# Patient Record
Sex: MALE | Race: WHITE | NOT HISPANIC OR LATINO | Employment: OTHER | URBAN - METROPOLITAN AREA
[De-identification: names, ages, dates, MRNs, and addresses within clinical notes are randomized per-mention and may not be internally consistent; named-entity substitution may affect disease eponyms.]

---

## 2017-03-31 ENCOUNTER — HOSPITAL ENCOUNTER (OUTPATIENT)
Facility: MEDICAL CENTER | Age: 67
End: 2017-03-31
Attending: COLON & RECTAL SURGERY | Admitting: COLON & RECTAL SURGERY
Payer: COMMERCIAL

## 2017-03-31 VITALS
WEIGHT: 193.78 LBS | TEMPERATURE: 98.1 F | OXYGEN SATURATION: 98 % | HEART RATE: 49 BPM | RESPIRATION RATE: 16 BRPM | SYSTOLIC BLOOD PRESSURE: 101 MMHG | DIASTOLIC BLOOD PRESSURE: 66 MMHG

## 2017-03-31 PROBLEM — R11.2 NAUSEA WITH VOMITING: Status: ACTIVE | Noted: 2017-03-31

## 2017-03-31 LAB
ANION GAP SERPL CALC-SCNC: 9 MMOL/L (ref 0–11.9)
BUN SERPL-MCNC: 15 MG/DL (ref 8–22)
CALCIUM SERPL-MCNC: 9.2 MG/DL (ref 8.5–10.5)
CHLORIDE SERPL-SCNC: 107 MMOL/L (ref 96–112)
CO2 SERPL-SCNC: 24 MMOL/L (ref 20–33)
CREAT SERPL-MCNC: 0.85 MG/DL (ref 0.5–1.4)
GFR SERPL CREATININE-BSD FRML MDRD: >60 ML/MIN/1.73 M 2
GLUCOSE SERPL-MCNC: 116 MG/DL (ref 65–99)
POTASSIUM SERPL-SCNC: 3.9 MMOL/L (ref 3.6–5.5)
SODIUM SERPL-SCNC: 140 MMOL/L (ref 135–145)

## 2017-03-31 PROCEDURE — 160002 HCHG RECOVERY MINUTES (STAT): Performed by: COLON & RECTAL SURGERY

## 2017-03-31 PROCEDURE — 160048 HCHG OR STATISTICAL LEVEL 1-5: Performed by: COLON & RECTAL SURGERY

## 2017-03-31 PROCEDURE — 500066 HCHG BITE BLOCK, ECT: Performed by: COLON & RECTAL SURGERY

## 2017-03-31 PROCEDURE — 99153 MOD SED SAME PHYS/QHP EA: CPT | Performed by: COLON & RECTAL SURGERY

## 2017-03-31 PROCEDURE — 80048 BASIC METABOLIC PNL TOTAL CA: CPT

## 2017-03-31 PROCEDURE — 88305 TISSUE EXAM BY PATHOLOGIST: CPT | Mod: 59

## 2017-03-31 PROCEDURE — 700111 HCHG RX REV CODE 636 W/ 250 OVERRIDE (IP)

## 2017-03-31 PROCEDURE — 160208 HCHG ENDO MINUTES - EA ADDL 1 MIN LEVEL 4: Performed by: COLON & RECTAL SURGERY

## 2017-03-31 PROCEDURE — 700111 HCHG RX REV CODE 636 W/ 250 OVERRIDE (IP): Performed by: COLON & RECTAL SURGERY

## 2017-03-31 PROCEDURE — 99152 MOD SED SAME PHYS/QHP 5/>YRS: CPT | Performed by: COLON & RECTAL SURGERY

## 2017-03-31 PROCEDURE — 502240 HCHG MISC OR SUPPLY RC 0272: Performed by: COLON & RECTAL SURGERY

## 2017-03-31 PROCEDURE — 160203 HCHG ENDO MINUTES - 1ST 30 MINS LEVEL 4: Performed by: COLON & RECTAL SURGERY

## 2017-03-31 RX ORDER — MIDAZOLAM HYDROCHLORIDE 1 MG/ML
.5-2 INJECTION INTRAMUSCULAR; INTRAVENOUS PRN
Status: DISCONTINUED | OUTPATIENT
Start: 2017-03-31 | End: 2017-03-31 | Stop reason: HOSPADM

## 2017-03-31 RX ORDER — SODIUM CHLORIDE 9 MG/ML
500 INJECTION, SOLUTION INTRAVENOUS PRN
Status: DISCONTINUED | OUTPATIENT
Start: 2017-03-31 | End: 2017-03-31 | Stop reason: HOSPADM

## 2017-03-31 RX ORDER — ATORVASTATIN CALCIUM 20 MG/1
20 TABLET, FILM COATED ORAL NIGHTLY
COMMUNITY
End: 2023-07-27

## 2017-03-31 RX ORDER — MIDAZOLAM HYDROCHLORIDE 1 MG/ML
INJECTION INTRAMUSCULAR; INTRAVENOUS
Status: DISCONTINUED | OUTPATIENT
Start: 2017-03-31 | End: 2017-03-31 | Stop reason: HOSPADM

## 2017-03-31 RX ORDER — GABAPENTIN 300 MG/1
600 CAPSULE ORAL 2 TIMES DAILY
COMMUNITY

## 2017-03-31 RX ORDER — SODIUM CHLORIDE, SODIUM LACTATE, POTASSIUM CHLORIDE, CALCIUM CHLORIDE 600; 310; 30; 20 MG/100ML; MG/100ML; MG/100ML; MG/100ML
1000 INJECTION, SOLUTION INTRAVENOUS
Status: COMPLETED | OUTPATIENT
Start: 2017-03-31 | End: 2017-03-31

## 2017-03-31 RX ADMIN — SODIUM CHLORIDE, POTASSIUM CHLORIDE, SODIUM LACTATE AND CALCIUM CHLORIDE 1000 ML: 600; 310; 30; 20 INJECTION, SOLUTION INTRAVENOUS at 09:30

## 2017-03-31 ASSESSMENT — PAIN SCALES - GENERAL
PAINLEVEL_OUTOF10: 0

## 2017-03-31 NOTE — IP AVS SNAPSHOT
Home Care Instructions                                                                                                                Name:Bart [facility]  Medical Record Number:[id number]  CSN: [id number]    YOB: 1950   Age: 67 y.o.  Sex: male  HT:  WT: 87.9 kg (193 lb 12.6 oz)          Admit Date: 3/31/2017     Discharge Date:   Today's Date: 3/31/2017  Attending Doctor:  Emerson Eaton M.D.                  Allergies:  Review of patient's allergies indicates no known allergies.                Discharge Instructions           ACTIVITY: Rest and take it easy for the first 24 hours.  A responsible adult is recommended to remain with you during that time.  It is normal to feel sleepy.  We encourage you to not do anything that requires balance, judgment or coordination.    MILD FLU-LIKE SYMPTOMS ARE NORMAL. YOU MAY EXPERIENCE GENERALIZED MUSCLE ACHES, THROAT IRRITATION, HEADACHE AND/OR SOME NAUSEA.    FOR 24 HOURS DO NOT:  Drive, operate machinery or run household appliances.  Drink beer or alcoholic beverages.   Make important decisions or sign legal documents.    SPECIAL INSTRUCTIONS: *EGD and Colonoscopy D/C instructions:     1. DIET: Upon discharge from the hospital you may resume your normal preoperative diet. Depending on how you are feeling and whether you have nausea or not, you may wish to stay with a bland diet for the first few days. However, you can advance this as quickly as you feel ready.     2. ACTIVITIES: Upon discharge from the hospital, the day of surgery it is requested that you do no significant physical activity and limit mental activities, as you have had sedation. The day after discharge, you may resume full routine activities.     3. DRIVING: You may drive whenever you are off pain medications.     4. BOWEL FUNCTION: Constipation is common after receiving anesthesia. The combination of pain medication and decreased activity level can cause constipation in otherwise normal  patients. If you feel this is occurring, take a laxative (Miralax, Milk of Magnesia, Ex-Lax, Senokot, etc.) until the problem has resolved.     5.CALL IF YOU HAVE: (1) Fevers to more than 101.0 F, (2) Unusual chest or leg pain, (3) Excessive bleeding or persistent nausea/vomiting, Please do not hesitate to call with any other questions.     6. APPOINTMENT: If biopsies were done, you can call the office in 1-2 weeks for results. Contact our office at [phone] with any questions or concerns. Our office hours are Monday-Friday, 8am-5pm.  Please try to call during these hours when we are better able to assist you.     If you have any additional questions, please do not hesitate to call the office and speak to either myself or the physician on call.     Office address:   [facility].   [address] [place] [id number]**    DIET: To avoid nausea, slowly advance diet as tolerated, avoiding spicy or greasy foods for the first day.  Add more substantial food to your diet according to your physician's instructions.  Babies can be fed formula or breast milk as soon as they are hungry.  INCREASE FLUIDS AND FIBER TO AVOID CONSTIPATION.    SURGICAL DRESSING/BATHING: *MAY SHOWER TOMORROW**    FOLLOW-UP APPOINTMENT:  A follow-up appointment should be arranged with your doctor in **FOLLOW UP WITH DR. DE LA PAZ *; call to schedule.    You should CALL YOUR PHYSICIAN if you develop:  Fever greater than 101 degrees F.  Pain not relieved by medication, or persistent nausea or vomiting.  Excessive bleeding (blood soaking through dressing) or unexpected drainage from the wound.  Extreme redness or swelling around the incision site, drainage of pus or foul smelling drainage.  Inability to urinate or empty your bladder within 8 hours.  Problems with breathing or chest pain.    You should call 911 if you develop problems with breathing or chest pain.  If you are unable to contact your doctor or surgical center,  you should go to the nearest emergency room or urgent care center.  Physician's telephone #: *DR. DE LA [phone] **    If any questions arise, call your doctor.  If your doctor is not available, please feel free to call the Surgical Center at [phone].  The Center is open Monday through Friday from 7AM to 7PM.  You can also call the HEALTH HOTLINE open 24 hours/day, 7 days/week and speak to a nurse at [phone], or toll free at [phone].    A registered nurse may call you a few days after your surgery to see how you are doing after your procedure.    MEDICATIONS: Resume taking daily medication.  Take prescribed pain medication with food.  If no medication is prescribed, you may take non-aspirin pain medication if needed.  PAIN MEDICATION CAN BE VERY CONSTIPATING.  Take a stool softener or laxative such as senokot, pericolace, or milk of magnesia if needed.    Prescription given for *NONE**.  Last pain medication given at *NONE    If your physician has prescribed pain medication that includes Acetaminophen (Tylenol), do not take additional Acetaminophen (Tylenol) while taking the prescribed medication.    Depression / Suicide Risk    As you are discharged from this [facility] facility, it is important to learn how to keep safe from harming yourself.    Recognize the warning signs:  · Abrupt changes in personality, positive or negative- including increase in energy   · Giving away possessions  · Change in eating patterns- significant weight changes-  positive or negative  · Change in sleeping patterns- unable to sleep or sleeping all the time   · Unwillingness or inability to communicate  · Depression  · Unusual sadness, discouragement and loneliness  · Talk of wanting to die  · Neglect of personal appearance   · Rebelliousness- reckless behavior  · Withdrawal from people/activities they love  · Confusion- inability to concentrate     If you or a loved one observes any of these behaviors or has  concerns about self-harm, here's what you can do:  · Talk about it- your feelings and reasons for harming yourself  · Remove any means that you might use to hurt yourself (examples: pills, rope, extension cords, firearm)  · Get professional help from the community (Mental Health, Substance Abuse, psychological counseling)  · Do not be alone:Call your Safe Contact- someone whom you trust who will be there for you.  · Call your local CRISIS HOTLINE 547-5629 or 202-097-5863  · Call your local Children's Mobile Crisis Response Team [place] (175) 235-5095 or www.[employer]  · Call the toll free National Suicide Prevention Hotlines   · National Suicide Prevention Lifeline 244-543-YASS (8022)  · [employer] [facility] Network 800-SUICIDE (284-0207)       Medication List      CONTINUE taking these medications        Instructions    aspirin EC 81 MG Tbec   Commonly known as:  ECOTRIN    Take 81 mg by mouth every day.   Dose:  81 mg       atorvastatin 20 MG Tabs   Commonly known as:  LIPITOR    Take 20 mg by mouth every evening.   Dose:  20 mg       gabapentin 300 MG Caps   Commonly known as:  NEURONTIN    Take 300 mg by mouth 3 times a day.   Dose:  300 mg               Medication Information     Above and/or attached are the medications your physician expects you to take upon discharge. Review all of your home medications and newly ordered medications with your doctor and/or pharmacist. Follow medication instructions as directed by your doctor and/or pharmacist. Please keep your medication list with you and share with your physician. Update the information when medications are discontinued, doses are changed, or new medications (including over-the-counter products) are added; and carry medication information at all times in the event of emergency situations.        Resources     Quit Smoking / Tobacco Use:    I understand the use of any tobacco products increases my chance of suffering from future heart disease or  stroke and could cause other illnesses which may shorten my life. Quitting the use of tobacco products is the single most important thing I can do to improve my health. For further information on smoking / tobacco cessation call a Toll Free Quit Line at 1-849.963.5592 (*National Cancer [place]) or 1-933.478.1175 (American Lung Association) or you can access the web based program at www.lungusa.org.    [place] Tobacco Users Help Line:  (779) 930-9752       Toll Free: 1-151.322.1839  Quit Tobacco Program [facility] (590)429-1277    Crisis Hotline:    [place] Crisis Hotline:  0-477-HIJRMUK or 1-769.333.6839    [place] Crisis Hotline:    1-716.691.8735 or 198-822-7224    Discharge Survey:   Thank you for choosing [facility]. We hope we did everything we could to make your hospital stay a pleasant one. You may be receiving a survey and we would appreciate your time and participation in answering the questions. Your input is very valuable to us in our efforts to improve our service to our patients and their families.            Signatures     My signature on this form indicates that:    1. I acknowledge receipt and understanding of these Home Care Instruction.  2. My questions regarding this information have been answered to my satisfaction.  3. I have formulated a plan with my discharge nurse to obtain my prescribed medications for home.    __________________________________      __________________________________                   Patient Signature                                 Guardian/Responsible Adult Signature      __________________________________                 __________       ________                       Nurse Signature                                               Date                 Time

## 2017-03-31 NOTE — OP REPORT
NAME:  Bart Ramsey  MRN:  [id number]  :  1950      DATE OF OPERATION: 3/31/2017    PREOPERATIVE DIAGNOSIS: Vomiting, diarrhea    POSTOPERATIVE DIAGNOSIS: Small hiatal hernia, sigmoid diverticulosis, otherwise normal EGD and colonoscopy to ccum    OPERATION PERFORMED: Esophagogastroduodenoscopy and colonoscopy to cecum with random biopsies    SURGEON: Emerson Eaton MD    SPECIMEN: Colon random biopsies    COMPLICATIONS: None    ESTIMATED BLOOD LOSS: <5 cc    INDICATIONS: The patient is a 67 y.o. male with a history of Vomiting, diarrhea. He is taken to the operating room today for Esophagogastroduodenoscopy.     DETAILS OF PROCEDURE: After an extensive informed consent discussion and   process, the patient was brought to the operating room and was placed in a   supine position on the endoscopy table. The patient was then given conscious sedation in the form 100 mcg of fentanyl and 5 mg of Versed IV. During the course of the   procedure, the patient was monitored continuously with pulse oximetry, telemetry, and   intermittent blood pressure readings.     The patient was turned and placed in the left lateral decubitus position with   a bite block and comfortable padding. After induction of conscious sedation,   the gastroscope was slowly introduced and advanced into the esophagus. It was   slowly advanced down to the gastroesophageal junction region. The GE   junction appeared normal with a small hiatal hernia.  The gastroscope passed without difficulty into the body of the stomach, which appeared normal. The gastroscope was then advanced into the duodenum. The first, second, and third portions of the duodenum appeared normal with no evidence of duodenitis or ulcers.     Slowly, the gastroscope was withdrawn, and the   antrum appeared normal. A retroflexion view was performed. This demonstrated   no evidence of ulcers.  The gastroscope was slowly withdrawn as air was aspirated and the area of the  proximal stomach and gastroesophageal junction region were again carefully inspected, which all appeared normal with a small hiatal hernia a very subtle venous prominence or varix and were photographed for documentation purposes. The gastroscope was then withdrawn.     A digital rectal exam was performed which demonstrated normal tone generally normal tone and no lesions, no significant external hemorrhoids.    Colonoscopy was introduced. It was slowly advanced. The rectum appeared normal. The sigmoid colon exhibited mild diverticulosis. Gradually, the colonoscope was advanced beyond both flexures to the cecum. The ileocecal valve and appendiceal orifice were clearly identified and photographed for documentation purposes. The area was irrigated as modest amount of fecal material obscured the view in the cecum and throughout the colon, but mostly we had a good view with multiple flushes and irrigations.     The colonoscope was slowly withdrawn. The vascular pattern appeared normal. No neoplasia or lesions were identified. Random biopsies were obtained from the ascending, transverse, descending, sigmoid and colon.  In the sigmoid colon, diverticulosis was present. Examination of the  internal hemorrhoids demonstrated mild-to-moderate internal hemorrhoids. Air was aspirated and the colonoscope was withdrawn.       IMPRESSION/PLAN: Small hiatal hernia. Sigmoid diverticulosis, otherwise normal EGD and colonoscopy.    Await colon biopsies. Recommend abdominal ultrasound to further evaluate vomiting.     The patient tolerated the procedure well and there were no apparent complications.  He was awakened and transferred to the recovery area in satisfactory condition.       ____________________________________   Emerson Eaton MD  DD: 3/31/2017  12:34 PM    CC:  [facility];

## 2017-03-31 NOTE — IP AVS SNAPSHOT
3/31/2017          Bart [facility]  [address] [place] [address]    Dear Bart:    [facility] wants to ensure your discharge home is safe and you or your loved ones have had all your questions answered regarding your care after you leave the hospital.    You may receive a telephone call within two days of your discharge.  This call is to make certain you understand your discharge instructions as well as ensure we provided you with the best care possible during your stay with us.     The call will only last approximately 3-5 minutes and will be done by a nurse.    Once again, we want to ensure your discharge home is safe and that you have a clear understanding of any next steps in your care.  If you have any questions or concerns, please do not hesitate to contact us, we are here for you.  Thank you for choosing [facility] for your healthcare needs.    Sincerely,    Manny Thompson    [facility]

## 2017-03-31 NOTE — DISCHARGE INSTRUCTIONS
ACTIVITY: Rest and take it easy for the first 24 hours.  A responsible adult is recommended to remain with you during that time.  It is normal to feel sleepy.  We encourage you to not do anything that requires balance, judgment or coordination.    MILD FLU-LIKE SYMPTOMS ARE NORMAL. YOU MAY EXPERIENCE GENERALIZED MUSCLE ACHES, THROAT IRRITATION, HEADACHE AND/OR SOME NAUSEA.    FOR 24 HOURS DO NOT:  Drive, operate machinery or run household appliances.  Drink beer or alcoholic beverages.   Make important decisions or sign legal documents.    SPECIAL INSTRUCTIONS: *EGD and Colonoscopy D/C instructions:     1. DIET: Upon discharge from the hospital you may resume your normal preoperative diet. Depending on how you are feeling and whether you have nausea or not, you may wish to stay with a bland diet for the first few days. However, you can advance this as quickly as you feel ready.     2. ACTIVITIES: Upon discharge from the hospital, the day of surgery it is requested that you do no significant physical activity and limit mental activities, as you have had sedation. The day after discharge, you may resume full routine activities.     3. DRIVING: You may drive whenever you are off pain medications.     4. BOWEL FUNCTION: Constipation is common after receiving anesthesia. The combination of pain medication and decreased activity level can cause constipation in otherwise normal patients. If you feel this is occurring, take a laxative (Miralax, Milk of Magnesia, Ex-Lax, Senokot, etc.) until the problem has resolved.     5.CALL IF YOU HAVE: (1) Fevers to more than 101.0 F, (2) Unusual chest or leg pain, (3) Excessive bleeding or persistent nausea/vomiting, Please do not hesitate to call with any other questions.     6. APPOINTMENT: If biopsies were done, you can call the office in 1-2 weeks for results. Contact our office at [phone] with any questions or concerns. Our office hours are Monday-Friday, 8am-5pm.  Please  try to call during these hours when we are better able to assist you.     If you have any additional questions, please do not hesitate to call the office and speak to either myself or the physician on call.     Office address:   [facility].   [address] [place] [address]   [address]   [place] [address]**    DIET: To avoid nausea, slowly advance diet as tolerated, avoiding spicy or greasy foods for the first day.  Add more substantial food to your diet according to your physician's instructions.  Babies can be fed formula or breast milk as soon as they are hungry.  INCREASE FLUIDS AND FIBER TO AVOID CONSTIPATION.    SURGICAL DRESSING/BATHING: *MAY SHOWER TOMORROW**    FOLLOW-UP APPOINTMENT:  A follow-up appointment should be arranged with your doctor in **FOLLOW UP WITH DR. DE LA PAZ *; call to schedule.    You should CALL YOUR PHYSICIAN if you develop:  Fever greater than 101 degrees F.  Pain not relieved by medication, or persistent nausea or vomiting.  Excessive bleeding (blood soaking through dressing) or unexpected drainage from the wound.  Extreme redness or swelling around the incision site, drainage of pus or foul smelling drainage.  Inability to urinate or empty your bladder within 8 hours.  Problems with breathing or chest pain.    You should call 911 if you develop problems with breathing or chest pain.  If you are unable to contact your doctor or surgical center, you should go to the nearest emergency room or urgent care center.  Physician's telephone #: *DR. DE LA -[phone] **    If any questions arise, call your doctor.  If your doctor is not available, please feel free to call the Surgical Center at [phone].  The Center is open Monday through Friday from 7AM to 7PM.  You can also call the "[employer] HOTLINE open 24 hours/day, 7 days/week and speak to a nurse at [phone], or toll free at [phone].    A registered nurse may call you a few days after your surgery to see how you are doing after  your procedure.    MEDICATIONS: Resume taking daily medication.  Take prescribed pain medication with food.  If no medication is prescribed, you may take non-aspirin pain medication if needed.  PAIN MEDICATION CAN BE VERY CONSTIPATING.  Take a stool softener or laxative such as senokot, pericolace, or milk of magnesia if needed.    Prescription given for *NONE**.  Last pain medication given at *NONE    If your physician has prescribed pain medication that includes Acetaminophen (Tylenol), do not take additional Acetaminophen (Tylenol) while taking the prescribed medication.    Depression / Suicide Risk    As you are discharged from this [facility] facility, it is important to learn how to keep safe from harming yourself.    Recognize the warning signs:  · Abrupt changes in personality, positive or negative- including increase in energy   · Giving away possessions  · Change in eating patterns- significant weight changes-  positive or negative  · Change in sleeping patterns- unable to sleep or sleeping all the time   · Unwillingness or inability to communicate  · Depression  · Unusual sadness, discouragement and loneliness  · Talk of wanting to die  · Neglect of personal appearance   · Rebelliousness- reckless behavior  · Withdrawal from people/activities they love  · Confusion- inability to concentrate     If you or a loved one observes any of these behaviors or has concerns about self-harm, here's what you can do:  · Talk about it- your feelings and reasons for harming yourself  · Remove any means that you might use to hurt yourself (examples: pills, rope, extension cords, firearm)  · Get professional help from the community (Mental Health, Substance Abuse, psychological counseling)  · Do not be alone:Call your Safe Contact- someone whom you trust who will be there for you.  · Call your local CRISIS HOTLINE 305-3866 or 653-839-1652  · Call your local Children's Mobile Crisis Response Team [facility] (702)  175-8800 or www.[employer]pan  · Call the toll free National Suicide Prevention Hotlines   · National Suicide Prevention Lifeline 445-076-TVID (6267)  · National [employer] Line Network 800-SUICIDE (085-2934)

## 2017-03-31 NOTE — OR NURSING
1210  RECEIVED PATIENT FROM ENDO.  REPORT FROM ROSI GARCIA.  PATIENT DENIES PAIN.  RESPIRATIONS ARE EVEN AND UNLABORED. TAKING PO WITHOUT DIFFICULTY.     1300  UP GETTING DRESSED.  WIFE CYNTHIA TO BEDSIDE.    1307  DISCHARGED.  DISCHARGE INSTRUCTIONS GIVEN TO PATIENT AND HIS WIFE.  A VERBAL UNDERSTANDING OF ALL INSTRUCTIONS WAS STATED.  PATIENT TAKING PO AND AMBULATING WITHOUT DIFFICULTY.  PATIENT STATES HE IS READY TO GO HOME.

## 2021-01-15 DIAGNOSIS — Z23 NEED FOR VACCINATION: ICD-10-CM

## 2023-02-10 ENCOUNTER — APPOINTMENT (OUTPATIENT)
Dept: RADIOLOGY | Facility: MEDICAL CENTER | Age: 73
End: 2023-02-10
Attending: EMERGENCY MEDICINE
Payer: COMMERCIAL

## 2023-02-10 ENCOUNTER — APPOINTMENT (OUTPATIENT)
Dept: RADIOLOGY | Facility: MEDICAL CENTER | Age: 73
End: 2023-02-10
Attending: INTERNAL MEDICINE
Payer: COMMERCIAL

## 2023-02-10 ENCOUNTER — APPOINTMENT (OUTPATIENT)
Dept: CARDIOLOGY | Facility: MEDICAL CENTER | Age: 73
End: 2023-02-10
Attending: INTERNAL MEDICINE
Payer: COMMERCIAL

## 2023-02-10 ENCOUNTER — HOSPITAL ENCOUNTER (OUTPATIENT)
Facility: MEDICAL CENTER | Age: 73
End: 2023-02-11
Attending: EMERGENCY MEDICINE | Admitting: INTERNAL MEDICINE
Payer: COMMERCIAL

## 2023-02-10 DIAGNOSIS — I25.9 CHEST PAIN DUE TO MYOCARDIAL ISCHEMIA, UNSPECIFIED ISCHEMIC CHEST PAIN TYPE: ICD-10-CM

## 2023-02-10 DIAGNOSIS — I16.1 HYPERTENSIVE EMERGENCY: ICD-10-CM

## 2023-02-10 DIAGNOSIS — R07.9 ACUTE CHEST PAIN: ICD-10-CM

## 2023-02-10 PROBLEM — I10 HYPERTENSION: Status: ACTIVE | Noted: 2023-02-10

## 2023-02-10 PROBLEM — I25.10 CAD (CORONARY ARTERY DISEASE): Status: ACTIVE | Noted: 2023-02-10

## 2023-02-10 PROBLEM — I71.9 AORTIC ANEURYSM (HCC): Status: ACTIVE | Noted: 2023-02-10

## 2023-02-10 LAB
ALBUMIN SERPL BCP-MCNC: 4.3 G/DL (ref 3.2–4.9)
ALBUMIN/GLOB SERPL: 1.2 G/DL
ALP SERPL-CCNC: 100 U/L (ref 30–99)
ALT SERPL-CCNC: 30 U/L (ref 2–50)
ANION GAP SERPL CALC-SCNC: 13 MMOL/L (ref 7–16)
AST SERPL-CCNC: 37 U/L (ref 12–45)
BASOPHILS # BLD AUTO: 1 % (ref 0–1.8)
BASOPHILS # BLD: 0.08 K/UL (ref 0–0.12)
BILIRUB SERPL-MCNC: 0.7 MG/DL (ref 0.1–1.5)
BUN SERPL-MCNC: 12 MG/DL (ref 8–22)
CALCIUM ALBUM COR SERPL-MCNC: 9.3 MG/DL (ref 8.5–10.5)
CALCIUM SERPL-MCNC: 9.5 MG/DL (ref 8.5–10.5)
CHLORIDE SERPL-SCNC: 105 MMOL/L (ref 96–112)
CO2 SERPL-SCNC: 22 MMOL/L (ref 20–33)
CREAT SERPL-MCNC: 0.82 MG/DL (ref 0.5–1.4)
D DIMER PPP IA.FEU-MCNC: 0.55 UG/ML (FEU) (ref 0–0.5)
EKG IMPRESSION: NORMAL
EOSINOPHIL # BLD AUTO: 0.29 K/UL (ref 0–0.51)
EOSINOPHIL NFR BLD: 3.7 % (ref 0–6.9)
ERYTHROCYTE [DISTWIDTH] IN BLOOD BY AUTOMATED COUNT: 47.1 FL (ref 35.9–50)
GFR SERPLBLD CREATININE-BSD FMLA CKD-EPI: 93 ML/MIN/1.73 M 2
GLOBULIN SER CALC-MCNC: 3.5 G/DL (ref 1.9–3.5)
GLUCOSE BLD STRIP.AUTO-MCNC: 140 MG/DL (ref 65–99)
GLUCOSE BLD STRIP.AUTO-MCNC: 146 MG/DL (ref 65–99)
GLUCOSE SERPL-MCNC: 154 MG/DL (ref 65–99)
HCT VFR BLD AUTO: 47.7 % (ref 42–52)
HGB BLD-MCNC: 16.4 G/DL (ref 14–18)
IMM GRANULOCYTES # BLD AUTO: 0.02 K/UL (ref 0–0.11)
IMM GRANULOCYTES NFR BLD AUTO: 0.3 % (ref 0–0.9)
LV EJECT FRACT  99904: 65
LYMPHOCYTES # BLD AUTO: 1.65 K/UL (ref 1–4.8)
LYMPHOCYTES NFR BLD: 21.3 % (ref 22–41)
MCH RBC QN AUTO: 31.6 PG (ref 27–33)
MCHC RBC AUTO-ENTMCNC: 34.4 G/DL (ref 33.7–35.3)
MCV RBC AUTO: 91.9 FL (ref 81.4–97.8)
MONOCYTES # BLD AUTO: 0.6 K/UL (ref 0–0.85)
MONOCYTES NFR BLD AUTO: 7.7 % (ref 0–13.4)
NEUTROPHILS # BLD AUTO: 5.11 K/UL (ref 1.82–7.42)
NEUTROPHILS NFR BLD: 66 % (ref 44–72)
NRBC # BLD AUTO: 0 K/UL
NRBC BLD-RTO: 0 /100 WBC
PLATELET # BLD AUTO: 97 K/UL (ref 164–446)
PMV BLD AUTO: 10.3 FL (ref 9–12.9)
POTASSIUM SERPL-SCNC: 4 MMOL/L (ref 3.6–5.5)
PROT SERPL-MCNC: 7.8 G/DL (ref 6–8.2)
RBC # BLD AUTO: 5.19 M/UL (ref 4.7–6.1)
SODIUM SERPL-SCNC: 140 MMOL/L (ref 135–145)
TROPONIN T SERPL-MCNC: 10 NG/L (ref 6–19)
TROPONIN T SERPL-MCNC: 14 NG/L (ref 6–19)
TROPONIN T SERPL-MCNC: 44 NG/L (ref 6–19)
WBC # BLD AUTO: 7.8 K/UL (ref 4.8–10.8)

## 2023-02-10 PROCEDURE — G0378 HOSPITAL OBSERVATION PER HR: HCPCS

## 2023-02-10 PROCEDURE — 85379 FIBRIN DEGRADATION QUANT: CPT

## 2023-02-10 PROCEDURE — A9270 NON-COVERED ITEM OR SERVICE: HCPCS | Performed by: INTERNAL MEDICINE

## 2023-02-10 PROCEDURE — 99285 EMERGENCY DEPT VISIT HI MDM: CPT

## 2023-02-10 PROCEDURE — 85025 COMPLETE CBC W/AUTO DIFF WBC: CPT

## 2023-02-10 PROCEDURE — 71045 X-RAY EXAM CHEST 1 VIEW: CPT

## 2023-02-10 PROCEDURE — 93005 ELECTROCARDIOGRAM TRACING: CPT | Performed by: INTERNAL MEDICINE

## 2023-02-10 PROCEDURE — 93005 ELECTROCARDIOGRAM TRACING: CPT

## 2023-02-10 PROCEDURE — 700111 HCHG RX REV CODE 636 W/ 250 OVERRIDE (IP): Performed by: INTERNAL MEDICINE

## 2023-02-10 PROCEDURE — 74175 CTA ABDOMEN W/CONTRAST: CPT

## 2023-02-10 PROCEDURE — 80053 COMPREHEN METABOLIC PANEL: CPT

## 2023-02-10 PROCEDURE — 96376 TX/PRO/DX INJ SAME DRUG ADON: CPT | Mod: XU

## 2023-02-10 PROCEDURE — 700102 HCHG RX REV CODE 250 W/ 637 OVERRIDE(OP): Performed by: INTERNAL MEDICINE

## 2023-02-10 PROCEDURE — 99204 OFFICE O/P NEW MOD 45 MIN: CPT | Performed by: INTERNAL MEDICINE

## 2023-02-10 PROCEDURE — 93005 ELECTROCARDIOGRAM TRACING: CPT | Mod: XE | Performed by: EMERGENCY MEDICINE

## 2023-02-10 PROCEDURE — 36415 COLL VENOUS BLD VENIPUNCTURE: CPT

## 2023-02-10 PROCEDURE — 96374 THER/PROPH/DIAG INJ IV PUSH: CPT

## 2023-02-10 PROCEDURE — 700102 HCHG RX REV CODE 250 W/ 637 OVERRIDE(OP): Performed by: EMERGENCY MEDICINE

## 2023-02-10 PROCEDURE — 700117 HCHG RX CONTRAST REV CODE 255: Performed by: EMERGENCY MEDICINE

## 2023-02-10 PROCEDURE — 99223 1ST HOSP IP/OBS HIGH 75: CPT | Performed by: INTERNAL MEDICINE

## 2023-02-10 PROCEDURE — 82962 GLUCOSE BLOOD TEST: CPT | Mod: 91

## 2023-02-10 PROCEDURE — 700111 HCHG RX REV CODE 636 W/ 250 OVERRIDE (IP): Performed by: EMERGENCY MEDICINE

## 2023-02-10 PROCEDURE — 96375 TX/PRO/DX INJ NEW DRUG ADDON: CPT

## 2023-02-10 PROCEDURE — A9270 NON-COVERED ITEM OR SERVICE: HCPCS | Performed by: EMERGENCY MEDICINE

## 2023-02-10 PROCEDURE — 700105 HCHG RX REV CODE 258: Performed by: INTERNAL MEDICINE

## 2023-02-10 PROCEDURE — 93306 TTE W/DOPPLER COMPLETE: CPT | Mod: 26 | Performed by: INTERNAL MEDICINE

## 2023-02-10 PROCEDURE — 93306 TTE W/DOPPLER COMPLETE: CPT

## 2023-02-10 PROCEDURE — 84484 ASSAY OF TROPONIN QUANT: CPT | Mod: 91

## 2023-02-10 RX ORDER — LANOLIN ALCOHOL/MO/W.PET/CERES
1000 CREAM (GRAM) TOPICAL DAILY
COMMUNITY

## 2023-02-10 RX ORDER — VITAMIN B COMPLEX
1000 TABLET ORAL DAILY
COMMUNITY

## 2023-02-10 RX ORDER — OXYCODONE HYDROCHLORIDE 5 MG/1
2.5 TABLET ORAL
Status: DISCONTINUED | OUTPATIENT
Start: 2023-02-10 | End: 2023-02-11 | Stop reason: HOSPADM

## 2023-02-10 RX ORDER — AMOXICILLIN 250 MG
2 CAPSULE ORAL 2 TIMES DAILY
Status: DISCONTINUED | OUTPATIENT
Start: 2023-02-10 | End: 2023-02-11 | Stop reason: HOSPADM

## 2023-02-10 RX ORDER — FLUTICASONE PROPIONATE 50 MCG
2 SPRAY, SUSPENSION (ML) NASAL DAILY
COMMUNITY
End: 2023-11-13

## 2023-02-10 RX ORDER — ATORVASTATIN CALCIUM 20 MG/1
20 TABLET, FILM COATED ORAL NIGHTLY
Status: DISCONTINUED | OUTPATIENT
Start: 2023-02-10 | End: 2023-02-11 | Stop reason: HOSPADM

## 2023-02-10 RX ORDER — BUDESONIDE AND FORMOTEROL FUMARATE DIHYDRATE 160; 4.5 UG/1; UG/1
2 AEROSOL RESPIRATORY (INHALATION)
Status: DISCONTINUED | OUTPATIENT
Start: 2023-02-10 | End: 2023-02-11 | Stop reason: HOSPADM

## 2023-02-10 RX ORDER — FLUTICASONE PROPIONATE AND SALMETEROL 250; 50 UG/1; UG/1
1 POWDER RESPIRATORY (INHALATION) 2 TIMES DAILY
COMMUNITY

## 2023-02-10 RX ORDER — ENALAPRILAT 1.25 MG/ML
1.25 INJECTION INTRAVENOUS EVERY 6 HOURS PRN
Status: DISCONTINUED | OUTPATIENT
Start: 2023-02-10 | End: 2023-02-11 | Stop reason: HOSPADM

## 2023-02-10 RX ORDER — BACLOFEN 10 MG/1
10 TABLET ORAL NIGHTLY PRN
Status: DISCONTINUED | OUTPATIENT
Start: 2023-02-10 | End: 2023-02-11 | Stop reason: HOSPADM

## 2023-02-10 RX ORDER — ONDANSETRON 4 MG/1
4 TABLET, ORALLY DISINTEGRATING ORAL EVERY 4 HOURS PRN
Status: DISCONTINUED | OUTPATIENT
Start: 2023-02-10 | End: 2023-02-11 | Stop reason: HOSPADM

## 2023-02-10 RX ORDER — HYDRALAZINE HYDROCHLORIDE 20 MG/ML
10 INJECTION INTRAMUSCULAR; INTRAVENOUS EVERY 4 HOURS PRN
Status: DISCONTINUED | OUTPATIENT
Start: 2023-02-10 | End: 2023-02-11 | Stop reason: HOSPADM

## 2023-02-10 RX ORDER — GABAPENTIN 300 MG/1
300 CAPSULE ORAL 3 TIMES DAILY
Status: DISCONTINUED | OUTPATIENT
Start: 2023-02-10 | End: 2023-02-11 | Stop reason: HOSPADM

## 2023-02-10 RX ORDER — ONDANSETRON 2 MG/ML
4 INJECTION INTRAMUSCULAR; INTRAVENOUS ONCE
Status: COMPLETED | OUTPATIENT
Start: 2023-02-10 | End: 2023-02-10

## 2023-02-10 RX ORDER — BACLOFEN 10 MG/1
10 TABLET ORAL NIGHTLY PRN
COMMUNITY

## 2023-02-10 RX ORDER — POLYETHYLENE GLYCOL 3350 17 G/17G
1 POWDER, FOR SOLUTION ORAL
Status: DISCONTINUED | OUTPATIENT
Start: 2023-02-10 | End: 2023-02-11 | Stop reason: HOSPADM

## 2023-02-10 RX ORDER — FLUTICASONE PROPIONATE 50 MCG
2 SPRAY, SUSPENSION (ML) NASAL DAILY
Status: DISCONTINUED | OUTPATIENT
Start: 2023-02-11 | End: 2023-02-11 | Stop reason: HOSPADM

## 2023-02-10 RX ORDER — METOPROLOL TARTRATE 50 MG/1
50 TABLET, FILM COATED ORAL TWICE DAILY
Status: DISCONTINUED | OUTPATIENT
Start: 2023-02-10 | End: 2023-02-11 | Stop reason: HOSPADM

## 2023-02-10 RX ORDER — ENOXAPARIN SODIUM 100 MG/ML
40 INJECTION SUBCUTANEOUS DAILY
Status: DISCONTINUED | OUTPATIENT
Start: 2023-02-10 | End: 2023-02-11 | Stop reason: HOSPADM

## 2023-02-10 RX ORDER — MORPHINE SULFATE 4 MG/ML
2 INJECTION INTRAVENOUS
Status: DISCONTINUED | OUTPATIENT
Start: 2023-02-10 | End: 2023-02-11 | Stop reason: HOSPADM

## 2023-02-10 RX ORDER — OMEPRAZOLE 20 MG/1
40 CAPSULE, DELAYED RELEASE ORAL DAILY
Status: DISCONTINUED | OUTPATIENT
Start: 2023-02-10 | End: 2023-02-11 | Stop reason: HOSPADM

## 2023-02-10 RX ORDER — LOSARTAN POTASSIUM 50 MG/1
50 TABLET ORAL
Status: DISCONTINUED | OUTPATIENT
Start: 2023-02-11 | End: 2023-02-11 | Stop reason: HOSPADM

## 2023-02-10 RX ORDER — MORPHINE SULFATE 4 MG/ML
4 INJECTION INTRAVENOUS ONCE
Status: COMPLETED | OUTPATIENT
Start: 2023-02-10 | End: 2023-02-10

## 2023-02-10 RX ORDER — SODIUM CHLORIDE 9 MG/ML
INJECTION, SOLUTION INTRAVENOUS CONTINUOUS
Status: DISCONTINUED | OUTPATIENT
Start: 2023-02-10 | End: 2023-02-11

## 2023-02-10 RX ORDER — BISACODYL 10 MG
10 SUPPOSITORY, RECTAL RECTAL
Status: DISCONTINUED | OUTPATIENT
Start: 2023-02-10 | End: 2023-02-11 | Stop reason: HOSPADM

## 2023-02-10 RX ORDER — CHLORAL HYDRATE 500 MG
1000 CAPSULE ORAL DAILY
COMMUNITY

## 2023-02-10 RX ORDER — NITROGLYCERIN 0.4 MG/1
0.4 TABLET SUBLINGUAL
Status: DISCONTINUED | OUTPATIENT
Start: 2023-02-10 | End: 2023-02-11 | Stop reason: HOSPADM

## 2023-02-10 RX ORDER — OXYCODONE HYDROCHLORIDE 5 MG/1
5 TABLET ORAL
Status: DISCONTINUED | OUTPATIENT
Start: 2023-02-10 | End: 2023-02-11 | Stop reason: HOSPADM

## 2023-02-10 RX ORDER — OMEPRAZOLE 40 MG/1
40 CAPSULE, DELAYED RELEASE ORAL DAILY
COMMUNITY
End: 2023-11-13

## 2023-02-10 RX ORDER — GUAIFENESIN/DEXTROMETHORPHAN 100-10MG/5
10 SYRUP ORAL EVERY 6 HOURS PRN
Status: DISCONTINUED | OUTPATIENT
Start: 2023-02-10 | End: 2023-02-11 | Stop reason: HOSPADM

## 2023-02-10 RX ORDER — ACETAMINOPHEN 325 MG/1
650 TABLET ORAL EVERY 6 HOURS PRN
Status: DISCONTINUED | OUTPATIENT
Start: 2023-02-10 | End: 2023-02-11 | Stop reason: HOSPADM

## 2023-02-10 RX ORDER — REGADENOSON 0.08 MG/ML
0.4 INJECTION, SOLUTION INTRAVENOUS ONCE
Status: COMPLETED | OUTPATIENT
Start: 2023-02-10 | End: 2023-02-11

## 2023-02-10 RX ORDER — LOSARTAN POTASSIUM 25 MG/1
25 TABLET ORAL
Status: DISCONTINUED | OUTPATIENT
Start: 2023-02-10 | End: 2023-02-10

## 2023-02-10 RX ORDER — METOPROLOL TARTRATE 1 MG/ML
5 INJECTION, SOLUTION INTRAVENOUS
Status: DISCONTINUED | OUTPATIENT
Start: 2023-02-10 | End: 2023-02-10

## 2023-02-10 RX ORDER — ONDANSETRON 2 MG/ML
4 INJECTION INTRAMUSCULAR; INTRAVENOUS EVERY 4 HOURS PRN
Status: DISCONTINUED | OUTPATIENT
Start: 2023-02-10 | End: 2023-02-11 | Stop reason: HOSPADM

## 2023-02-10 RX ORDER — AMINOPHYLLINE 25 MG/ML
100 INJECTION, SOLUTION INTRAVENOUS
Status: COMPLETED | OUTPATIENT
Start: 2023-02-10 | End: 2023-02-11

## 2023-02-10 RX ADMIN — ASPIRIN 81 MG: 81 TABLET, COATED ORAL at 10:20

## 2023-02-10 RX ADMIN — METOPROLOL TARTRATE 50 MG: 50 TABLET, FILM COATED ORAL at 19:36

## 2023-02-10 RX ADMIN — ONDANSETRON 4 MG: 2 INJECTION INTRAMUSCULAR; INTRAVENOUS at 14:30

## 2023-02-10 RX ADMIN — ONDANSETRON 4 MG: 2 INJECTION INTRAMUSCULAR; INTRAVENOUS at 06:17

## 2023-02-10 RX ADMIN — MORPHINE SULFATE 4 MG: 4 INJECTION INTRAVENOUS at 06:17

## 2023-02-10 RX ADMIN — BUDESONIDE AND FORMOTEROL FUMARATE DIHYDRATE 2 PUFF: 160; 4.5 AEROSOL RESPIRATORY (INHALATION) at 21:53

## 2023-02-10 RX ADMIN — ENALAPRILAT 1.25 MG: 1.25 INJECTION INTRAVENOUS at 14:44

## 2023-02-10 RX ADMIN — OXYCODONE HYDROCHLORIDE 5 MG: 5 TABLET ORAL at 10:29

## 2023-02-10 RX ADMIN — IOHEXOL 100 ML: 350 INJECTION, SOLUTION INTRAVENOUS at 06:41

## 2023-02-10 RX ADMIN — HYDRALAZINE HYDROCHLORIDE 10 MG: 20 INJECTION INTRAMUSCULAR; INTRAVENOUS at 10:20

## 2023-02-10 RX ADMIN — NITROGLYCERIN 0.5 INCH: 20 OINTMENT TOPICAL at 08:11

## 2023-02-10 RX ADMIN — OMEPRAZOLE 40 MG: 20 CAPSULE, DELAYED RELEASE ORAL at 10:20

## 2023-02-10 RX ADMIN — SODIUM CHLORIDE: 9 INJECTION, SOLUTION INTRAVENOUS at 14:52

## 2023-02-10 RX ADMIN — GABAPENTIN 300 MG: 300 CAPSULE ORAL at 19:36

## 2023-02-10 ASSESSMENT — LIFESTYLE VARIABLES
ALCOHOL_USE: NO
TOTAL SCORE: 0
CONSUMPTION TOTAL: NEGATIVE
TOTAL SCORE: 0
HAVE YOU EVER FELT YOU SHOULD CUT DOWN ON YOUR DRINKING: NO
ON A TYPICAL DAY WHEN YOU DRINK ALCOHOL HOW MANY DRINKS DO YOU HAVE: 0
DOES PATIENT WANT TO STOP DRINKING: NO
HAVE PEOPLE ANNOYED YOU BY CRITICIZING YOUR DRINKING: NO
AVERAGE NUMBER OF DAYS PER WEEK YOU HAVE A DRINK CONTAINING ALCOHOL: 0
TOTAL SCORE: 0
HOW MANY TIMES IN THE PAST YEAR HAVE YOU HAD 5 OR MORE DRINKS IN A DAY: 0
EVER FELT BAD OR GUILTY ABOUT YOUR DRINKING: NO
EVER HAD A DRINK FIRST THING IN THE MORNING TO STEADY YOUR NERVES TO GET RID OF A HANGOVER: NO

## 2023-02-10 ASSESSMENT — PATIENT HEALTH QUESTIONNAIRE - PHQ9
1. LITTLE INTEREST OR PLEASURE IN DOING THINGS: NOT AT ALL
7. TROUBLE CONCENTRATING ON THINGS, SUCH AS READING THE NEWSPAPER OR WATCHING TELEVISION: MORE THAN HALF THE DAYS
5. POOR APPETITE OR OVEREATING: NOT AT ALL
SUM OF ALL RESPONSES TO PHQ9 QUESTIONS 1 AND 2: 1
3. TROUBLE FALLING OR STAYING ASLEEP OR SLEEPING TOO MUCH: SEVERAL DAYS
4. FEELING TIRED OR HAVING LITTLE ENERGY: NOT AT ALL
SUM OF ALL RESPONSES TO PHQ QUESTIONS 1-9: 5
8. MOVING OR SPEAKING SO SLOWLY THAT OTHER PEOPLE COULD HAVE NOTICED. OR THE OPPOSITE, BEING SO FIGETY OR RESTLESS THAT YOU HAVE BEEN MOVING AROUND A LOT MORE THAN USUAL: SEVERAL DAYS
2. FEELING DOWN, DEPRESSED, IRRITABLE, OR HOPELESS: SEVERAL DAYS
6. FEELING BAD ABOUT YOURSELF - OR THAT YOU ARE A FAILURE OR HAVE LET YOURSELF OR YOUR FAMILY DOWN: NOT AL ALL
9. THOUGHTS THAT YOU WOULD BE BETTER OFF DEAD, OR OF HURTING YOURSELF: NOT AT ALL

## 2023-02-10 ASSESSMENT — ENCOUNTER SYMPTOMS
WEAKNESS: 1
ABDOMINAL PAIN: 0
HEADACHES: 0
FEVER: 0
SHORTNESS OF BREATH: 1
SPEECH CHANGE: 0
DIZZINESS: 0
NAUSEA: 1
VOMITING: 0
CHILLS: 0
FOCAL WEAKNESS: 0
MYALGIAS: 0
COUGH: 1
DEPRESSION: 0
BACK PAIN: 0
MEMORY LOSS: 0
NERVOUS/ANXIOUS: 0
HEARTBURN: 0
PALPITATIONS: 0
LOSS OF CONSCIOUSNESS: 0
SENSORY CHANGE: 0
FLANK PAIN: 0

## 2023-02-10 ASSESSMENT — PAIN DESCRIPTION - PAIN TYPE
TYPE: ACUTE PAIN

## 2023-02-10 ASSESSMENT — FIBROSIS 4 INDEX: FIB4 SCORE: 5.01

## 2023-02-10 NOTE — ED PROVIDER NOTES
ER Provider Note    Scribed for Ronal Mustafa M.d. by Ruben Adan. 2/10/2023  5:58 AM    Primary Care Provider: Pcp Pt States None    CHIEF COMPLAINT  Chief Complaint   Patient presents with    Chest Pain     Pt reports being woken up this morning with CP that radiates from R chest through into back. Pt has associated SOB as well. Hx of MI x 3 (last one in 2001). Pt has a total of 3 stents placed.     EXTERNAL RECORDS REVIEWED  Other Patient has history of 3 stent placements. He has not had a stress test or an Echocardiogram.    HPI/ROS  LIMITATION TO HISTORY   Select: : None  OUTSIDE HISTORIAN(S):  None    Bart Ramsey is a 72 y.o. male who presents to the ED who was upgraded to code Aorta complaining of acute tearing chest pain which woke him up this morning. He describes this chest pain as radiating from his right chest into his back. He complains of associated shortness of breath, and has a history of 3 MI's, last in 2001. He says he took 4 baby aspirin this morning at 2:00 AM due to the chest pain. He woke up at 3:00 AM and decided to present due to his symptoms.    PAST MEDICAL HISTORY  Past Medical History:   Diagnosis Date    Arthritis     knee    Bowel habit changes     diarrhea    Bronchitis     Cold     Feb 2017    Diabetes (HCC)     diet controlled, no medications.    Emphysema of lung (HCC)     COPD- no medications    Myocardial infarct (HCC)     1994,1996,2000    Pneumonia      SURGICAL HISTORY  Past Surgical History:   Procedure Laterality Date    GASTROSCOPY-ENDO  3/31/2017    Procedure: GASTROSCOPY-ENDO;  Surgeon: Emerson Eaton M.D.;  Location: ENDOSCOPY [facility];  Service:     COLONOSCOPY - ENDO  3/31/2017    Procedure: COLONOSCOPY - ENDO;  Surgeon: Emerson Eaton M.D.;  Location: ENDOSCOPY [facility];  Service:     OTHER      Tonsillectomy    OTHER      Hernia     OTHER CARDIAC SURGERY      3 stents     FAMILY HISTORY  No family history pertinent to today's visit.    SOCIAL  "HISTORY   reports that he has quit smoking. He does not have any smokeless tobacco history on file. He reports that he does not drink alcohol and does not use drugs.    CURRENT MEDICATIONS  Previous Medications    ASPIRIN EC (ECOTRIN) 81 MG TABLET DELAYED RESPONSE    Take 81 mg by mouth every day.    ATORVASTATIN (LIPITOR) 20 MG TAB    Take 20 mg by mouth every evening.    BACLOFEN (LIORESAL) 10 MG TAB    Take 10 mg by mouth at bedtime as needed. Indications: Muscle Spasm    CYANOCOBALAMIN (VITAMIN B-12) 1000 MCG TAB    Take 1,000 mcg by mouth every day.    FLUTICASONE (FLONASE) 50 MCG/ACT NASAL SPRAY    Administer 2 Sprays into affected nostril(S) every day.    FLUTICASONE-SALMETEROL (ADVAIR) 250-50 MCG/ACT AEROSOL POWDER, BREATH ACTIVATED    Inhale 1 Puff every 12 hours.    GABAPENTIN (NEURONTIN) 300 MG CAP    Take 300 mg by mouth 3 times a day.    METFORMIN (GLUCOPHAGE) 500 MG TAB    Take 750 mg by mouth 2 times a day with meals.    OMEGA-3 FATTY ACIDS (FISH OIL) 1000 MG CAP CAPSULE    Take 1,000 mg by mouth every day.    OMEPRAZOLE (PRILOSEC) 40 MG DELAYED-RELEASE CAPSULE    Take 40 mg by mouth every day.    VITAMIN D3 (CHOLECALCIFEROL) 1000 UNIT (25 MCG) TAB    Take 1,000 Units by mouth every day.     ALLERGIES  Patient has no known allergies.    PHYSICAL EXAM  BP (!) 179/91   Pulse 60   Temp 36.4 °C (97.5 °F) (Temporal)   Resp 16   Ht 1.778 m (5' 10\")   Wt 87.5 kg (193 lb)   SpO2 97%   BMI 27.69 kg/m²   Constitutional: Well developed, Well nourished, No acute distress, Non-toxic appearance, appears uncomfortable.  HENT: Normocephalic, Atraumatic, Bilateral external ears normal, Oropharynx is clear mucous membranes are moist. No oral exudates or nasal discharge.   Eyes: Pupils are equal round and reactive, EOMI, Conjunctiva normal, No discharge.   Neck: Normal range of motion, No tenderness, Supple, No stridor. No meningismus.  Lymphatic: No lymphadenopathy noted.   Cardiovascular: Bounding thermal " pulses bilaterally, 20 point difference of BP arm to arm.  Thorax & Lungs: Clear breath sounds bilaterally without wheezes, rhonchi or rales. There is no chest wall tenderness.   Abdomen: Soft non-tender non-distended. There is no rebound or guarding. No organomegaly is appreciated. Bowel sounds are normal.  Skin: Normal without rash.   Back: No CVA or spinal tenderness.   Extremities: Intact distal pulses, No edema, No tenderness, No cyanosis, No clubbing. Capillary refill is less than 2 seconds.  Musculoskeletal: Good range of motion in all major joints. No tenderness to palpation or major deformities noted.   Neurologic: Alert & oriented x 3, Normal motor function, Normal sensory function, No focal deficits noted. Reflexes are normal.  Psychiatric: Affect normal, Judgment normal, Mood normal. There is no suicidal ideation or patient reported hallucinations.     DIAGNOSTIC STUDIES    Labs:   Labs Reviewed   CBC WITH DIFFERENTIAL - Abnormal; Notable for the following components:       Result Value    Platelet Count 97 (*)     Lymphocytes 21.30 (*)     All other components within normal limits    Narrative:     Biotin intake of greater than 5 mg per day may interfere with  troponin levels, causing false low values.   COMP METABOLIC PANEL - Abnormal; Notable for the following components:    Glucose 154 (*)     Alkaline Phosphatase 100 (*)     All other components within normal limits    Narrative:     Biotin intake of greater than 5 mg per day may interfere with  troponin levels, causing false low values.   TROPONIN    Narrative:     Biotin intake of greater than 5 mg per day may interfere with  troponin levels, causing false low values.   TROPONIN    Narrative:     Biotin intake of greater than 5 mg per day may interfere with  troponin levels, causing false low values.   CORRECTED CALCIUM    Narrative:     Biotin intake of greater than 5 mg per day may interfere with  troponin levels, causing false low values.    ESTIMATED GFR    Narrative:     Biotin intake of greater than 5 mg per day may interfere with  troponin levels, causing false low values.     EKG:   I have independently interpreted this EKG  Results for orders placed or performed during the hospital encounter of 02/10/23   EKG   Result Value Ref Range    Report       [facility] Emergency Dept.    Test Date:  2023-02-10  Pt Name:    EMMIE GUTIERREZ           Department: ER  MRN:        [id number]                      Room:        09  Gender:     Male                         Technician: [id number]  :        1950                   Requested By:ER TRIAGE PROTOCOL  Order #:    [id number]                    Reading MD: EPI BATES MD    Measurements  Intervals                                Axis  Rate:       56                           P:          16  KS:         166                          QRS:        -7  QRSD:       96                           T:          66  QT:         397  QTc:        384    Interpretive Statements  Sinus bradycardia  Low voltage, precordial leads  No previous ECG available for comparison  Electronically Signed On 2- 7:54:14 PST by EPI BATES MD       Radiology:   The attending emergency physician has independently interpreted the diagnostic imaging associated with this visit and am waiting the final reading from the radiologist.   Preliminary interpretation is a follows: Calcifications observed on ascending arch of aorta, in scrolling down through bifurcation I do not see any false lumen.  Radiologist interpretation:   CT-CTA COMPLETE THORACOABDOMINAL AORTA   Final Result         1.  2.9 cm fusiform ectasia/borderline aneurysm of the distal abdominal aorta.   2.  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   3.  1.5 cm splenic artery aneurysm at its proximal portion. Interventional radiology or surgical consultation for further management.   4.  1.6 cm aneurysmal dilatation of  the bilateral common iliac arteries.   5.  Scattered paraseptal emphysematous changes.   6.  Hepatomegaly and irregular hepatic contour favoring changes of cirrhosis   7.  Mild mediastinal adenopathy, workup and evaluation for causes of adenopathy recommended as clinically appropriate.   8.  Small segment 2 duodenal diverticula   9.  Diverticulosis   10.  Cholelithiasis   11.  Small fat-containing umbilical hernia   12.  Atherosclerosis and atherosclerotic coronary artery   13.  Pulmonary nodules measuring up to 7 mm. See nodule follow-up right medications below.      Fleischner Society pulmonary nodule recommendations:      Low Risk: CT at 3-6 months, then consider CT at 18-24 months      High Risk: CT at 3-6 months, then at 18-24 months      Comments: Use most suspicious nodule as guide to management. Follow-up intervals may vary according to size and risk.      Low Risk - Minimal or absent history of smoking and of other known risk factors.      High Risk - History of smoking or of other known risk factors.      Note: These recommendations do not apply to lung cancer screening, patients with immunosuppression, or patients with known primary cancer.      Fleischner Society 2017 Guidelines for Management of Incidentally Detected Pulmonary Nodules in Adults      DX-CHEST-PORTABLE (1 VIEW)   Final Result         1.  Interstitial edema and/or infiltrates, greatest in the right upper lobe.   2.  Atherosclerosis        COURSE & MEDICAL DECISION MAKING     ED Observation Status? Yes; I am placing the patient in to an observation status due to a diagnostic uncertainty as well as therapeutic intensity. Patient placed in observation status at 6:15 AM, 2/10/2023.     Observation plan is as follows: Check cardiac enzymes, correct any hypoxia, determine whether an aortic emergency exists, score heart score    Upon Reevaluation, the patient's condition has: not improved; and will be escalated to hospitalization.    Patient  discharged from ED Observation status at 8:52 AM 02/10/23 In preparation for admission.     INITIAL ASSESSMENT, COURSE AND PLAN  Care Narrative: Patient arrives and had concerning symptoms with blood pressure inequity side to side prompting a code aorta to be called.    6:11 AM - Patient seen and examined at bedside. Discussed plan of care, including medications, labs and imaging. Patient agrees to the plan of care. The patient will be medicated with morphine 4 mg injection, and Zofran 4 mg injection. Ordered for EKG, DX-Chest, Troponins now and every 2 hours, CMP, CBC w/ Diff, CT-CTA Complete Thoracoabdominal Aorta, and DX-Chest to evaluate his symptoms. We discussed the concern for an aortic dissection, and the need for testing.    CT of the aorta does not reveal any definitive evidence of dissection or significant aneurysm/leakage    6:54 AM - Patient was reevaluated at bedside due to nursing report of desaturation in the 70's after morphine administration, he is now diaphoretic, but not persistently hypoxic now.    Laboratory evaluation reveals no leukocytosis, shift, anemia, electrolyte derangements or acidosis.  Both initial troponin and subsequent troponin are unremarkable.    7:04 AM - RN reports the patient continues to desaturate. I'm ordering for oxygen for this patient.    7:53 AM - Patient was reevaluated at bedside. He is sleeping comfortably with stable vital signs.    8:08 AM - I am ordering for another dose of Nitro-bid ointment.  Blood pressure has been difficult to manage.  Symptoms have been improved however and the patient is doing well with oxy mask with no further desaturation events which are likely secondary to obstructive sleep apnea    8:19 AM - Paged Hospitalist.    8:41 AM - Re-paged hospitalist.    8:52 AM - I discussed the patient's case and the above findings with Dr. Orozco (Hospitalist) who agreed to evaluate the patient for admission    ADDITIONAL PROBLEM LIST  Obstructive sleep  apnea: Oxy mask as needed and consider evaluation as an outpatient for sleep study    DISPOSITION AND DISCUSSIONS  I have discussed management of the patient with the following physicians and JAE's:  Dr. Orozco    Discussion of management with other [facility] [place] or appropriate source(s): None       Decision tools and prescription drugs considered including, but not limited to: HEART Score 6 .    Please note a critical care time of 30 minutes is spent in the care of this patient outside of procedure time.  The system is cardiovascular    FINAL DIAGNOSIS  1. Acute chest pain    2.  Critical care time, 30 minutes     Ruben FREY (Dipak), am scribing for, and in the presence of, Ronal Mustafa M.D..    Electronically signed by: Ruben Adan (Dipak), 2/10/2023    Ronal FREY M.D. personally performed the services described in this documentation, as scribed by Ruben Adan in my presence, and it is both accurate and complete.    The note accurately reflects work and decisions made by me.  Ronal Mustafa M.D.  2/10/2023  10:23 AM

## 2023-02-10 NOTE — ED NOTES
Pt desatting into 60s oxygenation during sleep- placed on 3L oxymask. Pt easy to arouse and oxygenation rises when pt awakens

## 2023-02-10 NOTE — H&P
Hospital Medicine History & Physical Note    Date of Service  2/10/2023    Primary Care Physician  Pcp Pt States None    Consultants  cardiology    Specialist Names: Dr. Ortiz    Code Status  Full Code    Chief Complaint  Chief Complaint   Patient presents with    Chest Pain     Pt reports being woken up this morning with CP that radiates from R chest through into back. Pt has associated SOB as well. Hx of MI x 3 (last one in 2001). Pt has a total of 3 stents placed.       History of Presenting Illness  Bart Ramsey is a 72 y.o. male who presented 2/10/2023 with known history of CAD, hypertension with 3 stents placed, history of MI x3 now presents with chest pain that woke patient up from sleep.    Patient reports the pain as substernal, on the right side radiating to his back.  Patient is noted to be hypertensive with  blood pressure of 193/100.  He was given nitro and morphine with relief of symptoms.  Blood pressure improved.    EKG with no significant changes.  Troponin is negative.  Due to patient's history of coronary artery disease he will be admitted to the CDU for observation and continued cardiac monitoring.    On examination, patient does appear lethargic.  He was given morphine for chest pain earlier.  He is hypertensive.  He reports ongoing substernal pressure.  CT of the aorta with no aneurysm as per ER physician.  Patient is kept n.p.o. for stress test and echocardiogram.    Patient reports slight improvement in chest pain.  Reports nausea, no vomiting.    I discussed the plan of care with patient and bedside RN.    Review of Systems  Review of Systems   Constitutional:  Positive for malaise/fatigue. Negative for chills and fever.   HENT:  Negative for congestion and hearing loss.    Respiratory:  Positive for cough and shortness of breath.    Cardiovascular:  Positive for chest pain. Negative for palpitations and leg swelling.   Gastrointestinal:  Positive for nausea. Negative for abdominal  pain, heartburn and vomiting.   Genitourinary:  Negative for dysuria and flank pain.   Musculoskeletal:  Negative for back pain, joint pain and myalgias.   Neurological:  Positive for weakness. Negative for dizziness, sensory change, speech change, focal weakness, loss of consciousness and headaches.   Psychiatric/Behavioral:  Negative for depression and memory loss. The patient is not nervous/anxious.      Past Medical History   has a past medical history of Arthritis, Bowel habit changes, Bronchitis, Cold, Diabetes ([facility]), Emphysema of lung (HCC), Myocardial infarct (HCC), and Pneumonia.    Surgical History   has a past surgical history that includes other cardiac surgery; other; other; gastroscopy-endo (3/31/2017); and colonoscopy - endo (3/31/2017).     Family History  family history is not on file.   Family history reviewed with patient. There is no family history that is pertinent to the chief complaint.     Social History   reports that he has quit smoking. He does not have any smokeless tobacco history on file. He reports current alcohol use of about 8.4 oz per week. He reports that he does not use drugs.    Allergies  No Known Allergies    Medications  Prior to Admission Medications   Prescriptions Last Dose Informant Patient Reported? Taking?   Cyanocobalamin (VITAMIN B-12) 1000 MCG Tab 2/9/2023 at [facility] Patient, Patient's Home Pharmacy Yes Yes   Sig: Take 1,000 mcg by mouth every day.   Omega-3 Fatty Acids (FISH OIL) 1000 MG Cap capsule 2/9/2023 at [facility] Patient, Patient's Home Pharmacy Yes Yes   Sig: Take 1,000 mg by mouth every day.   aspirin EC (ECOTRIN) 81 MG Tablet Delayed Response 2/9/2023 at AM Patient, Patient's Home Pharmacy Yes No   Sig: Take 81 mg by mouth every day.   atorvastatin (LIPITOR) 20 MG Tab 2/9/2023 at PM Patient, Patient's Home Pharmacy Yes No   Sig: Take 20 mg by mouth every evening.   baclofen (LIORESAL) 10 MG Tab PRN at PRN Patient, Patient's Home Pharmacy Yes Yes   Sig: Take 10 mg by  mouth at bedtime as needed. Indications: Muscle Spasm   fluticasone (FLONASE) 50 MCG/ACT nasal spray 2/9/2023 at [facility] Patient, Patient's Home Pharmacy Yes Yes   Sig: Administer 2 Sprays into affected nostril(S) every day.   fluticasone-salmeterol (ADVAIR) 250-50 MCG/ACT AEROSOL POWDER, BREATH ACTIVATED 2/9/2023 at PM Patient, Patient's Home Pharmacy Yes Yes   Sig: Inhale 1 Puff every 12 hours.   gabapentin (NEURONTIN) 300 MG Cap 2/9/2023 at PM Patient, Patient's Home Pharmacy Yes No   Sig: Take 300 mg by mouth 3 times a day.   metFORMIN (GLUCOPHAGE) 500 MG Tab 2/9/2023 at PM Patient, Patient's Home Pharmacy Yes Yes   Sig: Take 750 mg by mouth 2 times a day with meals.   omeprazole (PRILOSEC) 40 MG delayed-release capsule 2/9/2023 at [facility] Patient, Patient's Home Pharmacy Yes Yes   Sig: Take 40 mg by mouth every day.   vitamin D3 (CHOLECALCIFEROL) 1000 Unit (25 mcg) Tab 2/9/2023 at [facility] Patient, Patient's Home Pharmacy Yes Yes   Sig: Take 1,000 Units by mouth every day.      Facility-Administered Medications: None       Physical Exam  Temp:  [36.8 °C (98.2 °F)-37.1 °C (98.8 °F)] 37.1 °C (98.8 °F)  Pulse:  [54-91] 64  Resp:  [13-19] 18  BP: (136-197)/() 136/81  SpO2:  [88 %-98 %] 92 %  Blood Pressure : (!) 184/91   Temperature: 36.8 °C (98.2 °F)   Pulse: 61   Respiration: 15   Pulse Oximetry: 95 %       Physical Exam  Vitals and nursing note reviewed.   Constitutional:       General: He is in acute distress.      Appearance: He is ill-appearing. He is not toxic-appearing or diaphoretic.   HENT:      Head: Normocephalic and atraumatic.      Nose: Nose normal.      Mouth/Throat:      Mouth: Mucous membranes are moist.   Eyes:      General:         Right eye: No discharge.         Left eye: No discharge.      Pupils: Pupils are equal, round, and reactive to light.   Neck:      Thyroid: No thyromegaly.      Vascular: No JVD.   Cardiovascular:      Rate and Rhythm: Normal rate.      Heart sounds: No murmur  heard.  Pulmonary:      Effort: Pulmonary effort is normal. No respiratory distress.      Breath sounds: No stridor. No wheezing.      Comments: Decreased breath sounds bilaterally  Abdominal:      General: Bowel sounds are normal. There is no distension.      Palpations: Abdomen is soft. There is no mass.      Tenderness: There is no abdominal tenderness.      Hernia: No hernia is present.   Musculoskeletal:         General: Swelling present. No tenderness.      Cervical back: Neck supple.   Skin:     General: Skin is warm and dry.      Coloration: Skin is pale.      Findings: No erythema or rash.   Neurological:      Mental Status: He is alert and oriented to person, place, and time.      Cranial Nerves: No cranial nerve deficit.      Sensory: No sensory deficit.      Motor: Weakness present.      Coordination: Coordination normal.   Psychiatric:         Behavior: Behavior normal.         Thought Content: Thought content normal.       Laboratory:  Recent Labs     02/10/23  0553   WBC 7.8   RBC 5.19   HEMOGLOBIN 16.4   HEMATOCRIT 47.7   MCV 91.9   MCH 31.6   MCHC 34.4   RDW 47.1   PLATELETCT 97*   MPV 10.3     Recent Labs     02/10/23  0553   SODIUM 140   POTASSIUM 4.0   CHLORIDE 105   CO2 22   GLUCOSE 154*   BUN 12   CREATININE 0.82   CALCIUM 9.5     Recent Labs     02/10/23  0553   ALTSGPT 30   ASTSGOT 37   ALKPHOSPHAT 100*   TBILIRUBIN 0.7   GLUCOSE 154*         No results for input(s): NTPROBNP in the last 72 hours.      Recent Labs     02/10/23  0553 02/10/23  0724 02/10/23  1335   TROPONINT 10 14 44*       Imaging:  EC-ECHOCARDIOGRAM COMPLETE W/O CONT   Final Result      CT-CTA COMPLETE THORACOABDOMINAL AORTA   Final Result         1.  2.9 cm fusiform ectasia/borderline aneurysm of the distal abdominal aorta.   2.  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   3.  1.5 cm splenic artery aneurysm at its proximal portion. Interventional radiology or surgical consultation for  further management.   4.  1.6 cm aneurysmal dilatation of the bilateral common iliac arteries.   5.  Scattered paraseptal emphysematous changes.   6.  Hepatomegaly and irregular hepatic contour favoring changes of cirrhosis   7.  Mild mediastinal adenopathy, workup and evaluation for causes of adenopathy recommended as clinically appropriate.   8.  Small segment 2 duodenal diverticula   9.  Diverticulosis   10.  Cholelithiasis   11.  Small fat-containing umbilical hernia   12.  Atherosclerosis and atherosclerotic coronary artery   13.  Pulmonary nodules measuring up to 7 mm. See nodule follow-up right medications below.      Fleischner Society pulmonary nodule recommendations:      Low Risk: CT at 3-6 months, then consider CT at 18-24 months      High Risk: CT at 3-6 months, then at 18-24 months      Comments: Use most suspicious nodule as guide to management. Follow-up intervals may vary according to size and risk.      Low Risk - Minimal or absent history of smoking and of other known risk factors.      High Risk - History of smoking or of other known risk factors.      Note: These recommendations do not apply to lung cancer screening, patients with immunosuppression, or patients with known primary cancer.      Fleischner Society 2017 Guidelines for Management of Incidentally Detected Pulmonary Nodules in Adults      DX-CHEST-PORTABLE (1 VIEW)   Final Result         1.  Interstitial edema and/or infiltrates, greatest in the right upper lobe.   2.  Atherosclerosis      NM-CARDIAC STRESS TEST    (Results Pending)       X-Ray:  I have personally reviewed the images and compared with prior images.  EKG:  I have personally reviewed the images and compared with prior images.    Assessment/Plan:  Justification for Admission Status  I anticipate this patient is appropriate for observation status at this time because chest pain    Patient will need a  bed on EMERGENCY service .  The need is secondary to chest pain with  elevated troponin.    * Chest pain  Assessment & Plan  The ASCVD Risk score (Kristine DK, et al., 2019) failed to calculate for the following reasons:    Cannot find a previous HDL lab    Cannot find a previous total cholesterol lab     Patient will be admitted to the CDU, observation  History of CAD with stents follows up with outpatient cardiology, Dr. Blanchard  EKG and troponin negative  Now chest pain-free  Given history of CAD, will admit patient to the CDU for cardiac monitoring and follow-up troponin  Pain control as needed  Sublingual nitro  Continue home meds  No beta-blocker secondary to bradycardia  F/u MPI, and echo, cont npo      Aortic aneurysm (HCC)  Assessment & Plan  Known history of  CT aorta with aneurysm noted  Now chest pain-free  If recurrent, would get surgical evaluation  Continue to monitor    CAD (coronary artery disease)  Assessment & Plan  History of    Hypertension  Assessment & Plan  Continue home meds  Add hydralazine as needed        VTE prophylaxis: SCDs/TEDs

## 2023-02-10 NOTE — ASSESSMENT & PLAN NOTE
-Stable.    -CT showed 2.9 cm fusiform ectasia/borderline aneurysm of the distal abdominal aorta.  -Continue losartan and metoprolol.  -Recommended close outpatient follow-up.

## 2023-02-10 NOTE — ASSESSMENT & PLAN NOTE
The ASCVD Risk score (Kristine SOSA, et al., 2019) failed to calculate for the following reasons:    Cannot find a previous HDL lab    Cannot find a previous total cholesterol lab     -History of CAD with stents followed by cardiologist, Dr. Blanchard.  -Cardiology consulted and subsequently signed off.    -Chest pain resolved with initiation of Losartan and Metoprolol.    -Troponin 10-->15-->44-->32.    -EKG with no significant changes.    -SR on telemetry.  -ECHO showed left ventricular ejection fraction visually estimated to be 65% and noted valvular disease.  -Pending stress test.  -No indication for further invasive cardiac work-up per Cardiology.    -Recommended patient re-establish care with a cardiologist.

## 2023-02-10 NOTE — ASSESSMENT & PLAN NOTE
-Continue pertinent outpatient medications.  -Recommended patient re-establish care with a cardiologist.

## 2023-02-10 NOTE — ED TRIAGE NOTES
"Chief Complaint   Patient presents with    Chest Pain     Pt reports being woken up this morning with CP that radiates from R chest through into back. Pt has associated SOB as well. Hx of MI x 3 (last one in 2001). Pt has a total of 3 stents placed.       BIB EMS to [facility], pt on monitor and in gown, labs drawn and sent. Pt reports being woken up to CP on R side radiating into back. Pt has hx of MI x3. States this pain is different than previous MI.  Pt reports associated SOB.   R BP- 193/100  L BP-179/91    Medications given en route:324 ASA    BP (!) 193/100   Pulse 61   Ht 1.778 m (5' 10\")   Wt 87.5 kg (193 lb)   SpO2 98%   BMI 27.69 kg/m²    "

## 2023-02-10 NOTE — ASSESSMENT & PLAN NOTE
-Resolved with initiation of losartan 50 mg and Metroprolol tartrate 50 mg BID.  -Directed patient to take blood pressure twice daily and take data to PCP.

## 2023-02-11 ENCOUNTER — PHARMACY VISIT (OUTPATIENT)
Dept: PHARMACY | Facility: MEDICAL CENTER | Age: 73
End: 2023-02-11
Payer: COMMERCIAL

## 2023-02-11 ENCOUNTER — APPOINTMENT (OUTPATIENT)
Dept: RADIOLOGY | Facility: MEDICAL CENTER | Age: 73
End: 2023-02-11
Attending: EMERGENCY MEDICINE
Payer: COMMERCIAL

## 2023-02-11 VITALS
BODY MASS INDEX: 29.73 KG/M2 | OXYGEN SATURATION: 96 % | RESPIRATION RATE: 16 BRPM | TEMPERATURE: 97.5 F | WEIGHT: 207.67 LBS | DIASTOLIC BLOOD PRESSURE: 55 MMHG | HEIGHT: 70 IN | HEART RATE: 77 BPM | SYSTOLIC BLOOD PRESSURE: 114 MMHG

## 2023-02-11 PROBLEM — I71.9 AORTIC ANEURYSM (HCC): Status: RESOLVED | Noted: 2023-02-10 | Resolved: 2023-02-11

## 2023-02-11 PROBLEM — I16.0 HYPERTENSIVE URGENCY: Status: ACTIVE | Noted: 2023-02-10

## 2023-02-11 PROBLEM — I16.0 HYPERTENSIVE URGENCY: Status: RESOLVED | Noted: 2023-02-10 | Resolved: 2023-02-11

## 2023-02-11 PROBLEM — I16.0 HYPERTENSIVE URGENCY: Status: RESOLVED | Noted: 2023-02-11 | Resolved: 2023-02-11

## 2023-02-11 PROBLEM — K55.1 MESENTERIC ARTERY STENOSIS (HCC): Status: ACTIVE | Noted: 2023-02-11

## 2023-02-11 PROBLEM — I16.0 HYPERTENSIVE URGENCY: Status: ACTIVE | Noted: 2023-02-11

## 2023-02-11 PROBLEM — R07.9 CHEST PAIN: Status: RESOLVED | Noted: 2023-02-10 | Resolved: 2023-02-11

## 2023-02-11 PROBLEM — Z86.79 HISTORY OF AORTIC ANEURYSM: Status: ACTIVE | Noted: 2023-02-10

## 2023-02-11 LAB
ANION GAP SERPL CALC-SCNC: 10 MMOL/L (ref 7–16)
APPEARANCE UR: CLEAR
BASOPHILS # BLD AUTO: 0.4 % (ref 0–1.8)
BASOPHILS # BLD: 0.06 K/UL (ref 0–0.12)
BILIRUB UR QL STRIP.AUTO: NEGATIVE
BUN SERPL-MCNC: 14 MG/DL (ref 8–22)
CALCIUM SERPL-MCNC: 8.6 MG/DL (ref 8.5–10.5)
CHLORIDE SERPL-SCNC: 105 MMOL/L (ref 96–112)
CHOLEST SERPL-MCNC: 127 MG/DL (ref 100–199)
CO2 SERPL-SCNC: 23 MMOL/L (ref 20–33)
COLOR UR: YELLOW
CREAT SERPL-MCNC: 0.77 MG/DL (ref 0.5–1.4)
EOSINOPHIL # BLD AUTO: 0 K/UL (ref 0–0.51)
EOSINOPHIL NFR BLD: 0 % (ref 0–6.9)
ERYTHROCYTE [DISTWIDTH] IN BLOOD BY AUTOMATED COUNT: 48.2 FL (ref 35.9–50)
GFR SERPLBLD CREATININE-BSD FMLA CKD-EPI: 95 ML/MIN/1.73 M 2
GLUCOSE BLD STRIP.AUTO-MCNC: 135 MG/DL (ref 65–99)
GLUCOSE BLD STRIP.AUTO-MCNC: 153 MG/DL (ref 65–99)
GLUCOSE BLD STRIP.AUTO-MCNC: 165 MG/DL (ref 65–99)
GLUCOSE SERPL-MCNC: 173 MG/DL (ref 65–99)
GLUCOSE UR STRIP.AUTO-MCNC: NEGATIVE MG/DL
HCT VFR BLD AUTO: 44.7 % (ref 42–52)
HDLC SERPL-MCNC: 50 MG/DL
HGB BLD-MCNC: 15.3 G/DL (ref 14–18)
IMM GRANULOCYTES # BLD AUTO: 0.09 K/UL (ref 0–0.11)
IMM GRANULOCYTES NFR BLD AUTO: 0.6 % (ref 0–0.9)
KETONES UR STRIP.AUTO-MCNC: NEGATIVE MG/DL
LDLC SERPL CALC-MCNC: 58 MG/DL
LEUKOCYTE ESTERASE UR QL STRIP.AUTO: NEGATIVE
LYMPHOCYTES # BLD AUTO: 0.84 K/UL (ref 1–4.8)
LYMPHOCYTES NFR BLD: 5.8 % (ref 22–41)
MCH RBC QN AUTO: 31.6 PG (ref 27–33)
MCHC RBC AUTO-ENTMCNC: 34.2 G/DL (ref 33.7–35.3)
MCV RBC AUTO: 92.4 FL (ref 81.4–97.8)
MICRO URNS: NORMAL
MONOCYTES # BLD AUTO: 1.05 K/UL (ref 0–0.85)
MONOCYTES NFR BLD AUTO: 7.2 % (ref 0–13.4)
NEUTROPHILS # BLD AUTO: 12.56 K/UL (ref 1.82–7.42)
NEUTROPHILS NFR BLD: 86 % (ref 44–72)
NITRITE UR QL STRIP.AUTO: NEGATIVE
NRBC # BLD AUTO: 0 K/UL
NRBC BLD-RTO: 0 /100 WBC
PH UR STRIP.AUTO: 5.5 [PH] (ref 5–8)
PLATELET # BLD AUTO: 93 K/UL (ref 164–446)
PMV BLD AUTO: 10.1 FL (ref 9–12.9)
POTASSIUM SERPL-SCNC: 3.9 MMOL/L (ref 3.6–5.5)
PROCALCITONIN SERPL-MCNC: 0.19 NG/ML
PROT UR QL STRIP: NEGATIVE MG/DL
RBC # BLD AUTO: 4.84 M/UL (ref 4.7–6.1)
RBC UR QL AUTO: NEGATIVE
SODIUM SERPL-SCNC: 138 MMOL/L (ref 135–145)
SP GR UR STRIP.AUTO: 1.03
TRIGL SERPL-MCNC: 93 MG/DL (ref 0–149)
TROPONIN T SERPL-MCNC: 32 NG/L (ref 6–19)
UROBILINOGEN UR STRIP.AUTO-MCNC: 0.2 MG/DL
WBC # BLD AUTO: 14.6 K/UL (ref 4.8–10.8)

## 2023-02-11 PROCEDURE — 84145 PROCALCITONIN (PCT): CPT

## 2023-02-11 PROCEDURE — 81003 URINALYSIS AUTO W/O SCOPE: CPT

## 2023-02-11 PROCEDURE — 80061 LIPID PANEL: CPT

## 2023-02-11 PROCEDURE — 96376 TX/PRO/DX INJ SAME DRUG ADON: CPT

## 2023-02-11 PROCEDURE — 84484 ASSAY OF TROPONIN QUANT: CPT

## 2023-02-11 PROCEDURE — 85025 COMPLETE CBC W/AUTO DIFF WBC: CPT

## 2023-02-11 PROCEDURE — 700105 HCHG RX REV CODE 258: Performed by: INTERNAL MEDICINE

## 2023-02-11 PROCEDURE — 36415 COLL VENOUS BLD VENIPUNCTURE: CPT

## 2023-02-11 PROCEDURE — 700102 HCHG RX REV CODE 250 W/ 637 OVERRIDE(OP): Performed by: INTERNAL MEDICINE

## 2023-02-11 PROCEDURE — 82962 GLUCOSE BLOOD TEST: CPT | Mod: 91

## 2023-02-11 PROCEDURE — 99239 HOSP IP/OBS DSCHRG MGMT >30: CPT | Performed by: NURSE PRACTITIONER

## 2023-02-11 PROCEDURE — 80048 BASIC METABOLIC PNL TOTAL CA: CPT

## 2023-02-11 PROCEDURE — 700111 HCHG RX REV CODE 636 W/ 250 OVERRIDE (IP)

## 2023-02-11 PROCEDURE — A9270 NON-COVERED ITEM OR SERVICE: HCPCS | Performed by: INTERNAL MEDICINE

## 2023-02-11 PROCEDURE — G0378 HOSPITAL OBSERVATION PER HR: HCPCS

## 2023-02-11 PROCEDURE — 78452 HT MUSCLE IMAGE SPECT MULT: CPT

## 2023-02-11 PROCEDURE — RXMED WILLOW AMBULATORY MEDICATION CHARGE: Performed by: NURSE PRACTITIONER

## 2023-02-11 RX ORDER — LOSARTAN POTASSIUM 50 MG/1
50 TABLET ORAL DAILY
Qty: 30 TABLET | Refills: 0 | Status: CANCELLED | OUTPATIENT
Start: 2023-02-12 | End: 2023-03-14

## 2023-02-11 RX ORDER — REGADENOSON 0.08 MG/ML
INJECTION, SOLUTION INTRAVENOUS
Status: COMPLETED
Start: 2023-02-11 | End: 2023-02-11

## 2023-02-11 RX ORDER — METOPROLOL TARTRATE 50 MG/1
50 TABLET, FILM COATED ORAL 2 TIMES DAILY
Qty: 60 TABLET | Refills: 0 | Status: SHIPPED | OUTPATIENT
Start: 2023-02-11 | End: 2023-03-13

## 2023-02-11 RX ORDER — LOSARTAN POTASSIUM 50 MG/1
50 TABLET ORAL DAILY
Qty: 30 TABLET | Refills: 0 | Status: SHIPPED | OUTPATIENT
Start: 2023-02-12 | End: 2023-03-14

## 2023-02-11 RX ORDER — METOPROLOL TARTRATE 50 MG/1
50 TABLET, FILM COATED ORAL 2 TIMES DAILY
Qty: 60 TABLET | Refills: 0 | Status: CANCELLED | OUTPATIENT
Start: 2023-02-11 | End: 2023-03-13

## 2023-02-11 RX ORDER — AMINOPHYLLINE 25 MG/ML
INJECTION, SOLUTION INTRAVENOUS
Status: COMPLETED
Start: 2023-02-11 | End: 2023-02-11

## 2023-02-11 RX ADMIN — ASPIRIN 81 MG: 81 TABLET, COATED ORAL at 06:07

## 2023-02-11 RX ADMIN — AMINOPHYLLINE 100 MG: 25 INJECTION, SOLUTION INTRAVENOUS at 12:42

## 2023-02-11 RX ADMIN — OMEPRAZOLE 40 MG: 20 CAPSULE, DELAYED RELEASE ORAL at 06:07

## 2023-02-11 RX ADMIN — REGADENOSON 0.4 MG: 0.08 INJECTION, SOLUTION INTRAVENOUS at 12:32

## 2023-02-11 RX ADMIN — GABAPENTIN 300 MG: 300 CAPSULE ORAL at 06:05

## 2023-02-11 RX ADMIN — LOSARTAN POTASSIUM 50 MG: 50 TABLET, FILM COATED ORAL at 06:04

## 2023-02-11 RX ADMIN — FLUTICASONE PROPIONATE 100 MCG: 50 SPRAY, METERED NASAL at 06:07

## 2023-02-11 RX ADMIN — GABAPENTIN 300 MG: 300 CAPSULE ORAL at 13:50

## 2023-02-11 RX ADMIN — SODIUM CHLORIDE: 9 INJECTION, SOLUTION INTRAVENOUS at 03:52

## 2023-02-11 ASSESSMENT — COPD QUESTIONNAIRES
HAVE YOU SMOKED AT LEAST 100 CIGARETTES IN YOUR ENTIRE LIFE: YES
COPD SCREENING SCORE: 5
DO YOU EVER COUGH UP ANY MUCUS OR PHLEGM?: NO/ONLY WITH OCCASIONAL COLDS OR INFECTIONS
DURING THE PAST 4 WEEKS HOW MUCH DID YOU FEEL SHORT OF BREATH: SOME OF THE TIME

## 2023-02-11 NOTE — CARE PLAN
The patient is Stable - Low risk of patient condition declining or worsening    Shift Goals  Clinical Goals: Stress test  Patient Goals: rest  Family Goals: not at bedside    Progress made toward(s) clinical / shift goals:    Problem: Pain - Standard  Goal: Alleviation of pain or a reduction in pain to the patient’s comfort goal  Outcome: Progressing  Expected end date: 2/12/2023     Problem: Knowledge Deficit - Standard  Goal: Patient and family/care givers will demonstrate understanding of plan of care, disease process/condition, diagnostic tests and medications  Outcome: Progressing  Expected end date: 2/12/2023     Problem: Fall Risk  Goal: Patient will remain free from falls  Outcome: Progressing   Expected end date: 2/12/2023

## 2023-02-11 NOTE — CARE PLAN
The patient is Stable - Low risk of patient condition declining or worsening    Shift Goals: Pending stress test; medication management and monitoring blood pressures   Clinical Goals: Stress test  Patient Goals: rest  Family Goals: not at bedside    Progress made toward(s) clinical / shift goals:  see below    Problem: Pain - Standard  Goal: Alleviation of pain or a reduction in pain to the patient’s comfort goal  Outcome: Progressing     Problem: Knowledge Deficit - Standard  Goal: Patient and family/care givers will demonstrate understanding of plan of care, disease process/condition, diagnostic tests and medications  Outcome: Progressing     Problem: Fall Risk  Goal: Patient will remain free from falls  Outcome: Progressing       Patient is not progressing towards the following goals:

## 2023-02-11 NOTE — HOSPITAL COURSE
Bart Ramsey is a 72 y.o. male with PMH of CAD, hypertension, s/p cardiac stents x 3, and MI x 3 who was admitted for hypertensive urgency and chest pain/ACS rule out.    Cardiology consulted and subsequently signed off.  Chest pain and hypertensive urgency resolved with initiation of Losartan 50 mg and Metoprolol 50 mg BID.  Troponin 10-->15-->44-->32.  EKG with no significant changes.  SR on telemetry.  ECHO showed left ventricular ejection fraction visually estimated to be 65% and noted valvular disease.  Stress test negative for significant jeopardized viable myocardium or prior myocardial infarct.  No indication for further invasive cardiac work-up indicated per Cardiology.  Recommended patient re-establish care with a cardiologist.    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  Tolerating regular diet and denied abdominal pain.  Recommended close follow-up with general/vascular surgery outpatient.      CT showed 2.9 cm fusiform ectasia/borderline aneurysm of the distal abdominal aorta.  Optimize blood pressure with PCP.  Recommended close outpatient follow-up.    Other chronic conditions remained stable.  Patient updated on findings and hospital course.  Educated on discharge plan, including medications and follow-up recommendations.  All questions answered.  Patient verbalized understanding.

## 2023-02-11 NOTE — PROGRESS NOTES
0740: Went in to see patient this morning and the patient was alert and orientated x4. Patient denied having chest pain and they stated that they were a little short of breath. Patient was wanting to keep on oxygen and was sating at 92% on 2 liters.    0805: During rounds, the medical test said that stress test was pending and he was scheduled for 1100.     1140: Patient was transported to [facility] for his stress test.    1330: Patient came back from stress test with no complaints of chest pain or issues. Vitals were stable at this time.    1515: Spoke with MD about the patient's stress test results being read.     1530: MD spoke with patient about discharging and ordered [id number]. The patient was dressed and the patient's IV was taken out.    1600: The patient was picked up by discharge lounge CNA and they said that they picked up the patient's [id number] for the patient.

## 2023-02-11 NOTE — DISCHARGE SUMMARY
Discharge Summary    CHIEF COMPLAINT ON ADMISSION  Chief Complaint   Patient presents with    Chest Pain     Pt reports being woken up this morning with CP that radiates from R chest through into back. Pt has associated SOB as well. Hx of MI x 3 (last one in 2001). Pt has a total of 3 stents placed.     Reason for Admission  Chest pain    Admission Date  2/10/2023    CODE STATUS  Full Code    HPI & HOSPITAL COURSE  Bart Ramsey is a 72 y.o. male with PMH of CAD, hypertension, s/p cardiac stents x 3, and MI x 3 who was admitted for hypertensive urgency and chest pain/ACS rule out.    Cardiology consulted and subsequently signed off.  Chest pain and hypertensive urgency resolved with initiation of Losartan 50 mg and Metoprolol 50 mg BID.  Troponin 10-->15-->44-->32.  EKG with no significant changes.  SR on telemetry.  ECHO showed left ventricular ejection fraction visually estimated to be 65% and noted valvular disease.  Stress test negative for significant jeopardized viable myocardium or prior myocardial infarct.  No indication for further invasive cardiac work-up indicated per Cardiology.  Recommended patient re-establish care with a cardiologist.    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  Tolerating regular diet and denied abdominal pain.  Recommended close follow-up with general/vascular surgery outpatient.      CT showed 2.9 cm fusiform ectasia/borderline aneurysm of the distal abdominal aorta.  Optimize blood pressure with PCP.  Recommended close outpatient follow-up.    Other chronic conditions remained stable.  Patient updated on findings and hospital course.  Educated on discharge plan, including medications and follow-up recommendations.  All questions answered.  Patient verbalized understanding.      Therefore, he is discharged in fair and stable condition to home with close outpatient follow-up.    The patient recovered much more quickly than  anticipated on admission.    I have performed a physical exam and reviewed and updated ROS and Plan today (2/10/2023). In review of yesterday's note (2/11/2023), there are no changes except as documented above.    Discharge Date  2/11/2023    FOLLOW UP ITEMS POST DISCHARGE  -Follow-up with general surgery  -Re-establish care with a cardiology    DISCHARGE DIAGNOSES  Principal Problem:    Chest pain POA: Unknown  Active Problems:    Hypertensive urgency POA: Unknown    CAD (coronary artery disease) POA: Unknown    History of aortic aneurysm POA: Unknown    Mesenteric artery stenosis (HCC) POA: Unknown  Resolved Problems:    Hypertensive urgency POA: Unknown    FOLLOW UP  No future appointments.  PCP    Schedule an appointment as soon as possible for a visit  in the next 1-2 weeks.    Cardiology    Schedule an appointment as soon as possible for a visit  to re-establish care.    General surgery    Schedule an appointment as soon as possible for a visit  for further evaluation of mesenteric artery stenosis.    MEDICATIONS ON DISCHARGE     Medication List        START taking these medications        Instructions   losartan 50 MG Tabs  Start taking on: February 12, 2023  Commonly known as: COZAAR   Take 1 Tablet by mouth every day for 30 days.  Dose: 50 mg     metoprolol tartrate 50 MG Tabs  Commonly known as: LOPRESSOR   Take 1 Tablet by mouth 2 times a day for 30 days.  Dose: 50 mg            CONTINUE taking these medications        Instructions   aspirin EC 81 MG Tbec  Commonly known as: ECOTRIN   Take 81 mg by mouth every day.  Dose: 81 mg     atorvastatin 20 MG Tabs  Commonly known as: LIPITOR   Take 20 mg by mouth every evening.  Dose: 20 mg     baclofen 10 MG Tabs  Commonly known as: LIORESAL   Take 10 mg by mouth at bedtime as needed. Indications: Muscle Spasm  Dose: 10 mg     fish oil 1000 MG Caps capsule   Take 1,000 mg by mouth every day.  Dose: 1,000 mg     fluticasone 50 MCG/ACT nasal spray  Commonly known  as: FLONASE   Administer 2 Sprays into affected nostril(S) every day.  Dose: 2 Spray     fluticasone-salmeterol 250-50 MCG/ACT Aepb  Commonly known as: Advair   Inhale 1 Puff every 12 hours.  Dose: 1 Puff     gabapentin 300 MG Caps  Commonly known as: NEURONTIN   Take 300 mg by mouth 3 times a day.  Dose: 300 mg     metFORMIN 500 MG Tabs  Commonly known as: GLUCOPHAGE   Take 750 mg by mouth 2 times a day with meals.  Dose: 750 mg     omeprazole 40 MG delayed-release capsule  Commonly known as: PRILOSEC   Take 40 mg by mouth every day.  Dose: 40 mg     vitamin B-12 1000 MCG Tabs   Take 1,000 mcg by mouth every day.  Dose: 1,000 mcg     vitamin D3 1000 Unit (25 mcg) Tabs  Commonly known as: cholecalciferol   Take 1,000 Units by mouth every day.  Dose: 1,000 Units            Allergies  No Known Allergies    DIET  Orders Placed This Encounter   Procedures    Diet Order Diet: Regular     Standing Status:   Standing     Number of Occurrences:   1     Order Specific Question:   Diet:     Answer:   Regular [1]     ACTIVITY  As tolerated.  Weight bearing as tolerated    CONSULTATIONS  Cardiology    PROCEDURES  Stress test    LABORATORY  Lab Results   Component Value Date    SODIUM 138 02/11/2023    POTASSIUM 3.9 02/11/2023    CHLORIDE 105 02/11/2023    CO2 23 02/11/2023    GLUCOSE 173 (H) 02/11/2023    BUN 14 02/11/2023    CREATININE 0.77 02/11/2023      Lab Results   Component Value Date    WBC 14.6 (H) 02/11/2023    HEMOGLOBIN 15.3 02/11/2023    HEMATOCRIT 44.7 02/11/2023    PLATELETCT 93 (L) 02/11/2023      Total time of the discharge process took 35 minutes.

## 2023-02-11 NOTE — ASSESSMENT & PLAN NOTE
-Denies abdominal pain.    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    -Tolerating regular diet.    -Recommended close follow-up with general surgery outpatient.

## 2023-02-11 NOTE — DISCHARGE INSTRUCTIONS
Angina    Angina is very bad discomfort or pain in the chest, neck, arm, jaw, or back. The discomfort is caused by a lack of blood in the middle layer of the heart wall (myocardium).  What are the causes?  This condition is caused by a buildup of fat and cholesterol (plaque) in your arteries (atherosclerosis). This buildup narrows the arteries and makes it hard for blood to flow.  What increases the risk?  You are more likely to develop this condition if:  You have high levels of cholesterol in your blood.  You have high blood pressure (hypertension).  You have diabetes.  You have a family history of heart disease.  You are not active, or you do not exercise enough.  You feel sad (depressed).  You have been treated with high energy rays (radiation) on the left side of your chest.  Other risk factors are:  Using tobacco.  Being very overweight (obese).  Eating a diet high in unhealthy fats (saturated fats).  Having stress, or being exposed to things that cause stress.  Using drugs, such as cocaine.  Women have a greater risk for angina if:  They are older than 55.  They have stopped having their period (are in postmenopause).  What are the signs or symptoms?  Common symptoms of this condition in both men and women may include:  Chest pain, which may:  Feel like a crushing or squeezing in the chest.  Feel like a tightness, pressure, fullness, or heaviness in the chest.  Last for more than a few minutes at a time.  Stop and come back (recur) after a few minutes.  Pain in the neck, arm, jaw, or back.  Heartburn or upset stomach (indigestion) for no reason.  Being short of breath.  Feeling sick to your stomach (nauseous).  Sudden cold sweats.  Women and people with diabetes may have other symptoms that are not usual, such as feeling:  Tired (fatigue).  Worried or nervous (anxious) for no reason.  Weak for no reason.  Dizzy or passing out (fainting).  How is this treated?  This condition may be treated with:  Medicines.  These are given to:  Prevent blood clots.  Prevent heart attack.  Relax blood vessels and improve blood flow to the heart (nitrates).  Reduce blood pressure.  Improve the pumping action of the heart.  Reduce fat and cholesterol in the blood.  A procedure to widen a narrowed or blocked artery in the heart (angioplasty).  Surgery to allow blood to go around a blocked artery (coronary artery bypass surgery).  Follow these instructions at home:  Medicines  Take over-the-counter and prescription medicines only as told by your doctor.  Do not take these medicines unless your doctor says that you can:  NSAIDs. These include:  Ibuprofen.  Naproxen.  Vitamin supplements that have vitamin A, vitamin E, or both.  Hormone therapy that contains estrogen with or without progestin.  Eating and drinking    Eat a heart-healthy diet that includes:  Lots of fresh fruits and vegetables.  Whole grains.  Low-fat (lean) protein.  Low-fat dairy products.  Follow instructions from your doctor about what you cannot eat or drink.  Activity  Follow an exercise program that your doctor tells you.  Talk with your doctor about joining a program to help improve the health of your heart (cardiac rehab).  When you feel tired, take a break. Plan breaks if you know you are going to feel tired.  Lifestyle    Do not use any products that contain nicotine or tobacco. This includes cigarettes, e-cigarettes, and chewing tobacco. If you need help quitting, ask your doctor.  If your doctor says you can drink alcohol:  Limit how much you use to:  0-1 drink a day for women who are not pregnant.  0-2 drinks a day for men.  Be aware of how much alcohol is in your drink. In the [place] one drink equals:  One 12 oz bottle of beer (355 mL).  One 5 oz glass of wine (148 mL).  One 1½ oz glass of hard liquor (44 mL).  General instructions  Stay at a healthy weight. If your doctor tells you to do so, work with him or her to lose weight.  Learn to deal with stress. If you  need help, ask your doctor.  Keep your vaccines up to date. Get a flu shot every year.  Talk with your doctor if you feel sad. Take a screening test to see if you are at risk for depression.  Work with your doctor to manage any other health problems that you have. These may include diabetes or high blood pressure.  Keep all follow-up visits as told by your doctor. This is important.  Get help right away if:  You have pain in your chest, neck, arm, jaw, or back, and the pain:  Lasts more than a few minutes.  Comes back.  Does not get better after you take medicine under your tongue (sublingual nitroglycerin).  Keeps getting worse.  Comes more often.  You have any of these problems for no reason:  Sweating a lot.  Heartburn or upset stomach.  Shortness of breath.  Trouble breathing.  Feeling sick to your stomach.  Throwing up (vomiting).  Feeling more tired than normal.  Feeling nervous or worrying more than normal.  Weakness.  You are suddenly dizzy or light-headed.  You pass out.  These symptoms may be an emergency. Do not wait to see if the symptoms will go away. Get medical help right away. Call your local emergency services (911 in the [place] Do not drive yourself to the hospital.  Summary  Angina is very bad discomfort or pain in the chest, neck, arm, neck, or back.  Symptoms include chest pain, heartburn or upset stomach for no reason, and shortness of breath.  Women or people with diabetes may have symptoms that are not usual, such as feeling nervous or worried for no reason, weak for no reason, or tired.  Take all medicines only as told by your doctor.  You should eat a heart-healthy diet and follow an exercise program.  This information is not intended to replace advice given to you by your health care provider. Make sure you discuss any questions you have with your health care provider.  Document Released: 06/05/2009 Document Revised: 08/05/2019 Document Reviewed: 08/05/2019  [employer] Patient Education ©  "2020 [employer]    Cardiopulmonary Exercise Stress Test  Cardiopulmonary exercise testing (CPET) is a test that checks how the heart and lungs react to exercise. This is called \"exercise capacity.\" During this test, you will walk or run on a treadmill or pedal on a stationary bike. As you walk or run, tests will be done on your heart and lungs. You may have this test to:  Find out why you are short of breath.  Check for exercise intolerance.  See how your lungs work.  See how your heart works.  Check whether your heart or lungs are responding to treatments.  Check if you have a heart or lung problem.  Check if you are healthy enough to have surgery.  Tell your doctor about:  Any allergies you have.  All medicines you are taking.  Any problems you or family members have had with anesthetic medicines.  Any blood disorders you have.  Any surgeries you have had.  Any medical conditions you have.  Whether you are pregnant or may be pregnant.  What are the risks?  Generally, this is a safe test. However, problems may occur, including:  Chest pain.  Shortness of breath.  Leg pain.  Irregular heartbeat.  What happens before the procedure?  Follow instructions from your doctor about what you cannot eat or drink.  Ask your doctor about changing or stopping your normal medicines.  Wear loose, comfortable clothing and shoes.  If you use an inhaler, bring it with you to the test.  What happens during the procedure?    A blood pressure cuff will be placed on your arm.  Stick-on patches (electrodes) will be placed on your chest. They will be attached to an EKG machine.  A clip-on monitor that shows the amount of oxygen in your blood will be placed on your finger (pulse oximeter).  A clip will be placed on your nose and a mouthpiece will be placed in your mouth. This may be held in place with a headpiece. You will breathe through the mouthpiece during the test.  You will be asked to start exercising. You will be closely watched " "while you exercise.  The amount of effort for your exercise will be slowly increased.  During exercise, the test will measure:  Your heart rate.  Your heart rhythm.  Your oxygen blood level.  The amount of oxygen and carbon dioxide that you breathe out.  The test will end when:  You have finished the test.  You have reached your maximum ability to exercise.  You have chest or leg pain, dizziness, or shortness of breath.  The test may vary among doctors and hospitals.  What can I expect after the test?  Your blood pressure, heart rate, breathing rate, and blood oxygen level will be monitored until you leave the hospital or clinic.  Summary  Cardiopulmonary exercise testing (CPET) is a test that checks how your heart and lungs react to exercise.  Follow your doctor's instructions about food and drink, and what medicines to change or stop.  During this test, you will walk or run on a treadmill or pedal on a stationary bike. Tests will be done as you run or walk.  This information is not intended to replace advice given to you by your health care provider. Make sure you discuss any questions you have with your health care provider.  Document Released: 12/06/2010 Document Revised: 03/05/2020 Document Reviewed: 08/22/2019  [employer] Patient Education © 2020 [employer]  Hypertension, Adult  High blood pressure (hypertension) is when the force of blood pumping through the arteries is too strong. The arteries are the blood vessels that carry blood from the heart throughout the body. Hypertension forces the heart to work harder to pump blood and may cause arteries to become narrow or stiff. Untreated or uncontrolled hypertension can cause a heart attack, heart failure, a stroke, kidney disease, and other problems.  A blood pressure reading consists of a higher number over a lower number. Ideally, your blood pressure should be below 120/80. The first (\"top\") number is called the systolic pressure. It is a measure of the " "pressure in your arteries as your heart beats. The second (\"bottom\") number is called the diastolic pressure. It is a measure of the pressure in your arteries as the heart relaxes.  What are the causes?  The exact cause of this condition is not known. There are some conditions that result in or are related to high blood pressure.  What increases the risk?  Some risk factors for high blood pressure are under your control. The following factors may make you more likely to develop this condition:  Smoking.  Having type 2 diabetes mellitus, high cholesterol, or both.  Not getting enough exercise or physical activity.  Being overweight.  Having too much fat, sugar, calories, or salt (sodium) in your diet.  Drinking too much alcohol.  Some risk factors for high blood pressure may be difficult or impossible to change. Some of these factors include:  Having chronic kidney disease.  Having a family history of high blood pressure.  Age. Risk increases with age.  Race. You may be at higher risk if you are .  Gender. Men are at higher risk than women before age 45. After age 65, women are at higher risk than men.  Having obstructive sleep apnea.  Stress.  What are the signs or symptoms?  High blood pressure may not cause symptoms. Very high blood pressure (hypertensive crisis) may cause:  Headache.  Anxiety.  Shortness of breath.  Nosebleed.  Nausea and vomiting.  Vision changes.  Severe chest pain.  Seizures.  How is this diagnosed?  This condition is diagnosed by measuring your blood pressure while you are seated, with your arm resting on a flat surface, your legs uncrossed, and your feet flat on the floor. The cuff of the blood pressure monitor will be placed directly against the skin of your upper arm at the level of your heart. It should be measured at least twice using the same arm. Certain conditions can cause a difference in blood pressure between your right and left arms.  Certain factors can cause " blood pressure readings to be lower or higher than normal for a short period of time:  When your blood pressure is higher when you are in a health care provider's office than when you are at home, this is called white coat hypertension. Most people with this condition do not need medicines.  When your blood pressure is higher at home than when you are in a health care provider's office, this is called masked hypertension. Most people with this condition may need medicines to control blood pressure.  If you have a high blood pressure reading during one visit or you have normal blood pressure with other risk factors, you may be asked to:  Return on a different day to have your blood pressure checked again.  Monitor your blood pressure at home for 1 week or longer.  If you are diagnosed with hypertension, you may have other blood or imaging tests to help your health care provider understand your overall risk for other conditions.  How is this treated?  This condition is treated by making healthy lifestyle changes, such as eating healthy foods, exercising more, and reducing your alcohol intake. Your health care provider may prescribe medicine if lifestyle changes are not enough to get your blood pressure under control, and if:  Your systolic blood pressure is above 130.  Your diastolic blood pressure is above 80.  Your personal target blood pressure may vary depending on your medical conditions, your age, and other factors.  Follow these instructions at home:  Eating and drinking    Eat a diet that is high in fiber and potassium, and low in sodium, added sugar, and fat. An example eating plan is called the DASH (Dietary Approaches to Stop Hypertension) diet. To eat this way:  Eat plenty of fresh fruits and vegetables. Try to fill one half of your plate at each meal with fruits and vegetables.  Eat whole grains, such as whole-wheat pasta, brown rice, or whole-grain bread. Fill about one fourth of your plate with whole  grains.  Eat or drink low-fat dairy products, such as skim milk or low-fat yogurt.  Avoid fatty cuts of meat, processed or cured meats, and poultry with skin. Fill about one fourth of your plate with lean proteins, such as fish, chicken without skin, beans, eggs, or tofu.  Avoid pre-made and processed foods. These tend to be higher in sodium, added sugar, and fat.  Reduce your daily sodium intake. Most people with hypertension should eat less than 1,500 mg of sodium a day.  Do not drink alcohol if:  Your health care provider tells you not to drink.  You are pregnant, may be pregnant, or are planning to become pregnant.  If you drink alcohol:  Limit how much you use to:  0-1 drink a day for women.  0-2 drinks a day for men.  Be aware of how much alcohol is in your drink. In the [place] one drink equals one 12 oz bottle of beer (355 mL), one 5 oz glass of wine (148 mL), or one 1½ oz glass of hard liquor (44 mL).  Lifestyle    Work with your health care provider to maintain a healthy body weight or to lose weight. Ask what an ideal weight is for you.  Get at least 30 minutes of exercise most days of the week. Activities may include walking, swimming, or biking.  Include exercise to strengthen your muscles (resistance exercise), such as Pilates or lifting weights, as part of your weekly exercise routine. Try to do these types of exercises for 30 minutes at least 3 days a week.  Do not use any products that contain nicotine or tobacco, such as cigarettes, e-cigarettes, and chewing tobacco. If you need help quitting, ask your health care provider.  Monitor your blood pressure at home as told by your health care provider.  Keep all follow-up visits as told by your health care provider. This is important.  Medicines  Take over-the-counter and prescription medicines only as told by your health care provider. Follow directions carefully. Blood pressure medicines must be taken as prescribed.  Do not skip doses of blood pressure  medicine. Doing this puts you at risk for problems and can make the medicine less effective.  Ask your health care provider about side effects or reactions to medicines that you should watch for.  Contact a health care provider if you:  Think you are having a reaction to a medicine you are taking.  Have headaches that keep coming back (recurring).  Feel dizzy.  Have swelling in your ankles.  Have trouble with your vision.  Get help right away if you:  Develop a severe headache or confusion.  Have unusual weakness or numbness.  Feel faint.  Have severe pain in your chest or abdomen.  Vomit repeatedly.  Have trouble breathing.  Summary  Hypertension is when the force of blood pumping through your arteries is too strong. If this condition is not controlled, it may put you at risk for serious complications.  Your personal target blood pressure may vary depending on your medical conditions, your age, and other factors. For most people, a normal blood pressure is less than 120/80.  Hypertension is treated with lifestyle changes, medicines, or a combination of both. Lifestyle changes include losing weight, eating a healthy, low-sodium diet, exercising more, and limiting alcohol.  This information is not intended to replace advice given to you by your health care provider. Make sure you discuss any questions you have with your health care provider.  Document Released: 12/18/2006 Document Revised: 08/28/2019 Document Reviewed: 08/28/2019  [employer] Patient Education © 2020 [employer]      Losartan tablets  What is this medicine?  LOSARTAN (kris KATELYNN tan) is used to treat high blood pressure and to reduce the risk of stroke in certain patients. This drug also slows the progression of kidney disease in patients with diabetes.  This medicine may be used for other purposes; ask your health care provider or pharmacist if you have questions.  COMMON BRAND NAME(S): Cozaar  What should I tell my health care provider before I take  this medicine?  They need to know if you have any of these conditions:  heart failure  kidney or liver disease  an unusual or allergic reaction to losartan, other medicines, foods, dyes, or preservatives  pregnant or trying to get pregnant  breast-feeding  How should I use this medicine?  Take this medicine by mouth with a glass of water. Follow the directions on the prescription label. This medicine can be taken with or without food. Take your doses at regular intervals. Do not take your medicine more often than directed.  Talk to your pediatrician regarding the use of this medicine in children. Special care may be needed.  Overdosage: If you think you have taken too much of this medicine contact a poison control center or emergency room at once.  NOTE: This medicine is only for you. Do not share this medicine with others.  What if I miss a dose?  If you miss a dose, take it as soon as you can. If it is almost time for your next dose, take only that dose. Do not take double or extra doses.  What may interact with this medicine?  blood pressure medicines  diuretics, especially triamterene, spironolactone, or amiloride  fluconazole  NSAIDs, medicines for pain and inflammation, like ibuprofen or naproxen  potassium salts or potassium supplements  rifampin  This list may not describe all possible interactions. Give your health care provider a list of all the medicines, herbs, non-prescription drugs, or dietary supplements you use. Also tell them if you smoke, drink alcohol, or use illegal drugs. Some items may interact with your medicine.  What should I watch for while using this medicine?  Visit your doctor or health care professional for regular checks on your progress. Check your blood pressure as directed. Ask your doctor or health care professional what your blood pressure should be and when you should contact him or her. Call your doctor or health care professional if you notice an irregular or fast heart  beat.  Women should inform their doctor if they wish to become pregnant or think they might be pregnant. There is a potential for serious side effects to an unborn child, particularly in the second or third trimester. Talk to your health care professional or pharmacist for more information.  You may get drowsy or dizzy. Do not drive, use machinery, or do anything that needs mental alertness until you know how this drug affects you. Do not stand or sit up quickly, especially if you are an older patient. This reduces the risk of dizzy or fainting spells. Alcohol can make you more drowsy and dizzy. Avoid alcoholic drinks.  Avoid salt substitutes unless you are told otherwise by your doctor or health care professional.  Do not treat yourself for coughs, colds, or pain while you are taking this medicine without asking your doctor or health care professional for advice. Some ingredients may increase your blood pressure.  What side effects may I notice from receiving this medicine?  Side effects that you should report to your doctor or health care professional as soon as possible:  confusion, dizziness, light headedness or fainting spells  decreased amount of urine passed  difficulty breathing or swallowing, hoarseness, or tightening of the throat  fast or irregular heart beat, palpitations, or chest pain  skin rash, itching  swelling of your face, lips, tongue, hands, or feet  Side effects that usually do not require medical attention (report to your doctor or health care professional if they continue or are bothersome):  cough  decreased sexual function or desire  headache  nasal congestion or stuffiness  nausea or stomach pain  sore or cramping muscles  This list may not describe all possible side effects. Call your doctor for medical advice about side effects. You may report side effects to FDA at 4-038-FDA-4343.  Where should I keep my medicine?  Keep out of the reach of children.  Store at room temperature between 15  and 30 degrees C (59 and 86 degrees F). Protect from light. Keep container tightly closed. Throw away any unused medicine after the expiration date.  NOTE: This sheet is a summary. It may not cover all possible information. If you have questions about this medicine, talk to your doctor, pharmacist, or health care provider.  © 2020 [employer] (2009-02-27 16:42:18)  Metoprolol tablets  What is this medicine?  METOPROLOL (me TOE proe lole) is a beta-blocker. Beta-blockers reduce the workload on the heart and help it to beat more regularly. This medicine is used to treat high blood pressure and to prevent chest pain. It is also used to after a heart attack and to prevent an additional heart attack from occurring.  This medicine may be used for other purposes; ask your health care provider or pharmacist if you have questions.  COMMON BRAND NAME(S): Lopressor  What should I tell my health care provider before I take this medicine?  They need to know if you have any of these conditions:  diabetes  heart or vessel disease like slow heart rate, worsening heart failure, heart block, sick sinus syndrome or Raynaud's disease  kidney disease  liver disease  lung or breathing disease, like asthma or emphysema  pheochromocytoma  thyroid disease  an unusual or allergic reaction to metoprolol, other beta-blockers, medicines, foods, dyes, or preservatives  pregnant or trying to get pregnant  breast-feeding  How should I use this medicine?  Take this medicine by mouth with a drink of water. Follow the directions on the prescription label. Take this medicine immediately after meals. Take your doses at regular intervals. Do not take more medicine than directed. Do not stop taking this medicine suddenly. This could lead to serious heart-related effects.  Talk to your pediatrician regarding the use of this medicine in children. Special care may be needed.  Overdosage: If you think you have taken too much of this medicine  contact a poison control center or emergency room at once.  NOTE: This medicine is only for you. Do not share this medicine with others.  What if I miss a dose?  If you miss a dose, take it as soon as you can. If it is almost time for your next dose, take only that dose. Do not take double or extra doses.  What may interact with this medicine?  This medicine may interact with the following medications:  certain medicines for blood pressure, heart disease, irregular heart beat  certain medicines for depression like monoamine oxidase (MAO) inhibitors, fluoxetine, or paroxetine  clonidine  dobutamine  epinephrine  isoproterenol  reserpine  This list may not describe all possible interactions. Give your health care provider a list of all the medicines, herbs, non-prescription drugs, or dietary supplements you use. Also tell them if you smoke, drink alcohol, or use illegal drugs. Some items may interact with your medicine.  What should I watch for while using this medicine?  Visit your doctor or health care professional for regular check ups. Contact your doctor right away if your symptoms worsen. Check your blood pressure and pulse rate regularly. Ask your health care professional what your blood pressure and pulse rate should be, and when you should contact them.  You may get drowsy or dizzy. Do not drive, use machinery, or do anything that needs mental alertness until you know how this medicine affects you. Do not sit or stand up quickly, especially if you are an older patient. This reduces the risk of dizzy or fainting spells. Contact your doctor if these symptoms continue. Alcohol may interfere with the effect of this medicine. Avoid alcoholic drinks.  This medicine may increase blood sugar. Ask your healthcare provider if changes in diet or medicines are needed if you have diabetes.  What side effects may I notice from receiving this medicine?  Side effects that you should report to your doctor or health care  professional as soon as possible:  allergic reactions like skin rash, itching or hives  cold or numb hands or feet  depression  difficulty breathing  faint  fever with sore throat  irregular heartbeat, chest pain  rapid weight gain    signs and symptoms of high blood sugar such as being more thirsty or hungry or having to urinate more than normal. You may also feel very tired or have blurry vision.  swollen legs or ankles  Side effects that usually do not require medical attention (report to your doctor or health care professional if they continue or are bothersome):  anxiety or nervousness  change in sex drive or performance  dry skin  headache  nightmares or trouble sleeping  short term memory loss  stomach upset or diarrhea  This list may not describe all possible side effects. Call your doctor for medical advice about side effects. You may report side effects to FDA at 3-137-FDA-1929.  Where should I keep my medicine?  Keep out of the reach of children.  Store at room temperature between 15 and 30 degrees C (59 and 86 degrees F). Throw away any unused medicine after the expiration date.  NOTE: This sheet is a summary. It may not cover all possible information. If you have questions about this medicine, talk to your doctor, pharmacist, or health care provider.  © 2020 [employer]vier/Gold Standard (2019-10-08 11:15:23)

## 2023-02-11 NOTE — CONSULTS
Cardiology Consult Note:    Apolinar Ortiz M.D.  Date & Time note created:    2/10/2023   4:45 PM     Referring MD:  Dr. Orozco    Patient ID:   Name:             Bart Ramsey   YOB: 1950  Age:                 72 y.o.  male   MRN:               [id number]                                                             Chief Complaint / Reason for consult:  Chest pain    History of Present Illness:    This is a 72 years old man with uncontrolled hypertension, presented to the hospital with acute tearing chest pain which woke him up this morning.  Chest pain was described to be tearing rating from his right chest into his back.  He does have prior history of 3 MIs in the past remotely.  Patient did undergo CT angiogram which did not show evidence of aortic dissection.    I personally interpreted the images of his transthoracic echocardiogram which showed normal LV function, no significant valvular disease.    I personally interpreted the EKG tracing which showed diffuse ST depression.    His blood pressure is extremely high.    I personally interpreted blood test result which showed initial troponin CT negative x2 and third value jumped to 44.  Normal GFR, potassium of 4.0.    Review of Systems:      Constitutional: Denies fevers, Denies weight changes  Eyes: Denies changes in vision, no eye pain  Ears/Nose/Throat/Mouth: Denies nasal congestion or sore throat   Cardiovascular: no chest pain, no palpitations   Respiratory: no shortness of breath , Denies cough  Gastrointestinal/Hepatic: Denies abdominal pain, nausea, vomiting, diarrhea, constipation or GI bleeding   Genitourinary: Denies dysuria or frequency  Musculoskeletal/Rheum: Denies  joint pain and swelling   Skin: Denies rash  Neurological: Denies headache, confusion, memory loss or focal weakness/parasthesias  Psychiatric: denies mood disorder   Endocrine: Tori thyroid problems  Heme/Oncology/Lymph Nodes: Denies enlarged lymph  nodes, denies brusing or known bleeding disorder  All other systems were reviewed and are negative (AMA/CMS criteria)                Past Medical History:   Past Medical History:   Diagnosis Date    Arthritis     knee    Bowel habit changes     diarrhea    Bronchitis     Cold     Feb 2017    Diabetes (HCC)     diet controlled, no medications.    Emphysema of lung (HCC)     COPD- no medications    Myocardial infarct (HCC)     1994,1996,2000    Pneumonia      Active Hospital Problems    Diagnosis     Chest pain [R07.9]     Hypertension [I10]     CAD (coronary artery disease) [I25.10]     Aortic aneurysm (HCC) [I71.9]        Past Surgical History:  Past Surgical History:   Procedure Laterality Date    GASTROSCOPY-ENDO  3/31/2017    Procedure: GASTROSCOPY-ENDO;  Surgeon: Emerson Eaton M.D.;  Location: ENDOSCOPY [facility];  Service:     COLONOSCOPY - ENDO  3/31/2017    Procedure: COLONOSCOPY - ENDO;  Surgeon: Emerson Eaton M.D.;  Location: ENDOSCOPY [facility];  Service:     OTHER      Tonsillectomy    OTHER      Hernia     OTHER CARDIAC SURGERY      3 [facility]       Hospital Medications:    Current Facility-Administered Medications:     aspirin EC (ECOTRIN) tablet 81 mg, 81 mg, Oral, DAILY, Pepper Orozco D.O., 81 mg at 02/10/23 1020    atorvastatin (LIPITOR) tablet 20 mg, 20 mg, Oral, Nightly, KARRIE Crook.O.    baclofen (LIORESAL) tablet 10 mg, 10 mg, Oral, HS PRN, FRANCIA CrookO.    [START ON 2/11/2023] fluticasone (FLONASE) nasal spray 100 mcg, 2 Spray, Nasal, DAILY, Pepper Orozco D.O.    budesonide-formoterol (SYMBICORT) 160-4.5 MCG/ACT inhaler 2 Puff, 2 Puff, Inhalation, BID (RT), Pepper Orozco D.O.    gabapentin (NEURONTIN) capsule 300 mg, 300 mg, Oral, TID, FRANCIA CrookO.    omeprazole (PRILOSEC) capsule 40 mg, 40 mg, Oral, DAILY, KARRIE Crook.O., 40 mg at 02/10/23 1020    senna-docusate (PERICOLACE or SENOKOT S) 8.6-50 MG per tablet 2 Tablet, 2 Tablet, Oral, BID **AND** polyethylene  glycol/lytes (MIRALAX) PACKET 1 Packet, 1 Packet, Oral, QDAY PRN **AND** magnesium hydroxide (MILK OF MAGNESIA) suspension 30 mL, 30 mL, Oral, QDAY PRN **AND** bisacodyl (DULCOLAX) suppository 10 mg, 10 mg, Rectal, QDAY PRN, Pepper Orozco D.O.    Respiratory Therapy Consult, , Nebulization, Continuous RT, Pepper Orozco D.O.    NS infusion, , Intravenous, Continuous, Pepper Orozco D.O., Last Rate: 75 mL/hr at 02/10/23 1452, New Bag at 02/10/23 1452    enoxaparin (Lovenox) inj 40 mg, 40 mg, Subcutaneous, DAILY AT 1800, Pepper Orozco D.O.    acetaminophen (Tylenol) tablet 650 mg, 650 mg, Oral, Q6HRS PRN, Pepper Orozco D.O.    Notify provider if pain remains uncontrolled, , , CONTINUOUS **AND** Use the Numeric Rating Scale (NRS), Vidal-Baker Faces (WBF), or FLACC on regular floors and Critical-Care Pain Observation Tool (CPOT) on ICUs/Trauma to assess pain, , , CONTINUOUS **AND** Pulse Ox, , , CONTINUOUS **AND** Pharmacy Consult Request ...Pain Management Review 1 Each, 1 Each, Other, PHARMACY TO DOSE **AND** If patient difficult to arouse and/or has respiratory depression (respiratory rate of 10 or less), stop any opiates that are currently infusing and call a Rapid Response., , , CONTINUOUS, Pepper Orozco D.O.    oxyCODONE immediate-release (ROXICODONE) tablet 2.5 mg, 2.5 mg, Oral, Q3HRS PRN **OR** oxyCODONE immediate-release (ROXICODONE) tablet 5 mg, 5 mg, Oral, Q3HRS PRN, 5 mg at 02/10/23 1029 **OR** morphine 4 MG/ML injection 2 mg, 2 mg, Intravenous, Q3HRS PRN, Pepper Orozco D.O.    hydrALAZINE (APRESOLINE) injection 10 mg, 10 mg, Intravenous, Q4HRS PRN, Pepper Orozco D.O., 10 mg at 02/10/23 1020    ondansetron (ZOFRAN) syringe/vial injection 4 mg, 4 mg, Intravenous, Q4HRS PRN, FRANCIA CrookOShad, 4 mg at 02/10/23 1430    ondansetron (ZOFRAN ODT) dispertab 4 mg, 4 mg, Oral, Q4HRS PRN, Pepper Orozco D.O.    guaiFENesin dextromethorphan (ROBITUSSIN DM) 100-10 MG/5ML syrup 10 mL, 10 mL, Oral, Q6HRS PRN, Pepper Orozco,  D.O.    insulin regular (HumuLIN R,NovoLIN R) injection, 1-6 Units, Subcutaneous, Q6HRS **AND** POC blood glucose manual result, , , Q6H **AND** NOTIFY MD and PharmD, , , Once **AND** Administer 20 grams of glucose (approximately 8 ounces of fruit juice) every 15 minutes PRN FSBG less than 70 mg/dL, , , PRN **AND** dextrose 10 % BOLUS 25 g, 25 g, Intravenous, Q15 MIN PRN, Pepper Orozco D.O.    enalaprilat (Vasotec) injection 1.25 mg 1 mL, 1.25 mg, Intravenous, Q6HRS PRN, Pepper Orozco D.O., 1.25 mg at 02/10/23 1444    nitroglycerin (NITROSTAT) tablet 0.4 mg, 0.4 mg, Sublingual, Q5 MIN PRN, Pepper Orozco D.O.    losartan (COZAAR) tablet 25 mg, 25 mg, Oral, Q DAY, Pepper Orozco D.O.    regadenoson (LEXISCAN) injection SOLN 0.4 mg, 0.4 mg, Intravenous, Once, Pepper Orozco D.O.    aminophylline injection 100 mg, 100 mg, Intravenous, Once PRN, Pepper Orozco D.O.    Current Outpatient Medications:     metFORMIN (GLUCOPHAGE) 500 MG Tab, Take 750 mg by mouth 2 times a day with meals., Disp: , Rfl:     omeprazole (PRILOSEC) 40 MG delayed-release capsule, Take 40 mg by mouth every day., Disp: , Rfl:     baclofen (LIORESAL) 10 MG Tab, Take 10 mg by mouth at bedtime as needed. Indications: Muscle Spasm, Disp: , Rfl:     Cyanocobalamin (VITAMIN B-12) 1000 MCG Tab, Take 1,000 mcg by mouth every day., Disp: , Rfl:     fluticasone-salmeterol (ADVAIR) 250-50 MCG/ACT AEROSOL POWDER, BREATH ACTIVATED, Inhale 1 Puff every 12 hours., Disp: , Rfl:     fluticasone (FLONASE) 50 MCG/ACT nasal spray, Administer 2 Sprays into affected nostril(S) every day., Disp: , Rfl:     Omega-3 Fatty Acids (FISH OIL) 1000 MG Cap capsule, Take 1,000 mg by mouth every day., Disp: , Rfl:     vitamin D3 (CHOLECALCIFEROL) 1000 Unit (25 mcg) Tab, Take 1,000 Units by mouth every day., Disp: , Rfl:     atorvastatin (LIPITOR) 20 MG Tab, Take 20 mg by mouth every evening., Disp: , Rfl:     gabapentin (NEURONTIN) 300 MG Cap, Take 300 mg by mouth 3 times a day.,  "Disp: , Rfl:     aspirin EC (ECOTRIN) 81 MG Tablet Delayed Response, Take 81 mg by mouth every day., Disp: , Rfl:     Current Outpatient Medications:  (Not in a hospital admission)      Medication Allergy:  No Known Allergies    Family History:  No family history on file.    Social History:  Social History     Socioeconomic History    Marital status:      Spouse name: Not on file    Number of children: Not on file    Years of education: Not on file    Highest education level: Not on file   Occupational History    Not on file   Tobacco Use    Smoking status: Former    Smokeless tobacco: Not on file   Substance and Sexual Activity    Alcohol use: No    Drug use: No    Sexual activity: Not on file   Other Topics Concern    Not on file   Social History Narrative    Not on file     Social Determinants of Health     Financial Resource Strain: Not on file   Food Insecurity: Not on file   Transportation Needs: Not on file   Physical Activity: Not on file   Stress: Not on file   Social Connections: Not on file   Intimate Partner Violence: Not on file   Housing Stability: Not on file         Physical Exam:  Vitals/ General Appearance:   Weight/BMI: Body mass index is 27.69 kg/m².  BP (!) 157/88   Pulse 68   Temp 36.8 °C (98.2 °F) (Temporal)   Resp 14   Ht 1.778 m (5' 10\")   Wt 87.5 kg (193 lb)   SpO2 96%   Vitals:    02/10/23 1200 02/10/23 1430 02/10/23 1445 02/10/23 1504   BP: (!) 191/95 (!) 187/86 (!) 171/85 (!) 157/88   Pulse: 91 64 71 68   Resp: 19 16 13 14   Temp:       TempSrc:       SpO2: 95% 96% 93% 96%   Weight:       Height:         Oxygen Therapy:  Pulse Oximetry: 96 %, O2 (LPM): 3, O2 Delivery Device: None - Room Air    Constitutional:   No acute distress  HENMT:  Normocephalic, Atraumatic.  Eyes:  EOMI, No discharge.  Neck:  no JVD.  Cardiovascular:  Normal heart rate, Normal rhythm.  Extremitites with intact distal pulses, no cyanosis, or edema.  Lungs:  No respiratory distress.  Abdomen: Soft, No " tenderness, No guarding, No rebound.  Skin: No significant rash.  Neurologic: Alert & oriented x 3, No focal deficits noted, cranial nerves II through X are intact.  Psychiatric: Affect normal, Judgment normal, Mood normal.      MDM (Data Review):     Records reviewed and summarized in current documentation    Lab Data Review:  Recent Results (from the past 24 hour(s))   EKG    Collection Time: 02/10/23  5:45 AM   Result Value Ref Range    Report       [facility] Emergency Dept.    Test Date:  2023-02-10  Pt Name:    EMMIE GUTIERREZ           Department: ER  MRN:        [id number]                      Room:       RD 09  Gender:     Male                         Technician: [id number]  :        1950                   Requested By:ER TRIAGE PROTOCOL  Order #:    [id number]                    Reading MD: EPI BATES MD    Measurements  Intervals                                Axis  Rate:       56                           P:          16  TX:         166                          QRS:        -7  QRSD:       96                           T:          66  QT:         397  QTc:        384    Interpretive Statements  Sinus bradycardia  Low voltage, precordial leads  No previous ECG available for comparison  Electronically Signed On 2- 7:54:14 PST by EPI BATES MD     CBC with Differential    Collection Time: 02/10/23  5:53 AM   Result Value Ref Range    WBC 7.8 4.8 - 10.8 K/uL    RBC 5.19 4.70 - 6.10 M/uL    Hemoglobin 16.4 14.0 - 18.0 g/dL    Hematocrit 47.7 42.0 - 52.0 %    MCV 91.9 81.4 - 97.8 fL    MCH 31.6 27.0 - 33.0 pg    MCHC 34.4 33.7 - 35.3 g/dL    RDW 47.1 35.9 - 50.0 fL    Platelet Count 97 (L) 164 - 446 K/uL    MPV 10.3 9.0 - 12.9 fL    Neutrophils-Polys 66.00 44.00 - 72.00 %    Lymphocytes 21.30 (L) 22.00 - 41.00 %    Monocytes 7.70 0.00 - 13.40 %    Eosinophils 3.70 0.00 - 6.90 %    Basophils 1.00 0.00 - 1.80 %    Immature Granulocytes 0.30 0.00 - 0.90 %    Nucleated RBC 0.00 /100  WBC    Neutrophils (Absolute) 5.11 1.82 - 7.42 K/uL    Lymphs (Absolute) 1.65 1.00 - 4.80 K/uL    Monos (Absolute) 0.60 0.00 - 0.85 K/uL    Eos (Absolute) 0.29 0.00 - 0.51 K/uL    Baso (Absolute) 0.08 0.00 - 0.12 K/uL    Immature Granulocytes (abs) 0.02 0.00 - 0.11 K/uL    NRBC (Absolute) 0.00 K/uL   Complete Metabolic Panel (CMP)    Collection Time: 02/10/23  5:53 AM   Result Value Ref Range    Sodium 140 135 - 145 mmol/L    Potassium 4.0 3.6 - 5.5 mmol/L    Chloride 105 96 - 112 mmol/L    Co2 22 20 - 33 mmol/L    Anion Gap 13.0 7.0 - 16.0    Glucose 154 (H) 65 - 99 mg/dL    Bun 12 8 - 22 mg/dL    Creatinine 0.82 0.50 - 1.40 mg/dL    Calcium 9.5 8.5 - 10.5 mg/dL    AST(SGOT) 37 12 - 45 U/L    ALT(SGPT) 30 2 - 50 U/L    Alkaline Phosphatase 100 (H) 30 - 99 U/L    Total Bilirubin 0.7 0.1 - 1.5 mg/dL    Albumin 4.3 3.2 - 4.9 g/dL    Total Protein 7.8 6.0 - 8.2 g/dL    Globulin 3.5 1.9 - 3.5 g/dL    A-G Ratio 1.2 g/dL   Troponins NOW    Collection Time: 02/10/23  5:53 AM   Result Value Ref Range    Troponin T 10 6 - 19 ng/L   CORRECTED CALCIUM    Collection Time: 02/10/23  5:53 AM   Result Value Ref Range    Correct Calcium 9.3 8.5 - 10.5 mg/dL   ESTIMATED GFR    Collection Time: 02/10/23  5:53 AM   Result Value Ref Range    GFR (CKD-EPI) 93 >60 mL/min/1.73 m 2   Troponins in two (2) hours    Collection Time: 02/10/23  7:24 AM   Result Value Ref Range    Troponin T 14 6 - 19 ng/L   EKG in four (4) hours    Collection Time: 02/10/23  8:58 AM   Result Value Ref Range    Report       [facility] Emergency Dept.    Test Date:  2023-02-10  Pt Name:    EMMIE GUTIERREZ           Department: ER  MRN:        [id number]                      Room:        09  Gender:     Male                         Technician: [id number]  :        1950                   Requested By:ERYN COBOS  Order #:    [id number]                    Reading MD:    Measurements  Intervals                                Axis  Rate:        0                            P:          0  ID:                                      QRS:        0  QRSD:                                    T:  QT:  QTc:        0    Interpretive Statements  All 12 leads are missing  Compared to ECG 02/10/2023 05:45:04  Sinus bradycardia no longer present     D-DIMER    Collection Time: 02/10/23  9:45 AM   Result Value Ref Range    D-Dimer 0.55 (H) 0.00 - 0.50 ug/mL (FEU)   POCT glucose device results    Collection Time: 02/10/23 11:51 AM   Result Value Ref Range    POC Glucose, Blood 140 (H) 65 - 99 mg/dL   Troponin - STAT Once    Collection Time: 02/10/23  1:35 PM   Result Value Ref Range    Troponin T 44 (H) 6 - 19 ng/L   EKG (NOW)    Collection Time: 02/10/23  3:28 PM   Result Value Ref Range    Report       [facility] Emergency Dept.    Test Date:  2023-02-10  Pt Name:    EMMIE GUTIERREZ           Department: ER  MRN:        [id number]                      Room:       [facility]  Gender:     Male                         Technician: TCM  :        1950                   Requested By:ER TRIAGE PROTOCOL  Order #:    [id number]                    Reading MD:    Measurements  Intervals                                Axis  Rate:       60                           P:          17  ID:         175                          QRS:        -26  QRSD:       83                           T:          31  QT:         392  QTc:        392    Interpretive Statements  Sinus rhythm  Borderline left axis deviation  Low voltage, precordial leads  Probable anteroseptal infarct, old  Compared to ECG 02/10/2023 08:58:43  Low QRS voltage now present  Myocardial infarct finding now present         Imaging/Procedures Review:    Chest Xray:  Reviewed    EKG:   As in HPI.     MDM (Assessment and Plan):     Active Hospital Problems    Diagnosis     Chest pain [R07.9]     Hypertension [I10]     CAD (coronary artery disease) [I25.10]     Aortic aneurysm (HCC) [I71.9]          Overall at  this time, there is no suspicion for acute coronary syndrome.  No evidence of heart failure exacerbation either.  Overall, clinical presentation is suspicious for hypertensive emergency.  No further invasive cardiac work-up is indicated.  Optimize medical therapy for blood pressure control.  With normal LV function, trial of metoprolol 50 mg p.o. twice a day.  I will increase losartan to 50 mg p.o. once a day.  Titrate up medication to maximal dosage frequently via protocol for better blood pressure control.    Will sign off at this time, please call us with further questions.  Patient will be followed in the outpatient cardiology clinic for further cardiac care.    Thank you for referring this patient to our cardiology service.    Apolinar Ortiz MD.   [facility].

## 2023-05-30 ENCOUNTER — HOSPITAL ENCOUNTER (OUTPATIENT)
Dept: RADIOLOGY | Facility: MEDICAL CENTER | Age: 73
End: 2023-05-30
Attending: PHYSICIAN ASSISTANT
Payer: COMMERCIAL

## 2023-06-06 ENCOUNTER — HOSPITAL ENCOUNTER (OUTPATIENT)
Dept: RADIOLOGY | Facility: MEDICAL CENTER | Age: 73
End: 2023-06-06
Attending: PHYSICIAN ASSISTANT
Payer: COMMERCIAL

## 2023-06-06 DIAGNOSIS — R91.1 COIN LESION: ICD-10-CM

## 2023-06-06 PROCEDURE — A9552 F18 FDG: HCPCS

## 2023-06-19 ENCOUNTER — APPOINTMENT (RX ONLY)
Dept: URBAN - METROPOLITAN AREA CLINIC 4 | Facility: CLINIC | Age: 73
Setting detail: DERMATOLOGY
End: 2023-06-19

## 2023-06-19 DIAGNOSIS — L56.5 DISSEMINATED SUPERFICIAL ACTINIC POROKERATOSIS (DSAP): ICD-10-CM

## 2023-06-19 DIAGNOSIS — L57.0 ACTINIC KERATOSIS: ICD-10-CM

## 2023-06-19 DIAGNOSIS — Z71.89 OTHER SPECIFIED COUNSELING: ICD-10-CM

## 2023-06-19 DIAGNOSIS — L82.1 OTHER SEBORRHEIC KERATOSIS: ICD-10-CM

## 2023-06-19 DIAGNOSIS — L81.4 OTHER MELANIN HYPERPIGMENTATION: ICD-10-CM

## 2023-06-19 PROBLEM — D48.5 NEOPLASM OF UNCERTAIN BEHAVIOR OF SKIN: Status: ACTIVE | Noted: 2023-06-19

## 2023-06-19 PROCEDURE — ? DEFER

## 2023-06-19 PROCEDURE — ? SUNSCREEN RECOMMENDATIONS

## 2023-06-19 PROCEDURE — 99203 OFFICE O/P NEW LOW 30 MIN: CPT

## 2023-06-19 PROCEDURE — ? ADDITIONAL NOTES

## 2023-06-19 PROCEDURE — ? PHOTO-DOCUMENTATION

## 2023-06-19 PROCEDURE — ? COUNSELING

## 2023-06-19 ASSESSMENT — LOCATION ZONE DERM
LOCATION ZONE: ARM
LOCATION ZONE: HAND
LOCATION ZONE: FACE

## 2023-06-19 ASSESSMENT — LOCATION SIMPLE DESCRIPTION DERM
LOCATION SIMPLE: RIGHT HAND
LOCATION SIMPLE: LEFT UPPER ARM
LOCATION SIMPLE: RIGHT UPPER ARM
LOCATION SIMPLE: LEFT HAND
LOCATION SIMPLE: SUPERIOR FOREHEAD
LOCATION SIMPLE: RIGHT FOREHEAD

## 2023-06-19 ASSESSMENT — LOCATION DETAILED DESCRIPTION DERM
LOCATION DETAILED: RIGHT INFERIOR FOREHEAD
LOCATION DETAILED: SUPERIOR MID FOREHEAD
LOCATION DETAILED: LEFT DISTAL POSTERIOR UPPER ARM
LOCATION DETAILED: RIGHT ANTERIOR PROXIMAL UPPER ARM
LOCATION DETAILED: LEFT ANTERIOR PROXIMAL UPPER ARM
LOCATION DETAILED: LEFT ULNAR DORSAL HAND
LOCATION DETAILED: RIGHT ULNAR DORSAL HAND
LOCATION DETAILED: RIGHT PROXIMAL POSTERIOR UPPER ARM

## 2023-06-19 NOTE — PROCEDURE: ADDITIONAL NOTES
Render Risk Assessment In Note?: no
Detail Level: Simple
Additional Notes: Rechecking at next visit in three months and is scheduled. If still present then bx and patient was understanding of this.
Additional Notes: Pt reports that PCP did LN2 on this lesion one month ago. Rechecking at next visit and is scheduled.

## 2023-06-19 NOTE — PROCEDURE: PHOTO-DOCUMENTATION
Photo Preface (Leave Blank If You Do Not Want): Photographs were obtained today
Detail Level: Zone
03-Nov-2017

## 2023-07-19 ENCOUNTER — APPOINTMENT (OUTPATIENT)
Dept: SLEEP MEDICINE | Facility: MEDICAL CENTER | Age: 73
End: 2023-07-19
Attending: FAMILY MEDICINE
Payer: COMMERCIAL

## 2023-07-24 ASSESSMENT — ENCOUNTER SYMPTOMS
RESPIRATORY SYMPTOMS COMMENTS: YES
SHORTNESS OF BREATH: 1
CHEST TIGHTNESS: 0
DYSPNEA AT REST: 0
WHEEZING: 0
HEMOPTYSIS: 0

## 2023-07-27 ENCOUNTER — HOSPITAL ENCOUNTER (OUTPATIENT)
Dept: RADIOLOGY | Facility: MEDICAL CENTER | Age: 73
End: 2023-07-27

## 2023-07-27 ENCOUNTER — HOSPITAL ENCOUNTER (OUTPATIENT)
Dept: RADIOLOGY | Facility: MEDICAL CENTER | Age: 73
End: 2023-07-27
Attending: SURGERY

## 2023-07-27 ENCOUNTER — HOSPITAL ENCOUNTER (OUTPATIENT)
Dept: RADIOLOGY | Facility: MEDICAL CENTER | Age: 73
End: 2023-07-27
Attending: INTERNAL MEDICINE

## 2023-07-27 ENCOUNTER — OFFICE VISIT (OUTPATIENT)
Dept: SLEEP MEDICINE | Facility: MEDICAL CENTER | Age: 73
End: 2023-07-27
Attending: INTERNAL MEDICINE
Payer: COMMERCIAL

## 2023-07-27 VITALS
HEIGHT: 69 IN | DIASTOLIC BLOOD PRESSURE: 56 MMHG | SYSTOLIC BLOOD PRESSURE: 118 MMHG | WEIGHT: 180 LBS | BODY MASS INDEX: 26.66 KG/M2 | OXYGEN SATURATION: 96 % | HEART RATE: 64 BPM

## 2023-07-27 DIAGNOSIS — R91.1 LUNG NODULE: ICD-10-CM

## 2023-07-27 DIAGNOSIS — Z87.891 FORMER SMOKER: ICD-10-CM

## 2023-07-27 PROCEDURE — 3074F SYST BP LT 130 MM HG: CPT | Performed by: INTERNAL MEDICINE

## 2023-07-27 PROCEDURE — 99213 OFFICE O/P EST LOW 20 MIN: CPT | Performed by: INTERNAL MEDICINE

## 2023-07-27 PROCEDURE — 3078F DIAST BP <80 MM HG: CPT | Performed by: INTERNAL MEDICINE

## 2023-07-27 PROCEDURE — 99204 OFFICE O/P NEW MOD 45 MIN: CPT | Performed by: INTERNAL MEDICINE

## 2023-07-27 ASSESSMENT — ENCOUNTER SYMPTOMS
SINUS PAIN: 0
WEIGHT LOSS: 0
ORTHOPNEA: 0
HEARTBURN: 0
WHEEZING: 0
SPUTUM PRODUCTION: 0
FOCAL WEAKNESS: 0
SPEECH CHANGE: 0
EYE DISCHARGE: 0
FEVER: 0
CONSTIPATION: 0
PND: 0
SHORTNESS OF BREATH: 1
BLURRED VISION: 0
PALPITATIONS: 0
SORE THROAT: 0
HEADACHES: 0
CLAUDICATION: 0
DIZZINESS: 0
COUGH: 0
NECK PAIN: 0
VOMITING: 0
EYE PAIN: 0
WEAKNESS: 0
DIARRHEA: 0
DIAPHORESIS: 0
TREMORS: 0
BACK PAIN: 0
FALLS: 0
STRIDOR: 0
DEPRESSION: 0
EYE REDNESS: 0
CHILLS: 0
PHOTOPHOBIA: 0
NAUSEA: 0
HEMOPTYSIS: 0
ABDOMINAL PAIN: 0
MYALGIAS: 0
DOUBLE VISION: 0

## 2023-07-27 ASSESSMENT — FIBROSIS 4 INDEX: FIB4 SCORE: 5.3

## 2023-07-27 NOTE — PROGRESS NOTES
Chief Complaint   Patient presents with    New Patient     [facility]    Results     CT CHEST 7/10/23, 5/2/23       HPI: This patient is a 73 y.o. male presenting for evaluation of left lower lobe nodule that has enlarged significantly since February with associated left perihilar lymphadenopathy.  The patient's past medical history significant for obstructive sleep apnea on CPAP [facility], coronary artery disease status post PCI with no events for the past 20 years.  He is a former tobacco smoker with 20-25-pack-year history and quit in 1994.  He is retired from work in manufacturing specifically grinding sheet metal.  No family history of atopic or autoimmune disease.  He was hospitalized in February with acute cholecystitis and course was somewhat complicated but CT at the time noted incidental finding of peripheral left lower lobe nodule with associated cystic structure.  There was attempted CT-guided needle biopsy done in May which was nondiagnostic.  A PET scan done here in June showed max SUV of the left lower lobe at 6.36 in addition to increased FDG uptake in the left superior hilum with an SUV of 3.76.  No distal activity.  A repeat CT chest from 7/10 shows marked increase in left lower lobe nodule with some satellite nodules in addition to increase in left perihilar adenopathy.  The patient is essentially asymptomatic.  No cough.  No chest pain.  No unintended weight loss.  No fevers or chills.    Past Medical History:   Diagnosis Date    Arthritis     knee    Bowel habit changes     diarrhea    Bronchitis     Cold     Feb 2017    Diabetes (HCC)     diet controlled, no medications.    Emphysema of lung (HCC)     COPD- no medications    Myocardial infarct (HCC)     1994,1996,2000    Pneumonia        Social History     Socioeconomic History    Marital status:      Spouse name: Not on file    Number of children: Not on file    Years of education: Not on file     Highest education level: Not on file   Occupational History    Not on file   Tobacco Use    Smoking status: Former    Smokeless tobacco: Not on file   Vaping Use    Vaping Use: Never used   Substance and Sexual Activity    Alcohol use: Yes     Alcohol/week: 8.4 oz     Types: 14 Shots of liquor per week     Comment: 2 mixed drinks a night    Drug use: No    Sexual activity: Not on file   Other Topics Concern    Not on file   Social History Narrative    Not on file     Social Determinants of Health     Financial Resource Strain: Not on file   Food Insecurity: Not on file   Transportation Needs: Not on file   Physical Activity: Not on file   Stress: Not on file   Social Connections: Not on file   Intimate Partner Violence: Not on file   Housing Stability: Not on file       No family history on file.    Current Outpatient Medications on File Prior to Visit   Medication Sig Dispense Refill    metFORMIN (GLUCOPHAGE) 500 MG Tab Take 750 mg by mouth 2 times a day with meals.      omeprazole (PRILOSEC) 40 MG delayed-release capsule Take 40 mg by mouth every day.      baclofen (LIORESAL) 10 MG Tab Take 10 mg by mouth at bedtime as needed. Indications: Muscle Spasm      Cyanocobalamin (VITAMIN B-12) 1000 MCG Tab Take 1,000 mcg by mouth every day.      fluticasone-salmeterol (ADVAIR) 250-50 MCG/ACT AEROSOL POWDER, BREATH ACTIVATED Inhale 1 Puff every 12 hours.      fluticasone (FLONASE) 50 MCG/ACT nasal spray Administer 2 Sprays into affected nostril(S) every day.      Omega-3 Fatty Acids (FISH OIL) 1000 MG Cap capsule Take 1,000 mg by mouth every day.      vitamin D3 (CHOLECALCIFEROL) 1000 Unit (25 mcg) Tab Take 1,000 Units by mouth every day.      gabapentin (NEURONTIN) 300 MG Cap Take 300 mg by mouth 3 times a day.      aspirin EC (ECOTRIN) 81 MG Tablet Delayed Response Take 81 mg by mouth every day.      atorvastatin (LIPITOR) 20 MG Tab Take 20 mg by mouth every evening.       No current facility-administered medications on  "file prior to visit.       Allergies: Patient has no known allergies.    ROS:   Review of Systems   Constitutional:  Negative for chills, diaphoresis, fever, malaise/fatigue and weight loss.   HENT:  Negative for congestion, ear discharge, ear pain, hearing loss, nosebleeds, sinus pain, sore throat and tinnitus.    Eyes:  Negative for blurred vision, double vision, photophobia, pain, discharge and redness.   Respiratory:  Positive for shortness of breath. Negative for cough, hemoptysis, sputum production, wheezing and stridor.    Cardiovascular:  Negative for chest pain, palpitations, orthopnea, claudication, leg swelling and PND.   Gastrointestinal:  Negative for abdominal pain, constipation, diarrhea, heartburn, nausea and vomiting.   Genitourinary:  Negative for dysuria and urgency.   Musculoskeletal:  Negative for back pain, falls, joint pain, myalgias and neck pain.   Skin:  Negative for itching and rash.   Neurological:  Negative for dizziness, tremors, speech change, focal weakness, weakness and headaches.   Endo/Heme/Allergies:  Negative for environmental allergies.   Psychiatric/Behavioral:  Negative for depression.        /56 (BP Location: Right arm, Patient Position: Sitting, BP Cuff Size: Adult)   Pulse 64   Ht 1.753 m (5' 9\")   Wt 81.6 kg (180 lb)   SpO2 96%     Physical Exam:  Physical Exam  Vitals reviewed.   Constitutional:       General: He is not in acute distress.     Appearance: Normal appearance.   HENT:      Head: Normocephalic and atraumatic.      Right Ear: External ear normal.      Left Ear: External ear normal.      Nose: Nose normal. No congestion.      Mouth/Throat:      Mouth: Mucous membranes are moist.      Pharynx: Oropharynx is clear. No oropharyngeal exudate.   Eyes:      General: No scleral icterus.     Extraocular Movements: Extraocular movements intact.      Conjunctiva/sclera: Conjunctivae normal.      Pupils: Pupils are equal, round, and reactive to light. "   Cardiovascular:      Rate and Rhythm: Normal rate and regular rhythm.      Heart sounds: Normal heart sounds. No murmur heard.     No gallop.   Pulmonary:      Effort: Pulmonary effort is normal. No respiratory distress.      Breath sounds: Normal breath sounds. No wheezing or rales.   Abdominal:      General: There is no distension.      Palpations: Abdomen is soft.   Musculoskeletal:         General: Normal range of motion.      Cervical back: Normal range of motion and neck supple.      Right lower leg: No edema.      Left lower leg: No edema.   Skin:     General: Skin is warm and dry.      Findings: No rash.   Neurological:      Mental Status: He is alert and oriented to person, place, and time.      Cranial Nerves: No cranial nerve deficit.   Psychiatric:         Mood and Affect: Mood normal.         Behavior: Behavior normal.       PFTs as reviewed by me personally: I have no pulmonary function testing for review    Imaging as reviewed by me personally: As per above    Assessment:  1. Lung nodule  Bronchoscopy    CT-CHEST (THORAX) W/O      2. Former smoker            Plan:  Highly concerning left lower lobe nodule that has increased significantly over the past 5 months with associated lymphadenopathy suggesting a fast-growing malignancy such as small cell.  Nondiagnostic CT-guided biopsy therefore we will proceed with bronchoscopy with transbronchial biopsy of left lower lobe nodule in addition to EBUS for lymph node biopsy.  Tobacco free for greater than 30 years.  Return in about 4 weeks (around 8/24/2023) for bronch results any MD.

## 2023-08-08 ENCOUNTER — APPOINTMENT (OUTPATIENT)
Dept: ADMISSIONS | Facility: MEDICAL CENTER | Age: 73
End: 2023-08-08
Attending: INTERNAL MEDICINE
Payer: COMMERCIAL

## 2023-08-15 ENCOUNTER — PRE-ADMISSION TESTING (OUTPATIENT)
Dept: ADMISSIONS | Facility: MEDICAL CENTER | Age: 73
End: 2023-08-15
Attending: INTERNAL MEDICINE
Payer: COMMERCIAL

## 2023-08-15 VITALS — HEIGHT: 70 IN | BODY MASS INDEX: 25.83 KG/M2

## 2023-08-15 RX ORDER — IPRATROPIUM BROMIDE 21 UG/1
2 SPRAY, METERED NASAL EVERY 12 HOURS
COMMUNITY
End: 2023-11-13

## 2023-08-15 NOTE — PREPROCEDURE INSTRUCTIONS
Pre admit apt: Pt. Instructed to continue regularly prescribed medications through day before surgery.  Instructed to take the following medications, the day of surgery, with a sip of water per anesthesia protocol: baclofen, flonase, advair, gabpentin, ipratropium omeprazole  METS greater than 4  Instructed pt to notify Dr. Cortez if he develops any new sxs of covid/illness, prior to procedure.

## 2023-08-16 ENCOUNTER — PRE-ADMISSION TESTING (OUTPATIENT)
Dept: ADMISSIONS | Facility: MEDICAL CENTER | Age: 73
End: 2023-08-16
Attending: INTERNAL MEDICINE
Payer: COMMERCIAL

## 2023-08-16 DIAGNOSIS — Z01.810 PRE-OPERATIVE CARDIOVASCULAR EXAMINATION: ICD-10-CM

## 2023-08-16 DIAGNOSIS — Z01.812 PRE-OPERATIVE LABORATORY EXAMINATION: ICD-10-CM

## 2023-08-16 LAB
ANION GAP SERPL CALC-SCNC: 10 MMOL/L (ref 7–16)
BUN SERPL-MCNC: 17 MG/DL (ref 8–22)
CALCIUM SERPL-MCNC: 9.3 MG/DL (ref 8.4–10.2)
CHLORIDE SERPL-SCNC: 99 MMOL/L (ref 96–112)
CO2 SERPL-SCNC: 26 MMOL/L (ref 20–33)
CREAT SERPL-MCNC: 0.86 MG/DL (ref 0.5–1.4)
EST. AVERAGE GLUCOSE BLD GHB EST-MCNC: 134 MG/DL
GFR SERPLBLD CREATININE-BSD FMLA CKD-EPI: 91 ML/MIN/1.73 M 2
GLUCOSE SERPL-MCNC: 108 MG/DL (ref 65–99)
HBA1C MFR BLD: 6.3 % (ref 4–5.6)
POTASSIUM SERPL-SCNC: 4.5 MMOL/L (ref 3.6–5.5)
SODIUM SERPL-SCNC: 135 MMOL/L (ref 135–145)

## 2023-08-16 PROCEDURE — 80048 BASIC METABOLIC PNL TOTAL CA: CPT

## 2023-08-16 PROCEDURE — 36415 COLL VENOUS BLD VENIPUNCTURE: CPT

## 2023-08-16 PROCEDURE — 93005 ELECTROCARDIOGRAM TRACING: CPT

## 2023-08-16 PROCEDURE — 83036 HEMOGLOBIN GLYCOSYLATED A1C: CPT

## 2023-08-17 LAB — EKG IMPRESSION: NORMAL

## 2023-08-17 PROCEDURE — 93010 ELECTROCARDIOGRAM REPORT: CPT | Performed by: INTERNAL MEDICINE

## 2023-08-29 ENCOUNTER — APPOINTMENT (OUTPATIENT)
Dept: RADIOLOGY | Facility: MEDICAL CENTER | Age: 73
End: 2023-08-29
Attending: INTERNAL MEDICINE
Payer: COMMERCIAL

## 2023-08-29 ENCOUNTER — ANESTHESIA (OUTPATIENT)
Dept: SURGERY | Facility: MEDICAL CENTER | Age: 73
End: 2023-08-29
Payer: COMMERCIAL

## 2023-08-29 ENCOUNTER — ANESTHESIA EVENT (OUTPATIENT)
Dept: SURGERY | Facility: MEDICAL CENTER | Age: 73
End: 2023-08-29
Payer: COMMERCIAL

## 2023-08-29 ENCOUNTER — HOSPITAL ENCOUNTER (OUTPATIENT)
Facility: MEDICAL CENTER | Age: 73
End: 2023-08-29
Attending: INTERNAL MEDICINE | Admitting: INTERNAL MEDICINE
Payer: COMMERCIAL

## 2023-08-29 VITALS
BODY MASS INDEX: 26.94 KG/M2 | WEIGHT: 188.16 LBS | RESPIRATION RATE: 16 BRPM | HEIGHT: 70 IN | TEMPERATURE: 97.2 F | HEART RATE: 66 BPM | SYSTOLIC BLOOD PRESSURE: 127 MMHG | OXYGEN SATURATION: 91 % | DIASTOLIC BLOOD PRESSURE: 79 MMHG

## 2023-08-29 DIAGNOSIS — R91.1 LUNG NODULE: ICD-10-CM

## 2023-08-29 LAB
GLUCOSE BLD STRIP.AUTO-MCNC: 95 MG/DL (ref 65–99)
PATHOLOGY CONSULT NOTE: NORMAL

## 2023-08-29 PROCEDURE — 31627 NAVIGATIONAL BRONCHOSCOPY: CPT | Performed by: INTERNAL MEDICINE

## 2023-08-29 PROCEDURE — 88342 IMHCHEM/IMCYTCHM 1ST ANTB: CPT | Mod: XU

## 2023-08-29 PROCEDURE — 160002 HCHG RECOVERY MINUTES (STAT): Performed by: INTERNAL MEDICINE

## 2023-08-29 PROCEDURE — 160048 HCHG OR STATISTICAL LEVEL 1-5: Performed by: INTERNAL MEDICINE

## 2023-08-29 PROCEDURE — 700105 HCHG RX REV CODE 258: Performed by: INTERNAL MEDICINE

## 2023-08-29 PROCEDURE — 71250 CT THORAX DX C-: CPT

## 2023-08-29 PROCEDURE — C1887 CATHETER, GUIDING: HCPCS | Performed by: INTERNAL MEDICINE

## 2023-08-29 PROCEDURE — 71045 X-RAY EXAM CHEST 1 VIEW: CPT

## 2023-08-29 PROCEDURE — 82962 GLUCOSE BLOOD TEST: CPT

## 2023-08-29 PROCEDURE — 88360 TUMOR IMMUNOHISTOCHEM/MANUAL: CPT

## 2023-08-29 PROCEDURE — 160035 HCHG PACU - 1ST 60 MINS PHASE I: Performed by: INTERNAL MEDICINE

## 2023-08-29 PROCEDURE — 160046 HCHG PACU - 1ST 60 MINS PHASE II: Performed by: INTERNAL MEDICINE

## 2023-08-29 PROCEDURE — 160042 HCHG SURGERY MINUTES - EA ADDL 1 MIN LEVEL 5: Performed by: INTERNAL MEDICINE

## 2023-08-29 PROCEDURE — 88333 PATH CONSLTJ SURG CYTO XM 1: CPT | Mod: XU

## 2023-08-29 PROCEDURE — 88173 CYTOPATH EVAL FNA REPORT: CPT | Mod: XU

## 2023-08-29 PROCEDURE — 31652 BRONCH EBUS SAMPLNG 1/2 NODE: CPT | Performed by: INTERNAL MEDICINE

## 2023-08-29 PROCEDURE — 31628 BRONCHOSCOPY/LUNG BX EACH: CPT | Performed by: INTERNAL MEDICINE

## 2023-08-29 PROCEDURE — 88172 CYTP DX EVAL FNA 1ST EA SITE: CPT | Mod: XU

## 2023-08-29 PROCEDURE — 502714 HCHG ROBOTIC SURGERY SERVICES: Performed by: INTERNAL MEDICINE

## 2023-08-29 PROCEDURE — 31629 BRONCHOSCOPY/NEEDLE BX EACH: CPT | Performed by: INTERNAL MEDICINE

## 2023-08-29 PROCEDURE — 700111 HCHG RX REV CODE 636 W/ 250 OVERRIDE (IP): Performed by: ANESTHESIOLOGY

## 2023-08-29 PROCEDURE — 160025 RECOVERY II MINUTES (STATS): Performed by: INTERNAL MEDICINE

## 2023-08-29 PROCEDURE — 160009 HCHG ANES TIME/MIN: Performed by: INTERNAL MEDICINE

## 2023-08-29 PROCEDURE — 31654 BRONCH EBUS IVNTJ PERPH LES: CPT | Performed by: INTERNAL MEDICINE

## 2023-08-29 PROCEDURE — 160031 HCHG SURGERY MINUTES - 1ST 30 MINS LEVEL 5: Performed by: INTERNAL MEDICINE

## 2023-08-29 PROCEDURE — 700101 HCHG RX REV CODE 250: Performed by: ANESTHESIOLOGY

## 2023-08-29 PROCEDURE — 88341 IMHCHEM/IMCYTCHM EA ADD ANTB: CPT | Mod: XU

## 2023-08-29 PROCEDURE — 88305 TISSUE EXAM BY PATHOLOGIST: CPT

## 2023-08-29 RX ORDER — DEXAMETHASONE SODIUM PHOSPHATE 4 MG/ML
INJECTION, SOLUTION INTRA-ARTICULAR; INTRALESIONAL; INTRAMUSCULAR; INTRAVENOUS; SOFT TISSUE PRN
Status: DISCONTINUED | OUTPATIENT
Start: 2023-08-29 | End: 2023-08-29 | Stop reason: SURG

## 2023-08-29 RX ORDER — ONDANSETRON 2 MG/ML
4 INJECTION INTRAMUSCULAR; INTRAVENOUS
Status: DISCONTINUED | OUTPATIENT
Start: 2023-08-29 | End: 2023-08-29 | Stop reason: HOSPADM

## 2023-08-29 RX ORDER — SODIUM CHLORIDE, SODIUM LACTATE, POTASSIUM CHLORIDE, CALCIUM CHLORIDE 600; 310; 30; 20 MG/100ML; MG/100ML; MG/100ML; MG/100ML
INJECTION, SOLUTION INTRAVENOUS CONTINUOUS
Status: DISCONTINUED | OUTPATIENT
Start: 2023-08-29 | End: 2023-08-29 | Stop reason: HOSPADM

## 2023-08-29 RX ORDER — OXYCODONE HCL 5 MG/5 ML
5 SOLUTION, ORAL ORAL
Status: DISCONTINUED | OUTPATIENT
Start: 2023-08-29 | End: 2023-08-29 | Stop reason: HOSPADM

## 2023-08-29 RX ORDER — DIPHENHYDRAMINE HYDROCHLORIDE 50 MG/ML
12.5 INJECTION INTRAMUSCULAR; INTRAVENOUS
Status: DISCONTINUED | OUTPATIENT
Start: 2023-08-29 | End: 2023-08-29 | Stop reason: HOSPADM

## 2023-08-29 RX ORDER — OXYCODONE HCL 5 MG/5 ML
10 SOLUTION, ORAL ORAL
Status: DISCONTINUED | OUTPATIENT
Start: 2023-08-29 | End: 2023-08-29 | Stop reason: HOSPADM

## 2023-08-29 RX ORDER — HALOPERIDOL 5 MG/ML
1 INJECTION INTRAMUSCULAR
Status: DISCONTINUED | OUTPATIENT
Start: 2023-08-29 | End: 2023-08-29 | Stop reason: HOSPADM

## 2023-08-29 RX ORDER — ONDANSETRON 2 MG/ML
INJECTION INTRAMUSCULAR; INTRAVENOUS PRN
Status: DISCONTINUED | OUTPATIENT
Start: 2023-08-29 | End: 2023-08-29 | Stop reason: SURG

## 2023-08-29 RX ORDER — LIDOCAINE HYDROCHLORIDE 20 MG/ML
INJECTION, SOLUTION EPIDURAL; INFILTRATION; INTRACAUDAL; PERINEURAL PRN
Status: DISCONTINUED | OUTPATIENT
Start: 2023-08-29 | End: 2023-08-29 | Stop reason: SURG

## 2023-08-29 RX ADMIN — DEXAMETHASONE SODIUM PHOSPHATE 4 MG: 4 INJECTION INTRA-ARTICULAR; INTRALESIONAL; INTRAMUSCULAR; INTRAVENOUS; SOFT TISSUE at 14:50

## 2023-08-29 RX ADMIN — PROPOFOL 200 MG: 10 INJECTION, EMULSION INTRAVENOUS at 13:20

## 2023-08-29 RX ADMIN — Medication 100 MG: at 13:20

## 2023-08-29 RX ADMIN — ONDANSETRON 4 MG: 2 INJECTION INTRAMUSCULAR; INTRAVENOUS at 14:50

## 2023-08-29 RX ADMIN — SODIUM CHLORIDE, POTASSIUM CHLORIDE, SODIUM LACTATE AND CALCIUM CHLORIDE: 600; 310; 30; 20 INJECTION, SOLUTION INTRAVENOUS at 13:10

## 2023-08-29 RX ADMIN — ROCURONIUM BROMIDE 50 MG: 10 INJECTION, SOLUTION INTRAVENOUS at 13:20

## 2023-08-29 RX ADMIN — SUGAMMADEX 200 MG: 100 INJECTION, SOLUTION INTRAVENOUS at 14:50

## 2023-08-29 RX ADMIN — LIDOCAINE HYDROCHLORIDE 100 MG: 20 INJECTION, SOLUTION EPIDURAL; INFILTRATION; INTRACAUDAL at 13:20

## 2023-08-29 ASSESSMENT — PAIN DESCRIPTION - PAIN TYPE: TYPE: SURGICAL PAIN

## 2023-08-29 ASSESSMENT — FIBROSIS 4 INDEX: FIB4 SCORE: 5.3

## 2023-08-29 NOTE — OR NURSING
1436:  To PACU from OR via gurney,  sleeping, respirations spontaneous and non-labored.  VSS    1440:  Report given to Thi GARCIA

## 2023-08-29 NOTE — DISCHARGE INSTRUCTIONS
What to Expect Post Anesthesia    Rest and take it easy for the first 24 hours.  A responsible adult is recommended to remain with you during that time.  It is normal to feel sleepy.  We encourage you to not do anything that requires balance, judgment or coordination.    FOR 24 HOURS DO NOT:  Drive, operate machinery or run household appliances.  Drink beer or alcoholic beverages.  Make important decisions or sign legal documents.    To avoid nausea, slowly advance diet as tolerated, avoiding spicy or greasy foods for the first day.  Add more substantial food to your diet according to your provider's instructions.    INCREASE FLUIDS AND FIBER TO AVOID CONSTIPATION.    MILD FLU-LIKE SYMPTOMS ARE NORMAL.  YOU MAY EXPERIENCE GENERALIZED MUSCLE ACHES, THROAT IRRITATION, HEADACHE AND/OR SOME NAUSEA.    If any questions arise, call your provider.  If your provider is not available, please feel free to call the Surgical Center at [phone].    MEDICATIONS: Resume taking daily medication.  Take prescribed pain medication with food.  If no medication is prescribed, you may take non-aspirin pain medication if needed.  PAIN MEDICATION CAN BE VERY CONSTIPATING.  Take a stool softener or laxative such as senokot, pericolace, or milk of magnesia if needed.    Diet    Resume your normal diet as tolerated.  A diet low in cholesterol, fat, and sodium is recommended for good health.     Bronchoscopy Discharge Instructions  Home Care Instructions    Bronchoscopy is a procedure to look inside your windpipe and bronchial tubes.  An anesthetic solution is sprayed in your throat to make it numb.    You may experience a mild sore throat, hoarseness, fever up to 101?F, and /or coughing up small amounts of blood immediately following your bronchoscopy, especially if a biopsy was performed.  The discomfort should subside in 24-48 hours.    Do not smoke for 6-8 hours after the procedure to decrease your risk of coughing and /or  bleeding.    Take ice chips or slowly sip cool fluids to make sure your gag reflex has returned.  Avoid hot fluids from the microwave for several hours.    After 2 hours or when you get home you may take throat lozenges or gargle with salt water if your throat is sore.  Drink liquids to help dryness in your mouth and throat.    Resume your normal activities the following day.    MEDICATIONS: Resume taking daily medication as directed by your doctor.      Your physician will call with results of the bronchoscopy and any tests done during the procedure on Friday.     You should CALL YOUR PHYSICIAN if you develop:  Fever greater than 101?F  You cough up more than a teaspoon of blood other than blood-tinged mucus  You have increasing amounts of bleeding from coughing after the bronchoscopy  You are wheezing  You develop any unusual signs or symptoms or have any questions                You should call 911 if you develop problems with breathing or chest pain.    If you are unable to contact your doctor or surgical center, you should go to the nearest emergency room or urgent care center.  Physician's telephone #: [phone]      If any questions arise, call your doctor.  If your doctor is not available, please feel free to call the Surgical Center at [phone].  The Center is open Monday through Friday from 7AM to 7PM.  You can also call the [facility] HOTLINE open 24 hours/day, 7 days/week and speak to a nurse at [phone], or toll free at [phone].    You may receive a survey in the mail within the next two weeks and we ask that you take a few moments to complete the survey and return it to us.  Our goal is to provide you with very good care and we value your comments.

## 2023-08-29 NOTE — PROCEDURES
Bronchoscopy Procedure Note     Findings:   Malignant cells, small cell carcinoma vs. Lymphoma at station 10L and left lower lobe     Specimen:   A: FNA slides- left lower lobe  B: FNA core- left lower lobe  C: Transbronchial biopsies/touch prep- left lower lobe  D: FNA- left lower lobe in RPMI  E: FNA slides- 10L  F: FNA core- 10L    Location: [facility] Endoscopy Suite     Date of Operation: 8/29/2023     Attending Physician Performing Procedure: Bart Cortez MD     Pre-op Diagnosis: Solitary lung nodule, Hilar adenopathy     Post-op Diagnosis: Solitary lung nodule, Hilar adenopathy     Anesthesia: General, administered by Dr Coffman     Operation:   Diagnostic flexible fiberoptic bronchoscopy, with navigational bronchoscopy with fine needle aspiration and transbronchial biopsies, linear and radial endobronchial ultrasound and fluoroscopy      Estimated Blood Loss: 10cc     Complications: None     Indications and History:     The patient is a 73 y.o. male. The risks, benefits, complications, treatment options and expected outcomes were discussed with the patient. The possibilities of reaction to medication, pulmonary aspiration, perforation of a viscus, bleeding, failure to diagnose a condition and creating a complication requiring transfusion or operation were discussed with the patient who freely signed the consent.     Description of Procedure:     The patient was seen in the Pre-op area and interval H&P was verified. The patient was taken to the endoscopy suite, identified as Bart Ramsey and a Time Out was held and the above information confirmed. Following induction of general anesthesia and intubation, careful inspection of the tracheal lumen was accomplished with the bronchoscope. Jacquelyn was noted to be splayed. There were scant secretions bilaterally. The right upper lobe, bronchus intermedius, middle lobe, and lower lobe showed normal segmental and subsegmental anatomy. No  endobronchial lesions seen. Left upper lobe proper, lingual, and lower lobe areas on the left side were similarly normal in their segmental and subsegmental anatomy with no endobronchial lesions seen.     Robotic navigation bronchoscopy was then performed with Ion platform.  Partial registration was used.  Ion robotic catheter was used to engage the lower segment of left lower lobe lung.  Target lesion is about 2 cm in diameter.   Under navigational guidance the ion robotic catheter was advanced to 1.0 cm away from the planned target. Fluoroscopy was used to confirm catheter positioning.    Radial EBUS was performed to confirm that the location of the nodule is concentric.    Transbronchial needle aspiration was performed with 21 gauge needle through the extended working channel catheter.  Total 3 samples were collected.  Samples sent for pathology.    Transbronchial biopsy was performed with cupped forceps through the extended working channel catheter.  Total 12 samples were collected.  Samples sent for pathology    ROBERT findings: Malignant cells, small cell carcinoma vs. Lymphoma at station 10L and left lower lobe      Using the endobronchial ultrasound, a systematic survey of the accessible mediastinal and hilar lymph nodes was then performed, notable for lymphadenopathy at stations 10L and 12L. Then, under direct ultrasound visualization, transbronchial needle aspirations were performed at the following lymph node stations:     1. SIZE OF NEEDLE   22g [place] [facility]     2. STATIONS SAMPLED  Station 10L, 3 passes:        3. ROBERT CONFIRMATION  Malignant cells, small cell carcinoma vs. Lymphoma at station 10L and left lower lobe     The airway was subsequently cleared and hemostasis was ensured. A post-procedural CXR confirmed no pneumothorax. The patient was subsequently taken to the PACU in satisfactory condition.     Kavita (wife) was called and left message notified of the results of the procedure who will be driving  patient home after recovery in PACU.    __________  Bart Cortez MD  Pulmonary and Critical Care Medicine  [facility]

## 2023-08-29 NOTE — ANESTHESIA PREPROCEDURE EVALUATION
Case: [id number] Date/Time: 08/29/23 1245    Procedures:       FIBER OPTIC BRONCHOSCOPY WITH  WASH, BRUSH, BRONCHOALVEOLAR LAVAGE, BIOPSY AND FINE NEEDLE ASPIRATION, ENDOBRONCHIAL ULTRASOUND & NAVIGATION,  ROBOTICS      ENDOBRONCHIAL ULTRASOUND (EBUS)    Anesthesia type: General    Pre-op diagnosis: LUNG NODULE    Location:  PROCEDURE ROOM / SURGERY [facility]    Surgeons: Bart Cortez M.D.          Relevant Problems   CARDIAC   (positive) CAD (coronary artery disease)   (positive) Mesenteric artery stenosis (HCC)       Physical Exam    Airway   Mallampati: II  TM distance: >3 FB  Neck ROM: full       Cardiovascular - normal exam  Rhythm: regular  Rate: normal  (-) murmur     Dental - normal exam           Pulmonary - normal exam  Breath sounds clear to auscultation     Abdominal    Neurological - normal exam                 Anesthesia Plan    ASA 3   ASA physical status 3 criteria: CAD/stents (> 3 months)    Plan - general       Airway plan will be ETT          Induction: intravenous    Postoperative Plan: Postoperative administration of opioids is intended.    Pertinent diagnostic labs and testing reviewed    Informed Consent:    Anesthetic plan and risks discussed with patient.    Use of blood products discussed with: patient whom consented to blood products.

## 2023-08-29 NOTE — ANESTHESIA PROCEDURE NOTES
Airway    Date/Time: 8/29/2023 1:20 PM    Performed by: Cachorro Coffman M.D.  Authorized by: Cachorro Coffman M.D.    Location:  OR  Urgency:  Elective  Indications for Airway Management:  Anesthesia      Spontaneous Ventilation: absent    Sedation Level:  Deep  Preoxygenated: Yes    Patient Position:  Sniffing  Final Airway Type:  Endotracheal airway  Final Endotracheal Airway:  ETT  Cuffed: Yes    Technique Used for Successful ETT Placement:  Direct laryngoscopy    Insertion Site:  Oral  Blade Type:  Erin  Laryngoscope Blade/Videolaryngoscope Blade Size:  4  ETT Size (mm):  8.5  Measured from:  Teeth  ETT to Teeth (cm):  20  Placement Verified by: auscultation and capnometry    Cormack-Lehane Classification:  Grade I - full view of glottis  Number of Attempts at Approach:  1

## 2023-08-29 NOTE — ANESTHESIA TIME REPORT
Anesthesia Start and Stop Event Times     Date Time Event    8/29/2023 1309 Ready for Procedure     1310 Anesthesia Start     1450 Anesthesia Stop        Responsible Staff  08/29/23    Name Role Begin End    Cachorro Coffman M.D. Anesth 1310 1450        Overtime Reason:  no overtime (within assigned shift)    Comments:

## 2023-08-29 NOTE — OR NURSING
1446- Care assumed. Received report from Sue GARCIA. Respirations are equal and unlabored. Plan to keep pt in PACU for full hour per STOPBANG protocol.     1450-Radiology at the bedside.    1500- Sleeping - not roused at this time.    1515- Dr. Cortez at the bedside to speak with the patient. Given IS and instructed on use with goal of 2650, pt currently inhaling volumes of approx 2200.      1530-Pt using IS. Resting comfortably.     1536- 1 hr STOPBANG hold in PACU complete.  Patient met criteria for transfer to stage II via [facility] with CNA. assist.  Patient states good pain control. Denies N/V, tolerating PO well. Report called to Nirmal GARCIA.

## 2023-08-29 NOTE — OR NURSING
1536: Patient arrived to phase 2. Patient changed out of surgical gown into clothes. Patient is A&Ox4. Patient reports no pain and no nausea. Vital signs taken and patient assessed. Asked the patient if they had to use the bathroom.    1608: IV removed and discharge instructions read. All questions answered.     1613: Discharged to care of responsible adult via wheelchair by CNA.

## 2023-08-29 NOTE — ANESTHESIA POSTPROCEDURE EVALUATION
Patient: Bart Ramsey    Procedure Summary     Date: 08/29/23 Room / Location:  PROCEDURE ROOM / SURGERY [facility]    Anesthesia Start: 1310 Anesthesia Stop: 1450    Procedures:       FIBER OPTIC BRONCHOSCOPY WITH  WASH, BRUSH, BRONCHOALVEOLAR LAVAGE, BIOPSY AND FINE NEEDLE ASPIRATION, ENDOBRONCHIAL ULTRASOUND & NAVIGATION,  ROBOTICS      ENDOBRONCHIAL ULTRASOUND (EBUS) Diagnosis:       Lung nodule      (LUNG NODULE)    Surgeons: Bart Cortez M.D. Responsible Provider: Cachorro Coffman M.D.    Anesthesia Type: general ASA Status: 3          Final Anesthesia Type: general  Last vitals  BP   Blood Pressure : 132/75    Temp   36.2 °C (97.2 °F)    Pulse   (!) 54   Resp   16    SpO2   97 %      Anesthesia Post Evaluation    Patient location during evaluation: PACU  Patient participation: complete - patient participated  Level of consciousness: awake and alert    Airway patency: patent  Anesthetic complications: no  Cardiovascular status: hemodynamically stable  Respiratory status: acceptable  Hydration status: euvolemic    PONV: none          No notable events documented.     Nurse Pain Score: 0 (NPRS)

## 2023-09-07 DIAGNOSIS — C34.90 SMALL CELL LUNG CANCER (HCC): ICD-10-CM

## 2023-09-08 ENCOUNTER — HOSPITAL ENCOUNTER (OUTPATIENT)
Dept: HEMATOLOGY ONCOLOGY | Facility: MEDICAL CENTER | Age: 73
End: 2023-09-08
Attending: STUDENT IN AN ORGANIZED HEALTH CARE EDUCATION/TRAINING PROGRAM
Payer: COMMERCIAL

## 2023-09-08 ENCOUNTER — RESEARCH ENCOUNTER (OUTPATIENT)
Dept: HEMATOLOGY ONCOLOGY | Facility: MEDICAL CENTER | Age: 73
End: 2023-09-08

## 2023-09-08 VITALS
SYSTOLIC BLOOD PRESSURE: 106 MMHG | HEIGHT: 70 IN | HEART RATE: 92 BPM | BODY MASS INDEX: 27.03 KG/M2 | WEIGHT: 188.82 LBS | OXYGEN SATURATION: 94 % | TEMPERATURE: 98.6 F | DIASTOLIC BLOOD PRESSURE: 60 MMHG

## 2023-09-08 DIAGNOSIS — C34.90 SCLC (SMALL CELL LUNG CARCINOMA) (HCC): ICD-10-CM

## 2023-09-08 PROCEDURE — 99212 OFFICE O/P EST SF 10 MIN: CPT | Performed by: STUDENT IN AN ORGANIZED HEALTH CARE EDUCATION/TRAINING PROGRAM

## 2023-09-08 PROCEDURE — 99205 OFFICE O/P NEW HI 60 MIN: CPT | Performed by: STUDENT IN AN ORGANIZED HEALTH CARE EDUCATION/TRAINING PROGRAM

## 2023-09-08 RX ORDER — CETIRIZINE HYDROCHLORIDE 10 MG/1
10 TABLET ORAL DAILY
COMMUNITY
End: 2023-12-15

## 2023-09-08 ASSESSMENT — ENCOUNTER SYMPTOMS
WEIGHT LOSS: 0
ABDOMINAL PAIN: 0
FOCAL WEAKNESS: 0
CHILLS: 0
FEVER: 0
MEMORY LOSS: 0
DIZZINESS: 0
NAUSEA: 0
SENSORY CHANGE: 0
HEARTBURN: 0
BRUISES/BLEEDS EASILY: 0
DEPRESSION: 0
COUGH: 0
VOMITING: 0
HEADACHES: 0
TINGLING: 1
WHEEZING: 0
SPUTUM PRODUCTION: 0
SORE THROAT: 0
NECK PAIN: 0
ORTHOPNEA: 0
TREMORS: 0
SHORTNESS OF BREATH: 0
BLURRED VISION: 0
PALPITATIONS: 0

## 2023-09-08 ASSESSMENT — FIBROSIS 4 INDEX: FIB4 SCORE: 5.3

## 2023-09-08 NOTE — PROGRESS NOTES
"    Consult Note: Hematology/Oncology     Primary Care:  Pcp Pt States None    Chief Complaint   Patient presents with    New Patient     SCLC       Current Treatment: None    Prior Treatment: None    Subjective:   History of Presenting Illness:  Bart Ramsey is a 73 y.o. male who presents with a new diagnosis of SCLC    Patient reports that in February he felt that he was being strangled.  He called the ambulance and he was taken to the ER.  He was placed on BP meds.  He still felt terrible after several days.  [facility]bl[facility] which was removed.  Imaging at that time showed some nodules on his lung.      In March or April 2023 he had a biopsy attempt of his lung nodules.  Per patient biopsy result was inconclusive      PET scan was done on 6/6/2023 which showed a hypermetabolic nodule in the superior segment LEFT lower lobe of lung measuring 2.3 cm consistent with malignancy. Involvement of the LEFT superior hilar lymph node.  No evidence for metastatic disease in the abdomen or pelvis.    He had a bronchoscopy Pathology revealed SCLC.     He had agent orange exposure from [place] ([id number]). Son committed suicide 1 year ago today.  He is raising his granddaughter ([place]).     He quit in 1994 (started in 1966, 28 pack year history).  Used to smoke marijuana.  He states he drinks too much (drinks a \"couple good drinks\" per night).  He has a healthy diet.  He eats a lot of vegetables.     Brain MRI is scheduled for 9/10 @3pm  PET scan on 9/12     Past Medical History:   Diagnosis Date    Arthritis     knee    Bowel habit changes     diarrhea    Bronchitis     COPD (chronic obstructive pulmonary disease) (HCC)     Diabetes (HCC)     diet controlled, no medications.    Emphysema of lung (HCC)     COPD- no medications    High cholesterol 08/2023    Was on a statin, MD monitoring, not currently on meds    Myocardial infarct (HCC)     1994,1996,2000    " Pneumonia     Psychiatric problem 2023    depression, son passed away recently, no meds    Sleep apnea     cpap        Past Surgical History:   Procedure Laterality Date    WA BRONCHOSCOPY,DIAGNOSTIC N/A 2023    Procedure: FIBER OPTIC BRONCHOSCOPY WITH  WASH, BRUSH, BRONCHOALVEOLAR LAVAGE, BIOPSY AND FINE NEEDLE ASPIRATION, ENDOBRONCHIAL ULTRASOUND & NAVIGATION,  ROBOTICS;  Surgeon: Bart Cortez M.D.;  Location: SURGERY [facility];  Service: Pulmonary Robotic    ENDOBRONCHIAL US ADD-ON N/A 2023    Procedure: ENDOBRONCHIAL ULTRASOUND (EBUS);  Surgeon: Bart Cortez M.D.;  Location: SURGERY [facility];  Service: Pulmonary Robotic    GASTROSCOPY-ENDO  3/31/2017    Procedure: GASTROSCOPY-ENDO;  Surgeon: Emerson Eaton M.D.;  Location: ENDOSCOPY [facility], [place];  Service:     COLONOSCOPY - ENDO  3/31/2017    Procedure: COLONOSCOPY - ENDO;  Surgeon: Emerson Eaton M.D.;  Location: ENDOSCOPY [facility], [place];  Service:     OTHER      Tonsillectomy    OTHER      Hernia     OTHER CARDIAC SURGERY      3 stents       Social History     Tobacco Use    Smoking status: Former     Current packs/day: 0.00     Average packs/day: 0.5 packs/day for 20.0 years (10.0 ttl pk-yrs)     Types: Cigarettes     Start date:      Quit date:      Years since quittin.7    Tobacco comments:     Pt quit smoking cigarettes, .  1/2 pack per day, smoked 20 years    Vaping Use    Vaping Use: Never used   Substance Use Topics    Alcohol use: Yes     Alcohol/week: 8.4 oz     Types: 14 Shots of liquor per week     Comment: 4 per week    Drug use: No        No family history on file.    No Known Allergies    Current Outpatient Medications   Medication Sig Dispense Refill    Cetirizine HCl (ZYRTEC ALLERGY PO) Take  by mouth.      ipratropium (ATROVENT) 0.03 % Solution Administer 2 Sprays into affected nostril(S) every 12 hours.      metFORMIN (GLUCOPHAGE) 500 MG Tab Take 750 mg by mouth 2 times a day with meals.       omeprazole (PRILOSEC) 40 MG delayed-release capsule Take 40 mg by mouth every day.      baclofen (LIORESAL) 10 MG Tab Take 10 mg by mouth at bedtime as needed. Indications: Muscle Spasm      Cyanocobalamin (VITAMIN B-12) 1000 MCG Tab Take 1,000 mcg by mouth every day.      fluticasone-salmeterol (ADVAIR) 250-50 MCG/ACT AEROSOL POWDER, BREATH ACTIVATED Inhale 1 Puff every 12 hours.      fluticasone (FLONASE) 50 MCG/ACT nasal spray Administer 2 Sprays into affected nostril(S) every day.      Omega-3 Fatty Acids (FISH OIL) 1000 MG Cap capsule Take 1,000 mg by mouth every day.      vitamin D3 (CHOLECALCIFEROL) 1000 Unit (25 mcg) Tab Take 1,000 Units by mouth every day.      gabapentin (NEURONTIN) 300 MG Cap Take 300 mg by mouth 3 times a day.      aspirin EC (ECOTRIN) 81 MG Tablet Delayed Response Take 81 mg by mouth every day.       No current facility-administered medications for this encounter.       Review of Systems   Constitutional:  Negative for chills, fever, malaise/fatigue and weight loss.   HENT:  Positive for tinnitus. Negative for congestion, ear pain, nosebleeds and sore throat.    Eyes:  Negative for blurred vision.   Respiratory:  Negative for cough, sputum production, shortness of breath and wheezing.    Cardiovascular:  Negative for chest pain, palpitations, orthopnea and leg swelling.   Gastrointestinal:  Negative for abdominal pain, heartburn, nausea and vomiting.   Genitourinary:  Negative for dysuria, frequency and urgency.   Musculoskeletal:  Negative for neck pain.   Neurological:  Positive for tingling. Negative for dizziness, tremors, sensory change, focal weakness and headaches.   Endo/Heme/Allergies:  Does not bruise/bleed easily.   Psychiatric/Behavioral:  Negative for depression, memory loss and suicidal ideas.    All other systems reviewed and are negative.      Problem list, medications, and allergies reviewed by myself today in Epic.     Objective:     Vitals:    09/08/23 1429   BP:  "106/60   BP Location: Left arm   Patient Position: Sitting   BP Cuff Size: Adult   Pulse: 92   Temp: 37 °C (98.6 °F)   TempSrc: Temporal   SpO2: 94%   Weight: 85.6 kg (188 lb 13.2 oz)   Height: 1.778 m (5' 10\")       DESC; KARNOFSKY SCALE WITH ECOG EQUIVALENT: 90, Able to carry on normal activity; minor signs or symptoms of disease (ECOG equivalent 0)    DISTRESS LEVEL: no acute distress    Physical Exam  Constitutional:       General: He is not in acute distress.     Appearance: Normal appearance.   HENT:      Head: Normocephalic and atraumatic.      Nose: Nose normal. No congestion.      Mouth/Throat:      Mouth: Mucous membranes are moist.      Pharynx: Oropharynx is clear.   Eyes:      General: No scleral icterus.     Conjunctiva/sclera: Conjunctivae normal.      Pupils: Pupils are equal, round, and reactive to light.   Cardiovascular:      Rate and Rhythm: Normal rate and regular rhythm.      Pulses: Normal pulses.      Heart sounds: No murmur heard.     No friction rub.   Pulmonary:      Effort: Pulmonary effort is normal. No respiratory distress.      Breath sounds: Normal breath sounds. No stridor. No wheezing or rales.   Chest:      Chest wall: No tenderness.   Abdominal:      General: Abdomen is flat. Bowel sounds are normal. There is no distension.      Palpations: Abdomen is soft. There is no mass.      Tenderness: There is no abdominal tenderness. There is no guarding or rebound.   Musculoskeletal:         General: No swelling, tenderness or deformity. Normal range of motion.      Cervical back: Normal range of motion and neck supple. No rigidity or tenderness.      Right lower leg: No edema.      Left lower leg: No edema.   Skin:     General: Skin is warm.      Coloration: Skin is not jaundiced or pale.      Findings: No bruising or rash.   Neurological:      General: No focal deficit present.      Mental Status: He is alert and oriented to person, place, and time. Mental status is at baseline.      " Motor: No weakness.   Psychiatric:         Mood and Affect: Mood normal.         Behavior: Behavior normal.         Thought Content: Thought content normal.         Judgment: Judgment normal.         Labs:   Most recent labs reviewed.  Please see the lab tab of chart review    Imaging:   Most recent images below have been independently reviewed by me.  Please see the imaging tab of chart review    6/6/2023 PET scan  1.  Multilobular hypermetabolic nodule in the superior segment LEFT lower lobe of lung measuring 2.3 cm consistent with malignancy.  2.  Involvement of the LEFT superior hilar lymph node.  3.  No evidence for metastatic disease in the abdomen or pelvis.    Pathology    A. Lung, left lower lobe, fine needle aspiration slides:          Blood only; no epithelial or malignant cells identified.   B. Lung, left lower lobe, core biopsies:          Positive for small cell carcinoma.   C. Lung, left lower lobe, forceps biopsy with touch prep slides:          Positive for small cell carcinoma.   D. Lung, left lower lobe, fine needle aspiration:          Originally submitted for possible flow cytometric analysis,           which was deemed unnecessary.  Tissue will be submitted to           cytology for preparation of a cell block; an addendum will be           generated if there is an unexpected finding.   E. Lymph node, 10L, fine needle aspiration slides:          Malignant cells present, consistent with metastatic small cell   carcinoma.   F.  Lymph node, 10L, core biopsies:            Hypocellular specimen demonstrating few scattered lymphocytes           and few atypical cells suspicious for small cell carcinoma       Assessment/Plan:      Cancer Staging   SCLC (small cell lung carcinoma) (HCC)  Staging form: Lung, AJCC 8th Edition  - Clinical: Stage IIB (cT1c, cN1, cM0) - Signed by Roseline Fuller M.D. on 9/8/2023       Mr. Ramsey who presents today with a new diagnosis of small cell lung cancer.      Today I had a long discussion with the patient about his type of cancer.  We discussed the natural history of SCLC as well as the risk factors associated with this disease.     We discussed various stages of disease. Based on the information available, it appears he may have limited stage small cell lung cancer but more testing is needed, including a PET scan and brain MRI     Next we moved onto discuss the standard of care treatment for SCLC as well as a clinical trial.  We discussed that standard of care for limited stage consists of cisplatin and etoposide + radiation.  Clinical trial would consist of SOC drugs (Cis and etoposide) as well as immunotherapy.      We briefly touched on the s/e associated with this clinical trial and how given how aggressive SCLC is, recurrence is common. We we spoke about immunotherapy and how immunotherapy has shown very promising results in sense of stage small cell lung cancer as well as many other cancers.  Unfortunately it is not approved for the use in small cell lung cancer in the limited stage.     However, we have a clinical trial that is available here at [facility] that may provide the opportunity to have immunotherapy in her setting. We discussed the HL X trial which consists of standard of care plus or minus immunotherapy.     Plan  -Consent for HLX trial, if candidate RT to start with C2  -PET scan  -Brain MRI  -CT CAP  -Echo ordered   -Will need chemo ED  -participated in [facility]reen[facility] study  -tentative start date is 9/18, with labs on 8/15  -Signatera draw to be done now    Any questions and concerns raised by the patient were addressed and answered. Patient denies any further questions.  Patient encouraged to call the office with any concerns or issues.      Roseline Fuller M.D.  Hematology/Oncology     62 minutes was spent on this visit    No follow-ups on file.     Any questions and concerns raised by the patient were addressed and answered. Patient denies any further  questions.  Patient encouraged to call the office with any concerns or issues.     Roseline Fuller M.D.  Hematology/Oncology      65 minutes was spent on this visit

## 2023-09-08 NOTE — RESEARCH NOTE
Participation in the [id number] clinical trial was discussed with the patient today. All aspects of the study purpose and procedures were explained.  They were given ample time to review the consent and all questions were answered to thier satisfaction. Patient aware that the clinical trial is voluntary and they may withdraw consent at any time without affecting the level of care they receive.  Subject signed consent without coercion and undue influence and was given a copy of the signed consent. No study-related procedures took place prior to consenting and all assessments were conducted per protocol.

## 2023-09-08 NOTE — ADDENDUM NOTE
Encounter addended by: Santo Quintero on: 9/8/2023 4:41 PM   Actions taken: Charge Capture section accepted

## 2023-09-11 DIAGNOSIS — C34.90 SCLC (SMALL CELL LUNG CARCINOMA) (HCC): ICD-10-CM

## 2023-09-11 ASSESSMENT — LIFESTYLE VARIABLES
SMOKING_YEARS: 28
TOBACCO_USE: NO
SMOKING_STATUS: NO

## 2023-09-12 ENCOUNTER — HOSPITAL ENCOUNTER (OUTPATIENT)
Dept: CARDIOLOGY | Facility: MEDICAL CENTER | Age: 73
End: 2023-09-12
Attending: STUDENT IN AN ORGANIZED HEALTH CARE EDUCATION/TRAINING PROGRAM
Payer: COMMERCIAL

## 2023-09-12 ENCOUNTER — APPOINTMENT (OUTPATIENT)
Dept: HEMATOLOGY ONCOLOGY | Facility: MEDICAL CENTER | Age: 73
End: 2023-09-12
Payer: COMMERCIAL

## 2023-09-12 ENCOUNTER — HOSPITAL ENCOUNTER (OUTPATIENT)
Dept: RADIOLOGY | Facility: MEDICAL CENTER | Age: 73
End: 2023-09-12
Attending: RADIOLOGY
Payer: COMMERCIAL

## 2023-09-12 DIAGNOSIS — C34.90 SMALL CELL LUNG CANCER (HCC): ICD-10-CM

## 2023-09-12 DIAGNOSIS — C34.90 SCLC (SMALL CELL LUNG CARCINOMA) (HCC): ICD-10-CM

## 2023-09-12 LAB
LV EJECT FRACT  99904: 65
LV EJECT FRACT MOD 2C 99903: 58.79
LV EJECT FRACT MOD 4C 99902: 64.45
LV EJECT FRACT MOD BP 99901: 60.44

## 2023-09-12 PROCEDURE — A9552 F18 FDG: HCPCS

## 2023-09-12 PROCEDURE — 93306 TTE W/DOPPLER COMPLETE: CPT

## 2023-09-12 PROCEDURE — 93306 TTE W/DOPPLER COMPLETE: CPT | Mod: 26 | Performed by: INTERNAL MEDICINE

## 2023-09-13 ENCOUNTER — HOSPITAL ENCOUNTER (OUTPATIENT)
Dept: RADIOLOGY | Facility: MEDICAL CENTER | Age: 73
End: 2023-09-13
Attending: STUDENT IN AN ORGANIZED HEALTH CARE EDUCATION/TRAINING PROGRAM
Payer: COMMERCIAL

## 2023-09-13 DIAGNOSIS — C34.90 SCLC (SMALL CELL LUNG CARCINOMA) (HCC): ICD-10-CM

## 2023-09-13 PROCEDURE — 700117 HCHG RX CONTRAST REV CODE 255: Performed by: STUDENT IN AN ORGANIZED HEALTH CARE EDUCATION/TRAINING PROGRAM

## 2023-09-13 PROCEDURE — 71260 CT THORAX DX C+: CPT

## 2023-09-13 RX ADMIN — IOHEXOL 100 ML: 350 INJECTION, SOLUTION INTRAVENOUS at 18:00

## 2023-09-14 ENCOUNTER — HOSPITAL ENCOUNTER (OUTPATIENT)
Dept: RADIATION ONCOLOGY | Facility: MEDICAL CENTER | Age: 73
End: 2023-09-30
Attending: RADIOLOGY
Payer: COMMERCIAL

## 2023-09-14 VITALS
DIASTOLIC BLOOD PRESSURE: 74 MMHG | BODY MASS INDEX: 28.08 KG/M2 | RESPIRATION RATE: 18 BRPM | HEART RATE: 59 BPM | TEMPERATURE: 97.6 F | SYSTOLIC BLOOD PRESSURE: 126 MMHG | OXYGEN SATURATION: 97 % | WEIGHT: 189.6 LBS | HEIGHT: 69 IN

## 2023-09-14 DIAGNOSIS — C34.90 SCLC (SMALL CELL LUNG CARCINOMA) (HCC): ICD-10-CM

## 2023-09-14 PROCEDURE — 99205 OFFICE O/P NEW HI 60 MIN: CPT | Performed by: RADIOLOGY

## 2023-09-14 PROCEDURE — 99212 OFFICE O/P EST SF 10 MIN: CPT | Performed by: RADIOLOGY

## 2023-09-14 ASSESSMENT — PAIN SCALES - GENERAL: PAINLEVEL: NO PAIN

## 2023-09-14 ASSESSMENT — FIBROSIS 4 INDEX: FIB4 SCORE: 5.3

## 2023-09-14 NOTE — PROGRESS NOTES
"Patient was seen today in clinic with  for consult.  Vitals signs and weight were obtained and pain assessment was completed.  Allergies and medications were reviewed with the patient.       Vitals/Pain:  Vitals:    09/14/23 0907   BP: 126/74   Pulse: (!) 59   Resp: 18   Temp: 36.4 °C (97.6 °F)   SpO2: 97%   Weight: 86 kg (189 lb 9.5 oz)   Height: 1.753 m (5' 9\")   Pain Score: No pain (pain RLE from prior fall)        Allergies:   Patient has no known allergies.    Current Medications:  Current Outpatient Medications   Medication Sig Dispense Refill    Cetirizine HCl (ZYRTEC ALLERGY PO) Take  by mouth.      ipratropium (ATROVENT) 0.03 % Solution Administer 2 Sprays into affected nostril(S) every 12 hours.      metFORMIN (GLUCOPHAGE) 500 MG Tab Take 750 mg by mouth 2 times a day with meals.      omeprazole (PRILOSEC) 40 MG delayed-release capsule Take 40 mg by mouth every day.      baclofen (LIORESAL) 10 MG Tab Take 10 mg by mouth at bedtime as needed. Indications: Muscle Spasm      Cyanocobalamin (VITAMIN B-12) 1000 MCG Tab Take 1,000 mcg by mouth every day.      fluticasone-salmeterol (ADVAIR) 250-50 MCG/ACT AEROSOL POWDER, BREATH ACTIVATED Inhale 1 Puff every 12 hours.      fluticasone (FLONASE) 50 MCG/ACT nasal spray Administer 2 Sprays into affected nostril(S) every day.      Omega-3 Fatty Acids (FISH OIL) 1000 MG Cap capsule Take 1,000 mg by mouth every day.      vitamin D3 (CHOLECALCIFEROL) 1000 Unit (25 mcg) Tab Take 1,000 Units by mouth every day.      gabapentin (NEURONTIN) 300 MG Cap Take 300 mg by mouth 3 times a day.      aspirin EC (ECOTRIN) 81 MG Tablet Delayed Response Take 81 mg by mouth every day.       No current facility-administered medications for this encounter.         PCP:  Pcp Pt States None        Di Cotto R.N.   "

## 2023-09-14 NOTE — CONSULTS
"RADIATION ONCOLOGY CONSULT    Patient name:  Bart Ramsey    Primary Physician:  Pcp Pt States None MRN: [id number]  [facility]: [id number]   Referring physician:  Gianfranco Echevarria, *  : 1950, 73 y.o.     DATE OF SERVICE: 2023    IDENTIFICATION: A 73 y.o. male with   SCLC (small cell lung carcinoma) (HCC)  Staging form: Lung, AJCC 8th Edition  - Clinical stage from 2023: Stage IIIA (cT3, cN1, cM0) - Signed by Leonel Patel M.D. on 2023  Stage prefix: Initial diagnosis        He is here at the kind request of Gianfranco Dumont, *        HISTORY OF PRESENT ILLNESS:  Subjective     Bart Ramsey is a 73 y.o. male who presents with a new diagnosis of SCLC     His recent history dates back to February of this year when he had an admission due to cholecystitis and underwent a cholecystectomy.  Imaging at that time showed some nodules on his lung.       In March or 2023 he had a biopsy attempt of his lung nodules.  He reports that this was in conclusive.     PET scan was done on 2023 which showed a hypermetabolic nodule in the superior segment LEFT lower lobe of lung measuring 2.3 cm consistent with malignancy. Involvement of the LEFT superior hilar lymph node.  No evidence for metastatic disease in the abdomen or pelvis.     He had a bronchoscopy on  and pathology confirmed small cell lung cancer from station 10 L.  He had agent orange exposure from [place] ([id number]). Son committed suicide 1 year ago today.  He is raising his granddaughter ([place]).      He quit in  (started in , 28 pack year history).  Used to smoke marijuana.  He states he drinks too much (drinks a \"couple good drinks\" per night).  He has a healthy diet.  He eats a lot of vegetables.  He met with Dr. Fuller, and discussed possible enrollment on the HL X trial for limited stage small cell lung cancer.  In the meantime, I had discussed the case with her as well and we opted " for repeat imaging.  A repeat PET scan was completed earlier this week that unfortunately shows some progression of disease in the left thorax as well as a punctate area of uptake at the right lung base without any obvious CT correlate.  He is brain MRI was also updated a few days ago, and revealed no metastatic disease.        PROBLEM LIST:  Patient Active Problem List   Diagnosis    Nausea with vomiting    CAD (coronary artery disease)    History of aortic aneurysm    Mesenteric artery stenosis (HCC)    SCLC (small cell lung carcinoma) (HCC)        PAST SURGICAL HISTORY:  Past Surgical History:   Procedure Laterality Date    MN BRONCHOSCOPY,DIAGNOSTIC N/A 08/29/2023    Procedure: FIBER OPTIC BRONCHOSCOPY WITH  WASH, BRUSH, BRONCHOALVEOLAR LAVAGE, BIOPSY AND FINE NEEDLE ASPIRATION, ENDOBRONCHIAL ULTRASOUND & NAVIGATION,  ROBOTICS;  Surgeon: Bart Cortez M.D.;  Location: SURGERY [facility];  Service: Pulmonary Robotic    ENDOBRONCHIAL US ADD-ON N/A 08/29/2023    Procedure: ENDOBRONCHIAL ULTRASOUND (EBUS);  Surgeon: Bart Cortez M.D.;  Location: SURGERY [facility];  Service: Pulmonary Robotic    GASTROSCOPY-ENDO  03/31/2017    Procedure: GASTROSCOPY-ENDO;  Surgeon: Emerson Eaton M.D.;  Location: ENDOSCOPY [facility];  Service:     COLONOSCOPY - ENDO  03/31/2017    Procedure: COLONOSCOPY - ENDO;  Surgeon: Emerson Eaton M.D.;  Location: ENDOSCOPY [facility];  Service:     CHOLECYSTECTOMY      INGUINAL HERNIA REPAIR Right     OTHER      Tonsillectomy    OTHER      Hernia     OTHER CARDIAC SURGERY      3 stents    TONSILLECTOMY         CURRENT MEDICATIONS:  Current Outpatient Medications   Medication Sig Dispense Refill    Cetirizine HCl (ZYRTEC ALLERGY PO) Take  by mouth.      ipratropium (ATROVENT) 0.03 % Solution Administer 2 Sprays into affected nostril(S) every 12 hours.      metFORMIN (GLUCOPHAGE) 500 MG Tab Take 750 mg by mouth 2 times a day with meals.      omeprazole (PRILOSEC) 40 MG  delayed-release capsule Take 40 mg by mouth every day.      baclofen (LIORESAL) 10 MG Tab Take 10 mg by mouth at bedtime as needed. Indications: Muscle Spasm      Cyanocobalamin (VITAMIN B-12) 1000 MCG Tab Take 1,000 mcg by mouth every day.      fluticasone-salmeterol (ADVAIR) 250-50 MCG/ACT AEROSOL POWDER, BREATH ACTIVATED Inhale 1 Puff every 12 hours.      fluticasone (FLONASE) 50 MCG/ACT nasal spray Administer 2 Sprays into affected nostril(S) every day.      Omega-3 Fatty Acids (FISH OIL) 1000 MG Cap capsule Take 1,000 mg by mouth every day.      vitamin D3 (CHOLECALCIFEROL) 1000 Unit (25 mcg) Tab Take 1,000 Units by mouth every day.      gabapentin (NEURONTIN) 300 MG Cap Take 300 mg by mouth 3 times a day.      aspirin EC (ECOTRIN) 81 MG Tablet Delayed Response Take 81 mg by mouth every day.       No current facility-administered medications for this encounter.       ALLERGIES:    Patient has no known allergies.    FAMILY HISTORY:    family history includes Cancer in his mother.    SOCIAL HISTORY:     reports that he quit smoking about 23 years ago. His smoking use included cigarettes. He started smoking about 43 years ago. He has a 10.0 pack-year smoking history. He does not have any smokeless tobacco history on file. He reports current alcohol use of about 8.4 oz of alcohol per week. He reports that he does not currently use drugs after having used the following drugs: Marijuana.  Patient currently resides in [place] with his spouse Marcia and his grand-daughter.  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  He is retired from the [employer] business.     REVIEW OF SYSTEMS:    A complete review of systems taken. Pertinent items in HPI. All others negative.    PHYSICAL EXAM:    PERFORMANCE STATUS:      9/14/2023     9:22 AM   ECOG Performance Review   ECOG Performance Status Fully active, able to carry on all pre-disease performance without restriction         9/14/2023     9:23  "AM   Karnofsky Score   Karnofsky Score 90     /74   Pulse (!) 59   Temp 36.4 °C (97.6 °F)   Resp 18   Ht 1.753 m (5' 9\")   Wt 86 kg (189 lb 9.5 oz)   SpO2 97%   BMI 28.00 kg/m²   Physical Exam  Constitutional:       Appearance: Normal appearance.   HENT:      Head: Normocephalic and atraumatic.   Eyes:      Extraocular Movements: Extraocular movements intact.      Conjunctiva/sclera: Conjunctivae normal.   Cardiovascular:      Rate and Rhythm: Normal rate and regular rhythm.   Pulmonary:      Effort: Pulmonary effort is normal.      Breath sounds: Normal breath sounds.   Abdominal:      General: Abdomen is flat.      Palpations: Abdomen is soft.   Musculoskeletal:         General: Normal range of motion.   Neurological:      General: No focal deficit present.      Mental Status: He is alert and oriented to person, place, and time.          LABORATORY DATA:   Lab Results   Component Value Date/Time    WBC 14.6 (H) 02/11/2023 01:18 AM    RBC 4.84 02/11/2023 01:18 AM    HEMOGLOBIN 15.3 02/11/2023 01:18 AM    HEMATOCRIT 44.7 02/11/2023 01:18 AM    MCV 92.4 02/11/2023 01:18 AM    MCH 31.6 02/11/2023 01:18 AM    MCHC 34.2 02/11/2023 01:18 AM    RDW 48.2 02/11/2023 01:18 AM    PLATELETCT 93 (L) 02/11/2023 01:18 AM    MPV 10.1 02/11/2023 01:18 AM    NEUTSPOLYS 86.00 (H) 02/11/2023 01:18 AM    LYMPHOCYTES 5.80 (L) 02/11/2023 01:18 AM    MONOCYTES 7.20 02/11/2023 01:18 AM    EOSINOPHILS 0.00 02/11/2023 01:18 AM    BASOPHILS 0.40 02/11/2023 01:18 AM      Lab Results   Component Value Date/Time    SODIUM 135 08/16/2023 11:39 AM    POTASSIUM 4.5 08/16/2023 11:39 AM    CHLORIDE 99 08/16/2023 11:39 AM    CO2 26 08/16/2023 11:39 AM    GLUCOSE 108 (H) 08/16/2023 11:39 AM    BUN 17 08/16/2023 11:39 AM    CREATININE 0.86 08/16/2023 11:39 AM           RADIOLOGY DATA:  CT-CHEST (THORAX) W/O    Result Date: 8/29/2023  1.  There has been interval progression of disease with increased size of the dominant left lower lobe " lobulated peripheral lung mass consistent with malignancy. 2.  There is also increase in size of the more medial left lower lobe lobulated mass in the left infrahilar region as well as numerous other left lung nodule suspicious for intrapulmonary metastasis. 3.  There is left hilar adenopathy consistent with metastatic disease based on prior PET/CT uptake. 4.  Interval increase in subpleural nodularity left and to a lesser extent right posterior medial lung bases suspicious for pleural-based metastasis.     CT-CHEST,ABDOMEN,PELVIS WITH    Result Date: 9/13/2023  1.  Left lower lobe pulmonary masses, very slightly more prominent than on 8/29/2023. 2.  Multiple left pulmonary and pleural metastases. 3.  Left hilar emely metastases. 4.  No evidence of metastatic disease in the abdomen or pelvis.    DX-CHEST-LIMITED (1 VIEW)    Result Date: 8/30/2023  Digitized intraoperative radiograph is submitted for review. This examination is not for diagnostic purpose but for guidance during a surgical procedure. Please see the patient's chart for full procedural details. INTERPRETING LOCATION: [address], [place], [address]    DX-CHEST-PORTABLE (1 VIEW)    Result Date: 8/29/2023  1.  No evidence of pneumothorax. 2.  Mass within the left lung base.    DX-PORTABLE FLUOROSCOPY < 1 HOUR    Result Date: 8/30/2023  Portable fluoroscopy utilized for 1 minute 5 seconds. INTERPRETING LOCATION: [address], [place], [address]    [id number] BASE TO MID-THIGH    Result Date: 9/13/2023  1.  Interval increased size and intensity of LEFT lower lobe lung mass with new intrapulmonary and pleural metastases indicating progression of disease. 2.  Probable pleural metastasis at the RIGHT lung base posteriorly. 3.  Worsening LEFT hilar adenopathy, metastatic. 4.  No evidence of metastatic disease in the abdomen or pelvis.      IMPRESSION:    A 73 y.o. with  SCLC (small cell lung carcinoma) (HCC)  Staging form: Lung, AJCC 8th Edition  - Clinical stage  from 9/14/2023: Stage IIIA (cT3, cN1, cM0) - Signed by Leonel Patel M.D. on 9/14/2023  Stage prefix: Initial diagnosis        RECOMMENDATIONS:   I had a long discussion with him today regarding his diagnosis of small cell lung cancer, we reviewed the natural history and risk factors for small cell lung cancer, and the staging system both with TNM staging as well as more conventional limited versus extensive stage disease.  Based on his most recent PET scan, unfortunately he does have some progression on the left side along with the punctate focus of uptake on the right side.  However, without the obvious CT correlate on the right side of the right lung base, I think all of this is still limited to the left thorax and therefore I would still consider to this to be limited stage disease.  Therefore, we next reviewed treatment options for limited stage disease with concurrent chemoradiation with radiation's topically starting with cycle 1 or cycle 2 of chemotherapy.  We reviewed the aggressive nature of small cell lung cancer, though generally does tend to respond quite well to treatment but does have a significant risk of recurrence.  He has reviewed all of this with Dr. Fuller already, and wants to proceed with treatment as soon as possible.  I think he would be a reasonable candidate for the HL X study as well, and therefore we will plan to start with radiation, assuming he is able to enroll on the study with cycle 2 of chemotherapy.  It appears cycle 1 is scheduled for this coming Monday, so anticipate starting radiation with cycle 2 of chemotherapy.  CT simulation will be scheduled about a week prior to the start of radiation.  We reviewed the expected acute and long-term toxicities of the entirety of this approach in quite some detail today as well.  He is in agreement with the plan as outlined and I will plan to see him at his simulation appointment.    Thank you for the opportunity to participate in his  care.  If any questions or comments, please do not hesitate in calling.  Approximately 65 minutes were spent on this visit, including extensive face-to-face visit, imaging and records review, and documentation and coordination.    Orders Placed This Encounter    Referral to Oncology Psychosocial Screening for Distress

## 2023-09-14 NOTE — CT SIMULATION
PATIENT NAME Bart Israel Braulio   PRIMARY PHYSICIAN Pcp Pt States None [phone]   REFERRING PHYSICIAN Gianfranco Echevarria, * 1950     SCLC (small cell lung carcinoma) (HCC)  Staging form: Lung, AJCC 8th Edition  - Clinical stage from 9/14/2023: Stage IIIA (cT3, cN1, cM0) - Signed by Leonel Patel M.D. on 9/14/2023  Stage prefix: Initial diagnosis         Treatment Planning CT Simulation      Order Questions     Question Answer    Is this for a new course of treatment? Yes    Is this an Addendum? No    Implanted Device/Pacemaker No    Simulation Status Initial    Planned Start Date 9/14/2023    Treatment Site Lung    Laterality Left    Treatment Technique IMRT    Treatment Pattern/Frequency Daily    Number of fractions: 33    Concurrent Chemotherapy Yes    Ordering Provider ANUM MORSE    CT Technique 4D    Slice Thickness 2mm    Scan Extent Chest    Contrast IV    IV Contrast Volume Other    Volume (cc) 100    Bowel Preparation No    Treatment Device(s) Vac Junie    Patient Attire Gown    Patient Position Supine    Patient Orientation Head First    Arm Position Up    Treatment Machine No preference    Treatment Image Guidance CBCT    Frequency (CBCT) Daily    Image Guidance Match PTV - Soft Tissue     Airway    Treatment Planning Image Fusion CT/PET    Other Orders Weekly Physics Check     Special Tx Procedure    Release to patient Immediate

## 2023-09-15 ENCOUNTER — HOSPITAL ENCOUNTER (OUTPATIENT)
Dept: HEMATOLOGY ONCOLOGY | Facility: MEDICAL CENTER | Age: 73
End: 2023-09-15
Attending: STUDENT IN AN ORGANIZED HEALTH CARE EDUCATION/TRAINING PROGRAM
Payer: COMMERCIAL

## 2023-09-15 ENCOUNTER — RESEARCH ENCOUNTER (OUTPATIENT)
Dept: HEMATOLOGY ONCOLOGY | Facility: MEDICAL CENTER | Age: 73
End: 2023-09-15

## 2023-09-15 ENCOUNTER — HOSPITAL ENCOUNTER (OUTPATIENT)
Dept: LAB | Facility: MEDICAL CENTER | Age: 73
End: 2023-09-15
Attending: STUDENT IN AN ORGANIZED HEALTH CARE EDUCATION/TRAINING PROGRAM

## 2023-09-15 VITALS
DIASTOLIC BLOOD PRESSURE: 58 MMHG | WEIGHT: 191.8 LBS | SYSTOLIC BLOOD PRESSURE: 112 MMHG | BODY MASS INDEX: 28.41 KG/M2 | TEMPERATURE: 97.7 F | HEIGHT: 69 IN | HEART RATE: 76 BPM | OXYGEN SATURATION: 99 %

## 2023-09-15 DIAGNOSIS — C34.90 SCLC (SMALL CELL LUNG CARCINOMA) (HCC): ICD-10-CM

## 2023-09-15 LAB
ALBUMIN SERPL BCP-MCNC: 4.5 G/DL (ref 3.2–4.9)
ALBUMIN/GLOB SERPL: 1.5 G/DL
ALP SERPL-CCNC: 94 U/L (ref 30–99)
ALT SERPL-CCNC: 22 U/L (ref 2–50)
ANION GAP SERPL CALC-SCNC: 13 MMOL/L (ref 7–16)
APPEARANCE UR: CLEAR
APTT PPP: 27.4 SEC (ref 24.7–36)
AST SERPL-CCNC: 31 U/L (ref 12–45)
BASOPHILS # BLD AUTO: 1.5 % (ref 0–1.8)
BASOPHILS # BLD: 0.08 K/UL (ref 0–0.12)
BILIRUB CONJ SERPL-MCNC: 0.2 MG/DL (ref 0.1–0.5)
BILIRUB INDIRECT SERPL-MCNC: 0.6 MG/DL (ref 0–1)
BILIRUB SERPL-MCNC: 0.8 MG/DL (ref 0.1–1.5)
BILIRUB UR QL STRIP.AUTO: NEGATIVE
BUN SERPL-MCNC: 16 MG/DL (ref 8–22)
CALCIUM ALBUM COR SERPL-MCNC: 9 MG/DL (ref 8.5–10.5)
CALCIUM SERPL-MCNC: 9.4 MG/DL (ref 8.5–10.5)
CHLORIDE SERPL-SCNC: 102 MMOL/L (ref 96–112)
CHOLEST SERPL-MCNC: 215 MG/DL (ref 100–199)
CK SERPL-CCNC: 379 U/L (ref 0–154)
CO2 SERPL-SCNC: 24 MMOL/L (ref 20–33)
COLOR UR: YELLOW
CREAT SERPL-MCNC: 0.9 MG/DL (ref 0.5–1.4)
EOSINOPHIL # BLD AUTO: 0.22 K/UL (ref 0–0.51)
EOSINOPHIL NFR BLD: 4.1 % (ref 0–6.9)
ERYTHROCYTE [DISTWIDTH] IN BLOOD BY AUTOMATED COUNT: 52.8 FL (ref 35.9–50)
GFR SERPLBLD CREATININE-BSD FMLA CKD-EPI: 90 ML/MIN/1.73 M 2
GLOBULIN SER CALC-MCNC: 3.1 G/DL (ref 1.9–3.5)
GLUCOSE SERPL-MCNC: 113 MG/DL (ref 65–99)
GLUCOSE UR STRIP.AUTO-MCNC: NEGATIVE MG/DL
HBV SURFACE AB SERPL IA-ACNC: <3.5 MIU/ML (ref 0–10)
HBV SURFACE AG SER QL: NORMAL
HCT VFR BLD AUTO: 44.8 % (ref 42–52)
HGB BLD-MCNC: 15 G/DL (ref 14–18)
HIV 1+2 AB+HIV1 P24 AG SERPL QL IA: NORMAL
IMM GRANULOCYTES # BLD AUTO: 0.01 K/UL (ref 0–0.11)
IMM GRANULOCYTES NFR BLD AUTO: 0.2 % (ref 0–0.9)
INR PPP: 0.98 (ref 0.87–1.13)
KETONES UR STRIP.AUTO-MCNC: NEGATIVE MG/DL
LDH SERPL L TO P-CCNC: 136 U/L (ref 107–266)
LEUKOCYTE ESTERASE UR QL STRIP.AUTO: NEGATIVE
LYMPHOCYTES # BLD AUTO: 1.69 K/UL (ref 1–4.8)
LYMPHOCYTES NFR BLD: 31.4 % (ref 22–41)
MAGNESIUM SERPL-MCNC: 2.4 MG/DL (ref 1.5–2.5)
MCH RBC QN AUTO: 31.3 PG (ref 27–33)
MCHC RBC AUTO-ENTMCNC: 33.5 G/DL (ref 32.3–36.5)
MCV RBC AUTO: 93.3 FL (ref 81.4–97.8)
MICRO URNS: NORMAL
MONOCYTES # BLD AUTO: 0.5 K/UL (ref 0–0.85)
MONOCYTES NFR BLD AUTO: 9.3 % (ref 0–13.4)
NEUTROPHILS # BLD AUTO: 2.89 K/UL (ref 1.82–7.42)
NEUTROPHILS NFR BLD: 53.5 % (ref 44–72)
NITRITE UR QL STRIP.AUTO: NEGATIVE
NRBC # BLD AUTO: 0 K/UL
NRBC BLD-RTO: 0 /100 WBC (ref 0–0.2)
NT-PROBNP SERPL IA-MCNC: 88 PG/ML (ref 0–125)
PH UR STRIP.AUTO: 6 [PH] (ref 5–8)
PHOSPHATE SERPL-MCNC: 2.9 MG/DL (ref 2.5–4.5)
PLATELET # BLD AUTO: 153 K/UL (ref 164–446)
PMV BLD AUTO: 9.8 FL (ref 9–12.9)
POTASSIUM SERPL-SCNC: 4.1 MMOL/L (ref 3.6–5.5)
PROT SERPL-MCNC: 7.6 G/DL (ref 6–8.2)
PROT UR QL STRIP: NEGATIVE MG/DL
PROTHROMBIN TIME: 13.1 SEC (ref 12–14.6)
RBC # BLD AUTO: 4.8 M/UL (ref 4.7–6.1)
RBC UR QL AUTO: NEGATIVE
SODIUM SERPL-SCNC: 139 MMOL/L (ref 135–145)
SP GR UR STRIP.AUTO: 1.01
T3 SERPL-MCNC: 114 NG/DL (ref 60–181)
T4 FREE SERPL-MCNC: 1.22 NG/DL (ref 0.93–1.7)
TROPONIN T SERPL-MCNC: 12 NG/L (ref 6–19)
TSH SERPL DL<=0.005 MIU/L-ACNC: 1.26 UIU/ML (ref 0.38–5.33)
UROBILINOGEN UR STRIP.AUTO-MCNC: 0.2 MG/DL
WBC # BLD AUTO: 5.4 K/UL (ref 4.8–10.8)

## 2023-09-15 PROCEDURE — 80053 COMPREHEN METABOLIC PANEL: CPT

## 2023-09-15 PROCEDURE — 84100 ASSAY OF PHOSPHORUS: CPT

## 2023-09-15 PROCEDURE — 81003 URINALYSIS AUTO W/O SCOPE: CPT

## 2023-09-15 PROCEDURE — 87340 HEPATITIS B SURFACE AG IA: CPT

## 2023-09-15 PROCEDURE — 82550 ASSAY OF CK (CPK): CPT

## 2023-09-15 PROCEDURE — 84439 ASSAY OF FREE THYROXINE: CPT

## 2023-09-15 PROCEDURE — 83615 LACTATE (LD) (LDH) ENZYME: CPT

## 2023-09-15 PROCEDURE — 83880 ASSAY OF NATRIURETIC PEPTIDE: CPT

## 2023-09-15 PROCEDURE — 85730 THROMBOPLASTIN TIME PARTIAL: CPT

## 2023-09-15 PROCEDURE — 99214 OFFICE O/P EST MOD 30 MIN: CPT | Performed by: STUDENT IN AN ORGANIZED HEALTH CARE EDUCATION/TRAINING PROGRAM

## 2023-09-15 PROCEDURE — 99212 OFFICE O/P EST SF 10 MIN: CPT | Performed by: STUDENT IN AN ORGANIZED HEALTH CARE EDUCATION/TRAINING PROGRAM

## 2023-09-15 PROCEDURE — 83735 ASSAY OF MAGNESIUM: CPT

## 2023-09-15 PROCEDURE — 82248 BILIRUBIN DIRECT: CPT

## 2023-09-15 PROCEDURE — 87389 HIV-1 AG W/HIV-1&-2 AB AG IA: CPT

## 2023-09-15 PROCEDURE — 86706 HEP B SURFACE ANTIBODY: CPT

## 2023-09-15 PROCEDURE — 85025 COMPLETE CBC W/AUTO DIFF WBC: CPT

## 2023-09-15 PROCEDURE — 87522 HEPATITIS C REVRS TRNSCRPJ: CPT

## 2023-09-15 PROCEDURE — 36415 COLL VENOUS BLD VENIPUNCTURE: CPT

## 2023-09-15 PROCEDURE — 82465 ASSAY BLD/SERUM CHOLESTEROL: CPT

## 2023-09-15 PROCEDURE — 84480 ASSAY TRIIODOTHYRONINE (T3): CPT

## 2023-09-15 PROCEDURE — 84443 ASSAY THYROID STIM HORMONE: CPT

## 2023-09-15 PROCEDURE — 85610 PROTHROMBIN TIME: CPT

## 2023-09-15 PROCEDURE — 84484 ASSAY OF TROPONIN QUANT: CPT

## 2023-09-15 PROCEDURE — 82553 CREATINE MB FRACTION: CPT

## 2023-09-15 RX ORDER — ONDANSETRON 4 MG/1
4 TABLET, FILM COATED ORAL EVERY 4 HOURS PRN
Qty: 30 TABLET | Refills: 6 | Status: SHIPPED | OUTPATIENT
Start: 2023-09-15 | End: 2023-09-19 | Stop reason: SDUPTHER

## 2023-09-15 RX ORDER — PROCHLORPERAZINE MALEATE 10 MG
10 TABLET ORAL EVERY 6 HOURS PRN
Qty: 30 TABLET | Refills: 6 | Status: SHIPPED | OUTPATIENT
Start: 2023-09-15 | End: 2023-09-19 | Stop reason: SDUPTHER

## 2023-09-15 ASSESSMENT — ENCOUNTER SYMPTOMS
TINGLING: 1
DIZZINESS: 0
MEMORY LOSS: 0
VOMITING: 0
DEPRESSION: 0
SORE THROAT: 0
ORTHOPNEA: 0
NAUSEA: 0
PALPITATIONS: 0
TREMORS: 0
HEARTBURN: 0
SENSORY CHANGE: 0
SHORTNESS OF BREATH: 0
HEADACHES: 0
COUGH: 0
SPUTUM PRODUCTION: 0
FEVER: 0
NECK PAIN: 0
WEIGHT LOSS: 0
ABDOMINAL PAIN: 0
CHILLS: 0
BLURRED VISION: 0
WHEEZING: 0
FOCAL WEAKNESS: 0
BRUISES/BLEEDS EASILY: 0

## 2023-09-15 ASSESSMENT — FIBROSIS 4 INDEX: FIB4 SCORE: 5.3

## 2023-09-15 NOTE — PROGRESS NOTES
The following appointments, labs, and medications have been provided to the patient:  Line: NA  ECHO: Completed 9/12/23  WBC Support (g-csf): NA  Labs: CMP, CBC, Mag, Bilirubin direct, Phosphorus, TSH, Free T4, Triidothyronine, LDH, Cholesterol total, INR, APTT, CPK, CKMB, BNP, UA  Follow up appts: Scheduled  Chemo/immunotherapy class: Completed 9/15/23  Chemotherapy Regimen: Cisplatin, etoposide, and Immunotherapy or Placebo [id number]  Nausea medication: zofran/compazine prescribed   Other treatment specific meds: NA  Pain medication: Per provider discretion  Nurse angie: Referred on 9/11/23   Dietician:  NICK  SW: NICK  FRA: Referred on 9/11/23    Additional info/teaching for immunotherapy patients:  If hospitalized, inform staff of immunotherapy treatment and have them contact oncologist - immunotherapy alert card provided.    Additional info/teaching for chemotherapy patients:  Neutropenia reminder card provided to patient      Additional teaching:  NCCN handout, small cell lung cancer, distress management during cancer care handout.      Patient was provided with drug specific handouts and [facility] side effects sheet. Patient verbalized that questions and concerns regarding treatment have been addressed. Consent to proceed with treatment was reviewed and signed during this visit.    Spent 60 minutes of continuous, non-interrupted, face-to-face patient contact in which greater than 50% of the visit was spent counseling and coordinating of care.

## 2023-09-15 NOTE — ADDENDUM NOTE
Encounter addended by: Santo Quintero on: 9/15/2023 10:15 AM   Actions taken: Charge Capture section accepted

## 2023-09-15 NOTE — PROGRESS NOTES
"    Consult Note: Hematology/Oncology     Primary Care:  Pcp Pt States None    Chief Complaint   Patient presents with    Lung Cancer     Pre treatment follow up        Current Treatment: None    Prior Treatment: None    Subjective:   History of Presenting Illness:  Bart Ramsey is a 73 y.o. male who presents with a new diagnosis of SCLC    Patient reports that in February he felt that he was being strangled.  He called the ambulance and he was taken to the ER.  He was placed on BP meds.  He still felt terrible after several days.  [facility]bl[facility] which was removed.  Imaging at that time showed some nodules on his lung.      In March or April 2023 he had a biopsy attempt of his lung nodules.  Per patient biopsy result was inconclusive      PET scan was done on 6/6/2023 which showed a hypermetabolic nodule in the superior segment LEFT lower lobe of lung measuring 2.3 cm consistent with malignancy. Involvement of the LEFT superior hilar lymph node.  No evidence for metastatic disease in the abdomen or pelvis.    He had a bronchoscopy Pathology revealed SCLC.     He had agent orange exposure from [place] ([id number]). Son committed suicide 1 year ago today.  He is raising his granddaughter ([place]).     He quit in 1994 (started in 1966, 28 pack year history).  Used to smoke marijuana.  He states he drinks too much (drinks a \"couple good drinks\" per night).  He has a healthy diet.  He eats a lot of vegetables.     Interval History    Patient reports that he is doing well.  We reviewed his PET scan together.     EKG reviewed today.      Past Medical History:   Diagnosis Date    Arthritis     knee    Bowel habit changes     diarrhea    Bronchitis     COPD (chronic obstructive pulmonary disease) (HCC)     Diabetes (HCC)     diet controlled, no medications.    Emphysema of lung (HCC)     COPD- no medications    High cholesterol 08/2023    Was on a statin, MD " monitoring, not currently on meds    Myocardial infarct (HCC)     ,,    Pneumonia     Psychiatric problem 2023    depression, son passed away recently, no meds    Sleep apnea     cpap        Past Surgical History:   Procedure Laterality Date    IA BRONCHOSCOPY,DIAGNOSTIC N/A 2023    Procedure: FIBER OPTIC BRONCHOSCOPY WITH  WASH, BRUSH, BRONCHOALVEOLAR LAVAGE, BIOPSY AND FINE NEEDLE ASPIRATION, ENDOBRONCHIAL ULTRASOUND & NAVIGATION,  ROBOTICS;  Surgeon: Bart Cortez M.D.;  Location: SURGERY [facility];  Service: Pulmonary Robotic    ENDOBRONCHIAL US ADD-ON N/A 2023    Procedure: ENDOBRONCHIAL ULTRASOUND (EBUS);  Surgeon: Bart Cortez M.D.;  Location: SURGERY [facility];  Service: Pulmonary Robotic    GASTROSCOPY-ENDO  2017    Procedure: GASTROSCOPY-ENDO;  Surgeon: Emerson Eaton M.D.;  Location: ENDOSCOPY [facility];  Service:     COLONOSCOPY - ENDO  2017    Procedure: COLONOSCOPY - ENDO;  Surgeon: Emerson Eaton M.D.;  Location: ENDOSCOPY [facility];  Service:     CHOLECYSTECTOMY      INGUINAL HERNIA REPAIR Right     OTHER      Tonsillectomy    OTHER      Hernia     OTHER CARDIAC SURGERY      3 stents    TONSILLECTOMY         Social History     Tobacco Use    Smoking status: Former     Current packs/day: 0.00     Average packs/day: 0.5 packs/day for 20.0 years (10.0 ttl pk-yrs)     Types: Cigarettes     Start date:      Quit date: 2000     Years since quittin.7    Tobacco comments:     Pt quit smoking cigarettes, 2000.  1/2 pack per day, smoked 20 years    Vaping Use    Vaping Use: Never used   Substance Use Topics    Alcohol use: Yes     Alcohol/week: 8.4 oz     Types: 14 Shots of liquor per week     Comment: daily    Drug use: Not Currently     Types: Marijuana     Comment: quit         Family History   Problem Relation Age of Onset    Cancer Mother         lung?       No Known Allergies    Current Outpatient Medications   Medication  Sig Dispense Refill    Cetirizine HCl (ZYRTEC ALLERGY PO) Take  by mouth.      ipratropium (ATROVENT) 0.03 % Solution Administer 2 Sprays into affected nostril(S) every 12 hours.      metFORMIN (GLUCOPHAGE) 500 MG Tab Take 750 mg by mouth 2 times a day with meals.      omeprazole (PRILOSEC) 40 MG delayed-release capsule Take 40 mg by mouth every day.      baclofen (LIORESAL) 10 MG Tab Take 10 mg by mouth at bedtime as needed. Indications: Muscle Spasm      Cyanocobalamin (VITAMIN B-12) 1000 MCG Tab Take 1,000 mcg by mouth every day.      fluticasone-salmeterol (ADVAIR) 250-50 MCG/ACT AEROSOL POWDER, BREATH ACTIVATED Inhale 1 Puff every 12 hours.      fluticasone (FLONASE) 50 MCG/ACT nasal spray Administer 2 Sprays into affected nostril(S) every day.      Omega-3 Fatty Acids (FISH OIL) 1000 MG Cap capsule Take 1,000 mg by mouth every day.      vitamin D3 (CHOLECALCIFEROL) 1000 Unit (25 mcg) Tab Take 1,000 Units by mouth every day.      gabapentin (NEURONTIN) 300 MG Cap Take 300 mg by mouth 3 times a day.      aspirin EC (ECOTRIN) 81 MG Tablet Delayed Response Take 81 mg by mouth every day.       No current facility-administered medications for this encounter.       Review of Systems   Constitutional:  Negative for chills, fever, malaise/fatigue and weight loss.   HENT:  Positive for tinnitus. Negative for congestion, ear pain, nosebleeds and sore throat.    Eyes:  Negative for blurred vision.   Respiratory:  Negative for cough, sputum production, shortness of breath and wheezing.    Cardiovascular:  Negative for chest pain, palpitations, orthopnea and leg swelling.   Gastrointestinal:  Negative for abdominal pain, heartburn, nausea and vomiting.   Genitourinary:  Negative for dysuria, frequency and urgency.   Musculoskeletal:  Negative for neck pain.   Neurological:  Positive for tingling. Negative for dizziness, tremors, sensory change, focal weakness and headaches.   Endo/Heme/Allergies:  Does not bruise/bleed  "easily.   Psychiatric/Behavioral:  Negative for depression, memory loss and suicidal ideas.    All other systems reviewed and are negative.      Problem list, medications, and allergies reviewed by myself today in Epic.     Objective:     Vitals:    09/15/23 0915   BP: 112/58   BP Location: Left arm   Patient Position: Sitting   BP Cuff Size: Adult   Pulse: 76   Temp: 36.5 °C (97.7 °F)   TempSrc: Temporal   SpO2: 99%   Weight: 87 kg (191 lb 12.8 oz)   Height: 1.753 m (5' 9.02\")       DESC; KARNOFSKY SCALE WITH ECOG EQUIVALENT: 90, Able to carry on normal activity; minor signs or symptoms of disease (ECOG equivalent 0)    DISTRESS LEVEL: no acute distress    Physical Exam  Constitutional:       General: He is not in acute distress.     Appearance: Normal appearance.   HENT:      Head: Normocephalic and atraumatic.      Nose: Nose normal. No congestion.      Mouth/Throat:      Mouth: Mucous membranes are moist.      Pharynx: Oropharynx is clear.   Eyes:      General: No scleral icterus.     Conjunctiva/sclera: Conjunctivae normal.      Pupils: Pupils are equal, round, and reactive to light.   Cardiovascular:      Rate and Rhythm: Normal rate and regular rhythm.      Pulses: Normal pulses.      Heart sounds: No murmur heard.     No friction rub.   Pulmonary:      Effort: Pulmonary effort is normal. No respiratory distress.      Breath sounds: Normal breath sounds. No stridor. No wheezing or rales.   Chest:      Chest wall: No tenderness.   Abdominal:      General: Abdomen is flat. Bowel sounds are normal. There is no distension.      Palpations: Abdomen is soft. There is no mass.      Tenderness: There is no abdominal tenderness. There is no guarding or rebound.   Musculoskeletal:         General: No swelling, tenderness or deformity. Normal range of motion.      Cervical back: Normal range of motion and neck supple. No rigidity or tenderness.      Right lower leg: No edema.      Left lower leg: No edema.   Skin:     " General: Skin is warm.      Coloration: Skin is not jaundiced or pale.      Findings: No bruising or rash.   Neurological:      General: No focal deficit present.      Mental Status: He is alert and oriented to person, place, and time. Mental status is at baseline.      Motor: No weakness.   Psychiatric:         Mood and Affect: Mood normal.         Behavior: Behavior normal.         Thought Content: Thought content normal.         Judgment: Judgment normal.         Labs:   Most recent labs reviewed.  Please see the lab tab of chart review    Imaging:   Most recent images below have been independently reviewed by me.  Please see the imaging tab of chart review    9/13/23 CT CAP  1.  Left lower lobe pulmonary masses, very slightly more prominent than on 8/29/2023.  2.  Multiple left pulmonary and pleural metastases.  3.  Left hilar emely metastases.  4.  No evidence of metastatic disease in the abdomen or pelvis.    9/12/2023 PET scan  1.  Interval increased size and intensity of LEFT lower lobe lung mass with new intrapulmonary and pleural metastases indicating progression of disease.  2.  Probable pleural metastasis at the RIGHT lung base posteriorly.  3.  Worsening LEFT hilar adenopathy, metastatic.  4.  No evidence of metastatic disease in the abdomen or pelvis.    6/6/2023 PET scan  1.  Multilobular hypermetabolic nodule in the superior segment LEFT lower lobe of lung measuring 2.3 cm consistent with malignancy.  2.  Involvement of the LEFT superior hilar lymph node.  3.  No evidence for metastatic disease in the abdomen or pelvis.    Pathology    A. Lung, left lower lobe, fine needle aspiration slides:          Blood only; no epithelial or malignant cells identified.   B. Lung, left lower lobe, core biopsies:          Positive for small cell carcinoma.   C. Lung, left lower lobe, forceps biopsy with touch prep slides:          Positive for small cell carcinoma.   D. Lung, left lower lobe, fine needle  aspiration:          Originally submitted for possible flow cytometric analysis,           which was deemed unnecessary.  Tissue will be submitted to           cytology for preparation of a cell block; an addendum will be           generated if there is an unexpected finding.   E. Lymph node, 10L, fine needle aspiration slides:          Malignant cells present, consistent with metastatic small cell   carcinoma.   F.  Lymph node, 10L, core biopsies:            Hypocellular specimen demonstrating few scattered lymphocytes           and few atypical cells suspicious for small cell carcinoma       Assessment/Plan:      Cancer Staging   SCLC (small cell lung carcinoma) (HCC)  Staging form: Lung, AJCC 8th Edition  - Clinical stage from 9/14/2023: Stage IIIA (cT3, cN1, cM0) - Signed by Leonel Patel M.D. on 9/14/2023       Mr. Ramsey who presents today with a new diagnosis of small cell lung cancer, limited stage.     He is being considered for HLX10 trial.      Plan  -Consent for HLX trial, if candidate RT to start with C2  -tentative start date is 9/18, with labs on 8/15    Any questions and concerns raised by the patient were addressed and answered. Patient denies any further questions.  Patient encouraged to call the office with any concerns or issues.      Roseline Fuller M.D.  Hematology/Oncology     30 minutes was spent on this visit

## 2023-09-15 NOTE — RESEARCH NOTE
I/E criteria/Screening/Consent note:       Participation in the [employer] Study clinical trial was discussed with the patient today. All aspects of the study purpose and procedures were explained.  They were given ample time to review the consent and all questions were answered to thier satisfaction. Patient aware that the clinical trial is voluntary and they may withdraw consent at any time without affecting the level of care they receive.  Subject signed consent without coercion and undue influence and was given a copy of the signed consent. No study-related procedures took place prior to consenting and all assessments were conducted per protocol.  Did patient accept stipend?: Yes  Did the subject consent to the blood samples being used for future research? Yes  Did the subject consent to the medical information being used for future research? Yes  Did the subject consent to being re-contacted in the future regarding other studies or to provide feedback on this study? Yes  Master Informed Consent Version: 3  Protocol Version: 2    1) Inclusion criteria:  Age ?30 years within 30 days of enrollment: Yes  Able and willing to provide blood samples per protocol: Yes  Able to comprehend and willing to sign and date the informed consent and HIPAA Authorization documents: Yes  Able and willing to allow their retrospective and prospective data to be utilized for study purposes: Yes  Site/Sponsor has access to subject’s health information including past diagnoses, medications, and procedures and a minimum of 1 encounter in the site’s EHR system in the past 12 months: Yes    Lung Group:  Subject must be diagnosed with pathologically-confirmed lung cancer (i.e., adenocarcinoma, squamous cell carcinoma, large cell carcinoma or small cell carcinoma) or have a presumptive diagnosis of or high clinical suspicion for the same by imaging (e.g., CT, MRI or PET) and have not yet received any cancer treatment (including but  not limited to surgery, chemotherapy, and/or radiation). -Yes        Exclusion criteria:  Any history of solid organ or bone marrow transplantation: No  Any physical trauma or surgery requiring inpatient overnight hospitalization in the 30 days preceding enrollment: No  Received a blood transfusion in the 30 days preceding enrollment: No  A medical condition that, in the opinion of the Investigator, should preclude enrollment in the study: No   Known to be pregnant: No  Any therapy for cancer, including but not limited to surgery, chemotherapy, biologic therapy, immunotherapy, and/or radiation therapy in the 5 years preceding enrollment: No   Participated or currently participating in a clinical research study in which an experimental medication has been administered during the 30 days preceding enrollment: No   Participated or currently participating in another [employer]-sponsored clinical study: No    Lung Group: Any previous cancer diagnosis (with the exception of basal cell skin cancer or squamous cell skin cancer) in the 5 years preceding enrollment, apart from the current lung cancer diagnosis  OR recurrence of the same primary cancer within any timeframe;  OR concurrent diagnosis of multiple primary cancers within any timeframe. -No     Patient meets all eligibility criteria    Group: Lung  Cancer addendum: 1  Does the subject have a confirmed cancer diagnosis or have a presumptive cancer diagnosis / high clinical suspicion? Confirmed    Study specimen was drawn prior to standard care cancer treatment:    Accession Number: [id number]   Date of collection: 15Sep2023   Start time: 0947   End time: 0952   Phlebotomist initials: [facility]   Subject did not experience any adverse events or serious adverse events during today's study visit or blood draw    Was the medical history questionnaire completed by the patient? Yes

## 2023-09-17 NOTE — PROGRESS NOTES
"Pharmacy Chemotherapy Verification:   Patient Name: Bart Ramsey  Dx: small cell lung cancer  Cycle 1      Previous treatment: n/a  Study Protocol: [id number]  Participant # [id number]    HLX10 or placebo 300 mg IV over 30-60 min on day 1  Cisplatin 75 mg/m2 IV over 60 min on day 1  Etoposide 100 mg/m2 IV over 1-2 hours on days 1-3  Every 21 days with concurrent radiation    followed by  HLX10 or placebo 300 mg IV over 30-60 min on day 1  Every 21 days  A Randomized, Double-Blind, International Multicenter, Phase III Study to Evaluate the Anti-Tumor Efficacy and Safety of HLX10 (Recombinant Humanized Anti-PD-1 Monoclonal Antibody Injection) or Placebo in Combination with Chemotherapy (Carboplatin/Cisplatin-Etoposide) and Concurrent Radiotherapy in Patients with Limited-Stage Small Cell Lung Cancer (LS-SCLC). [id number]  Allergies: Patient has no known allergies.       /66   Pulse 66   Temp 36 °C (96.8 °F) (Temporal)   Resp 18   Ht 1.72 m (5' 7.72\")   Wt 88.2 kg (194 lb 7.1 oz)   SpO2 96%   BMI 29.81 kg/m²      Body surface area is 2.05 meters squared.    Labs 9/15/23:  ANC~ 2890 Plt = 153k   Hgb = 15.0     SCr = 0.9 mg/dL CrCl ~ 90 mL/min   AST/ALT/AP = 31/22/94 TBili = 0.8  TSH = 1.26 Free T4 = 1.22  HLX10 or placebo 300 mg fixed dose = 300 mg IV   Fixed dose, no calculation required = 300 mg IV    Cisplatin 75 mg/m² x 2.05 m² = 153.75 mg   <10% difference, ok to treat with final dose = 155 mg IV    Etoposide 100 mg/m² x 2.05 m² = 205 mg   <10% difference, ok to treat with final dose = 206 mg IV    Mainor Yap, PharmD      "

## 2023-09-17 NOTE — PROGRESS NOTES
"Pharmacy Chemotherapy Verification:     Patient Name: Bart Ramsey  Dx: small cell lung cancer  Study Protocol: [id number]  Participant # [id number]    HLX10 or placebo 300 mg IV over 30-60 min on day 1  Cisplatin 75 mg/m2 IV over 60 min on day 1  Etoposide 100 mg/m2 IV over 1-2 hours on days 1-3  Every 21 days with concurrent radiation    followed by  HLX10 or placebo 300 mg IV over 30-60 min on day 1  Every 21 days  A Randomized, Double-Blind, International Multicenter, Phase III Study to Evaluate the Anti-Tumor Efficacy and Safety of HLX10 (Recombinant Humanized Anti-PD-1 Monoclonal Antibody Injection) or Placebo in Combination with Chemotherapy (Carboplatin/Cisplatin-Etoposide) and Concurrent Radiotherapy in Patients with Limited-Stage Small Cell Lung Cancer (LS-SCLC). [id number]  Allergies: Patient has no known allergies.       /66   Pulse 66   Temp 36 °C (96.8 °F) (Temporal)   Resp 18   Ht 1.72 m (5' 7.72\")   Wt 88.2 kg (194 lb 7.1 oz)   SpO2 96%   BMI 29.81 kg/m²      Body surface area is 2.05 meters squared.    Labs 9/15/23:  ANC~ 2890 Plt = 153k   Hgb = 15     SCr = 0.9 mg/dL CrCl ~ 90 mL/min   AST/ALT/AP = 31/22/94 TBili = 0.8    TSH = 1.26   Free T4 = 1.22           Drug Order   (Drug name, dose, route, IV Fluid & volume, frequency, number of doses) Cycle: 1 Day 1 of 3      Previous treatment: n/a     Medication = HLX10 or placebo  Base Dose = 300 mg  Fixed dose, no calculation required  Final Dose = 300 mg  Route = IV  Fluid & Volume =  mL  Admin Duration = Over 60 min    Study-supplied medication      First infusion 60 min. If tolerated, subsequent infusions 30 min.   Medication = Cisplatin  Base Dose= 75 mg/m2  Calc Dose: Base Dose x 2.05 m2 = 153.75 mg  Final Dose = 155 mg  Route = IV  Fluid & Volume =  mL  Admin Duration = Over 60 min   Study-supplied medication       <10% difference, rounded to vial size (with 10%) per dose rounding protocol, OK to treat " with final dose     Medication = Etoposide   Base Dose= 100 mg/m2   Calc Dose: Base Dose x 2.05 m2 = 205 mg  Final Dose = 206 mg  Route = IV  Fluid & Volume =  mL  Admin Duration = Over 120 min   Days 1-3       <10% difference, rounded to vial size (with 10%) per dose rounding protocol, OK to treat with final dose       By my signature below, I confirm this process was performed independently with the BSA and all final chemotherapy dosing calculations congruent. I have reviewed the above chemotherapy order and that my calculation of the final dose and BSA (when applicable) corroborate those calculations of the  pharmacist. Discrepancies of 5% or greater in the written dose have been addressed and documented within the EPIC Progress notes.    Signature: Oscar PonceD

## 2023-09-18 ENCOUNTER — OUTPATIENT INFUSION SERVICES (OUTPATIENT)
Dept: ONCOLOGY | Facility: MEDICAL CENTER | Age: 73
End: 2023-09-18
Attending: STUDENT IN AN ORGANIZED HEALTH CARE EDUCATION/TRAINING PROGRAM
Payer: COMMERCIAL

## 2023-09-18 VITALS
SYSTOLIC BLOOD PRESSURE: 135 MMHG | HEIGHT: 68 IN | WEIGHT: 194.45 LBS | TEMPERATURE: 96.8 F | HEART RATE: 66 BPM | BODY MASS INDEX: 29.47 KG/M2 | OXYGEN SATURATION: 96 % | RESPIRATION RATE: 18 BRPM | DIASTOLIC BLOOD PRESSURE: 66 MMHG

## 2023-09-18 DIAGNOSIS — C34.90 SCLC (SMALL CELL LUNG CARCINOMA) (HCC): ICD-10-CM

## 2023-09-18 LAB
CK MB SERPL-MCNC: 11.2 NG/ML (ref 0–7.7)
HCV RNA SERPL NAA+PROBE-ACNC: NOT DETECTED IU/ML
HCV RNA SERPL NAA+PROBE-LOG IU: NOT DETECTED LOG IU/ML
HCV RNA SERPL QL NAA+PROBE: NOT DETECTED

## 2023-09-18 PROCEDURE — 304540 HCHG NITRO SET VENT 2ND TUB

## 2023-09-18 PROCEDURE — 96367 TX/PROPH/DG ADDL SEQ IV INF: CPT

## 2023-09-18 PROCEDURE — 700101 HCHG RX REV CODE 250: Performed by: STUDENT IN AN ORGANIZED HEALTH CARE EDUCATION/TRAINING PROGRAM

## 2023-09-18 PROCEDURE — 96413 CHEMO IV INFUSION 1 HR: CPT

## 2023-09-18 PROCEDURE — 96361 HYDRATE IV INFUSION ADD-ON: CPT

## 2023-09-18 PROCEDURE — A9270 NON-COVERED ITEM OR SERVICE: HCPCS | Performed by: STUDENT IN AN ORGANIZED HEALTH CARE EDUCATION/TRAINING PROGRAM

## 2023-09-18 PROCEDURE — 96375 TX/PRO/DX INJ NEW DRUG ADDON: CPT

## 2023-09-18 PROCEDURE — 96417 CHEMO IV INFUS EACH ADDL SEQ: CPT

## 2023-09-18 PROCEDURE — 96366 THER/PROPH/DIAG IV INF ADDON: CPT

## 2023-09-18 PROCEDURE — 700111 HCHG RX REV CODE 636 W/ 250 OVERRIDE (IP): Mod: JZ | Performed by: STUDENT IN AN ORGANIZED HEALTH CARE EDUCATION/TRAINING PROGRAM

## 2023-09-18 PROCEDURE — 84484 ASSAY OF TROPONIN QUANT: CPT

## 2023-09-18 PROCEDURE — 700105 HCHG RX REV CODE 258: Performed by: STUDENT IN AN ORGANIZED HEALTH CARE EDUCATION/TRAINING PROGRAM

## 2023-09-18 RX ORDER — 0.9 % SODIUM CHLORIDE 0.9 %
3 VIAL (ML) INJECTION PRN
Status: CANCELLED | OUTPATIENT
Start: 2023-09-19

## 2023-09-18 RX ORDER — ONDANSETRON 8 MG/1
8 TABLET, ORALLY DISINTEGRATING ORAL PRN
Status: CANCELLED | OUTPATIENT
Start: 2023-09-19

## 2023-09-18 RX ORDER — 0.9 % SODIUM CHLORIDE 0.9 %
VIAL (ML) INJECTION PRN
Status: CANCELLED | OUTPATIENT
Start: 2023-09-19

## 2023-09-18 RX ORDER — ONDANSETRON 2 MG/ML
4 INJECTION INTRAMUSCULAR; INTRAVENOUS PRN
Status: CANCELLED | OUTPATIENT
Start: 2023-09-19

## 2023-09-18 RX ORDER — SODIUM CHLORIDE 9 MG/ML
INJECTION, SOLUTION INTRAVENOUS CONTINUOUS
Status: CANCELLED | OUTPATIENT
Start: 2023-09-20

## 2023-09-18 RX ORDER — METHYLPREDNISOLONE SODIUM SUCCINATE 125 MG/2ML
125 INJECTION, POWDER, LYOPHILIZED, FOR SOLUTION INTRAMUSCULAR; INTRAVENOUS PRN
Status: CANCELLED | OUTPATIENT
Start: 2023-09-20

## 2023-09-18 RX ORDER — ONDANSETRON 2 MG/ML
4 INJECTION INTRAMUSCULAR; INTRAVENOUS PRN
Status: CANCELLED | OUTPATIENT
Start: 2023-09-18

## 2023-09-18 RX ORDER — 0.9 % SODIUM CHLORIDE 0.9 %
10 VIAL (ML) INJECTION PRN
Status: CANCELLED | OUTPATIENT
Start: 2023-09-20

## 2023-09-18 RX ORDER — 0.9 % SODIUM CHLORIDE 0.9 %
10 VIAL (ML) INJECTION PRN
Status: CANCELLED | OUTPATIENT
Start: 2023-09-18

## 2023-09-18 RX ORDER — METHYLPREDNISOLONE SODIUM SUCCINATE 125 MG/2ML
125 INJECTION, POWDER, LYOPHILIZED, FOR SOLUTION INTRAMUSCULAR; INTRAVENOUS PRN
Status: CANCELLED | OUTPATIENT
Start: 2023-09-19

## 2023-09-18 RX ORDER — DIPHENHYDRAMINE HYDROCHLORIDE 50 MG/ML
50 INJECTION INTRAMUSCULAR; INTRAVENOUS PRN
Status: CANCELLED | OUTPATIENT
Start: 2023-09-20

## 2023-09-18 RX ORDER — PROCHLORPERAZINE MALEATE 10 MG
10 TABLET ORAL EVERY 6 HOURS PRN
Status: CANCELLED | OUTPATIENT
Start: 2023-09-18

## 2023-09-18 RX ORDER — PROCHLORPERAZINE MALEATE 10 MG
10 TABLET ORAL EVERY 6 HOURS PRN
Status: CANCELLED | OUTPATIENT
Start: 2023-09-19

## 2023-09-18 RX ORDER — DIPHENHYDRAMINE HYDROCHLORIDE 50 MG/ML
50 INJECTION INTRAMUSCULAR; INTRAVENOUS PRN
Status: DISCONTINUED | OUTPATIENT
Start: 2023-09-18 | End: 2023-10-16 | Stop reason: HOSPADM

## 2023-09-18 RX ORDER — DEXAMETHASONE SODIUM PHOSPHATE 4 MG/ML
12 INJECTION, SOLUTION INTRA-ARTICULAR; INTRALESIONAL; INTRAMUSCULAR; INTRAVENOUS; SOFT TISSUE ONCE
Status: DISCONTINUED | OUTPATIENT
Start: 2023-09-18 | End: 2023-09-18

## 2023-09-18 RX ORDER — 0.9 % SODIUM CHLORIDE 0.9 %
10 VIAL (ML) INJECTION PRN
Status: CANCELLED | OUTPATIENT
Start: 2023-09-19

## 2023-09-18 RX ORDER — METHYLPREDNISOLONE SODIUM SUCCINATE 125 MG/2ML
125 INJECTION, POWDER, LYOPHILIZED, FOR SOLUTION INTRAMUSCULAR; INTRAVENOUS PRN
Status: DISCONTINUED | OUTPATIENT
Start: 2023-09-18 | End: 2023-10-16 | Stop reason: HOSPADM

## 2023-09-18 RX ORDER — 0.9 % SODIUM CHLORIDE 0.9 %
3 VIAL (ML) INJECTION PRN
Status: CANCELLED | OUTPATIENT
Start: 2023-09-18

## 2023-09-18 RX ORDER — SODIUM CHLORIDE 9 MG/ML
1000 INJECTION, SOLUTION INTRAVENOUS ONCE
Status: COMPLETED | OUTPATIENT
Start: 2023-09-18 | End: 2023-09-18

## 2023-09-18 RX ORDER — 0.9 % SODIUM CHLORIDE 0.9 %
VIAL (ML) INJECTION PRN
Status: CANCELLED | OUTPATIENT
Start: 2023-09-18

## 2023-09-18 RX ORDER — DIPHENHYDRAMINE HYDROCHLORIDE 50 MG/ML
50 INJECTION INTRAMUSCULAR; INTRAVENOUS PRN
Status: CANCELLED | OUTPATIENT
Start: 2023-09-18

## 2023-09-18 RX ORDER — EPINEPHRINE 1 MG/ML(1)
0.5 AMPUL (ML) INJECTION PRN
Status: CANCELLED | OUTPATIENT
Start: 2023-09-20

## 2023-09-18 RX ORDER — SODIUM CHLORIDE 9 MG/ML
INJECTION, SOLUTION INTRAVENOUS CONTINUOUS
Status: CANCELLED | OUTPATIENT
Start: 2023-09-18

## 2023-09-18 RX ORDER — DEXAMETHASONE SODIUM PHOSPHATE 4 MG/ML
12 INJECTION, SOLUTION INTRA-ARTICULAR; INTRALESIONAL; INTRAMUSCULAR; INTRAVENOUS; SOFT TISSUE ONCE
Status: CANCELLED | OUTPATIENT
Start: 2023-09-18 | End: 2023-09-18

## 2023-09-18 RX ORDER — EPINEPHRINE 1 MG/ML(1)
0.5 AMPUL (ML) INJECTION PRN
Status: CANCELLED | OUTPATIENT
Start: 2023-09-18

## 2023-09-18 RX ORDER — 0.9 % SODIUM CHLORIDE 0.9 %
VIAL (ML) INJECTION PRN
Status: CANCELLED | OUTPATIENT
Start: 2023-09-20

## 2023-09-18 RX ORDER — 0.9 % SODIUM CHLORIDE 0.9 %
3 VIAL (ML) INJECTION PRN
Status: CANCELLED | OUTPATIENT
Start: 2023-09-20

## 2023-09-18 RX ORDER — ONDANSETRON 8 MG/1
8 TABLET, ORALLY DISINTEGRATING ORAL PRN
Status: CANCELLED | OUTPATIENT
Start: 2023-09-20

## 2023-09-18 RX ORDER — SODIUM CHLORIDE 9 MG/ML
1000 INJECTION, SOLUTION INTRAVENOUS ONCE
Status: CANCELLED | OUTPATIENT
Start: 2023-09-18

## 2023-09-18 RX ORDER — ONDANSETRON 2 MG/ML
4 INJECTION INTRAMUSCULAR; INTRAVENOUS PRN
Status: CANCELLED | OUTPATIENT
Start: 2023-09-20

## 2023-09-18 RX ORDER — METHYLPREDNISOLONE SODIUM SUCCINATE 125 MG/2ML
125 INJECTION, POWDER, LYOPHILIZED, FOR SOLUTION INTRAMUSCULAR; INTRAVENOUS PRN
Status: CANCELLED | OUTPATIENT
Start: 2023-09-18

## 2023-09-18 RX ORDER — EPINEPHRINE 1 MG/ML(1)
0.5 AMPUL (ML) INJECTION PRN
Status: DISCONTINUED | OUTPATIENT
Start: 2023-09-18 | End: 2023-10-16 | Stop reason: HOSPADM

## 2023-09-18 RX ORDER — DIPHENHYDRAMINE HYDROCHLORIDE 50 MG/ML
50 INJECTION INTRAMUSCULAR; INTRAVENOUS PRN
Status: CANCELLED | OUTPATIENT
Start: 2023-09-19

## 2023-09-18 RX ORDER — EPINEPHRINE 1 MG/ML(1)
0.5 AMPUL (ML) INJECTION PRN
Status: CANCELLED | OUTPATIENT
Start: 2023-09-19

## 2023-09-18 RX ORDER — ONDANSETRON 2 MG/ML
8 INJECTION INTRAMUSCULAR; INTRAVENOUS ONCE
Status: CANCELLED | OUTPATIENT
Start: 2023-09-19 | End: 2023-09-19

## 2023-09-18 RX ORDER — PROCHLORPERAZINE MALEATE 10 MG
10 TABLET ORAL EVERY 6 HOURS PRN
Status: CANCELLED | OUTPATIENT
Start: 2023-09-20

## 2023-09-18 RX ORDER — ONDANSETRON 2 MG/ML
8 INJECTION INTRAMUSCULAR; INTRAVENOUS ONCE
Status: CANCELLED | OUTPATIENT
Start: 2023-09-20 | End: 2023-09-20

## 2023-09-18 RX ORDER — ONDANSETRON 8 MG/1
8 TABLET, ORALLY DISINTEGRATING ORAL PRN
Status: CANCELLED | OUTPATIENT
Start: 2023-09-18

## 2023-09-18 RX ORDER — SODIUM CHLORIDE 9 MG/ML
INJECTION, SOLUTION INTRAVENOUS CONTINUOUS
Status: CANCELLED | OUTPATIENT
Start: 2023-09-19

## 2023-09-18 RX ADMIN — Medication 300 MG: at 12:03

## 2023-09-18 RX ADMIN — FOSAPREPITANT 150 MG: 150 INJECTION, POWDER, LYOPHILIZED, FOR SOLUTION INTRAVENOUS at 11:14

## 2023-09-18 RX ADMIN — SODIUM CHLORIDE 1000 ML: 9 INJECTION, SOLUTION INTRAVENOUS at 09:30

## 2023-09-18 RX ADMIN — ONDANSETRON 16 MG: 2 INJECTION INTRAMUSCULAR; INTRAVENOUS at 10:55

## 2023-09-18 RX ADMIN — DEXAMETHASONE SODIUM PHOSPHATE 12 MG: 4 INJECTION, SOLUTION INTRA-ARTICULAR; INTRALESIONAL; INTRAMUSCULAR; INTRAVENOUS; SOFT TISSUE at 10:42

## 2023-09-18 RX ADMIN — MAGNESIUM SULFATE HEPTAHYDRATE: 500 INJECTION, SOLUTION INTRAMUSCULAR; INTRAVENOUS at 16:23

## 2023-09-18 ASSESSMENT — FIBROSIS 4 INDEX: FIB4 SCORE: 3.15

## 2023-09-18 NOTE — PROGRESS NOTES
Chemotherapy Verification - SECONDARY RN       Height = 172 cm  Weight = 88.2 kg  BSA = 2.05 m^2       Medication: HLX 10 or Placebo  Dose: set dose  Calculated Dose: 300 mg                             (In mg/m2, AUC, mg/kg)     Medication: Cisplatin  Dose: 75 mg/m^2  Calculated Dose: 153.75 mg                             (In mg/m2, AUC, mg/kg)    Medication: Etoposide  Dose: 100 mg/m^2  Calculated Dose: 205 mg                             (In mg/m2, AUC, mg/kg)

## 2023-09-18 NOTE — PROGRESS NOTES
Chemotherapy Verification - PRIMARY RN      Height = 1.72m  Weight = 88.2kg  BSA = 2.05m^2       Medication: HLX10 or Placebo  Dose: 300mg - set dose  Calculated Dose: 300mg - set dose                             (In mg/m2, AUC, mg/kg)     Medication: Cisplatin (Study drug)  Dose: 75mg/m^2  Calculated Dose: 153.75mg                             (In mg/m2, AUC, mg/kg)    Medication: Etoposide (Study drug)  Dose: 100mg/m^2  Calculated Dose: 205mg                             (In mg/m2, AUC, mg/kg)        I confirm this process was performed independently

## 2023-09-19 ENCOUNTER — OUTPATIENT INFUSION SERVICES (OUTPATIENT)
Dept: ONCOLOGY | Facility: MEDICAL CENTER | Age: 73
End: 2023-09-19
Attending: STUDENT IN AN ORGANIZED HEALTH CARE EDUCATION/TRAINING PROGRAM
Payer: COMMERCIAL

## 2023-09-19 ENCOUNTER — TELEPHONE (OUTPATIENT)
Dept: HEMATOLOGY ONCOLOGY | Facility: MEDICAL CENTER | Age: 73
End: 2023-09-19

## 2023-09-19 ENCOUNTER — APPOINTMENT (RX ONLY)
Dept: URBAN - METROPOLITAN AREA CLINIC 4 | Facility: CLINIC | Age: 73
Setting detail: DERMATOLOGY
End: 2023-09-19

## 2023-09-19 VITALS
HEART RATE: 82 BPM | TEMPERATURE: 97 F | BODY MASS INDEX: 30.07 KG/M2 | SYSTOLIC BLOOD PRESSURE: 117 MMHG | RESPIRATION RATE: 18 BRPM | DIASTOLIC BLOOD PRESSURE: 65 MMHG | HEIGHT: 68 IN | WEIGHT: 198.41 LBS | OXYGEN SATURATION: 93 %

## 2023-09-19 DIAGNOSIS — C34.90 SCLC (SMALL CELL LUNG CARCINOMA) (HCC): ICD-10-CM

## 2023-09-19 DIAGNOSIS — L57.0 ACTINIC KERATOSIS: ICD-10-CM

## 2023-09-19 PROBLEM — D48.5 NEOPLASM OF UNCERTAIN BEHAVIOR OF SKIN: Status: ACTIVE | Noted: 2023-09-19

## 2023-09-19 LAB — MISCELLANEOUS LAB RESULT MISCLAB: NORMAL

## 2023-09-19 PROCEDURE — ? ADDITIONAL NOTES

## 2023-09-19 PROCEDURE — 304540 HCHG NITRO SET VENT 2ND TUB

## 2023-09-19 PROCEDURE — 96375 TX/PRO/DX INJ NEW DRUG ADDON: CPT

## 2023-09-19 PROCEDURE — 96413 CHEMO IV INFUSION 1 HR: CPT

## 2023-09-19 PROCEDURE — ? COUNSELING

## 2023-09-19 PROCEDURE — 99212 OFFICE O/P EST SF 10 MIN: CPT

## 2023-09-19 PROCEDURE — 700111 HCHG RX REV CODE 636 W/ 250 OVERRIDE (IP): Mod: JZ | Performed by: STUDENT IN AN ORGANIZED HEALTH CARE EDUCATION/TRAINING PROGRAM

## 2023-09-19 PROCEDURE — ? DEFER

## 2023-09-19 PROCEDURE — ? PHOTO-DOCUMENTATION

## 2023-09-19 RX ORDER — ONDANSETRON 2 MG/ML
8 INJECTION INTRAMUSCULAR; INTRAVENOUS ONCE
Status: COMPLETED | OUTPATIENT
Start: 2023-09-19 | End: 2023-09-19

## 2023-09-19 RX ORDER — ONDANSETRON 4 MG/1
4 TABLET, FILM COATED ORAL EVERY 4 HOURS PRN
Qty: 30 TABLET | Refills: 6 | Status: SHIPPED | OUTPATIENT
Start: 2023-09-19 | End: 2024-03-07

## 2023-09-19 RX ORDER — PROCHLORPERAZINE MALEATE 10 MG
10 TABLET ORAL EVERY 6 HOURS PRN
Qty: 30 TABLET | Refills: 6 | Status: SHIPPED | OUTPATIENT
Start: 2023-09-19 | End: 2024-03-07

## 2023-09-19 RX ADMIN — ONDANSETRON 8 MG: 2 INJECTION INTRAMUSCULAR; INTRAVENOUS at 15:03

## 2023-09-19 ASSESSMENT — LOCATION DETAILED DESCRIPTION DERM: LOCATION DETAILED: RIGHT INFERIOR FOREHEAD

## 2023-09-19 ASSESSMENT — LOCATION SIMPLE DESCRIPTION DERM: LOCATION SIMPLE: RIGHT FOREHEAD

## 2023-09-19 ASSESSMENT — FIBROSIS 4 INDEX: FIB4 SCORE: 3.15

## 2023-09-19 ASSESSMENT — PAIN DESCRIPTION - PAIN TYPE: TYPE: ACUTE PAIN

## 2023-09-19 ASSESSMENT — LOCATION ZONE DERM: LOCATION ZONE: FACE

## 2023-09-19 NOTE — PROGRESS NOTES
Chemotherapy Verification - SECONDARY RN   C1 D2    Height = 172 cm  Weight = 90 kg  BSA = 2.07 m^2       Medication: etoposide (STUDY DRUG)  Dose: 100 mg/m2  Calculated Dose: 207 mg                             (In mg/m2, AUC, mg/kg)       I confirm that this process was performed independently.

## 2023-09-19 NOTE — PROGRESS NOTES
Chemotherapy Verification - PRIMARY RN      Height = 172 cm  Weight = 90 kg  BSA = 2.07 m^2       Medication: etoposide  Dose: 100 mg/m^2  Calculated Dose: 207.4 mg                             (In mg/m2, AUC, mg/kg)     I confirm this process was performed independently with the BSA and all final chemotherapy dosing calculations congruent.  Any discrepancies of 10% or greater have been addressed with the chemotherapy pharmacist. The resolution of the discrepancy has been documented in the EPIC progress notes.

## 2023-09-19 NOTE — PROGRESS NOTES
Assumed care of pt from Anuj FIGUEREDO RN for remainder of hydration. Hydration infused and pt tolerated well. No s/s of complications noted. PIV flushed, removed with tip intact and site covered with sterile gauze/coban. Pt confirmed tomorrow's appt and discharged ambulatory in [facility].

## 2023-09-19 NOTE — TELEPHONE ENCOUNTER
[facility] called stating Rx for zofran was never received, please fax to [phone] or send electronically.

## 2023-09-19 NOTE — PROGRESS NOTES
"Pharmacy Chemotherapy Verification:     Patient Name: Bart Ramsey  Dx: small cell lung cancer  Study Protocol: [id number]  Participant # [id number]    HLX10 or placebo 300 mg IV over 30-60 min on day 1  Cisplatin 75 mg/m2 IV over 60 min on day 1  Etoposide 100 mg/m2 IV over 1-2 hours on days 1-3  Every 21 days with concurrent radiation    followed by  HLX10 or placebo 300 mg IV over 30-60 min on day 1  Every 21 days  A Randomized, Double-Blind, International Multicenter, Phase III Study to Evaluate the Anti-Tumor Efficacy and Safety of HLX10 (Recombinant Humanized Anti-PD-1 Monoclonal Antibody Injection) or Placebo in Combination with Chemotherapy (Carboplatin/Cisplatin-Etoposide) and Concurrent Radiotherapy in Patients with Limited-Stage Small Cell Lung Cancer (LS-SCLC). [id number]  Allergies: Patient has no known allergies.       /65   Pulse 82   Temp 36.1 °C (97 °F) (Temporal)   Resp 18   Ht 1.72 m (5' 7.72\") Comment: Shoes off.  Wt 90 kg (198 lb 6.6 oz)   SpO2 93%   BMI 30.42 kg/m²      Body surface area is 2.07 meters squared.    Labs 9/15/23:  ANC~ 2890 Plt = 153k   Hgb = 15     SCr = 0.9 mg/dL CrCl ~ 90 mL/min   AST/ALT/AP = 31/22/94 TBili = 0.8    TSH = 1.26   Free T4 = 1.22           Drug Order   (Drug name, dose, route, IV Fluid & volume, frequency, number of doses) Cycle 1 Day 2 of 3      Previous treatment: n/a     Medication = HLX10 or placebo  Base Dose = 300 mg  Fixed dose, no calculation required  Final Dose = -- mg  Route = IV  Fluid & Volume =  mL  Admin Duration = Over 60 min    Study-supplied medication      First infusion 60 min. If tolerated, subsequent infusions 30 min.   Medication = Cisplatin  Base Dose= 75 mg/m2  Calc Dose: Base Dose x -- m2 = -- mg  Final Dose = -- mg  Route = IV  Fluid & Volume =  mL  Admin Duration = Over 60 min   Study-supplied medication       <10% difference, rounded to vial size (with 10%) per dose rounding protocol, OK to " treat with final dose     Medication = Etoposide   Base Dose= 100 mg/m2   Calc Dose: Base Dose x 2.07 m2 = 207 mg  Final Dose = 206 mg  Route = IV  Fluid & Volume =  mL  Admin Duration = Over 1-2 hours   Days 1-3       <10% difference, rounded to vial size (with 10%) per dose rounding protocol, OK to treat with final dose       By my signature below, I confirm this process was performed independently with the BSA and all final chemotherapy dosing calculations congruent. I have reviewed the above chemotherapy order and that my calculation of the final dose and BSA (when applicable) corroborate those calculations of the  pharmacist. Discrepancies of 5% or greater in the written dose have been addressed and documented within the EPIC Progress notes.    Mainor Yap, PharmD

## 2023-09-19 NOTE — PROGRESS NOTES
[facility]art presented [facility] for Day 2/ Cycle 1 of etoposide for small cell lung carcinoma. Pt denied having any new or acute complaints today, reports tolerating yesterday's treatment well. PIV started to left AC, had positive blood return and flushed briskly.  Labs were drawn yesterday and reviewed. Pt meets parameters for treatment today. Premed given as ordered. Pt given etoposide as prescribed, tolerated well, denied having any complaints during or after infusion. PIV discontinued, bleeding controlled with gauze and Coban. Pt has future appointments. Pt discharged to home in good condition.

## 2023-09-19 NOTE — PROCEDURE: ADDITIONAL NOTES
Render Risk Assessment In Note?: no
Detail Level: Simple
Additional Notes: Patient recently was diagnosed with small cell carcinoma of the lung. Patient will contact his oncologist in regard to performance of biopsies
Additional Notes: Pt reports that PCP did LN2 on this lesion one month ago. Rechecking at next visit and is scheduled.

## 2023-09-19 NOTE — PROGRESS NOTES
Bart came into [facility] for Day 1/ Cycle 1 of Cisplatin, etopside, study drug for lung cancer. Pt denied having any new or acute complaints today, reports tolerating past treatments well. PIV started, had + blood return flushed briskly. Labs drawn prior to appointment. Pt given pre-hydration, pre-meds, chemo, and post hydration as prescribed, tolerated well, denied having any complaints during or after infusion. PIV +blood return post infusion, flushed well, removed, covered with gauze and coban. Pt has future appointments. Discharged to self care.

## 2023-09-20 ENCOUNTER — APPOINTMENT (OUTPATIENT)
Dept: ADMISSIONS | Facility: MEDICAL CENTER | Age: 73
End: 2023-09-20
Attending: STUDENT IN AN ORGANIZED HEALTH CARE EDUCATION/TRAINING PROGRAM
Payer: COMMERCIAL

## 2023-09-20 ENCOUNTER — OUTPATIENT INFUSION SERVICES (OUTPATIENT)
Dept: ONCOLOGY | Facility: MEDICAL CENTER | Age: 73
End: 2023-09-20
Attending: STUDENT IN AN ORGANIZED HEALTH CARE EDUCATION/TRAINING PROGRAM
Payer: COMMERCIAL

## 2023-09-20 VITALS
WEIGHT: 201.28 LBS | OXYGEN SATURATION: 97 % | DIASTOLIC BLOOD PRESSURE: 68 MMHG | RESPIRATION RATE: 18 BRPM | TEMPERATURE: 97.4 F | HEIGHT: 68 IN | HEART RATE: 65 BPM | SYSTOLIC BLOOD PRESSURE: 122 MMHG | BODY MASS INDEX: 30.51 KG/M2

## 2023-09-20 DIAGNOSIS — C34.90 SCLC (SMALL CELL LUNG CARCINOMA) (HCC): ICD-10-CM

## 2023-09-20 PROCEDURE — 304540 HCHG NITRO SET VENT 2ND TUB

## 2023-09-20 PROCEDURE — 700111 HCHG RX REV CODE 636 W/ 250 OVERRIDE (IP): Mod: JZ | Performed by: STUDENT IN AN ORGANIZED HEALTH CARE EDUCATION/TRAINING PROGRAM

## 2023-09-20 PROCEDURE — 96415 CHEMO IV INFUSION ADDL HR: CPT

## 2023-09-20 PROCEDURE — 96375 TX/PRO/DX INJ NEW DRUG ADDON: CPT

## 2023-09-20 PROCEDURE — 96413 CHEMO IV INFUSION 1 HR: CPT

## 2023-09-20 RX ORDER — ONDANSETRON 2 MG/ML
8 INJECTION INTRAMUSCULAR; INTRAVENOUS ONCE
Status: COMPLETED | OUTPATIENT
Start: 2023-09-20 | End: 2023-09-20

## 2023-09-20 RX ADMIN — ONDANSETRON 8 MG: 2 INJECTION INTRAMUSCULAR; INTRAVENOUS at 15:48

## 2023-09-20 ASSESSMENT — FIBROSIS 4 INDEX: FIB4 SCORE: 3.15

## 2023-09-20 NOTE — PROGRESS NOTES
Chemotherapy Verification - PRIMARY RN      Height = 172 cm  Weight = 91.3 kg  BSA = 2.09 m^2       Medication: Etoposide  Dose: 100 mg/m^2  Calculated Dose: 209 mg                             (In mg/m2, AUC, mg/kg)         I confirm this process was performed independently with the BSA and all final chemotherapy dosing calculations congruent.  Any discrepancies of 10% or greater have been addressed with the chemotherapy pharmacist. The resolution of the discrepancy has been documented in the EPIC progress notes.

## 2023-09-20 NOTE — PROGRESS NOTES
"Pharmacy Chemotherapy Verification:    Patient Name: Bart Ramsey  Dx: small cell lung cancer  Study Protocol: [id number]  Participant # [id number]    HLX10 or placebo 300 mg IV over 30-60 min on day 1  Cisplatin 75 mg/m2 IV over 60 min on day 1  Etoposide 100 mg/m2 IV over 1-2 hours on days 1-3  Every 21 days with concurrent radiation    followed by  HLX10 or placebo 300 mg IV over 30-60 min on day 1  Every 21 days  A Randomized, Double-Blind, International Multicenter, Phase III Study to Evaluate the Anti-Tumor Efficacy and Safety of HLX10 (Recombinant Humanized Anti-PD-1 Monoclonal Antibody Injection) or Placebo in Combination with Chemotherapy (Carboplatin/Cisplatin-Etoposide) and Concurrent Radiotherapy in Patients with Limited-Stage Small Cell Lung Cancer (LS-SCLC). [id number]  Allergies: Patient has no known allergies.       /68   Pulse 65   Temp 36.3 °C (97.4 °F) (Temporal)   Resp 18   Ht 1.72 m (5' 7.72\")   Wt 91.3 kg (201 lb 4.5 oz)   SpO2 97%   BMI 30.86 kg/m²      Body surface area is 2.09 meters squared.    Labs 9/15/23:  ANC~ 2890 Plt = 153k   Hgb = 15     SCr = 0.9 mg/dL CrCl ~ 90 mL/min   AST/ALT/AP = 31/22/94 TBili = 0.8    TSH = 1.26   Free T4 = 1.22           Drug Order   (Drug name, dose, route, IV Fluid & volume, frequency, number of doses) Cycle 1, Day 3 of 3      Previous treatment: n/a     Medication = HLX10 or placebo  Base Dose = 300 mg  Fixed dose, no calculation required  Final Dose = -- mg  Route = IV  Fluid & Volume =  mL  Admin Duration = Over 60 min    Study-supplied medication      First infusion 60 min. If tolerated, subsequent infusions 30 min.   Medication = Cisplatin  Base Dose = 75 mg/m2  Calc Dose: Base Dose x -- m2 = -- mg  Final Dose = -- mg  Route = IV  Fluid & Volume =  mL  Admin Duration = Over 60 min   Study-supplied medication       <10% difference, okay to treat with final dose   Medication = Etoposide   Base Dose = 100 mg/m2 "   Calc Dose: Base Dose x 2.09 m2 = 209 mg  Final Dose = 206 mg  Route = IV  Fluid & Volume =  mL  Admin Duration = Over 1-2 hours   Days 1-3       <10% difference, okay to treat with final dose     By my signature below, I confirm this process was performed independently with the BSA and all final chemotherapy dosing calculations congruent. I have reviewed the above chemotherapy order and that my calculation of the final dose and BSA (when applicable) corroborate those calculations of the  pharmacist. Discrepancies of 10% or greater in the written dose have been addressed and documented within the EPIC Progress notes.    Anyi Thompson, PharmD

## 2023-09-20 NOTE — PROGRESS NOTES
Chemotherapy Verification - SECONDARY RN       Height = 1.72m  Weight = 91.3kg  BSA = 2.09m2       Medication: etoposide (STUDY DRUG)  Dose: 100mg/m2  Calculated Dose: 209mg                                  I confirm that this process was performed independently.

## 2023-09-21 ENCOUNTER — PATIENT OUTREACH (OUTPATIENT)
Dept: ONCOLOGY | Facility: MEDICAL CENTER | Age: 73
End: 2023-09-21
Payer: COMMERCIAL

## 2023-09-21 NOTE — PROGRESS NOTES
Introduction letter and myChart message with support calendar sent via [employer].  Will follow up with call for assessment.

## 2023-09-21 NOTE — LETTER
[facility]    [address]  [place] [address]  Phone: [phone] - Fax: [phone]              Edenilson Ramsey  [address] [place] [address]     Date: 09/21/23    Dear Edenilson,    I am a Cancer Nurse Navigator, a certified oncology nurse. My role is to assess any needs you may have with education, guidance and support. I am available to you and your family through your treatment at [facility].       I am available to address your needs during your journey with the following services:     Care Coordination  I can assist you in facilitating communication between your cancer care treatment team to ensure timely treatment and follow-up.  I can also assist with transition of care back to your primary care provider, or other specialist, as needed.  My goal is to bridge gaps for you throughout the course of your active treatment.       Education Services  Understanding the recommended treatment course by your physician is key. I can provide educational resources personalized to your cancer diagnosis to help you understand your diagnosis and treatment. Please let me know if you would like to receive information about your diagnosis and treatment plan.  I am here to help.     Support Services/Resource Information  [facility] we offer a full scope of support services.  I can assist you with referral information to:  Cancer Clinical Trials & Research  Nutrition counseling  Support groups  Complementary Therapies such as Mind-Body Techniques Meditation  Patient Financial Advocates    [facility], an [employer] affiliate office, our volunteers can assist you with accessing our lending library, support services information, head coverings and comfort items  Community and national resources, included eligibility based christina assistance and pharmaceutical access programs, if you are in need of additional information.     [facility]  offers services that include:  Behavioral Health  Genetic counseling & testing  Acupuncture  Lymphedema prevention/treatment program  Palliative care services.        I hope you have an excellent patient experience.  Please feel free to share with me your comments regarding the care you have received- we value your feedback.    Sincerely,     Jacqueline Rg R.N.  Cancer Nurse Navigator    Office: [phone]  Main:  [phone]  Email:  [email or web address]

## 2023-09-21 NOTE — PROGRESS NOTES
Pt presented to infusion for C1D3 Etoposide (study). Labs drawn previously, reviewed. Denied any new s/s of infection or open wounds. PIV started, brisk blood return observed. Pre-med given. Etoposide infused over 90 mins without issue. PIV flushed and removed, gauze drsg placed. Has next cycle, left on foot to self care.

## 2023-09-22 ENCOUNTER — APPOINTMENT (OUTPATIENT)
Dept: RADIOLOGY | Facility: MEDICAL CENTER | Age: 73
End: 2023-09-22
Attending: EMERGENCY MEDICINE
Payer: MEDICARE

## 2023-09-22 ENCOUNTER — HOSPITAL ENCOUNTER (EMERGENCY)
Facility: MEDICAL CENTER | Age: 73
End: 2023-09-23
Attending: EMERGENCY MEDICINE
Payer: MEDICARE

## 2023-09-22 VITALS
DIASTOLIC BLOOD PRESSURE: 70 MMHG | WEIGHT: 201.28 LBS | RESPIRATION RATE: 14 BRPM | SYSTOLIC BLOOD PRESSURE: 109 MMHG | TEMPERATURE: 97.1 F | HEART RATE: 62 BPM | BODY MASS INDEX: 31.59 KG/M2 | OXYGEN SATURATION: 95 % | HEIGHT: 67 IN

## 2023-09-22 DIAGNOSIS — R55 NEAR SYNCOPE: ICD-10-CM

## 2023-09-22 DIAGNOSIS — R53.1 WEAKNESS: ICD-10-CM

## 2023-09-22 LAB
ALBUMIN SERPL BCP-MCNC: 3.7 G/DL (ref 3.2–4.9)
ALBUMIN/GLOB SERPL: 1.2 G/DL
ALP SERPL-CCNC: 73 U/L (ref 30–99)
ALT SERPL-CCNC: 40 U/L (ref 2–50)
ANION GAP SERPL CALC-SCNC: 15 MMOL/L (ref 7–16)
AST SERPL-CCNC: 27 U/L (ref 12–45)
BASOPHILS # BLD AUTO: 0.6 % (ref 0–1.8)
BASOPHILS # BLD: 0.03 K/UL (ref 0–0.12)
BILIRUB SERPL-MCNC: 1 MG/DL (ref 0.1–1.5)
BLOOD CULTURE HOLD CXBCH: NORMAL
BUN SERPL-MCNC: 26 MG/DL (ref 8–22)
CALCIUM ALBUM COR SERPL-MCNC: 9.1 MG/DL (ref 8.5–10.5)
CALCIUM SERPL-MCNC: 8.9 MG/DL (ref 8.4–10.2)
CHLORIDE SERPL-SCNC: 98 MMOL/L (ref 96–112)
CO2 SERPL-SCNC: 21 MMOL/L (ref 20–33)
CREAT SERPL-MCNC: 1.06 MG/DL (ref 0.5–1.4)
EKG IMPRESSION: NORMAL
EOSINOPHIL # BLD AUTO: 0.1 K/UL (ref 0–0.51)
EOSINOPHIL NFR BLD: 2.1 % (ref 0–6.9)
ERYTHROCYTE [DISTWIDTH] IN BLOOD BY AUTOMATED COUNT: 50.8 FL (ref 35.9–50)
GFR SERPLBLD CREATININE-BSD FMLA CKD-EPI: 74 ML/MIN/1.73 M 2
GLOBULIN SER CALC-MCNC: 3 G/DL (ref 1.9–3.5)
GLUCOSE SERPL-MCNC: 194 MG/DL (ref 65–99)
HCT VFR BLD AUTO: 41.3 % (ref 42–52)
HGB BLD-MCNC: 14 G/DL (ref 14–18)
IMM GRANULOCYTES # BLD AUTO: 0.01 K/UL (ref 0–0.11)
IMM GRANULOCYTES NFR BLD AUTO: 0.2 % (ref 0–0.9)
LACTATE SERPL-SCNC: 2 MMOL/L (ref 0.5–2)
LACTATE SERPL-SCNC: 2.4 MMOL/L (ref 0.5–2)
LYMPHOCYTES # BLD AUTO: 1.35 K/UL (ref 1–4.8)
LYMPHOCYTES NFR BLD: 27.8 % (ref 22–41)
MCH RBC QN AUTO: 31.6 PG (ref 27–33)
MCHC RBC AUTO-ENTMCNC: 33.9 G/DL (ref 32.3–36.5)
MCV RBC AUTO: 93.2 FL (ref 81.4–97.8)
MONOCYTES # BLD AUTO: 0.04 K/UL (ref 0–0.85)
MONOCYTES NFR BLD AUTO: 0.8 % (ref 0–13.4)
NEUTROPHILS # BLD AUTO: 3.33 K/UL (ref 1.82–7.42)
NEUTROPHILS NFR BLD: 68.5 % (ref 44–72)
NRBC # BLD AUTO: 0 K/UL
NRBC BLD-RTO: 0 /100 WBC (ref 0–0.2)
PLATELET # BLD AUTO: 133 K/UL (ref 164–446)
PMV BLD AUTO: 10.2 FL (ref 9–12.9)
POTASSIUM SERPL-SCNC: 4.3 MMOL/L (ref 3.6–5.5)
PROT SERPL-MCNC: 6.7 G/DL (ref 6–8.2)
RBC # BLD AUTO: 4.43 M/UL (ref 4.7–6.1)
SODIUM SERPL-SCNC: 134 MMOL/L (ref 135–145)
TROPONIN T SERPL-MCNC: 11 NG/L (ref 6–19)
WBC # BLD AUTO: 4.9 K/UL (ref 4.8–10.8)

## 2023-09-22 PROCEDURE — 36415 COLL VENOUS BLD VENIPUNCTURE: CPT

## 2023-09-22 PROCEDURE — 83605 ASSAY OF LACTIC ACID: CPT

## 2023-09-22 PROCEDURE — 72125 CT NECK SPINE W/O DYE: CPT

## 2023-09-22 PROCEDURE — 700105 HCHG RX REV CODE 258: Performed by: EMERGENCY MEDICINE

## 2023-09-22 PROCEDURE — 71045 X-RAY EXAM CHEST 1 VIEW: CPT

## 2023-09-22 PROCEDURE — 99285 EMERGENCY DEPT VISIT HI MDM: CPT

## 2023-09-22 PROCEDURE — 84484 ASSAY OF TROPONIN QUANT: CPT

## 2023-09-22 PROCEDURE — 80053 COMPREHEN METABOLIC PANEL: CPT

## 2023-09-22 PROCEDURE — 93005 ELECTROCARDIOGRAM TRACING: CPT

## 2023-09-22 PROCEDURE — 85025 COMPLETE CBC W/AUTO DIFF WBC: CPT

## 2023-09-22 PROCEDURE — 96374 THER/PROPH/DIAG INJ IV PUSH: CPT

## 2023-09-22 PROCEDURE — 70450 CT HEAD/BRAIN W/O DYE: CPT

## 2023-09-22 RX ORDER — SODIUM CHLORIDE 9 MG/ML
1000 INJECTION, SOLUTION INTRAVENOUS ONCE
Status: COMPLETED | OUTPATIENT
Start: 2023-09-22 | End: 2023-09-22

## 2023-09-22 RX ADMIN — SODIUM CHLORIDE 1000 ML: 9 INJECTION, SOLUTION INTRAVENOUS at 20:30

## 2023-09-22 RX ADMIN — SODIUM CHLORIDE 1000 ML: 9 INJECTION, SOLUTION INTRAVENOUS at 23:15

## 2023-09-22 ASSESSMENT — FIBROSIS 4 INDEX: FIB4 SCORE: 3.15

## 2023-09-23 NOTE — ED NOTES
IV removed. Patient provided discharge instructions and prescription education. Patient denies questions at this time. Patient assisted out via wheelchair to spouse. No acute changes or concerns at this time.

## 2023-09-23 NOTE — DISCHARGE INSTRUCTIONS
You were seen in the ER after a near fainting.  Thankfully, your labs and imaging were reassuring.  We discussed potential admission which you have declined.  Please remember to continue taking care of yourself by drinking at least 8 ounces of clear liquids every hour.  Try to eat as many good calories as you can to keep your strength up for your chemotherapy.  If you develop any new or worsening symptoms please return immediately to the ER.  I hope you feel better soon!

## 2023-09-23 NOTE — ED PROVIDER NOTES
ED Provider Note    CHIEF COMPLAINT  Chief Complaint   Patient presents with    Near Syncopal     Patient reports syncopal episode this evening, unknown head strike denies blood thinners aside from daily aspirin. Patient reports chemo started Monday, has been feeling weak and lose of appetite since then.        EXTERNAL RECORDS REVIEWED  Outpatient Notes patient started chemotherapy 9/20/2023.  He is on either HL X10 or placebo on day 1, cisplatin on day 1, and etopside on days 1 through 3 and every 21 days with concurrent radiation followed by HL X10 or placebo for limited stage small cell lung cancer.    HPI/ROS  LIMITATION TO HISTORY   Select: : None  OUTSIDE HISTORIAN(S):  None    Bart Ramsey is a 73 y.o. male who presents after syncopal episode.  Patient has a history of lung cancer, is followed by Dr. Fuller, and initiated chemotherapy on Monday.  He did well on Monday but on Tuesday and Wednesday he began to feel significant fatigue, loss his appetite, and was feeling generally weak.  He stayed in bed the majority of the day today but when his wife left took a shower and went to the kitchen to make some dinner.  While in the kitchen he felt lightheaded, hung onto the counter and put his head down because he thought he was going to pass out, and ultimately thinks he may have lost consciousness briefly, falling to the floor.  He landed on his right side.  He takes an aspirin daily but denies any anticoagulation.  He was too weak to get up off the floor so EMS was contacted. He notes that he is feeling back to baseline here while lying on the gurney.  He has a minimal headache.  He has no neck pain, focal weakness or numbness of his extremities.  He denies any difficulty seeing, speaking, or swallowing.  He has no chest pain, shortness of breath, nausea, vomiting, diarrhea, constipation.    PAST MEDICAL HISTORY   has a past medical history of Arthritis, Bowel habit changes, Bronchitis, COPD (chronic  obstructive pulmonary disease) (HCC), Diabetes (HCC), Emphysema of lung (HCC), High cholesterol (2023), Myocardial infarct (HCC), Pneumonia, Psychiatric problem (2023), and Sleep apnea.    SURGICAL HISTORY   has a past surgical history that includes other cardiac surgery; other; other; gastroscopy-endo (2017); colonoscopy - endo (2017); bronchoscopy,diagnostic (N/A, 2023); endobronchial us add-on (N/A, 2023); cholecystectomy; tonsillectomy; and inguinal hernia repair (Right).    FAMILY HISTORY  Family History   Problem Relation Age of Onset    Cancer Mother         lung?       SOCIAL HISTORY  Social History     Tobacco Use    Smoking status: Former     Current packs/day: 0.00     Average packs/day: 0.5 packs/day for 20.0 years (10.0 ttl pk-yrs)     Types: Cigarettes     Start date:      Quit date:      Years since quittin.7    Smokeless tobacco: Not on file    Tobacco comments:     Pt quit smoking cigarettes, .  1/2 pack per day, smoked 20 years    Vaping Use    Vaping Use: Never used   Substance and Sexual Activity    Alcohol use: Yes     Alcohol/week: 8.4 oz     Types: 14 Shots of liquor per week     Comment: daily    Drug use: Not Currently     Types: Marijuana     Comment: quit     Sexual activity: Not on file       CURRENT MEDICATIONS  Home Medications       Reviewed by Kiki Cooper R.N. (Registered Nurse) on 23 at 1950  Med List Status: Partial     Medication Last Dose Status   aspirin EC (ECOTRIN) 81 MG Tablet Delayed Response  Active   baclofen (LIORESAL) 10 MG Tab  Active   Cetirizine HCl (ZYRTEC ALLERGY PO)  Active   Cyanocobalamin (VITAMIN B-12) 1000 MCG Tab  Active   fluticasone (FLONASE) 50 MCG/ACT nasal spray  Active   fluticasone-salmeterol (ADVAIR) 250-50 MCG/ACT AEROSOL POWDER, BREATH ACTIVATED  Active   gabapentin (NEURONTIN) 300 MG Cap  Active   ipratropium (ATROVENT) 0.03 % Solution  Active   metFORMIN (GLUCOPHAGE) 500 MG Tab  Active  "  Omega-3 Fatty Acids (FISH OIL) 1000 MG Cap capsule  Active   omeprazole (PRILOSEC) 40 MG delayed-release capsule  Active   ondansetron (ZOFRAN) 4 MG Tab tablet  Active   prochlorperazine (COMPAZINE) 10 MG Tab  Active   vitamin D3 (CHOLECALCIFEROL) 1000 Unit (25 mcg) Tab  Active                    ALLERGIES  No Known Allergies    PHYSICAL EXAM  VITAL SIGNS: /58   Pulse 69   Temp 36.2 °C (97.1 °F) (Temporal)   Resp 16   Ht 1.702 m (5' 7\")   Wt 91.3 kg (201 lb 4.5 oz)   SpO2 90%   BMI 31.52 kg/m²    Physical Exam  Vitals and nursing note reviewed.   Constitutional:       Appearance: Normal appearance.   HENT:      Head: Normocephalic.      Comments: Dried blood on mouth with tiny laceration above left lip.     Nose: Nose normal.      Mouth/Throat:      Mouth: Mucous membranes are dry.      Comments: Scant amount of dried blood in the mouth  Eyes:      Extraocular Movements: Extraocular movements intact.      Conjunctiva/sclera: Conjunctivae normal.      Pupils: Pupils are equal, round, and reactive to light.   Cardiovascular:      Rate and Rhythm: Normal rate and regular rhythm.   Pulmonary:      Effort: Pulmonary effort is normal.      Breath sounds: Normal breath sounds.   Abdominal:      Palpations: Abdomen is soft.      Tenderness: There is no abdominal tenderness.   Musculoskeletal:         General: Normal range of motion.      Cervical back: Normal range of motion and neck supple. No tenderness.   Skin:     General: Skin is warm and dry.   Neurological:      General: No focal deficit present.      Mental Status: He is alert and oriented to person, place, and time.   Psychiatric:         Mood and Affect: Mood normal.         Behavior: Behavior normal.       DIAGNOSTIC STUDIES / PROCEDURES  LABS  Results for orders placed or performed during the hospital encounter of 09/22/23   CBC With Differential   Result Value Ref Range    WBC 4.9 4.8 - 10.8 K/uL    RBC 4.43 (L) 4.70 - 6.10 M/uL    Hemoglobin " 14.0 14.0 - 18.0 g/dL    Hematocrit 41.3 (L) 42.0 - 52.0 %    MCV 93.2 81.4 - 97.8 fL    MCH 31.6 27.0 - 33.0 pg    MCHC 33.9 32.3 - 36.5 g/dL    RDW 50.8 (H) 35.9 - 50.0 fL    Platelet Count 133 (L) 164 - 446 K/uL    MPV 10.2 9.0 - 12.9 fL    Neutrophils-Polys 68.50 44.00 - 72.00 %    Lymphocytes 27.80 22.00 - 41.00 %    Monocytes 0.80 0.00 - 13.40 %    Eosinophils 2.10 0.00 - 6.90 %    Basophils 0.60 0.00 - 1.80 %    Immature Granulocytes 0.20 0.00 - 0.90 %    Nucleated RBC 0.00 0.00 - 0.20 /100 WBC    Neutrophils (Absolute) 3.33 1.82 - 7.42 K/uL    Lymphs (Absolute) 1.35 1.00 - 4.80 K/uL    Monos (Absolute) 0.04 0.00 - 0.85 K/uL    Eos (Absolute) 0.10 0.00 - 0.51 K/uL    Baso (Absolute) 0.03 0.00 - 0.12 K/uL    Immature Granulocytes (abs) 0.01 0.00 - 0.11 K/uL    NRBC (Absolute) 0.00 K/uL   Comp Metabolic Panel   Result Value Ref Range    Sodium 134 (L) 135 - 145 mmol/L    Potassium 4.3 3.6 - 5.5 mmol/L    Chloride 98 96 - 112 mmol/L    Co2 21 20 - 33 mmol/L    Anion Gap 15.0 7.0 - 16.0    Glucose 194 (H) 65 - 99 mg/dL    Bun 26 (H) 8 - 22 mg/dL    Creatinine 1.06 0.50 - 1.40 mg/dL    Calcium 8.9 8.4 - 10.2 mg/dL    Correct Calcium 9.1 8.5 - 10.5 mg/dL    AST(SGOT) 27 12 - 45 U/L    ALT(SGPT) 40 2 - 50 U/L    Alkaline Phosphatase 73 30 - 99 U/L    Total Bilirubin 1.0 0.1 - 1.5 mg/dL    Albumin 3.7 3.2 - 4.9 g/dL    Total Protein 6.7 6.0 - 8.2 g/dL    Globulin 3.0 1.9 - 3.5 g/dL    A-G Ratio 1.2 g/dL   Lactic Acid   Result Value Ref Range    Lactic Acid 2.4 (H) 0.5 - 2.0 mmol/L   Lactic Acid   Result Value Ref Range    Lactic Acid 2.0 0.5 - 2.0 mmol/L   Troponin   Result Value Ref Range    Troponin T 11 6 - 19 ng/L   ESTIMATED GFR   Result Value Ref Range    GFR (CKD-EPI) 74 >60 mL/min/1.73 m 2   Blood Culture,Hold   Result Value Ref Range    Blood Culture Hold Collected    EKG   Result Value Ref Range    Report       [facility]    Test Date:  2023-09-22  Pt Name:     [facility]           [facility]  MRN:        [id number]                      [facility]  Gender:     Male                         Technician: [id number]  :        1950                   Requested By:ER TRIAGE PROTOCOL  Order #:    [id number]                    Reading MD: Haile Driscoll MD    Measurements  Intervals                                Axis  Rate:       69                           P:          17  OR:         178                          QRS:        -30  QRSD:       84                           T:          25  QT:         364  QTc:        390    Interpretive Statements  Ventricular premature complex(es) now present    Sinus rhythm  Ventricular premature complex  Inferior infarct, old  Baseline wander in lead(s) V3  Compared to ECG 2023 11:39:27  Sinus bradycardia no longer present  Electronically Signed On 2023 22:49:00 PDT by Haile Driscoll MD       RADIOLOGY  I have independently interpreted the diagnostic imaging associated with this visit and am waiting the final reading from the radiologist.   My preliminary interpretation is as follows: No intracranial hemorrhage    Radiologist interpretation:   CT-CSPINE WITHOUT PLUS RECONS   Final Result      No acute fracture or dislocation seen in the CT scan of the cervical spine.      CT-HEAD W/O   Final Result         NO ACUTE ABNORMALITIES ARE NOTED ON CT SCAN OF THE HEAD.               DX-CHEST-PORTABLE (1 VIEW)   Final Result         No acute cardiac or pulmonary abnormality is identified.      Left lower lung mass again noted.        COURSE & MEDICAL DECISION MAKING    ED Observation Status? Yes; I am placing the patient in to an observation status due to a diagnostic uncertainty as well as therapeutic intensity. Patient placed in observation status at 7:53 PM, 2023.     Observation plan is as follows: Labs, imaging, IV fluids    Upon Reevaluation, the patient's condition has: Improved; and will be  discharged.    Patient discharged from ED Observation status at 11:00 PM (Time) 9/22/2023 (Date).     INITIAL ASSESSMENT, COURSE AND PLAN  Care Narrative: This is a 73 year old male, unfortunately with a recent lung CA diagnosis with recent initiation of chemotherapy this week, who is here after either a brief syncopal event or near syncope leading to a fall. Patient is unsure if he actually lost consciousness.    DDx includes, but is not limited to, infection, dehydration, hypo/hyperglycemia, ICH, fracture, anemia, arrhythmia.    Arrives AF with normal VS but upon my initial evaluation his BP was in the high 70s. Despite the hypotension he is awake and alert, has no focal neurologic deficits, is warm and well perfused. I asked nursing staff to come to bedside to start IV fluids given dry mucous membranes and obvious evidence of dehydration. BP cuff was exchanged to one appropriate for his arm size and repeat value had improved to the mid 90s without any additional intervention. IV fluids were started. Patient has a tiny laceration over his left upper lip that does not require sutures. There is no obvious dental injury and he has no malocclusion or midface instability. Beyond the tiny laceration there is no other obvious head trauma. Remainder of his exam is reassuring and non-focal. CN II-XII grossly intact. He has good strength and sensation in all four extremities.     CBC is reassuring without leukocytosis or left shift. He has no appreciable anemia, denies any active bleeding. Metabolic panel with some elevated blood glucose to 194 and BUN/Cr of 26/1.06 consistent with clinical picture of dehydration. Initial lactate is elevated to 2.4 and improved to normal after IV fluids. Troponin is negative. EKG without signs of acute ischemia. Patient had no chest pain, shortness of breath, diaphoresis, or nausea during the incident and I doubt ACS.     CTH and CT C-Spine are without acute abnormality.    I reevaluated  the patient at bedside. He has eaten without any nausea or vomiting. I observed him get up off the gurney and ambulate with his cane. He was stable and steady. We discussed his labs/imaging results along with my suspicion that he fell or passed out due to multiple factors including dehydration, deconditioning from lying in bed for two days after chemotherapy, decreased caloric intake, and initiation of chemotherapy. Utilizing shared decision making we discussed possibility of admission for continued IV fluids, overnight telemetry monitoring, PT/OT, and additional workup if needed vs discharge home with close outpatient follow up. He is very eager for discharge which I feel is acceptable. His BP is in the mid 100s to 110s. He is asymptomatic. He is steady on his feet. I recommended increasing his PO hydration and making every effort to force himself to take in calories especially while on chemotherapy. We discussed very strict return precautions. He will follow up with his oncologist next week. Discharged in good and stable condition.    HYDRATION: Based on the patient's presentation of Dehydration the patient was given IV fluids. IV Hydration was used because oral hydration was not adequate alone. Upon recheck following hydration, the patient was improved.    ADDITIONAL PROBLEM LIST  None  DISPOSITION AND DISCUSSIONS  I have discussed management of the patient with the following physicians and JAE's:  None    Discussion of management with other QHP or appropriate source(s): None     Escalation of care considered, and ultimately not performed:after discussion with the patient / family, they have elected to decline an escalation in care    Barriers to care at this time, including but not limited to:  None .     Decision tools and prescription drugs considered including, but not limited to:  None .    FINAL DIAGNOSIS  1. Weakness    2. Near syncope      Electronically signed by: Haile Driscoll M.D., 9/22/2023 7:53  PM

## 2023-09-23 NOTE — ED TRIAGE NOTES
"Chief Complaint   Patient presents with    Near Syncopal     Patient reports syncopal episode this evening, unknown head strike denies blood thinners aside from daily aspirin. Patient reports chemo started Monday, has been feeling weak and lose of appetite since then.      /58   Pulse 69   Temp 36.2 °C (97.1 °F) (Temporal)   Resp 16   Ht 1.702 m (5' 7\")   Wt 91.3 kg (201 lb 4.5 oz)   SpO2 90%   BMI 31.52 kg/m²     "

## 2023-09-25 ENCOUNTER — TELEPHONE (OUTPATIENT)
Dept: HEMATOLOGY ONCOLOGY | Facility: MEDICAL CENTER | Age: 73
End: 2023-09-25
Payer: COMMERCIAL

## 2023-09-25 NOTE — TELEPHONE ENCOUNTER
"Per Dr. Fuller called patient to schedule IV hydration in our office.  Patient refused appointment and stated that he is \"feeling a lot better and has been drinking a lot of water.\"  Pt states he has drank approximately 20 ounces so far this morning.  Provided education on drinking at least 64 ounces per day. He verbalized understanding and agreed. Patient states he will check in with nurse tomorrow.  "

## 2023-09-26 ENCOUNTER — HOSPITAL ENCOUNTER (OUTPATIENT)
Dept: HEMATOLOGY ONCOLOGY | Facility: MEDICAL CENTER | Age: 73
End: 2023-09-26
Attending: STUDENT IN AN ORGANIZED HEALTH CARE EDUCATION/TRAINING PROGRAM
Payer: COMMERCIAL

## 2023-09-26 ENCOUNTER — APPOINTMENT (OUTPATIENT)
Dept: ADMISSIONS | Facility: MEDICAL CENTER | Age: 73
End: 2023-09-26
Attending: STUDENT IN AN ORGANIZED HEALTH CARE EDUCATION/TRAINING PROGRAM
Payer: COMMERCIAL

## 2023-09-26 VITALS
TEMPERATURE: 98.9 F | BODY MASS INDEX: 28.69 KG/M2 | HEART RATE: 71 BPM | OXYGEN SATURATION: 97 % | DIASTOLIC BLOOD PRESSURE: 59 MMHG | RESPIRATION RATE: 18 BRPM | SYSTOLIC BLOOD PRESSURE: 106 MMHG | WEIGHT: 183.2 LBS

## 2023-09-26 DIAGNOSIS — C34.90 SCLC (SMALL CELL LUNG CARCINOMA) (HCC): ICD-10-CM

## 2023-09-26 PROCEDURE — 96360 HYDRATION IV INFUSION INIT: CPT

## 2023-09-26 PROCEDURE — 700105 HCHG RX REV CODE 258: Performed by: STUDENT IN AN ORGANIZED HEALTH CARE EDUCATION/TRAINING PROGRAM

## 2023-09-26 RX ORDER — SODIUM CHLORIDE 9 MG/ML
1000 INJECTION, SOLUTION INTRAVENOUS ONCE
Status: COMPLETED | OUTPATIENT
Start: 2023-09-26 | End: 2023-09-26

## 2023-09-26 RX ADMIN — SODIUM CHLORIDE 1000 ML: 9 INJECTION, SOLUTION INTRAVENOUS at 10:30

## 2023-09-26 ASSESSMENT — PAIN SCALES - GENERAL
PAINLEVEL: NO PAIN
PAINLEVEL: NO PAIN

## 2023-09-26 ASSESSMENT — FIBROSIS 4 INDEX: FIB4 SCORE: 2.34

## 2023-09-26 NOTE — ADDENDUM NOTE
Encounter addended by: Renzo Morales R.N. on: 9/26/2023 12:12 PM   Actions taken: Charge Capture section accepted

## 2023-09-26 NOTE — PROGRESS NOTES
Patient presents to Medical Oncology for hydration per orders from Dr. Fuller.  Pt in agreement with POC.  VSS.  Established 22g in left AC with brisk blood return noted. IV flushed without resistance.  Administered 1 LNS over 1 hour (refer to MAR- start time 1030 and end time 1130)  VSS at end of infusion.  PIV discontinued no s/s of infection. Pt tolerated well.  Patient released from clinic in no apparent distress.

## 2023-09-29 ENCOUNTER — PATIENT OUTREACH (OUTPATIENT)
Dept: ONCOLOGY | Facility: MEDICAL CENTER | Age: 73
End: 2023-09-29

## 2023-09-29 ENCOUNTER — HOSPITAL ENCOUNTER (OUTPATIENT)
Dept: RADIATION ONCOLOGY | Facility: MEDICAL CENTER | Age: 73
End: 2023-09-29

## 2023-09-29 DIAGNOSIS — Z65.8 PSYCHOSOCIAL DISTRESS: ICD-10-CM

## 2023-09-29 PROCEDURE — 77290 THER RAD SIMULAJ FIELD CPLX: CPT | Performed by: RADIOLOGY

## 2023-09-29 PROCEDURE — 77334 RADIATION TREATMENT AID(S): CPT | Mod: 26 | Performed by: RADIOLOGY

## 2023-09-29 PROCEDURE — 77470 SPECIAL RADIATION TREATMENT: CPT | Performed by: RADIOLOGY

## 2023-09-29 PROCEDURE — 77334 RADIATION TREATMENT AID(S): CPT | Performed by: RADIOLOGY

## 2023-09-29 PROCEDURE — 77470 SPECIAL RADIATION TREATMENT: CPT | Mod: 26 | Performed by: RADIOLOGY

## 2023-09-29 PROCEDURE — 77263 THER RADIOLOGY TX PLNG CPLX: CPT | Performed by: RADIOLOGY

## 2023-09-29 NOTE — RADIATION PLANNING NOTES
DATE OF SERVICE: 9/29/2023    DIAGNOSIS:  SCLC (small cell lung carcinoma) (HCC)  Staging form: Lung, AJCC 8th Edition  - Clinical stage from 9/14/2023: Stage IIIA (cT3, cN1, cM0) - Signed by Leonel Patel M.D. on 9/14/2023  Stage prefix: Initial diagnosis       DATE OF SERVICE: 9/29/2023    TYPE OF SIMULATION: Thorax    GOAL OF TREATMENT:   [x] Curative  [] Palliative  [] Oligometastatic    CONTRAST:    [x] IV Contrast*  [] Oral Contrast                POSITION:    [x]  Supine  [] Prone     COMPLEX:  [x] Complex Blocking   []Arcs  [] Custom Blocks  [] >3 Sites    PROCEDURE: Patient positioned on CT table in Vac-Junie immobilization device. 4D CT acquired thorough the entire volume of interest.  Images reviewed and exported to treatment planning system.    I have personally reviewed the relevant data, performed the target localization, and determined all relevant factors for this patient’s simulation.    *Omnipaque 80 -100cc IVP in conjunction with 500cc NS

## 2023-09-29 NOTE — RADIATION PLANNING NOTES
Clinical Treatment Planning Note    DATE OF SERVICE: 9/29/2023    DIAGNOSIS:  SCLC (small cell lung carcinoma) (HCC)  Staging form: Lung, AJCC 8th Edition  - Clinical stage from 9/14/2023: Stage IIIA (cT3, cN1, cM0) - Signed by Leonel Patel M.D. on 9/14/2023  Stage prefix: Initial diagnosis         IMAGING REVIEWED:  [x] CT     [] MRI     [] PET/CT     [] BONE SCAN     [] MAMMO     [] OTHER      TREATMENT INTENT:   [x] CURATIVE     [] MAINTENANCE     []  PALLIATIVE      []  SUPPORTIVE     []  PROPHYLACTIC     [] BENIGN     []  CONSOLIDATIVE      [x] DEFINITIVE   []  OLOGIMETASTATIC      LINE OF TREATMENT:  [] ADJUVANT   [x] DEFINITIVE   [] NEOADJUVANT   [] RE-TREATMENT      TECHNIQUE PLANNED:  [x] IMRT   [] 3D   [] SBRT   [] SRS/SRT   [] HDR   [] ELECTRON       IMRT JUSTIFICATION:  []   An immediately adjacent area has been previously irradiated and abutting portals must be established with high precision.    []  Dose escalation is planned to deliver radiation doses in excess of those commonly utilized for similar tumors with conventional treatment.    [x]  The target volume is concave or convex, and the critical normal tissues are within or around that convexity or concavity.    [x]  The target volume is in close proximity to critical structures that must be protected.    [x]  The volume of interest must be covered with narrow margins to adequately protect  immediately adjacent structures.      FIELDS & BLOCKING:  [x] COMPLEX BLOCKS     []  = 3 TX AREAS     []  ARCS     []  CUSTOM SHEILD        []  SIMPLE BLOCK      CHEMOTHERAPY:  [x]  CONCURRENT     []  INDUCTION     [] SEQUENTIAL     []  <30 DAYS FROM XRT      NOTES:  OAR CONSTRAINTS: (GUIDELINES ONLY NOT ABSOLUTE)  Target Prescribed Coverage   % of PTV covered by 95% (cGy) of RX Dose       JAY Goal   Plan Hot Spot Max Dose < 120%   Cord Max Dose < 45Gy   Cord + 0.5cm Max Dose < 50Gy   Total Lung ** V20Gy < 31%   Total Lung V10Gy < 50%   Total Lung V5Gy  < 60%   Total Lung  Mean Dose < 20Gy   Brachial Plexus Max Dose < 66Gy   Esophagus V35Gy < 50%   Esophagus V50Gy < 40%   Esophagus V70Gy < 20%   Esophagus Mean Dose < 34Gy   Heart Mean Dose < 26Gy   Heart V30Gy < 46%   *RTOG 0617, QUANTEC

## 2023-09-29 NOTE — PROGRESS NOTES
"[facility] Intake    Diagnosis Type:\" Small Cell Carcinoma\"  Preferred Language: English  Insurance: [facility]   Dental Insurance:[employer]; Last Appointment Date:\" January 2023\"   Primary Care Provider Reviewed: yes  [facility] App[facility]ment Date: \" [facility]\"  Introduced Edenilson Ramsey to Oncology Support Services and provided contact information on 9/29/2023 .  Current Barriers Identified Include: None at this time  MyChart Status: Activated  Communication Preferences:Phone calls; Best Contact Number: [phone]  Patient Contact's Reviewed: yes     Met with patient and his spouse, Marcia, in RTD Consult Room #1 post SIM appointment. Patient states he has family and friends as his support system. Educated pt. On Cancer Support Groups, Reviewed the Resource Center, and provided him with RADHA Osborne's and LUKE Phillips's card for contact information. Administered Distress Screening, patient scored a two stating \" Not worried about anything\". Encouraged pt. To reach out to our team if things change.     Outcome:  No further needs at this time.          "

## 2023-09-29 NOTE — RADIATION PLANNING NOTES
[facility]   PRIMARY PHYSICIAN Pcp Not In Computer [id number]   REFERRING PHYSICIAN No ref. provider found 1950       DATE OF SERVICE: 9/29/2023    DIAGNOSIS:  SCLC (small cell lung carcinoma) (HCC)  Staging form: Lung, AJCC 8th Edition  - Clinical stage from 9/14/2023: Stage IIIA (cT3, cN1, cM0) - Signed by Leonel Patel M.D. on 9/14/2023  Stage prefix: Initial diagnosis       SPECIAL TREATMENT PROCEDURE NOTE:  Considerable additional effort required in the management of this case because of administration of concurrent platinum/etoposide chemotherapy which may result in increased normal tissue toxicity. This includes longer and possibly more frequent on treatment visits.

## 2023-09-29 NOTE — PROGRESS NOTES
Cancer Nurse Navigation Assessment:      Assessment/Discussion: Call placed to patient, introduced self and explained role of navigator.  Pt reporting being fatigued from treatment, but otherwise doing ok.  Pt denies questions or barriers to care. He states that his wife will drive him to appointments.    TRANSPORTATION: pt reports wife will be driving him  FINANCIAL:   denies this being a barrier  SUPPORT SYSTEM:   Has support of family and friends  PSYCHOLOGICAL:   Denies concerns    DST:  2 previously administered by [facility]    Resources Provided/Intervention: Introduction letter, cancer support service calendar

## 2023-10-02 RX ORDER — 0.9 % SODIUM CHLORIDE 0.9 %
3 VIAL (ML) INJECTION PRN
Status: CANCELLED | OUTPATIENT
Start: 2023-10-10

## 2023-10-02 RX ORDER — 0.9 % SODIUM CHLORIDE 0.9 %
10 VIAL (ML) INJECTION PRN
Status: CANCELLED | OUTPATIENT
Start: 2023-10-10

## 2023-10-02 RX ORDER — PROCHLORPERAZINE MALEATE 10 MG
10 TABLET ORAL EVERY 6 HOURS PRN
Status: CANCELLED | OUTPATIENT
Start: 2023-10-10

## 2023-10-02 RX ORDER — DIPHENHYDRAMINE HYDROCHLORIDE 50 MG/ML
50 INJECTION INTRAMUSCULAR; INTRAVENOUS PRN
Status: CANCELLED | OUTPATIENT
Start: 2023-10-11

## 2023-10-02 RX ORDER — ONDANSETRON 2 MG/ML
4 INJECTION INTRAMUSCULAR; INTRAVENOUS PRN
Status: CANCELLED | OUTPATIENT
Start: 2023-10-09

## 2023-10-02 RX ORDER — 0.9 % SODIUM CHLORIDE 0.9 %
VIAL (ML) INJECTION PRN
Status: CANCELLED | OUTPATIENT
Start: 2023-10-09

## 2023-10-02 RX ORDER — 0.9 % SODIUM CHLORIDE 0.9 %
VIAL (ML) INJECTION PRN
Status: CANCELLED | OUTPATIENT
Start: 2023-10-11

## 2023-10-02 RX ORDER — 0.9 % SODIUM CHLORIDE 0.9 %
10 VIAL (ML) INJECTION PRN
Status: CANCELLED | OUTPATIENT
Start: 2023-10-08

## 2023-10-02 RX ORDER — 0.9 % SODIUM CHLORIDE 0.9 %
10 VIAL (ML) INJECTION PRN
Status: CANCELLED | OUTPATIENT
Start: 2023-10-11

## 2023-10-02 RX ORDER — SODIUM CHLORIDE 9 MG/ML
INJECTION, SOLUTION INTRAVENOUS CONTINUOUS
Status: CANCELLED | OUTPATIENT
Start: 2023-10-10

## 2023-10-02 RX ORDER — 0.9 % SODIUM CHLORIDE 0.9 %
10 VIAL (ML) INJECTION PRN
Status: CANCELLED | OUTPATIENT
Start: 2023-10-09

## 2023-10-02 RX ORDER — EPINEPHRINE 1 MG/ML(1)
0.5 AMPUL (ML) INJECTION PRN
Status: CANCELLED | OUTPATIENT
Start: 2023-10-11

## 2023-10-02 RX ORDER — SODIUM CHLORIDE 9 MG/ML
1000 INJECTION, SOLUTION INTRAVENOUS ONCE
Status: CANCELLED | OUTPATIENT
Start: 2023-10-09

## 2023-10-02 RX ORDER — 0.9 % SODIUM CHLORIDE 0.9 %
3 VIAL (ML) INJECTION PRN
Status: CANCELLED | OUTPATIENT
Start: 2023-10-11

## 2023-10-02 RX ORDER — METHYLPREDNISOLONE SODIUM SUCCINATE 125 MG/2ML
125 INJECTION, POWDER, LYOPHILIZED, FOR SOLUTION INTRAMUSCULAR; INTRAVENOUS PRN
Status: CANCELLED | OUTPATIENT
Start: 2023-10-09

## 2023-10-02 RX ORDER — METHYLPREDNISOLONE SODIUM SUCCINATE 125 MG/2ML
125 INJECTION, POWDER, LYOPHILIZED, FOR SOLUTION INTRAMUSCULAR; INTRAVENOUS PRN
Status: CANCELLED | OUTPATIENT
Start: 2023-10-10

## 2023-10-02 RX ORDER — SODIUM CHLORIDE 9 MG/ML
INJECTION, SOLUTION INTRAVENOUS CONTINUOUS
Status: CANCELLED | OUTPATIENT
Start: 2023-10-09

## 2023-10-02 RX ORDER — ONDANSETRON 2 MG/ML
8 INJECTION INTRAMUSCULAR; INTRAVENOUS ONCE
Status: CANCELLED | OUTPATIENT
Start: 2023-10-11 | End: 2023-10-11

## 2023-10-02 RX ORDER — ONDANSETRON 2 MG/ML
4 INJECTION INTRAMUSCULAR; INTRAVENOUS PRN
Status: CANCELLED | OUTPATIENT
Start: 2023-10-11

## 2023-10-02 RX ORDER — ONDANSETRON 8 MG/1
8 TABLET, ORALLY DISINTEGRATING ORAL PRN
Status: CANCELLED | OUTPATIENT
Start: 2023-10-10

## 2023-10-02 RX ORDER — 0.9 % SODIUM CHLORIDE 0.9 %
3 VIAL (ML) INJECTION PRN
Status: CANCELLED | OUTPATIENT
Start: 2023-10-08

## 2023-10-02 RX ORDER — ONDANSETRON 8 MG/1
8 TABLET, ORALLY DISINTEGRATING ORAL PRN
Status: CANCELLED | OUTPATIENT
Start: 2023-10-11

## 2023-10-02 RX ORDER — DIPHENHYDRAMINE HYDROCHLORIDE 50 MG/ML
50 INJECTION INTRAMUSCULAR; INTRAVENOUS PRN
Status: CANCELLED | OUTPATIENT
Start: 2023-10-10

## 2023-10-02 RX ORDER — DIPHENHYDRAMINE HYDROCHLORIDE 50 MG/ML
50 INJECTION INTRAMUSCULAR; INTRAVENOUS PRN
Status: CANCELLED | OUTPATIENT
Start: 2023-10-09

## 2023-10-02 RX ORDER — ONDANSETRON 2 MG/ML
8 INJECTION INTRAMUSCULAR; INTRAVENOUS ONCE
Status: CANCELLED | OUTPATIENT
Start: 2023-10-10 | End: 2023-10-10

## 2023-10-02 RX ORDER — 0.9 % SODIUM CHLORIDE 0.9 %
3 VIAL (ML) INJECTION PRN
Status: CANCELLED | OUTPATIENT
Start: 2023-10-09

## 2023-10-02 RX ORDER — EPINEPHRINE 1 MG/ML(1)
0.5 AMPUL (ML) INJECTION PRN
Status: CANCELLED | OUTPATIENT
Start: 2023-10-10

## 2023-10-02 RX ORDER — 0.9 % SODIUM CHLORIDE 0.9 %
VIAL (ML) INJECTION PRN
Status: CANCELLED | OUTPATIENT
Start: 2023-10-08

## 2023-10-02 RX ORDER — METHYLPREDNISOLONE SODIUM SUCCINATE 125 MG/2ML
125 INJECTION, POWDER, LYOPHILIZED, FOR SOLUTION INTRAMUSCULAR; INTRAVENOUS PRN
Status: CANCELLED | OUTPATIENT
Start: 2023-10-11

## 2023-10-02 RX ORDER — ONDANSETRON 2 MG/ML
4 INJECTION INTRAMUSCULAR; INTRAVENOUS PRN
Status: CANCELLED | OUTPATIENT
Start: 2023-10-10

## 2023-10-02 RX ORDER — PROCHLORPERAZINE MALEATE 10 MG
10 TABLET ORAL EVERY 6 HOURS PRN
Status: CANCELLED | OUTPATIENT
Start: 2023-10-09

## 2023-10-02 RX ORDER — 0.9 % SODIUM CHLORIDE 0.9 %
VIAL (ML) INJECTION PRN
Status: CANCELLED | OUTPATIENT
Start: 2023-10-10

## 2023-10-02 RX ORDER — ONDANSETRON 8 MG/1
8 TABLET, ORALLY DISINTEGRATING ORAL PRN
Status: CANCELLED | OUTPATIENT
Start: 2023-10-09

## 2023-10-02 RX ORDER — SODIUM CHLORIDE 9 MG/ML
INJECTION, SOLUTION INTRAVENOUS CONTINUOUS
Status: CANCELLED | OUTPATIENT
Start: 2023-10-11

## 2023-10-02 RX ORDER — PROCHLORPERAZINE MALEATE 10 MG
10 TABLET ORAL EVERY 6 HOURS PRN
Status: CANCELLED | OUTPATIENT
Start: 2023-10-11

## 2023-10-02 RX ORDER — EPINEPHRINE 1 MG/ML(1)
0.5 AMPUL (ML) INJECTION PRN
Status: CANCELLED | OUTPATIENT
Start: 2023-10-09

## 2023-10-03 ENCOUNTER — HOSPITAL ENCOUNTER (OUTPATIENT)
Facility: MEDICAL CENTER | Age: 73
End: 2023-10-03
Attending: STUDENT IN AN ORGANIZED HEALTH CARE EDUCATION/TRAINING PROGRAM | Admitting: RADIOLOGY
Payer: COMMERCIAL

## 2023-10-03 ENCOUNTER — APPOINTMENT (OUTPATIENT)
Dept: RADIOLOGY | Facility: MEDICAL CENTER | Age: 73
End: 2023-10-03
Attending: STUDENT IN AN ORGANIZED HEALTH CARE EDUCATION/TRAINING PROGRAM
Payer: COMMERCIAL

## 2023-10-03 VITALS
HEART RATE: 63 BPM | WEIGHT: 183 LBS | BODY MASS INDEX: 27.11 KG/M2 | HEIGHT: 69 IN | OXYGEN SATURATION: 96 % | DIASTOLIC BLOOD PRESSURE: 67 MMHG | RESPIRATION RATE: 12 BRPM | SYSTOLIC BLOOD PRESSURE: 105 MMHG

## 2023-10-03 DIAGNOSIS — C34.90 SCLC (SMALL CELL LUNG CARCINOMA) (HCC): ICD-10-CM

## 2023-10-03 PROCEDURE — 700111 HCHG RX REV CODE 636 W/ 250 OVERRIDE (IP): Performed by: RADIOLOGY

## 2023-10-03 PROCEDURE — 700105 HCHG RX REV CODE 258: Performed by: RADIOLOGY

## 2023-10-03 PROCEDURE — 99153 MOD SED SAME PHYS/QHP EA: CPT

## 2023-10-03 PROCEDURE — 700111 HCHG RX REV CODE 636 W/ 250 OVERRIDE (IP): Mod: JZ

## 2023-10-03 RX ORDER — SODIUM CHLORIDE 9 MG/ML
500 INJECTION, SOLUTION INTRAVENOUS
Status: DISCONTINUED | OUTPATIENT
Start: 2023-10-03 | End: 2023-10-03 | Stop reason: HOSPADM

## 2023-10-03 RX ORDER — CEFAZOLIN SODIUM 1 G/3ML
INJECTION, POWDER, FOR SOLUTION INTRAMUSCULAR; INTRAVENOUS
Status: COMPLETED
Start: 2023-10-03 | End: 2023-10-03

## 2023-10-03 RX ORDER — LIDOCAINE HCL/EPINEPHRINE/PF 2%-1:200K
VIAL (ML) INJECTION
Status: DISCONTINUED
Start: 2023-10-03 | End: 2023-10-03 | Stop reason: HOSPADM

## 2023-10-03 RX ORDER — MIDAZOLAM HYDROCHLORIDE 1 MG/ML
INJECTION INTRAMUSCULAR; INTRAVENOUS
Status: COMPLETED
Start: 2023-10-03 | End: 2023-10-03

## 2023-10-03 RX ORDER — ONDANSETRON 2 MG/ML
4 INJECTION INTRAMUSCULAR; INTRAVENOUS PRN
Status: DISCONTINUED | OUTPATIENT
Start: 2023-10-03 | End: 2023-10-03 | Stop reason: HOSPADM

## 2023-10-03 RX ORDER — MIDAZOLAM HYDROCHLORIDE 1 MG/ML
.5-2 INJECTION INTRAMUSCULAR; INTRAVENOUS PRN
Status: DISCONTINUED | OUTPATIENT
Start: 2023-10-03 | End: 2023-10-03 | Stop reason: HOSPADM

## 2023-10-03 RX ADMIN — FENTANYL CITRATE 50 MCG: 50 INJECTION, SOLUTION INTRAMUSCULAR; INTRAVENOUS at 10:33

## 2023-10-03 RX ADMIN — MIDAZOLAM 1 MG: 1 INJECTION, SOLUTION INTRAMUSCULAR; INTRAVENOUS at 10:33

## 2023-10-03 RX ADMIN — FENTANYL CITRATE 50 MCG: 50 INJECTION, SOLUTION INTRAMUSCULAR; INTRAVENOUS at 10:26

## 2023-10-03 RX ADMIN — MIDAZOLAM 1 MG: 1 INJECTION, SOLUTION INTRAMUSCULAR; INTRAVENOUS at 10:26

## 2023-10-03 RX ADMIN — CEFAZOLIN 2 G: 2 INJECTION, POWDER, FOR SOLUTION INTRAMUSCULAR; INTRAVENOUS at 10:29

## 2023-10-03 RX ADMIN — HEPARIN 2.5 UNITS: 100 SYRINGE at 10:41

## 2023-10-03 ASSESSMENT — FIBROSIS 4 INDEX: FIB4 SCORE: 2.34

## 2023-10-03 NOTE — PROGRESS NOTES
IR RN note    Patient underwent a Power port placement by Dr. Cao.  Procedure site was verified by MD using imaging guidance. Consent was signed.  IR kim Draper and Lilian assisted. Patient was placed in a supine position.  Vitals were taken every 5 minutes and remained stable during procedure (see doc flow sheet for results).  CO2 waveform capnography was monitored throughout procedure, see chart.  Right chest access site.   A derma bond, internal sutures, gauze and tegaderm dressing was placed over surgical site.  .   Reviewed sedation orders with MD JOHNSON procedure ended at 1052      BARD Power Port Clear Roxi Implantable port  8F X 21 cm     REF # [id number]  LOT # [id number]  Exp. 01-

## 2023-10-03 NOTE — H&P
History and Physical    Date: 10/3/2023    PCP: Pcp Pt States None      CC: Recently diagnosed with NSCLC, here for a port placement.    HPI: This is a 73 y.o. male who is presenting for port placement    Past Medical History:   Diagnosis Date    Arthritis     knee    Bowel habit changes     diarrhea    Bronchitis     Cancer (HCC) 09/29/2023    small cell lung cancer    COPD (chronic obstructive pulmonary disease) (HCC)     Diabetes (HCC)     diet controlled, no medications.    Emphysema of lung (HCC)     COPD- no medications    High cholesterol 08/2023    Was on a statin, MD monitoring, not currently on meds    Myocardial infarct (HCC)     1994,1996,2000    Pneumonia     Psychiatric problem 08/2023    depression, son passed away recently, no meds    Sleep apnea     cpap       Past Surgical History:   Procedure Laterality Date    NE BRONCHOSCOPY,DIAGNOSTIC N/A 08/29/2023    Procedure: FIBER OPTIC BRONCHOSCOPY WITH  WASH, BRUSH, BRONCHOALVEOLAR LAVAGE, BIOPSY AND FINE NEEDLE ASPIRATION, ENDOBRONCHIAL ULTRASOUND & NAVIGATION,  ROBOTICS;  Surgeon: Bart Cortez M.D.;  Location: SURGERY [facility];  Service: Pulmonary Robotic    ENDOBRONCHIAL US ADD-ON N/A 08/29/2023    Procedure: ENDOBRONCHIAL ULTRASOUND (EBUS);  Surgeon: Bart Cortez M.D.;  Location: SURGERY [facility];  Service: Pulmonary Robotic    GASTROSCOPY-ENDO  03/31/2017    Procedure: GASTROSCOPY-ENDO;  Surgeon: Emerson Eaton M.D.;  Location: ENDOSCOPY [facility];  Service:     COLONOSCOPY - ENDO  03/31/2017    Procedure: COLONOSCOPY - ENDO;  Surgeon: Emerson Eaton M.D.;  Location: ENDOSCOPY [facility];  Service:     CHOLECYSTECTOMY      INGUINAL HERNIA REPAIR Right     OTHER      Tonsillectomy    OTHER      Hernia     OTHER CARDIAC SURGERY      3 stents    TONSILLECTOMY         Current Facility-Administered Medications   Medication Dose Route Frequency Provider Last Rate Last Admin    LIDOCAINE-EPINEPHRINE 2 %-1:200000 INJ SOLN              HEPARIN SOD (PORK) LOCK FLUSH 100 UNIT/ML IV SOLN (WRAPPER)              Facility-Administered Medications Ordered in Other Encounters   Medication Dose Route Frequency Provider Last Rate Last Admin    EPINEPHrine (ANAPHYLAXIS DOSE) IM 0.5 mg  0.5 mg Intramuscular PRN Roseline Fuller M.D.        diphenhydrAMINE (Benadryl) injection 50 mg  50 mg Intravenous PRN Roseline Fuller M.D.        methylPREDNISolone sod succ (SOLU-MEDROL) 125 MG injection 125 mg  125 mg Intravenous PRN Roseline Fuller M.D.            Social History     Socioeconomic History    Marital status:      Spouse name: Not on file    Number of children: Not on file    Years of education: Not on file    Highest education level: Not on file   Occupational History    Not on file   Tobacco Use    Smoking status: Former     Current packs/day: 0.00     Average packs/day: 0.5 packs/day for 14.0 years (7.0 ttl pk-yrs)     Types: Cigarettes     Start date: 1980     Quit date:      Years since quittin.7    Smokeless tobacco: Not on file    Tobacco comments:     Pt quit smoking cigarettes, 2000.  1/2 pack per day, smoked 20 years    Vaping Use    Vaping Use: Never used   Substance and Sexual Activity    Alcohol use: Not Currently     Alcohol/week: 8.4 oz     Types: 14 Shots of liquor per week     Comment: daily    Drug use: Not Currently     Types: Marijuana     Comment: quit     Sexual activity: Not on file   Other Topics Concern    Not on file   Social History Narrative    Not on file     Social Determinants of Health     Financial Resource Strain: Not on file   Food Insecurity: Not on file   Transportation Needs: Not on file   Physical Activity: Not on file   Stress: Not on file   Social Connections: Not on file   Intimate Partner Violence: Not on file   Housing Stability: Not on file       Family History   Problem Relation Age of Onset    Cancer Mother         lung?       Allergies:  Patient has no known  allergies.    Review of Systems:  Negative except for mild tiredness attributed to chemotherapy.    Physical Exam    Vital Signs  Blood Pressure : 106/67       Pulse: 63   Respiration: 16   Pulse Oximetry: 94 %        Labs:                    Radiology:  IR-CVC PORT PLACEMENT > AGE 5    (Results Pending)             Assessment and Plan: This is a 73 y.o. with NSCLC presenting today for a port placement. Consent obtained and risks, benefits and alternatives discussed. Plan to proceed with procedure under moderate sedation.

## 2023-10-03 NOTE — OR SURGEON
Immediate Post- Operative Note        Findings: Right IJV port placed. Ready for use.      Procedure(s): Port placement      Estimated Blood Loss: Less than 5 ml        Complications: None            10/3/2023     10:58 AM     Jeff Cao M.D.      
no

## 2023-10-03 NOTE — PROGRESS NOTES
Reviewed DC orders with and wife. DC PIV.  Pt AOX4, had some juice. Vitals WNL. No pain. DC home

## 2023-10-04 ENCOUNTER — TELEPHONE (OUTPATIENT)
Dept: HEMATOLOGY ONCOLOGY | Facility: MEDICAL CENTER | Age: 73
End: 2023-10-04
Payer: COMMERCIAL

## 2023-10-04 ENCOUNTER — HOSPITAL ENCOUNTER (EMERGENCY)
Facility: MEDICAL CENTER | Age: 73
End: 2023-10-04
Attending: EMERGENCY MEDICINE
Payer: COMMERCIAL

## 2023-10-04 ENCOUNTER — APPOINTMENT (OUTPATIENT)
Dept: RADIOLOGY | Facility: MEDICAL CENTER | Age: 73
End: 2023-10-04
Attending: EMERGENCY MEDICINE
Payer: COMMERCIAL

## 2023-10-04 ENCOUNTER — HOSPITAL ENCOUNTER (OUTPATIENT)
Dept: RADIATION ONCOLOGY | Facility: MEDICAL CENTER | Age: 73
End: 2023-10-31
Attending: RADIOLOGY
Payer: COMMERCIAL

## 2023-10-04 VITALS
RESPIRATION RATE: 18 BRPM | TEMPERATURE: 97 F | WEIGHT: 188.05 LBS | DIASTOLIC BLOOD PRESSURE: 66 MMHG | BODY MASS INDEX: 27.85 KG/M2 | HEART RATE: 53 BPM | OXYGEN SATURATION: 96 % | SYSTOLIC BLOOD PRESSURE: 120 MMHG | HEIGHT: 69 IN

## 2023-10-04 DIAGNOSIS — L03.313 CELLULITIS OF CHEST WALL: ICD-10-CM

## 2023-10-04 PROCEDURE — 700102 HCHG RX REV CODE 250 W/ 637 OVERRIDE(OP): Performed by: EMERGENCY MEDICINE

## 2023-10-04 PROCEDURE — A9270 NON-COVERED ITEM OR SERVICE: HCPCS | Performed by: EMERGENCY MEDICINE

## 2023-10-04 PROCEDURE — 71045 X-RAY EXAM CHEST 1 VIEW: CPT

## 2023-10-04 PROCEDURE — 99284 EMERGENCY DEPT VISIT MOD MDM: CPT

## 2023-10-04 RX ORDER — CEPHALEXIN 250 MG/1
500 CAPSULE ORAL ONCE
Status: COMPLETED | OUTPATIENT
Start: 2023-10-04 | End: 2023-10-04

## 2023-10-04 RX ORDER — CEPHALEXIN 500 MG/1
500 CAPSULE ORAL 3 TIMES DAILY
Qty: 15 CAPSULE | Refills: 0 | Status: ACTIVE | OUTPATIENT
Start: 2023-10-04 | End: 2023-10-09

## 2023-10-04 RX ADMIN — CEPHALEXIN 500 MG: 250 CAPSULE ORAL at 11:57

## 2023-10-04 ASSESSMENT — FIBROSIS 4 INDEX: FIB4 SCORE: 2.34

## 2023-10-04 NOTE — ED PROVIDER NOTES
"ED Provider Note    CHIEF COMPLAINT  Chief Complaint   Patient presents with    Wound Check     Reports R chest port implanted yesterday. States today this is sore and \"white\". Denies fevers/chills.       EXTERNAL RECORDS REVIEWED  Other notes from yesterday's procedure are not available.  Notes from the patient's oncologist office reveal that he was instructed to come to the ER for evaluation.    HPI/ROS  LIMITATION TO HISTORY   Select: : None  OUTSIDE HISTORIAN(S):  None    Bart Ramsey is a 73 y.o. male who presents with a chief complaint of pain and redness over a port that was placed in his right chest recently for chemotherapy.  Patient was unfortunately recently diagnosed with lung cancer and has additional chemotherapy and radiation treatments in front of him.  He had the port placed yesterday but is uncertain who actually placed it.  He noted today that the area was sore and red.  He has not had any fevers and denies any purulence from the site.  He contacted his oncologist and was instructed to come to the ER for evaluation.    PAST MEDICAL HISTORY   has a past medical history of Arthritis, Bowel habit changes, Bronchitis, Cancer (HCC) (09/29/2023), COPD (chronic obstructive pulmonary disease) (HCC), Diabetes (HCC), Emphysema of lung (HCC), High cholesterol (08/2023), Myocardial infarct (HCC), Pneumonia, Psychiatric problem (08/2023), and Sleep apnea.    SURGICAL HISTORY   has a past surgical history that includes other cardiac surgery; other; other; gastroscopy-endo (03/31/2017); colonoscopy - endo (03/31/2017); bronchoscopy,diagnostic (N/A, 08/29/2023); endobronchial us add-on (N/A, 08/29/2023); cholecystectomy; tonsillectomy; and inguinal hernia repair (Right).    FAMILY HISTORY  Family History   Problem Relation Age of Onset    Cancer Mother         lung?       SOCIAL HISTORY  Social History     Tobacco Use    Smoking status: Former     Current packs/day: 0.00     Average packs/day: 0.5 " "packs/day for 14.0 years (7.0 ttl pk-yrs)     Types: Cigarettes     Start date: 1980     Quit date:      Years since quittin.7    Smokeless tobacco: Not on file    Tobacco comments:     Pt quit smoking cigarettes, 2000.  1/2 pack per day, smoked 20 years    Vaping Use    Vaping Use: Never used   Substance and Sexual Activity    Alcohol use: Not Currently     Alcohol/week: 8.4 oz     Types: 14 Shots of liquor per week     Comment: daily    Drug use: Not Currently     Types: Marijuana     Comment: quit     Sexual activity: Not on file       CURRENT MEDICATIONS  Home Medications       Reviewed by Yamilex Sweet R.N. (Registered Nurse) on 10/04/23 at 1122  Med List Status: Not Addressed     Medication Last Dose Status   aspirin EC (ECOTRIN) 81 MG Tablet Delayed Response  Active   baclofen (LIORESAL) 10 MG Tab  Active   Cetirizine HCl (ZYRTEC ALLERGY PO)  Active   Cyanocobalamin (VITAMIN B-12) 1000 MCG Tab  Active   fluticasone (FLONASE) 50 MCG/ACT nasal spray  Active   fluticasone-salmeterol (ADVAIR) 250-50 MCG/ACT AEROSOL POWDER, BREATH ACTIVATED  Active   gabapentin (NEURONTIN) 300 MG Cap  Active   ipratropium (ATROVENT) 0.03 % Solution  Active   metFORMIN (GLUCOPHAGE) 500 MG Tab  Active   Omega-3 Fatty Acids (FISH OIL) 1000 MG Cap capsule  Active   omeprazole (PRILOSEC) 40 MG delayed-release capsule  Active   ondansetron (ZOFRAN) 4 MG Tab tablet  Active   prochlorperazine (COMPAZINE) 10 MG Tab  Active   vitamin D3 (CHOLECALCIFEROL) 1000 Unit (25 mcg) Tab  Active                    ALLERGIES  No Known Allergies    PHYSICAL EXAM  VITAL SIGNS: BP (!) 142/90   Pulse 83   Temp 35.8 °C (96.5 °F) (Temporal)   Resp 20   Ht 1.753 m (5' 9\")   Wt 85.3 kg (188 lb 0.8 oz)   SpO2 95%   BMI 27.77 kg/m²    Physical Exam  Vitals and nursing note reviewed.   Constitutional:       Appearance: Normal appearance.   HENT:      Head: Normocephalic and atraumatic.      Right Ear: External ear normal.      Left " Ear: External ear normal.      Mouth/Throat:      Mouth: Mucous membranes are moist.      Pharynx: Oropharynx is clear.   Eyes:      Extraocular Movements: Extraocular movements intact.      Conjunctiva/sclera: Conjunctivae normal.      Pupils: Pupils are equal, round, and reactive to light.   Cardiovascular:      Rate and Rhythm: Normal rate.   Pulmonary:      Effort: Pulmonary effort is normal.   Musculoskeletal:         General: Normal range of motion.      Cervical back: Normal range of motion and neck supple.   Skin:     General: Skin is warm and dry.      Comments: Port in place over right chest with surrounding erythema and resolving ecchymosis.  There is associated tenderness but no fluctuance.  Surgical incision is clean, dry, and intact without any evidence for dehiscence.  There is no crepitus or pain out of proportion to exam.   Neurological:      General: No focal deficit present.      Mental Status: He is alert and oriented to person, place, and time.   Psychiatric:         Mood and Affect: Mood normal.         Behavior: Behavior normal.       DIAGNOSTIC STUDIES / PROCEDURES  RADIOLOGY  I have independently interpreted the diagnostic imaging associated with this visit and am waiting the final reading from the radiologist.   My preliminary interpretation is as follows: Port-A-Cath appears to be appropriately positioned    Radiologist interpretation:   DX-CHEST-PORTABLE (1 VIEW)   Final Result      1.  Right IJ Port-A-Cath present with the tip overlying the superior vena cava.      2.  Again seen mass within the left lung base.        COURSE & MEDICAL DECISION MAKING    ED Observation Status? No; Patient does not meet criteria for ED Observation.     INITIAL ASSESSMENT, COURSE AND PLAN  Care Narrative: This is a 73-year-old male with a recent diagnosis of lung cancer s/p port placement in the right chest yesterday who is here with concerns about the wound.  He has no systemic symptoms and otherwise feels  well beyond the soreness at the port.  There is significant erythema surrounding the port but no fluctuance, crepitus, tenderness out of proportion to exam.  Low suspicion for abscess, NSTI.  The incision itself is clean, dry, and intact.  There is no dehiscence or drainage.  Will obtain an x-ray to confirm appropriate placement and provide him with a dose of Keflex for potential developing cellulitis.    X-ray revealed appropriate placement.  Patient is safe for discharge and I will start him on Keflex.  I have asked him to follow-up with his oncologist for recheck.  Discharged in good and stable condition with very strict return precautions.    HTN/IDDM FOLLOW UP:  The patient is referred to a primary physician for blood pressure management, diabetic screening, and for all other preventive health concerns    ADDITIONAL PROBLEM LIST  None  DISPOSITION AND DISCUSSIONS  I have discussed management of the patient with the following physicians and JAE's: None    Discussion of management with other QHP or appropriate source(s): None     Escalation of care considered, and ultimately not performed:blood analysis and acute inpatient care management, however at this time, the patient is most appropriate for outpatient management    Barriers to care at this time, including but not limited to:  None .     Decision tools and prescription drugs considered including, but not limited to: Antibiotics -Keflex .    FINAL DIAGNOSIS  1. Cellulitis of chest wall      Electronically signed by: Haile Driscoll M.D., 10/4/2023 11:38 AM

## 2023-10-04 NOTE — DISCHARGE INSTRUCTIONS
You were seen in the ER for redness and soreness over your recently placed port.  I am starting you on antibiotics.  Take these as directed.  Follow-up with Dr. Fuller for recheck and return with new or worsening symptoms.  I hope you feel better soon!

## 2023-10-04 NOTE — TELEPHONE ENCOUNTER
Patient called this morning concerned that his port which was placed yesterday looks red and swollen today. He does not have fever or chills he does not have pus or yellowish fluid coming from the site. I spoke with Kalani Butterfield RN for Dr. Fuller and she instructed me to tell the patient to go to ER if it is very swollen and red and painful.  The patient was instructed to go to ER and have it looked at.  Kalani GARCIA will follow up with patient later today or tomorrow to see how he is doing.

## 2023-10-05 ENCOUNTER — TELEPHONE (OUTPATIENT)
Dept: HEMATOLOGY ONCOLOGY | Facility: MEDICAL CENTER | Age: 73
End: 2023-10-05
Payer: COMMERCIAL

## 2023-10-05 DIAGNOSIS — Z79.899 ENCOUNTER FOR LONG-TERM (CURRENT) USE OF HIGH-RISK MEDICATION: ICD-10-CM

## 2023-10-05 DIAGNOSIS — C34.90 SCLC (SMALL CELL LUNG CARCINOMA) (HCC): ICD-10-CM

## 2023-10-05 PROCEDURE — RXMED WILLOW AMBULATORY MEDICATION CHARGE: Performed by: STUDENT IN AN ORGANIZED HEALTH CARE EDUCATION/TRAINING PROGRAM

## 2023-10-05 RX ORDER — LIDOCAINE AND PRILOCAINE 25; 25 MG/G; MG/G
CREAM TOPICAL
Qty: 30 G | Refills: 3 | Status: SHIPPED | OUTPATIENT
Start: 2023-10-05 | End: 2023-11-13

## 2023-10-05 NOTE — TELEPHONE ENCOUNTER
Contacted patient to follow up on port site redness, swelling, pain. Pt stated that the site is looking much better, the ER visit cleaned it up and place a new dressing on it. Advised patient to call office with any issues, or return to ER for increased pain, redness, swelling. Pt did request EMLA cream for port site prior to chemotherapy infusions. Order sent to [facility].

## 2023-10-06 ENCOUNTER — PHARMACY VISIT (OUTPATIENT)
Dept: PHARMACY | Facility: MEDICAL CENTER | Age: 73
End: 2023-10-06
Payer: COMMERCIAL

## 2023-10-06 ENCOUNTER — PATIENT OUTREACH (OUTPATIENT)
Dept: ONCOLOGY | Facility: MEDICAL CENTER | Age: 73
End: 2023-10-06

## 2023-10-06 ENCOUNTER — HOSPITAL ENCOUNTER (OUTPATIENT)
Dept: HEMATOLOGY ONCOLOGY | Facility: MEDICAL CENTER | Age: 73
End: 2023-10-06
Attending: STUDENT IN AN ORGANIZED HEALTH CARE EDUCATION/TRAINING PROGRAM
Payer: COMMERCIAL

## 2023-10-06 ENCOUNTER — HOSPITAL ENCOUNTER (OUTPATIENT)
Dept: LAB | Facility: MEDICAL CENTER | Age: 73
End: 2023-10-06
Attending: STUDENT IN AN ORGANIZED HEALTH CARE EDUCATION/TRAINING PROGRAM
Payer: COMMERCIAL

## 2023-10-06 VITALS
SYSTOLIC BLOOD PRESSURE: 106 MMHG | BODY MASS INDEX: 27.54 KG/M2 | OXYGEN SATURATION: 97 % | HEART RATE: 56 BPM | WEIGHT: 185.96 LBS | TEMPERATURE: 97.7 F | DIASTOLIC BLOOD PRESSURE: 62 MMHG | HEIGHT: 69 IN

## 2023-10-06 DIAGNOSIS — C34.90 SCLC (SMALL CELL LUNG CARCINOMA) (HCC): ICD-10-CM

## 2023-10-06 DIAGNOSIS — Z79.899 ENCOUNTER FOR LONG-TERM (CURRENT) USE OF HIGH-RISK MEDICATION: ICD-10-CM

## 2023-10-06 LAB
ALBUMIN SERPL BCP-MCNC: 3.9 G/DL (ref 3.2–4.9)
ALBUMIN/GLOB SERPL: 1.1 G/DL
ALP SERPL-CCNC: 113 U/L (ref 30–99)
ALT SERPL-CCNC: 18 U/L (ref 2–50)
ANION GAP SERPL CALC-SCNC: 12 MMOL/L (ref 7–16)
APPEARANCE UR: CLEAR
APTT PPP: 25.5 SEC (ref 24.7–36)
AST SERPL-CCNC: 20 U/L (ref 12–45)
BASOPHILS # BLD AUTO: 0.5 % (ref 0–1.8)
BASOPHILS # BLD: 0.02 K/UL (ref 0–0.12)
BILIRUB CONJ SERPL-MCNC: <0.2 MG/DL (ref 0.1–0.5)
BILIRUB INDIRECT SERPL-MCNC: NORMAL MG/DL (ref 0–1)
BILIRUB SERPL-MCNC: 0.4 MG/DL (ref 0.1–1.5)
BILIRUB UR QL STRIP.AUTO: NEGATIVE
BUN SERPL-MCNC: 17 MG/DL (ref 8–22)
CALCIUM ALBUM COR SERPL-MCNC: 9.3 MG/DL (ref 8.5–10.5)
CALCIUM SERPL-MCNC: 9.2 MG/DL (ref 8.4–10.2)
CHLORIDE SERPL-SCNC: 101 MMOL/L (ref 96–112)
CHOLEST SERPL-MCNC: 194 MG/DL (ref 100–199)
CK SERPL-CCNC: 65 U/L (ref 0–154)
CO2 SERPL-SCNC: 25 MMOL/L (ref 20–33)
COLOR UR: YELLOW
CREAT SERPL-MCNC: 1 MG/DL (ref 0.5–1.4)
EOSINOPHIL # BLD AUTO: 0.08 K/UL (ref 0–0.51)
EOSINOPHIL NFR BLD: 2 % (ref 0–6.9)
ERYTHROCYTE [DISTWIDTH] IN BLOOD BY AUTOMATED COUNT: 46.5 FL (ref 35.9–50)
FASTING STATUS PATIENT QL REPORTED: NORMAL
GFR SERPLBLD CREATININE-BSD FMLA CKD-EPI: 79 ML/MIN/1.73 M 2
GLOBULIN SER CALC-MCNC: 3.6 G/DL (ref 1.9–3.5)
GLUCOSE SERPL-MCNC: 119 MG/DL (ref 65–99)
GLUCOSE UR STRIP.AUTO-MCNC: NEGATIVE MG/DL
HCT VFR BLD AUTO: 40.2 % (ref 42–52)
HGB BLD-MCNC: 13.4 G/DL (ref 14–18)
IMM GRANULOCYTES # BLD AUTO: 0.06 K/UL (ref 0–0.11)
IMM GRANULOCYTES NFR BLD AUTO: 1.5 % (ref 0–0.9)
INR PPP: 0.95 (ref 0.87–1.13)
KETONES UR STRIP.AUTO-MCNC: NEGATIVE MG/DL
LDH SERPL L TO P-CCNC: 114 U/L (ref 107–266)
LEUKOCYTE ESTERASE UR QL STRIP.AUTO: NEGATIVE
LYMPHOCYTES # BLD AUTO: 1.77 K/UL (ref 1–4.8)
LYMPHOCYTES NFR BLD: 43.7 % (ref 22–41)
MAGNESIUM SERPL-MCNC: 2 MG/DL (ref 1.5–2.5)
MCH RBC QN AUTO: 31 PG (ref 27–33)
MCHC RBC AUTO-ENTMCNC: 33.3 G/DL (ref 32.3–36.5)
MCV RBC AUTO: 93.1 FL (ref 81.4–97.8)
MICRO URNS: NORMAL
MONOCYTES # BLD AUTO: 0.52 K/UL (ref 0–0.85)
MONOCYTES NFR BLD AUTO: 12.8 % (ref 0–13.4)
NEUTROPHILS # BLD AUTO: 1.6 K/UL (ref 1.82–7.42)
NEUTROPHILS NFR BLD: 39.5 % (ref 44–72)
NITRITE UR QL STRIP.AUTO: NEGATIVE
NRBC # BLD AUTO: 0 K/UL
NRBC BLD-RTO: 0 /100 WBC (ref 0–0.2)
NT-PROBNP SERPL IA-MCNC: 133 PG/ML (ref 0–125)
PH UR STRIP.AUTO: 5.5 [PH] (ref 5–8)
PHOSPHATE SERPL-MCNC: 3.5 MG/DL (ref 2.5–4.5)
PLATELET # BLD AUTO: 242 K/UL (ref 164–446)
PMV BLD AUTO: 9.1 FL (ref 9–12.9)
POTASSIUM SERPL-SCNC: 4 MMOL/L (ref 3.6–5.5)
PROT SERPL-MCNC: 7.5 G/DL (ref 6–8.2)
PROT UR QL STRIP: NEGATIVE MG/DL
PROTHROMBIN TIME: 13.2 SEC (ref 12–14.6)
RBC # BLD AUTO: 4.32 M/UL (ref 4.7–6.1)
RBC UR QL AUTO: NEGATIVE
SODIUM SERPL-SCNC: 138 MMOL/L (ref 135–145)
SP GR UR STRIP.AUTO: 1.02
TROPONIN T SERPL-MCNC: 12 NG/L (ref 6–19)
WBC # BLD AUTO: 4.1 K/UL (ref 4.8–10.8)

## 2023-10-06 PROCEDURE — 77338 DESIGN MLC DEVICE FOR IMRT: CPT | Mod: 26 | Performed by: RADIOLOGY

## 2023-10-06 PROCEDURE — 85610 PROTHROMBIN TIME: CPT

## 2023-10-06 PROCEDURE — 36415 COLL VENOUS BLD VENIPUNCTURE: CPT

## 2023-10-06 PROCEDURE — 82465 ASSAY BLD/SERUM CHOLESTEROL: CPT

## 2023-10-06 PROCEDURE — 77293 RESPIRATOR MOTION MGMT SIMUL: CPT | Performed by: RADIOLOGY

## 2023-10-06 PROCEDURE — 99212 OFFICE O/P EST SF 10 MIN: CPT | Performed by: STUDENT IN AN ORGANIZED HEALTH CARE EDUCATION/TRAINING PROGRAM

## 2023-10-06 PROCEDURE — 99214 OFFICE O/P EST MOD 30 MIN: CPT | Performed by: STUDENT IN AN ORGANIZED HEALTH CARE EDUCATION/TRAINING PROGRAM

## 2023-10-06 PROCEDURE — 77338 DESIGN MLC DEVICE FOR IMRT: CPT | Performed by: RADIOLOGY

## 2023-10-06 PROCEDURE — 77300 RADIATION THERAPY DOSE PLAN: CPT | Performed by: RADIOLOGY

## 2023-10-06 PROCEDURE — 77301 RADIOTHERAPY DOSE PLAN IMRT: CPT | Mod: 26 | Performed by: RADIOLOGY

## 2023-10-06 PROCEDURE — 84100 ASSAY OF PHOSPHORUS: CPT

## 2023-10-06 PROCEDURE — 82553 CREATINE MB FRACTION: CPT

## 2023-10-06 PROCEDURE — 82248 BILIRUBIN DIRECT: CPT

## 2023-10-06 PROCEDURE — 82550 ASSAY OF CK (CPK): CPT

## 2023-10-06 PROCEDURE — 80053 COMPREHEN METABOLIC PANEL: CPT

## 2023-10-06 PROCEDURE — 83735 ASSAY OF MAGNESIUM: CPT

## 2023-10-06 PROCEDURE — 83880 ASSAY OF NATRIURETIC PEPTIDE: CPT

## 2023-10-06 PROCEDURE — 81003 URINALYSIS AUTO W/O SCOPE: CPT

## 2023-10-06 PROCEDURE — 83615 LACTATE (LD) (LDH) ENZYME: CPT

## 2023-10-06 PROCEDURE — 85025 COMPLETE CBC W/AUTO DIFF WBC: CPT

## 2023-10-06 PROCEDURE — 77300 RADIATION THERAPY DOSE PLAN: CPT | Mod: 26 | Performed by: RADIOLOGY

## 2023-10-06 PROCEDURE — 84484 ASSAY OF TROPONIN QUANT: CPT

## 2023-10-06 PROCEDURE — 77301 RADIOTHERAPY DOSE PLAN IMRT: CPT | Performed by: RADIOLOGY

## 2023-10-06 PROCEDURE — 77293 RESPIRATOR MOTION MGMT SIMUL: CPT | Mod: 26 | Performed by: RADIOLOGY

## 2023-10-06 PROCEDURE — 85730 THROMBOPLASTIN TIME PARTIAL: CPT

## 2023-10-06 ASSESSMENT — ENCOUNTER SYMPTOMS
WEIGHT LOSS: 0
SENSORY CHANGE: 0
SORE THROAT: 0
WHEEZING: 0
NAUSEA: 0
TINGLING: 1
HEARTBURN: 0
TREMORS: 0
COUGH: 0
MEMORY LOSS: 0
CHILLS: 0
ORTHOPNEA: 0
SHORTNESS OF BREATH: 0
FOCAL WEAKNESS: 0
BRUISES/BLEEDS EASILY: 0
VOMITING: 0
DIZZINESS: 0
PALPITATIONS: 0
HEADACHES: 0
ABDOMINAL PAIN: 0
NECK PAIN: 0
FEVER: 0
BLURRED VISION: 0
SPUTUM PRODUCTION: 0
DEPRESSION: 0

## 2023-10-06 ASSESSMENT — FIBROSIS 4 INDEX: FIB4 SCORE: 1.42

## 2023-10-06 NOTE — PROGRESS NOTES
"    Consult Note: Hematology/Oncology     Primary Care:  Pcp Pt States None    Chief Complaint   Patient presents with    Cancer     Prechemo        Current Treatment:     C1: Cis/Etop/HLX10 vs Placebo  C2: Cis/Etop/HLX vs Placebo + RT    Prior Treatment: None    Subjective:   History of Presenting Illness:  Bart Ramsey is a 73 y.o. male who presents with a new diagnosis of SCLC    Patient reports that in February he felt that he was being strangled.  He called the ambulance and he was taken to the ER.  He was placed on BP meds.  He still felt terrible after several days.  [facility]bl[facility] which was removed.  Imaging at that time showed some nodules on his lung.      In March or April 2023 he had a biopsy attempt of his lung nodules.  Per patient biopsy result was inconclusive      PET scan was done on 6/6/2023 which showed a hypermetabolic nodule in the superior segment LEFT lower lobe of lung measuring 2.3 cm consistent with malignancy. Involvement of the LEFT superior hilar lymph node.  No evidence for metastatic disease in the abdomen or pelvis.    He had a bronchoscopy Pathology revealed SCLC.     He had agent orange exposure from [place] ([id number]). Son committed suicide 1 year ago today.  He is raising his granddaughter ([place]).     He quit in 1994 (started in 1966, 28 pack year history).  Used to smoke marijuana.  He states he drinks too much (drinks a \"couple good drinks\" per night).  He has a healthy diet.  He eats a lot of vegetables.     Interval History    Patient is here prior to C2.     He is doing well, reports these past 2 days have been better than hes been feeling.    Radiation plan to start Monday.      Past Medical History:   Diagnosis Date    Arthritis     knee    Bowel habit changes     diarrhea    Bronchitis     Cancer (HCC) 09/29/2023    small cell lung cancer    COPD (chronic obstructive pulmonary disease) (HCC)     Diabetes " (HCC)     diet controlled, no medications.    Emphysema of lung (HCC)     COPD- no medications    High cholesterol 2023    Was on a statin, MD monitoring, not currently on meds    Myocardial infarct (HCC)     ,,    Pneumonia     Psychiatric problem 2023    depression, son passed away recently, no meds    Sleep apnea     cpap        Past Surgical History:   Procedure Laterality Date    [place] BRONCHOSCOPY,DIAGNOSTIC N/A 2023    Procedure: FIBER OPTIC BRONCHOSCOPY WITH  WASH, BRUSH, BRONCHOALVEOLAR LAVAGE, BIOPSY AND FINE NEEDLE ASPIRATION, ENDOBRONCHIAL ULTRASOUND & NAVIGATION,  ROBOTICS;  Surgeon: Bart Cortez M.D.;  Location: SURGERY [facility];  Service: Pulmonary Robotic    ENDOBRONCHIAL US ADD-ON N/A 2023    Procedure: ENDOBRONCHIAL ULTRASOUND (EBUS);  Surgeon: Bart Cortez M.D.;  Location: SURGERY [facility];  Service: Pulmonary Robotic    GASTROSCOPY-ENDO  2017    Procedure: GASTROSCOPY-ENDO;  Surgeon: Emerson Eaton M.D.;  Location: ENDOSCOPY [facility];  Service:     COLONOSCOPY - ENDO  2017    Procedure: COLONOSCOPY - ENDO;  Surgeon: Emerson Eaton M.D.;  Location: ENDOSCOPY [facility];  Service:     CHOLECYSTECTOMY      INGUINAL HERNIA REPAIR Right     OTHER      Tonsillectomy    OTHER      Hernia     OTHER CARDIAC SURGERY      3 stents    TONSILLECTOMY         Social History     Tobacco Use    Smoking status: Former     Current packs/day: 0.00     Average packs/day: 0.5 packs/day for 14.0 years (7.0 ttl pk-yrs)     Types: Cigarettes     Start date: 1980     Quit date:      Years since quittin.7    Tobacco comments:     Pt quit smoking cigarettes, 2000.  1/2 pack per day, smoked 20 years    Vaping Use    Vaping Use: Never used   Substance Use Topics    Alcohol use: Not Currently     Alcohol/week: 8.4 oz     Types: 14 Shots of liquor per week     Comment: daily    Drug use: Not Currently     Types: Marijuana     Comment: quit          Family History   Problem Relation Age of Onset    Cancer Mother         lung?       No Known Allergies    Current Outpatient Medications   Medication Sig Dispense Refill    lidocaine-prilocaine (EMLA) 2.5-2.5 % Cream Apply to port site approximately 1 hour prior to port access and cover with plastic wrap to numb skin. 30 g 3    cephALEXin (KEFLEX) 500 MG Cap Take 1 Capsule by mouth 3 times a day for 5 days. 15 Capsule 0    ondansetron (ZOFRAN) 4 MG Tab tablet Take 1 Tablet by mouth every four hours as needed for Nausea/Vomiting (for nausea, vomiting). 30 Tablet 6    prochlorperazine (COMPAZINE) 10 MG Tab Take 1 Tablet by mouth every 6 hours as needed (for nausea, vomiting). (Patient not taking: Reported on 9/29/2023) 30 Tablet 6    Cetirizine HCl (ZYRTEC ALLERGY PO) Take 1 Tablet by mouth every day.      ipratropium (ATROVENT) 0.03 % Solution Administer 2 Sprays into affected nostril(S) every 12 hours.      metFORMIN (GLUCOPHAGE) 500 MG Tab Take 750 mg by mouth 2 times a day with meals.      omeprazole (PRILOSEC) 40 MG delayed-release capsule Take 40 mg by mouth every day.      baclofen (LIORESAL) 10 MG Tab Take 10 mg by mouth at bedtime as needed. Indications: Muscle Spasm      Cyanocobalamin (VITAMIN B-12) 1000 MCG Tab Take 1,000 mcg by mouth every day.      fluticasone-salmeterol (ADVAIR) 250-50 MCG/ACT AEROSOL POWDER, BREATH ACTIVATED Inhale 1 Puff every 12 hours.      fluticasone (FLONASE) 50 MCG/ACT nasal spray Administer 2 Sprays into affected nostril(S) every day.      Omega-3 Fatty Acids (FISH OIL) 1000 MG Cap capsule Take 1,000 mg by mouth every day.      vitamin D3 (CHOLECALCIFEROL) 1000 Unit (25 mcg) Tab Take 1,000 Units by mouth every day.      gabapentin (NEURONTIN) 300 MG Cap Take 300 mg by mouth 3 times a day.      aspirin EC (ECOTRIN) 81 MG Tablet Delayed Response Take 81 mg by mouth every day.       No current facility-administered medications for this encounter.     Facility-Administered  "Medications Ordered in Other Encounters   Medication Dose Route Frequency Provider Last Rate Last Admin    EPINEPHrine (ANAPHYLAXIS DOSE) IM 0.5 mg  0.5 mg Intramuscular PRN Roseline Fuller M.D.        diphenhydrAMINE (Benadryl) injection 50 mg  50 mg Intravenous PRN Roseline Fuller M.D.        methylPREDNISolone sod succ (SOLU-MEDROL) 125 MG injection 125 mg  125 mg Intravenous PRN Roseline Fuller M.D.           Review of Systems   Constitutional:  Negative for chills, fever, malaise/fatigue and weight loss.   HENT:  Positive for tinnitus. Negative for congestion, ear pain, nosebleeds and sore throat.    Eyes:  Negative for blurred vision.   Respiratory:  Negative for cough, sputum production, shortness of breath and wheezing.    Cardiovascular:  Negative for chest pain, palpitations, orthopnea and leg swelling.   Gastrointestinal:  Negative for abdominal pain, heartburn, nausea and vomiting.   Genitourinary:  Negative for dysuria, frequency and urgency.   Musculoskeletal:  Negative for neck pain.   Neurological:  Positive for tingling. Negative for dizziness, tremors, sensory change, focal weakness and headaches.   Endo/Heme/Allergies:  Does not bruise/bleed easily.   Psychiatric/Behavioral:  Negative for depression, memory loss and suicidal ideas.    All other systems reviewed and are negative.      Problem list, medications, and allergies reviewed by myself today in Epic.     Objective:     Vitals:    10/06/23 1019   BP: 106/62   BP Location: Right arm   Patient Position: Sitting   BP Cuff Size: Adult   Pulse: (!) 56   Temp: 36.5 °C (97.7 °F)   TempSrc: Temporal   SpO2: 97%   Weight: 84.4 kg (185 lb 15.3 oz)   Height: 1.753 m (5' 9.02\")       DESC; KARNOFSKY SCALE WITH ECOG EQUIVALENT: 90, Able to carry on normal activity; minor signs or symptoms of disease (ECOG equivalent 0)    DISTRESS LEVEL: no acute distress    Physical Exam  Constitutional:       General: He is not in acute distress.     " Appearance: Normal appearance.      Comments: Port in place, bruising   HENT:      Head: Normocephalic and atraumatic.      Nose: Nose normal. No congestion.      Mouth/Throat:      Mouth: Mucous membranes are moist.      Pharynx: Oropharynx is clear.   Eyes:      General: No scleral icterus.     Conjunctiva/sclera: Conjunctivae normal.      Pupils: Pupils are equal, round, and reactive to light.   Cardiovascular:      Rate and Rhythm: Normal rate and regular rhythm.      Pulses: Normal pulses.      Heart sounds: No murmur heard.     No friction rub.   Pulmonary:      Effort: Pulmonary effort is normal. No respiratory distress.      Breath sounds: Normal breath sounds. No stridor. No wheezing or rales.   Chest:      Chest wall: No tenderness.   Abdominal:      General: Abdomen is flat. Bowel sounds are normal. There is no distension.      Palpations: Abdomen is soft. There is no mass.      Tenderness: There is no abdominal tenderness. There is no guarding or rebound.   Musculoskeletal:         General: No swelling, tenderness or deformity. Normal range of motion.      Cervical back: Normal range of motion and neck supple. No rigidity or tenderness.      Right lower leg: No edema.      Left lower leg: No edema.   Skin:     General: Skin is warm.      Coloration: Skin is not jaundiced or pale.      Findings: No bruising or rash.   Neurological:      General: No focal deficit present.      Mental Status: He is alert and oriented to person, place, and time. Mental status is at baseline.      Motor: No weakness.   Psychiatric:         Mood and Affect: Mood normal.         Behavior: Behavior normal.         Thought Content: Thought content normal.         Judgment: Judgment normal.         Labs:   Most recent labs reviewed.  Please see the lab tab of chart review    Imaging:   Most recent images below have been independently reviewed by me.  Please see the imaging tab of chart review    9/13/23 CT CAP  1.  Left lower lobe  pulmonary masses, very slightly more prominent than on 8/29/2023.  2.  Multiple left pulmonary and pleural metastases.  3.  Left hilar emely metastases.  4.  No evidence of metastatic disease in the abdomen or pelvis.    9/12/2023 PET scan  1.  Interval increased size and intensity of LEFT lower lobe lung mass with new intrapulmonary and pleural metastases indicating progression of disease.  2.  Probable pleural metastasis at the RIGHT lung base posteriorly.  3.  Worsening LEFT hilar adenopathy, metastatic.  4.  No evidence of metastatic disease in the abdomen or pelvis.    6/6/2023 PET scan  1.  Multilobular hypermetabolic nodule in the superior segment LEFT lower lobe of lung measuring 2.3 cm consistent with malignancy.  2.  Involvement of the LEFT superior hilar lymph node.  3.  No evidence for metastatic disease in the abdomen or pelvis.    Pathology    A. Lung, left lower lobe, fine needle aspiration slides:          Blood only; no epithelial or malignant cells identified.   B. Lung, left lower lobe, core biopsies:          Positive for small cell carcinoma.   C. Lung, left lower lobe, forceps biopsy with touch prep slides:          Positive for small cell carcinoma.   D. Lung, left lower lobe, fine needle aspiration:          Originally submitted for possible flow cytometric analysis,           which was deemed unnecessary.  Tissue will be submitted to           cytology for preparation of a cell block; an addendum will be           generated if there is an unexpected finding.   E. Lymph node, 10L, fine needle aspiration slides:          Malignant cells present, consistent with metastatic small cell   carcinoma.   F.  Lymph node, 10L, core biopsies:            Hypocellular specimen demonstrating few scattered lymphocytes           and few atypical cells suspicious for small cell carcinoma       Assessment/Plan:      Cancer Staging   SCLC (small cell lung carcinoma) (HCC)  Staging form: Lung, AJCC 8th  Edition  - Clinical stage from 9/14/2023: Stage IIIA (cT3, cN1, cM0) - Signed by Leonel Patel M.D. on 9/14/2023       Mr. Ramsey who presents today with a new diagnosis of small cell lung cancer, limited stage.     He is being considered for HLX10 trial.      Plan  -Enrolled in the HLX trial   -cleared for C2  -radiation to start with C2  -will need to see him prior to C3     Any questions and concerns raised by the patient were addressed and answered. Patient denies any further questions.  Patient encouraged to call the office with any concerns or issues.      Roseline Fuller M.D.  Hematology/Oncology

## 2023-10-06 NOTE — PROGRESS NOTES
"On October 6, 2023, Oncology Social Worker Alexus Almeida contacted pt. via telephone to follow up on psychosocial distress screening.  OSW Juan Pablo introduced herself, role and reason for call to pt.  Pt. shared he was \"doing okay I need to be healthy.  Chemo sucks.\"  Pt. shared he will have his second cycle of chemotherapy on Monday and starts radiation.  Pt. shared he will have 30 days of radiation treatment.  Pt. shared his wife will be taking him to appointments.  OSLIDIA Almeida encouraged [facility] if [facility] an issue.  SHELLIE Almeida also encouraged pt. to contact her if he has any questions and/or need support.     "

## 2023-10-06 NOTE — ADDENDUM NOTE
Encounter addended by: Santo Quintero on: 10/6/2023 11:06 AM   Actions taken: Charge Capture section accepted

## 2023-10-06 NOTE — PROGRESS NOTES
"Pharmacy Chemotherapy Verification:    Patient Name: Bart Ramsey  Dx: small cell lung cancer  Study Protocol: [id number]  Participant # [id number]    HLX10 or placebo 300 mg IV over 30-60 min on day 1  Cisplatin 75 mg/m2 IV over 60 min on day 1  Etoposide 100 mg/m2 IV over 1-2 hours on days 1-3  Every 21 days with concurrent radiation    followed by  HLX10 or placebo 300 mg IV over 30-60 min on day 1  Every 21 days  A Randomized, Double-Blind, International Multicenter, Phase III Study to Evaluate the Anti-Tumor Efficacy and Safety of HLX10 (Recombinant Humanized Anti-PD-1 Monoclonal Antibody Injection) or Placebo in Combination with Chemotherapy (Carboplatin/Cisplatin-Etoposide) and Concurrent Radiotherapy in Patients with Limited-Stage Small Cell Lung Cancer (LS-SCLC). [id number]  Allergies: Patient has no known allergies.       /69   Pulse 76   Temp 36.1 °C (96.9 °F) (Temporal)   Resp 18   Ht 1.72 m (5' 7.72\")   Wt 84.1 kg (185 lb 6.5 oz)   SpO2 96%   BMI 28.43 kg/m²      Body surface area is 2 meters squared.    Labs 10/6/23:  ANC~ 1600 Plt = 242k   Hgb = 13.4     SCr = 1 mg/dL   CrCl ~ 78 mL/min   AST/ALT/AP = 20/18/113 TBili = 0.4  Mag = 2  K+ = 4  Urine protein = negative      Labs 9/15/23:   TSH = 1.26   Free T4 = 1.22        Drug Order   (Drug name, dose, route, IV Fluid & volume, frequency, number of doses) Cycle 2, Day 3 of 3      Previous treatment: C1 Days 1-3 on 9/18-9/20/23     Medication = HLX10 or placebo  Base Dose = 300 mg  Fixed dose, no calculation required  Final Dose = 300 mg  Route = IV  Fluid & Volume =  mL  Admin Duration = Over 60 min    Study-supplied medication      First infusion 60 min. If tolerated, subsequent infusions 30 min.   Medication = Cisplatin  Base Dose = 75 mg/m2  Calc Dose: Base Dose x 2 m2 = 150 mg  Final Dose = 155 mg  Route = IV  Fluid & Volume =  mL  Admin Duration = Over 60 min   Study-supplied medication       <10% " difference, okay to treat with final dose   Medication = Etoposide   Base Dose = 100 mg/m2   Calc Dose: Base Dose x 2 m2 = 200 mg  Final Dose = 206 mg  Route = IV  Fluid & Volume =  mL  Admin Duration = Over 1-2 hours   Days 1-3       <10% difference, okay to treat with final dose     By my signature below, I confirm this process was performed independently with the BSA and all final chemotherapy dosing calculations congruent. I have reviewed the above chemotherapy order and that my calculation of the final dose and BSA (when applicable) corroborate those calculations of the  pharmacist. Discrepancies of 10% or greater in the written dose have been addressed and documented within the EPIC Progress notes.    Benita Pena, PharmD,BCOP

## 2023-10-08 LAB — CK MB SERPL-MCNC: 1.7 NG/ML (ref 0–7.7)

## 2023-10-08 NOTE — PROGRESS NOTES
"Pharmacy Chemotherapy Verification:   Patient Name: Bart Ramsey  Dx: small cell lung cancer  Cycle 2  Previous treatment: C1 9/18-9/20  Study Protocol: [id number]  Participant # [id number]    HLX10 or placebo 300 mg IV over 30-60 min on day 1  Cisplatin 75 mg/m2 IV over 60 min on day 1  Etoposide 100 mg/m2 IV over 1-2 hours on days 1-3  Every 21 days with concurrent radiation    followed by  HLX10 or placebo 300 mg IV over 30-60 min on day 1  Every 21 days  A Randomized, Double-Blind, International Multicenter, Phase III Study to Evaluate the Anti-Tumor Efficacy and Safety of HLX10 (Recombinant Humanized Anti-PD-1 Monoclonal Antibody Injection) or Placebo in Combination with Chemotherapy (Carboplatin/Cisplatin-Etoposide) and Concurrent Radiotherapy in Patients with Limited-Stage Small Cell Lung Cancer (LS-SCLC). [id number]  Allergies: Patient has no known allergies.       /69   Pulse 76   Temp 36.1 °C (96.9 °F) (Temporal)   Resp 18   Ht 1.72 m (5' 7.72\")   Wt 84.1 kg (185 lb 6.5 oz)   SpO2 96%   BMI 28.43 kg/m²      Body surface area is 2 meters squared.    Labs 10/8/23:  ANC~ 1600 Plt = 242k   Hgb = 13.4     SCr = 1.0 mg/dL CrCl ~ 78 mL/min   AST/ALT/AP = 20/18/113 TBili = 0.4   HLX10 or placebo 300 mg fixed dose = 300 mg IV   Fixed dose, no calculation required = 300 mg IV    Cisplatin 75 mg/m² x 2 m² = 150 mg   <10% difference, ok to treat with final dose = 155 mg IV    Etoposide 100 mg/m² x 2 m² = 200 mg   <10% difference, ok to treat with final dose = 206 mg IV    Mainor Yap, PharmD    "

## 2023-10-09 ENCOUNTER — DOCUMENTATION (OUTPATIENT)
Dept: ONCOLOGY | Facility: MEDICAL CENTER | Age: 73
End: 2023-10-09

## 2023-10-09 ENCOUNTER — OUTPATIENT INFUSION SERVICES (OUTPATIENT)
Dept: ONCOLOGY | Facility: MEDICAL CENTER | Age: 73
End: 2023-10-09
Attending: STUDENT IN AN ORGANIZED HEALTH CARE EDUCATION/TRAINING PROGRAM
Payer: COMMERCIAL

## 2023-10-09 VITALS
HEIGHT: 68 IN | DIASTOLIC BLOOD PRESSURE: 69 MMHG | WEIGHT: 185.41 LBS | BODY MASS INDEX: 28.1 KG/M2 | TEMPERATURE: 96.9 F | RESPIRATION RATE: 18 BRPM | HEART RATE: 76 BPM | SYSTOLIC BLOOD PRESSURE: 125 MMHG | OXYGEN SATURATION: 96 %

## 2023-10-09 DIAGNOSIS — C34.90 SCLC (SMALL CELL LUNG CARCINOMA) (HCC): ICD-10-CM

## 2023-10-09 PROCEDURE — 77280 THER RAD SIMULAJ FIELD SMPL: CPT | Performed by: RADIOLOGY

## 2023-10-09 PROCEDURE — 96367 TX/PROPH/DG ADDL SEQ IV INF: CPT

## 2023-10-09 PROCEDURE — 700105 HCHG RX REV CODE 258: Performed by: NURSE PRACTITIONER

## 2023-10-09 PROCEDURE — A4212 NON CORING NEEDLE OR STYLET: HCPCS

## 2023-10-09 PROCEDURE — 77386 HCHG IMRT DELIVERY COMPLEX: CPT | Performed by: RADIOLOGY

## 2023-10-09 PROCEDURE — 96368 THER/DIAG CONCURRENT INF: CPT

## 2023-10-09 PROCEDURE — 700101 HCHG RX REV CODE 250: Performed by: NURSE PRACTITIONER

## 2023-10-09 PROCEDURE — 96415 CHEMO IV INFUSION ADDL HR: CPT

## 2023-10-09 PROCEDURE — 96375 TX/PRO/DX INJ NEW DRUG ADDON: CPT

## 2023-10-09 PROCEDURE — 96417 CHEMO IV INFUS EACH ADDL SEQ: CPT

## 2023-10-09 PROCEDURE — A9270 NON-COVERED ITEM OR SERVICE: HCPCS | Performed by: NURSE PRACTITIONER

## 2023-10-09 PROCEDURE — 96413 CHEMO IV INFUSION 1 HR: CPT

## 2023-10-09 PROCEDURE — 304540 HCHG NITRO SET VENT 2ND TUB

## 2023-10-09 PROCEDURE — 700111 HCHG RX REV CODE 636 W/ 250 OVERRIDE (IP): Performed by: NURSE PRACTITIONER

## 2023-10-09 PROCEDURE — 96361 HYDRATE IV INFUSION ADD-ON: CPT

## 2023-10-09 RX ORDER — SODIUM CHLORIDE 9 MG/ML
1000 INJECTION, SOLUTION INTRAVENOUS ONCE
Status: COMPLETED | OUTPATIENT
Start: 2023-10-09 | End: 2023-10-09

## 2023-10-09 RX ADMIN — POTASSIUM CHLORIDE: 2 INJECTION, SOLUTION, CONCENTRATE INTRAVENOUS at 13:33

## 2023-10-09 RX ADMIN — DEXAMETHASONE SODIUM PHOSPHATE 12 MG: 4 INJECTION, SOLUTION INTRA-ARTICULAR; INTRALESIONAL; INTRAMUSCULAR; INTRAVENOUS; SOFT TISSUE at 09:34

## 2023-10-09 RX ADMIN — ONDANSETRON 16 MG: 2 INJECTION INTRAMUSCULAR; INTRAVENOUS at 09:47

## 2023-10-09 RX ADMIN — SODIUM CHLORIDE 1000 ML: 9 INJECTION, SOLUTION INTRAVENOUS at 09:34

## 2023-10-09 RX ADMIN — FOSAPREPITANT 150 MG: 150 INJECTION, POWDER, LYOPHILIZED, FOR SOLUTION INTRAVENOUS at 10:50

## 2023-10-09 RX ADMIN — LIDOCAINE HYDROCHLORIDE 0.5 ML: 10 INJECTION, SOLUTION EPIDURAL; INFILTRATION; INTRACAUDAL at 09:25

## 2023-10-09 RX ADMIN — HEPARIN 500 UNITS: 100 SYRINGE at 15:49

## 2023-10-09 RX ADMIN — Medication 300 MG: at 11:31

## 2023-10-09 ASSESSMENT — FIBROSIS 4 INDEX: FIB4 SCORE: 1.42

## 2023-10-09 NOTE — PROGRESS NOTES
Chemotherapy Verification - SECONDARY RN       Height = 172 cm  Weight = 84.1 kg  BSA = 2 m2       Medication: HLX10 or Placebo  Dose: 300 mg set dose  Calculated Dose: 300 mg                             (In mg/m2, AUC, mg/kg)     Medication: Cisplatin  Dose: 75 mg/m2  Calculated Dose: 150 mg                             (In mg/m2, AUC, mg/kg)    Medication: etoposide  Dose: 100 mg/m2  Calculated Dose: 200 mg                             (In mg/m2, AUC, mg/kg)    I confirm that this process was performed independently.

## 2023-10-09 NOTE — ADDENDUM NOTE
Encounter addended by: Leonel Patel M.D. on: 10/9/2023 2:22 PM   Actions taken: Clinical Note Signed

## 2023-10-09 NOTE — PROGRESS NOTES
Chemotherapy Verification - PRIMARY RN      Height = 172 cm  Weight = 84.1 kg  BSA = 2.0 m2       Medication: [id number] placebo  Dose: 300 mg (set dose)  Calculated Dose: 300 mg                                Medication: Cisplatin (Study Drug) Dose: 75 mg/m2  Calculated Dose: 200 mg                               Medication: etoposide  Dose: 100 mg/m2  Calculated Dose:   200 mg                                    I confirm this process was performed independently with the BSA and all final chemotherapy dosing calculations congruent.  Any discrepancies of 10% or greater have been addressed with the chemotherapy pharmacist. The resolution of the discrepancy has been documented in the EPIC progress notes.

## 2023-10-09 NOTE — PROGRESS NOTES
Bill arrives to [facility] for Day 1 Cycle 2 HLX10 Study Drug/placebo/ cisplatin study drug/ etoposide for SCLC.  Bill denies any new or acute changes.  Right upper chest port accessed in sterile fashion, brisk blood return observed.  Labs collected 10/06/2023 reviewed, parameters met for treatment today.  Pre-hydration, pre-medications, chemotherapy and post hydration administered as ordered, Edenilson tolerated well.  Port flushed with NS, + blood return observed, heparin instilled.  Port de-accessed, gauze and tape to site.  Discharged in NAD, next appointment confirmed.

## 2023-10-09 NOTE — PROGRESS NOTES
Nutrition Services: RD Consultation/ New Chemoradiation Start  Bart Ramsey is a 73 y.o. male with diagnosis of small cell lung carcinoma: Stage IIIA: cT3, cN1, cM0    RD met with pt to assess current intake, appetite, and nutritional status. RD attempted visit on 10/9, though pt sleeping soundly during attempt. RD able to visit pt today in [facility].  Pt presents to appointment unaccompanied. Pt states is managing decently at this time. Denies any significant n/v, though has alternating diarrhea and constipation. Pt states is taking imodium or milk of magnesia depending on how stools are presenting.  [facility] after having his gallbladder removed with complications. Pt did not express any further nutrition-related questions or concerns at this time.     Dietary intake pattern:  No food allergies listed, drinks smoothie daily of berries, ice cream, milk, protein powder, and Greek yogurt. States does force himself to eat frequently to preserve strength. Also eating chicken sandwich with soup, tunafish and soup.    Past Medical History:   Diagnosis Date    Arthritis     knee    Bowel habit changes     diarrhea    Bronchitis     Cancer (HCC) 09/29/2023    small cell lung cancer    COPD (chronic obstructive pulmonary disease) (HCC)     Diabetes (HCC)     diet controlled, no medications.    Emphysema of lung (HCC)     COPD- no medications    High cholesterol 08/2023    Was on a statin, MD monitoring, not currently on meds    Myocardial infarct (HCC)     1994,1996,2000    Pneumonia     Psychiatric problem 08/2023    depression, son passed away recently, no meds    Sleep apnea     cpap       Past Surgical History:   Procedure Laterality Date    NC BRONCHOSCOPY,DIAGNOSTIC N/A 08/29/2023    Procedure: FIBER OPTIC BRONCHOSCOPY WITH  WASH, BRUSH, BRONCHOALVEOLAR LAVAGE, BIOPSY AND FINE NEEDLE ASPIRATION, ENDOBRONCHIAL ULTRASOUND & NAVIGATION,   "ROBOTICS;  Surgeon: Bart Cortez M.D.;  Location: SURGERY [facility];  Service: Pulmonary Robotic    ENDOBRONCHIAL US ADD-ON N/A 08/29/2023    Procedure: ENDOBRONCHIAL ULTRASOUND (EBUS);  Surgeon: Bart Cortez M.D.;  Location: SURGERY [facility];  Service: Pulmonary Robotic    GASTROSCOPY-ENDO  03/31/2017    Procedure: GASTROSCOPY-ENDO;  Surgeon: Emerson Eaton M.D.;  Location: ENDOSCOPY [facility];  Service:     COLONOSCOPY - ENDO  03/31/2017    Procedure: COLONOSCOPY - ENDO;  Surgeon: Emerson Eaton M.D.;  Location: ENDOSCOPY [facility];  Service:     CHOLECYSTECTOMY      INGUINAL HERNIA REPAIR Right     OTHER      Tonsillectomy    OTHER      Hernia     OTHER CARDIAC SURGERY      3 stents    TONSILLECTOMY       Biochemical/ Anthropometric Assessment:  Treatment Regimen: Research [id number] SC Lung CISplatin + Etoposide + RT + HLX10 or placebo  Pertinent Labs: Glucose 119, A1c 6.3%  Pertinent Meds: zofran, compazine, neurontin, metformin, prilosec  Wt Readings from Last 1 Encounters:   10/09/23 84.1 kg (185 lb 6.5 oz)      Ht Readings from Last 1 Encounters:   10/09/23 1.72 m (5' 7.72\")      BMI Readings from Last 1 Encounters:   10/09/23 28.43 kg/m²   BMI Classification: Overweight-suitable range for age group  Nutrition-Focused Physical Exam: Appears nourished     Weight History:  Wt Readings from Last 6 Encounters:   10/09/23 84.1 kg (185 lb 6.5 oz)   10/06/23 84.4 kg (185 lb 15.3 oz)   10/04/23 85.3 kg (188 lb 0.8 oz)   10/03/23 83 kg (183 lb)   09/26/23 83.1 kg (183 lb 3.2 oz)   09/14/23 86 kg (189 lb 9.5 oz)   2/10/23: 207 lbs     Weight Change/Malnutrition Risk: potential weight loss of 22 lbs from earlier this year, or a 10.6% wt loss within past 8 months. While this does not meet criteria for significance, it is worth noting as was unintentional and related to complication ss/p gallbladder removal. Pt confirms weight today of 193 lbs is likely an error as weighed 185 lbs on 10/9. "     Interventions:  Introduced self and discussed role of dietitian throughout treatment process   Encouraged balanced diet. Verbalized importance of nutrition and maintaining LBM throughout treatment.   Increase meal and snack frequency to 4-6 x/day.   Supported continuation of high protein/kcal shake to help meet nutrition demands.     Pt reports understanding and was receptive to information provided.   RD provided contact information. Encouraged pt to reach out as questions/concerns arise.   RD available PRN  [phone]

## 2023-10-10 ENCOUNTER — OUTPATIENT INFUSION SERVICES (OUTPATIENT)
Dept: ONCOLOGY | Facility: MEDICAL CENTER | Age: 73
End: 2023-10-10
Attending: STUDENT IN AN ORGANIZED HEALTH CARE EDUCATION/TRAINING PROGRAM
Payer: COMMERCIAL

## 2023-10-10 VITALS
DIASTOLIC BLOOD PRESSURE: 63 MMHG | HEIGHT: 68 IN | BODY MASS INDEX: 29.34 KG/M2 | RESPIRATION RATE: 18 BRPM | OXYGEN SATURATION: 97 % | HEART RATE: 55 BPM | SYSTOLIC BLOOD PRESSURE: 131 MMHG | WEIGHT: 193.56 LBS | TEMPERATURE: 98 F

## 2023-10-10 DIAGNOSIS — C34.90 SCLC (SMALL CELL LUNG CARCINOMA) (HCC): ICD-10-CM

## 2023-10-10 PROCEDURE — 96413 CHEMO IV INFUSION 1 HR: CPT

## 2023-10-10 PROCEDURE — 700111 HCHG RX REV CODE 636 W/ 250 OVERRIDE (IP)

## 2023-10-10 PROCEDURE — 96415 CHEMO IV INFUSION ADDL HR: CPT

## 2023-10-10 PROCEDURE — 96375 TX/PRO/DX INJ NEW DRUG ADDON: CPT

## 2023-10-10 PROCEDURE — 700111 HCHG RX REV CODE 636 W/ 250 OVERRIDE (IP): Mod: JZ | Performed by: NURSE PRACTITIONER

## 2023-10-10 PROCEDURE — 304540 HCHG NITRO SET VENT 2ND TUB

## 2023-10-10 PROCEDURE — 77386 HCHG IMRT DELIVERY COMPLEX: CPT | Performed by: RADIOLOGY

## 2023-10-10 PROCEDURE — A4212 NON CORING NEEDLE OR STYLET: HCPCS

## 2023-10-10 PROCEDURE — 77014 PR CT GUIDANCE PLACEMENT RAD THERAPY FIELDS: CPT | Mod: 26 | Performed by: RADIOLOGY

## 2023-10-10 RX ORDER — ONDANSETRON 2 MG/ML
8 INJECTION INTRAMUSCULAR; INTRAVENOUS ONCE
Status: COMPLETED | OUTPATIENT
Start: 2023-10-10 | End: 2023-10-10

## 2023-10-10 RX ADMIN — HEPARIN 500 UNITS: 100 SYRINGE at 18:23

## 2023-10-10 RX ADMIN — ONDANSETRON 8 MG: 2 INJECTION INTRAMUSCULAR; INTRAVENOUS at 15:34

## 2023-10-10 ASSESSMENT — FIBROSIS 4 INDEX: FIB4 SCORE: 1.42

## 2023-10-10 NOTE — PROGRESS NOTES
Chemotherapy Verification - SECONDARY RN   C2 D2     Height = 172 cm  Weight = 87.8 kg  BSA = 2.05 m^2       Medication: etoposide (TOPOSAR)  Dose: 100 mg/m2  Calculated Dose: 205 mg                             (In mg/m2, AUC, mg/kg)       I confirm that this process was performed independently.

## 2023-10-10 NOTE — PROGRESS NOTES
"Pharmacy Chemotherapy Verification:    Patient Name: Bart Ramsey  Dx: small cell lung cancer  Study Protocol: [id number]  Participant # [id number]    HLX10 or placebo 300 mg IV over 30-60 min on day 1  Cisplatin 75 mg/m2 IV over 60 min on day 1  Etoposide 100 mg/m2 IV over 1-2 hours on days 1-3  Every 21 days with concurrent radiation    followed by  HLX10 or placebo 300 mg IV over 30-60 min on day 1  Every 21 days  A Randomized, Double-Blind, International Multicenter, Phase III Study to Evaluate the Anti-Tumor Efficacy and Safety of HLX10 (Recombinant Humanized Anti-PD-1 Monoclonal Antibody Injection) or Placebo in Combination with Chemotherapy (Carboplatin/Cisplatin-Etoposide) and Concurrent Radiotherapy in Patients with Limited-Stage Small Cell Lung Cancer (LS-SCLC). [id number]  Allergies: Patient has no known allergies.       /63   Pulse (!) 55   Temp 36.7 °C (98 °F) (Temporal)   Resp 18   Ht 1.72 m (5' 7.72\") Comment: Shoes off.  Wt 87.8 kg (193 lb 9 oz)   SpO2 97%   BMI 29.68 kg/m²      Body surface area is 2.05 meters squared.    Labs 10/6/23:  ANC~ 1600 Plt = 242k   Hgb = 13.4     SCr = 1 mg/dL   CrCl ~ 78 mL/min   AST/ALT/AP = 20/18/113 TBili = 0.4  Mag = 2  K+ = 4  Urine protein = negative      Labs 9/15/23:   TSH = 1.26   Free T4 = 1.22        Drug Order   (Drug name, dose, route, IV Fluid & volume, frequency, number of doses) Cycle 2, Day 2 of 3      Previous treatment: 1 Days 1-3 on 9/18-9/20/23   Medication = HLX10 or placebo  Base Dose = 300 mg  Fixed dose, no calculation required  Final Dose = 300 mg  Route = IV  Fluid & Volume =  mL  Admin Duration = Over 60 min    Study-supplied medication      First infusion 60 min. If tolerated, subsequent infusions 30 min.   Medication = Cisplatin  Base Dose = 75 mg/m2  Calc Dose: Base Dose x 2 m2 = 150 mg  Final Dose = 155 mg  Route = IV  Fluid & Volume =  mL  Admin Duration = Over 60 min   Study-supplied medication "       <10% difference, okay to treat with final dose   Medication = Etoposide   Base Dose = 100 mg/m2   Calc Dose: Base Dose x 2.05m2 = 205mg  Final Dose = 206 mg  Route = IV  Fluid & Volume =  mL  Admin Duration = Over 1-2 hours   Days 1-3   Study-supplied medication       <10% difference, okay to treat with final dose     By my signature below, I confirm this process was performed independently with the BSA and all final chemotherapy dosing calculations congruent. I have reviewed the above chemotherapy order and that my calculation of the final dose and BSA (when applicable) corroborate those calculations of the  pharmacist. Discrepancies of 10% or greater in the written dose have been addressed and documented within the EPIC Progress notes.    Oscar BainsD

## 2023-10-10 NOTE — PROGRESS NOTES
Chemotherapy Verification - PRIMARY RN      Height = 172 cm     Weight = 87.8 kg    BSA =  2.05 m^2      Medication:  Etoposide   Dose: 100 mg/m^2     Calculated Dose:  205 mg                            (In mg/m2, AUC, mg/kg)           I confirm this process was performed independently with the BSA and all final chemotherapy dosing calculations congruent.  Any discrepancies of 10% or greater have been addressed with the chemotherapy pharmacist. The resolution of the discrepancy has been documented in the EPIC progress notes.

## 2023-10-10 NOTE — PROGRESS NOTES
Edenilson arrived to the [facility]fusion [facility] for Day 2/ Cycle 2 of Etoposide  for SCLC. Pt denied having any new or acute complaints today, reports tolerating past treatments well. POC and lab results reviewed. Port accessed in a sterile manner, brisk blood return noted, IVF TKO. Premed administered per MD order, Pt tolerated well. Pt given Etoposide as prescribed, non-DEHP tubing intact, Bill tolerated well, denied having any complaints during or after infusion. Port had + blood return after, flushed per [facility] policy, de-accessed, needle intact, insertion site covered with sterile gauze and paper tape. Confirmed next appointment and Edenilson was discharged home in no acute distress.

## 2023-10-10 NOTE — PROGRESS NOTES
Pulmonary Clinic Note    Date of Visit: 10/11/2023     Chief Complaint:  Chief Complaint   Patient presents with    Follow-Up     Last seen 7/27/23 Dr. Blanchard     HPI:   Bart Ramsey is a very pleasant 73 y.o. year old male former smoker (25 pack-years, quit in 1994), with a PMHx of pulmonary nodule, ARAM on CPAP, CAD s/p PCI who presented to the Pulmonary Clinic for a regular follow up. Last seen in the office on 7/27/2023 with Dr. Blanchard.     Patient is followed by pulmonary office for pulmonary nodules.  CT chest shows left lower lobe nodule that has enlarged significantly since February 2023 with associated left perihilar lymphadenopathy.  Patient is a retired from sheet-metal manufacturing.  Patient did have an attempted CT-guided needle biopsy in May 2023, which is nondiagnostic.  A PET scan done here in June showed max SUV of the left lower lobe at 6.36 in addition to increased FDG uptake in the left superior hilum with an SUV of 3.76.  No distal activity.  A repeat CT chest from 7/10 shows marked increase in left lower lobe nodule with some satellite nodules in addition to increase in left perihilar adenopathy.  Patient underwent EBUS which showed small cell carcinoma.  Patient is currently being treated with radiation and chemo.  Patient is currently on Wixela and albuterol as needed, which she has not needed.  Symptomatically, he denies any significant shortness of breath, cough, sputum production, or wheezing.    Exacerbations this year:  0    Current medication regimen: Wixela 250 and albuterol    Oxygen use: None      Past Medical History:   Diagnosis Date    Arthritis     knee    Bowel habit changes     diarrhea    Bronchitis     Cancer (HCC) 09/29/2023    small cell lung cancer    COPD (chronic obstructive pulmonary disease) (HCC)     Diabetes (HCC)     diet controlled, no medications.    Emphysema of lung (HCC)     COPD- no medications    High cholesterol 08/2023    Was on a statin, MD  monitoring, not currently on meds    Myocardial infarct (HCC)     ,,    Pneumonia     Psychiatric problem 2023    depression, son passed away recently, no meds    Sleep apnea     cpap     Past Surgical History:   Procedure Laterality Date    GA BRONCHOSCOPY,DIAGNOSTIC N/A 2023    Procedure: FIBER OPTIC BRONCHOSCOPY WITH  WASH, BRUSH, BRONCHOALVEOLAR LAVAGE, BIOPSY AND FINE NEEDLE ASPIRATION, ENDOBRONCHIAL ULTRASOUND & NAVIGATION,  ROBOTICS;  Surgeon: Bart Cortez M.D.;  Location: SURGERY [facility];  Service: Pulmonary Robotic    ENDOBRONCHIAL US ADD-ON N/A 2023    Procedure: ENDOBRONCHIAL ULTRASOUND (EBUS);  Surgeon: Bart Cortez M.D.;  Location: SURGERY [facility];  Service: Pulmonary Robotic    GASTROSCOPY-ENDO  2017    Procedure: GASTROSCOPY-ENDO;  Surgeon: Emerson Eaton M.D.;  Location: ENDOSCOPY [facility];  Service:     COLONOSCOPY - ENDO  2017    Procedure: COLONOSCOPY - ENDO;  Surgeon: Emerson Eaton M.D.;  Location: ENDOSCOPY [facility];  Service:     CHOLECYSTECTOMY      INGUINAL HERNIA REPAIR Right     OTHER      Tonsillectomy    OTHER      Hernia     OTHER CARDIAC SURGERY      3 stents    TONSILLECTOMY       Social History     Socioeconomic History    Marital status:      Spouse name: Not on file    Number of children: Not on file    Years of education: Not on file    Highest education level: Not on file   Occupational History    Not on file   Tobacco Use    Smoking status: Former     Current packs/day: 0.00     Average packs/day: 0.5 packs/day for 14.0 years (7.0 ttl pk-yrs)     Types: Cigarettes     Start date: 1980     Quit date:      Years since quittin.7    Smokeless tobacco: Not on file    Tobacco comments:     Pt quit smoking cigarettes, .  1/2 pack per day, smoked 20 years    Vaping Use    Vaping Use: Never used   Substance and Sexual Activity    Alcohol use: Not Currently     Alcohol/week: 8.4 oz      Types: 14 Shots of liquor per week     Comment: daily    Drug use: Not Currently     Types: Marijuana     Comment: quit 2023    Sexual activity: Not on file   Other Topics Concern    Not on file   Social History Narrative    Not on file     Social Determinants of Health     Financial Resource Strain: Not on file   Food Insecurity: Not on file   Transportation Needs: Not on file   Physical Activity: Not on file   Stress: Not on file   Social Connections: Not on file   Intimate Partner Violence: Not on file   Housing Stability: Not on file        Family History   Problem Relation Age of Onset    Cancer Mother         lung?     Current Outpatient Medications on File Prior to Visit   Medication Sig Dispense Refill    lidocaine-prilocaine (EMLA) 2.5-2.5 % Cream Apply to port site approximately 1 hour prior to port access and cover with plastic wrap to numb skin. 30 g 3    ondansetron (ZOFRAN) 4 MG Tab tablet Take 1 Tablet by mouth every four hours as needed for Nausea/Vomiting (for nausea, vomiting). 30 Tablet 6    prochlorperazine (COMPAZINE) 10 MG Tab Take 1 Tablet by mouth every 6 hours as needed (for nausea, vomiting). 30 Tablet 6    Cetirizine HCl (ZYRTEC ALLERGY PO) Take 1 Tablet by mouth every day.      ipratropium (ATROVENT) 0.03 % Solution Administer 2 Sprays into affected nostril(S) every 12 hours.      metFORMIN (GLUCOPHAGE) 500 MG Tab Take 750 mg by mouth 2 times a day with meals.      omeprazole (PRILOSEC) 40 MG delayed-release capsule Take 40 mg by mouth every day.      baclofen (LIORESAL) 10 MG Tab Take 10 mg by mouth at bedtime as needed. Indications: Muscle Spasm      Cyanocobalamin (VITAMIN B-12) 1000 MCG Tab Take 1,000 mcg by mouth every day.      fluticasone-salmeterol (ADVAIR) 250-50 MCG/ACT AEROSOL POWDER, BREATH ACTIVATED Inhale 1 Puff every 12 hours.      fluticasone (FLONASE) 50 MCG/ACT nasal spray Administer 2 Sprays into affected nostril(S) every day.      Omega-3 Fatty Acids (FISH OIL) 1000 MG  "Cap capsule Take 1,000 mg by mouth every day.      vitamin D3 (CHOLECALCIFEROL) 1000 Unit (25 mcg) Tab Take 1,000 Units by mouth every day.      gabapentin (NEURONTIN) 300 MG Cap Take 300 mg by mouth 3 times a day.      aspirin EC (ECOTRIN) 81 MG Tablet Delayed Response Take 81 mg by mouth every day.       Current Facility-Administered Medications on File Prior to Visit   Medication Dose Route Frequency Provider Last Rate Last Admin    EPINEPHrine (ANAPHYLAXIS DOSE) IM 0.5 mg  0.5 mg Intramuscular PRN Roseline Fuller M.D.        diphenhydrAMINE (Benadryl) injection 50 mg  50 mg Intravenous PRN Roseline Fuller M.D.        methylPREDNISolone sod succ (SOLU-MEDROL) 125 MG injection 125 mg  125 mg Intravenous PRN Roseline Fuller M.D.         Allergies: Patient has no known allergies.    ROS:   Review of Systems   Constitutional:  Positive for malaise/fatigue. Negative for chills, diaphoresis and fever.   HENT:  Negative for congestion and sinus pain.    Respiratory:  Negative for cough, hemoptysis, sputum production, shortness of breath and wheezing.    Cardiovascular:  Negative for chest pain, palpitations and leg swelling.   Gastrointestinal:  Negative for diarrhea, heartburn, nausea and vomiting.   Musculoskeletal:  Negative for falls and myalgias.   Neurological:  Negative for dizziness, weakness and headaches.     Vitals:  /76 (BP Location: Right arm, Patient Position: Sitting, BP Cuff Size: Adult)   Pulse 71   Resp 16   Ht 1.753 m (5' 9\")   Wt 87.5 kg (193 lb)   SpO2 94%     Physical Exam  Constitutional:       General: He is not in acute distress.     Appearance: Normal appearance. He is not ill-appearing, toxic-appearing or diaphoretic.   Cardiovascular:      Rate and Rhythm: Normal rate and regular rhythm.      Heart sounds: No murmur heard.     No friction rub. No gallop.   Pulmonary:      Effort: No respiratory distress.      Breath sounds: Normal breath sounds. No stridor. No wheezing, " rhonchi or rales.   Musculoskeletal:         General: No swelling.      Right lower leg: No edema.      Left lower leg: No edema.   Skin:     General: Skin is warm.   Neurological:      General: No focal deficit present.      Mental Status: He is alert and oriented to person, place, and time.   Psychiatric:         Mood and Affect: Mood normal.         Behavior: Behavior normal.         Thought Content: Thought content normal.         Judgment: Judgment normal.         Laboratory Data:  Pathology 8/29/2023-          PET: (Date: 9/12/2023)-  Impression:  1.  Interval increased size and intensity of LEFT lower lobe lung mass with new intrapulmonary and pleural metastases indicating progression of disease.  2.  Probable pleural metastasis at the RIGHT lung base posteriorly.  3.  Worsening LEFT hilar adenopathy, metastatic.  4.  No evidence of metastatic disease in the abdomen or pelvis.      Assessment and Plan:    Problem List Items Addressed This Visit       SCLC (small cell lung carcinoma) (HCC)     Patient currently undergoing chemo and radiation.  He is followed by radiation and oncology.  He seems to be tolerating treatment fairly well.  Patient is currently on Wixela 250 and albuterol as needed, which she has not needed.  These medications were started before he was establish care with pulmonary office.  I do not have any PFTs on file.  Patient states that his breathing has remained stable on the inhalers.  We will continue inhaler therapy and once treatment is done we will consider PFTs.          Diagnostic studies have been reviewed with the patient.    Return in about 6 months (around 4/11/2024), or if symptoms worsen or fail to improve, for lung cancer, with Arya.     This note was generated using voice recognition software which has a chance of producing errors of grammar and possibly content.  I have made every reasonable attempt to find and correct any obvious errors, but it should be expected that some  may not be found prior to finalization of this note.    Time spent in record review prior to patient arrival, reviewing results, and in face-to-face encounter totaled 20 min.  __________  NAHOMI Bell  Pulmonary Medicine  [facility]

## 2023-10-11 ENCOUNTER — OFFICE VISIT (OUTPATIENT)
Dept: SLEEP MEDICINE | Facility: MEDICAL CENTER | Age: 73
End: 2023-10-11
Payer: COMMERCIAL

## 2023-10-11 ENCOUNTER — OUTPATIENT INFUSION SERVICES (OUTPATIENT)
Dept: ONCOLOGY | Facility: MEDICAL CENTER | Age: 73
End: 2023-10-11
Attending: STUDENT IN AN ORGANIZED HEALTH CARE EDUCATION/TRAINING PROGRAM
Payer: COMMERCIAL

## 2023-10-11 VITALS
DIASTOLIC BLOOD PRESSURE: 78 MMHG | HEART RATE: 57 BPM | BODY MASS INDEX: 29.58 KG/M2 | SYSTOLIC BLOOD PRESSURE: 127 MMHG | WEIGHT: 192.9 LBS | OXYGEN SATURATION: 96 %

## 2023-10-11 VITALS
WEIGHT: 193 LBS | HEIGHT: 69 IN | DIASTOLIC BLOOD PRESSURE: 76 MMHG | HEART RATE: 71 BPM | SYSTOLIC BLOOD PRESSURE: 124 MMHG | RESPIRATION RATE: 16 BRPM | BODY MASS INDEX: 28.58 KG/M2 | OXYGEN SATURATION: 94 %

## 2023-10-11 VITALS
TEMPERATURE: 97.8 F | BODY MASS INDEX: 29.74 KG/M2 | WEIGHT: 196.21 LBS | HEART RATE: 64 BPM | OXYGEN SATURATION: 95 % | SYSTOLIC BLOOD PRESSURE: 110 MMHG | DIASTOLIC BLOOD PRESSURE: 60 MMHG | RESPIRATION RATE: 18 BRPM | HEIGHT: 68 IN

## 2023-10-11 DIAGNOSIS — C34.90 SCLC (SMALL CELL LUNG CARCINOMA) (HCC): ICD-10-CM

## 2023-10-11 LAB
CHEMOTHERAPY INFUSION START DATE: NORMAL
CHEMOTHERAPY RECORDS: 2
CHEMOTHERAPY RECORDS: 6600
CHEMOTHERAPY RECORDS: NORMAL
CHEMOTHERAPY RX CANCER: NORMAL
DATE 1ST CHEMO CANCER: NORMAL
RAD ONC ARIA COURSE LAST TREATMENT DATE: NORMAL
RAD ONC ARIA COURSE TREATMENT ELAPSED DAYS: NORMAL
RAD ONC ARIA REFERENCE POINT DOSAGE GIVEN TO DATE: 6
RAD ONC ARIA REFERENCE POINT ID: NORMAL
RAD ONC ARIA REFERENCE POINT SESSION DOSAGE GIVEN: 2

## 2023-10-11 PROCEDURE — 96413 CHEMO IV INFUSION 1 HR: CPT

## 2023-10-11 PROCEDURE — 99213 OFFICE O/P EST LOW 20 MIN: CPT

## 2023-10-11 PROCEDURE — 77014 PR CT GUIDANCE PLACEMENT RAD THERAPY FIELDS: CPT | Mod: 26 | Performed by: RADIOLOGY

## 2023-10-11 PROCEDURE — 3078F DIAST BP <80 MM HG: CPT

## 2023-10-11 PROCEDURE — 77336 RADIATION PHYSICS CONSULT: CPT | Performed by: RADIOLOGY

## 2023-10-11 PROCEDURE — 1125F AMNT PAIN NOTED PAIN PRSNT: CPT

## 2023-10-11 PROCEDURE — 700111 HCHG RX REV CODE 636 W/ 250 OVERRIDE (IP): Mod: JZ | Performed by: NURSE PRACTITIONER

## 2023-10-11 PROCEDURE — 77386 HCHG IMRT DELIVERY COMPLEX: CPT | Performed by: RADIOLOGY

## 2023-10-11 PROCEDURE — 3074F SYST BP LT 130 MM HG: CPT

## 2023-10-11 PROCEDURE — A4212 NON CORING NEEDLE OR STYLET: HCPCS

## 2023-10-11 PROCEDURE — 304540 HCHG NITRO SET VENT 2ND TUB

## 2023-10-11 PROCEDURE — 96375 TX/PRO/DX INJ NEW DRUG ADDON: CPT

## 2023-10-11 RX ORDER — ONDANSETRON 2 MG/ML
8 INJECTION INTRAMUSCULAR; INTRAVENOUS ONCE
Status: COMPLETED | OUTPATIENT
Start: 2023-10-11 | End: 2023-10-11

## 2023-10-11 RX ADMIN — ONDANSETRON 8 MG: 2 INJECTION INTRAMUSCULAR; INTRAVENOUS at 15:03

## 2023-10-11 RX ADMIN — HEPARIN 500 UNITS: 100 SYRINGE at 16:48

## 2023-10-11 ASSESSMENT — FIBROSIS 4 INDEX
FIB4 SCORE: 1.42

## 2023-10-11 ASSESSMENT — ENCOUNTER SYMPTOMS
HEMOPTYSIS: 0
COUGH: 0
NAUSEA: 0
DIARRHEA: 0
HEADACHES: 0
SHORTNESS OF BREATH: 0
VOMITING: 0
MYALGIAS: 0
DIAPHORESIS: 0
HEARTBURN: 0
DIZZINESS: 0
SPUTUM PRODUCTION: 0
FALLS: 0
CHILLS: 0
WHEEZING: 0
WEAKNESS: 0
PALPITATIONS: 0
SINUS PAIN: 0
FEVER: 0

## 2023-10-11 ASSESSMENT — PAIN SCALES - GENERAL: PAINLEVEL: 2=MINIMAL-SLIGHT

## 2023-10-11 NOTE — PROGRESS NOTES
"Pharmacy Chemotherapy Verification:    Patient Name: Bart Ramsey  Dx: small cell lung cancer  Study Protocol: [id number]  Participant # [id number]    HLX10 or placebo 300 mg IV over 30-60 min on day 1  Cisplatin 75 mg/m2 IV over 60 min on day 1  Etoposide 100 mg/m2 IV over 1-2 hours on days 1-3  Every 21 days with concurrent radiation    followed by  HLX10 or placebo 300 mg IV over 30-60 min on day 1  Every 21 days  A Randomized, Double-Blind, International Multicenter, Phase III Study to Evaluate the Anti-Tumor Efficacy and Safety of HLX10 (Recombinant Humanized Anti-PD-1 Monoclonal Antibody Injection) or Placebo in Combination with Chemotherapy (Carboplatin/Cisplatin-Etoposide) and Concurrent Radiotherapy in Patients with Limited-Stage Small Cell Lung Cancer (LS-SCLC). [id number]  Allergies: Patient has no known allergies.       /60   Pulse 64   Temp 36.6 °C (97.8 °F) (Temporal)   Resp 18   Ht 1.72 m (5' 7.72\")   Wt 89 kg (196 lb 3.4 oz)   SpO2 95%   BMI 30.08 kg/m²      Body surface area is 2.06 meters squared.    Labs 10/6/23:  ANC~ 1600 Plt = 242k   Hgb = 13.4     SCr = 1 mg/dL   CrCl ~ 78 mL/min   AST/ALT/AP = 20/18/113 TBili = 0.4  Mag = 2  K+ = 4  Urine protein = negative      Labs 9/15/23:   TSH = 1.26   Free T4 = 1.22        Drug Order   (Drug name, dose, route, IV Fluid & volume, frequency, number of doses) Cycle 2, Day 3 of 3      Previous treatment: 1 Days 1-3 on 9/18-9/20/23   Medication = Etoposide   Base Dose = 100 mg/m2   Calc Dose: Base Dose x 2.06 m2 = 206 mg  Final Dose = 206 mg  Route = IV  Fluid & Volume =  mL  Admin Duration = Over 1-2 hours   Days 1-3   Study-supplied medication       <10% difference, okay to treat with final dose   By my signature below, I confirm this process was performed independently with the BSA and all final chemotherapy dosing calculations congruent. I have reviewed the above chemotherapy order and that my calculation of the final " dose and BSA (when applicable) corroborate those calculations of the  pharmacist. Discrepancies of 10% or greater in the written dose have been addressed and documented within the EPIC Progress notes.    Nba Fisher, PharmD

## 2023-10-11 NOTE — PROGRESS NOTES
Pt arrived ambulatory for day 3 Etoposide, c/o fatigue but otherwise doing well.  Pt states he is eating/drinking well, denies N/V/D.  Port accessed with good blood return, premeds and Etoposide infused per orders.  Pt tolerated well, no reaction noted.  Pt Dc'd home without incident and will return as scheduled.

## 2023-10-11 NOTE — ON TREATMENT VISIT
"  ON TREATMENT  NOTE  RADIATION ONCOLOGY DEPARTMENT    Patient name:  Bart Ramsey    Primary Physician:  Pcp Pt States None MRN: [id number]  CSN: [id number]   Referring physician:  Gianfranco Echevarria, *   : 1950, 73 y.o.     ENCOUNTER DATE:  10/11/2023      DIAGNOSIS:  SCLC (small cell lung carcinoma) (HCC)  Staging form: Lung, AJCC 8th Edition  - Clinical stage from 2023: Stage IIIA (cT3, cN1, cM0) - Signed by Leonel Patel M.D. on 2023  Stage prefix: Initial diagnosis      TREATMENT SUMMARY:  Course First Treatment Date 10/09/2023  Course Last Treatment Date 10/11/2023  Aria Treatment Information          10/11/2023   Aria Course Treatment Dates   Course First Treatment Date 10/09/2023    Course Last Treatment Date 10/11/2023    Aria Treatment Summary   L_Lung  Plan from Course C1_L_LUNG   Fraction 3 of 33   Elapsed Course Days 2 @ [id number]   Prescribed Fraction Dose 200 cGy   Prescribed Total Dose 6,600 cGy   L_Lung  Reference Point from Course C1_L_LUNG   Elapsed Course Days 2 @ [id number]   Session Dose 200 cGy   Total Dose 600 cGy          SUBJECTIVE:  Doing well overall but very fatigued from chemotherapy, no other complaints.    VITAL SIGNS:      10/11/2023    11:25 AM 10/10/2023     2:53 PM 10/9/2023     9:03 AM 10/6/2023    10:19 AM 10/4/2023    12:54 PM 10/4/2023    11:19 AM 10/3/2023    11:20 AM   Vitals   SYSTOLIC 127 131 125 106 120 142 105   DIASTOLIC 78 63 69 62 66 90 67   Pulse 57 55 76 56 53 83 63   Temperature  36.7 °C (98 °F) 36.1 °C (96.9 °F) 36.5 °C (97.7 °F) 36.1 °C (97 °F) 35.8 °C (96.5 °F)    Respiration  18 18  18 20 12   Weight 192.9 193.56 185.41 185.96  188.05    Height  1.72 m (5' 7.72\") 1.72 m (5' 7.72\") 1.753 m (5' 9.02\")  1.753 m (5' 9\")    BMI 29.58 kg/m2 29.68 kg/m2 28.43 kg/m2 27.45 kg/m2  27.77 kg/m2    Pulse Oximetry 96 % 97 % 96 % 97 % 96 % 95 % 96 %     KPS: 90, Able to carry on normal activity; minor signs or symptoms of disease (ECOG " equivalent 0)  Encounter Vitals  Blood Pressure : 127/78  Pulse: (!) 57  Pulse Oximetry: 96 %  Weight: 87.5 kg (192 lb 14.4 oz)  Pain Score: 2=Minimal-Slight      10/11/2023    11:25 AM 9/26/2023    11:30 AM 9/26/2023    10:25 AM 9/14/2023     9:07 AM   Pain Assessment   Pain Score 2=MINIMAL PA NO PAIN NO PAIN NO PAIN   Pain Loc HEAD             PHYSICAL EXAM:  Physical Exam  Constitutional:       Appearance: Normal appearance.   Cardiovascular:      Rate and Rhythm: Normal rate and regular rhythm.   Pulmonary:      Effort: Pulmonary effort is normal.      Breath sounds: Normal breath sounds.   Neurological:      Mental Status: He is alert.              10/11/2023    11:27 AM   Toxicity Assessment   Toxicity Assessment Chest   Fatigue (lethargy, malaise, asthenia) Increased fatigue over baseline, but not altering normal activities   Radiation Dermatitis None   Anorexia Loss of appetite   Dyspepsia/heartburn None   Dysphagia, Esophagitis, odynophagoa (painful swallowing) None   RT Dysphagia-Esophageal None   Cough Absent   Dyspnea Normal       CURRENT MEDICATIONS:    Current Outpatient Medications:     lidocaine-prilocaine (EMLA) 2.5-2.5 % Cream, Apply to port site approximately 1 hour prior to port access and cover with plastic wrap to numb skin., Disp: 30 g, Rfl: 3    ondansetron (ZOFRAN) 4 MG Tab tablet, Take 1 Tablet by mouth every four hours as needed for Nausea/Vomiting (for nausea, vomiting)., Disp: 30 Tablet, Rfl: 6    prochlorperazine (COMPAZINE) 10 MG Tab, Take 1 Tablet by mouth every 6 hours as needed (for nausea, vomiting)., Disp: 30 Tablet, Rfl: 6    Cetirizine HCl (ZYRTEC ALLERGY PO), Take 1 Tablet by mouth every day., Disp: , Rfl:     ipratropium (ATROVENT) 0.03 % Solution, Administer 2 Sprays into affected nostril(S) every 12 hours., Disp: , Rfl:     metFORMIN (GLUCOPHAGE) 500 MG Tab, Take 750 mg by mouth 2 times a day with meals., Disp: , Rfl:     omeprazole (PRILOSEC) 40 MG delayed-release capsule,  Take 40 mg by mouth every day., Disp: , Rfl:     baclofen (LIORESAL) 10 MG Tab, Take 10 mg by mouth at bedtime as needed. Indications: Muscle Spasm, Disp: , Rfl:     Cyanocobalamin (VITAMIN B-12) 1000 MCG Tab, Take 1,000 mcg by mouth every day., Disp: , Rfl:     fluticasone-salmeterol (ADVAIR) 250-50 MCG/ACT AEROSOL POWDER, BREATH ACTIVATED, Inhale 1 Puff every 12 hours., Disp: , Rfl:     fluticasone (FLONASE) 50 MCG/ACT nasal spray, Administer 2 Sprays into affected nostril(S) every day., Disp: , Rfl:     Omega-3 Fatty Acids (FISH OIL) 1000 MG Cap capsule, Take 1,000 mg by mouth every day., Disp: , Rfl:     vitamin D3 (CHOLECALCIFEROL) 1000 Unit (25 mcg) Tab, Take 1,000 Units by mouth every day., Disp: , Rfl:     gabapentin (NEURONTIN) 300 MG Cap, Take 300 mg by mouth 3 times a day., Disp: , Rfl:     aspirin EC (ECOTRIN) 81 MG Tablet Delayed Response, Take 81 mg by mouth every day., Disp: , Rfl:   No current facility-administered medications for this encounter.    Facility-Administered Medications Ordered in Other Encounters:     EPINEPHrine (ANAPHYLAXIS DOSE) IM 0.5 mg, 0.5 mg, Intramuscular, PRN, Roseline Fuller M.D.    diphenhydrAMINE (Benadryl) injection 50 mg, 50 mg, Intravenous, PRN, Roseline Fuller M.D.    methylPREDNISolone sod succ (SOLU-MEDROL) 125 MG injection 125 mg, 125 mg, Intravenous, PRN, Roseline Fuller M.D.    LABORATORY DATA:   Lab Results   Component Value Date/Time    SODIUM 138 10/06/2023 08:05 AM    POTASSIUM 4.0 10/06/2023 08:05 AM    CHLORIDE 101 10/06/2023 08:05 AM    CO2 25 10/06/2023 08:05 AM    GLUCOSE 119 (H) 10/06/2023 08:05 AM    BUN 17 10/06/2023 08:05 AM    CREATININE 1.00 10/06/2023 08:05 AM       Lab Results   Component Value Date/Time    WBC 4.1 (L) 10/06/2023 08:05 AM    RBC 4.32 (L) 10/06/2023 08:05 AM    HEMOGLOBIN 13.4 (L) 10/06/2023 08:05 AM    HEMATOCRIT 40.2 (L) 10/06/2023 08:05 AM    MCV 93.1 10/06/2023 08:05 AM    MCH 31.0 10/06/2023 08:05 AM    MCHC 33.3  10/06/2023 08:05 AM    PLATELETCT 242 10/06/2023 08:05 AM         RADIOLOGY DATA:  CT-CSPINE WITHOUT PLUS RECONS    Result Date: 9/22/2023  No acute fracture or dislocation seen in the CT scan of the cervical spine.    CT-CHEST (THORAX) W/O    Result Date: 8/29/2023  1.  There has been interval progression of disease with increased size of the dominant left lower lobe lobulated peripheral lung mass consistent with malignancy. 2.  There is also increase in size of the more medial left lower lobe lobulated mass in the left infrahilar region as well as numerous other left lung nodule suspicious for intrapulmonary metastasis. 3.  There is left hilar adenopathy consistent with metastatic disease based on prior PET/CT uptake. 4.  Interval increase in subpleural nodularity left and to a lesser extent right posterior medial lung bases suspicious for pleural-based metastasis.     CT-CHEST,ABDOMEN,PELVIS WITH    Result Date: 9/13/2023  1.  Left lower lobe pulmonary masses, very slightly more prominent than on 8/29/2023. 2.  Multiple left pulmonary and pleural metastases. 3.  Left hilar emely metastases. 4.  No evidence of metastatic disease in the abdomen or pelvis.    CT-HEAD W/O    Result Date: 9/22/2023  NO ACUTE ABNORMALITIES ARE NOTED ON CT SCAN OF THE HEAD.     DX-CHEST-LIMITED (1 VIEW)    Result Date: 8/30/2023  Digitized intraoperative radiograph is submitted for review. This examination is not for diagnostic purpose but for guidance during a surgical procedure. Please see the patient's chart for full procedural details. INTERPRETING LOCATION: [address] [place] [address], [id number]    DX-CHEST-PORTABLE (1 VIEW)    Result Date: 10/4/2023  1.  Right IJ Port-A-Cath present with the tip overlying the superior vena cava. 2.  Again seen mass within the left lung base.    DX-CHEST-PORTABLE (1 VIEW)    Result Date: 9/22/2023  No acute cardiac or pulmonary abnormality is identified. Left lower lung mass again noted.    DX-CHEST-PORTABLE  (1 VIEW)    Result Date: 8/29/2023  1.  No evidence of pneumothorax. 2.  Mass within the left lung base.    IR-CVC PORT PLACEMENT > AGE 5    Result Date: 10/3/2023  1. ULTRASOUND AND FLUOROSCOPIC GUIDED PLACEMENT OF A Right INTERNAL JUGULAR SINGLE LUMEN BARD POWER-PORT VENOUS ACCESS DEVICE. 2. THE PORT MAY BE USED IMMEDIATELY AS CLINICALLY INDICATED. FLUSHES PER PROTOCOL.     DX-PORTABLE FLUOROSCOPY < 1 HOUR    Result Date: 8/30/2023  Portable fluoroscopy utilized for 1 minute 5 seconds. INTERPRETING LOCATION: [address] [place] [address], [facility], [id number]    [id number] BASE TO MID-THIGH    Result Date: 9/13/2023  1.  Interval increased size and intensity of LEFT lower lobe lung mass with new intrapulmonary and pleural metastases indicating progression of disease. 2.  Probable pleural metastasis at the RIGHT lung base posteriorly. 3.  Worsening LEFT hilar adenopathy, metastatic. 4.  No evidence of metastatic disease in the abdomen or pelvis.      IMPRESSION:  Cancer Staging   SCLC (small cell lung carcinoma) (HCC)  Staging form: Lung, AJCC 8th Edition  - Clinical stage from 9/14/2023: Stage IIIA (cT3, cN1, cM0) - Signed by Leonel Patel M.D. on 9/14/2023      PLAN:  No change in treatment plan    Disposition:  Treatment plan and imaging reviewed. Questions answered. Continue therapy outlined.     Leonel Patel M.D.    Orders Placed This Encounter    Rad Onc Aria Session Summary

## 2023-10-11 NOTE — PROGRESS NOTES
Chemotherapy Verification - PRIMARY RN      Height = 172cm  Weight = 89kg  BSA = 2.06       Medication: Etoposide  Dose: 100mg/m2  Calculated Dose: 206mg                             (In mg/m2, AUC, mg/kg)         I confirm this process was performed independently with the BSA and all final chemotherapy dosing calculations congruent.  Any discrepancies of 10% or greater have been addressed with the chemotherapy pharmacist. The resolution of the discrepancy has been documented in the EPIC progress notes.

## 2023-10-11 NOTE — ASSESSMENT & PLAN NOTE
Patient currently undergoing chemo and radiation.  He is followed by radiation and oncology.  He seems to be tolerating treatment fairly well.  Patient is currently on Wixela 250 and albuterol as needed, which she has not needed.  These medications were started before he was establish care with pulmonary office.  I do not have any PFTs on file.  Patient states that his breathing has remained stable on the inhalers.  We will continue inhaler therapy and once treatment is done we will consider PFTs.

## 2023-10-11 NOTE — PROGRESS NOTES
Chemotherapy Verification - SECONDARY RN       Height = 172 cm  Weight = 89 kg  BSA = 2.06 m2       Medication: Etoposide  Dose: 100 mg/m2  Calculated Dose: 206 mg                             (In mg/m2, AUC, mg/kg)       I confirm that this process was performed independently.

## 2023-10-12 PROCEDURE — 77014 PR CT GUIDANCE PLACEMENT RAD THERAPY FIELDS: CPT | Mod: 26 | Performed by: RADIOLOGY

## 2023-10-12 PROCEDURE — 77386 HCHG IMRT DELIVERY COMPLEX: CPT | Performed by: RADIOLOGY

## 2023-10-13 ENCOUNTER — APPOINTMENT (OUTPATIENT)
Dept: RADIATION ONCOLOGY | Facility: MEDICAL CENTER | Age: 73
End: 2023-10-13
Payer: COMMERCIAL

## 2023-10-13 PROCEDURE — 77386 HCHG IMRT DELIVERY COMPLEX: CPT | Performed by: RADIOLOGY

## 2023-10-13 PROCEDURE — 77014 PR CT GUIDANCE PLACEMENT RAD THERAPY FIELDS: CPT | Mod: 26 | Performed by: RADIOLOGY

## 2023-10-13 PROCEDURE — 77427 RADIATION TX MANAGEMENT X5: CPT | Performed by: RADIOLOGY

## 2023-10-16 ENCOUNTER — APPOINTMENT (OUTPATIENT)
Dept: RADIATION ONCOLOGY | Facility: MEDICAL CENTER | Age: 73
End: 2023-10-16
Payer: COMMERCIAL

## 2023-10-16 PROCEDURE — 77386 HCHG IMRT DELIVERY COMPLEX: CPT | Performed by: RADIOLOGY

## 2023-10-16 PROCEDURE — 77014 PR CT GUIDANCE PLACEMENT RAD THERAPY FIELDS: CPT | Mod: 26 | Performed by: RADIOLOGY

## 2023-10-17 ENCOUNTER — APPOINTMENT (OUTPATIENT)
Dept: RADIATION ONCOLOGY | Facility: MEDICAL CENTER | Age: 73
End: 2023-10-17
Payer: COMMERCIAL

## 2023-10-17 PROCEDURE — 77014 PR CT GUIDANCE PLACEMENT RAD THERAPY FIELDS: CPT | Mod: 26 | Performed by: RADIOLOGY

## 2023-10-17 PROCEDURE — 77386 HCHG IMRT DELIVERY COMPLEX: CPT | Performed by: RADIOLOGY

## 2023-10-17 RX ORDER — 0.9 % SODIUM CHLORIDE 0.9 %
10 VIAL (ML) INJECTION PRN
Status: CANCELLED | OUTPATIENT
Start: 2023-10-29

## 2023-10-17 RX ORDER — 0.9 % SODIUM CHLORIDE 0.9 %
3 VIAL (ML) INJECTION PRN
Status: CANCELLED | OUTPATIENT
Start: 2023-11-07

## 2023-10-17 RX ORDER — ONDANSETRON 2 MG/ML
4 INJECTION INTRAMUSCULAR; INTRAVENOUS PRN
Status: CANCELLED | OUTPATIENT
Start: 2023-11-08

## 2023-10-17 RX ORDER — ONDANSETRON 2 MG/ML
8 INJECTION INTRAMUSCULAR; INTRAVENOUS ONCE
Status: CANCELLED | OUTPATIENT
Start: 2023-11-08 | End: 2023-11-01

## 2023-10-17 RX ORDER — DIPHENHYDRAMINE HYDROCHLORIDE 50 MG/ML
50 INJECTION INTRAMUSCULAR; INTRAVENOUS PRN
Status: CANCELLED | OUTPATIENT
Start: 2023-11-08

## 2023-10-17 RX ORDER — 0.9 % SODIUM CHLORIDE 0.9 %
10 VIAL (ML) INJECTION PRN
Status: CANCELLED | OUTPATIENT
Start: 2023-11-08

## 2023-10-17 RX ORDER — ONDANSETRON 2 MG/ML
4 INJECTION INTRAMUSCULAR; INTRAVENOUS PRN
Status: CANCELLED | OUTPATIENT
Start: 2023-11-07

## 2023-10-17 RX ORDER — PROCHLORPERAZINE MALEATE 10 MG
10 TABLET ORAL EVERY 6 HOURS PRN
Status: CANCELLED | OUTPATIENT
Start: 2023-10-30

## 2023-10-17 RX ORDER — METHYLPREDNISOLONE SODIUM SUCCINATE 125 MG/2ML
125 INJECTION, POWDER, LYOPHILIZED, FOR SOLUTION INTRAMUSCULAR; INTRAVENOUS PRN
Status: CANCELLED | OUTPATIENT
Start: 2023-10-30

## 2023-10-17 RX ORDER — EPINEPHRINE 1 MG/ML(1)
0.5 AMPUL (ML) INJECTION PRN
Status: CANCELLED | OUTPATIENT
Start: 2023-11-08

## 2023-10-17 RX ORDER — 0.9 % SODIUM CHLORIDE 0.9 %
3 VIAL (ML) INJECTION PRN
Status: CANCELLED | OUTPATIENT
Start: 2023-11-08

## 2023-10-17 RX ORDER — 0.9 % SODIUM CHLORIDE 0.9 %
3 VIAL (ML) INJECTION PRN
Status: CANCELLED | OUTPATIENT
Start: 2023-10-29

## 2023-10-17 RX ORDER — 0.9 % SODIUM CHLORIDE 0.9 %
10 VIAL (ML) INJECTION PRN
Status: CANCELLED | OUTPATIENT
Start: 2023-11-07

## 2023-10-17 RX ORDER — DIPHENHYDRAMINE HYDROCHLORIDE 50 MG/ML
50 INJECTION INTRAMUSCULAR; INTRAVENOUS PRN
Status: CANCELLED | OUTPATIENT
Start: 2023-11-07

## 2023-10-17 RX ORDER — 0.9 % SODIUM CHLORIDE 0.9 %
3 VIAL (ML) INJECTION PRN
Status: CANCELLED | OUTPATIENT
Start: 2023-10-30

## 2023-10-17 RX ORDER — 0.9 % SODIUM CHLORIDE 0.9 %
VIAL (ML) INJECTION PRN
Status: CANCELLED | OUTPATIENT
Start: 2023-11-08

## 2023-10-17 RX ORDER — 0.9 % SODIUM CHLORIDE 0.9 %
VIAL (ML) INJECTION PRN
Status: CANCELLED | OUTPATIENT
Start: 2023-10-29

## 2023-10-17 RX ORDER — 0.9 % SODIUM CHLORIDE 0.9 %
10 VIAL (ML) INJECTION PRN
Status: CANCELLED | OUTPATIENT
Start: 2023-10-30

## 2023-10-17 RX ORDER — PROCHLORPERAZINE MALEATE 10 MG
10 TABLET ORAL EVERY 6 HOURS PRN
Status: CANCELLED | OUTPATIENT
Start: 2023-11-07

## 2023-10-17 RX ORDER — SODIUM CHLORIDE 9 MG/ML
1000 INJECTION, SOLUTION INTRAVENOUS ONCE
Status: CANCELLED | OUTPATIENT
Start: 2023-10-30

## 2023-10-17 RX ORDER — ONDANSETRON 8 MG/1
8 TABLET, ORALLY DISINTEGRATING ORAL PRN
Status: CANCELLED | OUTPATIENT
Start: 2023-10-30

## 2023-10-17 RX ORDER — SODIUM CHLORIDE 9 MG/ML
INJECTION, SOLUTION INTRAVENOUS CONTINUOUS
Status: CANCELLED | OUTPATIENT
Start: 2023-11-08

## 2023-10-17 RX ORDER — 0.9 % SODIUM CHLORIDE 0.9 %
VIAL (ML) INJECTION PRN
Status: CANCELLED | OUTPATIENT
Start: 2023-10-30

## 2023-10-17 RX ORDER — EPINEPHRINE 1 MG/ML(1)
0.5 AMPUL (ML) INJECTION PRN
Status: CANCELLED | OUTPATIENT
Start: 2023-11-07

## 2023-10-17 RX ORDER — PROCHLORPERAZINE MALEATE 10 MG
10 TABLET ORAL EVERY 6 HOURS PRN
Status: CANCELLED | OUTPATIENT
Start: 2023-11-08

## 2023-10-17 RX ORDER — METHYLPREDNISOLONE SODIUM SUCCINATE 125 MG/2ML
125 INJECTION, POWDER, LYOPHILIZED, FOR SOLUTION INTRAMUSCULAR; INTRAVENOUS PRN
Status: CANCELLED | OUTPATIENT
Start: 2023-11-07

## 2023-10-17 RX ORDER — ONDANSETRON 8 MG/1
8 TABLET, ORALLY DISINTEGRATING ORAL PRN
Status: CANCELLED | OUTPATIENT
Start: 2023-11-08

## 2023-10-17 RX ORDER — SODIUM CHLORIDE 9 MG/ML
INJECTION, SOLUTION INTRAVENOUS CONTINUOUS
Status: CANCELLED | OUTPATIENT
Start: 2023-10-30

## 2023-10-17 RX ORDER — EPINEPHRINE 1 MG/ML(1)
0.5 AMPUL (ML) INJECTION PRN
Status: CANCELLED | OUTPATIENT
Start: 2023-10-30

## 2023-10-17 RX ORDER — 0.9 % SODIUM CHLORIDE 0.9 %
VIAL (ML) INJECTION PRN
Status: CANCELLED | OUTPATIENT
Start: 2023-11-07

## 2023-10-17 RX ORDER — ONDANSETRON 2 MG/ML
4 INJECTION INTRAMUSCULAR; INTRAVENOUS PRN
Status: CANCELLED | OUTPATIENT
Start: 2023-10-30

## 2023-10-17 RX ORDER — ONDANSETRON 8 MG/1
8 TABLET, ORALLY DISINTEGRATING ORAL PRN
Status: CANCELLED | OUTPATIENT
Start: 2023-11-07

## 2023-10-17 RX ORDER — ONDANSETRON 2 MG/ML
8 INJECTION INTRAMUSCULAR; INTRAVENOUS ONCE
Status: CANCELLED | OUTPATIENT
Start: 2023-11-07 | End: 2023-10-31

## 2023-10-17 RX ORDER — DIPHENHYDRAMINE HYDROCHLORIDE 50 MG/ML
50 INJECTION INTRAMUSCULAR; INTRAVENOUS PRN
Status: CANCELLED | OUTPATIENT
Start: 2023-10-30

## 2023-10-17 RX ORDER — METHYLPREDNISOLONE SODIUM SUCCINATE 125 MG/2ML
125 INJECTION, POWDER, LYOPHILIZED, FOR SOLUTION INTRAMUSCULAR; INTRAVENOUS PRN
Status: CANCELLED | OUTPATIENT
Start: 2023-11-08

## 2023-10-17 RX ORDER — SODIUM CHLORIDE 9 MG/ML
INJECTION, SOLUTION INTRAVENOUS CONTINUOUS
Status: CANCELLED | OUTPATIENT
Start: 2023-11-07

## 2023-10-18 ENCOUNTER — APPOINTMENT (OUTPATIENT)
Dept: RADIATION ONCOLOGY | Facility: MEDICAL CENTER | Age: 73
End: 2023-10-18
Payer: COMMERCIAL

## 2023-10-18 VITALS
BODY MASS INDEX: 27.85 KG/M2 | HEART RATE: 79 BPM | OXYGEN SATURATION: 97 % | SYSTOLIC BLOOD PRESSURE: 121 MMHG | WEIGHT: 181.66 LBS | RESPIRATION RATE: 17 BRPM | DIASTOLIC BLOOD PRESSURE: 74 MMHG

## 2023-10-18 LAB
CHEMOTHERAPY INFUSION START DATE: NORMAL
CHEMOTHERAPY RECORDS: 2
CHEMOTHERAPY RECORDS: 6600
CHEMOTHERAPY RECORDS: NORMAL
CHEMOTHERAPY RX CANCER: NORMAL
DATE 1ST CHEMO CANCER: NORMAL
RAD ONC ARIA COURSE LAST TREATMENT DATE: NORMAL
RAD ONC ARIA COURSE TREATMENT ELAPSED DAYS: NORMAL
RAD ONC ARIA REFERENCE POINT DOSAGE GIVEN TO DATE: 16
RAD ONC ARIA REFERENCE POINT ID: NORMAL
RAD ONC ARIA REFERENCE POINT SESSION DOSAGE GIVEN: 2

## 2023-10-18 PROCEDURE — 77386 HCHG IMRT DELIVERY COMPLEX: CPT | Performed by: RADIOLOGY

## 2023-10-18 PROCEDURE — 77014 PR CT GUIDANCE PLACEMENT RAD THERAPY FIELDS: CPT | Mod: 26 | Performed by: RADIOLOGY

## 2023-10-18 PROCEDURE — 77336 RADIATION PHYSICS CONSULT: CPT | Performed by: RADIOLOGY

## 2023-10-18 ASSESSMENT — FIBROSIS 4 INDEX: FIB4 SCORE: 1.42

## 2023-10-18 ASSESSMENT — PAIN SCALES - GENERAL: PAINLEVEL: 2=MINIMAL-SLIGHT

## 2023-10-18 NOTE — ON TREATMENT VISIT
ON TREATMENT NOTE  RADIATION ONCOLOGY DEPARTMENT    Patient name:  Bart Ramsey    Primary Physician:  Pcp Pt States None MRN: [id number]  CSN: [id number]   Referring physician:  Gianfranco Echevarria, * : 1950, 73 y.o.     ENCOUNTER DATE:  10/18/23    DIAGNOSIS:    SCLC (small cell lung carcinoma) (HCC)  Staging form: Lung, AJCC 8th Edition  - Clinical stage from 2023: Stage IIIA (cT3, cN1, cM0) - Signed by Leonel Patel M.D. on 2023  Stage prefix: Initial diagnosis      TREATMENT SUMMARY:  Aria Treatment Information          10/18/2023   Aria Course Treatment Dates   Course First Treatment Date 10/09/2023    Course Last Treatment Date 10/18/2023    Aria Treatment Summary   L_Lung  Plan from Course C1_L_LUNG   Fraction 8 of 33   Elapsed Course Days 9 @    Prescribed Fraction Dose 200 cGy   Prescribed Total Dose 6,600 cGy   L_Lung  Reference Point from Course C1_L_LUNG   Elapsed Course Days 9 @    Session Dose 200 cGy   Total Dose 1,600 cGy      Radiation Treatments       Active   Plans   L_Lung   Most recent treatment: Dose planned: 200 cGy (fraction 8 of 33 on 10/18/2023)   Total: Dose planned: 6,600 cGy   Elapsed Days: 9 @            Historical   No historical radiation treatments to show.               SUBJECTIVE:   Patient is doing well.  He does not have any changes he would attribute to his radiation.    VITAL SIGNS:    Encounter Vitals  Blood Pressure : 121/74  Pulse: 79  Respiration: 17  Pulse Oximetry: 97 %  Weight: 82.4 kg (181 lb 10.5 oz)  Pain Score: 2=Minimal-Slight      10/18/2023    11:11 AM 10/11/2023    11:25 AM 2023    11:30 AM 2023    10:25 AM 2023     9:07 AM   Pain Assessment   Pain Score 2=MINIMAL PA 2=MINIMAL PA NO PAIN NO PAIN NO PAIN   Pain Loc HEAD HEAD             PHYSICAL EXAM:  Physical Exam  Pulmonary:      Effort: Pulmonary effort is normal.      Breath sounds: Normal breath sounds.          TOXICITY       10/18/2023    11:12 AM 10/11/2023    11:27 AM   Toxicity Assessment   Toxicity Assessment Chest Chest   Fatigue (lethargy, malaise, asthenia) Increased fatigue over baseline, but not altering normal activities Increased fatigue over baseline, but not altering normal activities   Radiation Dermatitis None None   Anorexia Loss of appetite Loss of appetite   Dyspepsia/heartburn None None   Dysphagia, Esophagitis, odynophagoa (painful swallowing) None None   RT Dysphagia-Esophageal None None   Cough Absent Absent   Dyspnea Dyspnea on exertion Normal         IMPRESSION:  Cancer Staging   SCLC (small cell lung carcinoma) (HCC)  Staging form: Lung, AJCC 8th Edition  - Clinical stage from 9/14/2023: Stage IIIA (cT3, cN1, cM0) - Signed by Leonel Patel M.D. on 9/14/2023      PLAN:  No change in treatment plan    Disposition:  Treatment plan reviewed. Questions answered. Continue therapy outlined.     Juan Manuel Swartz M.D.    Orders Placed This Encounter    Rad Onc Aria Session Summary

## 2023-10-20 ENCOUNTER — APPOINTMENT (OUTPATIENT)
Dept: RADIATION ONCOLOGY | Facility: MEDICAL CENTER | Age: 73
End: 2023-10-20
Payer: COMMERCIAL

## 2023-10-20 PROCEDURE — 77014 PR CT GUIDANCE PLACEMENT RAD THERAPY FIELDS: CPT | Mod: 26 | Performed by: RADIOLOGY

## 2023-10-20 PROCEDURE — 77386 HCHG IMRT DELIVERY COMPLEX: CPT | Performed by: RADIOLOGY

## 2023-10-23 ENCOUNTER — APPOINTMENT (OUTPATIENT)
Dept: RADIATION ONCOLOGY | Facility: MEDICAL CENTER | Age: 73
End: 2023-10-23
Payer: COMMERCIAL

## 2023-10-23 PROCEDURE — 77427 RADIATION TX MANAGEMENT X5: CPT | Performed by: RADIOLOGY

## 2023-10-23 PROCEDURE — 77014 PR CT GUIDANCE PLACEMENT RAD THERAPY FIELDS: CPT | Mod: 26 | Performed by: RADIOLOGY

## 2023-10-23 PROCEDURE — 77386 HCHG IMRT DELIVERY COMPLEX: CPT | Performed by: RADIOLOGY

## 2023-10-24 ENCOUNTER — APPOINTMENT (OUTPATIENT)
Dept: RADIATION ONCOLOGY | Facility: MEDICAL CENTER | Age: 73
End: 2023-10-24
Payer: COMMERCIAL

## 2023-10-24 PROCEDURE — 77014 PR CT GUIDANCE PLACEMENT RAD THERAPY FIELDS: CPT | Mod: 26 | Performed by: RADIOLOGY

## 2023-10-24 PROCEDURE — 77386 HCHG IMRT DELIVERY COMPLEX: CPT | Performed by: RADIOLOGY

## 2023-10-25 ENCOUNTER — APPOINTMENT (OUTPATIENT)
Dept: RADIATION ONCOLOGY | Facility: MEDICAL CENTER | Age: 73
End: 2023-10-25
Payer: COMMERCIAL

## 2023-10-25 VITALS
DIASTOLIC BLOOD PRESSURE: 66 MMHG | TEMPERATURE: 97.7 F | OXYGEN SATURATION: 94 % | BODY MASS INDEX: 28.73 KG/M2 | SYSTOLIC BLOOD PRESSURE: 120 MMHG | WEIGHT: 187.39 LBS | HEART RATE: 71 BPM | RESPIRATION RATE: 18 BRPM

## 2023-10-25 LAB
CHEMOTHERAPY INFUSION START DATE: NORMAL
CHEMOTHERAPY RECORDS: 2
CHEMOTHERAPY RECORDS: 6600
CHEMOTHERAPY RECORDS: NORMAL
CHEMOTHERAPY RX CANCER: NORMAL
DATE 1ST CHEMO CANCER: NORMAL
RAD ONC ARIA COURSE LAST TREATMENT DATE: NORMAL
RAD ONC ARIA COURSE TREATMENT ELAPSED DAYS: NORMAL
RAD ONC ARIA REFERENCE POINT DOSAGE GIVEN TO DATE: 24
RAD ONC ARIA REFERENCE POINT ID: NORMAL
RAD ONC ARIA REFERENCE POINT SESSION DOSAGE GIVEN: 2

## 2023-10-25 PROCEDURE — 77014 PR CT GUIDANCE PLACEMENT RAD THERAPY FIELDS: CPT | Mod: 26 | Performed by: RADIOLOGY

## 2023-10-25 PROCEDURE — 77386 HCHG IMRT DELIVERY COMPLEX: CPT | Performed by: RADIOLOGY

## 2023-10-25 ASSESSMENT — FIBROSIS 4 INDEX: FIB4 SCORE: 1.42

## 2023-10-25 ASSESSMENT — PAIN SCALES - GENERAL: PAINLEVEL: NO PAIN

## 2023-10-25 NOTE — ON TREATMENT VISIT
"  ON TREATMENT  NOTE  RADIATION ONCOLOGY DEPARTMENT    Patient name:  Bart Ramsey    Primary Physician:  Pcp Pt States None MRN: [id number]  CSN: [id number]   Referring physician:  Gianfranco Echevarria, *   : 1950, 73 y.o.     ENCOUNTER DATE:  10/25/2023      DIAGNOSIS:  SCLC (small cell lung carcinoma) (HCC)  Staging form: Lung, AJCC 8th Edition  - Clinical stage from 2023: Stage IIIA (cT3, cN1, cM0) - Signed by Leonel Patel M.D. on 2023  Stage prefix: Initial diagnosis      TREATMENT SUMMARY:  Course First Treatment Date 10/09/2023  Course Last Treatment Date 10/25/2023  Aria Treatment Information          10/25/2023   Aria Course Treatment Dates   Course First Treatment Date 10/09/2023    Course Last Treatment Date 10/25/2023    Aria Treatment Summary   L_Lung  Plan from Course C1_L_LUNG   Fraction    Elapsed Course Days 16 @ [id number]   Prescribed Fraction Dose 200 cGy   Prescribed Total Dose 6,600 cGy   L_Lung  Reference Point from Course C1_L_LUNG   Elapsed Course Days 16 @ [id number]   Session Dose 200 cGy   Total Dose 2,400 cGy          SUBJECTIVE:  Remarkably well, no major complaints today at all    VITAL SIGNS:      10/25/2023    11:32 AM 10/18/2023    11:11 AM 10/11/2023     2:45 PM 10/11/2023     1:02 PM 10/11/2023    11:25 AM 10/10/2023     2:53 PM 10/9/2023     9:03 AM   Vitals   SYSTOLIC 120 121 110 124 127 131 125   DIASTOLIC 66 74 60 76 78 63 69   Pulse 71 79 64 71 57 55 76   Temperature 36.5 °C (97.7 °F)  36.6 °C (97.8 °F)   36.7 °C (98 °F) 36.1 °C (96.9 °F)   Respiration 18 17 18 16  18 18   Weight 187.39 181.66 196.21 193 192.9 193.56 185.41   Height   1.72 m (5' 7.72\") 1.753 m (5' 9\")  1.72 m (5' 7.72\") 1.72 m (5' 7.72\")   BMI 28.73 kg/m2 27.85 kg/m2 30.08 kg/m2 28.5 kg/m2 29.58 kg/m2 29.68 kg/m2 28.43 kg/m2   Pulse Oximetry 94 % 97 % 95 % 94 % 96 % 97 % 96 %     KPS: 90, Able to carry on normal activity; minor signs or symptoms of disease (ECOG " equivalent 0)  Encounter Vitals  Temperature: 36.5 °C (97.7 °F)  Blood Pressure : 120/66  Pulse: 71  Respiration: 18  Pulse Oximetry: 94 %  Weight: 85 kg (187 lb 6.3 oz)  Pain Score: No pain      10/25/2023    11:32 AM 10/18/2023    11:11 AM 10/11/2023    11:25 AM 9/26/2023    11:30 AM 9/26/2023    10:25 AM 9/14/2023     9:07 AM   Pain Assessment   Pain Score NO PAIN 2=MINIMAL PA 2=MINIMAL PA NO PAIN NO PAIN NO PAIN   Pain Loc  HEAD HEAD             PHYSICAL EXAM:  Physical Exam  Constitutional:       Appearance: Normal appearance.   Cardiovascular:      Rate and Rhythm: Normal rate and regular rhythm.   Pulmonary:      Effort: Pulmonary effort is normal.      Breath sounds: Normal breath sounds.   Neurological:      General: No focal deficit present.      Mental Status: He is alert and oriented to person, place, and time.              10/25/2023    11:34 AM 10/18/2023    11:12 AM 10/11/2023    11:27 AM   Toxicity Assessment   Toxicity Assessment Chest Chest Chest   Fatigue (lethargy, malaise, asthenia) Increased fatigue over baseline, but not altering normal activities Increased fatigue over baseline, but not altering normal activities Increased fatigue over baseline, but not altering normal activities   Radiation Dermatitis None None None   Anorexia None Loss of appetite Loss of appetite   Dyspepsia/heartburn None None None   Dysphagia, Esophagitis, odynophagoa (painful swallowing) None None None   RT Dysphagia-Esophageal None None None   Cough Absent Absent Absent   Dyspnea Dyspnea on exertion Dyspnea on exertion Normal       CURRENT MEDICATIONS:    Current Outpatient Medications:     lidocaine-prilocaine (EMLA) 2.5-2.5 % Cream, Apply to port site approximately 1 hour prior to port access and cover with plastic wrap to numb skin., Disp: 30 g, Rfl: 3    ondansetron (ZOFRAN) 4 MG Tab tablet, Take 1 Tablet by mouth every four hours as needed for Nausea/Vomiting (for nausea, vomiting)., Disp: 30 Tablet, Rfl: 6     prochlorperazine (COMPAZINE) 10 MG Tab, Take 1 Tablet by mouth every 6 hours as needed (for nausea, vomiting)., Disp: 30 Tablet, Rfl: 6    Cetirizine HCl (ZYRTEC ALLERGY PO), Take 1 Tablet by mouth every day., Disp: , Rfl:     ipratropium (ATROVENT) 0.03 % Solution, Administer 2 Sprays into affected nostril(S) every 12 hours., Disp: , Rfl:     metFORMIN (GLUCOPHAGE) 500 MG Tab, Take 750 mg by mouth 2 times a day with meals., Disp: , Rfl:     omeprazole (PRILOSEC) 40 MG delayed-release capsule, Take 40 mg by mouth every day., Disp: , Rfl:     baclofen (LIORESAL) 10 MG Tab, Take 10 mg by mouth at bedtime as needed. Indications: Muscle Spasm, Disp: , Rfl:     Cyanocobalamin (VITAMIN B-12) 1000 MCG Tab, Take 1,000 mcg by mouth every day., Disp: , Rfl:     fluticasone-salmeterol (ADVAIR) 250-50 MCG/ACT AEROSOL POWDER, BREATH ACTIVATED, Inhale 1 Puff every 12 hours., Disp: , Rfl:     fluticasone (FLONASE) 50 MCG/ACT nasal spray, Administer 2 Sprays into affected nostril(S) every day., Disp: , Rfl:     Omega-3 Fatty Acids (FISH OIL) 1000 MG Cap capsule, Take 1,000 mg by mouth every day., Disp: , Rfl:     vitamin D3 (CHOLECALCIFEROL) 1000 Unit (25 mcg) Tab, Take 1,000 Units by mouth every day., Disp: , Rfl:     gabapentin (NEURONTIN) 300 MG Cap, Take 300 mg by mouth 3 times a day., Disp: , Rfl:     aspirin EC (ECOTRIN) 81 MG Tablet Delayed Response, Take 81 mg by mouth every day., Disp: , Rfl:     LABORATORY DATA:   Lab Results   Component Value Date/Time    SODIUM 138 10/06/2023 08:05 AM    POTASSIUM 4.0 10/06/2023 08:05 AM    CHLORIDE 101 10/06/2023 08:05 AM    CO2 25 10/06/2023 08:05 AM    GLUCOSE 119 (H) 10/06/2023 08:05 AM    BUN 17 10/06/2023 08:05 AM    CREATININE 1.00 10/06/2023 08:05 AM       Lab Results   Component Value Date/Time    WBC 4.1 (L) 10/06/2023 08:05 AM    RBC 4.32 (L) 10/06/2023 08:05 AM    HEMOGLOBIN 13.4 (L) 10/06/2023 08:05 AM    HEMATOCRIT 40.2 (L) 10/06/2023 08:05 AM    MCV 93.1 10/06/2023 08:05  AM    MCH 31.0 10/06/2023 08:05 AM    MCHC 33.3 10/06/2023 08:05 AM    PLATELETCT 242 10/06/2023 08:05 AM         RADIOLOGY DATA:  CT-CSPINE WITHOUT PLUS RECONS    Result Date: 9/22/2023  No acute fracture or dislocation seen in the CT scan of the cervical spine.    CT-CHEST (THORAX) W/O    Result Date: 8/29/2023  1.  There has been interval progression of disease with increased size of the dominant left lower lobe lobulated peripheral lung mass consistent with malignancy. 2.  There is also increase in size of the more medial left lower lobe lobulated mass in the left infrahilar region as well as numerous other left lung nodule suspicious for intrapulmonary metastasis. 3.  There is left hilar adenopathy consistent with metastatic disease based on prior PET/CT uptake. 4.  Interval increase in subpleural nodularity left and to a lesser extent right posterior medial lung bases suspicious for pleural-based metastasis.     CT-CHEST,ABDOMEN,PELVIS WITH    Result Date: 9/13/2023  1.  Left lower lobe pulmonary masses, very slightly more prominent than on 8/29/2023. 2.  Multiple left pulmonary and pleural metastases. 3.  Left hilar emely metastases. 4.  No evidence of metastatic disease in the abdomen or pelvis.    CT-HEAD W/O    Result Date: 9/22/2023  NO ACUTE ABNORMALITIES ARE NOTED ON CT SCAN OF THE HEAD.     DX-CHEST-LIMITED (1 VIEW)    Result Date: 8/30/2023  Digitized intraoperative radiograph is submitted for review. This examination is not for diagnostic purpose but for guidance during a surgical procedure. Please see the patient's chart for full procedural details. INTERPRETING LOCATION: [address] [place] [address], [id number]    DX-CHEST-PORTABLE (1 VIEW)    Result Date: 10/4/2023  1.  Right IJ Port-A-Cath present with the tip overlying the superior vena cava. 2.  Again seen mass within the left lung base.    DX-CHEST-PORTABLE (1 VIEW)    Result Date: 9/22/2023  No acute cardiac or pulmonary abnormality is identified. Left  lower lung mass again noted.    DX-CHEST-PORTABLE (1 VIEW)    Result Date: 8/29/2023  1.  No evidence of pneumothorax. 2.  Mass within the left lung base.    IR-CVC PORT PLACEMENT > AGE 5    Result Date: 10/3/2023  1. ULTRASOUND AND FLUOROSCOPIC GUIDED PLACEMENT OF A Right INTERNAL JUGULAR SINGLE LUMEN BARD POWER-PORT VENOUS ACCESS DEVICE. 2. THE PORT MAY BE USED IMMEDIATELY AS CLINICALLY INDICATED. FLUSHES PER PROTOCOL.     DX-PORTABLE FLUOROSCOPY < 1 HOUR    Result Date: 8/30/2023  Portable fluoroscopy utilized for 1 minute 5 seconds. INTERPRETING LOCATION: [address] [place] [address], [id number]    [id number] BASE TO MID-THIGH    Result Date: 9/13/2023  1.  Interval increased size and intensity of LEFT lower lobe lung mass with new intrapulmonary and pleural metastases indicating progression of disease. 2.  Probable pleural metastasis at the RIGHT lung base posteriorly. 3.  Worsening LEFT hilar adenopathy, metastatic. 4.  No evidence of metastatic disease in the abdomen or pelvis.      IMPRESSION:  Cancer Staging   SCLC (small cell lung carcinoma) (HCC)  Staging form: Lung, AJCC 8th Edition  - Clinical stage from 9/14/2023: Stage IIIA (cT3, cN1, cM0) - Signed by Leonel Patel M.D. on 9/14/2023      PLAN:  No change in treatment plan    Disposition:  Treatment plan and imaging reviewed. Questions answered. Continue therapy outlined.     Leonel Patel M.D.    Orders Placed This Encounter    Rad Onc Aria Session Summary

## 2023-10-26 ENCOUNTER — APPOINTMENT (OUTPATIENT)
Dept: RADIATION ONCOLOGY | Facility: MEDICAL CENTER | Age: 73
End: 2023-10-26
Payer: COMMERCIAL

## 2023-10-26 PROCEDURE — 77386 HCHG IMRT DELIVERY COMPLEX: CPT | Performed by: RADIOLOGY

## 2023-10-26 PROCEDURE — 77336 RADIATION PHYSICS CONSULT: CPT | Performed by: RADIOLOGY

## 2023-10-26 PROCEDURE — 77014 PR CT GUIDANCE PLACEMENT RAD THERAPY FIELDS: CPT | Mod: 26 | Performed by: RADIOLOGY

## 2023-10-27 ENCOUNTER — OUTPATIENT INFUSION SERVICES (OUTPATIENT)
Dept: ONCOLOGY | Facility: MEDICAL CENTER | Age: 73
End: 2023-10-27
Attending: STUDENT IN AN ORGANIZED HEALTH CARE EDUCATION/TRAINING PROGRAM
Payer: COMMERCIAL

## 2023-10-27 VITALS
TEMPERATURE: 98 F | RESPIRATION RATE: 18 BRPM | SYSTOLIC BLOOD PRESSURE: 115 MMHG | DIASTOLIC BLOOD PRESSURE: 68 MMHG | HEART RATE: 77 BPM | OXYGEN SATURATION: 98 %

## 2023-10-27 DIAGNOSIS — C34.90 SCLC (SMALL CELL LUNG CARCINOMA) (HCC): ICD-10-CM

## 2023-10-27 LAB
ALBUMIN SERPL BCP-MCNC: 3.9 G/DL (ref 3.2–4.9)
ALBUMIN/GLOB SERPL: 1.3 G/DL
ALP SERPL-CCNC: 88 U/L (ref 30–99)
ALT SERPL-CCNC: 12 U/L (ref 2–50)
ANION GAP SERPL CALC-SCNC: 11 MMOL/L (ref 7–16)
APPEARANCE UR: CLEAR
APTT PPP: 27.9 SEC (ref 24.7–36)
AST SERPL-CCNC: 15 U/L (ref 12–45)
BASOPHILS # BLD AUTO: 0 % (ref 0–1.8)
BASOPHILS # BLD: 0 K/UL (ref 0–0.12)
BILIRUB CONJ SERPL-MCNC: <0.2 MG/DL (ref 0.1–0.5)
BILIRUB INDIRECT SERPL-MCNC: NORMAL MG/DL (ref 0–1)
BILIRUB SERPL-MCNC: 0.3 MG/DL (ref 0.1–1.5)
BILIRUB UR QL STRIP.AUTO: NEGATIVE
BUN SERPL-MCNC: 19 MG/DL (ref 8–22)
CALCIUM ALBUM COR SERPL-MCNC: 9.1 MG/DL (ref 8.5–10.5)
CALCIUM SERPL-MCNC: 9 MG/DL (ref 8.5–10.5)
CHLORIDE SERPL-SCNC: 105 MMOL/L (ref 96–112)
CHOLEST SERPL-MCNC: 174 MG/DL (ref 100–199)
CK SERPL-CCNC: 100 U/L (ref 0–154)
CO2 SERPL-SCNC: 24 MMOL/L (ref 20–33)
COLOR UR: YELLOW
CREAT SERPL-MCNC: 1.08 MG/DL (ref 0.5–1.4)
EOSINOPHIL # BLD AUTO: 0.07 K/UL (ref 0–0.51)
EOSINOPHIL NFR BLD: 5 % (ref 0–6.9)
ERYTHROCYTE [DISTWIDTH] IN BLOOD BY AUTOMATED COUNT: 44.9 FL (ref 35.9–50)
GFR SERPLBLD CREATININE-BSD FMLA CKD-EPI: 72 ML/MIN/1.73 M 2
GLOBULIN SER CALC-MCNC: 2.9 G/DL (ref 1.9–3.5)
GLUCOSE SERPL-MCNC: 115 MG/DL (ref 65–99)
GLUCOSE UR STRIP.AUTO-MCNC: NEGATIVE MG/DL
HCT VFR BLD AUTO: 31.8 % (ref 42–52)
HGB BLD-MCNC: 11.1 G/DL (ref 14–18)
IMM GRANULOCYTES # BLD AUTO: 0.04 K/UL (ref 0–0.11)
IMM GRANULOCYTES NFR BLD AUTO: 2.9 % (ref 0–0.9)
INR PPP: 0.97 (ref 0.87–1.13)
KETONES UR STRIP.AUTO-MCNC: NEGATIVE MG/DL
LDH SERPL L TO P-CCNC: 105 U/L (ref 107–266)
LEUKOCYTE ESTERASE UR QL STRIP.AUTO: NEGATIVE
LYMPHOCYTES # BLD AUTO: 0.76 K/UL (ref 1–4.8)
LYMPHOCYTES NFR BLD: 54.7 % (ref 22–41)
MAGNESIUM SERPL-MCNC: 1.5 MG/DL (ref 1.5–2.5)
MCH RBC QN AUTO: 31.7 PG (ref 27–33)
MCHC RBC AUTO-ENTMCNC: 34.9 G/DL (ref 32.3–36.5)
MCV RBC AUTO: 90.9 FL (ref 81.4–97.8)
MICRO URNS: NORMAL
MONOCYTES # BLD AUTO: 0.39 K/UL (ref 0–0.85)
MONOCYTES NFR BLD AUTO: 28.1 % (ref 0–13.4)
NEUTROPHILS # BLD AUTO: 0.13 K/UL (ref 1.82–7.42)
NEUTROPHILS NFR BLD: 9.3 % (ref 44–72)
NITRITE UR QL STRIP.AUTO: NEGATIVE
NRBC # BLD AUTO: 0 K/UL
NRBC BLD-RTO: 0 /100 WBC (ref 0–0.2)
NT-PROBNP SERPL IA-MCNC: 350 PG/ML (ref 0–125)
OUTPT INFUS CBC COMMENT OICOM: ABNORMAL
PH UR STRIP.AUTO: 6.5 [PH] (ref 5–8)
PHOSPHATE SERPL-MCNC: 2.9 MG/DL (ref 2.5–4.5)
PLATELET # BLD AUTO: 101 K/UL (ref 164–446)
PLATELETS.RETICULATED NFR BLD AUTO: 2.6 % (ref 0.6–13.1)
PMV BLD AUTO: 9.9 FL (ref 9–12.9)
POTASSIUM SERPL-SCNC: 3.9 MMOL/L (ref 3.6–5.5)
PROT SERPL-MCNC: 6.8 G/DL (ref 6–8.2)
PROT UR QL STRIP: NEGATIVE MG/DL
PROTHROMBIN TIME: 13 SEC (ref 12–14.6)
RBC # BLD AUTO: 3.5 M/UL (ref 4.7–6.1)
RBC UR QL AUTO: NEGATIVE
SODIUM SERPL-SCNC: 140 MMOL/L (ref 135–145)
SP GR UR STRIP.AUTO: 1.02
T3 SERPL-MCNC: 143 NG/DL (ref 60–181)
T4 FREE SERPL-MCNC: 1.17 NG/DL (ref 0.93–1.7)
TROPONIN T SERPL-MCNC: 17 NG/L (ref 6–19)
TSH SERPL DL<=0.005 MIU/L-ACNC: 1.28 UIU/ML (ref 0.38–5.33)
UROBILINOGEN UR STRIP.AUTO-MCNC: 0.2 MG/DL
WBC # BLD AUTO: 1.4 K/UL (ref 4.8–10.8)

## 2023-10-27 PROCEDURE — 84100 ASSAY OF PHOSPHORUS: CPT

## 2023-10-27 PROCEDURE — 700111 HCHG RX REV CODE 636 W/ 250 OVERRIDE (IP)

## 2023-10-27 PROCEDURE — 83880 ASSAY OF NATRIURETIC PEPTIDE: CPT

## 2023-10-27 PROCEDURE — A4212 NON CORING NEEDLE OR STYLET: HCPCS

## 2023-10-27 PROCEDURE — 82248 BILIRUBIN DIRECT: CPT

## 2023-10-27 PROCEDURE — 85730 THROMBOPLASTIN TIME PARTIAL: CPT

## 2023-10-27 PROCEDURE — 85055 RETICULATED PLATELET ASSAY: CPT

## 2023-10-27 PROCEDURE — 84439 ASSAY OF FREE THYROXINE: CPT

## 2023-10-27 PROCEDURE — 80053 COMPREHEN METABOLIC PANEL: CPT

## 2023-10-27 PROCEDURE — 82465 ASSAY BLD/SERUM CHOLESTEROL: CPT

## 2023-10-27 PROCEDURE — 83735 ASSAY OF MAGNESIUM: CPT

## 2023-10-27 PROCEDURE — 36591 DRAW BLOOD OFF VENOUS DEVICE: CPT

## 2023-10-27 PROCEDURE — 81003 URINALYSIS AUTO W/O SCOPE: CPT

## 2023-10-27 PROCEDURE — 84443 ASSAY THYROID STIM HORMONE: CPT

## 2023-10-27 PROCEDURE — 82553 CREATINE MB FRACTION: CPT

## 2023-10-27 PROCEDURE — 84480 ASSAY TRIIODOTHYRONINE (T3): CPT

## 2023-10-27 PROCEDURE — 85025 COMPLETE CBC W/AUTO DIFF WBC: CPT

## 2023-10-27 PROCEDURE — 82550 ASSAY OF CK (CPK): CPT

## 2023-10-27 PROCEDURE — 83615 LACTATE (LD) (LDH) ENZYME: CPT

## 2023-10-27 PROCEDURE — 85610 PROTHROMBIN TIME: CPT

## 2023-10-27 PROCEDURE — 84484 ASSAY OF TROPONIN QUANT: CPT | Mod: 91

## 2023-10-27 RX ADMIN — HEPARIN 500 UNITS: 100 SYRINGE at 07:08

## 2023-10-27 NOTE — PROGRESS NOTES
Pt arrived to [facility], ambulatory, for pre-chemo and study labs. Pt voices no new complaints. Port accessed in sterile manner, positive blood return noted. Labs drawn per orders. Port flushed and heparin locked per policy, port de-accessed. Pt left IS with no s/sx of distress. Follow up appointment confirmed.    0745: Autumn from Lab called with critical result of WBC 1.4 and ANC 0.13 at 0740. Critical lab result read back to Autumn.   NAHOMI Walter notified of critical lab result at 0742.  Critical lab result read back by Freya Prince. Orders received to recollect CBC on Monday, 10/30 at patient's chemotherapy appointment to determine if he will meet parameters for treatment at that time. Call placed to patient to update him on POC, neutropenic precautions reinforced with patient.

## 2023-10-29 LAB
CK MB SERPL-MCNC: 2.2 NG/ML (ref 0–7.7)
MISCELLANEOUS LAB RESULT MISCLAB: NORMAL

## 2023-10-30 ENCOUNTER — HOSPITAL ENCOUNTER (OUTPATIENT)
Dept: HEMATOLOGY ONCOLOGY | Facility: MEDICAL CENTER | Age: 73
End: 2023-10-30
Attending: STUDENT IN AN ORGANIZED HEALTH CARE EDUCATION/TRAINING PROGRAM
Payer: COMMERCIAL

## 2023-10-30 ENCOUNTER — APPOINTMENT (OUTPATIENT)
Dept: ONCOLOGY | Facility: MEDICAL CENTER | Age: 73
End: 2023-10-30
Attending: STUDENT IN AN ORGANIZED HEALTH CARE EDUCATION/TRAINING PROGRAM
Payer: COMMERCIAL

## 2023-10-30 VITALS
SYSTOLIC BLOOD PRESSURE: 120 MMHG | BODY MASS INDEX: 27.43 KG/M2 | HEART RATE: 63 BPM | OXYGEN SATURATION: 98 % | DIASTOLIC BLOOD PRESSURE: 72 MMHG | WEIGHT: 181 LBS | HEIGHT: 68 IN | RESPIRATION RATE: 16 BRPM | TEMPERATURE: 96.6 F

## 2023-10-30 DIAGNOSIS — C34.90 SCLC (SMALL CELL LUNG CARCINOMA) (HCC): ICD-10-CM

## 2023-10-30 PROCEDURE — 77386 HCHG IMRT DELIVERY COMPLEX: CPT | Performed by: RADIOLOGY

## 2023-10-30 PROCEDURE — 99214 OFFICE O/P EST MOD 30 MIN: CPT | Performed by: STUDENT IN AN ORGANIZED HEALTH CARE EDUCATION/TRAINING PROGRAM

## 2023-10-30 PROCEDURE — 99212 OFFICE O/P EST SF 10 MIN: CPT | Performed by: STUDENT IN AN ORGANIZED HEALTH CARE EDUCATION/TRAINING PROGRAM

## 2023-10-30 PROCEDURE — 77014 PR CT GUIDANCE PLACEMENT RAD THERAPY FIELDS: CPT | Mod: 26 | Performed by: RADIOLOGY

## 2023-10-30 ASSESSMENT — ENCOUNTER SYMPTOMS
DIZZINESS: 0
TREMORS: 0
PALPITATIONS: 0
CHILLS: 0
FEVER: 0
FOCAL WEAKNESS: 0
HEARTBURN: 0
WHEEZING: 0
NECK PAIN: 0
MEMORY LOSS: 0
COUGH: 0
ORTHOPNEA: 0
BLURRED VISION: 0
BRUISES/BLEEDS EASILY: 0
VOMITING: 0
SHORTNESS OF BREATH: 0
WEIGHT LOSS: 0
NAUSEA: 0
TINGLING: 1
SPUTUM PRODUCTION: 0
SORE THROAT: 0
ABDOMINAL PAIN: 0
DEPRESSION: 0
SENSORY CHANGE: 0
HEADACHES: 0

## 2023-10-30 ASSESSMENT — FIBROSIS 4 INDEX: FIB4 SCORE: 3.13

## 2023-10-30 NOTE — PROGRESS NOTES
"    Consult Note: Hematology/Oncology     Primary Care:  Pcp Pt States None    Chief Complaint   Patient presents with    Lung Cancer     Prechemo        Current Treatment:     C1: Cis/Etop/HLX10 vs Placebo  C2: Cis/Etop/HLX vs Placebo + RT  C3: Cis/Etop/HLX vs Placebo + RT (treatment deferred 1 week for neutropenia)    Prior Treatment: None    Subjective:   History of Presenting Illness:  Bart Ramsey is a 73 y.o. male who presents with a new diagnosis of SCLC    Patient reports that in February he felt that he was being strangled.  He called the ambulance and he was taken to the ER.  He was placed on BP meds.  He still felt terrible after several days.  [facility] to have [facility]bl[facility] which was removed.  Imaging at that time showed some nodules on his lung.      In March or April 2023 he had a biopsy attempt of his lung nodules.  Per patient biopsy result was inconclusive      PET scan was done on 6/6/2023 which showed a hypermetabolic nodule in the superior segment LEFT lower lobe of lung measuring 2.3 cm consistent with malignancy. Involvement of the LEFT superior hilar lymph node.  No evidence for metastatic disease in the abdomen or pelvis.    He had a bronchoscopy Pathology revealed SCLC.     He had agent orange exposure from [place] ([id number]). Son committed suicide 1 year ago today.  He is raising his granddaughter ([place]).     He quit in 1994 (started in 1966, 28 pack year history).  Used to smoke marijuana.  He states he drinks too much (drinks a \"couple good drinks\" per night).  He has a healthy diet.  He eats a lot of vegetables.     Interval History    Patient is here prior to C3. He is tolerating treatment reasonably well from a clinical standpoint, but is severely neutropenic on his prechemo labs.     Past Medical History:   Diagnosis Date    Arthritis     knee    Bowel habit changes     diarrhea    Bronchitis     Cancer (HCC) 09/29/2023    small cell " lung cancer    COPD (chronic obstructive pulmonary disease) (HCC)     Diabetes (HCC)     diet controlled, no medications.    Emphysema of lung (HCC)     COPD- no medications    High cholesterol 2023    Was on a statin, MD monitoring, not currently on meds    Myocardial infarct (HCC)     ,,    Pneumonia     Psychiatric problem 2023    depression, son passed away recently, no meds    Sleep apnea     cpap        Past Surgical History:   Procedure Laterality Date    VT BRONCHOSCOPY,DIAGNOSTIC N/A 2023    Procedure: FIBER OPTIC BRONCHOSCOPY WITH  WASH, BRUSH, BRONCHOALVEOLAR LAVAGE, BIOPSY AND FINE NEEDLE ASPIRATION, ENDOBRONCHIAL ULTRASOUND & NAVIGATION,  ROBOTICS;  Surgeon: Bart Cortez M.D.;  Location: SURGERY [facility];  Service: Pulmonary Robotic    ENDOBRONCHIAL US ADD-ON N/A 2023    Procedure: ENDOBRONCHIAL ULTRASOUND (EBUS);  Surgeon: Bart Cortez M.D.;  Location: SURGERY [facility];  Service: Pulmonary Robotic    GASTROSCOPY-ENDO  2017    Procedure: GASTROSCOPY-ENDO;  Surgeon: Emerson Eaton M.D.;  Location: ENDOSCOPY [facility];  Service:     COLONOSCOPY - ENDO  2017    Procedure: COLONOSCOPY - ENDO;  Surgeon: Emerson Eaton M.D.;  Location: ENDOSCOPY [facility];  Service:     CHOLECYSTECTOMY      INGUINAL HERNIA REPAIR Right     OTHER      Tonsillectomy    OTHER      Hernia     OTHER CARDIAC SURGERY      3 stents    TONSILLECTOMY         Social History     Tobacco Use    Smoking status: Former     Current packs/day: 0.00     Average packs/day: 0.5 packs/day for 14.0 years (7.0 ttl pk-yrs)     Types: Cigarettes     Start date: 1980     Quit date:      Years since quittin.8    Tobacco comments:     Pt quit smoking cigarettes, .  1/2 pack per day, smoked 20 years    Vaping Use    Vaping Use: Never used   Substance Use Topics    Alcohol use: Not Currently     Alcohol/week: 8.4 oz     Types: 14 Shots of liquor per week     Comment:  daily    Drug use: Not Currently     Types: Marijuana     Comment: quit 2023        Family History   Problem Relation Age of Onset    Cancer Mother         lung?       No Known Allergies    Current Outpatient Medications   Medication Sig Dispense Refill    lidocaine-prilocaine (EMLA) 2.5-2.5 % Cream Apply to port site approximately 1 hour prior to port access and cover with plastic wrap to numb skin. 30 g 3    ondansetron (ZOFRAN) 4 MG Tab tablet Take 1 Tablet by mouth every four hours as needed for Nausea/Vomiting (for nausea, vomiting). 30 Tablet 6    prochlorperazine (COMPAZINE) 10 MG Tab Take 1 Tablet by mouth every 6 hours as needed (for nausea, vomiting). 30 Tablet 6    Cetirizine HCl (ZYRTEC ALLERGY PO) Take 1 Tablet by mouth every day.      ipratropium (ATROVENT) 0.03 % Solution Administer 2 Sprays into affected nostril(S) every 12 hours.      metFORMIN (GLUCOPHAGE) 500 MG Tab Take 750 mg by mouth 2 times a day with meals.      omeprazole (PRILOSEC) 40 MG delayed-release capsule Take 40 mg by mouth every day.      baclofen (LIORESAL) 10 MG Tab Take 10 mg by mouth at bedtime as needed. Indications: Muscle Spasm      Cyanocobalamin (VITAMIN B-12) 1000 MCG Tab Take 1,000 mcg by mouth every day.      fluticasone-salmeterol (ADVAIR) 250-50 MCG/ACT AEROSOL POWDER, BREATH ACTIVATED Inhale 1 Puff every 12 hours.      fluticasone (FLONASE) 50 MCG/ACT nasal spray Administer 2 Sprays into affected nostril(S) every day.      Omega-3 Fatty Acids (FISH OIL) 1000 MG Cap capsule Take 1,000 mg by mouth every day.      vitamin D3 (CHOLECALCIFEROL) 1000 Unit (25 mcg) Tab Take 1,000 Units by mouth every day.      gabapentin (NEURONTIN) 300 MG Cap Take 300 mg by mouth 3 times a day.      aspirin EC (ECOTRIN) 81 MG Tablet Delayed Response Take 81 mg by mouth every day.       No current facility-administered medications for this encounter.       Review of Systems   Constitutional:  Negative for chills, fever, malaise/fatigue and  "weight loss.   HENT:  Positive for tinnitus. Negative for congestion, ear pain, nosebleeds and sore throat.    Eyes:  Negative for blurred vision.   Respiratory:  Negative for cough, sputum production, shortness of breath and wheezing.    Cardiovascular:  Negative for chest pain, palpitations, orthopnea and leg swelling.   Gastrointestinal:  Negative for abdominal pain, heartburn, nausea and vomiting.   Genitourinary:  Negative for dysuria, frequency and urgency.   Musculoskeletal:  Negative for neck pain.   Neurological:  Positive for tingling. Negative for dizziness, tremors, sensory change, focal weakness and headaches.   Endo/Heme/Allergies:  Does not bruise/bleed easily.   Psychiatric/Behavioral:  Negative for depression, memory loss and suicidal ideas.    All other systems reviewed and are negative.      Problem list, medications, and allergies reviewed by myself today in Epic.     Objective:     Vitals:    10/30/23 0838   BP: 120/72   Pulse: 63   Resp: 16   Temp: 35.9 °C (96.6 °F)   TempSrc: Temporal   SpO2: 98%   Weight: 82.1 kg (181 lb)   Height: 1.72 m (5' 7.72\")       DESC; KARNOFSKY SCALE WITH ECOG EQUIVALENT: 90, Able to carry on normal activity; minor signs or symptoms of disease (ECOG equivalent 0)    DISTRESS LEVEL: no acute distress    Physical Exam  Constitutional:       General: He is not in acute distress.     Appearance: Normal appearance.      Comments: Port in place, bruising   HENT:      Head: Normocephalic and atraumatic.      Nose: Nose normal. No congestion.      Mouth/Throat:      Mouth: Mucous membranes are moist.      Pharynx: Oropharynx is clear.   Eyes:      General: No scleral icterus.     Conjunctiva/sclera: Conjunctivae normal.      Pupils: Pupils are equal, round, and reactive to light.   Cardiovascular:      Rate and Rhythm: Normal rate and regular rhythm.      Pulses: Normal pulses.      Heart sounds: No murmur heard.     No friction rub.   Pulmonary:      Effort: Pulmonary " effort is normal. No respiratory distress.      Breath sounds: Normal breath sounds. No stridor. No wheezing or rales.   Chest:      Chest wall: No tenderness.   Abdominal:      General: Abdomen is flat. Bowel sounds are normal. There is no distension.      Palpations: Abdomen is soft. There is no mass.      Tenderness: There is no abdominal tenderness. There is no guarding or rebound.   Musculoskeletal:         General: No swelling, tenderness or deformity. Normal range of motion.      Cervical back: Normal range of motion and neck supple. No rigidity or tenderness.      Right lower leg: No edema.      Left lower leg: No edema.   Skin:     General: Skin is warm.      Coloration: Skin is not jaundiced or pale.      Findings: No bruising or rash.   Neurological:      General: No focal deficit present.      Mental Status: He is alert and oriented to person, place, and time. Mental status is at baseline.      Motor: No weakness.   Psychiatric:         Mood and Affect: Mood normal.         Behavior: Behavior normal.         Thought Content: Thought content normal.         Judgment: Judgment normal.         Labs:   Most recent labs reviewed.  Please see the lab tab of chart review    Imaging:   Most recent images below have been independently reviewed by me.  Please see the imaging tab of chart review    9/13/23 CT CAP  1.  Left lower lobe pulmonary masses, very slightly more prominent than on 8/29/2023.  2.  Multiple left pulmonary and pleural metastases.  3.  Left hilar emely metastases.  4.  No evidence of metastatic disease in the abdomen or pelvis.    9/12/2023 PET scan  1.  Interval increased size and intensity of LEFT lower lobe lung mass with new intrapulmonary and pleural metastases indicating progression of disease.  2.  Probable pleural metastasis at the RIGHT lung base posteriorly.  3.  Worsening LEFT hilar adenopathy, metastatic.  4.  No evidence of metastatic disease in the abdomen or pelvis.    6/6/2023  PET scan  1.  Multilobular hypermetabolic nodule in the superior segment LEFT lower lobe of lung measuring 2.3 cm consistent with malignancy.  2.  Involvement of the LEFT superior hilar lymph node.  3.  No evidence for metastatic disease in the abdomen or pelvis.    Pathology    A. Lung, left lower lobe, fine needle aspiration slides:          Blood only; no epithelial or malignant cells identified.   B. Lung, left lower lobe, core biopsies:          Positive for small cell carcinoma.   C. Lung, left lower lobe, forceps biopsy with touch prep slides:          Positive for small cell carcinoma.   D. Lung, left lower lobe, fine needle aspiration:          Originally submitted for possible flow cytometric analysis,           which was deemed unnecessary.  Tissue will be submitted to           cytology for preparation of a cell block; an addendum will be           generated if there is an unexpected finding.   E. Lymph node, 10L, fine needle aspiration slides:          Malignant cells present, consistent with metastatic small cell   carcinoma.   F.  Lymph node, 10L, core biopsies:            Hypocellular specimen demonstrating few scattered lymphocytes           and few atypical cells suspicious for small cell carcinoma       Assessment/Plan:      Cancer Staging   SCLC (small cell lung carcinoma) (HCC)  Staging form: Lung, AJCC 8th Edition  - Clinical stage from 9/14/2023: Stage IIIA (cT3, cN1, cM0) - Signed by Leonel Patel M.D. on 9/14/2023       Mr. Ramsey who presents today with a new diagnosis of small cell lung cancer, limited stage.     He is being considered for HLX10 trial.      Plan  -Enrolled in the HLX trial   -C3 to be deferred 1 week due to severe neutropenia  -labs to be repeated next week and if corrected, will proceed with C3 at that time  -radiation to start with C2  -will need to see him prior to C4     Any questions and concerns raised by the patient were addressed and answered. Patient denies  any further questions.  Patient encouraged to call the office with any concerns or issues.      David Morin DO (seeing for Roseline Fuller MD)  Hematology/Oncology

## 2023-10-31 ENCOUNTER — APPOINTMENT (OUTPATIENT)
Dept: RADIATION ONCOLOGY | Facility: MEDICAL CENTER | Age: 73
End: 2023-10-31
Payer: COMMERCIAL

## 2023-10-31 ENCOUNTER — APPOINTMENT (OUTPATIENT)
Dept: ONCOLOGY | Facility: MEDICAL CENTER | Age: 73
End: 2023-10-31
Attending: STUDENT IN AN ORGANIZED HEALTH CARE EDUCATION/TRAINING PROGRAM
Payer: COMMERCIAL

## 2023-10-31 PROCEDURE — 77014 PR CT GUIDANCE PLACEMENT RAD THERAPY FIELDS: CPT | Mod: 26 | Performed by: RADIOLOGY

## 2023-10-31 PROCEDURE — 77427 RADIATION TX MANAGEMENT X5: CPT | Performed by: RADIOLOGY

## 2023-10-31 PROCEDURE — 77386 HCHG IMRT DELIVERY COMPLEX: CPT | Performed by: RADIOLOGY

## 2023-11-01 ENCOUNTER — APPOINTMENT (OUTPATIENT)
Dept: ONCOLOGY | Facility: MEDICAL CENTER | Age: 73
End: 2023-11-01
Attending: STUDENT IN AN ORGANIZED HEALTH CARE EDUCATION/TRAINING PROGRAM
Payer: COMMERCIAL

## 2023-11-01 ENCOUNTER — HOSPITAL ENCOUNTER (OUTPATIENT)
Dept: RADIATION ONCOLOGY | Facility: MEDICAL CENTER | Age: 73
End: 2023-11-30
Attending: RADIOLOGY
Payer: COMMERCIAL

## 2023-11-01 LAB
CHEMOTHERAPY INFUSION START DATE: NORMAL
CHEMOTHERAPY RECORDS: 2
CHEMOTHERAPY RECORDS: 6600
CHEMOTHERAPY RECORDS: NORMAL
CHEMOTHERAPY RX CANCER: NORMAL
DATE 1ST CHEMO CANCER: NORMAL
RAD ONC ARIA COURSE LAST TREATMENT DATE: NORMAL
RAD ONC ARIA COURSE TREATMENT ELAPSED DAYS: NORMAL
RAD ONC ARIA REFERENCE POINT DOSAGE GIVEN TO DATE: 32
RAD ONC ARIA REFERENCE POINT ID: NORMAL
RAD ONC ARIA REFERENCE POINT SESSION DOSAGE GIVEN: 2

## 2023-11-01 PROCEDURE — 77014 PR CT GUIDANCE PLACEMENT RAD THERAPY FIELDS: CPT | Mod: 26 | Performed by: RADIOLOGY

## 2023-11-01 PROCEDURE — 77386 HCHG IMRT DELIVERY COMPLEX: CPT | Performed by: RADIOLOGY

## 2023-11-02 ENCOUNTER — APPOINTMENT (OUTPATIENT)
Dept: RADIATION ONCOLOGY | Facility: MEDICAL CENTER | Age: 73
End: 2023-11-02
Payer: COMMERCIAL

## 2023-11-02 VITALS
DIASTOLIC BLOOD PRESSURE: 68 MMHG | WEIGHT: 183 LBS | HEART RATE: 65 BPM | OXYGEN SATURATION: 98 % | TEMPERATURE: 97.5 F | BODY MASS INDEX: 28.06 KG/M2 | SYSTOLIC BLOOD PRESSURE: 116 MMHG

## 2023-11-02 DIAGNOSIS — C34.90 SCLC (SMALL CELL LUNG CARCINOMA) (HCC): ICD-10-CM

## 2023-11-02 LAB
CHEMOTHERAPY INFUSION START DATE: NORMAL
CHEMOTHERAPY RECORDS: 2
CHEMOTHERAPY RECORDS: 6600
CHEMOTHERAPY RECORDS: NORMAL
CHEMOTHERAPY RX CANCER: NORMAL
DATE 1ST CHEMO CANCER: NORMAL
RAD ONC ARIA COURSE LAST TREATMENT DATE: NORMAL
RAD ONC ARIA COURSE TREATMENT ELAPSED DAYS: NORMAL
RAD ONC ARIA REFERENCE POINT DOSAGE GIVEN TO DATE: 34
RAD ONC ARIA REFERENCE POINT ID: NORMAL
RAD ONC ARIA REFERENCE POINT SESSION DOSAGE GIVEN: 2

## 2023-11-02 PROCEDURE — 77014 PR CT GUIDANCE PLACEMENT RAD THERAPY FIELDS: CPT | Mod: 26 | Performed by: RADIOLOGY

## 2023-11-02 PROCEDURE — 77386 HCHG IMRT DELIVERY COMPLEX: CPT | Performed by: RADIOLOGY

## 2023-11-02 RX ORDER — SUCRALFATE 1 G/1
1 TABLET ORAL
Qty: 120 TABLET | Refills: 3 | Status: SHIPPED | OUTPATIENT
Start: 2023-11-02 | End: 2023-12-15

## 2023-11-02 ASSESSMENT — PAIN SCALES - GENERAL: PAINLEVEL: NO PAIN

## 2023-11-02 ASSESSMENT — FIBROSIS 4 INDEX: FIB4 SCORE: 3.13

## 2023-11-02 NOTE — ON TREATMENT VISIT
"  ON TREATMENT  NOTE  RADIATION ONCOLOGY DEPARTMENT    Patient name:  Bart Ramsey    Primary Physician:  Pcp Pt States None MRN: [id number]  CSN: [id number]   Referring physician:  Gianfranco Echevarria, *   : 1950, 73 y.o.     ENCOUNTER DATE:  2023      DIAGNOSIS:  SCLC (small cell lung carcinoma) (HCC)  Staging form: Lung, AJCC 8th Edition  - Clinical stage from 2023: Stage IIIA (cT3, cN1, cM0) - Signed by Leonel Patel M.D. on 2023  Stage prefix: Initial diagnosis      TREATMENT SUMMARY:  Course First Treatment Date 10/09/2023  Course Last Treatment Date 2023  [facility] Treatment Information          2023   [facility] Course Treatment Dates   Course First Treatment Date 10/09/2023    Course Last Treatment Date 2023    [facility] Treatment Summary   L_Lung  Plan from Course C1_L_LUNG   Fraction 17 of 33   Elapsed Course Days 24 @    Prescribed Fraction Dose 200 cGy   Prescribed Total Dose 6,600 cGy   L_Lung  Reference Point from Course C1_L_LUNG   Elapsed Course Days 24 @ [id number]   Session Dose 200 cGy   Total Dose 3,400 cGy          SUBJECTIVE:  Doing okay, noticing more esophagitis type symptoms, slight difficulty with swallowing but able to eat relatively okay, weight is stable, significant issues with burping but still manageable.    VITAL SIGNS:      2023    11:22 AM 10/30/2023     8:38 AM 10/27/2023     7:00 AM 10/25/2023    11:32 AM 10/18/2023    11:11 AM 10/11/2023     2:45 PM 10/11/2023     1:02 PM   Vitals   SYSTOLIC 116 120 115 120 121 110 124   DIASTOLIC 68 72 68 66 74 60 76   Pulse 65 63 77 71 79 64 71   Temperature 36.4 °C (97.5 °F) 35.9 °C (96.6 °F) 36.7 °C (98 °F) 36.5 °C (97.7 °F)  36.6 °C (97.8 °F)    Respiration  16 18 18 17 18 16   Weight 183 181  187.39 181.66 196.21 193   Height  1.72 m (5' 7.72\")    1.72 m (5' 7.72\") 1.753 m (5' 9\")   BMI 28.06 kg/m2 27.75 kg/m2  28.73 kg/m2 27.85 kg/m2 30.08 kg/m2 28.5 kg/m2   Pulse Oximetry 98 % 98 % " 98 % 94 % 97 % 95 % 94 %     KPS: 80, Normal activity with effort; some signs or symptoms of disease (ECOG equivalent 1)  Encounter Vitals  Temperature: 36.4 °C (97.5 °F)  Temp src: Temporal  Blood Pressure : 116/68  Pulse: 65  Pulse Oximetry: 98 %  Weight: 83 kg (183 lb)  Pain Score: No pain      11/2/2023    11:22 AM 10/25/2023    11:32 AM 10/18/2023    11:11 AM 10/11/2023    11:25 AM 9/26/2023    11:30 AM 9/26/2023    10:25 AM   Pain Assessment   Pain Score NO PAIN NO PAIN 2=MINIMAL PA 2=MINIMAL PA NO PAIN NO PAIN   Pain Loc   HEAD HEAD            PHYSICAL EXAM:  Physical Exam  Constitutional:       Appearance: Normal appearance.   Cardiovascular:      Rate and Rhythm: Normal rate and regular rhythm.   Pulmonary:      Effort: Pulmonary effort is normal. No respiratory distress.      Breath sounds: Normal breath sounds.   Neurological:      Mental Status: He is alert.              11/2/2023    11:23 AM 10/25/2023    11:34 AM 10/18/2023    11:12 AM 10/11/2023    11:27 AM   Toxicity Assessment   Toxicity Assessment Chest Chest Chest Chest   Fatigue (lethargy, malaise, asthenia) None Increased fatigue over baseline, but not altering normal activities Increased fatigue over baseline, but not altering normal activities Increased fatigue over baseline, but not altering normal activities   Radiation Dermatitis None None None None   Anorexia Loss of appetite None Loss of appetite Loss of appetite   Dyspepsia/heartburn Severe None None None   Dysphagia, Esophagitis, odynophagoa (painful swallowing) Mild dysphagia, but can eat regular diet None None None   RT Dysphagia-Esophageal None None None None   Cough Absent Absent Absent Absent   Dyspnea Dyspnea on exertion Dyspnea on exertion Dyspnea on exertion Normal       CURRENT MEDICATIONS:    Current Outpatient Medications:     lidocaine-prilocaine (EMLA) 2.5-2.5 % Cream, Apply to port site approximately 1 hour prior to port access and cover with plastic wrap to numb skin.,  Disp: 30 g, Rfl: 3    ondansetron (ZOFRAN) 4 MG Tab tablet, Take 1 Tablet by mouth every four hours as needed for Nausea/Vomiting (for nausea, vomiting)., Disp: 30 Tablet, Rfl: 6    prochlorperazine (COMPAZINE) 10 MG Tab, Take 1 Tablet by mouth every 6 hours as needed (for nausea, vomiting)., Disp: 30 Tablet, Rfl: 6    Cetirizine HCl (ZYRTEC ALLERGY PO), Take 1 Tablet by mouth every day., Disp: , Rfl:     ipratropium (ATROVENT) 0.03 % Solution, Administer 2 Sprays into affected nostril(S) every 12 hours., Disp: , Rfl:     metFORMIN (GLUCOPHAGE) 500 MG Tab, Take 750 mg by mouth 2 times a day with meals., Disp: , Rfl:     omeprazole (PRILOSEC) 40 MG delayed-release capsule, Take 40 mg by mouth every day., Disp: , Rfl:     baclofen (LIORESAL) 10 MG Tab, Take 10 mg by mouth at bedtime as needed. Indications: Muscle Spasm, Disp: , Rfl:     Cyanocobalamin (VITAMIN B-12) 1000 MCG Tab, Take 1,000 mcg by mouth every day., Disp: , Rfl:     fluticasone-salmeterol (ADVAIR) 250-50 MCG/ACT AEROSOL POWDER, BREATH ACTIVATED, Inhale 1 Puff every 12 hours., Disp: , Rfl:     fluticasone (FLONASE) 50 MCG/ACT nasal spray, Administer 2 Sprays into affected nostril(S) every day., Disp: , Rfl:     Omega-3 Fatty Acids (FISH OIL) 1000 MG Cap capsule, Take 1,000 mg by mouth every day., Disp: , Rfl:     vitamin D3 (CHOLECALCIFEROL) 1000 Unit (25 mcg) Tab, Take 1,000 Units by mouth every day., Disp: , Rfl:     gabapentin (NEURONTIN) 300 MG Cap, Take 300 mg by mouth 3 times a day., Disp: , Rfl:     aspirin EC (ECOTRIN) 81 MG Tablet Delayed Response, Take 81 mg by mouth every day., Disp: , Rfl:     LABORATORY DATA:   Lab Results   Component Value Date/Time    SODIUM 140 10/27/2023 07:05 AM    POTASSIUM 3.9 10/27/2023 07:05 AM    CHLORIDE 105 10/27/2023 07:05 AM    CO2 24 10/27/2023 07:05 AM    GLUCOSE 115 (H) 10/27/2023 07:05 AM    BUN 19 10/27/2023 07:05 AM    CREATININE 1.08 10/27/2023 07:05 AM       Lab Results   Component Value Date/Time     WBC 1.4 (LL) 10/27/2023 07:05 AM    RBC 3.50 (L) 10/27/2023 07:05 AM    HEMOGLOBIN 11.1 (L) 10/27/2023 07:05 AM    HEMATOCRIT 31.8 (L) 10/27/2023 07:05 AM    MCV 90.9 10/27/2023 07:05 AM    MCH 31.7 10/27/2023 07:05 AM    MCHC 34.9 10/27/2023 07:05 AM    PLATELETCT 101 (L) 10/27/2023 07:05 AM         RADIOLOGY DATA:  CT-CSPINE WITHOUT PLUS RECONS    Result Date: 9/22/2023  No acute fracture or dislocation seen in the CT scan of the cervical spine.    CT-CHEST,ABDOMEN,PELVIS WITH    Result Date: 9/13/2023  1.  Left lower lobe pulmonary masses, very slightly more prominent than on 8/29/2023. 2.  Multiple left pulmonary and pleural metastases. 3.  Left hilar emely metastases. 4.  No evidence of metastatic disease in the abdomen or pelvis.    CT-HEAD W/O    Result Date: 9/22/2023  NO ACUTE ABNORMALITIES ARE NOTED ON CT SCAN OF THE HEAD.     DX-CHEST-PORTABLE (1 VIEW)    Result Date: 10/4/2023  1.  Right IJ Port-A-Cath present with the tip overlying the superior vena cava. 2.  Again seen mass within the left lung base.    DX-CHEST-PORTABLE (1 VIEW)    Result Date: 9/22/2023  No acute cardiac or pulmonary abnormality is identified. Left lower lung mass again noted.    IR-CVC PORT PLACEMENT > AGE 5    Result Date: 10/3/2023  1. ULTRASOUND AND FLUOROSCOPIC GUIDED PLACEMENT OF A Right INTERNAL JUGULAR SINGLE LUMEN BARD POWER-PORT VENOUS ACCESS DEVICE. 2. THE PORT MAY BE USED IMMEDIATELY AS CLINICALLY INDICATED. FLUSHES PER PROTOCOL.     [id number] BASE TO MID-THIGH    Result Date: 9/13/2023  1.  Interval increased size and intensity of LEFT lower lobe lung mass with new intrapulmonary and pleural metastases indicating progression of disease. 2.  Probable pleural metastasis at the RIGHT lung base posteriorly. 3.  Worsening LEFT hilar adenopathy, metastatic. 4.  No evidence of metastatic disease in the abdomen or pelvis.      IMPRESSION:  Cancer Staging   SCLC (small cell lung carcinoma) (HCC)  Staging form: Lung, AJCC 8th  Edition  - Clinical stage from 9/14/2023: Stage IIIA (cT3, cN1, cM0) - Signed by Leonel Patel M.D. on 9/14/2023      PLAN:  No change in treatment plan    Disposition:  Treatment plan and imaging reviewed. Questions answered. Continue therapy outlined.     Leonel Patel M.D.    Orders Placed This Encounter    Rad Onc [facility] Session Summary

## 2023-11-03 ENCOUNTER — APPOINTMENT (OUTPATIENT)
Dept: RADIATION ONCOLOGY | Facility: MEDICAL CENTER | Age: 73
End: 2023-11-03
Payer: COMMERCIAL

## 2023-11-03 ENCOUNTER — OUTPATIENT INFUSION SERVICES (OUTPATIENT)
Dept: ONCOLOGY | Facility: MEDICAL CENTER | Age: 73
End: 2023-11-03
Attending: STUDENT IN AN ORGANIZED HEALTH CARE EDUCATION/TRAINING PROGRAM
Payer: COMMERCIAL

## 2023-11-03 VITALS
TEMPERATURE: 98 F | HEART RATE: 116 BPM | OXYGEN SATURATION: 92 % | DIASTOLIC BLOOD PRESSURE: 62 MMHG | SYSTOLIC BLOOD PRESSURE: 118 MMHG | RESPIRATION RATE: 18 BRPM

## 2023-11-03 DIAGNOSIS — C34.90 SCLC (SMALL CELL LUNG CARCINOMA) (HCC): ICD-10-CM

## 2023-11-03 LAB
BASOPHILS # BLD AUTO: 1.4 % (ref 0–1.8)
BASOPHILS # BLD: 0.08 K/UL (ref 0–0.12)
EOSINOPHIL # BLD AUTO: 0.04 K/UL (ref 0–0.51)
EOSINOPHIL NFR BLD: 0.7 % (ref 0–6.9)
ERYTHROCYTE [DISTWIDTH] IN BLOOD BY AUTOMATED COUNT: 49 FL (ref 35.9–50)
HCT VFR BLD AUTO: 31.6 % (ref 42–52)
HGB BLD-MCNC: 10.9 G/DL (ref 14–18)
IMM GRANULOCYTES # BLD AUTO: 0.06 K/UL (ref 0–0.11)
IMM GRANULOCYTES NFR BLD AUTO: 1.1 % (ref 0–0.9)
LYMPHOCYTES # BLD AUTO: 0.94 K/UL (ref 1–4.8)
LYMPHOCYTES NFR BLD: 17 % (ref 22–41)
MCH RBC QN AUTO: 31.6 PG (ref 27–33)
MCHC RBC AUTO-ENTMCNC: 34.5 G/DL (ref 32.3–36.5)
MCV RBC AUTO: 91.6 FL (ref 81.4–97.8)
MONOCYTES # BLD AUTO: 0.82 K/UL (ref 0–0.85)
MONOCYTES NFR BLD AUTO: 14.8 % (ref 0–13.4)
NEUTROPHILS # BLD AUTO: 3.6 K/UL (ref 1.82–7.42)
NEUTROPHILS NFR BLD: 65 % (ref 44–72)
NRBC # BLD AUTO: 0 K/UL
NRBC BLD-RTO: 0 /100 WBC (ref 0–0.2)
OUTPT INFUS CBC COMMENT OICOM: ABNORMAL
PLATELET # BLD AUTO: 167 K/UL (ref 164–446)
PMV BLD AUTO: 9.3 FL (ref 9–12.9)
RBC # BLD AUTO: 3.45 M/UL (ref 4.7–6.1)
WBC # BLD AUTO: 5.5 K/UL (ref 4.8–10.8)

## 2023-11-03 PROCEDURE — A4212 NON CORING NEEDLE OR STYLET: HCPCS

## 2023-11-03 PROCEDURE — 36591 DRAW BLOOD OFF VENOUS DEVICE: CPT

## 2023-11-03 PROCEDURE — 700111 HCHG RX REV CODE 636 W/ 250 OVERRIDE (IP): Performed by: STUDENT IN AN ORGANIZED HEALTH CARE EDUCATION/TRAINING PROGRAM

## 2023-11-03 PROCEDURE — 77014 PR CT GUIDANCE PLACEMENT RAD THERAPY FIELDS: CPT | Mod: 26 | Performed by: RADIOLOGY

## 2023-11-03 PROCEDURE — 77386 HCHG IMRT DELIVERY COMPLEX: CPT | Performed by: RADIOLOGY

## 2023-11-03 PROCEDURE — 77336 RADIATION PHYSICS CONSULT: CPT | Performed by: RADIOLOGY

## 2023-11-03 PROCEDURE — 85025 COMPLETE CBC W/AUTO DIFF WBC: CPT

## 2023-11-03 RX ADMIN — HEPARIN 500 UNITS: 100 SYRINGE at 10:50

## 2023-11-03 NOTE — PROGRESS NOTES
Bill is here for pre-chemo labs. Right upper chest port accessed using sterile technique; labs drawn as ordered. Heparin instilled prior to de-accessing port. Port de-accessed; gauze/tape applied to site. Next appointment scheduled. Discharged to self care; no apparent distress noted.

## 2023-11-03 NOTE — PROGRESS NOTES
"Pharmacy Chemotherapy Verification:   Patient Name: Bart Ramsey  Dx: small cell lung cancer  Cycle 3 (delayed one week d/t neutropenia)  Previous treatment: C2 10/9-10/11  Study Protocol: [id number]  Participant # [id number]    HLX10 or placebo 300 mg IV over 30-60 min on day 1  Cisplatin 75 mg/m2 IV over 60 min on day 1  Etoposide 100 mg/m2 IV over 1-2 hours on days 1-3  Every 21 days with concurrent radiation    followed by  HLX10 or placebo 300 mg IV over 30-60 min on day 1  Every 21 days  A Randomized, Double-Blind, International Multicenter, Phase III Study to Evaluate the Anti-Tumor Efficacy and Safety of HLX10 (Recombinant Humanized Anti-PD-1 Monoclonal Antibody Injection) or Placebo in Combination with Chemotherapy (Carboplatin/Cisplatin-Etoposide) and Concurrent Radiotherapy in Patients with Limited-Stage Small Cell Lung Cancer (LS-SCLC). [id number]  Allergies: Patient has no known allergies.       /62   Pulse 82   Temp 36.4 °C (97.6 °F) (Temporal)   Resp 18   Ht 1.72 m (5' 7.72\")   Wt 83 kg (182 lb 15.7 oz)   SpO2 97%   BMI 28.06 kg/m²      Body surface area is 1.99 meters squared.    Labs 11/3/23:  ANC~ 3600 Plt = 167k   Hgb = 10.9       Labs 11/6/23:    SCr = 1.2 mg/dL CrCl ~ 64 mL/min   AST/ALT/AP = 19/15/99 TBili = 0.3   K = 4.3  Mag = 1.5 - replaced per protocol  HLX10 or placebo 300 mg fixed dose = 300 mg IV   Fixed dose, no calculation required = 300 mg IV    Cisplatin 75 mg/m² x 1.99 m² = 149.25 mg   <10% difference, ok to treat with final dose = 155 mg IV    Etoposide 100 mg/m² x 1.99 m² = 199 mg   <10% difference, ok to treat with final dose = 206 mg IV    Mainor Yap, PharmD  "

## 2023-11-06 ENCOUNTER — APPOINTMENT (OUTPATIENT)
Dept: HEMATOLOGY ONCOLOGY | Facility: MEDICAL CENTER | Age: 73
End: 2023-11-06
Payer: COMMERCIAL

## 2023-11-06 ENCOUNTER — HOSPITAL ENCOUNTER (OUTPATIENT)
Dept: HEMATOLOGY ONCOLOGY | Facility: MEDICAL CENTER | Age: 73
End: 2023-11-06
Attending: STUDENT IN AN ORGANIZED HEALTH CARE EDUCATION/TRAINING PROGRAM

## 2023-11-06 ENCOUNTER — OUTPATIENT INFUSION SERVICES (OUTPATIENT)
Dept: ONCOLOGY | Facility: MEDICAL CENTER | Age: 73
End: 2023-11-06
Attending: STUDENT IN AN ORGANIZED HEALTH CARE EDUCATION/TRAINING PROGRAM
Payer: COMMERCIAL

## 2023-11-06 ENCOUNTER — APPOINTMENT (OUTPATIENT)
Dept: RADIATION ONCOLOGY | Facility: MEDICAL CENTER | Age: 73
End: 2023-11-06
Payer: COMMERCIAL

## 2023-11-06 ENCOUNTER — DOCUMENTATION (OUTPATIENT)
Dept: ONCOLOGY | Facility: MEDICAL CENTER | Age: 73
End: 2023-11-06
Payer: COMMERCIAL

## 2023-11-06 VITALS
TEMPERATURE: 97.6 F | BODY MASS INDEX: 27.73 KG/M2 | HEIGHT: 68 IN | RESPIRATION RATE: 18 BRPM | OXYGEN SATURATION: 97 % | WEIGHT: 182.98 LBS | SYSTOLIC BLOOD PRESSURE: 118 MMHG | DIASTOLIC BLOOD PRESSURE: 62 MMHG | HEART RATE: 82 BPM

## 2023-11-06 VITALS
BODY MASS INDEX: 27.7 KG/M2 | HEIGHT: 68 IN | DIASTOLIC BLOOD PRESSURE: 62 MMHG | SYSTOLIC BLOOD PRESSURE: 94 MMHG | TEMPERATURE: 98.6 F | HEART RATE: 84 BPM | WEIGHT: 182.76 LBS | OXYGEN SATURATION: 96 %

## 2023-11-06 DIAGNOSIS — C34.90 SCLC (SMALL CELL LUNG CARCINOMA) (HCC): ICD-10-CM

## 2023-11-06 DIAGNOSIS — Z79.899 ENCOUNTER FOR LONG-TERM (CURRENT) USE OF HIGH-RISK MEDICATION: ICD-10-CM

## 2023-11-06 LAB
ALBUMIN SERPL BCP-MCNC: 4 G/DL (ref 3.2–4.9)
ALBUMIN/GLOB SERPL: 1.4 G/DL
ALP SERPL-CCNC: 99 U/L (ref 30–99)
ALT SERPL-CCNC: 15 U/L (ref 2–50)
ANION GAP SERPL CALC-SCNC: 14 MMOL/L (ref 7–16)
APPEARANCE UR: CLEAR
APTT PPP: 27.2 SEC (ref 24.7–36)
AST SERPL-CCNC: 19 U/L (ref 12–45)
BILIRUB CONJ SERPL-MCNC: <0.2 MG/DL (ref 0.1–0.5)
BILIRUB INDIRECT SERPL-MCNC: NORMAL MG/DL (ref 0–1)
BILIRUB SERPL-MCNC: 0.3 MG/DL (ref 0.1–1.5)
BILIRUB UR QL STRIP.AUTO: NEGATIVE
BUN SERPL-MCNC: 14 MG/DL (ref 8–22)
CALCIUM ALBUM COR SERPL-MCNC: 9 MG/DL (ref 8.5–10.5)
CALCIUM SERPL-MCNC: 9 MG/DL (ref 8.5–10.5)
CHLORIDE SERPL-SCNC: 103 MMOL/L (ref 96–112)
CHOLEST SERPL-MCNC: 196 MG/DL (ref 100–199)
CK SERPL-CCNC: 121 U/L (ref 0–154)
CO2 SERPL-SCNC: 21 MMOL/L (ref 20–33)
COLOR UR: YELLOW
CREAT SERPL-MCNC: 1.2 MG/DL (ref 0.5–1.4)
GFR SERPLBLD CREATININE-BSD FMLA CKD-EPI: 64 ML/MIN/1.73 M 2
GLOBULIN SER CALC-MCNC: 2.9 G/DL (ref 1.9–3.5)
GLUCOSE SERPL-MCNC: 133 MG/DL (ref 65–99)
GLUCOSE UR STRIP.AUTO-MCNC: NEGATIVE MG/DL
HDLC SERPL-MCNC: 35 MG/DL
INR PPP: 1.05 (ref 0.87–1.13)
KETONES UR STRIP.AUTO-MCNC: ABNORMAL MG/DL
LDH SERPL L TO P-CCNC: 165 U/L (ref 107–266)
LDLC SERPL CALC-MCNC: 124 MG/DL
LEUKOCYTE ESTERASE UR QL STRIP.AUTO: NEGATIVE
MAGNESIUM SERPL-MCNC: 1.5 MG/DL (ref 1.5–2.5)
MICRO URNS: ABNORMAL
NITRITE UR QL STRIP.AUTO: NEGATIVE
NT-PROBNP SERPL IA-MCNC: 223 PG/ML (ref 0–125)
PH UR STRIP.AUTO: 5 [PH] (ref 5–8)
POTASSIUM SERPL-SCNC: 4.3 MMOL/L (ref 3.6–5.5)
PROT SERPL-MCNC: 6.9 G/DL (ref 6–8.2)
PROT UR QL STRIP: NEGATIVE MG/DL
PROTHROMBIN TIME: 13.8 SEC (ref 12–14.6)
RBC UR QL AUTO: NEGATIVE
SODIUM SERPL-SCNC: 138 MMOL/L (ref 135–145)
SP GR UR STRIP.AUTO: 1.02
T3 SERPL-MCNC: 120 NG/DL (ref 60–181)
T4 FREE SERPL-MCNC: 1.2 NG/DL (ref 0.93–1.7)
TRIGL SERPL-MCNC: 183 MG/DL (ref 0–149)
TROPONIN T SERPL-MCNC: 27 NG/L (ref 6–19)
TSH SERPL DL<=0.005 MIU/L-ACNC: 0.84 UIU/ML (ref 0.38–5.33)
UROBILINOGEN UR STRIP.AUTO-MCNC: 0.2 MG/DL

## 2023-11-06 PROCEDURE — 700111 HCHG RX REV CODE 636 W/ 250 OVERRIDE (IP): Mod: JZ | Performed by: NURSE PRACTITIONER

## 2023-11-06 PROCEDURE — 96417 CHEMO IV INFUS EACH ADDL SEQ: CPT

## 2023-11-06 PROCEDURE — 84480 ASSAY TRIIODOTHYRONINE (T3): CPT

## 2023-11-06 PROCEDURE — 96361 HYDRATE IV INFUSION ADD-ON: CPT

## 2023-11-06 PROCEDURE — 77014 PR CT GUIDANCE PLACEMENT RAD THERAPY FIELDS: CPT | Mod: 26 | Performed by: RADIOLOGY

## 2023-11-06 PROCEDURE — 96413 CHEMO IV INFUSION 1 HR: CPT

## 2023-11-06 PROCEDURE — 82248 BILIRUBIN DIRECT: CPT

## 2023-11-06 PROCEDURE — 84439 ASSAY OF FREE THYROXINE: CPT

## 2023-11-06 PROCEDURE — 82550 ASSAY OF CK (CPK): CPT

## 2023-11-06 PROCEDURE — 83735 ASSAY OF MAGNESIUM: CPT

## 2023-11-06 PROCEDURE — A4212 NON CORING NEEDLE OR STYLET: HCPCS

## 2023-11-06 PROCEDURE — 96375 TX/PRO/DX INJ NEW DRUG ADDON: CPT

## 2023-11-06 PROCEDURE — 82553 CREATINE MB FRACTION: CPT

## 2023-11-06 PROCEDURE — A9270 NON-COVERED ITEM OR SERVICE: HCPCS | Performed by: NURSE PRACTITIONER

## 2023-11-06 PROCEDURE — 96367 TX/PROPH/DG ADDL SEQ IV INF: CPT

## 2023-11-06 PROCEDURE — 84443 ASSAY THYROID STIM HORMONE: CPT

## 2023-11-06 PROCEDURE — 85610 PROTHROMBIN TIME: CPT

## 2023-11-06 PROCEDURE — 80053 COMPREHEN METABOLIC PANEL: CPT

## 2023-11-06 PROCEDURE — 99212 OFFICE O/P EST SF 10 MIN: CPT | Performed by: STUDENT IN AN ORGANIZED HEALTH CARE EDUCATION/TRAINING PROGRAM

## 2023-11-06 PROCEDURE — 83880 ASSAY OF NATRIURETIC PEPTIDE: CPT

## 2023-11-06 PROCEDURE — 99214 OFFICE O/P EST MOD 30 MIN: CPT | Performed by: STUDENT IN AN ORGANIZED HEALTH CARE EDUCATION/TRAINING PROGRAM

## 2023-11-06 PROCEDURE — 84484 ASSAY OF TROPONIN QUANT: CPT

## 2023-11-06 PROCEDURE — 304540 HCHG NITRO SET VENT 2ND TUB

## 2023-11-06 PROCEDURE — 700101 HCHG RX REV CODE 250: Performed by: NURSE PRACTITIONER

## 2023-11-06 PROCEDURE — 85730 THROMBOPLASTIN TIME PARTIAL: CPT

## 2023-11-06 PROCEDURE — 77386 HCHG IMRT DELIVERY COMPLEX: CPT | Performed by: RADIOLOGY

## 2023-11-06 PROCEDURE — 83615 LACTATE (LD) (LDH) ENZYME: CPT

## 2023-11-06 PROCEDURE — 80061 LIPID PANEL: CPT

## 2023-11-06 PROCEDURE — 81003 URINALYSIS AUTO W/O SCOPE: CPT

## 2023-11-06 PROCEDURE — 700105 HCHG RX REV CODE 258: Performed by: NURSE PRACTITIONER

## 2023-11-06 RX ORDER — SODIUM CHLORIDE 9 MG/ML
1000 INJECTION, SOLUTION INTRAVENOUS ONCE
Status: COMPLETED | OUTPATIENT
Start: 2023-11-06 | End: 2023-11-06

## 2023-11-06 RX ADMIN — ONDANSETRON 16 MG: 2 INJECTION INTRAMUSCULAR; INTRAVENOUS at 11:10

## 2023-11-06 RX ADMIN — Medication 300 MG: at 12:06

## 2023-11-06 RX ADMIN — SODIUM CHLORIDE 1000 ML: 9 INJECTION, SOLUTION INTRAVENOUS at 10:02

## 2023-11-06 RX ADMIN — FOSAPREPITANT 150 MG: 150 INJECTION, POWDER, LYOPHILIZED, FOR SOLUTION INTRAVENOUS at 11:32

## 2023-11-06 RX ADMIN — POTASSIUM CHLORIDE: 2 INJECTION, SOLUTION, CONCENTRATE INTRAVENOUS at 14:29

## 2023-11-06 RX ADMIN — DEXAMETHASONE SODIUM PHOSPHATE 12 MG: 4 INJECTION, SOLUTION INTRA-ARTICULAR; INTRALESIONAL; INTRAMUSCULAR; INTRAVENOUS; SOFT TISSUE at 10:56

## 2023-11-06 RX ADMIN — HEPARIN 500 UNITS: 100 SYRINGE at 16:32

## 2023-11-06 ASSESSMENT — ENCOUNTER SYMPTOMS
HEADACHES: 0
SHORTNESS OF BREATH: 0
ABDOMINAL PAIN: 0
BLURRED VISION: 0
MEMORY LOSS: 0
DEPRESSION: 0
TINGLING: 1
CHILLS: 0
FEVER: 0
TREMORS: 0
SORE THROAT: 0
DIZZINESS: 0
PALPITATIONS: 0
VOMITING: 0
NAUSEA: 0
FOCAL WEAKNESS: 0
BRUISES/BLEEDS EASILY: 0
WHEEZING: 0
SPUTUM PRODUCTION: 0
NECK PAIN: 0
SENSORY CHANGE: 0
ORTHOPNEA: 0
HEARTBURN: 0
WEIGHT LOSS: 0
COUGH: 0

## 2023-11-06 ASSESSMENT — FIBROSIS 4 INDEX
FIB4 SCORE: 1.89
FIB4 SCORE: 1.89

## 2023-11-06 NOTE — PROGRESS NOTES
"    Consult Note: Hematology/Oncology     Primary Care:  Pcp Pt States None    No chief complaint on file.      Current Treatment:     9/18/23   C1: Cis/Etop/HLX10 vs Placebo  10/9/23   C2: Cis/Etop/HLX vs Placebo + RT  10/28/23 C3: Cis/Etop/HLX vs Placebo + RT (treatment deferred 1 week for neutropenia)  11/6/23   C3: Cis/Etop/HLX vs Placebo + RT    Prior Treatment: None    Subjective:   History of Presenting Illness:  Bart Ramsey is a 73 y.o. male who presents with a new diagnosis of SCLC    Patient reports that in February he felt that he was being strangled.  He called the ambulance and he was taken to the ER.  He was placed on BP meds.  He still felt terrible after several days.  [facility]bl[facility] which was removed.  Imaging at that time showed some nodules on his lung.      In March or April 2023 he had a biopsy attempt of his lung nodules.  Per patient biopsy result was inconclusive      PET scan was done on 6/6/2023 which showed a hypermetabolic nodule in the superior segment LEFT lower lobe of lung measuring 2.3 cm consistent with malignancy. Involvement of the LEFT superior hilar lymph node.  No evidence for metastatic disease in the abdomen or pelvis.    He had a bronchoscopy Pathology revealed SCLC.     He had agent orange exposure from [place] ([id number]). Son committed suicide 1 year ago today.  He is raising his granddaughter ([place]).     He quit in 1994 (started in 1966, 28 pack year history).  Used to smoke marijuana.  He states he drinks too much (drinks a \"couple good drinks\" per night).  He has a healthy diet.  He eats a lot of vegetables.     Interval History    Patient is here prior to C3.  Last week his cycle 3 was held due to severe neutropenia.     Labs reviewed and neutropenia resolved    He is tolerating radiation well.       Past Medical History:   Diagnosis Date    Arthritis     knee    Bowel habit changes     diarrhea    " Bronchitis     Cancer (HCC) 2023    small cell lung cancer    COPD (chronic obstructive pulmonary disease) (HCC)     Diabetes (HCC)     diet controlled, no medications.    Emphysema of lung (HCC)     COPD- no medications    High cholesterol 2023    Was on a statin, MD monitoring, not currently on meds    Myocardial infarct ([facility])     ,,    Pneumonia     Psychiatric problem 2023    depression, son passed away recently, no meds    Sleep apnea     cpap        Past Surgical History:   Procedure Laterality Date    CT BRONCHOSCOPY,DIAGNOSTIC N/A 2023    Procedure: FIBER OPTIC BRONCHOSCOPY WITH  WASH, BRUSH, BRONCHOALVEOLAR LAVAGE, BIOPSY AND FINE NEEDLE ASPIRATION, ENDOBRONCHIAL ULTRASOUND & NAVIGATION,  ROBOTICS;  Surgeon: Bart Cortez M.D.;  Location: SURGERY [facility];  Service: Pulmonary Robotic    ENDOBRONCHIAL US ADD-ON N/A 2023    Procedure: ENDOBRONCHIAL ULTRASOUND (EBUS);  Surgeon: Bart Cortez M.D.;  Location: SURGERY [facility];  Service: Pulmonary Robotic    GASTROSCOPY-ENDO  2017    Procedure: GASTROSCOPY-ENDO;  Surgeon: Emerson Eaotn M.D.;  Location: ENDOSCOPY [facility];  Service:     COLONOSCOPY - ENDO  2017    Procedure: COLONOSCOPY - ENDO;  Surgeon: Emerson Eaton M.D.;  Location: ENDOSCOPY [facility];  Service:     CHOLECYSTECTOMY      INGUINAL HERNIA REPAIR Right     OTHER      Tonsillectomy    OTHER      Hernia     OTHER CARDIAC SURGERY      3 stents    TONSILLECTOMY         Social History     Tobacco Use    Smoking status: Former     Current packs/day: 0.00     Average packs/day: 0.5 packs/day for 14.0 years (7.0 ttl pk-yrs)     Types: Cigarettes     Start date: 1980     Quit date:      Years since quittin.8    Tobacco comments:     Pt quit smoking cigarettes, 2000.  1/2 pack per day, smoked 20 years    Vaping Use    Vaping Use: Never used   Substance Use Topics    Alcohol use: Not Currently     Alcohol/week: 8.4 oz      Types: 14 Shots of liquor per week     Comment: daily    Drug use: Not Currently     Types: Marijuana     Comment: quit 2023        Family History   Problem Relation Age of Onset    Cancer Mother         lung?       No Known Allergies    Current Outpatient Medications   Medication Sig Dispense Refill    sucralfate (CARAFATE) 1 GM Tab Take 1 Tablet by mouth 4 Times a Day,Before Meals and at Bedtime. Dissolve tablet in 1 cup water. 120 Tablet 3    lidocaine-prilocaine (EMLA) 2.5-2.5 % Cream Apply to port site approximately 1 hour prior to port access and cover with plastic wrap to numb skin. 30 g 3    ondansetron (ZOFRAN) 4 MG Tab tablet Take 1 Tablet by mouth every four hours as needed for Nausea/Vomiting (for nausea, vomiting). 30 Tablet 6    prochlorperazine (COMPAZINE) 10 MG Tab Take 1 Tablet by mouth every 6 hours as needed (for nausea, vomiting). 30 Tablet 6    Cetirizine HCl (ZYRTEC ALLERGY PO) Take 1 Tablet by mouth every day.      ipratropium (ATROVENT) 0.03 % Solution Administer 2 Sprays into affected nostril(S) every 12 hours.      metFORMIN (GLUCOPHAGE) 500 MG Tab Take 750 mg by mouth 2 times a day with meals.      omeprazole (PRILOSEC) 40 MG delayed-release capsule Take 40 mg by mouth every day.      baclofen (LIORESAL) 10 MG Tab Take 10 mg by mouth at bedtime as needed. Indications: Muscle Spasm      Cyanocobalamin (VITAMIN B-12) 1000 MCG Tab Take 1,000 mcg by mouth every day.      fluticasone-salmeterol (ADVAIR) 250-50 MCG/ACT AEROSOL POWDER, BREATH ACTIVATED Inhale 1 Puff every 12 hours.      fluticasone (FLONASE) 50 MCG/ACT nasal spray Administer 2 Sprays into affected nostril(S) every day.      Omega-3 Fatty Acids (FISH OIL) 1000 MG Cap capsule Take 1,000 mg by mouth every day.      vitamin D3 (CHOLECALCIFEROL) 1000 Unit (25 mcg) Tab Take 1,000 Units by mouth every day.      gabapentin (NEURONTIN) 300 MG Cap Take 300 mg by mouth 3 times a day.      aspirin EC (ECOTRIN) 81 MG Tablet Delayed  Response Take 81 mg by mouth every day.       No current facility-administered medications for this visit.       Review of Systems   Constitutional:  Negative for chills, fever, malaise/fatigue and weight loss.   HENT:  Positive for tinnitus. Negative for congestion, ear pain, nosebleeds and sore throat.    Eyes:  Negative for blurred vision.   Respiratory:  Negative for cough, sputum production, shortness of breath and wheezing.    Cardiovascular:  Negative for chest pain, palpitations, orthopnea and leg swelling.   Gastrointestinal:  Negative for abdominal pain, heartburn, nausea and vomiting.   Genitourinary:  Negative for dysuria, frequency and urgency.   Musculoskeletal:  Negative for neck pain.   Neurological:  Positive for tingling. Negative for dizziness, tremors, sensory change, focal weakness and headaches.   Endo/Heme/Allergies:  Does not bruise/bleed easily.   Psychiatric/Behavioral:  Negative for depression, memory loss and suicidal ideas.    All other systems reviewed and are negative.      Problem list, medications, and allergies reviewed by myself today in Epic.     Objective:     There were no vitals filed for this visit.      DESC; KARNOFSKY SCALE WITH ECOG EQUIVALENT: 90, Able to carry on normal activity; minor signs or symptoms of disease (ECOG equivalent 0)    DISTRESS LEVEL: no acute distress    Physical Exam  Constitutional:       General: He is not in acute distress.     Appearance: Normal appearance.      Comments: Port in place, bruising   HENT:      Head: Normocephalic and atraumatic.      Nose: Nose normal. No congestion.      Mouth/Throat:      Mouth: Mucous membranes are moist.      Pharynx: Oropharynx is clear.   Eyes:      General: No scleral icterus.     Conjunctiva/sclera: Conjunctivae normal.      Pupils: Pupils are equal, round, and reactive to light.   Cardiovascular:      Rate and Rhythm: Normal rate and regular rhythm.      Pulses: Normal pulses.      Heart sounds: No murmur  heard.     No friction rub.   Pulmonary:      Effort: Pulmonary effort is normal. No respiratory distress.      Breath sounds: Normal breath sounds. No stridor. No wheezing or rales.   Chest:      Chest wall: No tenderness.   Abdominal:      General: Abdomen is flat. Bowel sounds are normal. There is no distension.      Palpations: Abdomen is soft. There is no mass.      Tenderness: There is no abdominal tenderness. There is no guarding or rebound.   Musculoskeletal:         General: No swelling, tenderness or deformity. Normal range of motion.      Cervical back: Normal range of motion and neck supple. No rigidity or tenderness.      Right lower leg: No edema.      Left lower leg: No edema.   Skin:     General: Skin is warm.      Coloration: Skin is not jaundiced or pale.      Findings: No bruising or rash.   Neurological:      General: No focal deficit present.      Mental Status: He is alert and oriented to person, place, and time. Mental status is at baseline.      Motor: No weakness.   Psychiatric:         Mood and Affect: Mood normal.         Behavior: Behavior normal.         Thought Content: Thought content normal.         Judgment: Judgment normal.         Labs:   Most recent labs reviewed.  Please see the lab tab of chart review    Imaging:   Most recent images below have been independently reviewed by me.  Please see the imaging tab of chart review    9/13/23 CT CAP  1.  Left lower lobe pulmonary masses, very slightly more prominent than on 8/29/2023.  2.  Multiple left pulmonary and pleural metastases.  3.  Left hilar emely metastases.  4.  No evidence of metastatic disease in the abdomen or pelvis.    9/12/2023 PET scan  1.  Interval increased size and intensity of LEFT lower lobe lung mass with new intrapulmonary and pleural metastases indicating progression of disease.  2.  Probable pleural metastasis at the RIGHT lung base posteriorly.  3.  Worsening LEFT hilar adenopathy, metastatic.  4.  No  evidence of metastatic disease in the abdomen or pelvis.    6/6/2023 PET scan  1.  Multilobular hypermetabolic nodule in the superior segment LEFT lower lobe of lung measuring 2.3 cm consistent with malignancy.  2.  Involvement of the LEFT superior hilar lymph node.  3.  No evidence for metastatic disease in the abdomen or pelvis.    Pathology    A. Lung, left lower lobe, fine needle aspiration slides:          Blood only; no epithelial or malignant cells identified.   B. Lung, left lower lobe, core biopsies:          Positive for small cell carcinoma.   C. Lung, left lower lobe, forceps biopsy with touch prep slides:          Positive for small cell carcinoma.   D. Lung, left lower lobe, fine needle aspiration:          Originally submitted for possible flow cytometric analysis,           which was deemed unnecessary.  Tissue will be submitted to           cytology for preparation of a cell block; an addendum will be           generated if there is an unexpected finding.   E. Lymph node, 10L, fine needle aspiration slides:          Malignant cells present, consistent with metastatic small cell   carcinoma.   F.  Lymph node, 10L, core biopsies:            Hypocellular specimen demonstrating few scattered lymphocytes           and few atypical cells suspicious for small cell carcinoma       Assessment/Plan:      Cancer Staging   SCLC (small cell lung carcinoma) (HCC)  Staging form: Lung, AJCC 8th Edition  - Clinical stage from 9/14/2023: Stage IIIA (cT3, cN1, cM0) - Signed by Leonel Patel M.D. on 9/14/2023       Mr. Ramsey who presents today with a new diagnosis of small cell lung cancer, limited stage.     He is currently enrolled in the HLX10 trial.     He is here prior to C3.      Neutropenia resolved. Labs clear for chemo     Plan  -Enrolled in the HLX trial   -cleared for C3  -radiation to to continue (started with C2)  -will need to see him prior to C4     Any questions and concerns raised by the patient  were addressed and answered. Patient denies any further questions.  Patient encouraged to call the office with any concerns or issues.      Roseline Fuller M.D.  Hematology/Oncology

## 2023-11-06 NOTE — PROGRESS NOTES
Nutrition Services: Brief Update  Wt Readings from Last 7 Encounters:   11/06/23 83 kg (182 lb 15.7 oz)   11/06/23 82.9 kg (182 lb 12.2 oz)   11/02/23 83 kg (183 lb)   10/25/23 85 kg (187 lb 6.3 oz)   10/18/23 82.4 kg (181 lb 10.5 oz)   10/11/23 87.5 kg (192 lb 14.4 oz)   10/30/23 82.1 kg (181 lb)     Weight Change: wt fluctuates, though overall stable, noted recorded weights are not presented in chronological order. Noted weight on 10/11 is an outlier as does not reflect normal trending for pt and pt had reported UBW ranging around 185 lbs.     Pertinent Recent Lab work (D11/6/23): Na 133, ANC improved    RD able to visit pt chairside in [facility]. Pt states belief that is doing well considering, Mentions second cycle was not as bad as the first one in terms of symptoms. States nausea is not a significant issue and resolves with medication. Denies any issues with constipation or diarrhea. States poor appetite persists, though continues to make eating a priority as well as maintaining adequate hydration. States did have an increase in activity or the weekend and uses his front wheel seated walker if needed, ambulates otherwise with cane without issue. Denies questions or concerns for RD at this time.     Plan/Recommend:  RD praised pt for strong efforts into treatment thus far. Discussed potential contributors to poor appetite, pt doing well in eating despite changes to appetite.   Encouraged continuation of honoring and listening to cues from body, particularly when needing to rest.     Pt verbalizes understanding and is receptive to information provided.   RD available PRN.  [phone]

## 2023-11-06 NOTE — ADDENDUM NOTE
Encounter addended by: Santo Quintero on: 11/6/2023 8:18 AM   Actions taken: Charge Capture section accepted

## 2023-11-06 NOTE — PROGRESS NOTES
Chemotherapy Verification - PRIMARY RN      Height = 172 cm  Weight = 83 kg  BSA = 1.99 m^2       Medication: HLX10 or PLACEBO  Dose: 300 mg set dose  Calculated Dose: 300 mg set dose                             (In mg/m2, AUC, mg/kg)     Medication: cisplatin (STUDY DRUG)  Dose: 75 mg/m^2  Calculated Dose: 149.25 mg                             (In mg/m2, AUC, mg/kg)    Medication: etoposide (STUDY DRUG)  Dose: 100 mg/m^2  Calculated Dose: 199 mg                             (In mg/m2, AUC, mg/kg)    I confirm this process was performed independently with the BSA and all final chemotherapy dosing calculations congruent.  Any discrepancies of 10% or greater have been addressed with the chemotherapy pharmacist. The resolution of the discrepancy has been documented in the EPIC progress notes.

## 2023-11-06 NOTE — PROGRESS NOTES
Chemotherapy Verification - SECONDARY RN       Height = 172 cm  Weight = 83 kg  BSA = 1.99 m2       Medication: HLX10 or placebo  Dose: 300 mg set dose  Calculated Dose: 300 mg                             (In mg/m2, AUC, mg/kg)     Medication: Cisplatin  Dose: 75 mg/m2  Calculated Dose: 149.25 mg                             (In mg/m2, AUC, mg/kg)    Medication: etoposide  Dose: 100 mg/m2  Calculated Dose: 199 mg                             (In mg/m2, AUC, mg/kg)    I confirm that this process was performed independently.

## 2023-11-06 NOTE — PROGRESS NOTES
"Pharmacy Chemotherapy Verification:    Patient Name: Bart Ramsey  Dx: small cell lung cancer  Study Protocol: [id number]  Participant # [id number]    HLX10 or placebo 300 mg IV over 30-60 min on day 1  Cisplatin 75 mg/m2 IV over 60 min on day 1  Etoposide 100 mg/m2 IV over 1-2 hours on days 1-3  Every 21 days with concurrent radiation x 4 cycles    followed by  HLX10 or placebo 300 mg IV over 30-60 min on day 1  Every 21 days up to one year  A Randomized, Double-Blind, International Multicenter, Phase III Study to Evaluate the Anti-Tumor Efficacy and Safety of HLX10 (Recombinant Humanized Anti-PD-1 Monoclonal Antibody Injection) or Placebo in Combination with Chemotherapy (Carboplatin/Cisplatin-Etoposide) and Concurrent Radiotherapy in Patients with Limited-Stage Small Cell Lung Cancer (LS-SCLC). [id number]  Allergies: Patient has no known allergies.       /62   Pulse 82   Temp 36.4 °C (97.6 °F) (Temporal)   Resp 18   Ht 1.72 m (5' 7.72\")   Wt 83 kg (182 lb 15.7 oz)   SpO2 97%   BMI 28.06 kg/m²      Body surface area is 1.99 meters squared.    Labs 11/3/23:  ANC~ 3600 Plt = 167k   Hgb = 10.9     Labs 11/6/23:  SCr = 1.2 mg/dL CrCl ~ 63.6 mL/min   AST/ALT/AP = 19/15/99 TBili = 0.3  Mag = 1.5 K+ = 4.3  Urine protein = negative    Labs 10/17/23:   TSH = 1.28   Free T4 = 1.17       Drug Order   (Drug name, dose, route, IV Fluid & volume, frequency, number of doses) Cycle 3, Day 1 of 3 (treatment deferred 1 week for neutropenia)     Previous treatment: C2 Days 1-3 on 10/09-10/11/23   Medication = HLX10 or placebo  Base Dose = 300 mg  Fixed dose, no calculation required  Final Dose = 300 mg  Route = IV  Fluid & Volume =  mL  Admin Duration = Over 60 min     Study-supplied medication       First infusion 60 min. If tolerated, subsequent infusions 30 min.   Medication = Cisplatin  Base Dose = 75 mg/m2  Calc Dose: Base Dose x 1.99 m2 = 149.25 mg  Final Dose = 155 mg  Route = IV  Fluid & " Volume =  mL  Admin Duration = Over 60 min    Study-supplied medication        <10% difference, okay to treat with final dose   Medication = Etoposide   Base Dose = 100 mg/m2   Calc Dose: Base Dose x 1.99 m2 = 199 mg  Final Dose = 206 mg  Route = IV  Fluid & Volume =  mL  Admin Duration = Over 1-2 hours    Days 1-3   Study-supplied medication        <10% difference, okay to treat with final dose      By my signature below, I confirm this process was performed independently with the BSA and all final chemotherapy dosing calculations congruent. I have reviewed the above chemotherapy order and that my calculation of the final dose and BSA (when applicable) corroborate those calculations of the  pharmacist. Discrepancies of 10% or greater in the written dose have been addressed and documented within the EPIC Progress notes.    Oscar ScottD

## 2023-11-06 NOTE — PROGRESS NOTES
Bill presented into [facility] for Day 1/ Cycle 3 of [facility] [id number] SC Lung CISplatin + Etoposide + RT + HLX10 or placebo. Pt denied having any new or acute complaints today, reports tolerating past treatments well. EMLA was applied over the port site prior to arrival. Port accessed in a sterile manner, had positive blood return and flushed briskly. Labs for the study were drawn as ordered and reviewed. Pt meets parameters for treatment today. Pre-hydration and premeds given as ordered. Pt given HLX10 or placebo and cisplatin as prescribed, tolerated well, denied having any complaints during or after infusion. Etoposide and post-hydration infusing as ordered. Report given to JOSE Mariee.

## 2023-11-07 ENCOUNTER — OUTPATIENT INFUSION SERVICES (OUTPATIENT)
Dept: ONCOLOGY | Facility: MEDICAL CENTER | Age: 73
End: 2023-11-07
Attending: STUDENT IN AN ORGANIZED HEALTH CARE EDUCATION/TRAINING PROGRAM
Payer: COMMERCIAL

## 2023-11-07 VITALS
RESPIRATION RATE: 18 BRPM | WEIGHT: 188.49 LBS | BODY MASS INDEX: 28.57 KG/M2 | SYSTOLIC BLOOD PRESSURE: 141 MMHG | HEART RATE: 66 BPM | DIASTOLIC BLOOD PRESSURE: 70 MMHG | HEIGHT: 68 IN | TEMPERATURE: 96.8 F | OXYGEN SATURATION: 97 %

## 2023-11-07 DIAGNOSIS — C34.90 SCLC (SMALL CELL LUNG CARCINOMA) (HCC): ICD-10-CM

## 2023-11-07 LAB
CK MB SERPL-MCNC: 2.5 NG/ML (ref 0–7.7)
MISCELLANEOUS LAB RESULT MISCLAB: NORMAL

## 2023-11-07 PROCEDURE — 304540 HCHG NITRO SET VENT 2ND TUB

## 2023-11-07 PROCEDURE — 96375 TX/PRO/DX INJ NEW DRUG ADDON: CPT

## 2023-11-07 PROCEDURE — 96413 CHEMO IV INFUSION 1 HR: CPT

## 2023-11-07 PROCEDURE — 77427 RADIATION TX MANAGEMENT X5: CPT | Performed by: RADIOLOGY

## 2023-11-07 PROCEDURE — 77386 HCHG IMRT DELIVERY COMPLEX: CPT | Performed by: RADIOLOGY

## 2023-11-07 PROCEDURE — A4212 NON CORING NEEDLE OR STYLET: HCPCS

## 2023-11-07 PROCEDURE — 77014 PR CT GUIDANCE PLACEMENT RAD THERAPY FIELDS: CPT | Mod: 26 | Performed by: RADIOLOGY

## 2023-11-07 PROCEDURE — 700111 HCHG RX REV CODE 636 W/ 250 OVERRIDE (IP): Performed by: NURSE PRACTITIONER

## 2023-11-07 RX ORDER — ONDANSETRON 2 MG/ML
8 INJECTION INTRAMUSCULAR; INTRAVENOUS ONCE
Status: COMPLETED | OUTPATIENT
Start: 2023-11-07 | End: 2023-11-07

## 2023-11-07 RX ADMIN — ONDANSETRON 8 MG: 2 INJECTION INTRAMUSCULAR; INTRAVENOUS at 12:02

## 2023-11-07 RX ADMIN — HEPARIN 500 UNITS: 100 SYRINGE at 13:31

## 2023-11-07 ASSESSMENT — FIBROSIS 4 INDEX: FIB4 SCORE: 2.14

## 2023-11-07 NOTE — PROGRESS NOTES
Pt arrived ambulatory to [facility] for D2C3 etoposide (Study Drug) treatment for SCLC. POC discussed with pt and he agrees with plan.     Port accessed in sterile fashion, brisk blood return noted. Pt medicated per MAR. Pt tolerated treatment without s/s adverse reaction. Etoposide (Study Drug) infused over 1 hour. Port with brisk blood return, flushed with NS then heparin. Port de-accessed, gauze dressing applied.     Pt discharged to self care, NAD. Pt's next appt confirmed for tomorrow 11/8/2023.

## 2023-11-07 NOTE — PROGRESS NOTES
Chemotherapy Verification - PRIMARY RN      Height = 1.72 m  Weight = 85.5 kg  BSA = 2.02 m2       Medication: etoposide  Dose: 100mg/m2  Calculated Dose: 202 mg                             (In mg/m2, AUC, mg/kg)         I confirm this process was performed independently with the BSA and all final chemotherapy dosing calculations congruent.  Any discrepancies of 10% or greater have been addressed with the chemotherapy pharmacist. The resolution of the discrepancy has been documented in the EPIC progress notes.

## 2023-11-07 NOTE — PROGRESS NOTES
"Pharmacy Chemotherapy Verification:    Patient Name: Bart Ramsey  Dx: small cell lung cancer  Study Protocol: [id number]  Participant # [id number]    HLX10 or placebo 300 mg IV over 30-60 min on day 1  Cisplatin 75 mg/m2 IV over 60 min on day 1  Etoposide 100 mg/m2 IV over 1-2 hours on days 1-3  Every 21 days with concurrent radiation x 4 cycles    followed by  HLX10 or placebo 300 mg IV over 30-60 min on day 1  Every 21 days up to one year  A Randomized, Double-Blind, International Multicenter, Phase III Study to Evaluate the Anti-Tumor Efficacy and Safety of HLX10 (Recombinant Humanized Anti-PD-1 Monoclonal Antibody Injection) or Placebo in Combination with Chemotherapy (Carboplatin/Cisplatin-Etoposide) and Concurrent Radiotherapy in Patients with Limited-Stage Small Cell Lung Cancer (LS-SCLC). [id number]  Allergies: Patient has no known allergies.       BP (!) 141/70   Pulse 66   Temp 36 °C (96.8 °F) (Temporal)   Resp 18   Ht 1.72 m (5' 7.72\")   Wt 85.5 kg (188 lb 7.9 oz)   SpO2 97%   BMI 28.90 kg/m²      Body surface area is 2.02 meters squared.    Labs 11/3/23:  ANC~ 3600 Plt = 167k   Hgb = 10.9     Labs 11/6/23:  SCr = 1.2 mg/dL CrCl ~ 63.6 mL/min   AST/ALT/AP = 19/15/99 TBili = 0.3  Mag = 1.5 K+ = 4.3  Urine protein = negative   TSH = 0.84    Free T4 = 1.2       Drug Order   (Drug name, dose, route, IV Fluid & volume, frequency, number of doses) Cycle 3, Day 2 of 3 (treatment deferred 1 week for neutropenia)     Previous treatment: C2 Days 1-3 on 10/09-10/11/23   Medication = HLX10 or placebo  Base Dose = 300 mg  Fixed dose, no calculation required  Final Dose = -- mg  Route = IV  Fluid & Volume =  mL  Admin Duration = Over 60 min     Study-supplied medication       First infusion 60 min. If tolerated, subsequent infusions 30 min.   Medication = Cisplatin  Base Dose = 75 mg/m2  Calc Dose: Base Dose x 1.99 m2 = 149.25 mg  Final Dose = -- mg  Route = IV  Fluid & Volume =  " mL  Admin Duration = Over 60 min    Study-supplied medication        <10% difference, okay to treat with final dose   Medication = Etoposide   Base Dose = 100 mg/m2   Calc Dose: Base Dose x 2.02 m2 = 202 mg  Final Dose = 206 mg  Route = IV  Fluid & Volume =  mL  Admin Duration = Over 1-2 hours    Days 1-3   Study-supplied medication        <10% difference, okay to treat with final dose      By my signature below, I confirm this process was performed independently with the BSA and all final chemotherapy dosing calculations congruent. I have reviewed the above chemotherapy order and that my calculation of the final dose and BSA (when applicable) corroborate those calculations of the  pharmacist. Discrepancies of 10% or greater in the written dose have been addressed and documented within the EPIC Progress notes.    Mainor Yap, PharmD

## 2023-11-07 NOTE — PROGRESS NOTES
"Pharmacy Chemotherapy Verification:    Patient Name: Bart Ramsey  Dx: small cell lung cancer  Study Protocol: [id number]  Participant # [id number]    HLX10 or placebo 300 mg IV over 30-60 min on day 1  Cisplatin 75 mg/m2 IV over 60 min on day 1  Etoposide 100 mg/m2 IV over 1-2 hours on days 1-3  Every 21 days with concurrent radiation x 4 cycles    followed by  HLX10 or placebo 300 mg IV over 30-60 min on day 1  Every 21 days up to one year  A Randomized, Double-Blind, International Multicenter, Phase III Study to Evaluate the Anti-Tumor Efficacy and Safety of HLX10 (Recombinant Humanized Anti-PD-1 Monoclonal Antibody Injection) or Placebo in Combination with Chemotherapy (Carboplatin/Cisplatin-Etoposide) and Concurrent Radiotherapy in Patients with Limited-Stage Small Cell Lung Cancer (LS-SCLC). [id number]  Allergies: Patient has no known allergies.       /69   Pulse 68   Temp 36.5 °C (97.7 °F) (Temporal)   Resp 18   Ht 1.72 m (5' 7.72\")   Wt 85 kg (187 lb 6.3 oz)   SpO2 94%   BMI 28.73 kg/m²      Body surface area is 2.02 meters squared.    Labs 11/3/23:  ANC~ 3600 Plt = 167k   Hgb = 10.9     Labs 11/6/23:  SCr = 1.2 mg/dL CrCl ~ 63.6 mL/min   AST/ALT/AP = 19/15/99 TBili = 0.3  Mag = 1.5 K+ = 4.3  Urine protein = negative   TSH = 0.84    Free T4 = 1.2       Drug Order   (Drug name, dose, route, IV Fluid & volume, frequency, number of doses) Cycle 3, Day 3 of 3 (treatment deferred 1 week for neutropenia)     Previous treatment: C2 Days 1-3 on 10/09-10/11/23   Medication = HLX10 or placebo  Base Dose = 300 mg  Fixed dose, no calculation required  Final Dose = -- mg  Route = IV  Fluid & Volume =  mL  Admin Duration = Over 60 min     Study-supplied medication       First infusion 60 min. If tolerated, subsequent infusions 30 min.   Medication = Cisplatin  Base Dose = 75 mg/m2  Calc Dose: Base Dose x 1.99 m2 = 149.25 mg  Final Dose = -- mg  Route = IV  Fluid & Volume =  " mL  Admin Duration = Over 60 min    Study-supplied medication        <10% difference, okay to treat with final dose   Medication = Etoposide   Base Dose = 100 mg/m2   Calc Dose: Base Dose x 2.02m2 = 202mg  Final Dose = 206 mg  Route = IV  Fluid & Volume =  mL  Admin Duration = Over 1-2 hours    Days 1-3   Study-supplied medication        <10% difference, okay to treat with final dose      By my signature below, I confirm this process was performed independently with the BSA and all final chemotherapy dosing calculations congruent. I have reviewed the above chemotherapy order and that my calculation of the final dose and BSA (when applicable) corroborate those calculations of the  pharmacist. Discrepancies of 10% or greater in the written dose have been addressed and documented within the EPIC Progress notes.    Ambika Belle, OscarD

## 2023-11-08 ENCOUNTER — OUTPATIENT INFUSION SERVICES (OUTPATIENT)
Dept: ONCOLOGY | Facility: MEDICAL CENTER | Age: 73
End: 2023-11-08
Attending: STUDENT IN AN ORGANIZED HEALTH CARE EDUCATION/TRAINING PROGRAM
Payer: COMMERCIAL

## 2023-11-08 VITALS
OXYGEN SATURATION: 95 % | BODY MASS INDEX: 28.66 KG/M2 | HEART RATE: 59 BPM | DIASTOLIC BLOOD PRESSURE: 76 MMHG | RESPIRATION RATE: 18 BRPM | TEMPERATURE: 98.4 F | SYSTOLIC BLOOD PRESSURE: 140 MMHG | WEIGHT: 186.95 LBS

## 2023-11-08 VITALS
RESPIRATION RATE: 18 BRPM | DIASTOLIC BLOOD PRESSURE: 69 MMHG | HEIGHT: 68 IN | WEIGHT: 187.39 LBS | SYSTOLIC BLOOD PRESSURE: 132 MMHG | OXYGEN SATURATION: 94 % | BODY MASS INDEX: 28.4 KG/M2 | TEMPERATURE: 97.7 F | HEART RATE: 68 BPM

## 2023-11-08 DIAGNOSIS — C34.90 SCLC (SMALL CELL LUNG CARCINOMA) (HCC): ICD-10-CM

## 2023-11-08 LAB
CHEMOTHERAPY INFUSION START DATE: NORMAL
CHEMOTHERAPY RECORDS: 2
CHEMOTHERAPY RECORDS: 6600
CHEMOTHERAPY RECORDS: NORMAL
CHEMOTHERAPY RX CANCER: NORMAL
DATE 1ST CHEMO CANCER: NORMAL
RAD ONC ARIA COURSE LAST TREATMENT DATE: NORMAL
RAD ONC ARIA COURSE TREATMENT ELAPSED DAYS: NORMAL
RAD ONC ARIA REFERENCE POINT DOSAGE GIVEN TO DATE: 42
RAD ONC ARIA REFERENCE POINT ID: NORMAL
RAD ONC ARIA REFERENCE POINT SESSION DOSAGE GIVEN: 2

## 2023-11-08 PROCEDURE — 96375 TX/PRO/DX INJ NEW DRUG ADDON: CPT

## 2023-11-08 PROCEDURE — 77014 PR CT GUIDANCE PLACEMENT RAD THERAPY FIELDS: CPT | Mod: 26 | Performed by: RADIOLOGY

## 2023-11-08 PROCEDURE — 304540 HCHG NITRO SET VENT 2ND TUB

## 2023-11-08 PROCEDURE — 700111 HCHG RX REV CODE 636 W/ 250 OVERRIDE (IP)

## 2023-11-08 PROCEDURE — 96415 CHEMO IV INFUSION ADDL HR: CPT

## 2023-11-08 PROCEDURE — A4212 NON CORING NEEDLE OR STYLET: HCPCS

## 2023-11-08 PROCEDURE — 77386 HCHG IMRT DELIVERY COMPLEX: CPT | Performed by: RADIOLOGY

## 2023-11-08 PROCEDURE — 700111 HCHG RX REV CODE 636 W/ 250 OVERRIDE (IP): Mod: JZ | Performed by: NURSE PRACTITIONER

## 2023-11-08 PROCEDURE — 96413 CHEMO IV INFUSION 1 HR: CPT

## 2023-11-08 RX ORDER — ONDANSETRON 2 MG/ML
8 INJECTION INTRAMUSCULAR; INTRAVENOUS ONCE
Status: COMPLETED | OUTPATIENT
Start: 2023-11-08 | End: 2023-11-08

## 2023-11-08 RX ADMIN — ONDANSETRON 8 MG: 2 INJECTION INTRAMUSCULAR; INTRAVENOUS at 12:26

## 2023-11-08 RX ADMIN — HEPARIN 500 UNITS: 100 SYRINGE at 14:19

## 2023-11-08 ASSESSMENT — FIBROSIS 4 INDEX
FIB4 SCORE: 2.14
FIB4 SCORE: 2.14

## 2023-11-08 ASSESSMENT — PAIN SCALES - GENERAL: PAINLEVEL: 4=SLIGHT-MODERATE PAIN

## 2023-11-08 NOTE — ON TREATMENT VISIT
ON TREATMENT NOTE  RADIATION ONCOLOGY DEPARTMENT    Patient name:  Bart Ramsey    Primary Physician:  Pcp Pt States None MRN: [id number]  CSN: [id number]   Referring physician:  Gianfranco Echevarria, * : 1950, 73 y.o.     ENCOUNTER DATE:  23    DIAGNOSIS:    SCLC (small cell lung carcinoma) (HCC)  Staging form: Lung, AJCC 8th Edition  - Clinical stage from 2023: Stage IIIA (cT3, cN1, cM0) - Signed by Leonel Patel M.D. on 2023  Stage prefix: Initial diagnosis      TREATMENT SUMMARY:  Aria Treatment Information          2023   Aria Course Treatment Dates   Course First Treatment Date 10/09/2023    Course Last Treatment Date 2023    Aria Treatment Summary   L_Lung  Plan from Course C1_L_LUNG   Fraction 21 of 33   Elapsed Course Days 30 @ [id number]   Prescribed Fraction Dose 200 cGy   Prescribed Total Dose 6,600 cGy   L_Lung  Reference Point from Course C1_L_LUNG   Elapsed Course Days 30 @ [id number]   Session Dose 200 cGy   Total Dose 4,200 cGy      Radiation Treatments       Active   Plans   L_Lung   Most recent treatment: Dose planned: 200 cGy (fraction 21 of 33 on 2023)   Total: Dose planned: 6,600 cGy   Elapsed Days: 30 @ [id number]           Historical   No historical radiation treatments to show.               SUBJECTIVE:   Patient is having his last chemotherapy today.  He states he is finally starting to feel more of the effects of the chemotherapy and radiation.  This is most notable with fatigue.  He knows he needs to increase his nutritional levels but does have some nausea and decreased appetite.  We talked about strategies to achieve his nutritional goals.    VITAL SIGNS:    Encounter Vitals  Temperature: 36.9 °C (98.4 °F)  Blood Pressure : (!) 140/76  Pulse: (!) 59  Respiration: 18  Pulse Oximetry: 95 %  Weight: 84.8 kg (186 lb 15.2 oz)  Pain Score: 4=Slight-Moderate Pain      2023    11:43 AM 2023    11:22 AM 10/25/2023    11:32 AM  10/18/2023    11:11 AM 10/11/2023    11:25 AM 9/26/2023    11:30 AM   Pain Assessment   Pain Score 4=SLIGHT MARIVEL NO PAIN NO PAIN 2=MINIMAL PA 2=MINIMAL PA NO PAIN   Pain Loc THROAT   HEAD HEAD           PHYSICAL EXAM:  Physical Exam  Pulmonary:      Effort: Pulmonary effort is normal.      Breath sounds: Normal breath sounds.          TOXICITY      11/8/2023    11:46 AM 11/2/2023    11:23 AM 10/25/2023    11:34 AM 10/18/2023    11:12 AM 10/11/2023    11:27 AM   Toxicity Assessment   Toxicity Assessment Chest Chest Chest Chest Chest   Fatigue (lethargy, malaise, asthenia) Moderate (e.g., decrease in performance status by 1 ECOG level or 20% Karnofsky) or causing difficulty performing some activities None Increased fatigue over baseline, but not altering normal activities Increased fatigue over baseline, but not altering normal activities Increased fatigue over baseline, but not altering normal activities   Radiation Dermatitis Faint erythema or dry desquamation None None None None   Anorexia Oral intake significantly decreased Loss of appetite None Loss of appetite Loss of appetite   Dyspepsia/heartburn None Severe None None None   Dysphagia, Esophagitis, odynophagoa (painful swallowing) Mild dysphagia, but can eat regular diet Mild dysphagia, but can eat regular diet None None None   RT Dysphagia-Esophageal Mild dysphagia, but can eat regular diet None None None None   Cough Absent Absent Absent Absent Absent   Dyspnea Dyspnea on exertion Dyspnea on exertion Dyspnea on exertion Dyspnea on exertion Normal         IMPRESSION:  Cancer Staging   SCLC (small cell lung carcinoma) (HCC)  Staging form: Lung, AJCC 8th Edition  - Clinical stage from 9/14/2023: Stage IIIA (cT3, cN1, cM0) - Signed by Leonel Patel M.D. on 9/14/2023      PLAN:  No change in treatment plan    Disposition:  Treatment plan reviewed. Questions answered. Continue therapy outlined.     Juan Manuel Swartz M.D.    Orders Placed This Encounter    Rad  Onc Aria Session Summary

## 2023-11-08 NOTE — PROGRESS NOTES
Chemotherapy Verification - PRIMARY RN      Height = 172 cm  Weight = 85 kg  BSA = 2.02 m2       Medication: Etoposide  Dose: 100 mg/m2  Calculated Dose: 202 mg                             (In mg/m2, AUC, mg/kg)       I confirm this process was performed independently with the BSA and all final chemotherapy dosing calculations congruent.  Any discrepancies of 10% or greater have been addressed with the chemotherapy pharmacist. The resolution of the discrepancy has been documented in the EPIC progress notes.

## 2023-11-08 NOTE — PROGRESS NOTES
Chemotherapy Verification - SECONDARY RN       Height = 1.72m  Weight = 85kg  BSA = 2.02m2       Medication: etoposide  Dose: 100mg/m2  Calculated Dose: 202mg                             (In mg/m2, AUC, mg/kg)       I confirm that this process was performed independently.

## 2023-11-09 ENCOUNTER — APPOINTMENT (OUTPATIENT)
Dept: RADIATION ONCOLOGY | Facility: MEDICAL CENTER | Age: 73
End: 2023-11-09
Payer: COMMERCIAL

## 2023-11-09 PROCEDURE — 77386 HCHG IMRT DELIVERY COMPLEX: CPT | Performed by: RADIOLOGY

## 2023-11-09 PROCEDURE — 77014 PR CT GUIDANCE PLACEMENT RAD THERAPY FIELDS: CPT | Mod: 26 | Performed by: RADIOLOGY

## 2023-11-09 NOTE — PROGRESS NOTES
Pt presented to Infusion for D3C3 OP Research [id number] SC Lung Cisplatin + Etoposide. POC discussed and pt verbalized understanding, reports increased fatigue and nausea the last few days, controlled with home meds. Port accessed per [facility] policy, brisk blood return noted, line flushes with ease, and pt tolerated well. Labs reviewed from D1.  Pre-medications and chemotherapy administered per MAR. Pt tolerated well. No s/s of adverse reactions or complications noted. Port flushed per [facility] policy, and de-accessed with needle intact; site covered with sterile gauze/paper tape and pt tolerated well. Educated pt on when to contact provider including fever, s/s of infection, worsening symptoms and/or defer judgment to ED for provider evaluation. Pt verbalized understanding. Pt confirmed next appt and discharged ambulatory to self care. Pt in NAD at time of discharge.

## 2023-11-10 ENCOUNTER — APPOINTMENT (OUTPATIENT)
Dept: RADIATION ONCOLOGY | Facility: MEDICAL CENTER | Age: 73
End: 2023-11-10
Payer: COMMERCIAL

## 2023-11-10 PROCEDURE — 77336 RADIATION PHYSICS CONSULT: CPT | Performed by: RADIOLOGY

## 2023-11-10 PROCEDURE — 77014 PR CT GUIDANCE PLACEMENT RAD THERAPY FIELDS: CPT | Mod: 26 | Performed by: RADIOLOGY

## 2023-11-10 PROCEDURE — 77386 HCHG IMRT DELIVERY COMPLEX: CPT | Performed by: RADIOLOGY

## 2023-11-12 NOTE — Clinical Note
Dr. JAVIER & Santo, Pt update  RNs, Infusion did not have a spot for him on Monday - can we get some supportive hydration done in office Mon+?  Maybe have Dr. JAVIER evaluate as well - he's on study

## 2023-11-13 ENCOUNTER — HOSPITAL ENCOUNTER (OUTPATIENT)
Facility: MEDICAL CENTER | Age: 73
End: 2023-11-14
Attending: EMERGENCY MEDICINE | Admitting: INTERNAL MEDICINE
Payer: COMMERCIAL

## 2023-11-13 ENCOUNTER — HOSPITAL ENCOUNTER (OUTPATIENT)
Dept: HEMATOLOGY ONCOLOGY | Facility: MEDICAL CENTER | Age: 73
End: 2023-11-13
Attending: STUDENT IN AN ORGANIZED HEALTH CARE EDUCATION/TRAINING PROGRAM
Payer: COMMERCIAL

## 2023-11-13 ENCOUNTER — APPOINTMENT (OUTPATIENT)
Dept: RADIOLOGY | Facility: MEDICAL CENTER | Age: 73
End: 2023-11-13
Attending: EMERGENCY MEDICINE
Payer: COMMERCIAL

## 2023-11-13 VITALS
SYSTOLIC BLOOD PRESSURE: 130 MMHG | HEART RATE: 74 BPM | DIASTOLIC BLOOD PRESSURE: 72 MMHG | RESPIRATION RATE: 20 BRPM | OXYGEN SATURATION: 97 % | TEMPERATURE: 100.8 F

## 2023-11-13 DIAGNOSIS — R11.2 NAUSEA AND VOMITING, UNSPECIFIED VOMITING TYPE: ICD-10-CM

## 2023-11-13 DIAGNOSIS — E86.0 DEHYDRATION: ICD-10-CM

## 2023-11-13 DIAGNOSIS — N17.9 AKI (ACUTE KIDNEY INJURY) (HCC): ICD-10-CM

## 2023-11-13 PROBLEM — R50.9 FEVER: Status: ACTIVE | Noted: 2023-11-13

## 2023-11-13 PROBLEM — N28.9 RENAL INSUFFICIENCY: Status: ACTIVE | Noted: 2023-11-13

## 2023-11-13 PROBLEM — D61.818 PANCYTOPENIA (HCC): Status: ACTIVE | Noted: 2023-11-13

## 2023-11-13 LAB
ALBUMIN SERPL BCP-MCNC: 4.2 G/DL (ref 3.2–4.9)
ALBUMIN/GLOB SERPL: 1.2 G/DL
ALP SERPL-CCNC: 91 U/L (ref 30–99)
ALT SERPL-CCNC: 16 U/L (ref 2–50)
ANION GAP SERPL CALC-SCNC: 16 MMOL/L (ref 7–16)
APPEARANCE UR: CLEAR
APTT PPP: 24.9 SEC (ref 24.7–36)
AST SERPL-CCNC: 19 U/L (ref 12–45)
BACTERIA #/AREA URNS HPF: NEGATIVE /HPF
BASOPHILS # BLD AUTO: 1.7 % (ref 0–1.8)
BASOPHILS # BLD: 0.06 K/UL (ref 0–0.12)
BILIRUB SERPL-MCNC: 0.8 MG/DL (ref 0.1–1.5)
BILIRUB UR QL STRIP.AUTO: NEGATIVE
BUN SERPL-MCNC: 39 MG/DL (ref 8–22)
CALCIUM ALBUM COR SERPL-MCNC: 9.1 MG/DL (ref 8.5–10.5)
CALCIUM SERPL-MCNC: 9.3 MG/DL (ref 8.5–10.5)
CHLORIDE SERPL-SCNC: 94 MMOL/L (ref 96–112)
CO2 SERPL-SCNC: 24 MMOL/L (ref 20–33)
COLOR UR: YELLOW
CREAT SERPL-MCNC: 1.47 MG/DL (ref 0.5–1.4)
EOSINOPHIL # BLD AUTO: 0 K/UL (ref 0–0.51)
EOSINOPHIL NFR BLD: 0 % (ref 0–6.9)
EPI CELLS #/AREA URNS HPF: NEGATIVE /HPF
ERYTHROCYTE [DISTWIDTH] IN BLOOD BY AUTOMATED COUNT: 48.4 FL (ref 35.9–50)
FLUAV RNA SPEC QL NAA+PROBE: NEGATIVE
FLUBV RNA SPEC QL NAA+PROBE: NEGATIVE
GFR SERPLBLD CREATININE-BSD FMLA CKD-EPI: 50 ML/MIN/1.73 M 2
GLOBULIN SER CALC-MCNC: 3.5 G/DL (ref 1.9–3.5)
GLUCOSE SERPL-MCNC: 154 MG/DL (ref 65–99)
GLUCOSE UR STRIP.AUTO-MCNC: 100 MG/DL
HCT VFR BLD AUTO: 32.7 % (ref 42–52)
HGB BLD-MCNC: 11.4 G/DL (ref 14–18)
HYALINE CASTS #/AREA URNS LPF: ABNORMAL /LPF
INR PPP: 0.98 (ref 0.87–1.13)
KETONES UR STRIP.AUTO-MCNC: NEGATIVE MG/DL
LACTATE SERPL-SCNC: 1.4 MMOL/L (ref 0.5–2)
LACTATE SERPL-SCNC: 1.5 MMOL/L (ref 0.5–2)
LEUKOCYTE ESTERASE UR QL STRIP.AUTO: NEGATIVE
LYMPHOCYTES # BLD AUTO: 0.21 K/UL (ref 1–4.8)
LYMPHOCYTES NFR BLD: 6.1 % (ref 22–41)
MANUAL DIFF BLD: NORMAL
MCH RBC QN AUTO: 31.7 PG (ref 27–33)
MCHC RBC AUTO-ENTMCNC: 34.9 G/DL (ref 32.3–36.5)
MCV RBC AUTO: 90.8 FL (ref 81.4–97.8)
MICRO URNS: ABNORMAL
MONOCYTES # BLD AUTO: 0 K/UL (ref 0–0.85)
MONOCYTES NFR BLD AUTO: 0 % (ref 0–13.4)
MORPHOLOGY BLD-IMP: NORMAL
NEUTROPHILS # BLD AUTO: 3.23 K/UL (ref 1.82–7.42)
NEUTROPHILS NFR BLD: 92.2 % (ref 44–72)
NITRITE UR QL STRIP.AUTO: NEGATIVE
NRBC # BLD AUTO: 0 K/UL
NRBC BLD-RTO: 0 /100 WBC (ref 0–0.2)
PH UR STRIP.AUTO: 6 [PH] (ref 5–8)
PLATELET # BLD AUTO: 87 K/UL (ref 164–446)
PLATELET BLD QL SMEAR: NORMAL
PLATELETS.RETICULATED NFR BLD AUTO: 2.6 % (ref 0.6–13.1)
PMV BLD AUTO: 9.5 FL (ref 9–12.9)
POTASSIUM SERPL-SCNC: 4.2 MMOL/L (ref 3.6–5.5)
PROT SERPL-MCNC: 7.7 G/DL (ref 6–8.2)
PROT UR QL STRIP: 30 MG/DL
PROTHROMBIN TIME: 13.1 SEC (ref 12–14.6)
RBC # BLD AUTO: 3.6 M/UL (ref 4.7–6.1)
RBC # URNS HPF: ABNORMAL /HPF
RBC BLD AUTO: NORMAL
RBC UR QL AUTO: NEGATIVE
RSV RNA SPEC QL NAA+PROBE: NEGATIVE
SARS-COV-2 RNA RESP QL NAA+PROBE: DETECTED
SODIUM SERPL-SCNC: 134 MMOL/L (ref 135–145)
SP GR UR STRIP.AUTO: 1.02
SPECIMEN SOURCE: ABNORMAL
UROBILINOGEN UR STRIP.AUTO-MCNC: 0.2 MG/DL
WBC # BLD AUTO: 3.5 K/UL (ref 4.8–10.8)
WBC #/AREA URNS HPF: ABNORMAL /HPF

## 2023-11-13 PROCEDURE — G0378 HOSPITAL OBSERVATION PER HR: HCPCS

## 2023-11-13 PROCEDURE — 71045 X-RAY EXAM CHEST 1 VIEW: CPT

## 2023-11-13 PROCEDURE — C9803 HOPD COVID-19 SPEC COLLECT: HCPCS | Performed by: EMERGENCY MEDICINE

## 2023-11-13 PROCEDURE — 36415 COLL VENOUS BLD VENIPUNCTURE: CPT

## 2023-11-13 PROCEDURE — 700111 HCHG RX REV CODE 636 W/ 250 OVERRIDE (IP): Mod: JZ | Performed by: EMERGENCY MEDICINE

## 2023-11-13 PROCEDURE — 87086 URINE CULTURE/COLONY COUNT: CPT

## 2023-11-13 PROCEDURE — 81001 URINALYSIS AUTO W/SCOPE: CPT

## 2023-11-13 PROCEDURE — 99285 EMERGENCY DEPT VISIT HI MDM: CPT

## 2023-11-13 PROCEDURE — 700105 HCHG RX REV CODE 258: Performed by: INTERNAL MEDICINE

## 2023-11-13 PROCEDURE — 85610 PROTHROMBIN TIME: CPT

## 2023-11-13 PROCEDURE — 96374 THER/PROPH/DIAG INJ IV PUSH: CPT

## 2023-11-13 PROCEDURE — 80053 COMPREHEN METABOLIC PANEL: CPT

## 2023-11-13 PROCEDURE — 85007 BL SMEAR W/DIFF WBC COUNT: CPT

## 2023-11-13 PROCEDURE — 96375 TX/PRO/DX INJ NEW DRUG ADDON: CPT

## 2023-11-13 PROCEDURE — A9270 NON-COVERED ITEM OR SERVICE: HCPCS | Performed by: INTERNAL MEDICINE

## 2023-11-13 PROCEDURE — 83605 ASSAY OF LACTIC ACID: CPT | Mod: 91

## 2023-11-13 PROCEDURE — 87040 BLOOD CULTURE FOR BACTERIA: CPT | Mod: 91

## 2023-11-13 PROCEDURE — 700102 HCHG RX REV CODE 250 W/ 637 OVERRIDE(OP): Performed by: INTERNAL MEDICINE

## 2023-11-13 PROCEDURE — 85027 COMPLETE CBC AUTOMATED: CPT

## 2023-11-13 PROCEDURE — 0241U HCHG SARS-COV-2 COVID-19 NFCT DS RESP RNA 4 TRGT MIC: CPT

## 2023-11-13 PROCEDURE — 85730 THROMBOPLASTIN TIME PARTIAL: CPT

## 2023-11-13 PROCEDURE — 99223 1ST HOSP IP/OBS HIGH 75: CPT | Mod: AI | Performed by: INTERNAL MEDICINE

## 2023-11-13 PROCEDURE — 85055 RETICULATED PLATELET ASSAY: CPT

## 2023-11-13 PROCEDURE — 700105 HCHG RX REV CODE 258: Performed by: EMERGENCY MEDICINE

## 2023-11-13 RX ORDER — BACLOFEN 10 MG/1
10 TABLET ORAL NIGHTLY PRN
Status: DISCONTINUED | OUTPATIENT
Start: 2023-11-13 | End: 2023-11-14 | Stop reason: HOSPADM

## 2023-11-13 RX ORDER — ONDANSETRON 4 MG/1
4 TABLET, ORALLY DISINTEGRATING ORAL EVERY 4 HOURS PRN
Status: DISCONTINUED | OUTPATIENT
Start: 2023-11-13 | End: 2023-11-14 | Stop reason: HOSPADM

## 2023-11-13 RX ORDER — POLYETHYLENE GLYCOL 3350 17 G/17G
1 POWDER, FOR SOLUTION ORAL
Status: DISCONTINUED | OUTPATIENT
Start: 2023-11-13 | End: 2023-11-14 | Stop reason: HOSPADM

## 2023-11-13 RX ORDER — ONDANSETRON 2 MG/ML
4 INJECTION INTRAMUSCULAR; INTRAVENOUS EVERY 4 HOURS PRN
Status: DISCONTINUED | OUTPATIENT
Start: 2023-11-13 | End: 2023-11-14 | Stop reason: HOSPADM

## 2023-11-13 RX ORDER — FLUTICASONE PROPIONATE AND SALMETEROL 250; 50 UG/1; UG/1
1 POWDER RESPIRATORY (INHALATION) 2 TIMES DAILY PRN
Status: DISCONTINUED | OUTPATIENT
Start: 2023-11-13 | End: 2023-11-13

## 2023-11-13 RX ORDER — BISACODYL 10 MG
10 SUPPOSITORY, RECTAL RECTAL
Status: DISCONTINUED | OUTPATIENT
Start: 2023-11-13 | End: 2023-11-14 | Stop reason: HOSPADM

## 2023-11-13 RX ORDER — SODIUM CHLORIDE 9 MG/ML
1000 INJECTION, SOLUTION INTRAVENOUS ONCE
Status: COMPLETED | OUTPATIENT
Start: 2023-11-13 | End: 2023-11-14

## 2023-11-13 RX ORDER — ONDANSETRON 2 MG/ML
4 INJECTION INTRAMUSCULAR; INTRAVENOUS ONCE
Status: COMPLETED | OUTPATIENT
Start: 2023-11-13 | End: 2023-11-13

## 2023-11-13 RX ORDER — ACETAMINOPHEN 325 MG/1
650 TABLET ORAL EVERY 6 HOURS PRN
Status: DISCONTINUED | OUTPATIENT
Start: 2023-11-13 | End: 2023-11-14 | Stop reason: HOSPADM

## 2023-11-13 RX ORDER — ALBUTEROL SULFATE 90 UG/1
2 AEROSOL, METERED RESPIRATORY (INHALATION)
Status: DISCONTINUED | OUTPATIENT
Start: 2023-11-13 | End: 2023-11-14 | Stop reason: HOSPADM

## 2023-11-13 RX ORDER — CEFEPIME HYDROCHLORIDE 2 G/1
2 INJECTION, POWDER, FOR SOLUTION INTRAVENOUS ONCE
Status: COMPLETED | OUTPATIENT
Start: 2023-11-13 | End: 2023-11-13

## 2023-11-13 RX ORDER — GABAPENTIN 300 MG/1
300 CAPSULE ORAL 3 TIMES DAILY
Status: DISCONTINUED | OUTPATIENT
Start: 2023-11-13 | End: 2023-11-14 | Stop reason: HOSPADM

## 2023-11-13 RX ORDER — OXYCODONE HYDROCHLORIDE 5 MG/1
5 TABLET ORAL EVERY 4 HOURS PRN
Status: DISCONTINUED | OUTPATIENT
Start: 2023-11-13 | End: 2023-11-14 | Stop reason: HOSPADM

## 2023-11-13 RX ORDER — MORPHINE SULFATE 4 MG/ML
4 INJECTION INTRAVENOUS
Status: DISCONTINUED | OUTPATIENT
Start: 2023-11-13 | End: 2023-11-14 | Stop reason: HOSPADM

## 2023-11-13 RX ORDER — SODIUM CHLORIDE 9 MG/ML
INJECTION, SOLUTION INTRAVENOUS CONTINUOUS
Status: DISCONTINUED | OUTPATIENT
Start: 2023-11-13 | End: 2023-11-14 | Stop reason: HOSPADM

## 2023-11-13 RX ORDER — AMOXICILLIN 250 MG
2 CAPSULE ORAL 2 TIMES DAILY PRN
Status: DISCONTINUED | OUTPATIENT
Start: 2023-11-13 | End: 2023-11-14 | Stop reason: HOSPADM

## 2023-11-13 RX ORDER — CETIRIZINE HYDROCHLORIDE 10 MG/1
10 TABLET ORAL DAILY
Status: DISCONTINUED | OUTPATIENT
Start: 2023-11-14 | End: 2023-11-14 | Stop reason: HOSPADM

## 2023-11-13 RX ADMIN — SODIUM CHLORIDE 1000 ML: 9 INJECTION, SOLUTION INTRAVENOUS at 12:08

## 2023-11-13 RX ADMIN — ONDANSETRON 4 MG: 2 INJECTION INTRAMUSCULAR; INTRAVENOUS at 12:08

## 2023-11-13 RX ADMIN — ACETAMINOPHEN 650 MG: 325 TABLET ORAL at 15:39

## 2023-11-13 RX ADMIN — GABAPENTIN 300 MG: 300 CAPSULE ORAL at 20:34

## 2023-11-13 RX ADMIN — SODIUM CHLORIDE: 9 INJECTION, SOLUTION INTRAVENOUS at 17:41

## 2023-11-13 RX ADMIN — OXYCODONE 5 MG: 5 TABLET ORAL at 18:05

## 2023-11-13 RX ADMIN — CEFEPIME 2 G: 2 INJECTION, POWDER, FOR SOLUTION INTRAVENOUS at 12:08

## 2023-11-13 ASSESSMENT — LIFESTYLE VARIABLES
AVERAGE NUMBER OF DAYS PER WEEK YOU HAVE A DRINK CONTAINING ALCOHOL: 0
EVER HAD A DRINK FIRST THING IN THE MORNING TO STEADY YOUR NERVES TO GET RID OF A HANGOVER: NO
HOW MANY TIMES IN THE PAST YEAR HAVE YOU HAD 5 OR MORE DRINKS IN A DAY: 0
TOTAL SCORE: 0
ALCOHOL_USE: NO
EVER FELT BAD OR GUILTY ABOUT YOUR DRINKING: NO
HAVE PEOPLE ANNOYED YOU BY CRITICIZING YOUR DRINKING: NO
DOES PATIENT WANT TO STOP DRINKING: NO
CONSUMPTION TOTAL: NEGATIVE
HAVE YOU EVER FELT YOU SHOULD CUT DOWN ON YOUR DRINKING: NO
ON A TYPICAL DAY WHEN YOU DRINK ALCOHOL HOW MANY DRINKS DO YOU HAVE: 0
TOTAL SCORE: 0
TOTAL SCORE: 0

## 2023-11-13 ASSESSMENT — PATIENT HEALTH QUESTIONNAIRE - PHQ9
2. FEELING DOWN, DEPRESSED, IRRITABLE, OR HOPELESS: NOT AT ALL
1. LITTLE INTEREST OR PLEASURE IN DOING THINGS: NOT AT ALL
SUM OF ALL RESPONSES TO PHQ9 QUESTIONS 1 AND 2: 0

## 2023-11-13 ASSESSMENT — ENCOUNTER SYMPTOMS
FLANK PAIN: 1
VOMITING: 1
FEVER: 1
NAUSEA: 1

## 2023-11-13 ASSESSMENT — FIBROSIS 4 INDEX
FIB4 SCORE: 2.14
FIB4 SCORE: 3.99

## 2023-11-13 ASSESSMENT — PAIN DESCRIPTION - PAIN TYPE
TYPE: ACUTE PAIN
TYPE: ACUTE PAIN

## 2023-11-13 NOTE — PROGRESS NOTES
"On Call / After Hours Call  Call on 11/12/2023 at 4:38 PM from Bart Ramsey    PCP: Pcp Pt States None  Onc: Dr. Fuller    Patient is diagnosed with SCLC and is currently undergoing treatment.  - Treatment regimen consists of: Cis, etoposide, HLX10 vs placebo w/ RT started 9/18/23  - Last treatment was: C3 from 11/6-11/8  - Next treatment is due: C4 due 11/26    Pt reports:  - Feeling \"pretty rough\", increased nausea, taking zofran only - has not been alternating with compazine  - Tolerating water somewhat but gets heartburn, no real food intake  - Heartburn - has not been taking sucralfate nor omeprazole    Plan:  - Will try alternating Zofran/Compazine every 3 hours  - Continue with sips of fluids as tolerated: including Gatorade, broth, 7-up, etc  - Advised supportive hydration this week: infusion center did not have availability for 11/13 (per call this evening), office RN will attempt to facilitate in office ASAP (pt has XRT daily)  - OK to take Tums for heartburn, encouraged already on MAR sucralfate/omeprazole consistently for better coverage with secondary effect of nausea relief  - Discussed olanzapine with patient: advised if alternating PRN nausea meds does not relieve symptoms, will try low dose olanzapine for better coverage      MIKAL Kerr Hematology/Medical Oncology  Office of [facility]  Phone: [phone]  Fax: [phone]      "

## 2023-11-13 NOTE — NON-PROVIDER
Pt calls clinic with c/o not feeling well, not being able to keep anything down, vomiting since last chemotherapy treatment on 11/8/23. Pt denies having diarrhea. Patient asked to come into clinic for vitals, lab work, and hydration. Pt has not tested for covid. Bilateral upper lobe lung sounds diminished, clear in bases. Heart sounds regular. Pt noted to have a fever of 101.0F temporally, other vitals stable. Retake of temperature is 100.8F. Pt has port in place. Dr. Fuller updated, advising ER due to temperature and patient reported symptoms.

## 2023-11-13 NOTE — ASSESSMENT & PLAN NOTE
IV fluid hydration with normal saline  Monitor BMP and assess response  Avoid IV contrast/nephrotoxins/NSAIDs  Dose adjust meds for decreased GFR

## 2023-11-13 NOTE — ASSESSMENT & PLAN NOTE
Rule out influenza, COVID RSV  Follow blood cultures  Check UA  Chest x-ray is negative for pneumonia  Monitor CBC and vitals

## 2023-11-13 NOTE — H&P
Hospital Medicine History & Physical Note    Date of Service  11/13/2023    Primary Care Physician  Pcp Pt States None    Consultants  None    Code Status  Full Code    Chief Complaint  Chief Complaint   Patient presents with    Dizziness     With headache and SOB over the last week. Pt has hx of small cell carcinoma and received chemo on 11/06, 11/7 and 11/8. Pt was visiting oncology RN today and was told to come to the ER due to fever of 101 F.     Flank Pain     BL flank pain that started Thursday. +N/V Pt denies dysuria.        History of Presenting Illness  Bart Ramsey is a 73 y.o. male with a past medical history of small cell lung cancer stage III A treated with chemotherapy and radiation therapy who presented 11/13/2023 with fever and nausea.  Patient was at his oncologist's office when he was noted to have a fever of 101 and was sent to the ER for further evaluation.  Patient's last chemotherapy session was on Wednesday of last week.  He reports symptoms of nausea and vomiting with bilateral flank pain.  He denies any dysuria.  He denies any chest pain, cough or shortness of breath.  He denies any headache or neck stiffness.  In the ER patient's temperature was 38.3 and blood pressure was elevated at 169/81.  WBC was low at 3.5 however ANC was normal at 3.2.  His BUN was elevated at 39 and creatinine was elevated at 1.47.  UA is pending at this time.  Chest x-ray interpreted by me reveals no acute cardiopulmonary process    I discussed the plan of care with patient, family, bedside RN, and ERP .    Review of Systems  Review of Systems   Constitutional:  Positive for fever and malaise/fatigue.   Gastrointestinal:  Positive for nausea and vomiting.   Genitourinary:  Positive for flank pain.   All other systems reviewed and are negative.      Past Medical History   has a past medical history of Arthritis, Bowel habit changes, Bronchitis, Cancer (HCC) (09/29/2023), COPD (chronic obstructive  pulmonary disease) (HCC), Diabetes (HCC), Emphysema of lung (HCC), High cholesterol (08/2023), Myocardial infarct (HCC), Pneumonia, Psychiatric problem (08/2023), and Sleep apnea.    Surgical History   has a past surgical history that includes other cardiac surgery; other; other; gastroscopy-endo (03/31/2017); colonoscopy - endo (03/31/2017); cholecystectomy; tonsillectomy; inguinal hernia repair (Right); pr bronchoscopy,diagnostic (N/A, 8/29/2023); and endobronchial us add-on (N/A, 8/29/2023).     Family History  family history includes Cancer in his mother.   Family history reviewed with patient. There is no family history that is pertinent to the chief complaint.     Social History   reports that he quit smoking about 29 years ago. His smoking use included cigarettes. He started smoking about 43 years ago. He has a 7.0 pack-year smoking history. He does not have any smokeless tobacco history on file. He reports that he does not currently use alcohol after a past usage of about 8.4 oz of alcohol per week. He reports that he does not currently use drugs after having used the following drugs: Marijuana.    Allergies  No Known Allergies    Medications  Prior to Admission Medications   Prescriptions Last Dose Informant Patient Reported? Taking?   Cyanocobalamin (VITAMIN B-12) 1000 MCG Tab 1 wk ago Patient, Patient's Home Pharmacy Yes No   Sig: Take 1,000 mcg by mouth every day.   Omega-3 Fatty Acids (FISH OIL) 1000 MG Cap capsule 1 wk ago Patient, Patient's Home Pharmacy Yes No   Sig: Take 1,000 mg by mouth every day.   aspirin EC (ECOTRIN) 81 MG Tablet Delayed Response 1 wk ago Patient, Patient's Home Pharmacy Yes No   Sig: Take 81 mg by mouth every day.   baclofen (LIORESAL) 10 MG Tab 1 wk ago Patient, Patient's Home Pharmacy Yes No   Sig: Take 10 mg by mouth at bedtime as needed. Indications: Muscle Spasm   cetirizine (ZYRTEC) 10 MG Tab 1 wk ago Patient Yes No   Sig: Take 1 Tablet by mouth every day.    fluticasone-salmeterol (ADVAIR) 250-50 MCG/ACT AEROSOL POWDER, BREATH ACTIVATED 1 wk ago Patient, Patient's Home Pharmacy Yes No   Sig: Inhale 1 Puff 2 times a day as needed.   gabapentin (NEURONTIN) 300 MG Cap 1 wk ago Patient, Patient's Home Pharmacy Yes No   Sig: Take 300-600 mg by mouth 3 times a day.   metFORMIN (GLUCOPHAGE) 500 MG Tab 1 wk ago Patient, Patient's Home Pharmacy Yes No   Sig: Take 750 mg by mouth 2 times a day with meals.   ondansetron (ZOFRAN) 4 MG Tab tablet 1 wk ago Patient No No   Sig: Take 1 Tablet by mouth every four hours as needed for Nausea/Vomiting (for nausea, vomiting).   prochlorperazine (COMPAZINE) 10 MG Tab 1 wk ago Patient No No   Sig: Take 1 Tablet by mouth every 6 hours as needed (for nausea, vomiting).   sucralfate (CARAFATE) 1 GM Tab 1 wk ago  No No   Sig: Take 1 Tablet by mouth 4 Times a Day,Before Meals and at Bedtime. Dissolve tablet in 1 cup water.   vitamin D3 (CHOLECALCIFEROL) 1000 Unit (25 mcg) Tab 1 wk ago Patient, Patient's Home Pharmacy Yes No   Sig: Take 1,000 Units by mouth every day.      Facility-Administered Medications: None       Physical Exam  Temp:  [37.2 °C (99 °F)-38.3 °C (101 °F)] 37.2 °C (99 °F)  Pulse:  [73-82] 78  Resp:  [18-20] 18  BP: (118-169)/(63-81) 169/81  SpO2:  [90 %-99 %] 93 %  Blood Pressure : (!) 169/81   Temperature: 37.2 °C (99 °F)   Pulse: 78   Respiration: 18   Pulse Oximetry: 93 %       Physical Exam  Vitals and nursing note reviewed.   Constitutional:       General: He is not in acute distress.  HENT:      Head: Normocephalic.      Mouth/Throat:      Mouth: Mucous membranes are moist.   Eyes:      Pupils: Pupils are equal, round, and reactive to light.   Cardiovascular:      Rate and Rhythm: Normal rate and regular rhythm.      Pulses: Normal pulses.      Heart sounds: Normal heart sounds.   Pulmonary:      Effort: Pulmonary effort is normal.      Breath sounds: Normal breath sounds.   Abdominal:      Palpations: Abdomen is soft.      " Tenderness: There is no abdominal tenderness.   Musculoskeletal:         General: No swelling.      Cervical back: Neck supple.   Skin:     General: Skin is warm.      Coloration: Skin is not jaundiced.   Neurological:      General: No focal deficit present.      Mental Status: He is alert and oriented to person, place, and time.   Psychiatric:         Mood and Affect: Mood normal.         Behavior: Behavior normal.         Laboratory:  Recent Labs     11/13/23  1110   WBC 3.5*   RBC 3.60*   HEMOGLOBIN 11.4*   HEMATOCRIT 32.7*   MCV 90.8   MCH 31.7   MCHC 34.9   RDW 48.4   PLATELETCT 87*   MPV 9.5     Recent Labs     11/13/23  1110   SODIUM 134*   POTASSIUM 4.2   CHLORIDE 94*   CO2 24   GLUCOSE 154*   BUN 39*   CREATININE 1.47*   CALCIUM 9.3     Recent Labs     11/13/23  1110   ALTSGPT 16   ASTSGOT 19   ALKPHOSPHAT 91   TBILIRUBIN 0.8   GLUCOSE 154*     Recent Labs     11/13/23  1110   APTT 24.9   INR 0.98     No results for input(s): \"NTPROBNP\" in the last 72 hours.      No results for input(s): \"TROPONINT\" in the last 72 hours.    Imaging:  DX-CHEST-PORTABLE (1 VIEW)   Final Result      1. No acute findings.   2. Stable chronic degenerative and post surgical changes.            Assessment/Plan:  Justification for Admission Status  I anticipate this patient is appropriate for observation status at this time because      Patient will need a Med/Surg bed on MEDICAL service .  The need is secondary to .    * Nausea and vomiting- (present on admission)  Assessment & Plan  IV fluid hydration with NS  Zofran as needed    Pancytopenia (HCC)  Assessment & Plan  Secondary to chemotherapy and cancer  No active bleeding  Monitor CBC  Transfuse for hemoglobin less than 7  Transfuse for platelets less than 10 or if active bleeding occurs    Fever  Assessment & Plan  Rule out influenza, COVID RSV  Follow blood cultures  Check UA  Chest x-ray is negative for pneumonia  Monitor CBC and vitals    Renal insufficiency- (present on " admission)  Assessment & Plan  IV fluid hydration with normal saline  Monitor BMP and assess response  Avoid IV contrast/nephrotoxins/NSAIDs  Dose adjust meds for decreased GFR      SCLC (small cell lung carcinoma) (HCC)- (present on admission)  Assessment & Plan  Stage IIIa  Follows with Dr. Fuller  Treated with chemo and radiation therapy        VTE prophylaxis: SCD

## 2023-11-13 NOTE — ASSESSMENT & PLAN NOTE
Secondary to chemotherapy and cancer  No active bleeding  Monitor CBC  Transfuse for hemoglobin less than 7  Transfuse for platelets less than 10 or if active bleeding occurs

## 2023-11-13 NOTE — ED NOTES
Bedside report from JOSE Lock. Pt resting in rney comfortably, on monitor, call light in reach.  Pt is on RA, GCS 15.  Necessary fall precautions in place.

## 2023-11-13 NOTE — ED PROVIDER NOTES
ED Provider Note    CHIEF COMPLAINT  Chief Complaint   Patient presents with    Dizziness     With headache and SOB over the last week. Pt has hx of small cell carcinoma and received chemo on 11/06, 11/7 and 11/8. Pt was visiting oncology RN today and was told to come to the ER due to fever of 101 F.     Flank Pain     BL flank pain that started Thursday. +N/V Pt denies dysuria.        EXTERNAL RECORDS REVIEWED  Other prior labs with regards to creatinine and anemia    HPI/ROS  LIMITATION TO HISTORY   Select: : None    OUTSIDE HISTORIAN(S):  Significant other patient's wife is at the bedside and is concerned the patient may be dehydrated    Bart Ramsey is a 73 y.o. diabetic male with lung CA on chemo with his last round on Monday, Tuesday, Wednesday of last week followed by XRT who presents for fever and nausea.    Patient states he presented to the oncology clinic for nursing visit and was found to be febrile at 101.  Sent to the ER for evaluation.    Patient denies rash, chest pain, shortness of breath, cough, nasal congestion, urinary symptoms, sick contacts, diarrhea, abdominal pain.    Patient reports some bilateral low back achy pain.  Patient declines pain medication.    Patient states he has had some nausea and vomiting that is not very well controlled by his 2 antinausea medications at home since chemotherapy.    PAST MEDICAL HISTORY   has a past medical history of Arthritis, Bowel habit changes, Bronchitis, Cancer (HCC) (09/29/2023), COPD (chronic obstructive pulmonary disease) ([facility]), Diabetes (HCC), Emphysema of lung (HCC), High cholesterol (08/2023), Myocardial infarct (HCC), Pneumonia, Psychiatric problem (08/2023), and Sleep apnea.    SURGICAL HISTORY   has a past surgical history that includes other cardiac surgery; other; other; gastroscopy-endo (03/31/2017); colonoscopy - endo (03/31/2017); cholecystectomy; tonsillectomy; inguinal hernia repair (Right); bronchoscopy,diagnostic (N/A,  2023); and endobronchial us add-on (N/A, 2023).    FAMILY HISTORY  Family History   Problem Relation Age of Onset    Cancer Mother         lung?       SOCIAL HISTORY  Social History     Tobacco Use    Smoking status: Former     Current packs/day: 0.00     Average packs/day: 0.5 packs/day for 14.0 years (7.0 ttl pk-yrs)     Types: Cigarettes     Start date: 1980     Quit date:      Years since quittin.8    Smokeless tobacco: Not on file    Tobacco comments:     Pt quit smoking cigarettes, 2000.  1/2 pack per day, smoked 20 years    Vaping Use    Vaping Use: Never used   Substance and Sexual Activity    Alcohol use: Not Currently     Alcohol/week: 8.4 oz     Types: 14 Shots of liquor per week     Comment: daily    Drug use: Not Currently     Types: Marijuana     Comment: quit     Sexual activity: Not on file       CURRENT MEDICATIONS  Home Medications       Reviewed by Arelis Hale R.N. (Registered Nurse) on 23 at 1100  Med List Status: Partial     Medication Last Dose Status   aspirin EC (ECOTRIN) 81 MG Tablet Delayed Response  Active   baclofen (LIORESAL) 10 MG Tab  Active   Cetirizine HCl (ZYRTEC ALLERGY PO)  Active   Cyanocobalamin (VITAMIN B-12) 1000 MCG Tab  Active   fluticasone (FLONASE) 50 MCG/ACT nasal spray  Active   fluticasone-salmeterol (ADVAIR) 250-50 MCG/ACT AEROSOL POWDER, BREATH ACTIVATED  Active   gabapentin (NEURONTIN) 300 MG Cap  Active   ipratropium (ATROVENT) 0.03 % Solution  Active   lidocaine-prilocaine (EMLA) 2.5-2.5 % Cream  Active   metFORMIN (GLUCOPHAGE) 500 MG Tab  Active   Omega-3 Fatty Acids (FISH OIL) 1000 MG Cap capsule  Active   omeprazole (PRILOSEC) 40 MG delayed-release capsule  Active   ondansetron (ZOFRAN) 4 MG Tab tablet  Active   prochlorperazine (COMPAZINE) 10 MG Tab  Active   sucralfate (CARAFATE) 1 GM Tab  Active   vitamin D3 (CHOLECALCIFEROL) 1000 Unit (25 mcg) Tab  Active                    ALLERGIES  No Known Allergies    PHYSICAL  "EXAM  VITAL SIGNS: /76   Pulse 82   Temp 37.2 °C (99 °F) (Temporal)   Resp 18   Ht 1.753 m (5' 9\")   Wt 76.7 kg (169 lb)   SpO2 99%   BMI 24.96 kg/m²    General:  WDWN male, nontoxic appearing in NAD; A+Ox3; V/S as above; afebrile, not hypoxic or tachycardic; not hypotensive  Skin: warm and dry; slightly pale color; no rash  HEENT: NCAT; EOMs intact; PERRL; no scleral icterus   Neck: FROM  Cardiovascular: Regular heart rate and rhythm.  No murmurs, rubs, or gallops; pulses 2+ bilaterally radially and DP areas  Lungs: No respiratory distress or tachypnea; Clear to auscultation with good air movement bilaterally.  No wheezes, rhonchi, or rales.   Abdomen: BS present; soft; NTND; no rebound, guarding, or rigidity.  No organomegaly or pulsatile mass; no CVAT  Back: No midline tenderness, erythema, rash  Extremities: ARGUETA x 4; no e/o trauma; no pedal edema; neg Micah's  Neurologic: CNs III-XII grossly intact; speech clear; distal sensation intact; strength 5/5 UE/LEs  Psychiatric: Appropriate affect, normal mood      DIAGNOSTIC STUDIES / PROCEDURES  LABS  Results for orders placed or performed during the hospital encounter of 11/13/23   Lactic acid (lactate)   Result Value Ref Range    Lactic Acid 1.5 0.5 - 2.0 mmol/L   CBC with Differential   Result Value Ref Range    WBC 3.5 (L) 4.8 - 10.8 K/uL    RBC 3.60 (L) 4.70 - 6.10 M/uL    Hemoglobin 11.4 (L) 14.0 - 18.0 g/dL    Hematocrit 32.7 (L) 42.0 - 52.0 %    MCV 90.8 81.4 - 97.8 fL    MCH 31.7 27.0 - 33.0 pg    MCHC 34.9 32.3 - 36.5 g/dL    RDW 48.4 35.9 - 50.0 fL    Platelet Count 87 (L) 164 - 446 K/uL    MPV 9.5 9.0 - 12.9 fL    Neutrophils-Polys 92.20 (H) 44.00 - 72.00 %    Lymphocytes 6.10 (L) 22.00 - 41.00 %    Monocytes 0.00 0.00 - 13.40 %    Eosinophils 0.00 0.00 - 6.90 %    Basophils 1.70 0.00 - 1.80 %    Nucleated RBC 0.00 0.00 - 0.20 /100 WBC    Neutrophils (Absolute) 3.23 1.82 - 7.42 K/uL    Lymphs (Absolute) 0.21 (L) 1.00 - 4.80 K/uL    Monos " (Absolute) 0.00 0.00 - 0.85 K/uL    Eos (Absolute) 0.00 0.00 - 0.51 K/uL    Baso (Absolute) 0.06 0.00 - 0.12 K/uL    NRBC (Absolute) 0.00 K/uL   Comp Metabolic Panel (CMP)   Result Value Ref Range    Sodium 134 (L) 135 - 145 mmol/L    Potassium 4.2 3.6 - 5.5 mmol/L    Chloride 94 (L) 96 - 112 mmol/L    Co2 24 20 - 33 mmol/L    Anion Gap 16.0 7.0 - 16.0    Glucose 154 (H) 65 - 99 mg/dL    Bun 39 (H) 8 - 22 mg/dL    Creatinine 1.47 (H) 0.50 - 1.40 mg/dL    Calcium 9.3 8.5 - 10.5 mg/dL    Correct Calcium 9.1 8.5 - 10.5 mg/dL    AST(SGOT) 19 12 - 45 U/L    ALT(SGPT) 16 2 - 50 U/L    Alkaline Phosphatase 91 30 - 99 U/L    Total Bilirubin 0.8 0.1 - 1.5 mg/dL    Albumin 4.2 3.2 - 4.9 g/dL    Total Protein 7.7 6.0 - 8.2 g/dL    Globulin 3.5 1.9 - 3.5 g/dL    A-G Ratio 1.2 g/dL   Prothrombin Time (INR)   Result Value Ref Range    PT 13.1 12.0 - 14.6 sec    INR 0.98 0.87 - 1.13   APTT (PTT)   Result Value Ref Range    APTT 24.9 24.7 - 36.0 sec   DIFFERENTIAL MANUAL   Result Value Ref Range    Manual Diff Status PERFORMED    PERIPHERAL SMEAR REVIEW   Result Value Ref Range    Peripheral Smear Review see below    PLATELET ESTIMATE   Result Value Ref Range    Plt Estimation Decreased    MORPHOLOGY   Result Value Ref Range    RBC Morphology Normal    IMMATURE PLT FRACTION   Result Value Ref Range    Imm. Plt Fraction 2.6 0.6 - 13.1 %   ESTIMATED GFR   Result Value Ref Range    GFR (CKD-EPI) 50 (A) >60 mL/min/1.73 m 2         RADIOLOGY  I have independently interpreted the diagnostic imaging associated with this visit and am waiting the final reading from the radiologist.   My preliminary interpretation is as follows:   Chest x-ray shows no focal consolidation    Radiologist interpretation:   DX-CHEST-PORTABLE (1 VIEW)   Final Result      1. No acute findings.   2. Stable chronic degenerative and post surgical changes.            COURSE & MEDICAL DECISION MAKING    ED Observation Status? Yes; I am placing the patient in to an  observation status due to a diagnostic uncertainty as well as therapeutic intensity. Patient placed in observation status at 11:53 AM, 11/13/2023.     Observation plan is as follows: Labs, imaging, rehydration, antinausea treatment    Upon Reevaluation, the patient's condition has: not improved; and will be escalated to hospitalization.    Patient discharged from ED Observation status at 2:01 PM   (Time) 11/13/23 (Date).     INITIAL ASSESSMENT, COURSE AND PLAN  Care Narrative: This is a 73-year-old with lung cancer currently undergoing chemotherapy who presents for nausea and fever.  Patient had a documented temperature of 101 with chills.  At triage, he was 99.    Neutropenic protocol was initiated with labs and cefepime.    Patient complained of nausea and was concerned for dehydration.  Zofran and normal saline bolus ordered.    Labs demonstrate a leukopenia with a white blood cell count of 3.5, lymphopenia of 6, elevated neutrophils, anemia with a hemoglobin of 11.4 and platelets 87.  Creatinine is elevated to 1.47--up from baseline of 1.1.  Lactic acid normal at 1.5.  Urinalysis pending as patient has not voided as of yet.    Chest x-ray demonstrates no evidence of pneumonia.    1:12 PM  Paging oncology/Jonny    1:20 PM  Paging hospitalist    1:39 PM  I discussed the case with Dr. Fuller from oncology who agrees with ER management and work-up thus far.  Patient is not neutropenic.  Will admit for ALBERTA/dehydration.  Awaiting hospitalist callback.    1:50 PM  I discussed the case with Dr. Vela from the [facility] hospitalist service who agrees to hospitalize the patient.      HYDRATION: Based on the patient's presentation of Acute Kidney Injury, Acute Vomiting, and Dehydration the patient was given IV fluids. IV Hydration was used because oral hydration was not adequate alone. Upon recheck following hydration, the patient was stable.        DISPOSITION AND DISCUSSIONS  I have discussed management of the patient with  the following physicians and JAE's:    Jonny/oncology  Mirian    Discussion of management with other QHP or appropriate source(s): Pharmacy per neutropenic protocol      Decision tools and prescription drugs considered including, but not limited to: Antibiotics cefepime per neutropenia protocol .    FINAL DIAGNOSIS  1. Nausea and vomiting, unspecified vomiting type    2. Dehydration    3. ALBERTA (acute kidney injury) (HCC)      Electronically signed by: Shelly Back M.D., 11/13/2023 11:28 AM

## 2023-11-13 NOTE — ED TRIAGE NOTES
"Chief Complaint   Patient presents with    Dizziness     With headache and SOB over the last week. Pt has hx of small cell carcinoma and received chemo on 11/06, 11/7 and 11/8. Pt was visiting oncology RN today and was told to come to the ER due to fever of 101 F.     Flank Pain     BL flank pain that started Thursday. +N/V Pt denies dysuria.          Pt ambulated to triage for above complaint.  Pt is AO x 4, follows commands, and responds appropriately to questions. Patient's breathing is unlabored and pain is currently 6/10 on the 0-10 pain scale.  Pt placed in family room. Patient educated on triage process and encouraged to alert staff for any changes.      /76   Pulse 82   Temp 37.2 °C (99 °F) (Temporal)   Resp 18   Ht 1.753 m (5' 9\")   Wt 76.7 kg (169 lb)   SpO2 99%     "

## 2023-11-14 ENCOUNTER — APPOINTMENT (OUTPATIENT)
Dept: RADIATION ONCOLOGY | Facility: MEDICAL CENTER | Age: 73
End: 2023-11-14
Payer: COMMERCIAL

## 2023-11-14 VITALS
SYSTOLIC BLOOD PRESSURE: 129 MMHG | HEIGHT: 69 IN | RESPIRATION RATE: 20 BRPM | WEIGHT: 171.96 LBS | DIASTOLIC BLOOD PRESSURE: 73 MMHG | HEART RATE: 68 BPM | OXYGEN SATURATION: 93 % | BODY MASS INDEX: 25.47 KG/M2 | TEMPERATURE: 98.7 F

## 2023-11-14 LAB
ANION GAP SERPL CALC-SCNC: 11 MMOL/L (ref 7–16)
BUN SERPL-MCNC: 35 MG/DL (ref 8–22)
CALCIUM SERPL-MCNC: 8.1 MG/DL (ref 8.5–10.5)
CHLORIDE SERPL-SCNC: 100 MMOL/L (ref 96–112)
CO2 SERPL-SCNC: 23 MMOL/L (ref 20–33)
CREAT SERPL-MCNC: 1.16 MG/DL (ref 0.5–1.4)
ERYTHROCYTE [DISTWIDTH] IN BLOOD BY AUTOMATED COUNT: 49.1 FL (ref 35.9–50)
GFR SERPLBLD CREATININE-BSD FMLA CKD-EPI: 66 ML/MIN/1.73 M 2
GLUCOSE SERPL-MCNC: 102 MG/DL (ref 65–99)
HCT VFR BLD AUTO: 25.5 % (ref 42–52)
HGB BLD-MCNC: 9 G/DL (ref 14–18)
MCH RBC QN AUTO: 32.4 PG (ref 27–33)
MCHC RBC AUTO-ENTMCNC: 35.3 G/DL (ref 32.3–36.5)
MCV RBC AUTO: 91.7 FL (ref 81.4–97.8)
PLATELET # BLD AUTO: 54 K/UL (ref 164–446)
PLATELETS.RETICULATED NFR BLD AUTO: 3.1 % (ref 0.6–13.1)
PMV BLD AUTO: 9.4 FL (ref 9–12.9)
POTASSIUM SERPL-SCNC: 3.6 MMOL/L (ref 3.6–5.5)
RBC # BLD AUTO: 2.78 M/UL (ref 4.7–6.1)
SODIUM SERPL-SCNC: 134 MMOL/L (ref 135–145)
WBC # BLD AUTO: 1.5 K/UL (ref 4.8–10.8)

## 2023-11-14 PROCEDURE — 80048 BASIC METABOLIC PNL TOTAL CA: CPT

## 2023-11-14 PROCEDURE — 99239 HOSP IP/OBS DSCHRG MGMT >30: CPT | Performed by: STUDENT IN AN ORGANIZED HEALTH CARE EDUCATION/TRAINING PROGRAM

## 2023-11-14 PROCEDURE — 77386 HCHG IMRT DELIVERY COMPLEX: CPT | Performed by: RADIOLOGY

## 2023-11-14 PROCEDURE — G0378 HOSPITAL OBSERVATION PER HR: HCPCS

## 2023-11-14 PROCEDURE — A9270 NON-COVERED ITEM OR SERVICE: HCPCS | Performed by: INTERNAL MEDICINE

## 2023-11-14 PROCEDURE — 36415 COLL VENOUS BLD VENIPUNCTURE: CPT

## 2023-11-14 PROCEDURE — 700102 HCHG RX REV CODE 250 W/ 637 OVERRIDE(OP): Performed by: INTERNAL MEDICINE

## 2023-11-14 PROCEDURE — 85055 RETICULATED PLATELET ASSAY: CPT

## 2023-11-14 PROCEDURE — 85027 COMPLETE CBC AUTOMATED: CPT

## 2023-11-14 PROCEDURE — 700105 HCHG RX REV CODE 258: Performed by: INTERNAL MEDICINE

## 2023-11-14 PROCEDURE — 77014 PR CT GUIDANCE PLACEMENT RAD THERAPY FIELDS: CPT | Mod: 26 | Performed by: RADIOLOGY

## 2023-11-14 RX ORDER — HEPARIN SODIUM,PORCINE 10 UNIT/ML
20 VIAL (ML) INTRAVENOUS EVERY 6 HOURS
Status: DISCONTINUED | OUTPATIENT
Start: 2023-11-14 | End: 2023-11-14

## 2023-11-14 RX ADMIN — OXYCODONE 5 MG: 5 TABLET ORAL at 08:02

## 2023-11-14 RX ADMIN — GABAPENTIN 300 MG: 300 CAPSULE ORAL at 15:18

## 2023-11-14 RX ADMIN — SODIUM CHLORIDE 1000 ML: 9 INJECTION, SOLUTION INTRAVENOUS at 01:29

## 2023-11-14 RX ADMIN — GABAPENTIN 300 MG: 300 CAPSULE ORAL at 08:02

## 2023-11-14 RX ADMIN — MOMETASONE FUROATE AND FORMOTEROL FUMARATE DIHYDRATE 2 PUFF: 200; 5 AEROSOL RESPIRATORY (INHALATION) at 05:52

## 2023-11-14 ASSESSMENT — PAIN DESCRIPTION - PAIN TYPE: TYPE: ACUTE PAIN

## 2023-11-14 NOTE — PROGRESS NOTES
Report received from night shift RN. Patient A&O, in bed resting comfortably. VSS, denies pain, bed in lowest position and locked, call light in reach.

## 2023-11-14 NOTE — CARE PLAN
Problem: Pain - Standard  Goal: Alleviation of pain or a reduction in pain to the patient’s comfort goal  Outcome: Progressing     Problem: Knowledge Deficit - Standard  Goal: Patient and family/care givers will demonstrate understanding of plan of care, disease process/condition, diagnostic tests and medications  Outcome: Progressing   The patient is Stable - Low risk of patient condition declining or worsening    Shift Goals  Clinical Goals: no falls    Progress made toward(s) clinical / shift goals:  Alert and oriented. VSS. RA. Continuous IVF infusing per order. No complaints of pain    Patient is not progressing towards the following goals:

## 2023-11-14 NOTE — PROGRESS NOTES
Report received from ER RN. Assume care. Pt.arrived to unit AAOx4.  Assessment completed. VSS. Denies pain, fully ambulatory, able to wiggle toes and dorsi/plantar flex feet, good CMS and pulses to misty LE, denies numbness and tingling. Dressing to Rt upper port chest  in place DCI, Pt was update for the care for the day. White board updated, All question answered. Pt has call light within reach,  bed is in the lowest position. Pt has no other needs at this time.

## 2023-11-14 NOTE — ED NOTES
Pt transported to T214 with transport via gurney.  Pt transported with all belongings and RA. Pt awake and oriented.

## 2023-11-15 ENCOUNTER — APPOINTMENT (OUTPATIENT)
Dept: RADIATION ONCOLOGY | Facility: MEDICAL CENTER | Age: 73
End: 2023-11-15
Payer: COMMERCIAL

## 2023-11-15 LAB
BACTERIA UR CULT: NORMAL
CHEMOTHERAPY INFUSION START DATE: NORMAL
CHEMOTHERAPY RECORDS: 2
CHEMOTHERAPY RECORDS: 6600
CHEMOTHERAPY RECORDS: NORMAL
CHEMOTHERAPY RX CANCER: NORMAL
DATE 1ST CHEMO CANCER: NORMAL
RAD ONC ARIA COURSE LAST TREATMENT DATE: NORMAL
RAD ONC ARIA COURSE TREATMENT ELAPSED DAYS: NORMAL
RAD ONC ARIA REFERENCE POINT DOSAGE GIVEN TO DATE: 50
RAD ONC ARIA REFERENCE POINT ID: NORMAL
RAD ONC ARIA REFERENCE POINT SESSION DOSAGE GIVEN: 2
SIGNIFICANT IND 70042: NORMAL
SITE SITE: NORMAL
SOURCE SOURCE: NORMAL

## 2023-11-15 PROCEDURE — 77014 PR CT GUIDANCE PLACEMENT RAD THERAPY FIELDS: CPT | Mod: 26 | Performed by: RADIOLOGY

## 2023-11-15 PROCEDURE — 77386 HCHG IMRT DELIVERY COMPLEX: CPT | Performed by: RADIOLOGY

## 2023-11-15 PROCEDURE — 77427 RADIATION TX MANAGEMENT X5: CPT | Performed by: RADIOLOGY

## 2023-11-15 NOTE — DISCHARGE SUMMARY
"Discharge Summary    CHIEF COMPLAINT ON ADMISSION  Chief Complaint   Patient presents with    Dizziness     With headache and SOB over the last week. Pt has hx of small cell carcinoma and received chemo on 11/06, 11/7 and 11/8. Pt was visiting oncology RN today and was told to come to the ER due to fever of 101 F.     Flank Pain     BL flank pain that started Thursday. +N/V Pt denies dysuria.        Reason for Admission  Dizziness, Intractable N/V, Covid-19 infection     Admission Date  11/13/2023    CODE STATUS  FULL    HPI & HOSPITAL COURSE  Per Dr. Vela's HPI dated 11/13/2023:  \"Bart Ramsey is a 73 y.o. male with a past medical history of small cell lung cancer stage III A treated with chemotherapy and radiation therapy who presented 11/13/2023 with fever and nausea.  Patient was at his oncologist's office when he was noted to have a fever of 101 and was sent to the ER for further evaluation.  Patient's last chemotherapy session was on Wednesday of last week.  He reports symptoms of nausea and vomiting with bilateral flank pain.  He denies any dysuria.  He denies any chest pain, cough or shortness of breath.  He denies any headache or neck stiffness.  In the ER patient's temperature was 38.3 and blood pressure was elevated at 169/81.  WBC was low at 3.5 however ANC was normal at 3.2.  His BUN was elevated at 39 and creatinine was elevated at 1.47.  UA is pending at this time.  Chest x-ray interpreted by me reveals no acute cardiopulmonary process\"    The patient was admitted for further management. Gentle hydration was provided and supportive care with anti-emetics.    Hospital course under my care 11/14: afebrile and vitals wnl. Satting well in RA. Exam at bedside was grossly unremarkable. Labs and imagings findings discussed with patient. Cr trending but worsening leukopenia. Pt tolerated IP diet without N/V. At this point, a plan to DC discussed with a close outpatient follow up. Pt resumed " radiation therapy treatment in PM. Pt will continue to follow up with his oncology team.        Therefore, he is discharged in fair and stable condition to home with close outpatient follow-up.    The patient recovered much more quickly than anticipated on admission.    Discharge Date  11/14/2023    FOLLOW UP ITEMS POST DISCHARGE  N/A    DISCHARGE DIAGNOSES  Principal Problem:    Nausea and vomiting (POA: Yes)  Active Problems:    SCLC (small cell lung carcinoma) (HCC) (POA: Yes)    Renal insufficiency (POA: Yes)    Fever (POA: Yes)    Pancytopenia (HCC) (POA: Yes)  Resolved Problems:    * No resolved hospital problems. *      FOLLOW UP  Future Appointments   Date Time Provider Department Center   11/15/2023 11:30 AM Leonel Patel M.D. RADT None   11/15/2023  3:15 PM RADIATION THERAPY RADT None   11/16/2023  3:30 PM RADIATION THERAPY RADT None   11/17/2023  3:00 PM RADIATION THERAPY RADT None   11/19/2023 11:15 AM RADIATION THERAPY RADT None   11/20/2023 11:15 AM RADIATION THERAPY RADT None   11/21/2023  9:00 AM RADIATION THERAPY RADT None   11/22/2023 11:15 AM RADIATION THERAPY RADT None   11/22/2023  2:30 PM [facility] 1 [facility] [place]   11/24/2023  [facility]   11/27/2023  9:00 AM RADIATION THERAPY RADT None   11/27/2023 10:00 AM APARNA [place] [facility] None   11/27/2023 11:00 AM [facility]   11/28/2023  9:00 AM RADIATION THERAPY RADT None   11/28/2023 11:45 [facility]   11/29/2023 11:30 AM [facility]   12/15/2023  8:00 AM Roseline Fuller M.D. ON[facility] None   12/15/2023  8:30 AM INFUSION QUICK INJECT ON [facility]   12/18/2023  1:30 PM [facility]     Roseline Fuller M.D.  [address] [place] [id number] [phone]    Follow up  As needed, If symptoms worsen      MEDICATIONS ON DISCHARGE     Medication List        CONTINUE taking these medications         Instructions   aspirin EC 81 MG Tbec  Commonly known as: Ecotrin   Take 81 mg by mouth every day.  Dose: 81 mg     baclofen 10 MG Tabs  Commonly known as: Lioresal   Take 10 mg by mouth at bedtime as needed. Indications: Muscle Spasm  Dose: 10 mg     cetirizine 10 MG Tabs  Commonly known as: ZyrTEC   Take 1 Tablet by mouth every day.  Dose: 1 Tablet     fish oil 1000 MG Caps capsule   Take 1,000 mg by mouth every day.  Dose: 1,000 mg     fluticasone-salmeterol 250-50 MCG/ACT Aepb  Commonly known as: Advair   Inhale 1 Puff 2 times a day as needed.  Dose: 1 Puff     gabapentin 300 MG Caps  Commonly known as: Neurontin   Take 300-600 mg by mouth 3 times a day.  Dose: 300-600 mg     metFORMIN 500 MG Tabs  Commonly known as: Glucophage   Take 750 mg by mouth 2 times a day with meals.  Dose: 750 mg     ondansetron 4 MG Tabs tablet  Commonly known as: Zofran   Take 1 Tablet by mouth every four hours as needed for Nausea/Vomiting (for nausea, vomiting).  Dose: 4 mg     prochlorperazine 10 MG Tabs  Commonly known as: Compazine   Take 1 Tablet by mouth every 6 hours as needed (for nausea, vomiting).  Dose: 10 mg     sucralfate 1 GM Tabs  Commonly known as: Carafate   Take 1 Tablet by mouth 4 Times a Day,Before Meals and at Bedtime. Dissolve tablet in 1 cup water.  Dose: 1 g     vitamin B-12 1000 MCG Tabs   Take 1,000 mcg by mouth every day.  Dose: 1,000 mcg     vitamin D3 1000 Unit (25 mcg) Tabs  Commonly known as: Cholecalciferol   Take 1,000 Units by mouth every day.  Dose: 1,000 Units              Allergies  No Known Allergies    DIET  Regular      ACTIVITY  As tolerated.  Weight bearing as tolerated    CONSULTATIONS  N/A    PROCEDURES  N/A    LABORATORY  Lab Results   Component Value Date    SODIUM 134 (L) 11/14/2023    POTASSIUM 3.6 11/14/2023    CHLORIDE 100 11/14/2023    CO2 23 11/14/2023    GLUCOSE 102 (H) 11/14/2023    BUN 35 (H) 11/14/2023    CREATININE 1.16 11/14/2023        Lab Results   Component Value Date     WBC 1.5 (LL) 11/14/2023    HEMOGLOBIN 9.0 (L) 11/14/2023    HEMATOCRIT 25.5 (L) 11/14/2023    PLATELETCT 54 (L) 11/14/2023        Total time of the discharge process exceeds 36 minutes.

## 2023-11-15 NOTE — ON TREATMENT VISIT
ON TREATMENT  NOTE  RADIATION ONCOLOGY DEPARTMENT    Patient name:  Bart Ramsey    Primary Physician:  Pcp Pt States None MRN: [id number]  CSN: [id number]   Referring physician:  Gianfranco Echevarria, *   : 1950, 73 y.o.     ENCOUNTER DATE:  11/15/2023      DIAGNOSIS:  SCLC (small cell lung carcinoma) (HCC)  Staging form: Lung, AJCC 8th Edition  - Clinical stage from 2023: Stage IIIA (cT3, cN1, cM0) - Signed by Leonel Patel M.D. on 2023  Stage prefix: Initial diagnosis      TREATMENT SUMMARY:  Course First Treatment Date 10/09/2023  Course Last Treatment Date 11/15/2023  [facility] Treatment Information          11/15/2023   [facility]ria Course Treatment Dates   Course First Treatment Date 10/09/2023    Course Last Treatment Date 11/15/2023    [facility] Treatment Summary   L_Lung  Plan from Course C1_L_LUNG   Fraction 25 of 33   Elapsed Course Days 37 @ [id number]   Prescribed Fraction Dose 200 cGy   Prescribed Total Dose 6,600 cGy   L_Lung  Reference Point from Course C1_L_LUNG   Elapsed Course Days 37 @ [id number]   Session Dose 200 cGy   Total Dose 5,000 cGy          SUBJECTIVE:  Generally doing okay, but was briefly admitted earlier this week with fever to 101 and leukopenia, as well as nausea and vomiting.  Inpatient work-up was notable with patient being COVID-positive, BUN and creatinine both elevated, and treated with gentle hydration and supportive care.  Exam grossly unremarkable.  He improved and was able to resume radiation and was discharged.  Now slowly recovering.    VITAL SIGNS:      2023     7:36 AM 2023     3:21 AM 2023     7:48 PM 2023     6:05 PM 2023     4:50 PM 2023     4:35 PM 2023     4:02 PM   Vitals   SYSTOLIC 129 149 131 114  165  145   DIASTOLIC 73 79 63 65  80  70   Pulse 68 76 79 71 73 81 69   Temperature 37.1 °C (98.7 °F) 36.6 °C (97.9 °F) 37.3 °C (99.1 °F)  37.8 °C (100.1 °F)     Respiration 20 18 18  18  20   Weight      "171.96     Height     1.753 m (5' 9\")     BMI     25.39 kg/m2     Pulse Oximetry 93 % 93 % 93 %  93 % 94 % 90 %       Significant value     KPS: 90, Able to carry on normal activity; minor signs or symptoms of disease (ECOG equivalent 0)         11/8/2023    11:43 AM 11/2/2023    11:22 AM 10/25/2023    11:32 AM 10/18/2023    11:11 AM 10/11/2023    11:25 AM 9/26/2023    11:30 AM   Pain Assessment   Pain Score 4=SLIGHT MARIVEL NO PAIN NO PAIN 2=MINIMAL PA 2=MINIMAL PA NO PAIN   Pain Loc THROAT   HEAD HEAD           PHYSICAL EXAM:  Physical Exam  Constitutional:       Appearance: Normal appearance.   Cardiovascular:      Rate and Rhythm: Normal rate and regular rhythm.   Pulmonary:      Effort: Pulmonary effort is normal.      Breath sounds: Normal breath sounds.   Neurological:      Mental Status: He is alert.              11/8/2023    11:46 AM 11/2/2023    11:23 AM 10/25/2023    11:34 AM 10/18/2023    11:12 AM 10/11/2023    11:27 AM   Toxicity Assessment   Toxicity Assessment Chest Chest Chest Chest Chest   Fatigue (lethargy, malaise, asthenia) Moderate (e.g., decrease in performance status by 1 ECOG level or 20% Karnofsky) or causing difficulty performing some activities None Increased fatigue over baseline, but not altering normal activities Increased fatigue over baseline, but not altering normal activities Increased fatigue over baseline, but not altering normal activities   Radiation Dermatitis Faint erythema or dry desquamation None None None None   Anorexia Oral intake significantly decreased Loss of appetite None Loss of appetite Loss of appetite   Dyspepsia/heartburn None Severe None None None   Dysphagia, Esophagitis, odynophagoa (painful swallowing) Mild dysphagia, but can eat regular diet Mild dysphagia, but can eat regular diet None None None   RT Dysphagia-Esophageal Mild dysphagia, but can eat regular diet None None None None   Cough Absent Absent Absent Absent Absent   Dyspnea Dyspnea on exertion Dyspnea " on exertion Dyspnea on exertion Dyspnea on exertion Normal       CURRENT MEDICATIONS:    Current Outpatient Medications:     sucralfate (CARAFATE) 1 GM Tab, Take 1 Tablet by mouth 4 Times a Day,Before Meals and at Bedtime. Dissolve tablet in 1 cup water., Disp: 120 Tablet, Rfl: 3    ondansetron (ZOFRAN) 4 MG Tab tablet, Take 1 Tablet by mouth every four hours as needed for Nausea/Vomiting (for nausea, vomiting)., Disp: 30 Tablet, Rfl: 6    prochlorperazine (COMPAZINE) 10 MG Tab, Take 1 Tablet by mouth every 6 hours as needed (for nausea, vomiting)., Disp: 30 Tablet, Rfl: 6    cetirizine (ZYRTEC) 10 MG Tab, Take 1 Tablet by mouth every day., Disp: , Rfl:     metFORMIN (GLUCOPHAGE) 500 MG Tab, Take 750 mg by mouth 2 times a day with meals., Disp: , Rfl:     baclofen (LIORESAL) 10 MG Tab, Take 10 mg by mouth at bedtime as needed. Indications: Muscle Spasm, Disp: , Rfl:     Cyanocobalamin (VITAMIN B-12) 1000 MCG Tab, Take 1,000 mcg by mouth every day., Disp: , Rfl:     fluticasone-salmeterol (ADVAIR) 250-50 MCG/ACT AEROSOL POWDER, BREATH ACTIVATED, Inhale 1 Puff 2 times a day as needed., Disp: , Rfl:     Omega-3 Fatty Acids (FISH OIL) 1000 MG Cap capsule, Take 1,000 mg by mouth every day., Disp: , Rfl:     vitamin D3 (CHOLECALCIFEROL) 1000 Unit (25 mcg) Tab, Take 1,000 Units by mouth every day., Disp: , Rfl:     gabapentin (NEURONTIN) 300 MG Cap, Take 300-600 mg by mouth 3 times a day., Disp: , Rfl:     aspirin EC (ECOTRIN) 81 MG Tablet Delayed Response, Take 81 mg by mouth every day., Disp: , Rfl:     LABORATORY DATA:   Lab Results   Component Value Date/Time    SODIUM 134 (L) 11/14/2023 02:53 AM    POTASSIUM 3.6 11/14/2023 02:53 AM    CHLORIDE 100 11/14/2023 02:53 AM    CO2 23 11/14/2023 02:53 AM    GLUCOSE 102 (H) 11/14/2023 02:53 AM    BUN 35 (H) 11/14/2023 02:53 AM    CREATININE 1.16 11/14/2023 02:53 AM       Lab Results   Component Value Date/Time    WBC 1.5 (LL) 11/14/2023 02:53 AM    RBC 2.78 (L) 11/14/2023  02:53 AM    HEMOGLOBIN 9.0 (L) 11/14/2023 02:53 AM    HEMATOCRIT 25.5 (L) 11/14/2023 02:53 AM    MCV 91.7 11/14/2023 02:53 AM    MCH 32.4 11/14/2023 02:53 AM    MCHC 35.3 11/14/2023 02:53 AM    PLATELETCT 54 (L) 11/14/2023 02:53 AM         RADIOLOGY DATA:  CT-CSPINE WITHOUT PLUS RECONS    Result Date: 9/22/2023  No acute fracture or dislocation seen in the CT scan of the cervical spine.    CT-HEAD W/O    Result Date: 9/22/2023  NO ACUTE ABNORMALITIES ARE NOTED ON CT SCAN OF THE HEAD.     DX-CHEST-PORTABLE (1 VIEW)    Result Date: 11/13/2023  1. No acute findings. 2. Stable chronic degenerative and post surgical changes.    DX-CHEST-PORTABLE (1 VIEW)    Result Date: 10/4/2023  1.  Right IJ Port-A-Cath present with the tip overlying the superior vena cava. 2.  Again seen mass within the left lung base.    DX-CHEST-PORTABLE (1 VIEW)    Result Date: 9/22/2023  No acute cardiac or pulmonary abnormality is identified. Left lower lung mass again noted.    IR-CVC PORT PLACEMENT > AGE 5    Result Date: 10/3/2023  1. ULTRASOUND AND FLUOROSCOPIC GUIDED PLACEMENT OF A Right INTERNAL JUGULAR SINGLE LUMEN BARD POWER-PORT VENOUS ACCESS DEVICE. 2. THE PORT MAY BE USED IMMEDIATELY AS CLINICALLY INDICATED. FLUSHES PER PROTOCOL.       IMPRESSION:  Cancer Staging   SCLC (small cell lung carcinoma) (HCC)  Staging form: Lung, AJCC 8th Edition  - Clinical stage from 9/14/2023: Stage IIIA (cT3, cN1, cM0) - Signed by Leonel Patel M.D. on 9/14/2023      PLAN:  No change in treatment plan    Disposition:  Treatment plan and imaging reviewed. Questions answered. Continue therapy outlined.     Leonel Patel M.D.    Orders Placed This Encounter    Rad Onc [facility] Session Summary

## 2023-11-16 ENCOUNTER — APPOINTMENT (OUTPATIENT)
Dept: RADIATION ONCOLOGY | Facility: MEDICAL CENTER | Age: 73
End: 2023-11-16
Payer: COMMERCIAL

## 2023-11-16 PROCEDURE — 77014 PR CT GUIDANCE PLACEMENT RAD THERAPY FIELDS: CPT | Mod: 26 | Performed by: RADIOLOGY

## 2023-11-16 PROCEDURE — 77386 HCHG IMRT DELIVERY COMPLEX: CPT | Performed by: RADIOLOGY

## 2023-11-17 ENCOUNTER — APPOINTMENT (OUTPATIENT)
Dept: RADIATION ONCOLOGY | Facility: MEDICAL CENTER | Age: 73
End: 2023-11-17
Payer: COMMERCIAL

## 2023-11-17 ENCOUNTER — APPOINTMENT (OUTPATIENT)
Dept: HEMATOLOGY ONCOLOGY | Facility: MEDICAL CENTER | Age: 73
End: 2023-11-17
Payer: COMMERCIAL

## 2023-11-17 PROCEDURE — 77014 PR CT GUIDANCE PLACEMENT RAD THERAPY FIELDS: CPT | Mod: 26 | Performed by: RADIOLOGY

## 2023-11-17 PROCEDURE — 77386 HCHG IMRT DELIVERY COMPLEX: CPT | Performed by: RADIOLOGY

## 2023-11-18 LAB
BACTERIA BLD CULT: NORMAL
BACTERIA BLD CULT: NORMAL
SIGNIFICANT IND 70042: NORMAL
SIGNIFICANT IND 70042: NORMAL
SITE SITE: NORMAL
SITE SITE: NORMAL
SOURCE SOURCE: NORMAL
SOURCE SOURCE: NORMAL

## 2023-11-19 ENCOUNTER — APPOINTMENT (OUTPATIENT)
Dept: RADIOLOGY | Facility: MEDICAL CENTER | Age: 73
DRG: 177 | End: 2023-11-19
Attending: STUDENT IN AN ORGANIZED HEALTH CARE EDUCATION/TRAINING PROGRAM
Payer: COMMERCIAL

## 2023-11-19 ENCOUNTER — HOSPITAL ENCOUNTER (INPATIENT)
Facility: MEDICAL CENTER | Age: 73
LOS: 4 days | DRG: 177 | End: 2023-11-23
Attending: STUDENT IN AN ORGANIZED HEALTH CARE EDUCATION/TRAINING PROGRAM | Admitting: HOSPITALIST
Payer: COMMERCIAL

## 2023-11-19 DIAGNOSIS — C34.90 SCLC (SMALL CELL LUNG CARCINOMA) (HCC): ICD-10-CM

## 2023-11-19 DIAGNOSIS — D70.1 CHEMOTHERAPY-INDUCED NEUTROPENIA (HCC): ICD-10-CM

## 2023-11-19 DIAGNOSIS — E86.0 DEHYDRATION: ICD-10-CM

## 2023-11-19 DIAGNOSIS — U07.1 PNEUMONIA DUE TO COVID-19 VIRUS: ICD-10-CM

## 2023-11-19 DIAGNOSIS — J44.9 COPD WITHOUT EXACERBATION (HCC): ICD-10-CM

## 2023-11-19 DIAGNOSIS — T66.XXXA RADIATION-INDUCED ESOPHAGITIS: ICD-10-CM

## 2023-11-19 DIAGNOSIS — J96.01 ACUTE RESPIRATORY FAILURE WITH HYPOXIA (HCC): ICD-10-CM

## 2023-11-19 DIAGNOSIS — J18.9 PNEUMONIA OF LEFT LOWER LOBE DUE TO INFECTIOUS ORGANISM: ICD-10-CM

## 2023-11-19 DIAGNOSIS — K20.80 RADIATION-INDUCED ESOPHAGITIS: ICD-10-CM

## 2023-11-19 DIAGNOSIS — J12.82 PNEUMONIA DUE TO COVID-19 VIRUS: ICD-10-CM

## 2023-11-19 DIAGNOSIS — T45.1X5A CHEMOTHERAPY-INDUCED NEUTROPENIA (HCC): ICD-10-CM

## 2023-11-19 PROBLEM — D70.9 NEUTROPENIA (HCC): Status: ACTIVE | Noted: 2023-11-19

## 2023-11-19 LAB
ALBUMIN SERPL BCP-MCNC: 3.8 G/DL (ref 3.2–4.9)
ALBUMIN/GLOB SERPL: 1.2 G/DL
ALP SERPL-CCNC: 66 U/L (ref 30–99)
ALT SERPL-CCNC: 12 U/L (ref 2–50)
ANION GAP SERPL CALC-SCNC: 14 MMOL/L (ref 7–16)
ANISOCYTOSIS BLD QL SMEAR: ABNORMAL
AST SERPL-CCNC: 15 U/L (ref 12–45)
BASOPHILS # BLD AUTO: 0.8 % (ref 0–1.8)
BASOPHILS # BLD: 0.01 K/UL (ref 0–0.12)
BILIRUB SERPL-MCNC: 1.1 MG/DL (ref 0.1–1.5)
BUN SERPL-MCNC: 31 MG/DL (ref 8–22)
CALCIUM ALBUM COR SERPL-MCNC: 9.2 MG/DL (ref 8.5–10.5)
CALCIUM SERPL-MCNC: 9 MG/DL (ref 8.5–10.5)
CHEMOTHERAPY INFUSION START DATE: NORMAL
CHEMOTHERAPY RECORDS: 2
CHEMOTHERAPY RECORDS: 6600
CHEMOTHERAPY RECORDS: NORMAL
CHEMOTHERAPY RX CANCER: NORMAL
CHLORIDE SERPL-SCNC: 96 MMOL/L (ref 96–112)
CO2 SERPL-SCNC: 26 MMOL/L (ref 20–33)
CREAT SERPL-MCNC: 0.96 MG/DL (ref 0.5–1.4)
CRP SERPL HS-MCNC: 22.49 MG/DL (ref 0–0.75)
D DIMER PPP IA.FEU-MCNC: 2.17 UG/ML (FEU) (ref 0–0.5)
DACRYOCYTES BLD QL SMEAR: NORMAL
DATE 1ST CHEMO CANCER: NORMAL
EKG IMPRESSION: NORMAL
EOSINOPHIL # BLD AUTO: 0.01 K/UL (ref 0–0.51)
EOSINOPHIL NFR BLD: 0.9 % (ref 0–6.9)
ERYTHROCYTE [DISTWIDTH] IN BLOOD BY AUTOMATED COUNT: 43.6 FL (ref 35.9–50)
ERYTHROCYTE [SEDIMENTATION RATE] IN BLOOD BY WESTERGREN METHOD: >140 MM/HOUR (ref 0–20)
GFR SERPLBLD CREATININE-BSD FMLA CKD-EPI: 83 ML/MIN/1.73 M 2
GLOBULIN SER CALC-MCNC: 3.3 G/DL (ref 1.9–3.5)
GLUCOSE SERPL-MCNC: 128 MG/DL (ref 65–99)
HCT VFR BLD AUTO: 25.5 % (ref 42–52)
HGB BLD-MCNC: 9.3 G/DL (ref 14–18)
LACTATE SERPL-SCNC: 1.3 MMOL/L (ref 0.5–2)
LDH SERPL L TO P-CCNC: 97 U/L (ref 107–266)
LYMPHOCYTES # BLD AUTO: 0.29 K/UL (ref 1–4.8)
LYMPHOCYTES NFR BLD: 35.8 % (ref 22–41)
MACROCYTES BLD QL SMEAR: ABNORMAL
MANUAL DIFF BLD: NORMAL
MCH RBC QN AUTO: 32 PG (ref 27–33)
MCHC RBC AUTO-ENTMCNC: 36.5 G/DL (ref 32.3–36.5)
MCV RBC AUTO: 87.6 FL (ref 81.4–97.8)
MICROCYTES BLD QL SMEAR: ABNORMAL
MONOCYTES # BLD AUTO: 0.28 K/UL (ref 0–0.85)
MONOCYTES NFR BLD AUTO: 35 % (ref 0–13.4)
MORPHOLOGY BLD-IMP: NORMAL
NEUTROPHILS # BLD AUTO: 0.22 K/UL (ref 1.82–7.42)
NEUTROPHILS NFR BLD: 27.5 % (ref 44–72)
NRBC # BLD AUTO: 0 K/UL
NRBC BLD-RTO: 0 /100 WBC (ref 0–0.2)
OVALOCYTES BLD QL SMEAR: NORMAL
PLATELET # BLD AUTO: 36 K/UL (ref 164–446)
PLATELET BLD QL SMEAR: NORMAL
PLATELETS.RETICULATED NFR BLD AUTO: 5.6 % (ref 0.6–13.1)
PMV BLD AUTO: 11.3 FL (ref 9–12.9)
POIKILOCYTOSIS BLD QL SMEAR: NORMAL
POTASSIUM SERPL-SCNC: 3.3 MMOL/L (ref 3.6–5.5)
PROCALCITONIN SERPL-MCNC: 0.18 NG/ML
PROT SERPL-MCNC: 7.1 G/DL (ref 6–8.2)
RAD ONC ARIA COURSE LAST TREATMENT DATE: NORMAL
RAD ONC ARIA COURSE TREATMENT ELAPSED DAYS: NORMAL
RAD ONC ARIA REFERENCE POINT DOSAGE GIVEN TO DATE: 56
RAD ONC ARIA REFERENCE POINT ID: NORMAL
RAD ONC ARIA REFERENCE POINT SESSION DOSAGE GIVEN: 2
RBC # BLD AUTO: 2.91 M/UL (ref 4.7–6.1)
RBC BLD AUTO: PRESENT
SODIUM SERPL-SCNC: 136 MMOL/L (ref 135–145)
TROPONIN T SERPL-MCNC: 22 NG/L (ref 6–19)
URATE SERPL-MCNC: 7.2 MG/DL (ref 2.5–8.3)
WBC # BLD AUTO: 0.8 K/UL (ref 4.8–10.8)

## 2023-11-19 PROCEDURE — 93005 ELECTROCARDIOGRAM TRACING: CPT | Performed by: STUDENT IN AN ORGANIZED HEALTH CARE EDUCATION/TRAINING PROGRAM

## 2023-11-19 PROCEDURE — 87040 BLOOD CULTURE FOR BACTERIA: CPT

## 2023-11-19 PROCEDURE — 85652 RBC SED RATE AUTOMATED: CPT

## 2023-11-19 PROCEDURE — 700117 HCHG RX CONTRAST REV CODE 255: Performed by: STUDENT IN AN ORGANIZED HEALTH CARE EDUCATION/TRAINING PROGRAM

## 2023-11-19 PROCEDURE — 99223 1ST HOSP IP/OBS HIGH 75: CPT | Performed by: HOSPITALIST

## 2023-11-19 PROCEDURE — 8E0ZXY6 ISOLATION: ICD-10-PCS | Performed by: STUDENT IN AN ORGANIZED HEALTH CARE EDUCATION/TRAINING PROGRAM

## 2023-11-19 PROCEDURE — 36415 COLL VENOUS BLD VENIPUNCTURE: CPT

## 2023-11-19 PROCEDURE — 71275 CT ANGIOGRAPHY CHEST: CPT

## 2023-11-19 PROCEDURE — 85027 COMPLETE CBC AUTOMATED: CPT

## 2023-11-19 PROCEDURE — 96365 THER/PROPH/DIAG IV INF INIT: CPT

## 2023-11-19 PROCEDURE — 85379 FIBRIN DEGRADATION QUANT: CPT

## 2023-11-19 PROCEDURE — 770001 HCHG ROOM/CARE - MED/SURG/GYN PRIV*

## 2023-11-19 PROCEDURE — 700111 HCHG RX REV CODE 636 W/ 250 OVERRIDE (IP): Mod: JZ | Performed by: STUDENT IN AN ORGANIZED HEALTH CARE EDUCATION/TRAINING PROGRAM

## 2023-11-19 PROCEDURE — 86140 C-REACTIVE PROTEIN: CPT

## 2023-11-19 PROCEDURE — 96367 TX/PROPH/DG ADDL SEQ IV INF: CPT

## 2023-11-19 PROCEDURE — 84484 ASSAY OF TROPONIN QUANT: CPT

## 2023-11-19 PROCEDURE — 84550 ASSAY OF BLOOD/URIC ACID: CPT

## 2023-11-19 PROCEDURE — 700101 HCHG RX REV CODE 250: Performed by: STUDENT IN AN ORGANIZED HEALTH CARE EDUCATION/TRAINING PROGRAM

## 2023-11-19 PROCEDURE — 77014 PR CT GUIDANCE PLACEMENT RAD THERAPY FIELDS: CPT | Mod: 26 | Performed by: RADIOLOGY

## 2023-11-19 PROCEDURE — 83615 LACTATE (LD) (LDH) ENZYME: CPT

## 2023-11-19 PROCEDURE — 85007 BL SMEAR W/DIFF WBC COUNT: CPT

## 2023-11-19 PROCEDURE — 700105 HCHG RX REV CODE 258: Performed by: STUDENT IN AN ORGANIZED HEALTH CARE EDUCATION/TRAINING PROGRAM

## 2023-11-19 PROCEDURE — 77336 RADIATION PHYSICS CONSULT: CPT | Performed by: RADIOLOGY

## 2023-11-19 PROCEDURE — 85055 RETICULATED PLATELET ASSAY: CPT

## 2023-11-19 PROCEDURE — 71045 X-RAY EXAM CHEST 1 VIEW: CPT

## 2023-11-19 PROCEDURE — 77386 HCHG IMRT DELIVERY COMPLEX: CPT | Performed by: RADIOLOGY

## 2023-11-19 PROCEDURE — 96375 TX/PRO/DX INJ NEW DRUG ADDON: CPT

## 2023-11-19 PROCEDURE — 83605 ASSAY OF LACTIC ACID: CPT

## 2023-11-19 PROCEDURE — 99285 EMERGENCY DEPT VISIT HI MDM: CPT

## 2023-11-19 PROCEDURE — A9270 NON-COVERED ITEM OR SERVICE: HCPCS | Mod: JZ | Performed by: STUDENT IN AN ORGANIZED HEALTH CARE EDUCATION/TRAINING PROGRAM

## 2023-11-19 PROCEDURE — 84145 PROCALCITONIN (PCT): CPT

## 2023-11-19 PROCEDURE — 80053 COMPREHEN METABOLIC PANEL: CPT

## 2023-11-19 PROCEDURE — 700102 HCHG RX REV CODE 250 W/ 637 OVERRIDE(OP): Mod: JZ | Performed by: STUDENT IN AN ORGANIZED HEALTH CARE EDUCATION/TRAINING PROGRAM

## 2023-11-19 RX ORDER — 0.9 % SODIUM CHLORIDE 0.9 %
3 VIAL (ML) INJECTION PRN
Status: CANCELLED | OUTPATIENT
Start: 2023-12-06

## 2023-11-19 RX ORDER — EPINEPHRINE 1 MG/ML(1)
0.5 AMPUL (ML) INJECTION PRN
Status: CANCELLED | OUTPATIENT
Start: 2023-12-05

## 2023-11-19 RX ORDER — EPINEPHRINE 1 MG/ML(1)
0.5 AMPUL (ML) INJECTION PRN
Status: CANCELLED | OUTPATIENT
Start: 2023-12-04

## 2023-11-19 RX ORDER — 0.9 % SODIUM CHLORIDE 0.9 %
VIAL (ML) INJECTION PRN
Status: CANCELLED | OUTPATIENT
Start: 2023-12-04

## 2023-11-19 RX ORDER — SODIUM CHLORIDE 9 MG/ML
INJECTION, SOLUTION INTRAVENOUS CONTINUOUS
Status: CANCELLED | OUTPATIENT
Start: 2023-12-04

## 2023-11-19 RX ORDER — 0.9 % SODIUM CHLORIDE 0.9 %
10 VIAL (ML) INJECTION PRN
Status: CANCELLED | OUTPATIENT
Start: 2023-12-04

## 2023-11-19 RX ORDER — 0.9 % SODIUM CHLORIDE 0.9 %
3 VIAL (ML) INJECTION PRN
Status: CANCELLED | OUTPATIENT
Start: 2023-12-04

## 2023-11-19 RX ORDER — PROCHLORPERAZINE MALEATE 10 MG
10 TABLET ORAL EVERY 6 HOURS PRN
Status: CANCELLED | OUTPATIENT
Start: 2023-12-05

## 2023-11-19 RX ORDER — DIPHENHYDRAMINE HYDROCHLORIDE 50 MG/ML
50 INJECTION INTRAMUSCULAR; INTRAVENOUS PRN
Status: CANCELLED | OUTPATIENT
Start: 2023-12-04

## 2023-11-19 RX ORDER — 0.9 % SODIUM CHLORIDE 0.9 %
3 VIAL (ML) INJECTION PRN
Status: CANCELLED | OUTPATIENT
Start: 2023-12-05

## 2023-11-19 RX ORDER — SODIUM CHLORIDE 9 MG/ML
INJECTION, SOLUTION INTRAVENOUS CONTINUOUS
Status: CANCELLED | OUTPATIENT
Start: 2023-12-06

## 2023-11-19 RX ORDER — ALBUTEROL SULFATE 90 UG/1
2 AEROSOL, METERED RESPIRATORY (INHALATION)
Status: DISCONTINUED | OUTPATIENT
Start: 2023-11-19 | End: 2023-11-23 | Stop reason: HOSPADM

## 2023-11-19 RX ORDER — 0.9 % SODIUM CHLORIDE 0.9 %
10 VIAL (ML) INJECTION PRN
Status: CANCELLED | OUTPATIENT
Start: 2023-12-01

## 2023-11-19 RX ORDER — SODIUM CHLORIDE 9 MG/ML
INJECTION, SOLUTION INTRAVENOUS CONTINUOUS
Status: CANCELLED | OUTPATIENT
Start: 2023-12-05

## 2023-11-19 RX ORDER — ONDANSETRON 2 MG/ML
8 INJECTION INTRAMUSCULAR; INTRAVENOUS ONCE
Status: CANCELLED | OUTPATIENT
Start: 2023-12-05 | End: 2023-11-28

## 2023-11-19 RX ORDER — ONDANSETRON 8 MG/1
8 TABLET, ORALLY DISINTEGRATING ORAL PRN
Status: CANCELLED | OUTPATIENT
Start: 2023-12-06

## 2023-11-19 RX ORDER — MAGNESIUM SULFATE 1 G/100ML
1 INJECTION INTRAVENOUS ONCE
Status: COMPLETED | OUTPATIENT
Start: 2023-11-19 | End: 2023-11-19

## 2023-11-19 RX ORDER — PROCHLORPERAZINE MALEATE 10 MG
10 TABLET ORAL EVERY 6 HOURS PRN
Status: CANCELLED | OUTPATIENT
Start: 2023-12-06

## 2023-11-19 RX ORDER — ONDANSETRON 2 MG/ML
4 INJECTION INTRAMUSCULAR; INTRAVENOUS PRN
Status: CANCELLED | OUTPATIENT
Start: 2023-12-05

## 2023-11-19 RX ORDER — 0.9 % SODIUM CHLORIDE 0.9 %
VIAL (ML) INJECTION PRN
Status: CANCELLED | OUTPATIENT
Start: 2023-12-01

## 2023-11-19 RX ORDER — ONDANSETRON 8 MG/1
8 TABLET, ORALLY DISINTEGRATING ORAL PRN
Status: CANCELLED | OUTPATIENT
Start: 2023-12-05

## 2023-11-19 RX ORDER — ONDANSETRON 2 MG/ML
4 INJECTION INTRAMUSCULAR; INTRAVENOUS PRN
Status: CANCELLED | OUTPATIENT
Start: 2023-12-04

## 2023-11-19 RX ORDER — METHYLPREDNISOLONE SODIUM SUCCINATE 125 MG/2ML
125 INJECTION, POWDER, LYOPHILIZED, FOR SOLUTION INTRAMUSCULAR; INTRAVENOUS PRN
Status: CANCELLED | OUTPATIENT
Start: 2023-12-04

## 2023-11-19 RX ORDER — SODIUM CHLORIDE 9 MG/ML
1000 INJECTION, SOLUTION INTRAVENOUS ONCE
Status: CANCELLED | OUTPATIENT
Start: 2023-12-04

## 2023-11-19 RX ORDER — 0.9 % SODIUM CHLORIDE 0.9 %
10 VIAL (ML) INJECTION PRN
Status: CANCELLED | OUTPATIENT
Start: 2023-12-06

## 2023-11-19 RX ORDER — DIPHENHYDRAMINE HYDROCHLORIDE 50 MG/ML
50 INJECTION INTRAMUSCULAR; INTRAVENOUS PRN
Status: CANCELLED | OUTPATIENT
Start: 2023-12-05

## 2023-11-19 RX ORDER — GABAPENTIN 300 MG/1
300-600 CAPSULE ORAL 3 TIMES DAILY
Status: DISCONTINUED | OUTPATIENT
Start: 2023-11-19 | End: 2023-11-23 | Stop reason: HOSPADM

## 2023-11-19 RX ORDER — ONDANSETRON 2 MG/ML
8 INJECTION INTRAMUSCULAR; INTRAVENOUS ONCE
Status: CANCELLED | OUTPATIENT
Start: 2023-12-06 | End: 2023-11-29

## 2023-11-19 RX ORDER — SODIUM CHLORIDE, SODIUM LACTATE, POTASSIUM CHLORIDE, CALCIUM CHLORIDE 600; 310; 30; 20 MG/100ML; MG/100ML; MG/100ML; MG/100ML
2000 INJECTION, SOLUTION INTRAVENOUS ONCE
Status: COMPLETED | OUTPATIENT
Start: 2023-11-19 | End: 2023-11-19

## 2023-11-19 RX ORDER — ONDANSETRON 2 MG/ML
4 INJECTION INTRAMUSCULAR; INTRAVENOUS PRN
Status: CANCELLED | OUTPATIENT
Start: 2023-12-06

## 2023-11-19 RX ORDER — 0.9 % SODIUM CHLORIDE 0.9 %
VIAL (ML) INJECTION PRN
Status: CANCELLED | OUTPATIENT
Start: 2023-12-05

## 2023-11-19 RX ORDER — ONDANSETRON 8 MG/1
8 TABLET, ORALLY DISINTEGRATING ORAL PRN
Status: CANCELLED | OUTPATIENT
Start: 2023-12-04

## 2023-11-19 RX ORDER — POTASSIUM CHLORIDE 20 MEQ/1
40 TABLET, EXTENDED RELEASE ORAL ONCE
Status: COMPLETED | OUTPATIENT
Start: 2023-11-19 | End: 2023-11-19

## 2023-11-19 RX ORDER — 0.9 % SODIUM CHLORIDE 0.9 %
10 VIAL (ML) INJECTION PRN
Status: CANCELLED | OUTPATIENT
Start: 2023-12-05

## 2023-11-19 RX ORDER — POTASSIUM CHLORIDE 20 MEQ/1
40 TABLET, EXTENDED RELEASE ORAL ONCE
Status: COMPLETED | OUTPATIENT
Start: 2023-11-19 | End: 2023-11-20

## 2023-11-19 RX ORDER — ENOXAPARIN SODIUM 100 MG/ML
40 INJECTION SUBCUTANEOUS DAILY
Status: DISCONTINUED | OUTPATIENT
Start: 2023-11-19 | End: 2023-11-23 | Stop reason: HOSPADM

## 2023-11-19 RX ORDER — EPINEPHRINE 1 MG/ML(1)
0.5 AMPUL (ML) INJECTION PRN
Status: CANCELLED | OUTPATIENT
Start: 2023-12-06

## 2023-11-19 RX ORDER — METHYLPREDNISOLONE SODIUM SUCCINATE 125 MG/2ML
125 INJECTION, POWDER, LYOPHILIZED, FOR SOLUTION INTRAMUSCULAR; INTRAVENOUS PRN
Status: CANCELLED | OUTPATIENT
Start: 2023-12-05

## 2023-11-19 RX ORDER — 0.9 % SODIUM CHLORIDE 0.9 %
3 VIAL (ML) INJECTION PRN
Status: CANCELLED | OUTPATIENT
Start: 2023-12-01

## 2023-11-19 RX ORDER — METHYLPREDNISOLONE SODIUM SUCCINATE 125 MG/2ML
125 INJECTION, POWDER, LYOPHILIZED, FOR SOLUTION INTRAMUSCULAR; INTRAVENOUS PRN
Status: CANCELLED | OUTPATIENT
Start: 2023-12-06

## 2023-11-19 RX ORDER — ONDANSETRON 2 MG/ML
4 INJECTION INTRAMUSCULAR; INTRAVENOUS ONCE
Status: COMPLETED | OUTPATIENT
Start: 2023-11-19 | End: 2023-11-19

## 2023-11-19 RX ORDER — BACLOFEN 10 MG/1
10 TABLET ORAL NIGHTLY PRN
Status: DISCONTINUED | OUTPATIENT
Start: 2023-11-19 | End: 2023-11-23 | Stop reason: HOSPADM

## 2023-11-19 RX ORDER — 0.9 % SODIUM CHLORIDE 0.9 %
VIAL (ML) INJECTION PRN
Status: CANCELLED | OUTPATIENT
Start: 2023-12-06

## 2023-11-19 RX ORDER — DOXYCYCLINE 100 MG/1
100 TABLET ORAL EVERY 12 HOURS
Status: DISCONTINUED | OUTPATIENT
Start: 2023-11-20 | End: 2023-11-23

## 2023-11-19 RX ORDER — DIPHENHYDRAMINE HYDROCHLORIDE 50 MG/ML
50 INJECTION INTRAMUSCULAR; INTRAVENOUS PRN
Status: CANCELLED | OUTPATIENT
Start: 2023-12-06

## 2023-11-19 RX ORDER — PROCHLORPERAZINE MALEATE 10 MG
10 TABLET ORAL EVERY 6 HOURS PRN
Status: CANCELLED | OUTPATIENT
Start: 2023-12-04

## 2023-11-19 RX ORDER — ASPIRIN 81 MG/1
81 TABLET ORAL DAILY
Status: DISCONTINUED | OUTPATIENT
Start: 2023-11-20 | End: 2023-11-23 | Stop reason: HOSPADM

## 2023-11-19 RX ADMIN — DOXYCYCLINE 100 MG: 100 INJECTION, POWDER, LYOPHILIZED, FOR SOLUTION INTRAVENOUS at 23:16

## 2023-11-19 RX ADMIN — CEFEPIME 2 G: 2 INJECTION, POWDER, FOR SOLUTION INTRAVENOUS at 22:21

## 2023-11-19 RX ADMIN — SODIUM CHLORIDE, POTASSIUM CHLORIDE, SODIUM LACTATE AND CALCIUM CHLORIDE 2000 ML: 600; 310; 30; 20 INJECTION, SOLUTION INTRAVENOUS at 18:47

## 2023-11-19 RX ADMIN — POTASSIUM CHLORIDE 40 MEQ: 1500 TABLET, EXTENDED RELEASE ORAL at 20:07

## 2023-11-19 RX ADMIN — IOHEXOL 55 ML: 350 INJECTION, SOLUTION INTRAVENOUS at 21:15

## 2023-11-19 RX ADMIN — ONDANSETRON 4 MG: 2 INJECTION INTRAMUSCULAR; INTRAVENOUS at 18:47

## 2023-11-19 RX ADMIN — MAGNESIUM SULFATE IN DEXTROSE 1 G: 10 INJECTION, SOLUTION INTRAVENOUS at 20:58

## 2023-11-19 ASSESSMENT — ENCOUNTER SYMPTOMS
NAUSEA: 0
VOMITING: 0
WHEEZING: 0
NECK PAIN: 0
STRIDOR: 0
ORTHOPNEA: 0
DOUBLE VISION: 0
HALLUCINATIONS: 0
FEVER: 1
PHOTOPHOBIA: 0
ABDOMINAL PAIN: 0
SORE THROAT: 0
BRUISES/BLEEDS EASILY: 0
BLURRED VISION: 0
HEARTBURN: 0
BACK PAIN: 0
POLYDIPSIA: 0
DIAPHORESIS: 0
HEMOPTYSIS: 0
MYALGIAS: 0
CONSTIPATION: 0
TREMORS: 0
CLAUDICATION: 0
WEAKNESS: 1
HEADACHES: 0
FLANK PAIN: 0
FALLS: 0
CHILLS: 1
BLOOD IN STOOL: 0
PALPITATIONS: 0
SPUTUM PRODUCTION: 0
DIZZINESS: 0
SINUS PAIN: 0
DEPRESSION: 0
PND: 0
EYE PAIN: 0
SHORTNESS OF BREATH: 1
DIARRHEA: 0
TINGLING: 0
COUGH: 1

## 2023-11-19 ASSESSMENT — LIFESTYLE VARIABLES: SUBSTANCE_ABUSE: 0

## 2023-11-19 ASSESSMENT — COPD QUESTIONNAIRES
DURING THE PAST 4 WEEKS HOW MUCH DID YOU FEEL SHORT OF BREATH: SOME OF THE TIME
COPD SCREENING SCORE: 8
DO YOU EVER COUGH UP ANY MUCUS OR PHLEGM?: YES, A FEW DAYS A WEEK OR MONTH
HAVE YOU SMOKED AT LEAST 100 CIGARETTES IN YOUR ENTIRE LIFE: YES

## 2023-11-19 ASSESSMENT — FIBROSIS 4 INDEX: FIB4 SCORE: 6.42

## 2023-11-20 ENCOUNTER — APPOINTMENT (OUTPATIENT)
Dept: RADIATION ONCOLOGY | Facility: MEDICAL CENTER | Age: 73
End: 2023-11-20
Payer: COMMERCIAL

## 2023-11-20 PROBLEM — K20.80 RADIATION-INDUCED ESOPHAGITIS: Status: ACTIVE | Noted: 2023-11-20

## 2023-11-20 PROBLEM — E11.42 TYPE 2 DIABETES MELLITUS WITH DIABETIC POLYNEUROPATHY, WITHOUT LONG-TERM CURRENT USE OF INSULIN (HCC): Status: ACTIVE | Noted: 2023-11-20

## 2023-11-20 PROBLEM — J18.9 PNEUMONIA OF LEFT LOWER LOBE DUE TO INFECTIOUS ORGANISM: Status: ACTIVE | Noted: 2023-11-20

## 2023-11-20 PROBLEM — T66.XXXA RADIATION-INDUCED ESOPHAGITIS: Status: ACTIVE | Noted: 2023-11-20

## 2023-11-20 PROBLEM — D61.810 PANCYTOPENIA DUE TO ANTINEOPLASTIC CHEMOTHERAPY (HCC): Status: ACTIVE | Noted: 2023-11-19

## 2023-11-20 PROBLEM — E87.6 HYPOKALEMIA DUE TO INADEQUATE POTASSIUM INTAKE: Status: ACTIVE | Noted: 2023-11-20

## 2023-11-20 PROBLEM — J44.9 COPD WITHOUT EXACERBATION (HCC): Status: ACTIVE | Noted: 2023-11-20

## 2023-11-20 PROBLEM — T45.1X5A PANCYTOPENIA DUE TO ANTINEOPLASTIC CHEMOTHERAPY (HCC): Status: ACTIVE | Noted: 2023-11-19

## 2023-11-20 LAB
ALBUMIN SERPL BCP-MCNC: 3.1 G/DL (ref 3.2–4.9)
ALBUMIN/GLOB SERPL: 1.1 G/DL
ALP SERPL-CCNC: 56 U/L (ref 30–99)
ALT SERPL-CCNC: 9 U/L (ref 2–50)
ANION GAP SERPL CALC-SCNC: 11 MMOL/L (ref 7–16)
ANISOCYTOSIS BLD QL SMEAR: ABNORMAL
AST SERPL-CCNC: 13 U/L (ref 12–45)
BASOPHILS # BLD AUTO: 0.4 % (ref 0–1.8)
BASOPHILS # BLD: 0 K/UL (ref 0–0.12)
BILIRUB SERPL-MCNC: 0.8 MG/DL (ref 0.1–1.5)
BUN SERPL-MCNC: 26 MG/DL (ref 8–22)
CALCIUM ALBUM COR SERPL-MCNC: 9 MG/DL (ref 8.5–10.5)
CALCIUM SERPL-MCNC: 8.3 MG/DL (ref 8.5–10.5)
CHEMOTHERAPY INFUSION START DATE: NORMAL
CHEMOTHERAPY RECORDS: 2
CHEMOTHERAPY RECORDS: 6600
CHEMOTHERAPY RECORDS: NORMAL
CHEMOTHERAPY RX CANCER: NORMAL
CHLORIDE SERPL-SCNC: 100 MMOL/L (ref 96–112)
CO2 SERPL-SCNC: 24 MMOL/L (ref 20–33)
CREAT SERPL-MCNC: 0.77 MG/DL (ref 0.5–1.4)
DATE 1ST CHEMO CANCER: NORMAL
DOHLE BOD BLD QL SMEAR: NORMAL
EOSINOPHIL # BLD AUTO: 0 K/UL (ref 0–0.51)
EOSINOPHIL NFR BLD: 0.4 % (ref 0–6.9)
ERYTHROCYTE [DISTWIDTH] IN BLOOD BY AUTOMATED COUNT: 45.1 FL (ref 35.9–50)
GFR SERPLBLD CREATININE-BSD FMLA CKD-EPI: 94 ML/MIN/1.73 M 2
GLOBULIN SER CALC-MCNC: 2.8 G/DL (ref 1.9–3.5)
GLUCOSE SERPL-MCNC: 113 MG/DL (ref 65–99)
HCT VFR BLD AUTO: 21.5 % (ref 42–52)
HGB BLD-MCNC: 7.9 G/DL (ref 14–18)
LYMPHOCYTES # BLD AUTO: 0.37 K/UL (ref 1–4.8)
LYMPHOCYTES NFR BLD: 40.7 % (ref 22–41)
MAGNESIUM SERPL-MCNC: 1.5 MG/DL (ref 1.5–2.5)
MANUAL DIFF BLD: NORMAL
MCH RBC QN AUTO: 32.1 PG (ref 27–33)
MCHC RBC AUTO-ENTMCNC: 36.7 G/DL (ref 32.3–36.5)
MCV RBC AUTO: 87.4 FL (ref 81.4–97.8)
MICROCYTES BLD QL SMEAR: ABNORMAL
MONOCYTES # BLD AUTO: 0.22 K/UL (ref 0–0.85)
MONOCYTES NFR BLD AUTO: 24.6 % (ref 0–13.4)
MORPHOLOGY BLD-IMP: NORMAL
NEUTROPHILS # BLD AUTO: 0.31 K/UL (ref 1.82–7.42)
NEUTROPHILS NFR BLD: 33.9 % (ref 44–72)
NRBC # BLD AUTO: 0 K/UL
NRBC BLD-RTO: 0 /100 WBC (ref 0–0.2)
PLATELET # BLD AUTO: 36 K/UL (ref 164–446)
PLATELET BLD QL SMEAR: NORMAL
PLATELETS.RETICULATED NFR BLD AUTO: 3.9 % (ref 0.6–13.1)
PMV BLD AUTO: 10.7 FL (ref 9–12.9)
POTASSIUM SERPL-SCNC: 3.5 MMOL/L (ref 3.6–5.5)
PROT SERPL-MCNC: 5.9 G/DL (ref 6–8.2)
RAD ONC ARIA COURSE LAST TREATMENT DATE: NORMAL
RAD ONC ARIA COURSE TREATMENT ELAPSED DAYS: NORMAL
RAD ONC ARIA REFERENCE POINT DOSAGE GIVEN TO DATE: 58
RAD ONC ARIA REFERENCE POINT ID: NORMAL
RAD ONC ARIA REFERENCE POINT SESSION DOSAGE GIVEN: 2
RBC # BLD AUTO: 2.46 M/UL (ref 4.7–6.1)
RBC BLD AUTO: PRESENT
SODIUM SERPL-SCNC: 135 MMOL/L (ref 135–145)
TOXIC GRANULES BLD QL SMEAR: NORMAL
WBC # BLD AUTO: 0.9 K/UL (ref 4.8–10.8)

## 2023-11-20 PROCEDURE — 36415 COLL VENOUS BLD VENIPUNCTURE: CPT

## 2023-11-20 PROCEDURE — 85007 BL SMEAR W/DIFF WBC COUNT: CPT

## 2023-11-20 PROCEDURE — 85027 COMPLETE CBC AUTOMATED: CPT

## 2023-11-20 PROCEDURE — 99233 SBSQ HOSP IP/OBS HIGH 50: CPT | Performed by: STUDENT IN AN ORGANIZED HEALTH CARE EDUCATION/TRAINING PROGRAM

## 2023-11-20 PROCEDURE — 700105 HCHG RX REV CODE 258: Performed by: HOSPITALIST

## 2023-11-20 PROCEDURE — 80053 COMPREHEN METABOLIC PANEL: CPT

## 2023-11-20 PROCEDURE — 85055 RETICULATED PLATELET ASSAY: CPT

## 2023-11-20 PROCEDURE — 77014 PR CT GUIDANCE PLACEMENT RAD THERAPY FIELDS: CPT | Mod: 26 | Performed by: RADIOLOGY

## 2023-11-20 PROCEDURE — 700111 HCHG RX REV CODE 636 W/ 250 OVERRIDE (IP): Performed by: STUDENT IN AN ORGANIZED HEALTH CARE EDUCATION/TRAINING PROGRAM

## 2023-11-20 PROCEDURE — 700102 HCHG RX REV CODE 250 W/ 637 OVERRIDE(OP): Performed by: HOSPITALIST

## 2023-11-20 PROCEDURE — 770001 HCHG ROOM/CARE - MED/SURG/GYN PRIV*

## 2023-11-20 PROCEDURE — A9270 NON-COVERED ITEM OR SERVICE: HCPCS | Performed by: HOSPITALIST

## 2023-11-20 PROCEDURE — 700102 HCHG RX REV CODE 250 W/ 637 OVERRIDE(OP): Performed by: STUDENT IN AN ORGANIZED HEALTH CARE EDUCATION/TRAINING PROGRAM

## 2023-11-20 PROCEDURE — 83735 ASSAY OF MAGNESIUM: CPT

## 2023-11-20 PROCEDURE — A9270 NON-COVERED ITEM OR SERVICE: HCPCS | Performed by: STUDENT IN AN ORGANIZED HEALTH CARE EDUCATION/TRAINING PROGRAM

## 2023-11-20 PROCEDURE — 96372 THER/PROPH/DIAG INJ SC/IM: CPT

## 2023-11-20 PROCEDURE — 700111 HCHG RX REV CODE 636 W/ 250 OVERRIDE (IP): Mod: JZ | Performed by: HOSPITALIST

## 2023-11-20 PROCEDURE — 77386 HCHG IMRT DELIVERY COMPLEX: CPT | Performed by: RADIOLOGY

## 2023-11-20 RX ORDER — FLUTICASONE PROPIONATE AND SALMETEROL 250; 50 UG/1; UG/1
1 POWDER RESPIRATORY (INHALATION) 2 TIMES DAILY PRN
Status: DISCONTINUED | OUTPATIENT
Start: 2023-11-20 | End: 2023-11-20

## 2023-11-20 RX ORDER — CETIRIZINE HYDROCHLORIDE 10 MG/1
10 TABLET ORAL DAILY
Status: DISCONTINUED | OUTPATIENT
Start: 2023-11-20 | End: 2023-11-23 | Stop reason: HOSPADM

## 2023-11-20 RX ORDER — ONDANSETRON 4 MG/1
4 TABLET, ORALLY DISINTEGRATING ORAL
Status: DISCONTINUED | OUTPATIENT
Start: 2023-11-20 | End: 2023-11-23 | Stop reason: HOSPADM

## 2023-11-20 RX ORDER — OMEPRAZOLE 20 MG/1
20 CAPSULE, DELAYED RELEASE ORAL 2 TIMES DAILY
Status: DISCONTINUED | OUTPATIENT
Start: 2023-11-20 | End: 2023-11-23 | Stop reason: HOSPADM

## 2023-11-20 RX ORDER — OXYCODONE HYDROCHLORIDE 5 MG/1
5 TABLET ORAL EVERY 4 HOURS PRN
Status: DISCONTINUED | OUTPATIENT
Start: 2023-11-20 | End: 2023-11-23 | Stop reason: HOSPADM

## 2023-11-20 RX ORDER — VITAMIN B COMPLEX
1000 TABLET ORAL DAILY
Status: DISCONTINUED | OUTPATIENT
Start: 2023-11-20 | End: 2023-11-23 | Stop reason: HOSPADM

## 2023-11-20 RX ORDER — BENZONATATE 100 MG/1
100 CAPSULE ORAL 3 TIMES DAILY PRN
Status: DISCONTINUED | OUTPATIENT
Start: 2023-11-20 | End: 2023-11-23 | Stop reason: HOSPADM

## 2023-11-20 RX ORDER — CHOLECALCIFEROL (VITAMIN D3) 125 MCG
1000 CAPSULE ORAL DAILY
Status: DISCONTINUED | OUTPATIENT
Start: 2023-11-20 | End: 2023-11-23 | Stop reason: HOSPADM

## 2023-11-20 RX ORDER — OXYCODONE HYDROCHLORIDE 10 MG/1
10 TABLET ORAL EVERY 4 HOURS PRN
Status: DISCONTINUED | OUTPATIENT
Start: 2023-11-20 | End: 2023-11-23 | Stop reason: HOSPADM

## 2023-11-20 RX ADMIN — GABAPENTIN 300 MG: 300 CAPSULE ORAL at 17:07

## 2023-11-20 RX ADMIN — ONDANSETRON 4 MG: 4 TABLET, ORALLY DISINTEGRATING ORAL at 12:25

## 2023-11-20 RX ADMIN — MOMETASONE FUROATE AND FORMOTEROL FUMARATE DIHYDRATE 2 PUFF: 200; 5 AEROSOL RESPIRATORY (INHALATION) at 09:26

## 2023-11-20 RX ADMIN — BENZONATATE 100 MG: 100 CAPSULE ORAL at 09:33

## 2023-11-20 RX ADMIN — CEFEPIME 2 G: 2 INJECTION, POWDER, FOR SOLUTION INTRAVENOUS at 12:29

## 2023-11-20 RX ADMIN — DOXYCYCLINE 100 MG: 100 TABLET, FILM COATED ORAL at 06:21

## 2023-11-20 RX ADMIN — CEFEPIME 2 G: 2 INJECTION, POWDER, FOR SOLUTION INTRAVENOUS at 22:31

## 2023-11-20 RX ADMIN — DOXYCYCLINE 100 MG: 100 TABLET, FILM COATED ORAL at 17:07

## 2023-11-20 RX ADMIN — GABAPENTIN 300 MG: 300 CAPSULE ORAL at 00:54

## 2023-11-20 RX ADMIN — GABAPENTIN 300 MG: 300 CAPSULE ORAL at 06:30

## 2023-11-20 RX ADMIN — CYANOCOBALAMIN TAB 500 MCG 1000 MCG: 500 TAB at 09:26

## 2023-11-20 RX ADMIN — ONDANSETRON 4 MG: 4 TABLET, ORALLY DISINTEGRATING ORAL at 17:07

## 2023-11-20 RX ADMIN — OMEPRAZOLE 20 MG: 20 CAPSULE, DELAYED RELEASE ORAL at 17:07

## 2023-11-20 RX ADMIN — BENZONATATE 100 MG: 100 CAPSULE ORAL at 17:07

## 2023-11-20 RX ADMIN — MOMETASONE FUROATE AND FORMOTEROL FUMARATE DIHYDRATE 2 PUFF: 200; 5 AEROSOL RESPIRATORY (INHALATION) at 17:07

## 2023-11-20 RX ADMIN — Medication 1000 UNITS: at 09:26

## 2023-11-20 RX ADMIN — CEFEPIME 2 G: 2 INJECTION, POWDER, FOR SOLUTION INTRAVENOUS at 06:23

## 2023-11-20 RX ADMIN — POTASSIUM CHLORIDE 40 MEQ: 1500 TABLET, EXTENDED RELEASE ORAL at 00:51

## 2023-11-20 RX ADMIN — TBO-FILGRASTIM 300 MCG: 300 INJECTION, SOLUTION SUBCUTANEOUS at 01:45

## 2023-11-20 RX ADMIN — ENOXAPARIN SODIUM 40 MG: 100 INJECTION SUBCUTANEOUS at 00:54

## 2023-11-20 RX ADMIN — CETIRIZINE HYDROCHLORIDE 10 MG: 10 TABLET, FILM COATED ORAL at 09:26

## 2023-11-20 RX ADMIN — GABAPENTIN 300 MG: 300 CAPSULE ORAL at 12:25

## 2023-11-20 ASSESSMENT — ENCOUNTER SYMPTOMS
NAUSEA: 1
HEMOPTYSIS: 0
FLANK PAIN: 0
HEADACHES: 0
BLOOD IN STOOL: 0
CHILLS: 0
FEVER: 0
NECK PAIN: 0
COUGH: 1
DEPRESSION: 1
CONSTIPATION: 0
WEAKNESS: 1
MYALGIAS: 0
DIARRHEA: 0
BACK PAIN: 0
SHORTNESS OF BREATH: 1
NERVOUS/ANXIOUS: 0

## 2023-11-20 ASSESSMENT — LIFESTYLE VARIABLES
TOTAL SCORE: 0
HAVE PEOPLE ANNOYED YOU BY CRITICIZING YOUR DRINKING: NO
TOTAL SCORE: 0
EVER FELT BAD OR GUILTY ABOUT YOUR DRINKING: NO
HAVE YOU EVER FELT YOU SHOULD CUT DOWN ON YOUR DRINKING: NO
CONSUMPTION TOTAL: INCOMPLETE
EVER HAD A DRINK FIRST THING IN THE MORNING TO STEADY YOUR NERVES TO GET RID OF A HANGOVER: NO
TOTAL SCORE: 0
ALCOHOL_USE: NO

## 2023-11-20 NOTE — ASSESSMENT & PLAN NOTE
Presented with subjective fevers and cough  CTA chest demonstrates LLL infiltrate consistent with PNA  Procalcitonin negative, likely viral (COVID) but due to risk will empirically cover for CAP  --Treated with cefepime and doxycycline due to neutropenia

## 2023-11-20 NOTE — ASSESSMENT & PLAN NOTE
Follows with [facility] Oncologist Dr. Fuller and Radiation Oncologist   S/p C3 11/8 with cisplatin and etoposide with HLX study drug  Continue XRT while hospitalized  Continue supportive care with analgesics, antiemetics, antitussives

## 2023-11-20 NOTE — ON TREATMENT VISIT
ON TREATMENT  NOTE  RADIATION ONCOLOGY DEPARTMENT    Patient name:  Bart Ramsey    Primary Physician:  Pcp Pt States None MRN: [id number]  CSN: [id number]   Referring physician:  Gianfranco Echevarria, *   : 1950, 73 y.o.     ENCOUNTER DATE:  2023      DIAGNOSIS:  SCLC (small cell lung carcinoma) (HCC)  Staging form: Lung, AJCC 8th Edition  - Clinical stage from 2023: Stage IIIA (cT3, cN1, cM0) - Signed by Leonel Patel M.D. on 2023  Stage prefix: Initial diagnosis      TREATMENT SUMMARY:  Course First Treatment Date 10/09/2023  Course Last Treatment Date 2023  [facility]ria Treatment Information          2023   [facility] Course Treatment Dates   Course First Treatment Date 10/09/2023    Course Last Treatment Date 2023    [facility] Treatment Summary   L_Lung  Plan from Course C1_L_LUNG   Fraction 28 of 33   Elapsed Course Days 41 @ [id number]   Prescribed Fraction Dose 200 cGy   Prescribed Total Dose 6,600 cGy   L_Lung  Reference Point from Course C1_L_LUNG   Elapsed Course Days 41 @ [id number]   Session Dose 200 cGy   Total Dose 5,600 cGy          SUBJECTIVE:  Unfortunately, he had another setback, and has been admitted because of worsening pulmonary symptoms with fevers and cough.  He has a left lower lobe infiltrate consistent with pneumonia.  This is likely viral, but he is on antibiotics.  Sputum culture pending.  We will plan to continue radiation, but likely systemic therapy on hold until this has been further assessed.    VITAL SIGNS:      2023     8:14 AM 2023     5:00 AM 2023     3:37 AM 2023     3:00 AM 2023     2:37 AM 2023     2:00 AM 2023     1:52 AM   Vitals   SYSTOLIC 136 137 135 99 100 132 146   DIASTOLIC 65 67 68 54 52 60 65   Pulse 55 51 58 64 61 63 58   Temperature 37 °C (98.6 °F) 36.1 °C (97 °F)        Respiration 16 16 15 15 13 17 17   Pulse Oximetry 93 % 100 % 96 % 86 % 93 % 94 % 98 %     KPS: 60, Requires  occasional assistance, but is able to care for most of his personal needs (ECOG equivalent 2)         11/8/2023    11:43 AM 11/2/2023    11:22 AM 10/25/2023    11:32 AM 10/18/2023    11:11 AM 10/11/2023    11:25 AM 9/26/2023    11:30 AM   Pain Assessment   Pain Score 4=SLIGHT MARIVEL NO PAIN NO PAIN 2=MINIMAL PA 2=MINIMAL PA NO PAIN   Pain Loc THROAT   HEAD HEAD           PHYSICAL EXAM: Limited exam, OTV virtual due to COVID precautions, feeling comfortable at reast, not in acute distress        11/8/2023    11:46 AM 11/2/2023    11:23 AM 10/25/2023    11:34 AM 10/18/2023    11:12 AM 10/11/2023    11:27 AM   Toxicity Assessment   Toxicity Assessment Chest Chest Chest Chest Chest   Fatigue (lethargy, malaise, asthenia) Moderate (e.g., decrease in performance status by 1 ECOG level or 20% Karnofsky) or causing difficulty performing some activities None Increased fatigue over baseline, but not altering normal activities Increased fatigue over baseline, but not altering normal activities Increased fatigue over baseline, but not altering normal activities   Radiation Dermatitis Faint erythema or dry desquamation None None None None   Anorexia Oral intake significantly decreased Loss of appetite None Loss of appetite Loss of appetite   Dyspepsia/heartburn None Severe None None None   Dysphagia, Esophagitis, odynophagoa (painful swallowing) Mild dysphagia, but can eat regular diet Mild dysphagia, but can eat regular diet None None None   RT Dysphagia-Esophageal Mild dysphagia, but can eat regular diet None None None None   Cough Absent Absent Absent Absent Absent   Dyspnea Dyspnea on exertion Dyspnea on exertion Dyspnea on exertion Dyspnea on exertion Normal       CURRENT MEDICATIONS:  No current facility-administered medications for this encounter.  No current outpatient medications on file.    Facility-Administered Medications Ordered in Other Encounters:     cetirizine (ZyrTEC) tablet 10 mg, 10 mg, Oral, DAILYFareed  REYNA Beth, 10 mg at 11/20/23 0926    cyanocobalamin (Vitamin B-12) tablet 1,000 mcg, 1,000 mcg, Oral, DAILY, Fareed Beth M.D., 1,000 mcg at 11/20/23 0926    vitamin D3 (Cholecalciferol) tablet 1,000 Units, 1,000 Units, Oral, DAILY, Fareed Beth M.D., 1,000 Units at 11/20/23 0926    ondansetron (Zofran ODT) dispertab 4 mg, 4 mg, Oral, TID AC, Fareed Beth M.D., 4 mg at 11/20/23 1225    benzonatate (Tessalon) capsule 100 mg, 100 mg, Oral, TID PRN, Fareed Beth M.D., 100 mg at 11/20/23 0933    mometasone-formoterol (Dulera) 200-5 MCG/ACT inhaler 2 Puff, 2 Puff, Inhalation, BID, Fareed Beth M.D., 2 Puff at 11/20/23 0926    omeprazole (PriLOSEC) capsule 20 mg, 20 mg, Oral, BID, Fareed Beth M.D.    oxyCODONE immediate-release (Roxicodone) tablet 5 mg, 5 mg, Oral, Q4HRS PRN **OR** oxyCODONE immediate release (Roxicodone) tablet 10 mg, 10 mg, Oral, Q4HRS PRN, Fareed Beth M.D.    aspirin EC tablet 81 mg, 81 mg, Oral, DAILY, Bienvenido Vela M.D.    baclofen (Lioresal) tablet 10 mg, 10 mg, Oral, HS PRN, Bienvenido Vela M.D.    gabapentin (Neurontin) capsule 300-600 mg, 300-600 mg, Oral, TID, Bienvenido Vela M.D., 300 mg at 11/20/23 1225    Respiratory Therapy Consult, , Nebulization, Continuous RT, Bienvenido Vela M.D.    cefepime (Maxipime) 2 g in  mL IVPB, 2 g, Intravenous, Q8HRS, Bienvenido Vela M.D., Stopped at 11/20/23 1259    doxycycline monohydrate (Adoxa) tablet 100 mg, 100 mg, Oral, Q12HRS, Bienvenido Vela M.D., 100 mg at 11/20/23 0621    [Held by provider] enoxaparin (Lovenox) inj 40 mg, 40 mg, Subcutaneous, DAILY AT 1800, Bienvenido Vela M.D., 40 mg at 11/20/23 0054    albuterol inhaler 2 Puff, 2 Puff, Inhalation, Q4H PRN (RT), Bienvenido Vela M.D.    LABORATORY DATA:   Lab Results   Component Value Date/Time    SODIUM 135 11/20/2023 03:35 AM    POTASSIUM 3.5 (L) 11/20/2023 03:35 AM    CHLORIDE 100 11/20/2023 03:35 AM    CO2 24 11/20/2023 03:35 AM    GLUCOSE 113 (H) 11/20/2023 03:35 AM    BUN 26 (H)  11/20/2023 03:35 AM    CREATININE 0.77 11/20/2023 03:35 AM       Lab Results   Component Value Date/Time    WBC 0.9 (LL) 11/20/2023 03:35 AM    RBC 2.46 (L) 11/20/2023 03:35 AM    HEMOGLOBIN 7.9 (L) 11/20/2023 03:35 AM    HEMATOCRIT 21.5 (L) 11/20/2023 03:35 AM    MCV 87.4 11/20/2023 03:35 AM    MCH 32.1 11/20/2023 03:35 AM    MCHC 36.7 (H) 11/20/2023 03:35 AM    PLATELETCT 36 (L) 11/20/2023 03:35 AM         RADIOLOGY DATA:  CT-CSPINE WITHOUT PLUS RECONS    Result Date: 9/22/2023  No acute fracture or dislocation seen in the CT scan of the cervical spine.    CT-HEAD W/O    Result Date: 9/22/2023  NO ACUTE ABNORMALITIES ARE NOTED ON CT SCAN OF THE HEAD.     DX-CHEST-PORTABLE (1 VIEW)    Result Date: 11/19/2023  No acute cardiopulmonary abnormality.    DX-CHEST-PORTABLE (1 VIEW)    Result Date: 11/13/2023  1. No acute findings. 2. Stable chronic degenerative and post surgical changes.    DX-CHEST-PORTABLE (1 VIEW)    Result Date: 10/4/2023  1.  Right IJ Port-A-Cath present with the tip overlying the superior vena cava. 2.  Again seen mass within the left lung base.    DX-CHEST-PORTABLE (1 VIEW)    Result Date: 9/22/2023  No acute cardiac or pulmonary abnormality is identified. Left lower lung mass again noted.    IR-CVC PORT PLACEMENT > AGE 5    Result Date: 10/3/2023  1. ULTRASOUND AND FLUOROSCOPIC GUIDED PLACEMENT OF A Right INTERNAL JUGULAR SINGLE LUMEN BARD POWER-PORT VENOUS ACCESS DEVICE. 2. THE PORT MAY BE USED IMMEDIATELY AS CLINICALLY INDICATED. FLUSHES PER PROTOCOL.     CT-CTA CHEST PULMONARY ARTERY W/ RECONS    Result Date: 11/19/2023  1.  Negative for pulmonary embolism 2.  Small superior segment left lower lobe airspace process consistent with pneumonia 3.  Multiple bilateral subpleural bleb 4.  Central venous catheter appears appropriately located 5.  Prior cholecystectomy 6.  Splenic granuloma       IMPRESSION:  Cancer Staging   SCLC (small cell lung carcinoma) (HCC)  Staging form: Lung, AJCC 8th  Edition  - Clinical stage from 9/14/2023: Stage IIIA (cT3, cN1, cM0) - Signed by Leonel Patel M.D. on 9/14/2023      PLAN:  No change in treatment plan.  Consider treatment break if continues to worsen, or stop at 30 fractions.    Disposition:  Treatment plan and imaging reviewed. Questions answered. Continue therapy outlined.     Leonel Patel M.D.    No orders of the defined types were placed in this encounter.

## 2023-11-20 NOTE — PROGRESS NOTES
Hospital Medicine Daily Progress Note    Date of Service  11/20/2023    Chief Complaint  Bart Ramsey is a 73 y.o. male with SCLC IIIA on cis/etoposide/HLX study and XRT, COPD not on oxygen, T2DM, and recent COVID ifnection admitted 11/19/2023 with LLL pneumonia.    Hospital Course  No notes on file    Interval Problem Update    STORMY ON, Afebrile  He feels fatigued and short of breath.  He has a cough productive of white sputum.  He has nausea and vomiting after eating due to radiation esophagitis.  Takes PPI for h/o Barett's esophagus.  He denies bleeding.  Ambulated in his room without oxygen. No dexamethasone indicated.  Awaiting 24-48h of blood cultures for bacteremia.  Initiating supportive care with tessalon perls for cough.  Scheduled zofran with PRN compazine for nausea.  PPI BID for esophagitis.  POC discussed with oncologist Dr. Fuller.  CBC reviewed, ongoing pancytopenia.  CMP reviewed, K normalized.  Lactic acid normal.  UA negative.    I have discussed this patient's plan of care and discharge plan at IDT rounds today with Case Management, Nursing, Nursing leadership, and other members of the IDT team.    Consultants/Specialty  None    Code Status  Full Code    Disposition  The patient is not medically cleared for discharge to home or a post-acute facility.  Anticipate discharge to: home with organized home healthcare and close outpatient follow-up    I have placed the appropriate orders for post-discharge needs.    Review of Systems  Review of Systems   Constitutional:  Negative for chills and fever.   Respiratory:  Positive for cough and shortness of breath. Negative for hemoptysis.    Cardiovascular:  Negative for leg swelling.   Gastrointestinal:  Positive for nausea. Negative for blood in stool, constipation, diarrhea and melena.   Genitourinary:  Negative for dysuria, flank pain and hematuria.   Musculoskeletal:  Negative for back pain, joint pain, myalgias and neck pain.   Neurological:   Positive for weakness. Negative for headaches.   Psychiatric/Behavioral:  Positive for depression. The patient is not nervous/anxious.         Physical Exam  Temp:  [36.1 °C (97 °F)-37 °C (98.6 °F)] 37 °C (98.6 °F)  Pulse:  [50-88] 55  Resp:  [12-20] 16  BP: ()/(52-92) 136/65  SpO2:  [86 %-100 %] 93 %    Physical Exam  Vitals and nursing note reviewed. Exam conducted with a chaperone present.   Constitutional:       General: He is not in acute distress.     Appearance: He is ill-appearing ([place]). He is not toxic-appearing or diaphoretic.      Interventions: Nasal cannula in place.   HENT:      Head: Normocephalic.      Nose: Nose normal.      Mouth/Throat:      Mouth: Mucous membranes are dry.   Eyes:      General: No scleral icterus.     Conjunctiva/sclera: Conjunctivae normal.   Cardiovascular:      Rate and Rhythm: Normal rate and regular rhythm.      Pulses: Normal pulses.      Heart sounds: Normal heart sounds. No murmur heard.     No friction rub. No gallop.   Pulmonary:      Effort: Pulmonary effort is normal. No respiratory distress.      Breath sounds: Normal breath sounds. No wheezing, rhonchi or rales.   Abdominal:      General: Abdomen is flat. Bowel sounds are normal. There is no distension.      Palpations: Abdomen is soft.      Tenderness: There is no abdominal tenderness. There is no guarding or rebound.   Genitourinary:     Comments: No stern  Musculoskeletal:      Cervical back: Neck supple.      Right lower leg: No edema.      Left lower leg: No edema.   Skin:     General: Skin is warm and dry.      Coloration: Skin is pale.   Neurological:      Mental Status: He is alert.      Comments: Appropriately conversant. Moves all extremities.   Psychiatric:         Attention and Perception: Attention and perception normal.         Mood and Affect: Mood is depressed. Affect is blunt.         Speech: Speech normal.         Behavior: Behavior is slowed. Behavior is cooperative.         Thought  Content: Thought content normal.         Cognition and Memory: Cognition and memory normal.         Judgment: Judgment normal.         Fluids    Intake/Output Summary (Last 24 hours) at 11/20/2023 1307  Last data filed at 11/20/2023 0016  Gross per 24 hour   Intake 2300 ml   Output --   Net 2300 ml       Laboratory  Recent Labs     11/19/23 1850 11/20/23  0335   WBC 0.8* 0.9*   RBC 2.91* 2.46*   HEMOGLOBIN 9.3* 7.9*   HEMATOCRIT 25.5* 21.5*   MCV 87.6 87.4   MCH 32.0 32.1   MCHC 36.5 36.7*   RDW 43.6 45.1   PLATELETCT 36* 36*   MPV 11.3 10.7     Recent Labs     11/19/23 1850 11/20/23  0335   SODIUM 136 135   POTASSIUM 3.3* 3.5*   CHLORIDE 96 100   CO2 26 24   GLUCOSE 128* 113*   BUN 31* 26*   CREATININE 0.96 0.77   CALCIUM 9.0 8.3*                   Imaging  CT-CTA CHEST PULMONARY ARTERY W/ RECONS   Final Result      1.  Negative for pulmonary embolism      2.  Small superior segment left lower lobe airspace process consistent with pneumonia      3.  Multiple bilateral subpleural bleb      4.  Central venous catheter appears appropriately located      5.  Prior cholecystectomy      6.  Splenic granuloma         DX-CHEST-PORTABLE (1 VIEW)   Final Result      No acute cardiopulmonary abnormality.           Assessment/Plan  * Community acquired pneumonia of left lower lobe of lung- (present on admission)  Assessment & Plan  Presented with subjective fevers and cough  CTA chest demonstrates LLL infiltrate consistent with PNA  Procalcitonin negative, likely viral (COVID) but due to risk will empirically cover for CAP  Treated with cefepime and doxycycline due to neutropenia  Sputum culture to screen for pseudomonas or MRSA  If afebrile with negative blood cultures at 48h will transition to augmentin + ciprofloxacin BID x 5 days        COPD without exacerbation (HCC)- (present on admission)  Assessment & Plan  Sputum and dyspnea more explained by PNA  Continue ICS/LABA BID and duonebs PRN  RT consult    Type 2 diabetes  mellitus with diabetic polyneuropathy, without long-term current use of insulin (HCC)- (present on admission)  Assessment & Plan  A1c 6.3 August 2023  No indication for strict BG control - POCT / SSI deferred  Continue gabapentin  Unclear why not on a statin - defer to outpatient provider    Hypokalemia due to inadequate potassium intake- (present on admission)  Assessment & Plan  Improved with repletion  Due to poor PO intake from radiation esophagitis  Mg level ordered, replete if low  Repeat AM BMP/Mg    Radiation-induced esophagitis- (present on admission)  Assessment & Plan  Continue sucralfate per Radiation Oncology  PPI BID initiated  Scheduled and PRN antiemetcs    COVID-19 virus infection- (present on admission)  Assessment & Plan  He was first diagnosed with COVID on 11/13  Not hypoxic, dexamethasone not indicated  CTA negative for PE  VTE ppx contraindicated while Plt <50  Enhanced droplet precautions      Pancytopenia due to antineoplastic chemotherapy (HCC)- (present on admission)  Assessment & Plan  Due to cisplatin/etoposide and radiation  Neutropenia without fever  Further GCSF contraindicated in SCLC due to association with increased mortality  Repeat AM CBC with Diff  Transfuse for Hgb <7 or Plt <10    Nausea and vomiting- (present on admission)  Assessment & Plan  Due to antineoplastic therapy and radiation esophagitis - see separate plan  Scheduled zofran TID AC with PRN doses up to 24 mg daily  Compazine PRN if zofran ineffective    SCLC (small cell lung carcinoma) (HCC)- (present on admission)  Assessment & Plan  Follows with [facility] Oncologist Dr. Fuller and Radiation Oncologist   S/p C3 11/8 with cisplatin and etoposide with HLX study drug  Continue XRT while hospitalized  Continue supportive care with analgesics, antiemetics, antitussives    Coronary artery disease involving native coronary artery of native heart without angina pectoris- (present on admission)  Assessment &  Plan  Denies any chest pain  Continue aspirin         VTE prophylaxis:   SCDs/TEDs   pharmacologic prophylaxis contraindicated due to thrombocytopenia      I have performed a physical exam and reviewed and updated ROS and Plan today (11/20/2023). In review of yesterday's note (11/19/2023), there are no changes except as documented above.

## 2023-11-20 NOTE — ASSESSMENT & PLAN NOTE
A1c 6.3 August 2023  No indication for strict BG control - POCT / SSI deferred  Continue gabapentin

## 2023-11-20 NOTE — CARE PLAN
The patient is Watcher - Medium risk of patient condition declining or worsening    Shift Goals  Clinical Goals: O2 sat >90% on RA, educate on IS  Patient Goals: comfort, control nausea and cough  Family Goals: Not present    Progress made toward(s) clinical / shift goals:    Problem: Knowledge Deficit - Standard  Goal: Patient and family/care givers will demonstrate understanding of plan of care, disease process/condition, diagnostic tests and medications  Outcome: Progressing   POC discussed-pt verbalized understanding. Educated pt on using IS 5-10 times per hour.  in use. O2 sat >90% on RA. On isolation precautions for Covid    Patient is not progressing towards the following goals:

## 2023-11-20 NOTE — ED TRIAGE NOTES
"Chief Complaint   Patient presents with    Flu Like Symptoms     Pt diagnosed with cancer (small cell lung carcinoma) and currently receiving radiation, Monday with a +COVID test, increased lethargy, cough +phlegm    Dehydration     Not able to eat/drink, nausea        Ambulated to triage with family for above complaint. Pt states intermittent SOB, hx pneumonia. Hx MI '94, '96, '00, not on thinners, COPD, DM.    SOB protocols ordered. Pt brought to [facility] office for blood draw. Pt educated of triage process and informed to contact staff if situation changes.    /61   Pulse 88   Temp 36.6 °C (97.9 °F) (Temporal)   Resp 18   Ht 1.753 m (5' 9\")   Wt 76 kg (167 lb 8.8 oz)   SpO2 94%   BMI 24.74 kg/m²      "

## 2023-11-20 NOTE — ED NOTES
Pt medicated per MAR, pharmacy messaged for missing med. Pt resting with even chest rise and fall, reports no needs at this time, call light available and in reach.

## 2023-11-20 NOTE — ASSESSMENT & PLAN NOTE
Due to cisplatin/etoposide and radiation  Neutropenia without fever  Further GCSF contraindicated in SCLC due to association with increased mortality  Repeat AM CBC with Diff  Transfuse for Hgb <7 or Plt <10    Wbc improving at 5

## 2023-11-20 NOTE — PROGRESS NOTES
4 Eyes Skin Assessment Completed by Minh, RN and JOSE Lomax.    Head WDL  Ears Discoloration back of right ear  Nose WDL  Mouth WDL  Neck Redness back  Breast/Chest WDL  Shoulder Blades WDL  Spine WDL  (R) Arm/Elbow/Hand WDL  (L) Arm/Elbow/Hand WDL  Abdomen WDL  Groin WDL  Scrotum/Coccyx/Buttocks WDL  (R) Leg WDL  (L) Leg WDL  (R) Heel/Foot/Toe WDL  (L) Heel/Foot/Toe WDL          Devices In Places Blood Pressure Cuff, Pulse Ox, and Nasal Cannula      Interventions In Place Gray Ear Foams    Possible Skin Injury No    Pictures Uploaded Into Epic N/A  Wound Consult Placed N/A  RN Wound Prevention Protocol Ordered No

## 2023-11-20 NOTE — ED NOTES
PT medicated per MAR. Pt resting with even chest rise and fall, reports no needs at this time, call light available and in reach.

## 2023-11-20 NOTE — ED PROVIDER NOTES
ER Provider Note    Scribed for Kamari Quintero M.D. by Zainab Hanson. 11/19/2023   6:25 PM    Primary Care Provider: None noted    CHIEF COMPLAINT  Chief Complaint   Patient presents with    Flu Like Symptoms     Pt diagnosed with cancer (small cell lung carcinoma) and currently receiving radiation, Monday with a +COVID test, increased lethargy, cough +phlegm    Dehydration     Not able to eat/drink, nausea     EXTERNAL RECORDS REVIEWED   Inpatient Notes: The patient was admitted on 11/13 for a fever. He had a chest X ray. He has a recent diagnosis of small cell lung cancer.  Chart review: Recent infusion note, patient on experimental PD-1 inhibitor    HPI/ROS  LIMITATION TO HISTORY   Select: : None  OUTSIDE HISTORIAN(S):  None noted    Bart Ramsey is a 73 y.o. male with cancer who presents to the ED for evaluation of COVID-like symptoms. He reports associated fever, fatigue, generalized weakness, nausea, dehydration, shortness of breath and cough, but denies any leg swelling. The patient tested positive for COVID on Monday. He states he has had trouble tolerating fluids including water. Patient, he has some lower back pain. The patient reports he last had chemotherapy 1-2 weeks ago. The patient reports he is mostly bed bound.     PAST MEDICAL HISTORY  Past Medical History:   Diagnosis Date    Arthritis     knee    Bowel habit changes     diarrhea    Bronchitis     Cancer (HCC) 09/29/2023    small cell lung cancer    COPD (chronic obstructive pulmonary disease) ([facility])     Diabetes ([facility])     diet controlled, no medications.    Emphysema of lung ([facility])     COPD- no medications    High cholesterol 08/2023    Was on a statin, MD monitoring, not currently on meds    Myocardial infarct ([facility])     1994,1996,2000    Pneumonia     Psychiatric problem 08/2023    depression, son passed away recently, no meds    Sleep apnea     cpap       SURGICAL HISTORY  Past Surgical History:   Procedure Laterality Date    GA  BRONCHOSCOPY,DIAGNOSTIC N/A 8/29/2023    Procedure: FIBER OPTIC BRONCHOSCOPY WITH  WASH, BRUSH, BRONCHOALVEOLAR LAVAGE, BIOPSY AND FINE NEEDLE ASPIRATION, ENDOBRONCHIAL ULTRASOUND & NAVIGATION,  ROBOTICS;  Surgeon: Bart Cortez M.D.;  Location: SURGERY [facility];  Service: Pulmonary Robotic    ENDOBRONCHIAL US ADD-ON N/A 8/29/2023    Procedure: ENDOBRONCHIAL ULTRASOUND (EBUS);  Surgeon: Bart Cortez M.D.;  Location: SURGERY [facility];  Service: Pulmonary Robotic    GASTROSCOPY-ENDO  03/31/2017    Procedure: GASTROSCOPY-ENDO;  Surgeon: Emerson Eaton M.D.;  Location: ENDOSCOPY [facility];  Service:     COLONOSCOPY - ENDO  03/31/2017    Procedure: COLONOSCOPY - ENDO;  Surgeon: Emerson Eaton M.D.;  Location: ENDOSCOPY [facility];  Service:     CHOLECYSTECTOMY      INGUINAL HERNIA REPAIR Right     OTHER      Tonsillectomy    OTHER      Hernia     OTHER CARDIAC SURGERY      3 stents    TONSILLECTOMY         FAMILY HISTORY  Family History   Problem Relation Age of Onset    Cancer Mother         lung?       SOCIAL HISTORY   reports that he quit smoking about 29 years ago. His smoking use included cigarettes. He started smoking about 43 years ago. He has a 7.0 pack-year smoking history. He does not have any smokeless tobacco history on file. He reports that he does not currently use alcohol after a past usage of about 8.4 oz of alcohol per week. He reports that he does not currently use drugs after having used the following drugs: Marijuana.    CURRENT MEDICATIONS  Previous Medications    ASPIRIN EC (ECOTRIN) 81 MG TABLET DELAYED RESPONSE    Take 81 mg by mouth every day.    BACLOFEN (LIORESAL) 10 MG TAB    Take 10 mg by mouth at bedtime as needed. Indications: Muscle Spasm    CETIRIZINE (ZYRTEC) 10 MG TAB    Take 1 Tablet by mouth every day.    CYANOCOBALAMIN (VITAMIN B-12) 1000 MCG TAB    Take 1,000 mcg by mouth every day.    FLUTICASONE-SALMETEROL (ADVAIR) 250-50 MCG/ACT AEROSOL POWDER,  "BREATH ACTIVATED    Inhale 1 Puff 2 times a day as needed.    GABAPENTIN (NEURONTIN) 300 MG CAP    Take 300-600 mg by mouth 3 times a day.    METFORMIN (GLUCOPHAGE) 500 MG TAB    Take 750 mg by mouth 2 times a day with meals.    OMEGA-3 FATTY ACIDS (FISH OIL) 1000 MG CAP CAPSULE    Take 1,000 mg by mouth every day.    ONDANSETRON (ZOFRAN) 4 MG TAB TABLET    Take 1 Tablet by mouth every four hours as needed for Nausea/Vomiting (for nausea, vomiting).    PROCHLORPERAZINE (COMPAZINE) 10 MG TAB    Take 1 Tablet by mouth every 6 hours as needed (for nausea, vomiting).    SUCRALFATE (CARAFATE) 1 GM TAB    Take 1 Tablet by mouth 4 Times a Day,Before Meals and at Bedtime. Dissolve tablet in 1 cup water.    VITAMIN D3 (CHOLECALCIFEROL) 1000 UNIT (25 MCG) TAB    Take 1,000 Units by mouth every day.       ALLERGIES  No Known Allergies     PHYSICAL EXAM  /61   Pulse 88   Temp 36.6 °C (97.9 °F) (Temporal)   Resp 18   Ht 1.753 m (5' 9\")   Wt 76 kg (167 lb 8.8 oz)   SpO2 94%   BMI 24.74 kg/m²    General: chronically ill appearing  Head: Normocephalic atraumatic, Dry mucous membranes, dry tongue  Eyes: Extraocular motion intact  Neck: Supple, no rigidity  Cardiovascular: Regular rate and rhythm no murmurs rubs or gallops, prolonged capillary refill greater than 4 seconds. no erythema or induration or edema in low extremities.  Respiratory: equal chest rise and fall, no increased work of breathing, no respiratory distress, faint crackles in right upper lobe.  Abdomen: Soft nontender no guarding  Musculoskeletal: Warm and well perfused, no peripheral edema  Neuro: Alert, no focal deficits  Integumentary: No wounds or rashes    DIAGNOSTIC STUDIES    Labs:   Labs Reviewed   CBC WITH DIFFERENTIAL - Abnormal; Notable for the following components:       Result Value    WBC 0.8 (*)     RBC 2.91 (*)     Hemoglobin 9.3 (*)     Hematocrit 25.5 (*)     Platelet Count 36 (*)     Neutrophils-Polys 27.50 (*)     Monocytes 35.00 (*)  "    Neutrophils (Absolute) 0.22 (*)     Lymphs (Absolute) 0.29 (*)     All other components within normal limits   COMP METABOLIC PANEL - Abnormal; Notable for the following components:    Potassium 3.3 (*)     Glucose 128 (*)     Bun 31 (*)     All other components within normal limits   TROPONIN - Abnormal; Notable for the following components:    Troponin T 22 (*)     All other components within normal limits   D-DIMER - Abnormal; Notable for the following components:    D-Dimer 2.17 (*)     All other components within normal limits   PROCALCITONIN   ESTIMATED GFR   DIFFERENTIAL MANUAL   PERIPHERAL SMEAR REVIEW   PLATELET ESTIMATE   MORPHOLOGY   IMMATURE PLT FRACTION   Hypokalemia, mildly elevated troponin in the setting of dehydration, elevated D-dimer, neutropenia and leukopenia, borderline procalcitonin, otherwise unrevealing.    EKG:   I have independently interpreted this EKG as detailed above.   Results for orders placed or performed during the hospital encounter of 23   EKG   Result Value Ref Range    Report       [facility]    Test Date:  2023  Pt Name:    EMMIE GUTIERREZ           Department: ER  MRN:        [id number]                      Room:  Gender:     Male                         Technician: [id number]  :        [id number]                   Requested By:ER TRIAGE PROTOCOL  Order #:    [id number]                    Reading MD: Andrew Quintero    Measurements  Intervals                                Axis  Rate:       64                           P:          -8  MN:         131                          QRS:        -4  QRSD:       102                          T:          34  QT:         393  QTc:        406    Interpretive Statements  EKG: Normal sinus rhythm, rate of 64, normal axis, Q waves in lead III, aVF,  not significant change from prior 2023.  There are nonspecific ST  abnormalities in V2 and V3 which are new compared to prior, no ST  elevation.  Electronically Signed On 11- 18:48:19 PST by Andrew [facility]           Radiology:   This attending emergency physician has independently interpreted the diagnostic imaging associated with this visit and is awaiting the final reading from the radiologist.   Preliminary interpretation is a follows: Chest x-ray shows no acute pathology    Radiologist interpretation:   CT-CTA CHEST PULMONARY ARTERY W/ RECONS   Final Result      1.  Negative for pulmonary embolism      2.  Small superior segment left lower lobe airspace process consistent with pneumonia      3.  Multiple bilateral subpleural bleb      4.  Central venous catheter appears appropriately located      5.  Prior cholecystectomy      6.  Splenic granuloma         DX-CHEST-PORTABLE (1 VIEW)   Final Result      No acute cardiopulmonary abnormality.           INITIAL ASSESSMENT COURSE AND PLAN  Care Narrative 33-year-old male history of small cell lung cancer on chemotherapy as well as immunomodulating experimental therapy including PD-1 checkpoint inhibitor, recently diagnosed with COVID on Monday, presenting with worsening shortness of breath, intractable nausea and vomiting and dehydration, reports fever on Monday, having subjective fevers at home however no objective fever since Monday, vital signs are notable for SPO2 in the mid 80s on room air and occasionally up to 91, afebrile, no tachypnea.  On exam, patient is cachectic and chronically ill-appearing, he has crackles in the upper lobes, with no increased work of breathing, he is extremely prolonged capillary refill, with dry mucous membranes and a dry tongue.    6:25 PM - Patient was evaluated at bedside for COVID-like symptoms. Ordered for Dx-chest, Procalcitonin, Troponin, D-Dimer, CBC with diff, CMP, Immature PLT Fraction, Morphology, Platelet Estimate, Peripheral Smear Review, Differential Manual, D-dimer, Troponin, CT-CTA Chest Pulmonary Artery with and EKG to evaluate. The patient  will be medicated with Zofran 4 mg for his symptoms. Patient verbalizes understanding and support with my plan of care.  Differential diagnoses include but not limited to: Neutropenia neutropenic fever, COVID-pneumonia, secondary pneumonia, myocardial infarction, type II MI, PE, dehydration ALBERTA electrolyte abnormality considered intra-abdominal pathology, U UTI, however less likely given no symptoms.        7:44 PM - Patient was treated with Kdur 40 mEq and Magnesium Sulfate 1 g. Ordered for Ct-CTA Chest Pulmonary Artery with recons.    9:35 PM - Patient was seen at bedside. I updated him on all diagnostic results as detailed above. He was informed of the plan for hospitalization. Patient verbalizes understanding and agreement to this plan of care.     9:41 PM - I discussed the patient's case and the above findings with Dr. Vela (hospitalist) who will consult on the patient for hospitalization.    HYDRATION: Based on the patient's presentation of Dehydration the patient was given IV fluids. IV Hydration was used because oral hydration was not adequate alone. Upon recheck following hydration, the patient was improved.    Given lobar pneumonia, worsening symptoms and new hypoxia, will treat for immunocompromise pneumonia with cefepime, doxycycline, consider dexamethasone for COVID-pneumonia however given patient is on PD-1 inhibitor, and is not acutely decompensated, feel it is beneficial to await Ortho oncologic consultation in the a.m.    DISPOSITION AND DISCUSSIONS    I have discussed management of the patient with the following physicians and JAE's:  Dr. Vela (hospitalist)     Discussion of management with other [facility] or appropriate source(s): None         Barriers to care at this time, including but not limited to: Patient does not have established PCP.         DISPOSITION:  Patient will be hospitalized by Dr. Vela (hospitalist) in guarded condition.    FINAL DIAGNOSIS  1. Dehydration    2. Pneumonia due to  COVID-19 virus    3. Acute respiratory failure with hypoxia (HCC)    4. Chemotherapy-induced neutropenia (HCC)      CRITICAL CARE  The very real possibilty of a deterioration of this patient's condition required the highest level of my preparedness for sudden, emergent intervention.  I provided critical care services, which included medication orders, frequent reevaluations of the patient's condition and response to treatment, ordering and reviewing test results, and discussing the case with various consultants.  The critical care time associated with the care of the patient was 35 minutes. Review chart for interventions. This time is exclusive of any other billable procedures.      Zainab FREY (Scribe), am scribing for, and in the presence of, Andrew Quintero M.D..    Electronically signed by: Zainab Hanson ([employer]), 11/19/2023    Andrew FREY M.D. personally performed the services described in this documentation, as scribed by Zainab Hanson in my presence, and it is both accurate and complete.      The note accurately reflects work and decisions made by me.  Andrew Quintero M.D.  11/19/2023  10:49 PM

## 2023-11-20 NOTE — ED NOTES
Pt transferred over to house bed for pt comfort. Blood drawn and sent to lab. Pt resting with even chest rise and fall, reports no needs at this time, call light available and in reach.

## 2023-11-20 NOTE — H&P
Hospital Medicine History & Physical Note    Date of Service  11/19/2023    Primary Care Physician  Pcp Pt States None    Consultants      Specialist Names:     Code Status  Full Code    Chief Complaint  Chief Complaint   Patient presents with    Flu Like Symptoms     Pt diagnosed with cancer (small cell lung carcinoma) and currently receiving radiation, Monday with a +COVID test, increased lethargy, cough +phlegm    Dehydration     Not able to eat/drink, nausea       History of Presenting Illness  Bart Ramsey is a 73 y.o. male who presented 11/19/2023 with coronary disease, small cell lung cancer stage III A treated with chemotherapy, immunotherapy and radiation therapy with fever, productive cough and nausea for the past 1 week.  Over the past 2 to 3 days he has had increased shortness of breath, fatigue and weakness.  He was diagnosed with COVID on 11/13.  The patient last received chemotherapy on 11/8.    Chest x-ray interpreted by me found no acute pulmonary process  EKG found sinus bradycardia  CT scan of the chest found left lower lobe infiltrate which is possibly has underlying malignancy  I discussed the plan of care with patient.    Review of Systems  Review of Systems   Constitutional:  Positive for chills, fever and malaise/fatigue. Negative for diaphoresis.   HENT:  Negative for congestion, ear discharge, ear pain, hearing loss, nosebleeds, sinus pain, sore throat and tinnitus.    Eyes:  Negative for blurred vision, double vision, photophobia and pain.   Respiratory:  Positive for cough and shortness of breath. Negative for hemoptysis, sputum production, wheezing and stridor.    Cardiovascular:  Negative for chest pain, palpitations, orthopnea, claudication, leg swelling and PND.   Gastrointestinal:  Negative for abdominal pain, blood in stool, constipation, diarrhea, heartburn, melena, nausea and vomiting.   Genitourinary:  Negative for dysuria, flank pain, frequency, hematuria and urgency.    Musculoskeletal:  Negative for back pain, falls, joint pain, myalgias and neck pain.   Skin:  Negative for itching and rash.   Neurological:  Positive for weakness. Negative for dizziness, tingling, tremors and headaches.   Endo/Heme/Allergies:  Negative for environmental allergies and polydipsia. Does not bruise/bleed easily.   Psychiatric/Behavioral:  Negative for depression, hallucinations, substance abuse and suicidal ideas.        Past Medical History   has a past medical history of Arthritis, Bowel habit changes, Bronchitis, Cancer ([facility]) (09/29/2023), COPD (chronic obstructive pulmonary disease) ([facility]), Diabetes ([facility]), Emphysema of lung ([facility]), High cholesterol (08/2023), Myocardial infarct ([facility]), Pneumonia, Psychiatric problem (08/2023), and Sleep apnea.    Surgical History   has a past surgical history that includes other cardiac surgery; other; other; gastroscopy-endo (03/31/2017); colonoscopy - endo (03/31/2017); cholecystectomy; tonsillectomy; inguinal hernia repair (Right); pr bronchoscopy,diagnostic (N/A, 8/29/2023); and endobronchial us add-on (N/A, 8/29/2023).     Family History  family history includes Cancer in his mother.   Family history reviewed with patient. There is no family history that is pertinent to the chief complaint.     Social History   reports that he quit smoking about 29 years ago. His smoking use included cigarettes. He started smoking about 43 years ago. He has a 7.0 pack-year smoking history. He does not have any smokeless tobacco history on file. He reports that he does not currently use alcohol after a past usage of about 8.4 oz of alcohol per week. He reports that he does not currently use drugs after having used the following drugs: Marijuana.    Allergies  No Known Allergies    Medications  Prior to Admission Medications   Prescriptions Last Dose Informant Patient Reported? Taking?   Cyanocobalamin (VITAMIN B-12) 1000 MCG Tab  Patient, Patient's Home Pharmacy Yes No   Sig:  Take 1,000 mcg by mouth every day.   Omega-3 Fatty Acids (FISH OIL) 1000 MG Cap capsule  Patient, Patient's Home Pharmacy Yes No   Sig: Take 1,000 mg by mouth every day.   aspirin EC (ECOTRIN) 81 MG Tablet Delayed Response  Patient, Patient's Home Pharmacy Yes No   Sig: Take 81 mg by mouth every day.   baclofen (LIORESAL) 10 MG Tab  Patient, Patient's Home Pharmacy Yes No   Sig: Take 10 mg by mouth at bedtime as needed. Indications: Muscle Spasm   cetirizine (ZYRTEC) 10 MG Tab  Patient Yes No   Sig: Take 1 Tablet by mouth every day.   fluticasone-salmeterol (ADVAIR) 250-50 MCG/ACT AEROSOL POWDER, BREATH ACTIVATED  Patient, Patient's Home Pharmacy Yes No   Sig: Inhale 1 Puff 2 times a day as needed.   gabapentin (NEURONTIN) 300 MG Cap  Patient, Patient's Home Pharmacy Yes No   Sig: Take 300-600 mg by mouth 3 times a day.   metFORMIN (GLUCOPHAGE) 500 MG Tab  Patient, Patient's Home Pharmacy Yes No   Sig: Take 750 mg by mouth 2 times a day with meals.   ondansetron (ZOFRAN) 4 MG Tab tablet  Patient No No   Sig: Take 1 Tablet by mouth every four hours as needed for Nausea/Vomiting (for nausea, vomiting).   prochlorperazine (COMPAZINE) 10 MG Tab  Patient No No   Sig: Take 1 Tablet by mouth every 6 hours as needed (for nausea, vomiting).   sucralfate (CARAFATE) 1 GM Tab   No No   Sig: Take 1 Tablet by mouth 4 Times a Day,Before Meals and at Bedtime. Dissolve tablet in 1 cup water.   vitamin D3 (CHOLECALCIFEROL) 1000 Unit (25 mcg) Tab  Patient, Patient's Home Pharmacy Yes No   Sig: Take 1,000 Units by mouth every day.      Facility-Administered Medications: None       Physical Exam  Temp:  [36.6 °C (97.9 °F)-36.7 °C (98 °F)] 36.7 °C (98 °F)  Pulse:  [60-88] 63  Resp:  [12-18] 18  BP: (115-163)/(61-92) 139/92  SpO2:  [89 %-97 %] 96 %  Blood Pressure : (!) 139/92   Temperature: 36.7 °C (98 °F)   Pulse: 63   Respiration: 18   Pulse Oximetry: 96 %       Physical Exam  Vitals and nursing note reviewed.   Constitutional:        "General: He is not in acute distress.     Appearance: Normal appearance. He is not ill-appearing, toxic-appearing or diaphoretic.   HENT:      Head: Normocephalic and atraumatic.      Nose: No congestion or rhinorrhea.      Mouth/Throat:      Pharynx: No posterior oropharyngeal erythema.   Eyes:      General: No scleral icterus.        Right eye: No discharge.   Cardiovascular:      Rate and Rhythm: Normal rate and regular rhythm.      Pulses: Normal pulses.      Heart sounds: Normal heart sounds. No murmur heard.     No friction rub. No gallop.   Pulmonary:      Effort: Pulmonary effort is normal. No respiratory distress.      Breath sounds: No stridor. Rhonchi present. No wheezing or rales.   Abdominal:      General: There is no distension.      Tenderness: There is no abdominal tenderness.   Musculoskeletal:         General: No swelling, tenderness, deformity or signs of injury.      Cervical back: Normal range of motion.      Right lower leg: No edema.      Left lower leg: No edema.   Skin:     Capillary Refill: Capillary refill takes more than 3 seconds.      Coloration: Skin is not jaundiced or pale.      Findings: No bruising, erythema, lesion or rash.   Neurological:      General: No focal deficit present.      Mental Status: He is alert and oriented to person, place, and time.         Laboratory:  Recent Labs     11/19/23 1850   WBC 0.8*   RBC 2.91*   HEMOGLOBIN 9.3*   HEMATOCRIT 25.5*   MCV 87.6   MCH 32.0   MCHC 36.5   RDW 43.6   PLATELETCT 36*   MPV 11.3     Recent Labs     11/19/23  1850   SODIUM 136   POTASSIUM 3.3*   CHLORIDE 96   CO2 26   GLUCOSE 128*   BUN 31*   CREATININE 0.96   CALCIUM 9.0     Recent Labs     11/19/23  1850   ALTSGPT 12   ASTSGOT 15   ALKPHOSPHAT 66   TBILIRUBIN 1.1   GLUCOSE 128*         No results for input(s): \"NTPROBNP\" in the last 72 hours.      Recent Labs     11/19/23  1850   TROPONINT 22*       Imaging:  CT-CTA CHEST PULMONARY ARTERY W/ RECONS   Final Result      1.  " Negative for pulmonary embolism      2.  Small superior segment left lower lobe airspace process consistent with pneumonia      3.  Multiple bilateral subpleural bleb      4.  Central venous catheter appears appropriately located      5.  Prior cholecystectomy      6.  Splenic granuloma         DX-CHEST-PORTABLE (1 VIEW)   Final Result      No acute cardiopulmonary abnormality.          X-Ray:  I have personally reviewed the images and compared with prior images.  EKG:  I have personally reviewed the images and compared with prior images.    Assessment/Plan:  Justification for Admission Status  I anticipate this patient will require at least two midnights for appropriate medical management, necessitating inpatient admission because pneumonia    Patient will need a Med/Surg bed on MEDICAL service .  The need is secondary to pneumonia.    * Pneumonia due to infectious organism- (present on admission)  Assessment & Plan  Presents with neutropenia and fevers and productive cough  Start IV cefepime and doxycycline  Patient likely has a secondary pneumonia   sputum cultures and blood cultures      COVID-19  Assessment & Plan  He was first diagnosed with COVID on 11/13  Monitor O2 status closely  Prone positioning and frequent change position  ISS and ambulation aggressively  Inflammatory markers every 48 hours  Prophylactic Lovenox        Acute respiratory failure with hypoxia (HCC)  Assessment & Plan  On 3 L of O2 above baseline    Neutropenia (HCC)  Assessment & Plan  I will give 1 dose of Granix  Continue to trend    SCLC (small cell lung carcinoma) (HCC)- (present on admission)  Assessment & Plan  Last received chemotherapy infusion on 11/8 with cisplatin and etoposide    CAD (coronary artery disease)- (present on admission)  Assessment & Plan  Denies any chest pain  Continue aspirin        VTE prophylaxis: enoxaparin ppx

## 2023-11-20 NOTE — PROGRESS NOTES
0509 Informed Hospitalist Greg COMER about Mr. Negron critical lab value of WBC 0.9 and Absolute Neutrophil 0.31.

## 2023-11-20 NOTE — ED NOTES
PT repositioned in bed. Pt resting with even chest rise and fall, reports no needs at this time, call light available and in reach.

## 2023-11-20 NOTE — ED NOTES
Bedside report received from off going RN Alexis, assumed care of patient.  POC discussed with patient. Call light within reach, all needs addressed at this time.       Fall risk interventions in place: Place fall risk sign on patient's door, Give patient urinal if applicable, Keep floor surfaces clean and dry, and Accompanied to restroom (all applicable per [place] Fall risk assessment)   Continuous monitoring: Cardiac Leads, Pulse Ox, or Blood Pressure  IVF/IV medications: Not Applicable   Oxygen: How many liters 2L  Bedside sitter: Not Applicable   Isolation: Not Applicable     Blood cultures and lactic drawn and sent. Phlebotomy messaged for second set.

## 2023-11-20 NOTE — ED NOTES
Med rec updated and complete.  Allergies reviewed.  Pt has not taken his maintenance medications > 1 week.  Pt reports no outpatient  antibiotic use in last 30 days.  No anticoagulant medications.  Last ASA > 1 week.  Last infusion ( chemo) 11/08/23    [facility]   [phone]

## 2023-11-20 NOTE — ASSESSMENT & PLAN NOTE
He was first diagnosed with COVID on 11/13  Not hypoxic, dexamethasone not indicated  CTA negative for PE  VTE ppx contraindicated while Plt <50  Enhanced droplet precautions

## 2023-11-20 NOTE — CARE PLAN
Problem: Knowledge Deficit - Standard  Goal: Patient and family/care givers will demonstrate understanding of plan of care, disease process/condition, diagnostic tests and medications  Outcome: Progressing   The patient is Stable - Low risk of patient condition declining or worsening    Shift Goals  Clinical Goals: Manage cough  Patient Goals: Comfort  Family Goals: Not present    Progress made toward(s) clinical / shift goals:      Patient is not progressing towards the following goals:

## 2023-11-21 ENCOUNTER — APPOINTMENT (OUTPATIENT)
Dept: RADIATION ONCOLOGY | Facility: MEDICAL CENTER | Age: 73
End: 2023-11-21
Payer: COMMERCIAL

## 2023-11-21 LAB
ANION GAP SERPL CALC-SCNC: 11 MMOL/L (ref 7–16)
ANISOCYTOSIS BLD QL SMEAR: ABNORMAL
BASOPHILS # BLD AUTO: 0.9 % (ref 0–1.8)
BASOPHILS # BLD: 0.03 K/UL (ref 0–0.12)
BUN SERPL-MCNC: 23 MG/DL (ref 8–22)
CALCIUM SERPL-MCNC: 8.3 MG/DL (ref 8.5–10.5)
CHLORIDE SERPL-SCNC: 101 MMOL/L (ref 96–112)
CO2 SERPL-SCNC: 25 MMOL/L (ref 20–33)
CREAT SERPL-MCNC: 0.92 MG/DL (ref 0.5–1.4)
DOHLE BOD BLD QL SMEAR: NORMAL
EOSINOPHIL # BLD AUTO: 0.03 K/UL (ref 0–0.51)
EOSINOPHIL NFR BLD: 0.9 % (ref 0–6.9)
ERYTHROCYTE [DISTWIDTH] IN BLOOD BY AUTOMATED COUNT: 44.2 FL (ref 35.9–50)
GFR SERPLBLD CREATININE-BSD FMLA CKD-EPI: 87 ML/MIN/1.73 M 2
GLUCOSE SERPL-MCNC: 108 MG/DL (ref 65–99)
HCT VFR BLD AUTO: 23.5 % (ref 42–52)
HGB BLD-MCNC: 8.5 G/DL (ref 14–18)
LYMPHOCYTES # BLD AUTO: 0.26 K/UL (ref 1–4.8)
LYMPHOCYTES NFR BLD: 6.9 % (ref 22–41)
MAGNESIUM SERPL-MCNC: 1.3 MG/DL (ref 1.5–2.5)
MANUAL DIFF BLD: NORMAL
MCH RBC QN AUTO: 31.8 PG (ref 27–33)
MCHC RBC AUTO-ENTMCNC: 36.2 G/DL (ref 32.3–36.5)
MCV RBC AUTO: 88 FL (ref 81.4–97.8)
METAMYELOCYTES NFR BLD MANUAL: 0.9 %
MICROCYTES BLD QL SMEAR: ABNORMAL
MONOCYTES # BLD AUTO: 0.2 K/UL (ref 0–0.85)
MONOCYTES NFR BLD AUTO: 5.2 % (ref 0–13.4)
MORPHOLOGY BLD-IMP: NORMAL
NEUTROPHILS # BLD AUTO: 3.24 K/UL (ref 1.82–7.42)
NEUTROPHILS NFR BLD: 83.5 % (ref 44–72)
NEUTS BAND NFR BLD MANUAL: 1.7 % (ref 0–10)
NRBC # BLD AUTO: 0 K/UL
NRBC BLD-RTO: 0 /100 WBC (ref 0–0.2)
PHOSPHATE SERPL-MCNC: 1.5 MG/DL (ref 2.5–4.5)
PLATELET # BLD AUTO: 58 K/UL (ref 164–446)
PLATELET BLD QL SMEAR: NORMAL
PLATELETS.RETICULATED NFR BLD AUTO: 3.3 % (ref 0.6–13.1)
PMV BLD AUTO: 10.8 FL (ref 9–12.9)
POTASSIUM SERPL-SCNC: 3 MMOL/L (ref 3.6–5.5)
RBC # BLD AUTO: 2.67 M/UL (ref 4.7–6.1)
RBC BLD AUTO: PRESENT
SODIUM SERPL-SCNC: 137 MMOL/L (ref 135–145)
TOXIC GRANULES BLD QL SMEAR: NORMAL
WBC # BLD AUTO: 3.8 K/UL (ref 4.8–10.8)

## 2023-11-21 PROCEDURE — 700111 HCHG RX REV CODE 636 W/ 250 OVERRIDE (IP): Performed by: STUDENT IN AN ORGANIZED HEALTH CARE EDUCATION/TRAINING PROGRAM

## 2023-11-21 PROCEDURE — 99233 SBSQ HOSP IP/OBS HIGH 50: CPT | Performed by: HOSPITALIST

## 2023-11-21 PROCEDURE — 77014 PR CT GUIDANCE PLACEMENT RAD THERAPY FIELDS: CPT | Mod: 26 | Performed by: RADIOLOGY

## 2023-11-21 PROCEDURE — 80048 BASIC METABOLIC PNL TOTAL CA: CPT

## 2023-11-21 PROCEDURE — A9270 NON-COVERED ITEM OR SERVICE: HCPCS | Mod: JZ | Performed by: HOSPITALIST

## 2023-11-21 PROCEDURE — 85027 COMPLETE CBC AUTOMATED: CPT

## 2023-11-21 PROCEDURE — A9270 NON-COVERED ITEM OR SERVICE: HCPCS | Performed by: STUDENT IN AN ORGANIZED HEALTH CARE EDUCATION/TRAINING PROGRAM

## 2023-11-21 PROCEDURE — 700102 HCHG RX REV CODE 250 W/ 637 OVERRIDE(OP): Mod: JZ | Performed by: HOSPITALIST

## 2023-11-21 PROCEDURE — A9270 NON-COVERED ITEM OR SERVICE: HCPCS | Performed by: HOSPITALIST

## 2023-11-21 PROCEDURE — 85007 BL SMEAR W/DIFF WBC COUNT: CPT

## 2023-11-21 PROCEDURE — 84100 ASSAY OF PHOSPHORUS: CPT

## 2023-11-21 PROCEDURE — 85055 RETICULATED PLATELET ASSAY: CPT

## 2023-11-21 PROCEDURE — 700105 HCHG RX REV CODE 258: Performed by: HOSPITALIST

## 2023-11-21 PROCEDURE — 700111 HCHG RX REV CODE 636 W/ 250 OVERRIDE (IP): Mod: JZ | Performed by: HOSPITALIST

## 2023-11-21 PROCEDURE — 77386 HCHG IMRT DELIVERY COMPLEX: CPT | Performed by: RADIOLOGY

## 2023-11-21 PROCEDURE — 700101 HCHG RX REV CODE 250: Performed by: HOSPITALIST

## 2023-11-21 PROCEDURE — 770001 HCHG ROOM/CARE - MED/SURG/GYN PRIV*

## 2023-11-21 PROCEDURE — 36415 COLL VENOUS BLD VENIPUNCTURE: CPT

## 2023-11-21 PROCEDURE — 700102 HCHG RX REV CODE 250 W/ 637 OVERRIDE(OP): Performed by: STUDENT IN AN ORGANIZED HEALTH CARE EDUCATION/TRAINING PROGRAM

## 2023-11-21 PROCEDURE — 83735 ASSAY OF MAGNESIUM: CPT

## 2023-11-21 PROCEDURE — 77427 RADIATION TX MANAGEMENT X5: CPT | Performed by: RADIOLOGY

## 2023-11-21 PROCEDURE — 700102 HCHG RX REV CODE 250 W/ 637 OVERRIDE(OP): Performed by: HOSPITALIST

## 2023-11-21 RX ORDER — MAGNESIUM SULFATE HEPTAHYDRATE 40 MG/ML
2 INJECTION, SOLUTION INTRAVENOUS ONCE
Status: COMPLETED | OUTPATIENT
Start: 2023-11-21 | End: 2023-11-21

## 2023-11-21 RX ORDER — POTASSIUM CHLORIDE 20 MEQ/1
40 TABLET, EXTENDED RELEASE ORAL ONCE
Status: DISCONTINUED | OUTPATIENT
Start: 2023-11-21 | End: 2023-11-21

## 2023-11-21 RX ORDER — POTASSIUM CHLORIDE 20 MEQ/1
40 TABLET, EXTENDED RELEASE ORAL ONCE
Status: COMPLETED | OUTPATIENT
Start: 2023-11-21 | End: 2023-11-21

## 2023-11-21 RX ADMIN — MOMETASONE FUROATE AND FORMOTEROL FUMARATE DIHYDRATE 2 PUFF: 200; 5 AEROSOL RESPIRATORY (INHALATION) at 16:03

## 2023-11-21 RX ADMIN — ONDANSETRON 4 MG: 4 TABLET, ORALLY DISINTEGRATING ORAL at 11:45

## 2023-11-21 RX ADMIN — GABAPENTIN 300 MG: 300 CAPSULE ORAL at 11:45

## 2023-11-21 RX ADMIN — ASPIRIN 81 MG: 81 TABLET, COATED ORAL at 05:46

## 2023-11-21 RX ADMIN — POTASSIUM PHOSPHATE, MONOBASIC POTASSIUM PHOSPHATE, DIBASIC 30 MMOL: 224; 236 INJECTION, SOLUTION, CONCENTRATE INTRAVENOUS at 11:49

## 2023-11-21 RX ADMIN — POTASSIUM CHLORIDE 40 MEQ: 1500 TABLET, EXTENDED RELEASE ORAL at 08:59

## 2023-11-21 RX ADMIN — DOXYCYCLINE 100 MG: 100 TABLET, FILM COATED ORAL at 16:03

## 2023-11-21 RX ADMIN — OMEPRAZOLE 20 MG: 20 CAPSULE, DELAYED RELEASE ORAL at 05:47

## 2023-11-21 RX ADMIN — CETIRIZINE HYDROCHLORIDE 10 MG: 10 TABLET, FILM COATED ORAL at 05:46

## 2023-11-21 RX ADMIN — Medication 1000 UNITS: at 05:47

## 2023-11-21 RX ADMIN — MAGNESIUM SULFATE HEPTAHYDRATE 2 G: 2 INJECTION, SOLUTION INTRAVENOUS at 09:08

## 2023-11-21 RX ADMIN — ONDANSETRON 4 MG: 4 TABLET, ORALLY DISINTEGRATING ORAL at 16:03

## 2023-11-21 RX ADMIN — CEFEPIME 2 G: 2 INJECTION, POWDER, FOR SOLUTION INTRAVENOUS at 05:53

## 2023-11-21 RX ADMIN — GABAPENTIN 300 MG: 300 CAPSULE ORAL at 05:47

## 2023-11-21 RX ADMIN — CEFEPIME 2 G: 2 INJECTION, POWDER, FOR SOLUTION INTRAVENOUS at 14:03

## 2023-11-21 RX ADMIN — MOMETASONE FUROATE AND FORMOTEROL FUMARATE DIHYDRATE 2 PUFF: 200; 5 AEROSOL RESPIRATORY (INHALATION) at 05:45

## 2023-11-21 RX ADMIN — GABAPENTIN 300 MG: 300 CAPSULE ORAL at 16:03

## 2023-11-21 RX ADMIN — CEFEPIME 2 G: 2 INJECTION, POWDER, FOR SOLUTION INTRAVENOUS at 22:39

## 2023-11-21 RX ADMIN — ONDANSETRON 4 MG: 4 TABLET, ORALLY DISINTEGRATING ORAL at 06:04

## 2023-11-21 RX ADMIN — BENZONATATE 100 MG: 100 CAPSULE ORAL at 06:04

## 2023-11-21 RX ADMIN — DOXYCYCLINE 100 MG: 100 TABLET, FILM COATED ORAL at 05:47

## 2023-11-21 RX ADMIN — OMEPRAZOLE 20 MG: 20 CAPSULE, DELAYED RELEASE ORAL at 16:03

## 2023-11-21 RX ADMIN — CYANOCOBALAMIN TAB 500 MCG 1000 MCG: 500 TAB at 05:46

## 2023-11-21 ASSESSMENT — ENCOUNTER SYMPTOMS
HEADACHES: 0
SHORTNESS OF BREATH: 1
FEVER: 0
COUGH: 1
HEARTBURN: 1
CONSTIPATION: 0
DEPRESSION: 1
HEMOPTYSIS: 0
FLANK PAIN: 0
NERVOUS/ANXIOUS: 0
MYALGIAS: 0
CHILLS: 0
DIARRHEA: 0
NAUSEA: 1
WEAKNESS: 1

## 2023-11-21 ASSESSMENT — PAIN DESCRIPTION - PAIN TYPE
TYPE: ACUTE PAIN
TYPE: ACUTE PAIN

## 2023-11-21 NOTE — PROGRESS NOTES
Patient brought to [facility] for his final XRT to the left lung, Patient will follow up with .

## 2023-11-21 NOTE — HOSPITAL COURSE
Please follow up with Dr. Stone # [phone]    Let me know if your symptoms get worse or do not fully resolve.    This is 73-year-old male with a past medical history significant for CAD, small lung cell cancer III A treated with chemotherapy, immunotherapy and radiation therapy presented to ER with a complaint of fever, productive cough and nausea for the past 1 week.    Patient was diagnosed with COVID-19 on 11/13, last chemotherapy on 11/8, last radiation on 11/21.  CT scan showed lower lobe infiltrate.    During the stay in the hospital, patient continued receive treatment for community-acquired pneumonia with cefepime; he continued to feel better.  At discharge, will continue levofloxacin    He WBC count continues to improve.  Of note patient was intermittently requiring oxygen of 1 L, discussed with Dr. Fuller of oncology who agreed to start steroid for COVID-19.  Home O2 evaluation was obtained and patient will be discharged home on oxygen.

## 2023-11-21 NOTE — CARE PLAN
The patient is Watcher - Medium risk of patient condition declining or worsening    Shift Goals  Clinical Goals: encourgae pt to use IS, O2 sat >90% on RA  Patient Goals: comfort, rest  Family Goals: Not present    Progress made toward(s) clinical / shift goals:    Problem: Knowledge Deficit - Standard  Goal: Patient and family/care givers will demonstrate understanding of plan of care, disease process/condition, diagnostic tests and medications  Outcome: Progressing   POC discussed-pt verbalized understanding. Last radiation treatment this afternoon. O2 sat >90% on RA. Encouraged pt to use IS x5 per hour.    Patient is not progressing towards the following goals:

## 2023-11-21 NOTE — DISCHARGE PLANNING
"Case Management Discharge Planning    Admission Date: 11/19/2023  GMLOS: 5  ALOS: 2    6-Clicks ADL Score:    6-Clicks Mobility Score:        Anticipated Discharge Dispo: Discharge Disposition: Discharged to home/self care (01)  Discharge Address: [address] [place] [address] [place] [address]    DME Needed: No    Action(s) Taken: DC Assessment Complete (See below)    LSW met with patient at bedside to complete dc assessment. Patient verified demographic information on facesheet. Patient reported to live in a single story home with one step to enter. [facility] Patient reported to be independent with ADLs/IADLs prior to admission. Patient reported to use a walker and cane for assistance ambulating.  Patient denied a hx of mental health. Patient reported a hx of ETOH and marijuana use \"years\" ago.     Patient's discharge plan pending hospital course. Patient may likely need O2 prior to discharge.     Escalations Completed: None    Medically Clear: Yes    Next Steps: Pending hospital course.    Barriers to Discharge: Medical clearance    Is the patient up for discharge tomorrow: No      Care Transition Team Assessment    Information Source  Orientation Level: Oriented X4  Information Given By: Patient  Who is responsible for making decisions for patient? : Patient    Readmission Evaluation  Is this a readmission?: Yes - unplanned readmission    Elopement Risk  Legal Hold: No  Ambulatory or Self Mobile in Wheelchair: Yes  Disoriented: No  Psychiatric Symptoms: None  History of Wandering: No  Elopement this Admit: No  Vocalizing Wanting to Leave: No  Displays Behaviors, Body Language Wanting to Leave: No-Not at Risk for Elopement  Elopement Risk: Not at Risk for Elopement         Discharge Preparedness  What is your plan after discharge?: Uncertain - pending medical team collaboration  What are your discharge supports?: Spouse  Prior Functional Level: Ambulatory, Independent with Activities of Daily " "Living, Independent with Medication Management, Uses Walker, Uses Cane  Difficulity with ADLs: None  Difficulity with IADLs: None    Functional Assesment  Prior Functional Level: Ambulatory, Independent with Activities of Daily Living, Independent with Medication Management, Uses Walker, Uses Cane    Finances  Financial Barriers to Discharge: No  Prescription Coverage: Yes              Advance Directive  Advance Directive?: None    Domestic Abuse  Have you ever been the victim of abuse or violence?: No  Physical Abuse or Sexual Abuse: No  Verbal Abuse or Emotional Abuse: Yes, Present.  Comment  Possible Abuse/Neglect Reported to:: Not Applicable    Psychological Assessment  History of Substance Abuse: Alcohol, Marijuana (Patient reported a hx of ETOH and marijuana use.)  Date Last Used - Alcohol: Per the patient,\"years ago\".  History of Psychiatric Problems: No  Non-compliant with Treatment: No  Newly Diagnosed Illness: No    Discharge Risks or Barriers  Discharge risks or barriers?: No  Patient risk factors: Vulnerable adult    Anticipated Discharge Information  Discharge Disposition: Discharged to home/self care (01)  Discharge Address: [address] [place] [address] [place] [address]  Discharge Contact Phone Number: [phone]        "

## 2023-11-21 NOTE — DIETARY
"Nutrition services: Day 2 of admit.  Bart Ramsey is a 73 y.o. male with admitting DX of Pneumonia due to infectious organism, pneumonia of left lower lobe due to infectious organism     Consult received for poor oral intake    RD unable to meet with pt at bedside due to Covid-19 Isolation policy.     Assessment:  Height: 175.3 cm (5' 9\")  Weight: 76 kg (167 lb 8.8 oz)via stand up scale   Body mass index is 24.74 kg/m²., BMI classification: Normal   Diet/Intake: Regular; PO intake 50-75% x 1 meal     Wt Readings from Last 7 Encounters:   11/19/23 76 kg (167 lb 8.8 oz)   11/13/23 78 kg (171 lb 15.3 oz)   11/08/23 85 kg (187 lb 6.3 oz)   11/08/23 84.8 kg (186 lb 15.2 oz)   11/07/23 85.5 kg (188 lb 7.9 oz)   11/06/23 82.9 kg (182 lb 12.2 oz)   11/06/23 83 kg (182 lb 15.7 oz)     Weight Change: Per chart review, pt weight on 10/4/2023 was 188 lbs, this is an 11% weight  loss x 1 month which is clinically significant.     Evaluation:   PMH: coronary disease, small cell lung cancer stage III A treated with chemotherapy (last done on 11/18), immunotherapy and radiation therapy with fever, productive cough and nausea for the past 1 week.  Over the past 2 to 3 days he has had increased shortness of breath, fatigue and weakness.  He was diagnosed with COVID on 11/13.   Pt is followed by [facility] Dietitian   Labs: K+ 3.0, Bun 23, Phos 1.5, Mag 1.3  Meds: Cefepime, vitamin B-12, magnesium sulfate, kdur, vitamin D3  +BM: 11/20  Nutrition focused physical exam: Unable to complete due to COVID-19 isolation.   Pt would benefit from oral nutrition supplements to help optimize nutrition.   Pt is at risk for refeeding syndrome due to reports of poor PO intake and low electrolytes.     Malnutrition Risk: Unable to determine at this time. Pt is at risk for 11% weight loss x 1 mo per chart review.     Recommendations/Plan:  Provide Boost supplements TID    Encourage intake of >50% of meals and supplements   Document intake of all " meals and supplements  as % taken in ADL's to provide interdisciplinary communication across all shifts.   Monitor weight.  Nutrition rep will continue to see patient for ongoing meal and snack preferences.   Monitor for refeeding: Order BMP w/ Mg and Phos x 7 days. Replete K, Phos and Mg prn. Supplement 100 mg Thiamine x 7 days to reduce risk of refeeding.     RD following.

## 2023-11-21 NOTE — CARE PLAN
The patient is   Problem: Knowledge Deficit - Standard  Goal: Patient and family/care givers will demonstrate understanding of plan of care, disease process/condition, diagnostic tests and medications  Outcome: Progressing       Shift Goals  Clinical Goals: Work on IS to bring O2 up.  Patient Goals: Coughing sputum  Family Goals: Not present    Progress made toward(s) clinical / shift goals:      Patient is not progressing towards the following goals:

## 2023-11-21 NOTE — PROGRESS NOTES
Hospital Medicine Daily Progress Note    Date of Service  11/21/2023    Chief Complaint  Bart Ramsey is a 73 y.o. male with SCLC IIIA on cis/etoposide/HLX study and XRT, COPD not on oxygen, T2DM, and recent COVID ifnection admitted 11/19/2023 with LLL pneumonia.    Hospital Course  Hello hello hello    Interval Problem Update  No acute events overnight, patient continued to remain afebrile WBC count has improved to 3.8  -- potassium low, will replete  -- Mag low at 1.5 , 2gmof IV magnesium  --Replete phosphorus  Patient stated pain secondary to radiation esophagitis  Does follow Dr. Fuller of oncology as an outpatient.  Patient underwent radiation treatment today.  Continue IV antibiotics for community-acquired pneumonia      I have discussed this patient's plan of care and discharge plan at IDT rounds today with Case Management, Nursing, Nursing leadership, and other members of the IDT team.    Consultants/Specialty  None    Code Status  Full Code    Disposition  The patient is not medically cleared for discharge to home or a post-acute facility.      I have placed the appropriate orders for post-discharge needs.    Review of Systems  Review of Systems   Constitutional:  Negative for chills and fever.   Respiratory:  Positive for cough and shortness of breath. Negative for hemoptysis.    Cardiovascular:  Negative for leg swelling.   Gastrointestinal:  Positive for heartburn and nausea. Negative for constipation and diarrhea.   Genitourinary:  Negative for dysuria, flank pain and hematuria.   Musculoskeletal:  Negative for joint pain and myalgias.   Neurological:  Positive for weakness. Negative for headaches.   Psychiatric/Behavioral:  Positive for depression. The patient is not nervous/anxious.         Physical Exam  Temp:  [36.6 °C (97.9 °F)-37.1 °C (98.8 °F)] 36.6 °C (97.9 °F)  Pulse:  [62-87] 69  Resp:  [16-17] 17  BP: (127-140)/(56-68) 140/67  SpO2:  [86 %-94 %] 86 %    Physical Exam  Vitals and  nursing note reviewed. Exam conducted with a chaperone present.   Constitutional:       Appearance: He is ill-appearing (Acutely). He is not diaphoretic.      Interventions: Nasal cannula in place.   HENT:      Head: Normocephalic.      Mouth/Throat:      Mouth: Mucous membranes are dry.   Eyes:      General: No scleral icterus.     Extraocular Movements: Extraocular movements intact.      Pupils: Pupils are equal, round, and reactive to light.   Cardiovascular:      Rate and Rhythm: Normal rate and regular rhythm.      Pulses: Normal pulses.   Pulmonary:      Effort: Pulmonary effort is normal.   Abdominal:      General: Bowel sounds are normal. There is no distension.      Palpations: Abdomen is soft.      Tenderness: There is no abdominal tenderness. There is no guarding or rebound.   Genitourinary:     Comments: No stern  Musculoskeletal:      Cervical back: Neck supple.      Right lower leg: No edema.      Left lower leg: No edema.   Skin:     General: Skin is warm and dry.      Coloration: Skin is pale.   Neurological:      Mental Status: He is alert.      Comments: Appropriately conversant. Moves all extremities.   Psychiatric:         Attention and Perception: Attention and perception normal.         Mood and Affect: Mood is depressed. Affect is blunt.         Speech: Speech normal.         Behavior: Behavior is slowed. Behavior is cooperative.         Thought Content: Thought content normal.         Cognition and Memory: Cognition and memory normal.         Judgment: Judgment normal.         Fluids    Intake/Output Summary (Last 24 hours) at 11/21/2023 1555  Last data filed at 11/21/2023 1403  Gross per 24 hour   Intake 360 ml   Output --   Net 360 ml         Laboratory  Recent Labs     11/19/23  1850 11/20/23  0335 11/21/23  0428   WBC 0.8* 0.9* 3.8*   RBC 2.91* 2.46* 2.67*   HEMOGLOBIN 9.3* 7.9* 8.5*   HEMATOCRIT 25.5* 21.5* 23.5*   MCV 87.6 87.4 88.0   MCH 32.0 32.1 31.8   MCHC 36.5 36.7* 36.2   RDW 43.6  45.1 44.2   PLATELETCT 36* 36* 58*   MPV 11.3 10.7 10.8       Recent Labs     11/19/23  1850 11/20/23  0335 11/21/23  0428   SODIUM 136 135 137   POTASSIUM 3.3* 3.5* 3.0*   CHLORIDE 96 100 101   CO2 26 24 25   GLUCOSE 128* 113* 108*   BUN 31* 26* 23*   CREATININE 0.96 0.77 0.92   CALCIUM 9.0 8.3* 8.3*                     Imaging  CT-CTA CHEST PULMONARY ARTERY W/ RECONS   Final Result      1.  Negative for pulmonary embolism      2.  Small superior segment left lower lobe airspace process consistent with pneumonia      3.  Multiple bilateral subpleural bleb      4.  Central venous catheter appears appropriately located      5.  Prior cholecystectomy      6.  Splenic granuloma         DX-CHEST-PORTABLE (1 VIEW)   Final Result      No acute cardiopulmonary abnormality.           Assessment/Plan  * Community acquired pneumonia of left lower lobe of lung- (present on admission)  Assessment & Plan  Presented with subjective fevers and cough  CTA chest demonstrates LLL infiltrate consistent with PNA  Procalcitonin negative, likely viral (COVID) but due to risk will empirically cover for CAP  Treated with cefepime and doxycycline due to neutropenia  Sputum culture to screen for pseudomonas or MRSA    COPD without exacerbation (HCC)- (present on admission)  Assessment & Plan  Sputum and dyspnea more explained by PNA  Continue ICS/LABA BID and duonebs PRN  RT consult    Type 2 diabetes mellitus with diabetic polyneuropathy, without long-term current use of insulin (HCC)- (present on admission)  Assessment & Plan  A1c 6.3 August 2023  No indication for strict BG control - POCT / SSI deferred  Continue gabapentin      Hypokalemia due to inadequate potassium intake- (present on admission)  Assessment & Plan  Replete as needed    Radiation-induced esophagitis- (present on admission)  Assessment & Plan  Continue sucralfate per Radiation Oncology  PPI BID initiated  Scheduled and PRN antiemetcs    COVID-19 virus infection- (present  on admission)  Assessment & Plan  He was first diagnosed with COVID on 11/13  Not hypoxic, dexamethasone not indicated  CTA negative for PE  VTE ppx contraindicated while Plt <50  Enhanced droplet precautions      Pancytopenia due to antineoplastic chemotherapy (HCC)- (present on admission)  Assessment & Plan  Due to cisplatin/etoposide and radiation  Neutropenia without fever  Further GCSF contraindicated in SCLC due to association with increased mortality  Repeat AM CBC with Diff  Transfuse for Hgb <7 or Plt <10    Wbc improving    Nausea and vomiting- (present on admission)  Assessment & Plan  Due to antineoplastic therapy and radiation esophagitis   Prn zofran/compazine    SCLC (small cell lung carcinoma) (HCC)- (present on admission)  Assessment & Plan  Follows with [facility] Oncologist Dr. Fuller and Radiation Oncologist   S/p C3 11/8 with cisplatin and etoposide with HLX study drug  Continue XRT while hospitalized  Continue supportive care with analgesics, antiemetics, antitussives    Coronary artery disease involving native coronary artery of native heart without angina pectoris- (present on admission)  Assessment & Plan  Denies any chest pain  Continue aspirin         VTE prophylaxis: scd

## 2023-11-22 ENCOUNTER — APPOINTMENT (OUTPATIENT)
Dept: RADIOLOGY | Facility: MEDICAL CENTER | Age: 73
End: 2023-11-22
Attending: STUDENT IN AN ORGANIZED HEALTH CARE EDUCATION/TRAINING PROGRAM
Payer: COMMERCIAL

## 2023-11-22 LAB
ANION GAP SERPL CALC-SCNC: 8 MMOL/L (ref 7–16)
BASOPHILS # BLD AUTO: 0 % (ref 0–1.8)
BASOPHILS # BLD: 0 K/UL (ref 0–0.12)
BUN SERPL-MCNC: 18 MG/DL (ref 8–22)
CALCIUM SERPL-MCNC: 7.8 MG/DL (ref 8.5–10.5)
CHLORIDE SERPL-SCNC: 97 MMOL/L (ref 96–112)
CO2 SERPL-SCNC: 26 MMOL/L (ref 20–33)
CREAT SERPL-MCNC: 1 MG/DL (ref 0.5–1.4)
EOSINOPHIL # BLD AUTO: 0 K/UL (ref 0–0.51)
EOSINOPHIL NFR BLD: 0 % (ref 0–6.9)
ERYTHROCYTE [DISTWIDTH] IN BLOOD BY AUTOMATED COUNT: 45.6 FL (ref 35.9–50)
GFR SERPLBLD CREATININE-BSD FMLA CKD-EPI: 79 ML/MIN/1.73 M 2
GLUCOSE SERPL-MCNC: 130 MG/DL (ref 65–99)
HCT VFR BLD AUTO: 23.2 % (ref 42–52)
HGB BLD-MCNC: 8.4 G/DL (ref 14–18)
LYMPHOCYTES # BLD AUTO: 0.39 K/UL (ref 1–4.8)
LYMPHOCYTES NFR BLD: 7.8 % (ref 22–41)
MAGNESIUM SERPL-MCNC: 1.3 MG/DL (ref 1.5–2.5)
MANUAL DIFF BLD: NORMAL
MCH RBC QN AUTO: 31.9 PG (ref 27–33)
MCHC RBC AUTO-ENTMCNC: 36.2 G/DL (ref 32.3–36.5)
MCV RBC AUTO: 88.2 FL (ref 81.4–97.8)
METAMYELOCYTES NFR BLD MANUAL: 0.9 %
MONOCYTES # BLD AUTO: 0.52 K/UL (ref 0–0.85)
MONOCYTES NFR BLD AUTO: 10.3 % (ref 0–13.4)
MORPHOLOGY BLD-IMP: NORMAL
MYELOCYTES NFR BLD MANUAL: 1.7 %
NEUTROPHILS # BLD AUTO: 3.97 K/UL (ref 1.82–7.42)
NEUTROPHILS NFR BLD: 79.3 % (ref 44–72)
NRBC # BLD AUTO: 0 K/UL
NRBC BLD-RTO: 0 /100 WBC (ref 0–0.2)
OVALOCYTES BLD QL SMEAR: NORMAL
PHOSPHATE SERPL-MCNC: 2.5 MG/DL (ref 2.5–4.5)
PLATELET # BLD AUTO: 77 K/UL (ref 164–446)
PLATELET BLD QL SMEAR: NORMAL
PLATELETS.RETICULATED NFR BLD AUTO: 3.4 % (ref 0.6–13.1)
PMV BLD AUTO: 10.3 FL (ref 9–12.9)
POIKILOCYTOSIS BLD QL SMEAR: NORMAL
POTASSIUM SERPL-SCNC: 3.2 MMOL/L (ref 3.6–5.5)
RBC # BLD AUTO: 2.63 M/UL (ref 4.7–6.1)
RBC BLD AUTO: PRESENT
SODIUM SERPL-SCNC: 131 MMOL/L (ref 135–145)
WBC # BLD AUTO: 5 K/UL (ref 4.8–10.8)

## 2023-11-22 PROCEDURE — RXMED WILLOW AMBULATORY MEDICATION CHARGE: Performed by: HOSPITALIST

## 2023-11-22 PROCEDURE — A9270 NON-COVERED ITEM OR SERVICE: HCPCS | Performed by: STUDENT IN AN ORGANIZED HEALTH CARE EDUCATION/TRAINING PROGRAM

## 2023-11-22 PROCEDURE — 700105 HCHG RX REV CODE 258: Performed by: HOSPITALIST

## 2023-11-22 PROCEDURE — 700102 HCHG RX REV CODE 250 W/ 637 OVERRIDE(OP): Performed by: HOSPITALIST

## 2023-11-22 PROCEDURE — A9270 NON-COVERED ITEM OR SERVICE: HCPCS | Mod: JZ | Performed by: HOSPITALIST

## 2023-11-22 PROCEDURE — 700111 HCHG RX REV CODE 636 W/ 250 OVERRIDE (IP): Mod: JZ | Performed by: HOSPITALIST

## 2023-11-22 PROCEDURE — 770001 HCHG ROOM/CARE - MED/SURG/GYN PRIV*

## 2023-11-22 PROCEDURE — 99233 SBSQ HOSP IP/OBS HIGH 50: CPT | Performed by: HOSPITALIST

## 2023-11-22 PROCEDURE — 700102 HCHG RX REV CODE 250 W/ 637 OVERRIDE(OP): Performed by: STUDENT IN AN ORGANIZED HEALTH CARE EDUCATION/TRAINING PROGRAM

## 2023-11-22 PROCEDURE — 85027 COMPLETE CBC AUTOMATED: CPT

## 2023-11-22 PROCEDURE — 84100 ASSAY OF PHOSPHORUS: CPT

## 2023-11-22 PROCEDURE — 80048 BASIC METABOLIC PNL TOTAL CA: CPT

## 2023-11-22 PROCEDURE — 700111 HCHG RX REV CODE 636 W/ 250 OVERRIDE (IP): Performed by: STUDENT IN AN ORGANIZED HEALTH CARE EDUCATION/TRAINING PROGRAM

## 2023-11-22 PROCEDURE — 700102 HCHG RX REV CODE 250 W/ 637 OVERRIDE(OP): Mod: JZ | Performed by: HOSPITALIST

## 2023-11-22 PROCEDURE — 700111 HCHG RX REV CODE 636 W/ 250 OVERRIDE (IP): Performed by: HOSPITALIST

## 2023-11-22 PROCEDURE — A9270 NON-COVERED ITEM OR SERVICE: HCPCS

## 2023-11-22 PROCEDURE — 700102 HCHG RX REV CODE 250 W/ 637 OVERRIDE(OP)

## 2023-11-22 PROCEDURE — 83735 ASSAY OF MAGNESIUM: CPT

## 2023-11-22 PROCEDURE — 85055 RETICULATED PLATELET ASSAY: CPT

## 2023-11-22 PROCEDURE — 85007 BL SMEAR W/DIFF WBC COUNT: CPT

## 2023-11-22 PROCEDURE — A9270 NON-COVERED ITEM OR SERVICE: HCPCS | Performed by: HOSPITALIST

## 2023-11-22 RX ORDER — DEXAMETHASONE SODIUM PHOSPHATE 4 MG/ML
6 INJECTION, SOLUTION INTRA-ARTICULAR; INTRALESIONAL; INTRAMUSCULAR; INTRAVENOUS; SOFT TISSUE DAILY
Status: DISCONTINUED | OUTPATIENT
Start: 2023-11-22 | End: 2023-11-23 | Stop reason: HOSPADM

## 2023-11-22 RX ORDER — DEXAMETHASONE 6 MG/1
6 TABLET ORAL DAILY
Qty: 5 TABLET | Refills: 0 | Status: SHIPPED | OUTPATIENT
Start: 2023-11-23 | End: 2023-11-28

## 2023-11-22 RX ORDER — POTASSIUM CHLORIDE 20 MEQ/1
40 TABLET, EXTENDED RELEASE ORAL ONCE
Status: COMPLETED | OUTPATIENT
Start: 2023-11-22 | End: 2023-11-22

## 2023-11-22 RX ORDER — LEVOFLOXACIN 500 MG/1
500 TABLET, FILM COATED ORAL DAILY
Qty: 3 TABLET | Refills: 0 | Status: SHIPPED | OUTPATIENT
Start: 2023-11-22 | End: 2023-11-22 | Stop reason: SDUPTHER

## 2023-11-22 RX ORDER — GAUZE BANDAGE 2" X 2"
100 BANDAGE TOPICAL DAILY
Status: DISCONTINUED | OUTPATIENT
Start: 2023-11-22 | End: 2023-11-23 | Stop reason: HOSPADM

## 2023-11-22 RX ORDER — FLUTICASONE PROPIONATE AND SALMETEROL 250; 50 UG/1; UG/1
1 POWDER RESPIRATORY (INHALATION) 2 TIMES DAILY
Qty: 60 EACH | Refills: 0 | Status: SHIPPED | OUTPATIENT
Start: 2023-11-22 | End: 2023-11-23

## 2023-11-22 RX ORDER — BENZONATATE 100 MG/1
100 CAPSULE ORAL 3 TIMES DAILY PRN
Qty: 15 CAPSULE | Refills: 0 | Status: SHIPPED | OUTPATIENT
Start: 2023-11-22 | End: 2023-11-28

## 2023-11-22 RX ORDER — GUAIFENESIN 600 MG/1
600 TABLET, EXTENDED RELEASE ORAL EVERY 12 HOURS
Status: DISCONTINUED | OUTPATIENT
Start: 2023-11-22 | End: 2023-11-23 | Stop reason: HOSPADM

## 2023-11-22 RX ORDER — LEVOFLOXACIN 750 MG/1
750 TABLET, FILM COATED ORAL DAILY
Qty: 3 TABLET | Refills: 0 | Status: ACTIVE | OUTPATIENT
Start: 2023-11-22 | End: 2023-11-23 | Stop reason: SDUPTHER

## 2023-11-22 RX ORDER — ALBUTEROL SULFATE 90 UG/1
2 AEROSOL, METERED RESPIRATORY (INHALATION) EVERY 4 HOURS PRN
Qty: 18 G | Refills: 0 | Status: ON HOLD | OUTPATIENT
Start: 2023-11-22 | End: 2023-12-23

## 2023-11-22 RX ORDER — MAGNESIUM SULFATE HEPTAHYDRATE 40 MG/ML
4 INJECTION, SOLUTION INTRAVENOUS ONCE
Status: COMPLETED | OUTPATIENT
Start: 2023-11-22 | End: 2023-11-22

## 2023-11-22 RX ORDER — LANOLIN ALCOHOL/MO/W.PET/CERES
100 CREAM (GRAM) TOPICAL DAILY
Qty: 5 TABLET | Refills: 0 | Status: SHIPPED | OUTPATIENT
Start: 2023-11-23 | End: 2023-11-28

## 2023-11-22 RX ORDER — DEXTROMETHORPHAN POLISTIREX 30 MG/5ML
60 SUSPENSION ORAL 2 TIMES DAILY PRN
Status: DISCONTINUED | OUTPATIENT
Start: 2023-11-22 | End: 2023-11-23 | Stop reason: HOSPADM

## 2023-11-22 RX ADMIN — CEFEPIME 2 G: 2 INJECTION, POWDER, FOR SOLUTION INTRAVENOUS at 05:52

## 2023-11-22 RX ADMIN — CETIRIZINE HYDROCHLORIDE 10 MG: 10 TABLET, FILM COATED ORAL at 05:55

## 2023-11-22 RX ADMIN — OMEPRAZOLE 20 MG: 20 CAPSULE, DELAYED RELEASE ORAL at 05:55

## 2023-11-22 RX ADMIN — ONDANSETRON 4 MG: 4 TABLET, ORALLY DISINTEGRATING ORAL at 11:14

## 2023-11-22 RX ADMIN — ONDANSETRON 4 MG: 4 TABLET, ORALLY DISINTEGRATING ORAL at 05:56

## 2023-11-22 RX ADMIN — CYANOCOBALAMIN TAB 500 MCG 1000 MCG: 500 TAB at 05:55

## 2023-11-22 RX ADMIN — MAGNESIUM SULFATE HEPTAHYDRATE 4 G: 40 INJECTION, SOLUTION INTRAVENOUS at 08:47

## 2023-11-22 RX ADMIN — Medication 1000 UNITS: at 05:55

## 2023-11-22 RX ADMIN — DOXYCYCLINE 100 MG: 100 TABLET, FILM COATED ORAL at 05:55

## 2023-11-22 RX ADMIN — MOMETASONE FUROATE AND FORMOTEROL FUMARATE DIHYDRATE 2 PUFF: 200; 5 AEROSOL RESPIRATORY (INHALATION) at 22:14

## 2023-11-22 RX ADMIN — POTASSIUM CHLORIDE 40 MEQ: 1500 TABLET, EXTENDED RELEASE ORAL at 08:44

## 2023-11-22 RX ADMIN — MOMETASONE FUROATE AND FORMOTEROL FUMARATE DIHYDRATE 2 PUFF: 200; 5 AEROSOL RESPIRATORY (INHALATION) at 05:56

## 2023-11-22 RX ADMIN — GABAPENTIN 300 MG: 300 CAPSULE ORAL at 11:14

## 2023-11-22 RX ADMIN — GABAPENTIN 300 MG: 300 CAPSULE ORAL at 17:10

## 2023-11-22 RX ADMIN — CEFEPIME 2 G: 2 INJECTION, POWDER, FOR SOLUTION INTRAVENOUS at 14:29

## 2023-11-22 RX ADMIN — DOXYCYCLINE 100 MG: 100 TABLET, FILM COATED ORAL at 17:10

## 2023-11-22 RX ADMIN — GUAIFENESIN 600 MG: 600 TABLET, EXTENDED RELEASE ORAL at 22:13

## 2023-11-22 RX ADMIN — ONDANSETRON 4 MG: 4 TABLET, ORALLY DISINTEGRATING ORAL at 17:10

## 2023-11-22 RX ADMIN — CEFEPIME 2 G: 2 INJECTION, POWDER, FOR SOLUTION INTRAVENOUS at 22:19

## 2023-11-22 RX ADMIN — DEXAMETHASONE SODIUM PHOSPHATE 6 MG: 4 INJECTION INTRA-ARTICULAR; INTRALESIONAL; INTRAMUSCULAR; INTRAVENOUS; SOFT TISSUE at 11:14

## 2023-11-22 RX ADMIN — DEXTROMETHORPHAN POLISTIREX 60 MG: 30 SUSPENSION ORAL at 22:13

## 2023-11-22 RX ADMIN — ENOXAPARIN SODIUM 40 MG: 100 INJECTION SUBCUTANEOUS at 17:11

## 2023-11-22 RX ADMIN — GABAPENTIN 300 MG: 300 CAPSULE ORAL at 05:55

## 2023-11-22 RX ADMIN — Medication 100 MG: at 08:44

## 2023-11-22 RX ADMIN — ASPIRIN 81 MG: 81 TABLET, COATED ORAL at 05:55

## 2023-11-22 RX ADMIN — OMEPRAZOLE 20 MG: 20 CAPSULE, DELAYED RELEASE ORAL at 17:10

## 2023-11-22 RX ADMIN — POTASSIUM CHLORIDE 40 MEQ: 1500 TABLET, EXTENDED RELEASE ORAL at 11:14

## 2023-11-22 ASSESSMENT — ENCOUNTER SYMPTOMS
WEAKNESS: 1
HEMOPTYSIS: 0
DIARRHEA: 0
MYALGIAS: 0
NERVOUS/ANXIOUS: 0
SHORTNESS OF BREATH: 1
CHILLS: 0
HEADACHES: 0
NAUSEA: 1
FLANK PAIN: 0
CONSTIPATION: 0
COUGH: 1
DEPRESSION: 1
FEVER: 0
HEARTBURN: 1

## 2023-11-22 ASSESSMENT — PAIN DESCRIPTION - PAIN TYPE: TYPE: ACUTE PAIN

## 2023-11-22 NOTE — CARE PLAN
The patient is Stable - Low risk of patient condition declining or worsening    Shift Goals  Clinical Goals: o2sat and respiratory monitoring  Patient Goals: rest  Family Goals: Not present    Progress made toward(s) clinical / shift goals:        Problem: Respiratory  Goal: Patient will achieve/maintain optimum respiratory ventilation and gas exchange  Description: Target End Date:  Prior to discharge or change in level of care    Document on Assessment flowsheet    1.  Assess and monitor rate, rhythm, depth and effort of respiration  2.  Breath sounds assessed qshift and/or as needed  3.  Assess O2 saturation, administer/titrate oxygen as ordered  4.  Position patient for maximum ventilatory efficiency  5.  Turn, cough, and deep breath with splinting to improve effectiveness  6.  Encourage use of incentive spirometer and encourage patient to cough       Outcome: Progressing       Patient is not progressing towards the following goals:

## 2023-11-22 NOTE — RADIATION COMPLETION NOTES
END OF TREATMENT SUMMARY    Patient name:  Bart Ramsey    Primary Physician:  Pcp Pt States None MRN: [id number]  CSN: [id number]   Referring physician:  Gianfranco Echevarria, *  : 1950, 73 y.o.       TREATMENT SUMMARY:        Course First Treatment Date 10/09/2023    Course Last Treatment Date 2023   Course Elapsed Days 43 @ [id number]   Course Intent Curative     Radiation Therapy Episodes       Active Episodes       Radiation Therapy: IMRT (2023)                   Radiation Treatments         Plan Last Treated On Elapsed Days Fractions Treated Prescribed Fraction Dose (cGy) Prescribed Total Dose (cGy)    L_Lung 2023 43 @ [id number] 29 of 29 200 6,600    L_Lun 2023 43 @ [id number] 1 of 1 200 200                  Reference Point Last Treated On Elapsed Days Most Recent Session Dose (cGy) Total Dose (cGy)    L_Lung 2023 43 @ [id number] -- 6,000                                     STAGE:   SCLC (small cell lung carcinoma) (HCC)  Staging form: Lung, AJCC 8th Edition  - Clinical stage from 2023: Stage IIIA (cT3, cN1, cM0) - Signed by Leonel Patel M.D. on 2023  Stage prefix: Initial diagnosis       TREATMENT INDICATION:   Curative intent definitive chemoradiation     CONCURRENT SYSTEMIC TREATMENT:   Cisplatin etoposide     RT COURSE DISCONTINUED EARLY:   No     PATIENT EXPERIENCE:       2023    11:46 AM 2023    11:23 AM 10/25/2023    11:34 AM 10/18/2023    11:12 AM 10/11/2023    11:27 AM   Toxicity Assessment   Toxicity Assessment Chest Chest Chest Chest Chest   Fatigue (lethargy, malaise, asthenia) Moderate (e.g., decrease in performance status by 1 ECOG level or 20% Karnofsky) or causing difficulty performing some activities None Increased fatigue over baseline, but not altering normal activities Increased fatigue over baseline, but not altering normal activities Increased fatigue over baseline, but not altering normal activities    Radiation Dermatitis Faint erythema or dry desquamation None None None None   Anorexia Oral intake significantly decreased Loss of appetite None Loss of appetite Loss of appetite   Dyspepsia/heartburn None Severe None None None   Dysphagia, Esophagitis, odynophagoa (painful swallowing) Mild dysphagia, but can eat regular diet Mild dysphagia, but can eat regular diet None None None   RT Dysphagia-Esophageal Mild dysphagia, but can eat regular diet None None None None   Cough Absent Absent Absent Absent Absent   Dyspnea Dyspnea on exertion Dyspnea on exertion Dyspnea on exertion Dyspnea on exertion Normal        FOLLOW-UP PLAN:   6 Weeks     COMMENT:          ANATOMIC TARGET SUMMARY    ANATOMIC TARGET MODALITY TECHNIQUE   Left lung primary, hilum   External beam, photons IMRT            COMMENT: Patient unfortunately did treatment break for significant COVID-19 infection, but was able to complete eventual treatment plan as prescribed         DIAGRAMS:      DOSE VOLUME HISTOGRAMS:

## 2023-11-22 NOTE — ADDENDUM NOTE
Encounter addended by: Marcia Macdonald, Med Ass't on: 11/22/2023 9:32 AM   Actions taken: Pend clinical note

## 2023-11-22 NOTE — CARE PLAN
The patient is Watcher - Medium risk of patient condition declining or worsening    Shift Goals  Clinical Goals: encorage pt to use IS x10 per hour, O2 sat >90%  Patient Goals: rest  Family Goals: Not present    Progress made toward(s) clinical / shift goals:    Problem: Knowledge Deficit - Standard  Goal: Patient and family/care givers will demonstrate understanding of plan of care, disease process/condition, diagnostic tests and medications  Outcome: Progressing   POC discussed-pt verbalized understanding.     Problem: Respiratory  Goal: Patient will achieve/maintain optimum respiratory ventilation and gas exchange  Outcome: Progressing   O2 sat >90% on 1L [place]. Encouraged pt to use IS x10 per hour.    Patient is not progressing towards the following goals:

## 2023-11-22 NOTE — FACE TO FACE
"Face to Face Note  -  Durable Medical Equipment    Ling Prasad M.D. - NPI: [id number]  I certify that this patient is under my care and that they had a durable medical equipment(DME)face to face encounter by myself that meets the physician DME face-to-face encounter requirements with this patient on:    Date of encounter:   Patient:                    MRN:                       YOB: 2023  Bart [facility]  [id number]  1950     The encounter with the patient was in whole, or in part, for the following medical condition, which is the primary reason for durable medical equipment:  Other - lung cancer    I certify that, based on my findings, the following durable medical equipment is medically necessary:    Oxygen   HOME O2 Saturation Measurements:(Values must be present for Home Oxygen orders)  Room air sat at rest: 94  Room air sat with amb: 93  With liters of O2: 1, O2 sat at rest with O2: 95  With Liters of O2: 1, O2 sat with amb with O2 : 91  Is the patient mobile?: Yes  If patient feels more short of breath, they can go up to 6 liters per minute and contact healthcare provider.    Supporting Symptoms: The patient requires supplemental oxygen, as the following interventions have been tried with limited or no improvement: \"Bronchodilators and/or steroid inhalers.    My Clinical findings support the need for the above equipment due to:  Hypoxia  "

## 2023-11-22 NOTE — PROGRESS NOTES
Hospital Medicine Daily Progress Note    Date of Service  11/22/2023    Chief Complaint  Bart Ramsey is a 73 y.o. male with SCLC IIIA on cis/etoposide/HLX study and XRT, COPD not on oxygen, T2DM, and recent COVID ifnection admitted 11/19/2023 with LLL pneumonia.    Hospital Course  This is 73-year-old male with a past medical history significant for CAD, small lung cell cancer III A treated with chemotherapy, immunotherapy and radiation therapy presented to ER with a complaint of fever, productive cough and nausea for the past 1 week.    Patient was diagnosed with COVID-19 on 11/13, last chemotherapy on 11/8, last radiation on 11/21.  CT scan showed lower lobe infiltrate.    During the stay in the hospital, patient continued receive treatment for community-acquired pneumonia with cefepime; he continued to feel better.    He WBC count continues to improve.  Of note patient was intermittently requiring oxygen of 1 L, discussed with Dr. Fuller of oncology who agreed to start steroid for COVID-19.  Home O2 evaluation has been obtained and oxygen has been ordered.      Interval Problem Update  No acute events overnight, patient continued to remain afebrile WBC count has improved to 3.8  -- potassium low, will replete  -- Mag low at 1.5 , 2gmof IV magnesium  --Replete phosphorus  Patient stated pain secondary to radiation esophagitis  Does follow Dr. Fuller of oncology as an outpatient.  Patient underwent radiation treatment today.  Continue IV antibiotics for community-acquired pneumonia    11/22:  -- No acute events overnight, vital sign has been stable, patient reports worsening of shortness of breath, currently requiring 1 L of oxygen.  --Home O2 evaluation has been obtained and oxygen has been ordered  --WBC count has improved to 5, platelets improving to 77.  --Of note patient potassium is noted to be 3.2, will provide 40 mg of potassium x2, magnesium is low at 1.5, will provide 4 g of IV magnesium.    I  have discussed this patient's plan of care and discharge plan at IDT rounds today with Case Management, Nursing, Nursing leadership, and other members of the IDT team.    Consultants/Specialty  None    Code Status  Full Code    Disposition  The patient is not medically cleared for discharge to home or a post-acute facility.  Anticipate discharge to: home with close outpatient follow-up    I have placed the appropriate orders for post-discharge needs.    Review of Systems  Review of Systems   Constitutional:  Negative for chills and fever.   Respiratory:  Positive for cough and shortness of breath. Negative for hemoptysis.    Cardiovascular:  Negative for leg swelling.   Gastrointestinal:  Positive for heartburn and nausea. Negative for constipation and diarrhea.   Genitourinary:  Negative for dysuria, flank pain and hematuria.   Musculoskeletal:  Negative for joint pain and myalgias.   Neurological:  Positive for weakness. Negative for headaches.   Psychiatric/Behavioral:  Positive for depression. The patient is not nervous/anxious.         Physical Exam  Temp:  [36.3 °C (97.3 °F)-37.5 °C (99.5 °F)] 36.5 °C (97.7 °F)  Pulse:  [67-86] 81  Resp:  [17-18] 18  BP: ()/(46-70) 124/66  SpO2:  [88 %-94 %] 94 %    Physical Exam  Vitals and nursing note reviewed. Exam conducted with a chaperone present.   Constitutional:       Appearance: He is ill-appearing (Acutely). He is not diaphoretic.      Interventions: Nasal cannula in place.   HENT:      Head: Normocephalic.      Mouth/Throat:      Mouth: Mucous membranes are dry.   Eyes:      General: No scleral icterus.     Extraocular Movements: Extraocular movements intact.      Pupils: Pupils are equal, round, and reactive to light.   Cardiovascular:      Rate and Rhythm: Normal rate and regular rhythm.      Pulses: Normal pulses.   Pulmonary:      Effort: Pulmonary effort is normal.   Abdominal:      General: Bowel sounds are normal. There is no distension.       Palpations: Abdomen is soft.      Tenderness: There is no abdominal tenderness. There is no guarding or rebound.   Genitourinary:     Comments: No stern  Musculoskeletal:      Cervical back: Neck supple.      Right lower leg: No edema.      Left lower leg: No edema.   Skin:     General: Skin is warm and dry.      Coloration: Skin is pale.   Neurological:      Mental Status: He is alert.      Comments: Appropriately conversant. Moves all extremities.   Psychiatric:         Attention and Perception: Attention and perception normal.         Mood and Affect: Mood is depressed. Affect is blunt.         Speech: Speech normal.         Behavior: Behavior is slowed. Behavior is cooperative.         Thought Content: Thought content normal.         Cognition and Memory: Cognition and memory normal.         Judgment: Judgment normal.         Fluids    Intake/Output Summary (Last 24 hours) at 11/22/2023 1455  Last data filed at 11/22/2023 1247  Gross per 24 hour   Intake 910 ml   Output 675 ml   Net 235 ml         Laboratory  Recent Labs     11/20/23  0335 11/21/23  0428 11/22/23  0502   WBC 0.9* 3.8* 5.0   RBC 2.46* 2.67* 2.63*   HEMOGLOBIN 7.9* 8.5* 8.4*   HEMATOCRIT 21.5* 23.5* 23.2*   MCV 87.4 88.0 88.2   MCH 32.1 31.8 31.9   MCHC 36.7* 36.2 36.2   RDW 45.1 44.2 45.6   PLATELETCT 36* 58* 77*   MPV 10.7 10.8 10.3       Recent Labs     11/20/23  0335 11/21/23  0428 11/22/23  0502   SODIUM 135 137 131*   POTASSIUM 3.5* 3.0* 3.2*   CHLORIDE 100 101 97   CO2 24 25 26   GLUCOSE 113* 108* 130*   BUN 26* 23* 18   CREATININE 0.77 0.92 1.00   CALCIUM 8.3* 8.3* 7.8*                     Imaging  CT-CTA CHEST PULMONARY ARTERY W/ RECONS   Final Result      1.  Negative for pulmonary embolism      2.  Small superior segment left lower lobe airspace process consistent with pneumonia      3.  Multiple bilateral subpleural bleb      4.  Central venous catheter appears appropriately located      5.  Prior cholecystectomy      6.  Splenic  granuloma         DX-CHEST-PORTABLE (1 VIEW)   Final Result      No acute cardiopulmonary abnormality.           Assessment/Plan  * Community acquired pneumonia of left lower lobe of lung- (present on admission)  Assessment & Plan  Presented with subjective fevers and cough  CTA chest demonstrates LLL infiltrate consistent with PNA  Procalcitonin negative, likely viral (COVID) but due to risk will empirically cover for CAP  --Treated with cefepime and doxycycline due to neutropenia      COPD without exacerbation (HCC)- (present on admission)  Assessment & Plan  Sputum and dyspnea more explained by PNA  Continue ICS/LABA BID and duonebs PRN  RT consult    Hypokalemia due to inadequate potassium intake- (present on admission)  Assessment & Plan  Replete as needed    Radiation-induced esophagitis- (present on admission)  Assessment & Plan  Continue sucralfate per Radiation Oncology  PPI BID initiated  Scheduled and PRN antiemetcs    COVID-19 virus infection- (present on admission)  Assessment & Plan  He was first diagnosed with COVID on 11/13  Not hypoxic, dexamethasone not indicated  CTA negative for PE  VTE ppx contraindicated while Plt <50  Enhanced droplet precautions      Pancytopenia due to antineoplastic chemotherapy (HCC)- (present on admission)  Assessment & Plan  Due to cisplatin/etoposide and radiation  Neutropenia without fever  Further GCSF contraindicated in SCLC due to association with increased mortality  Repeat AM CBC with Diff  Transfuse for Hgb <7 or Plt <10    Wbc improving at 5    Nausea and vomiting- (present on admission)  Assessment & Plan  Due to antineoplastic therapy and radiation esophagitis   Prn zofran/compazine    SCLC (small cell lung carcinoma) (HCC)- (present on admission)  Assessment & Plan  Follows with [facility] Oncologist Dr. Fuller and Radiation Oncologist   S/p C3 11/8 with cisplatin and etoposide with HLX study drug  Continue XRT while hospitalized  Continue supportive  care with analgesics, antiemetics, antitussives    Coronary artery disease involving native coronary artery of native heart without angina pectoris- (present on admission)  Assessment & Plan  Denies any chest pain  Continue aspirin    Type 2 diabetes mellitus with diabetic polyneuropathy, without long-term current use of insulin (HCC)- (present on admission)  Assessment & Plan  A1c 6.3 August 2023  No indication for strict BG control - POCT / SSI deferred  Continue gabapentin           VTE prophylaxis: lovenox  I have performed a physical exam and reviewed and updated ROS and Plan today (11/22/2023). In review of yesterday's note (11/21/2023), there are no changes except as documented above.

## 2023-11-22 NOTE — FACE TO FACE
"Face to Face Note  -  Durable Medical Equipment    Ling Prasad M.D. - NPI: [id number]  I certify that this patient is under my care and that they had a durable medical equipment(DME)face to face encounter by myself that meets the physician DME face-to-face encounter requirements with this patient on:    Date of encounter:   Patient:                    MRN:                       YOB: 2023  Bart [facility]  [id number]  1950     The encounter with the patient was in whole, or in part, for the following medical condition, which is the primary reason for durable medical equipment:  Other - lung cancer     I certify that, based on my findings, the following durable medical equipment is medically necessary:    Oxygen   HOME O2 Saturation Measurements:(Values must be present for Home Oxygen orders)  Room air sat at rest: 92  Room air sat with amb: 86  With liters of O2: 1, O2 sat at rest with O2: 96  With Liters of O2: 1, O2 sat with amb with O2 : 92  Is the patient mobile?: Yes  If patient feels more short of breath, they can go up to 6 liters per minute and contact healthcare provider.    Supporting Symptoms: The patient requires supplemental oxygen, as the following interventions have been tried with limited or no improvement: \"Bronchodilators and/or steroid inhalers.    My Clinical findings support the need for the above equipment due to:  Hypoxia  "

## 2023-11-23 ENCOUNTER — PHARMACY VISIT (OUTPATIENT)
Dept: PHARMACY | Facility: MEDICAL CENTER | Age: 73
End: 2023-11-23
Payer: COMMERCIAL

## 2023-11-23 VITALS
BODY MASS INDEX: 24.82 KG/M2 | HEIGHT: 69 IN | TEMPERATURE: 97.7 F | RESPIRATION RATE: 16 BRPM | WEIGHT: 167.55 LBS | SYSTOLIC BLOOD PRESSURE: 142 MMHG | OXYGEN SATURATION: 95 % | DIASTOLIC BLOOD PRESSURE: 72 MMHG | HEART RATE: 55 BPM

## 2023-11-23 LAB
ALBUMIN SERPL BCP-MCNC: 2.9 G/DL (ref 3.2–4.9)
ANISOCYTOSIS BLD QL SMEAR: ABNORMAL
BASOPHILS # BLD AUTO: 0 % (ref 0–1.8)
BASOPHILS # BLD: 0 K/UL (ref 0–0.12)
BUN SERPL-MCNC: 19 MG/DL (ref 8–22)
BURR CELLS BLD QL SMEAR: NORMAL
CALCIUM ALBUM COR SERPL-MCNC: 9.1 MG/DL (ref 8.5–10.5)
CALCIUM SERPL-MCNC: 8.2 MG/DL (ref 8.5–10.5)
CHLORIDE SERPL-SCNC: 100 MMOL/L (ref 96–112)
CO2 SERPL-SCNC: 25 MMOL/L (ref 20–33)
CREAT SERPL-MCNC: 0.87 MG/DL (ref 0.5–1.4)
EOSINOPHIL # BLD AUTO: 0 K/UL (ref 0–0.51)
EOSINOPHIL NFR BLD: 0 % (ref 0–6.9)
ERYTHROCYTE [DISTWIDTH] IN BLOOD BY AUTOMATED COUNT: 46.7 FL (ref 35.9–50)
GFR SERPLBLD CREATININE-BSD FMLA CKD-EPI: 91 ML/MIN/1.73 M 2
GLUCOSE SERPL-MCNC: 162 MG/DL (ref 65–99)
HCT VFR BLD AUTO: 24.6 % (ref 42–52)
HGB BLD-MCNC: 8.6 G/DL (ref 14–18)
LYMPHOCYTES # BLD AUTO: 0.24 K/UL (ref 1–4.8)
LYMPHOCYTES NFR BLD: 6.1 % (ref 22–41)
MAGNESIUM SERPL-MCNC: 1.8 MG/DL (ref 1.5–2.5)
MANUAL DIFF BLD: NORMAL
MCH RBC QN AUTO: 31.4 PG (ref 27–33)
MCHC RBC AUTO-ENTMCNC: 35 G/DL (ref 32.3–36.5)
MCV RBC AUTO: 89.8 FL (ref 81.4–97.8)
METAMYELOCYTES NFR BLD MANUAL: 0.9 %
MICROCYTES BLD QL SMEAR: ABNORMAL
MONOCYTES # BLD AUTO: 0.37 K/UL (ref 0–0.85)
MONOCYTES NFR BLD AUTO: 9.6 % (ref 0–13.4)
MORPHOLOGY BLD-IMP: NORMAL
MYELOCYTES NFR BLD MANUAL: 1.8 %
NEUTROPHILS # BLD AUTO: 3.15 K/UL (ref 1.82–7.42)
NEUTROPHILS NFR BLD: 79.8 % (ref 44–72)
NEUTS BAND NFR BLD MANUAL: 0.9 % (ref 0–10)
NRBC # BLD AUTO: 0 K/UL
NRBC BLD-RTO: 0 /100 WBC (ref 0–0.2)
OVALOCYTES BLD QL SMEAR: NORMAL
PHOSPHATE SERPL-MCNC: 1.9 MG/DL (ref 2.5–4.5)
PLATELET # BLD AUTO: 87 K/UL (ref 164–446)
PLATELET BLD QL SMEAR: NORMAL
PLATELETS.RETICULATED NFR BLD AUTO: 4.3 % (ref 0.6–13.1)
PMV BLD AUTO: 10.7 FL (ref 9–12.9)
POIKILOCYTOSIS BLD QL SMEAR: NORMAL
POTASSIUM SERPL-SCNC: 4.2 MMOL/L (ref 3.6–5.5)
PROMYELOCYTES NFR BLD MANUAL: 0.9 %
RBC # BLD AUTO: 2.74 M/UL (ref 4.7–6.1)
RBC BLD AUTO: PRESENT
SODIUM SERPL-SCNC: 133 MMOL/L (ref 135–145)
WBC # BLD AUTO: 3.9 K/UL (ref 4.8–10.8)

## 2023-11-23 PROCEDURE — 80069 RENAL FUNCTION PANEL: CPT

## 2023-11-23 PROCEDURE — 700102 HCHG RX REV CODE 250 W/ 637 OVERRIDE(OP): Performed by: STUDENT IN AN ORGANIZED HEALTH CARE EDUCATION/TRAINING PROGRAM

## 2023-11-23 PROCEDURE — 85007 BL SMEAR W/DIFF WBC COUNT: CPT

## 2023-11-23 PROCEDURE — 700102 HCHG RX REV CODE 250 W/ 637 OVERRIDE(OP): Performed by: HOSPITALIST

## 2023-11-23 PROCEDURE — 85055 RETICULATED PLATELET ASSAY: CPT

## 2023-11-23 PROCEDURE — A9270 NON-COVERED ITEM OR SERVICE: HCPCS | Performed by: STUDENT IN AN ORGANIZED HEALTH CARE EDUCATION/TRAINING PROGRAM

## 2023-11-23 PROCEDURE — 700111 HCHG RX REV CODE 636 W/ 250 OVERRIDE (IP): Performed by: HOSPITALIST

## 2023-11-23 PROCEDURE — 85027 COMPLETE CBC AUTOMATED: CPT

## 2023-11-23 PROCEDURE — A9270 NON-COVERED ITEM OR SERVICE: HCPCS | Performed by: HOSPITALIST

## 2023-11-23 PROCEDURE — 99239 HOSP IP/OBS DSCHRG MGMT >30: CPT | Performed by: HOSPITALIST

## 2023-11-23 PROCEDURE — 700111 HCHG RX REV CODE 636 W/ 250 OVERRIDE (IP): Mod: JZ | Performed by: HOSPITALIST

## 2023-11-23 PROCEDURE — 700111 HCHG RX REV CODE 636 W/ 250 OVERRIDE (IP): Performed by: STUDENT IN AN ORGANIZED HEALTH CARE EDUCATION/TRAINING PROGRAM

## 2023-11-23 PROCEDURE — 700102 HCHG RX REV CODE 250 W/ 637 OVERRIDE(OP)

## 2023-11-23 PROCEDURE — 83735 ASSAY OF MAGNESIUM: CPT

## 2023-11-23 PROCEDURE — 700105 HCHG RX REV CODE 258: Performed by: HOSPITALIST

## 2023-11-23 PROCEDURE — A9270 NON-COVERED ITEM OR SERVICE: HCPCS

## 2023-11-23 RX ORDER — GUAIFENESIN 600 MG/1
600 TABLET, EXTENDED RELEASE ORAL EVERY 12 HOURS
Qty: 28 TABLET | Refills: 0 | Status: SHIPPED | OUTPATIENT
Start: 2023-11-23 | End: 2024-02-25

## 2023-11-23 RX ORDER — LEVOFLOXACIN 750 MG/1
750 TABLET, FILM COATED ORAL DAILY
Qty: 1 TABLET | Refills: 0 | Status: ACTIVE | OUTPATIENT
Start: 2023-11-24 | End: 2023-11-25

## 2023-11-23 RX ORDER — LEVOFLOXACIN 750 MG/1
750 TABLET, FILM COATED ORAL DAILY
Status: COMPLETED | OUTPATIENT
Start: 2023-11-23 | End: 2023-11-23

## 2023-11-23 RX ADMIN — Medication 1000 UNITS: at 05:52

## 2023-11-23 RX ADMIN — LEVOFLOXACIN 750 MG: 750 TABLET, FILM COATED ORAL at 15:09

## 2023-11-23 RX ADMIN — DOXYCYCLINE 100 MG: 100 TABLET, FILM COATED ORAL at 05:52

## 2023-11-23 RX ADMIN — ONDANSETRON 4 MG: 4 TABLET, ORALLY DISINTEGRATING ORAL at 11:08

## 2023-11-23 RX ADMIN — DEXAMETHASONE SODIUM PHOSPHATE 6 MG: 4 INJECTION INTRA-ARTICULAR; INTRALESIONAL; INTRAMUSCULAR; INTRAVENOUS; SOFT TISSUE at 07:45

## 2023-11-23 RX ADMIN — MOMETASONE FUROATE AND FORMOTEROL FUMARATE DIHYDRATE 2 PUFF: 200; 5 AEROSOL RESPIRATORY (INHALATION) at 05:52

## 2023-11-23 RX ADMIN — CYANOCOBALAMIN TAB 500 MCG 1000 MCG: 500 TAB at 05:51

## 2023-11-23 RX ADMIN — ONDANSETRON 4 MG: 4 TABLET, ORALLY DISINTEGRATING ORAL at 05:52

## 2023-11-23 RX ADMIN — CETIRIZINE HYDROCHLORIDE 10 MG: 10 TABLET, FILM COATED ORAL at 05:52

## 2023-11-23 RX ADMIN — CEFEPIME 2 G: 2 INJECTION, POWDER, FOR SOLUTION INTRAVENOUS at 07:57

## 2023-11-23 RX ADMIN — DIBASIC SODIUM PHOSPHATE, MONOBASIC POTASSIUM PHOSPHATE AND MONOBASIC SODIUM PHOSPHATE 500 MG: 852; 155; 130 TABLET ORAL at 11:08

## 2023-11-23 RX ADMIN — ASPIRIN 81 MG: 81 TABLET, COATED ORAL at 05:52

## 2023-11-23 RX ADMIN — OMEPRAZOLE 20 MG: 20 CAPSULE, DELAYED RELEASE ORAL at 05:52

## 2023-11-23 RX ADMIN — Medication 100 MG: at 05:52

## 2023-11-23 RX ADMIN — GUAIFENESIN 600 MG: 600 TABLET, EXTENDED RELEASE ORAL at 05:52

## 2023-11-23 RX ADMIN — GABAPENTIN 300 MG: 300 CAPSULE ORAL at 05:51

## 2023-11-23 RX ADMIN — GABAPENTIN 300 MG: 300 CAPSULE ORAL at 11:08

## 2023-11-23 NOTE — DISCHARGE PLANNING
Case Management Discharge Planning    Admission Date: 11/19/2023  GMLOS: 5  ALOS: 4    6-Clicks ADL Score:    6-Clicks Mobility Score:        Anticipated Discharge Dispo: Discharge Disposition: Discharged to home/self care (01)  Discharge Address: [address] [place] [address] [place] [address]  Discharge Contact Phone Number: [phone]    DME Needed: Yes    DME Ordered: Yes    Action(s) Taken: Pt is medically cleared. Walking home O2 eval was completed yesterday at 1526. RNCM placed call to [employer] [phone] for oxygen delivery. They stated they will have the on call person call me in 20 minutes.     RNCM received call back stating they cannot accept orders today.    1120: [facility]CM [facility] a different [facility] # [phone] who stated the [facility] is closed today and they are not sure if they open tomorrow or on Monday.    1205: RNCM placed call to [employer] answering service who stated they will have the on call RT give me a call back    1324: Jovan from [employer] will deliver oxygen to bedside around 1430.     ASF $880 for tank, regulator and concentrator. Faxed to [employer]     Escalations Completed: None    Medically Clear: Yes    Next Steps: f/u with oxygen     Barriers to Discharge: Oxygen Delivery

## 2023-11-23 NOTE — DISCHARGE SUMMARY
Discharge Summary    CHIEF COMPLAINT ON ADMISSION  Chief Complaint   Patient presents with    Flu Like Symptoms     Pt diagnosed with cancer (small cell lung carcinoma) and currently receiving radiation, Monday with a +COVID test, increased lethargy, cough +phlegm    Dehydration     Not able to eat/drink, nausea       Reason for Admission  Flu-Like Symptoms     Admission Date  11/19/2023    CODE STATUS  Full Code    HPI & HOSPITAL COURSE  This is 73-year-old male with a past medical history significant for CAD, small lung cell cancer III A treated with chemotherapy, immunotherapy and radiation therapy presented to ER with a complaint of fever, productive cough and nausea for the past 1 week.    Patient was diagnosed with COVID-19 on 11/13, last chemotherapy on 11/8, last radiation on 11/21.  CT scan showed lower lobe infiltrate.    During the stay in the hospital, patient continued receive treatment for community-acquired pneumonia with cefepime and doxycycline; he continued to feel better.  At discharge, will continue levofloxacin    He WBC count continues to improve.  Of note patient was intermittently requiring oxygen of 1 L, discussed with Dr. Fuller of oncology who agreed to start steroid for COVID-19.  Home O2 evaluation was obtained and patient will be discharged home on oxygen.     Therefore, he is discharged in good and stable condition to home with close outpatient follow-up.    The patient met 2-midnight criteria for an inpatient stay at the time of discharge.    Discharge Date  11/23/2023    FOLLOW UP ITEMS POST DISCHARGE  Please follow with PCP as an outpatient.    Please follow-up with Dr. Fuller of oncology as an outpatient    DISCHARGE DIAGNOSES  Principal Problem:    Community acquired pneumonia of left lower lobe of lung (POA: Yes)  Active Problems:    Coronary artery disease involving native coronary artery of native heart without angina pectoris (POA: Yes)    SCLC (small cell lung carcinoma)  (HCC) (POA: Yes)    Nausea and vomiting (POA: Yes)    Pancytopenia due to antineoplastic chemotherapy (HCC) (POA: Yes)    COVID-19 virus infection (POA: Yes)    Radiation-induced esophagitis (POA: Yes)    Hypokalemia due to inadequate potassium intake (POA: Yes)    COPD without exacerbation (HCC) (POA: Yes)    Pneumonia of left lower lobe due to infectious organism (POA: Yes)    Type 2 diabetes mellitus with diabetic polyneuropathy, without long-term current use of insulin (HCC) (POA: Yes)  Resolved Problems:    * No resolved hospital problems. *      FOLLOW UP  Future Appointments   Date Time Provider Department Center   12/1/2023  2:30 PM [facility]ION QUICK INJECT ON[facility] [address]   12/4/2023  7:00 AM [facility] [address]   12/5/2023  3:30 PM [facility] [address]   12/6/2023  3:30 PM [facility] [address]   12/15/2023  8:00 AM Roseline Fuller M.D. [facility] None   12/22/2023  9:15 AM INFUSION QUICK INJECT ONP [address]   12/25/2023 10:00 AM [facility] [address]     No follow-up provider specified.    MEDICATIONS ON DISCHARGE     Medication List        START taking these medications        Instructions   albuterol 108 (90 Base) MCG/ACT Aers inhalation aerosol   Inhale 2 Puffs every four hours as needed for Shortness of Breath for up to 30 days.  Dose: 2 Puff     benzonatate 100 MG Caps  Commonly known as: Tessalon   Take 1 Capsule by mouth 3 times a day as needed for Cough for up to 5 days.  Dose: 100 mg     dexamethasone 6 MG Tabs  Commonly known as: Decadron   Take 1 Tablet every day for 5 days.  Dose: 6 mg     guaiFENesin  MG Tb12  Commonly known as: Mucinex   Take 1 Tablet by mouth every 12 hours.  Dose: 600 mg     levoFLOXacin 750 MG tablet  Start taking on: November 24, 2023  Commonly known as: Levaquin   Take 1 Tablet by mouth every day for 1 day.  Dose: 750 mg     thiamine 100 MG tablet  Commonly known as: Thiamine   Take 1 Tablet by mouth every day  for 5 days.  Dose: 100 mg            CONTINUE taking these medications        Instructions   aspirin EC 81 MG Tbec  Commonly known as: Ecotrin   Take 81 mg by mouth every day.  Dose: 81 mg     baclofen 10 MG Tabs  Commonly known as: Lioresal   Take 10 mg by mouth at bedtime as needed. Indications: Muscle Spasm  Dose: 10 mg     cetirizine 10 MG Tabs  Commonly known as: ZyrTEC   Take 10 mg by mouth every day.  Dose: 10 mg     fish oil 1000 MG Caps capsule   Take 1,000 mg by mouth every day.  Dose: 1,000 mg     fluticasone-salmeterol 250-50 MCG/ACT Aepb  Commonly known as: Advair   Inhale 1 Puff 2 times a day as needed. Indications: Asthma  Dose: 1 Puff     gabapentin 300 MG Caps  Commonly known as: Neurontin   Take 300-600 mg by mouth 3 times a day. * depending on pain level*  Dose: 300-600 mg     metFORMIN 500 MG Tabs  Commonly known as: Glucophage   Take 750 mg by mouth 2 times a day with meals. 500 mg x 1.5 tab = 750 mg  Dose: 750 mg     ondansetron 4 MG Tabs tablet  Commonly known as: Zofran   Take 1 Tablet by mouth every four hours as needed for Nausea/Vomiting (for nausea, vomiting).  Dose: 4 mg     prochlorperazine 10 MG Tabs  Commonly known as: Compazine   Take 1 Tablet by mouth every 6 hours as needed (for nausea, vomiting).  Dose: 10 mg     sucralfate 1 GM Tabs  Commonly known as: Carafate   Take 1 Tablet by mouth 4 Times a Day,Before Meals and at Bedtime. Dissolve tablet in 1 cup water.  Dose: 1 g     vitamin B-12 1000 MCG Tabs   Take 1,000 mcg by mouth every day.  Dose: 1,000 mcg     vitamin D3 1000 Unit (25 mcg) Tabs  Commonly known as: Cholecalciferol   Take 1,000 Units by mouth every day.  Dose: 1,000 Units              Allergies  No Known Allergies    DIET  Orders Placed This Encounter   Procedures    Diet Order Diet: Regular     Standing Status:   Standing     Number of Occurrences:   1     Order Specific Question:   Diet:     Answer:   Regular [1]       ACTIVITY  As tolerated.  Weight bearing as  tolerated    CONSULTATIONS  None     PROCEDURES  None     LABORATORY  Lab Results   Component Value Date    SODIUM 133 (L) 11/23/2023    POTASSIUM 4.2 11/23/2023    CHLORIDE 100 11/23/2023    CO2 25 11/23/2023    GLUCOSE 162 (H) 11/23/2023    BUN 19 11/23/2023    CREATININE 0.87 11/23/2023        Lab Results   Component Value Date    WBC 3.9 (L) 11/23/2023    HEMOGLOBIN 8.6 (L) 11/23/2023    HEMATOCRIT 24.6 (L) 11/23/2023    PLATELETCT 87 (L) 11/23/2023        Total time of the discharge process exceeds 37 minutes.

## 2023-11-23 NOTE — CARE PLAN
The patient is Stable - Low risk of patient condition declining or worsening    Shift Goals  Clinical Goals: o2 sat monitoring, cough management  Patient Goals: rest and comfort  Family Goals: Not present    Progress made toward(s) clinical / shift goals:        Problem: Respiratory  Goal: Patient will achieve/maintain optimum respiratory ventilation and gas exchange      Outcome: Progressing       Problem: Pain - Standard  Goal: Alleviation of pain or a reduction in pain to the patient’s comfort goal    Outcome: Progressing       Patient is not progressing towards the following goals:

## 2023-11-23 NOTE — DISCHARGE PLANNING
5358  Received Choice form at 5197  Agency/Facility Name: [facility]   Referral sent per Choice form @ 4019

## 2023-11-23 NOTE — RESPIRATORY CARE
"  COPD EDUCATION by COPD CLINICAL EDUCATOR  11/23/2023 at 2:45 PM by Qi Landis RRT     Patient revisit by COPD education team. Patient refused COPD program at this time. An Action Plan was completed in the EMR to reflect current Respiratory Medication use.     He has new Oxygen Orders for discharge as noted below.              COPD Screen  COPD Risk Screening  Do you have a history of COPD?: Yes  Do you have a Pulmonologist?: No  COPD Population Screener  During the past 4 weeks, how much did you feel short of breath?: Some of the time  Do you ever cough up any mucus or phlegm?: Yes, a few days a week or month  In the past 12 months, you do less than you used to because of your breathing problems: Strongly agree  Have you smoked at least 100 cigarettes in your entire life?: Yes  How old are you?: 60+  COPD Screening Score: 8  COPD Coordinator Recommended: Yes    COPD Assessment  COPD Clinical Specialists ONLY  COPD Education Initiated: Yes--Short Intervention (pt actively discharging has COPD and active chemo/Radiation Lung CA new O2 for home)  DME Company: [employer] Equipment Type: Oxygen  [facility]: [facility]  Pulmonologist Name: [facility]  Interdisciplinary Rounds: Attendance at Rounds (30 Min)     MY COPD ACTION PLAN     It is recommended that patients and physicians /healthcare providers complete this action plan together. This plan should be discussed at each physician visit and updated as needed.    The green, yellow and red zones show groups of symptoms of COPD. This list of symptoms is not comprehensive, and you may experience other symptoms. In the \"Actions\" column, your healthcare provider has recommended actions for you to take based on your symptoms.    Patient Name: Bart Ramsey   YOB: 1950   Last Updated on:     Green Zone:  I am doing well today Actions     Usual activitiy and exercise level   Take daily medications     Usual amounts of cough and " "phlegm/mucus   Use oxygen as prescribed     Sleep well at night   Continue regular exercise/diet plan     Appetite is good   At all times avoid cigarette smoke, inhaled irritants     Daily Medications (these medications are taken every day):    Advair  1 puff  Twice daily        Yellow Zone:  I am having a bad day or a COPD flare Actions     More breathless than usual   Continue daily medications     I have less energy for my daily activities   Use quick relief inhaler as ordered     Increased or thicker phlegm/mucus   Use oxygen as prescribed     Using quick relief inhaler/nebulizer more often   Get plenty of rest     Swelling of ankles more than usual   Use pursed lip breathing     More coughing than usual   At all times avoid cigarette smoke, inhaled irritants     I feel like I have a \"chest cold\"     Poor sleep and my symptoms woke me up     My appetite is not good     My medicine is not helping      Call provider immediately if symptoms don’t improve     Continue daily medications, add rescue medications:               Medications to be used during a flare up, (as Discussed with Provider):              Red Zone:  I need urgent medical care Actions     Severe shortness of breath even at rest   Call 911 or seek medical care immediately     Not able to do any activity because of breathing      Fever or shaking chills      Feeling confused or very drowsy       Chest pains      Coughing up blood                  "

## 2023-11-23 NOTE — PROGRESS NOTES
Patient on bed, aox4, denies numbness and tingling sensation. Complains of pain in the chest when coughing, still on O2 via NC @ 1lpm Saturating well, denies SOB but when coughing occurs he mention that when he felt having SOB. Inform Hospitalist on call about patient concerns, Responded will order medications, will carry out latest order. IS performed. Advise patient to call for assistance. Maintain on comfortable position. Refused bed alarm and SCD. Call light and belonging within reach. Hourly rounds in place.

## 2023-11-23 NOTE — PROGRESS NOTES
Patient is being discharged home from the discharge lounge. Patient is A&Ox4. IV removed on unit. Discharge instructions provided to patient and reviewed. Patient verbalized understanding and all questions and concerns were addressed. Patient escorted to vehicle by discharge lounge staff.

## 2023-11-28 ENCOUNTER — APPOINTMENT (OUTPATIENT)
Dept: ONCOLOGY | Facility: MEDICAL CENTER | Age: 73
End: 2023-11-28
Attending: STUDENT IN AN ORGANIZED HEALTH CARE EDUCATION/TRAINING PROGRAM
Payer: COMMERCIAL

## 2023-11-29 NOTE — PROGRESS NOTES
"Pharmacy Chemotherapy Verification:   Patient Name: Bart Ramsey  Dx: small cell lung cancer  Cycle 4 (delayed one week d/t illness/hospital admission)  Previous treatment: C3 10/6-11/8  Study Protocol: [id number]  Participant # [id number]    HLX10 or placebo 300 mg IV over 30-60 min on day 1  Cisplatin 75 mg/m2 IV over 60 min on day 1  Etoposide 100 mg/m2 IV over 1-2 hours on days 1-3  Every 21 days with concurrent radiation x4 cycles    followed by  HLX10 or placebo 300 mg IV over 30-60 min on day 1  Every 21 days until DP/UT  A Randomized, Double-Blind, International Multicenter, Phase III Study to Evaluate the Anti-Tumor Efficacy and Safety of HLX10 (Recombinant Humanized Anti-PD-1 Monoclonal Antibody Injection) or Placebo in Combination with Chemotherapy (Carboplatin/Cisplatin-Etoposide) and Concurrent Radiotherapy in Patients with Limited-Stage Small Cell Lung Cancer (LS-SCLC). [id number]  Allergies: Patient has no known allergies.       /64   Pulse 96   Temp 36.7 °C (98 °F) (Temporal)   Resp 18   Ht 1.72 m (5' 7.72\")   Wt 77 kg (169 lb 12.1 oz)   SpO2 93%   BMI 26.03 kg/m²      Body surface area is 1.92 meters squared.    Labs 12/1/23:  ANC = 6060  AST/ALT/ALP = 15/11/61 Tbili = 0.4    Labs 12/4/23:  Plt = 226k   Hgb = 7.8*    SCr = 1.19 mg/dL CrCl ~ 59 mL/min   K = 4.1  Mag = 1.2 - replaced per protocol  *Ok to proceed with treatment today per APRN  HLX10 or placebo 300 mg fixed dose = 300 mg IV   Fixed dose, no calculation required = 300 mg IV    Cisplatin 75 mg/m² x 1.92 m² = 144 mg   <10% difference, ok to treat with final dose = 144 mg IV    Etoposide 100 mg/m² x 1.92 m² = 192 mg   <10% difference, ok to treat with final dose = 192 mg IV    Mainor Yap, PharmD  "

## 2023-12-01 ENCOUNTER — OUTPATIENT INFUSION SERVICES (OUTPATIENT)
Dept: ONCOLOGY | Facility: MEDICAL CENTER | Age: 73
End: 2023-12-01
Attending: STUDENT IN AN ORGANIZED HEALTH CARE EDUCATION/TRAINING PROGRAM
Payer: COMMERCIAL

## 2023-12-01 ENCOUNTER — HOSPITAL ENCOUNTER (OUTPATIENT)
Dept: HEMATOLOGY ONCOLOGY | Facility: MEDICAL CENTER | Age: 73
End: 2023-12-01
Attending: STUDENT IN AN ORGANIZED HEALTH CARE EDUCATION/TRAINING PROGRAM
Payer: COMMERCIAL

## 2023-12-01 VITALS
HEIGHT: 68 IN | SYSTOLIC BLOOD PRESSURE: 103 MMHG | OXYGEN SATURATION: 96 % | TEMPERATURE: 96.9 F | HEART RATE: 84 BPM | WEIGHT: 170.64 LBS | DIASTOLIC BLOOD PRESSURE: 54 MMHG | BODY MASS INDEX: 25.86 KG/M2 | RESPIRATION RATE: 16 BRPM

## 2023-12-01 VITALS
SYSTOLIC BLOOD PRESSURE: 102 MMHG | HEART RATE: 95 BPM | WEIGHT: 171.08 LBS | HEIGHT: 69 IN | DIASTOLIC BLOOD PRESSURE: 58 MMHG | TEMPERATURE: 98.6 F | BODY MASS INDEX: 25.34 KG/M2 | OXYGEN SATURATION: 92 %

## 2023-12-01 DIAGNOSIS — C34.90 SCLC (SMALL CELL LUNG CARCINOMA) (HCC): ICD-10-CM

## 2023-12-01 DIAGNOSIS — Z79.899 ENCOUNTER FOR LONG-TERM (CURRENT) USE OF HIGH-RISK MEDICATION: ICD-10-CM

## 2023-12-01 LAB
ALBUMIN SERPL BCP-MCNC: 3.2 G/DL (ref 3.2–4.9)
ALBUMIN/GLOB SERPL: 1.1 G/DL
ALP SERPL-CCNC: 61 U/L (ref 30–99)
ALT SERPL-CCNC: 11 U/L (ref 2–50)
ANION GAP SERPL CALC-SCNC: 11 MMOL/L (ref 7–16)
APTT PPP: 28.3 SEC (ref 24.7–36)
AST SERPL-CCNC: 15 U/L (ref 12–45)
BASOPHILS # BLD AUTO: 0.3 % (ref 0–1.8)
BASOPHILS # BLD: 0.02 K/UL (ref 0–0.12)
BILIRUB CONJ SERPL-MCNC: <0.2 MG/DL (ref 0.1–0.5)
BILIRUB INDIRECT SERPL-MCNC: NORMAL MG/DL (ref 0–1)
BILIRUB SERPL-MCNC: 0.4 MG/DL (ref 0.1–1.5)
BUN SERPL-MCNC: 26 MG/DL (ref 8–22)
CALCIUM ALBUM COR SERPL-MCNC: 9.2 MG/DL (ref 8.5–10.5)
CALCIUM SERPL-MCNC: 8.6 MG/DL (ref 8.5–10.5)
CHLORIDE SERPL-SCNC: 98 MMOL/L (ref 96–112)
CHOLEST SERPL-MCNC: 121 MG/DL (ref 100–199)
CK SERPL-CCNC: 75 U/L (ref 0–154)
CO2 SERPL-SCNC: 23 MMOL/L (ref 20–33)
CREAT SERPL-MCNC: 1.31 MG/DL (ref 0.5–1.4)
EOSINOPHIL # BLD AUTO: 0.01 K/UL (ref 0–0.51)
EOSINOPHIL NFR BLD: 0.1 % (ref 0–6.9)
ERYTHROCYTE [DISTWIDTH] IN BLOOD BY AUTOMATED COUNT: 56.4 FL (ref 35.9–50)
GFR SERPLBLD CREATININE-BSD FMLA CKD-EPI: 57 ML/MIN/1.73 M 2
GLOBULIN SER CALC-MCNC: 2.9 G/DL (ref 1.9–3.5)
GLUCOSE SERPL-MCNC: 91 MG/DL (ref 65–99)
HCT VFR BLD AUTO: 22.5 % (ref 42–52)
HDLC SERPL-MCNC: 37 MG/DL
HGB BLD-MCNC: 7.9 G/DL (ref 14–18)
IMM GRANULOCYTES # BLD AUTO: 0.08 K/UL (ref 0–0.11)
IMM GRANULOCYTES NFR BLD AUTO: 1.1 % (ref 0–0.9)
INR PPP: 1.07 (ref 0.87–1.13)
LDH SERPL L TO P-CCNC: 146 U/L (ref 107–266)
LDLC SERPL CALC-MCNC: 67 MG/DL
LYMPHOCYTES # BLD AUTO: 0.59 K/UL (ref 1–4.8)
LYMPHOCYTES NFR BLD: 8 % (ref 22–41)
MAGNESIUM SERPL-MCNC: 1.1 MG/DL (ref 1.5–2.5)
MCH RBC QN AUTO: 32.8 PG (ref 27–33)
MCHC RBC AUTO-ENTMCNC: 35.1 G/DL (ref 32.3–36.5)
MCV RBC AUTO: 93.4 FL (ref 81.4–97.8)
MONOCYTES # BLD AUTO: 0.63 K/UL (ref 0–0.85)
MONOCYTES NFR BLD AUTO: 8.5 % (ref 0–13.4)
NEUTROPHILS # BLD AUTO: 6.06 K/UL (ref 1.82–7.42)
NEUTROPHILS NFR BLD: 82 % (ref 44–72)
NRBC # BLD AUTO: 0 K/UL
NRBC BLD-RTO: 0 /100 WBC (ref 0–0.2)
NT-PROBNP SERPL IA-MCNC: 319 PG/ML (ref 0–125)
OUTPT INFUS CBC COMMENT OICOM: ABNORMAL
PHOSPHATE SERPL-MCNC: 3 MG/DL (ref 2.5–4.5)
PLATELET # BLD AUTO: 236 K/UL (ref 164–446)
PMV BLD AUTO: 9.2 FL (ref 9–12.9)
POTASSIUM SERPL-SCNC: 4.7 MMOL/L (ref 3.6–5.5)
PROT SERPL-MCNC: 6.1 G/DL (ref 6–8.2)
PROTHROMBIN TIME: 14 SEC (ref 12–14.6)
RBC # BLD AUTO: 2.41 M/UL (ref 4.7–6.1)
SODIUM SERPL-SCNC: 132 MMOL/L (ref 135–145)
TRIGL SERPL-MCNC: 85 MG/DL (ref 0–149)
TROPONIN T SERPL-MCNC: 18 NG/L (ref 6–19)
WBC # BLD AUTO: 7.4 K/UL (ref 4.8–10.8)

## 2023-12-01 PROCEDURE — 82550 ASSAY OF CK (CPK): CPT

## 2023-12-01 PROCEDURE — 36591 DRAW BLOOD OFF VENOUS DEVICE: CPT

## 2023-12-01 PROCEDURE — 84100 ASSAY OF PHOSPHORUS: CPT

## 2023-12-01 PROCEDURE — 85025 COMPLETE CBC W/AUTO DIFF WBC: CPT

## 2023-12-01 PROCEDURE — 700111 HCHG RX REV CODE 636 W/ 250 OVERRIDE (IP): Performed by: NURSE PRACTITIONER

## 2023-12-01 PROCEDURE — 82553 CREATINE MB FRACTION: CPT

## 2023-12-01 PROCEDURE — 83735 ASSAY OF MAGNESIUM: CPT

## 2023-12-01 PROCEDURE — 80061 LIPID PANEL: CPT

## 2023-12-01 PROCEDURE — 85610 PROTHROMBIN TIME: CPT

## 2023-12-01 PROCEDURE — 99214 OFFICE O/P EST MOD 30 MIN: CPT | Performed by: STUDENT IN AN ORGANIZED HEALTH CARE EDUCATION/TRAINING PROGRAM

## 2023-12-01 PROCEDURE — 83880 ASSAY OF NATRIURETIC PEPTIDE: CPT

## 2023-12-01 PROCEDURE — A4212 NON CORING NEEDLE OR STYLET: HCPCS

## 2023-12-01 PROCEDURE — 99212 OFFICE O/P EST SF 10 MIN: CPT | Performed by: STUDENT IN AN ORGANIZED HEALTH CARE EDUCATION/TRAINING PROGRAM

## 2023-12-01 PROCEDURE — 85730 THROMBOPLASTIN TIME PARTIAL: CPT

## 2023-12-01 PROCEDURE — 700101 HCHG RX REV CODE 250: Performed by: NURSE PRACTITIONER

## 2023-12-01 PROCEDURE — 83615 LACTATE (LD) (LDH) ENZYME: CPT

## 2023-12-01 PROCEDURE — 80053 COMPREHEN METABOLIC PANEL: CPT

## 2023-12-01 PROCEDURE — 82248 BILIRUBIN DIRECT: CPT

## 2023-12-01 PROCEDURE — 84484 ASSAY OF TROPONIN QUANT: CPT

## 2023-12-01 RX ADMIN — LIDOCAINE HYDROCHLORIDE 0.5 ML: 10 INJECTION, SOLUTION EPIDURAL; INFILTRATION; INTRACAUDAL at 09:57

## 2023-12-01 RX ADMIN — HEPARIN 500 UNITS: 100 SYRINGE at 10:11

## 2023-12-01 ASSESSMENT — ENCOUNTER SYMPTOMS
COUGH: 0
SPUTUM PRODUCTION: 0
NAUSEA: 0
DIZZINESS: 0
FEVER: 0
WHEEZING: 0
DEPRESSION: 0
ORTHOPNEA: 0
HEARTBURN: 1
BLURRED VISION: 0
PALPITATIONS: 0
VOMITING: 0
WEIGHT LOSS: 0
ABDOMINAL PAIN: 0
CHILLS: 0
MEMORY LOSS: 0
FOCAL WEAKNESS: 0
HEADACHES: 0
BRUISES/BLEEDS EASILY: 0
NECK PAIN: 0
SORE THROAT: 0
TREMORS: 0
SHORTNESS OF BREATH: 0
TINGLING: 1
SENSORY CHANGE: 0

## 2023-12-01 ASSESSMENT — FIBROSIS 4 INDEX
FIB4 SCORE: 3.64
FIB4 SCORE: 3.64

## 2023-12-01 NOTE — PROGRESS NOTES
Edenilson arrived ambulatory with walker to [facility] for pre-chemo labs. Port accessed in sterile fashion, following [facility] policy, and labs drawn. Line flushed and instilled with heparin prior to de-accessing. Urinalysis collected and future appointments discussed.

## 2023-12-01 NOTE — PROGRESS NOTES
"Pharmacy Chemotherapy Verification:    Patient Name: Bart Ramsey  Dx: small cell lung cancer  Study Protocol: [id number]  Participant # [id number]    HLX10 or placebo 300 mg IV over 30-60 min on day 1  Cisplatin 75 mg/m2 IV over 60 min on day 1  Etoposide 100 mg/m2 IV over 1-2 hours on days 1-3  Every 21 days with concurrent radiation x 4 cycles    followed by  HLX10 or placebo 300 mg IV over 30-60 min on day 1  Every 21 days up to one year  A Randomized, Double-Blind, International Multicenter, Phase III Study to Evaluate the Anti-Tumor Efficacy and Safety of HLX10 (Recombinant Humanized Anti-PD-1 Monoclonal Antibody Injection) or Placebo in Combination with Chemotherapy (Carboplatin/Cisplatin-Etoposide) and Concurrent Radiotherapy in Patients with Limited-Stage Small Cell Lung Cancer (LS-SCLC). [id number]  Allergies: Patient has no known allergies.       /64   Pulse 96   Temp 36.7 °C (98 °F) (Temporal)   Resp 18   Ht 1.72 m (5' 7.72\")   Wt 77 kg (169 lb 12.1 oz)   SpO2 93%   BMI 26.03 kg/m²      Body surface area is 1.92 meters squared.    Labs 12/04/23:  Plt = 226k   Hgb = 7.8 (ok to proced per SHREYAS Stapleton)     SCr = 1.19 mg/dL CrCl ~ 59.49 mL/min     Labs 12/01/23:  ANC~ 6060   AST/ALT/AP = 15/11/61 TBili = 0.4      Labs 11/06/23:   TSH = 0.840   Free T4 = 11.20      Drug Order   (Drug name, dose, route, IV Fluid & volume, frequency, number of doses) Cycle 4, Day 1 of 3 (treatment deferred 1 week due to covid admission)     Previous treatment: C3 Days 1-3 on 11/06-11/08/23   Medication = HLX10 or placebo  Base Dose = 300 mg  Fixed dose, no calculation required  Final Dose = 300 mg  Route = IV  Fluid & Volume =  mL  Admin Duration = Over 60 min     Study-supplied medication       First infusion 60 min. If tolerated, subsequent infusions 30 min.   Medication = Cisplatin  Base Dose = 75 mg/m2  Calc Dose: Base Dose x 1.92 m2 = 144 mg  Final Dose = 144 mg  Route = " IV  Fluid & Volume =  mL  Admin Duration = Over 60 min    Study-supplied medication        <10% difference, okay to treat with final dose   Medication = Etoposide   Base Dose = 100 mg/m2   Calc Dose: Base Dose x 1.92 m2 = 192 mg  Final Dose = 192 mg  Route = IV  Fluid & Volume =  mL  Admin Duration = Over 1-2 hours    Days 1-3   Study-supplied medication        <10% difference, okay to treat with final dose      By my signature below, I confirm this process was performed independently with the BSA and all final chemotherapy dosing calculations congruent. I have reviewed the above chemotherapy order and that my calculation of the final dose and BSA (when applicable) corroborate those calculations of the  pharmacist. Discrepancies of 10% or greater in the written dose have been addressed and documented within the EPIC Progress notes.    Oscar ScottD

## 2023-12-01 NOTE — PROGRESS NOTES
"    Consult Note: Hematology/Oncology     Primary Care:  Pcp Pt States None    Chief Complaint   Patient presents with    Cancer     Prechemo          Current Treatment:     9/18/23   C1: Cis/Etop/HLX10 vs Placebo  10/9/23   C2: Cis/Etop/HLX vs Placebo + RT  10/28/23 C3: Cis/Etop/HLX vs Placebo + RT (treatment deferred 1 week for neutropenia)  11/6/23   C3: Cis/Etop/HLX vs Placebo + RT  12/4/23: C4 Cis/Etop/HLX    Prior Treatment: None    Subjective:   History of Presenting Illness:  Bart Ramsey is a 73 y.o. male who presents with a new diagnosis of SCLC    Patient reports that in February he felt that he was being strangled.  He called the ambulance and he was taken to the ER.  He was placed on BP meds.  He still felt terrible after several days.  [facility]bl[facility] which was removed.  Imaging at that time showed some nodules on his lung.      In March or April 2023 he had a biopsy attempt of his lung nodules.  Per patient biopsy result was inconclusive      PET scan was done on 6/6/2023 which showed a hypermetabolic nodule in the superior segment LEFT lower lobe of lung measuring 2.3 cm consistent with malignancy. Involvement of the LEFT superior hilar lymph node.  No evidence for metastatic disease in the abdomen or pelvis.    He had a bronchoscopy Pathology revealed SCLC.     He had agent orange exposure from [place] ([id number]). Son committed suicide 1 year ago today.  He is raising his granddaughter ([place]).     He quit in 1994 (started in 1966, 28 pack year history).  Used to smoke marijuana.  He states he drinks too much (drinks a \"couple good drinks\" per night).  He has a healthy diet.  He eats a lot of vegetables.     Interval History    Patient was diagnosed with COVID-19 on 11/13. CT scan showed lower lobe infiltrate. During the stay in the hospital, patient continued receive treatment for community-acquired pneumonia with cefepime and " doxycycline; he continued to feel better.  At discharge, given levofloxacin.    Since discharge he is doing better and trying to regain strength.      He notices that the pain in his throat when he swallows is getting better.      Patient is here prior to C4.  Last week his cycle 3 was held due to severe neutropenia.     He has completed RT on 11/21    Past Medical History:   Diagnosis Date    Arthritis     knee    Bowel habit changes     diarrhea    Bronchitis     Cancer ([facility]) 09/29/2023    small cell lung cancer    COPD (chronic obstructive pulmonary disease) ([facility])     Diabetes ([facility])     diet controlled, no medications.    Emphysema of lung ([facility])     COPD- no medications    High cholesterol 08/2023    Was on a statin, MD monitoring, not currently on meds    Myocardial infarct ([facility])     1994,1996,2000    Pneumonia     Psychiatric problem 08/2023    depression, son passed away recently, no meds    Sleep apnea     cpap        Past Surgical History:   Procedure Laterality Date    IN BRONCHOSCOPY,DIAGNOSTIC N/A 8/29/2023    Procedure: FIBER OPTIC BRONCHOSCOPY WITH  WASH, BRUSH, BRONCHOALVEOLAR LAVAGE, BIOPSY AND FINE NEEDLE ASPIRATION, ENDOBRONCHIAL ULTRASOUND & NAVIGATION,  ROBOTICS;  Surgeon: Bart Cortez M.D.;  Location: [facility];  Service: Pulmonary Robotic    ENDOBRONCHIAL US ADD-ON N/A 8/29/2023    Procedure: ENDOBRONCHIAL ULTRASOUND (EBUS);  Surgeon: Bart Cortez M.D.;  Location: [facility];  Service: Pulmonary Robotic    GASTROSCOPY-ENDO  03/31/2017    Procedure: GASTROSCOPY-ENDO;  Surgeon: Emerson Eaton M.D.;  Location: [facility];  Service:     COLONOSCOPY - ENDO  03/31/2017    Procedure: COLONOSCOPY - ENDO;  Surgeon: Emerson Eaton M.D.;  Location: ENDOSCOPY [facility];  Service:     CHOLECYSTECTOMY      INGUINAL HERNIA REPAIR Right     OTHER      Tonsillectomy    OTHER      Hernia     OTHER CARDIAC SURGERY      3 stents    TONSILLECTOMY         Social  History     Tobacco Use    Smoking status: Former     Current packs/day: 0.00     Average packs/day: 0.5 packs/day for 14.0 years (7.0 ttl pk-yrs)     Types: Cigarettes     Start date: 1980     Quit date:      Years since quittin.9    Tobacco comments:     Pt quit smoking cigarettes, .  1/2 pack per day, smoked 20 years    Vaping Use    Vaping Use: Never used   Substance Use Topics    Alcohol use: Not Currently     Alcohol/week: 8.4 oz     Types: 14 Shots of liquor per week     Comment: daily    Drug use: Not Currently     Types: Marijuana     Comment: quit         Family History   Problem Relation Age of Onset    Cancer Mother         lung?       No Known Allergies    Current Outpatient Medications   Medication Sig Dispense Refill    guaiFENesin ER (MUCINEX) 600 MG TABLET SR 12 HR Take 1 Tablet by mouth every 12 hours. 28 Tablet 0    albuterol 108 (90 Base) MCG/ACT Aero Soln inhalation aerosol Inhale 2 Puffs every four hours as needed for Shortness of Breath for up to 30 days. 18 g 0    sucralfate (CARAFATE) 1 GM Tab Take 1 Tablet by mouth 4 Times a Day,Before Meals and at Bedtime. Dissolve tablet in 1 cup water. 120 Tablet 3    ondansetron (ZOFRAN) 4 MG Tab tablet Take 1 Tablet by mouth every four hours as needed for Nausea/Vomiting (for nausea, vomiting). 30 Tablet 6    prochlorperazine (COMPAZINE) 10 MG Tab Take 1 Tablet by mouth every 6 hours as needed (for nausea, vomiting). 30 Tablet 6    cetirizine (ZYRTEC) 10 MG Tab Take 10 mg by mouth every day.      metFORMIN (GLUCOPHAGE) 500 MG Tab Take 750 mg by mouth 2 times a day with meals. 500 mg x 1.5 tab = 750 mg      baclofen (LIORESAL) 10 MG Tab Take 10 mg by mouth at bedtime as needed. Indications: Muscle Spasm      Cyanocobalamin (VITAMIN B-12) 1000 MCG Tab Take 1,000 mcg by mouth every day.      fluticasone-salmeterol (ADVAIR) 250-50 MCG/ACT AEROSOL POWDER, BREATH ACTIVATED Inhale 1 Puff 2 times a day as needed. Indications: Asthma       "Omega-3 Fatty Acids (FISH OIL) 1000 MG Cap capsule Take 1,000 mg by mouth every day.      vitamin D3 (CHOLECALCIFEROL) 1000 Unit (25 mcg) Tab Take 1,000 Units by mouth every day.      gabapentin (NEURONTIN) 300 MG Cap Take 300-600 mg by mouth 3 times a day. * depending on pain level*      aspirin EC (ECOTRIN) 81 MG Tablet Delayed Response Take 81 mg by mouth every day.       No current facility-administered medications for this encounter.       Review of Systems   Constitutional:  Positive for malaise/fatigue. Negative for chills, fever and weight loss.   HENT:  Positive for tinnitus. Negative for congestion, ear pain, nosebleeds and sore throat.    Eyes:  Negative for blurred vision.   Respiratory:  Negative for cough, sputum production, shortness of breath and wheezing.    Cardiovascular:  Negative for chest pain, palpitations, orthopnea and leg swelling.   Gastrointestinal:  Positive for heartburn. Negative for abdominal pain, nausea and vomiting.   Genitourinary:  Negative for dysuria, frequency and urgency.   Musculoskeletal:  Negative for neck pain.   Neurological:  Positive for tingling. Negative for dizziness, tremors, sensory change, focal weakness and headaches.   Endo/Heme/Allergies:  Does not bruise/bleed easily.   Psychiatric/Behavioral:  Negative for depression, memory loss and suicidal ideas.    All other systems reviewed and are negative.      Problem list, medications, and allergies reviewed by myself today in Epic.     Objective:     Vitals:    12/01/23 0852   BP: 102/58   BP Location: Right arm   Patient Position: Supine   BP Cuff Size: Adult   Pulse: 95   Temp: 37 °C (98.6 °F)   TempSrc: Temporal   SpO2: 92%   Weight: 77.6 kg (171 lb 1.2 oz)   Height: 1.753 m (5' 9.02\")         DESC; KARNOFSKY SCALE WITH ECOG EQUIVALENT: 90, Able to carry on normal activity; minor signs or symptoms of disease (ECOG equivalent 0)    DISTRESS LEVEL: no acute distress    Physical Exam  Constitutional:       General: " He is not in acute distress.     Appearance: Normal appearance.      Comments: Port in place, bruising   HENT:      Head: Normocephalic and atraumatic.      Nose: Nose normal. No congestion.      Mouth/Throat:      Mouth: Mucous membranes are moist.      Pharynx: Oropharynx is clear.   Eyes:      General: No scleral icterus.     Conjunctiva/sclera: Conjunctivae normal.      Pupils: Pupils are equal, round, and reactive to light.   Cardiovascular:      Rate and Rhythm: Normal rate and regular rhythm.      Pulses: Normal pulses.      Heart sounds: No murmur heard.     No friction rub.   Pulmonary:      Effort: Pulmonary effort is normal. No respiratory distress.      Breath sounds: Normal breath sounds. No stridor. No wheezing or rales.   Chest:      Chest wall: No tenderness.   Abdominal:      General: Abdomen is flat. Bowel sounds are normal. There is no distension.      Palpations: Abdomen is soft. There is no mass.      Tenderness: There is no abdominal tenderness. There is no guarding or rebound.   Musculoskeletal:         General: No swelling, tenderness or deformity. Normal range of motion.      Cervical back: Normal range of motion and neck supple. No rigidity or tenderness.      Right lower leg: No edema.      Left lower leg: No edema.      Comments: Walks with walker   Skin:     General: Skin is warm.      Coloration: Skin is not jaundiced or pale.      Findings: Bruising present. No rash.   Neurological:      General: No focal deficit present.      Mental Status: He is alert and oriented to person, place, and time. Mental status is at baseline.      Motor: No weakness.   Psychiatric:         Mood and Affect: Mood normal.         Behavior: Behavior normal.         Thought Content: Thought content normal.         Judgment: Judgment normal.         Labs:   Most recent labs reviewed.  Please see the lab tab of chart review    Imaging:   Most recent images below have been independently reviewed by me.  Please  see the imaging tab of chart review    9/13/23 CT CAP  1.  Left lower lobe pulmonary masses, very slightly more prominent than on 8/29/2023.  2.  Multiple left pulmonary and pleural metastases.  3.  Left hilar emely metastases.  4.  No evidence of metastatic disease in the abdomen or pelvis.    9/12/2023 PET scan  1.  Interval increased size and intensity of LEFT lower lobe lung mass with new intrapulmonary and pleural metastases indicating progression of disease.  2.  Probable pleural metastasis at the RIGHT lung base posteriorly.  3.  Worsening LEFT hilar adenopathy, metastatic.  4.  No evidence of metastatic disease in the abdomen or pelvis.    6/6/2023 PET scan  1.  Multilobular hypermetabolic nodule in the superior segment LEFT lower lobe of lung measuring 2.3 cm consistent with malignancy.  2.  Involvement of the LEFT superior hilar lymph node.  3.  No evidence for metastatic disease in the abdomen or pelvis.    Pathology    A. Lung, left lower lobe, fine needle aspiration slides:          Blood only; no epithelial or malignant cells identified.   B. Lung, left lower lobe, core biopsies:          Positive for small cell carcinoma.   C. Lung, left lower lobe, forceps biopsy with touch prep slides:          Positive for small cell carcinoma.   D. Lung, left lower lobe, fine needle aspiration:          Originally submitted for possible flow cytometric analysis,           which was deemed unnecessary.  Tissue will be submitted to           cytology for preparation of a cell block; an addendum will be           generated if there is an unexpected finding.   E. Lymph node, 10L, fine needle aspiration slides:          Malignant cells present, consistent with metastatic small cell   carcinoma.   F.  Lymph node, 10L, core biopsies:            Hypocellular specimen demonstrating few scattered lymphocytes           and few atypical cells suspicious for small cell carcinoma       Assessment/Plan:      Cancer Staging    SCLC (small cell lung carcinoma) (HCC)  Staging form: Lung, AJCC 8th Edition  - Clinical stage from 9/14/2023: Stage IIIA (cT3, cN1, cM0) - Signed by Leonel Patel M.D. on 9/14/2023       Mr. Ramsey who presents today with a new diagnosis of small cell lung cancer, limited stage.     He is currently enrolled in the HLX10 trial.     He is here prior to C4.      Labs pending    Plan  -Enrolled in the HLX trial   -cleared for C4, pending labs  -will need to see him prior to C5    Any questions and concerns raised by the patient were addressed and answered. Patient denies any further questions.  Patient encouraged to call the office with any concerns or issues.      Roseline Fuller M.D.  Hematology/Oncology

## 2023-12-01 NOTE — ADDENDUM NOTE
Encounter addended by: Santo Quintero on: 12/1/2023 9:52 AM   Actions taken: Charge Capture section accepted

## 2023-12-02 LAB — MISCELLANEOUS LAB RESULT MISCLAB: NORMAL

## 2023-12-03 LAB — CK MB SERPL-MCNC: 2 NG/ML (ref 0–7.7)

## 2023-12-04 ENCOUNTER — DOCUMENTATION (OUTPATIENT)
Dept: ONCOLOGY | Facility: MEDICAL CENTER | Age: 73
End: 2023-12-04

## 2023-12-04 ENCOUNTER — OUTPATIENT INFUSION SERVICES (OUTPATIENT)
Dept: ONCOLOGY | Facility: MEDICAL CENTER | Age: 73
End: 2023-12-04
Attending: STUDENT IN AN ORGANIZED HEALTH CARE EDUCATION/TRAINING PROGRAM
Payer: COMMERCIAL

## 2023-12-04 VITALS
BODY MASS INDEX: 25.73 KG/M2 | DIASTOLIC BLOOD PRESSURE: 64 MMHG | TEMPERATURE: 98 F | OXYGEN SATURATION: 93 % | HEIGHT: 68 IN | RESPIRATION RATE: 18 BRPM | WEIGHT: 169.75 LBS | HEART RATE: 96 BPM | SYSTOLIC BLOOD PRESSURE: 121 MMHG

## 2023-12-04 DIAGNOSIS — T45.1X5A ANEMIA ASSOCIATED WITH CHEMOTHERAPY: ICD-10-CM

## 2023-12-04 DIAGNOSIS — C34.90 SCLC (SMALL CELL LUNG CARCINOMA) (HCC): ICD-10-CM

## 2023-12-04 DIAGNOSIS — D64.81 ANEMIA ASSOCIATED WITH CHEMOTHERAPY: ICD-10-CM

## 2023-12-04 LAB
ABO + RH BLD: NORMAL
ABO GROUP BLD: NORMAL
ANION GAP SERPL CALC-SCNC: 11 MMOL/L (ref 7–16)
BLD GP AB SCN SERPL QL: NORMAL
BUN SERPL-MCNC: 22 MG/DL (ref 8–22)
CALCIUM SERPL-MCNC: 8.5 MG/DL (ref 8.5–10.5)
CHLORIDE SERPL-SCNC: 100 MMOL/L (ref 96–112)
CO2 SERPL-SCNC: 23 MMOL/L (ref 20–33)
CREAT SERPL-MCNC: 1.19 MG/DL (ref 0.5–1.4)
ERYTHROCYTE [DISTWIDTH] IN BLOOD BY AUTOMATED COUNT: 59.7 FL (ref 35.9–50)
GFR SERPLBLD CREATININE-BSD FMLA CKD-EPI: 64 ML/MIN/1.73 M 2
GLUCOSE SERPL-MCNC: 106 MG/DL (ref 65–99)
HCT VFR BLD AUTO: 22.8 % (ref 42–52)
HGB BLD-MCNC: 7.8 G/DL (ref 14–18)
MAGNESIUM SERPL-MCNC: 1.2 MG/DL (ref 1.5–2.5)
MCH RBC QN AUTO: 32.4 PG (ref 27–33)
MCHC RBC AUTO-ENTMCNC: 34.2 G/DL (ref 32.3–36.5)
MCV RBC AUTO: 94.6 FL (ref 81.4–97.8)
PHOSPHATE SERPL-MCNC: 2.5 MG/DL (ref 2.5–4.5)
PLATELET # BLD AUTO: 226 K/UL (ref 164–446)
PMV BLD AUTO: 9.1 FL (ref 9–12.9)
POTASSIUM SERPL-SCNC: 4.1 MMOL/L (ref 3.6–5.5)
RBC # BLD AUTO: 2.41 M/UL (ref 4.7–6.1)
RH BLD: NORMAL
SODIUM SERPL-SCNC: 134 MMOL/L (ref 135–145)
WBC # BLD AUTO: 5.6 K/UL (ref 4.8–10.8)

## 2023-12-04 PROCEDURE — 84100 ASSAY OF PHOSPHORUS: CPT

## 2023-12-04 PROCEDURE — 96366 THER/PROPH/DIAG IV INF ADDON: CPT

## 2023-12-04 PROCEDURE — A4212 NON CORING NEEDLE OR STYLET: HCPCS

## 2023-12-04 PROCEDURE — 700105 HCHG RX REV CODE 258: Performed by: NURSE PRACTITIONER

## 2023-12-04 PROCEDURE — 96417 CHEMO IV INFUS EACH ADDL SEQ: CPT

## 2023-12-04 PROCEDURE — 86900 BLOOD TYPING SEROLOGIC ABO: CPT

## 2023-12-04 PROCEDURE — 700111 HCHG RX REV CODE 636 W/ 250 OVERRIDE (IP): Performed by: NURSE PRACTITIONER

## 2023-12-04 PROCEDURE — 96368 THER/DIAG CONCURRENT INF: CPT

## 2023-12-04 PROCEDURE — 96361 HYDRATE IV INFUSION ADD-ON: CPT

## 2023-12-04 PROCEDURE — 85027 COMPLETE CBC AUTOMATED: CPT

## 2023-12-04 PROCEDURE — 96375 TX/PRO/DX INJ NEW DRUG ADDON: CPT

## 2023-12-04 PROCEDURE — 80048 BASIC METABOLIC PNL TOTAL CA: CPT

## 2023-12-04 PROCEDURE — 83735 ASSAY OF MAGNESIUM: CPT

## 2023-12-04 PROCEDURE — 86901 BLOOD TYPING SEROLOGIC RH(D): CPT

## 2023-12-04 PROCEDURE — 96413 CHEMO IV INFUSION 1 HR: CPT

## 2023-12-04 PROCEDURE — 700101 HCHG RX REV CODE 250: Performed by: NURSE PRACTITIONER

## 2023-12-04 PROCEDURE — 86850 RBC ANTIBODY SCREEN: CPT

## 2023-12-04 PROCEDURE — 96367 TX/PROPH/DG ADDL SEQ IV INF: CPT

## 2023-12-04 PROCEDURE — A9270 NON-COVERED ITEM OR SERVICE: HCPCS | Performed by: NURSE PRACTITIONER

## 2023-12-04 PROCEDURE — 700111 HCHG RX REV CODE 636 W/ 250 OVERRIDE (IP): Performed by: STUDENT IN AN ORGANIZED HEALTH CARE EDUCATION/TRAINING PROGRAM

## 2023-12-04 RX ORDER — DIPHENHYDRAMINE HYDROCHLORIDE 50 MG/ML
25 INJECTION INTRAMUSCULAR; INTRAVENOUS PRN
Status: CANCELLED | OUTPATIENT
Start: 2023-12-05

## 2023-12-04 RX ORDER — ACETAMINOPHEN 325 MG/1
650 TABLET ORAL ONCE
Status: CANCELLED | OUTPATIENT
Start: 2023-12-04

## 2023-12-04 RX ORDER — DIPHENHYDRAMINE HCL 25 MG
25 TABLET ORAL ONCE
Status: CANCELLED | OUTPATIENT
Start: 2023-12-05 | End: 2023-12-05

## 2023-12-04 RX ORDER — 0.9 % SODIUM CHLORIDE 0.9 %
VIAL (ML) INJECTION PRN
Status: CANCELLED | OUTPATIENT
Start: 2023-12-05

## 2023-12-04 RX ORDER — 0.9 % SODIUM CHLORIDE 0.9 %
10 VIAL (ML) INJECTION PRN
Status: CANCELLED | OUTPATIENT
Start: 2023-12-05

## 2023-12-04 RX ORDER — SODIUM CHLORIDE 9 MG/ML
INJECTION, SOLUTION INTRAVENOUS CONTINUOUS
Status: CANCELLED | OUTPATIENT
Start: 2023-12-04

## 2023-12-04 RX ORDER — 0.9 % SODIUM CHLORIDE 0.9 %
10 VIAL (ML) INJECTION PRN
Status: CANCELLED | OUTPATIENT
Start: 2023-12-04

## 2023-12-04 RX ORDER — ACETAMINOPHEN 325 MG/1
650 TABLET ORAL ONCE
Status: CANCELLED | OUTPATIENT
Start: 2023-12-05

## 2023-12-04 RX ORDER — DIPHENHYDRAMINE HCL 25 MG
25 TABLET ORAL ONCE
Status: CANCELLED | OUTPATIENT
Start: 2023-12-04 | End: 2023-12-04

## 2023-12-04 RX ORDER — MAGNESIUM SULFATE HEPTAHYDRATE 40 MG/ML
4 INJECTION, SOLUTION INTRAVENOUS ONCE
Status: COMPLETED | OUTPATIENT
Start: 2023-12-04 | End: 2023-12-04

## 2023-12-04 RX ORDER — SODIUM CHLORIDE 9 MG/ML
1000 INJECTION, SOLUTION INTRAVENOUS ONCE
Status: COMPLETED | OUTPATIENT
Start: 2023-12-04 | End: 2023-12-04

## 2023-12-04 RX ORDER — DIPHENHYDRAMINE HYDROCHLORIDE 50 MG/ML
25 INJECTION INTRAMUSCULAR; INTRAVENOUS PRN
Status: CANCELLED | OUTPATIENT
Start: 2023-12-04

## 2023-12-04 RX ORDER — SODIUM CHLORIDE 9 MG/ML
INJECTION, SOLUTION INTRAVENOUS CONTINUOUS
Status: CANCELLED | OUTPATIENT
Start: 2023-12-05

## 2023-12-04 RX ORDER — ACETAMINOPHEN 325 MG/1
650 TABLET ORAL PRN
Status: CANCELLED | OUTPATIENT
Start: 2023-12-04

## 2023-12-04 RX ORDER — 0.9 % SODIUM CHLORIDE 0.9 %
3 VIAL (ML) INJECTION PRN
Status: CANCELLED | OUTPATIENT
Start: 2023-12-05

## 2023-12-04 RX ORDER — 0.9 % SODIUM CHLORIDE 0.9 %
3 VIAL (ML) INJECTION PRN
Status: CANCELLED | OUTPATIENT
Start: 2023-12-04

## 2023-12-04 RX ORDER — ACETAMINOPHEN 325 MG/1
650 TABLET ORAL PRN
Status: CANCELLED | OUTPATIENT
Start: 2023-12-05

## 2023-12-04 RX ORDER — 0.9 % SODIUM CHLORIDE 0.9 %
VIAL (ML) INJECTION PRN
Status: CANCELLED | OUTPATIENT
Start: 2023-12-04

## 2023-12-04 RX ADMIN — SODIUM CHLORIDE 1000 ML: 9 INJECTION, SOLUTION INTRAVENOUS at 11:13

## 2023-12-04 RX ADMIN — DEXAMETHASONE SODIUM PHOSPHATE 12 MG: 4 INJECTION, SOLUTION INTRA-ARTICULAR; INTRALESIONAL; INTRAMUSCULAR; INTRAVENOUS; SOFT TISSUE at 12:34

## 2023-12-04 RX ADMIN — HEPARIN 500 UNITS: 100 SYRINGE at 19:09

## 2023-12-04 RX ADMIN — POTASSIUM CHLORIDE: 2 INJECTION, SOLUTION, CONCENTRATE INTRAVENOUS at 17:03

## 2023-12-04 RX ADMIN — ONDANSETRON 16 MG: 2 INJECTION INTRAMUSCULAR; INTRAVENOUS at 12:50

## 2023-12-04 RX ADMIN — MAGNESIUM SULFATE HEPTAHYDRATE 4 G: 40 INJECTION, SOLUTION INTRAVENOUS at 14:39

## 2023-12-04 RX ADMIN — Medication 300 MG: at 13:49

## 2023-12-04 RX ADMIN — FOSAPREPITANT 150 MG: 150 INJECTION, POWDER, LYOPHILIZED, FOR SOLUTION INTRAVENOUS at 13:05

## 2023-12-04 RX ADMIN — LIDOCAINE HYDROCHLORIDE 0.5 ML: 10 INJECTION, SOLUTION EPIDURAL; INFILTRATION; INTRACAUDAL at 10:29

## 2023-12-04 ASSESSMENT — FIBROSIS 4 INDEX: FIB4 SCORE: 1.4

## 2023-12-04 NOTE — PROGRESS NOTES
"Nutrition Services: Brief Update  Wt Readings from Last 7 Encounters:   12/04/23 77 kg (169 lb 12.1 oz)   12/01/23 77.6 kg (171 lb 1.2 oz)   12/01/23 77.4 kg (170 lb 10.2 oz)   11/19/23 76 kg (167 lb 8.8 oz)   11/08/23 84.8 kg (186 lb 15.2 oz)   11/02/23 83 kg (183 lb)   11/13/23 78 kg (171 lb 15.3 oz)     Weight Change: Weight fluctuating between 167-171 lbs. Pt reports home scale weight has been trending up over the past week.     Pertinent Recent Lab work (DATE 12/4): Na 134, Glu 106, Mg 1.2    RD able to visit pt at chairside. Pt reports \"eating like a horse\" since being DC from the hospital last month. When asked about meal frequency pt reports constantly eating, pt did not quantify meal times/frequency. Current foods include soups, burritos, sloppy ernesto's, muffins, milk, oatmeal, etc. Pt also reports eating big portions with 1 Equate supplement/d.     Plan/Recommend:  Continue Equate protein supplements and frequent meals throughout the day.     Pt verbalizes understanding and is receptive to information provided.   RD available PRN and will make further recommendations as indicated within policy.    [phone]    "

## 2023-12-04 NOTE — PROGRESS NOTES
Chemotherapy Verification - PRIMARY RN      Height = 1.72 m  Weight = 77 kg  BSA = 1.92 m2       Medication: HLX 10 or placebo  Dose: 300 mg   Calculated Dose: 300 mg                             (In mg/m2, AUC, mg/kg)     Medication: cisplatin  Dose: 75mg/m2  Calculated Dose: 144 mg                             (In mg/m2, AUC, mg/kg)    Medication: etoposide  Dose: 100mg/m2  Calculated Dose: 192 mg                             (In mg/m2, AUC, mg/kg)      I confirm this process was performed independently with the BSA and all final chemotherapy dosing calculations congruent.  Any discrepancies of 10% or greater have been addressed with the chemotherapy pharmacist. The resolution of the discrepancy has been documented in the EPIC progress notes.

## 2023-12-04 NOTE — PROGRESS NOTES
Chemotherapy Verification - PRIMARY RN    D1C4    Height = 172cm  Weight = 77kg  BSA = 1.92m^2       Medication: HLX10 or PLACEBO  Dose: set dose 300mg   Calculated Dose: set dose 300mg                                Medication: CISplatin (STUDY DRUG)  Dose: 75mg/m^2  Calculated Dose: 144mg                                Medication: etoposide (STUDY DRUG)  Dose: 100mg/m^2  Calculated Dose: 192mg                                  I confirm this process was performed independently with the BSA and all final chemotherapy dosing calculations congruent.  Any discrepancies of 10% or greater have been addressed with the chemotherapy pharmacist. The resolution of the discrepancy has been documented in the EPIC progress notes.

## 2023-12-05 ENCOUNTER — OUTPATIENT INFUSION SERVICES (OUTPATIENT)
Dept: ONCOLOGY | Facility: MEDICAL CENTER | Age: 73
End: 2023-12-05
Attending: STUDENT IN AN ORGANIZED HEALTH CARE EDUCATION/TRAINING PROGRAM
Payer: COMMERCIAL

## 2023-12-05 VITALS
SYSTOLIC BLOOD PRESSURE: 116 MMHG | HEART RATE: 63 BPM | RESPIRATION RATE: 18 BRPM | DIASTOLIC BLOOD PRESSURE: 72 MMHG | HEIGHT: 68 IN | WEIGHT: 177.69 LBS | OXYGEN SATURATION: 93 % | BODY MASS INDEX: 26.93 KG/M2 | TEMPERATURE: 97.3 F

## 2023-12-05 DIAGNOSIS — T45.1X5A ANEMIA ASSOCIATED WITH CHEMOTHERAPY: ICD-10-CM

## 2023-12-05 DIAGNOSIS — C34.90 SCLC (SMALL CELL LUNG CARCINOMA) (HCC): ICD-10-CM

## 2023-12-05 DIAGNOSIS — D64.81 ANEMIA ASSOCIATED WITH CHEMOTHERAPY: ICD-10-CM

## 2023-12-05 LAB
BARCODED ABORH UBTYP: 9500
BARCODED PRD CODE UBPRD: NORMAL
BARCODED UNIT NUM UBUNT: NORMAL
COMPONENT R 8504R: NORMAL
PRODUCT TYPE UPROD: NORMAL
UNIT STATUS USTAT: NORMAL

## 2023-12-05 PROCEDURE — 304540 HCHG NITRO SET VENT 2ND TUB

## 2023-12-05 PROCEDURE — A4212 NON CORING NEEDLE OR STYLET: HCPCS

## 2023-12-05 PROCEDURE — 86945 BLOOD PRODUCT/IRRADIATION: CPT

## 2023-12-05 PROCEDURE — 700102 HCHG RX REV CODE 250 W/ 637 OVERRIDE(OP): Performed by: NURSE PRACTITIONER

## 2023-12-05 PROCEDURE — 700111 HCHG RX REV CODE 636 W/ 250 OVERRIDE (IP): Performed by: NURSE PRACTITIONER

## 2023-12-05 PROCEDURE — 96375 TX/PRO/DX INJ NEW DRUG ADDON: CPT

## 2023-12-05 PROCEDURE — P9016 RBC LEUKOCYTES REDUCED: HCPCS

## 2023-12-05 PROCEDURE — 96413 CHEMO IV INFUSION 1 HR: CPT

## 2023-12-05 PROCEDURE — A9270 NON-COVERED ITEM OR SERVICE: HCPCS | Performed by: NURSE PRACTITIONER

## 2023-12-05 PROCEDURE — 306780 HCHG STAT FOR TRANSFUSION PER CASE

## 2023-12-05 PROCEDURE — 86923 COMPATIBILITY TEST ELECTRIC: CPT

## 2023-12-05 PROCEDURE — 36430 TRANSFUSION BLD/BLD COMPNT: CPT

## 2023-12-05 RX ORDER — ACETAMINOPHEN 325 MG/1
650 TABLET ORAL ONCE
Status: COMPLETED | OUTPATIENT
Start: 2023-12-05 | End: 2023-12-05

## 2023-12-05 RX ORDER — 0.9 % SODIUM CHLORIDE 0.9 %
10 VIAL (ML) INJECTION PRN
OUTPATIENT
Start: 2023-12-05

## 2023-12-05 RX ORDER — 0.9 % SODIUM CHLORIDE 0.9 %
VIAL (ML) INJECTION PRN
OUTPATIENT
Start: 2023-12-05

## 2023-12-05 RX ORDER — ONDANSETRON 2 MG/ML
8 INJECTION INTRAMUSCULAR; INTRAVENOUS ONCE
Status: COMPLETED | OUTPATIENT
Start: 2023-12-05 | End: 2023-12-05

## 2023-12-05 RX ORDER — DIPHENHYDRAMINE HYDROCHLORIDE 50 MG/ML
25 INJECTION INTRAMUSCULAR; INTRAVENOUS PRN
OUTPATIENT
Start: 2023-12-05

## 2023-12-05 RX ORDER — SODIUM CHLORIDE 9 MG/ML
INJECTION, SOLUTION INTRAVENOUS CONTINUOUS
OUTPATIENT
Start: 2023-12-05

## 2023-12-05 RX ORDER — ACETAMINOPHEN 325 MG/1
650 TABLET ORAL PRN
OUTPATIENT
Start: 2023-12-05

## 2023-12-05 RX ORDER — DIPHENHYDRAMINE HCL 25 MG
25 TABLET ORAL ONCE
Status: COMPLETED | OUTPATIENT
Start: 2023-12-05 | End: 2023-12-05

## 2023-12-05 RX ORDER — ACETAMINOPHEN 325 MG/1
650 TABLET ORAL ONCE
Status: CANCELLED | OUTPATIENT
Start: 2023-12-05

## 2023-12-05 RX ORDER — DIPHENHYDRAMINE HCL 25 MG
25 TABLET ORAL ONCE
OUTPATIENT
Start: 2023-12-05 | End: 2023-12-05

## 2023-12-05 RX ORDER — 0.9 % SODIUM CHLORIDE 0.9 %
3 VIAL (ML) INJECTION PRN
OUTPATIENT
Start: 2023-12-05

## 2023-12-05 RX ADMIN — ACETAMINOPHEN 650 MG: 325 TABLET ORAL at 09:21

## 2023-12-05 RX ADMIN — DIPHENHYDRAMINE HYDROCHLORIDE 25 MG: 25 TABLET ORAL at 09:21

## 2023-12-05 RX ADMIN — HEPARIN 500 UNITS: 100 SYRINGE at 13:55

## 2023-12-05 RX ADMIN — HYDROCORTISONE SODIUM SUCCINATE 100 MG: 100 INJECTION, POWDER, FOR SOLUTION INTRAMUSCULAR; INTRAVENOUS at 09:25

## 2023-12-05 RX ADMIN — ONDANSETRON 8 MG: 2 INJECTION INTRAMUSCULAR; INTRAVENOUS at 11:50

## 2023-12-05 ASSESSMENT — FIBROSIS 4 INDEX: FIB4 SCORE: 1.46

## 2023-12-05 ASSESSMENT — PAIN DESCRIPTION - PAIN TYPE: TYPE: CHRONIC PAIN

## 2023-12-05 NOTE — PROGRESS NOTES
Chemotherapy Verification - PRIMARY RN      Height =  172 cm   Weight =   80.6 kg  BSA =  1.96 m^2      Medication: Etoposide (study drug)   Dose: 100 mg/m^2    Calculated Dose:  196 mg                            (In mg/m2, AUC, mg/kg)           I confirm this process was performed independently with the BSA and all final chemotherapy dosing calculations congruent.  Any discrepancies of 10% or greater have been addressed with the chemotherapy pharmacist. The resolution of the discrepancy has been documented in the EPIC progress notes.

## 2023-12-05 NOTE — PROGRESS NOTES
Pt arrived ambulatory to [facility] for HLX10 or Placebo + CISplatin + etoposide treatment for SCLC. POC discussed with pt and he agrees with plan. Pt with call light in reach for safety. Pt verbalized understanding to call for needs/assist.    Port site numbed with lidocaine prior to access per pt request. Port accessed in sterile fashion, brisk blood return noted. Labs drawn as ordered. Results reviewed, Hgb 7.8 and Magnesium 1.2 today. Pt does not meet parameters for treatment.     Diane COMER contacted with results. Ok to proceed with treatment and replace electrolytes per protocol. Also, orders received to transfuse 1 unit PRBC tomorrow. COD drawn and sent to lab today. Pt updated on plan and he agrees to proceed.     Pt medicated per MAR. Pt tolerated treatment without s/s adverse reaction. Port with brisk blood return, flushed with NS then heparin. Port de-accessed, gauze dressing applied. Pt discharged to self care, [facility]. Pt's next appt confirmed for tomorrow at 0900 for 1 unit PRBC and Day 2 etoposide.

## 2023-12-05 NOTE — PROGRESS NOTES
Chemotherapy Verification - SECONDARY RN   C4 D2    Height = 172 cm  Weight = 80.6 kg  BSA = 1.96 m^2       Medication: etoposide (STUDY DRUG)  Dose: 100 mg/m2  Calculated Dose: 196 mg                             (In mg/m2, AUC, mg/kg)       I confirm that this process was performed independently.

## 2023-12-05 NOTE — PROGRESS NOTES
"Pharmacy Chemotherapy Verification:    Patient Name: Bart Ramsey  Dx: small cell lung cancer  Study Protocol: [id number]  Participant # [id number]    HLX10 or placebo 300 mg IV over 30-60 min on day 1  Cisplatin 75 mg/m2 IV over 60 min on day 1  Etoposide 100 mg/m2 IV over 1-2 hours on days 1-3  Every 21 days with concurrent radiation x 4 cycles    followed by  HLX10 or placebo 300 mg IV over 30-60 min on day 1  Every 21 days up to one year  A Randomized, Double-Blind, International Multicenter, Phase III Study to Evaluate the Anti-Tumor Efficacy and Safety of HLX10 (Recombinant Humanized Anti-PD-1 Monoclonal Antibody Injection) or Placebo in Combination with Chemotherapy (Carboplatin/Cisplatin-Etoposide) and Concurrent Radiotherapy in Patients with Limited-Stage Small Cell Lung Cancer (LS-SCLC). [id number]  Allergies: Patient has no known allergies.       /71   Pulse 86   Temp 36.4 °C (97.5 °F) (Temporal)   Resp 18   Ht 1.72 m (5' 7.72\")   Wt 80.6 kg (177 lb 11.1 oz)   SpO2 97%   BMI 27.24 kg/m²      Body surface area is 1.96 meters squared.    Labs 12/04/23:  Plt = 226k   Hgb = 7.8 (ok to proced per Diane Iglesias APN)     SCr = 1.19 mg/dL CrCl ~ 59.49 mL/min     Labs 12/01/23:  ANC~ 6060   AST/ALT/AP = 15/11/61 TBili = 0.4      Labs 11/06/23:   TSH = 0.840   Free T4 = 11.20      Drug Order   (Drug name, dose, route, IV Fluid & volume, frequency, number of doses) Cycle 4, Day 2 of 3 (treatment deferred 1 week due to covid admission)     Previous treatment: C3 Days 1-3 on 11/06-11/08/23   Medication = HLX10 or placebo  Base Dose = 300 mg  Fixed dose, no calculation required  Final Dose = -- mg  Route = IV  Fluid & Volume =  mL  Admin Duration = Over 60 min     Study-supplied medication       First infusion 60 min. If tolerated, subsequent infusions 30 min.   Medication = Cisplatin  Base Dose = 75 mg/m2  Calc Dose: Base Dose x 1.92 m2 = 144 mg  Final Dose = -- mg  Route = " IV  Fluid & Volume =  mL  Admin Duration = Over 60 min    Study-supplied medication        <10% difference, okay to treat with final dose   Medication = Etoposide   Base Dose = 100 mg/m2   Calc Dose: Base Dose x 1.96 m2 = 196 mg  Final Dose = 192 mg  Route = IV  Fluid & Volume =  mL  Admin Duration = Over 1-2 hours    Days 1-3   Study-supplied medication        <10% difference, okay to treat with final dose      By my signature below, I confirm this process was performed independently with the BSA and all final chemotherapy dosing calculations congruent. I have reviewed the above chemotherapy order and that my calculation of the final dose and BSA (when applicable) corroborate those calculations of the  pharmacist. Discrepancies of 10% or greater in the written dose have been addressed and documented within the EPIC Progress notes.    Mainor Yap, PharmD

## 2023-12-05 NOTE — PROGRESS NOTES
"[facility]denilson [facility] for blood transfusion and   Day 2/ Cycle 4 of Etoposide (study drug) for SCLC. Pt denied having any new or acute complaints today, reports tolerating past treatments well. POC reviewed, blood consents signed.   Port accessed in a sterile manner, brisk blood return noted. Premeds administered per MD order, Edenilson tolerated well. Blood transfused per MD order, VSS, Edenilson tolerated well.   POC reviewed. Premed administered per MD order, Edenilson tolerated well. Pt c/o difficulty swallowing, Pt assessed, states \"it's better now\" and agrees to inform RN if it happens again.   Pt given Etoposide (non-DEHP tubing intact) as prescribed, Edenilson tolerated well, denied having any complaints during or after infusion.   Port had + blood return after, flushed per [facility] policy, de-accessed, needle intact, insertion site covered with sterile gauze and paper tape. Confirmed next appointment and Edenilson was discharged home in no acute distress.   "

## 2023-12-06 ENCOUNTER — OUTPATIENT INFUSION SERVICES (OUTPATIENT)
Dept: ONCOLOGY | Facility: MEDICAL CENTER | Age: 73
End: 2023-12-06
Attending: STUDENT IN AN ORGANIZED HEALTH CARE EDUCATION/TRAINING PROGRAM
Payer: COMMERCIAL

## 2023-12-06 VITALS
TEMPERATURE: 97.3 F | OXYGEN SATURATION: 95 % | HEIGHT: 68 IN | SYSTOLIC BLOOD PRESSURE: 109 MMHG | HEART RATE: 83 BPM | DIASTOLIC BLOOD PRESSURE: 73 MMHG | BODY MASS INDEX: 27.6 KG/M2 | WEIGHT: 182.1 LBS | RESPIRATION RATE: 18 BRPM

## 2023-12-06 DIAGNOSIS — C34.90 SCLC (SMALL CELL LUNG CARCINOMA) (HCC): ICD-10-CM

## 2023-12-06 PROCEDURE — 304540 HCHG NITRO SET VENT 2ND TUB

## 2023-12-06 PROCEDURE — A4212 NON CORING NEEDLE OR STYLET: HCPCS

## 2023-12-06 PROCEDURE — 96375 TX/PRO/DX INJ NEW DRUG ADDON: CPT

## 2023-12-06 PROCEDURE — 96413 CHEMO IV INFUSION 1 HR: CPT

## 2023-12-06 PROCEDURE — 700101 HCHG RX REV CODE 250: Performed by: NURSE PRACTITIONER

## 2023-12-06 PROCEDURE — 700111 HCHG RX REV CODE 636 W/ 250 OVERRIDE (IP): Performed by: NURSE PRACTITIONER

## 2023-12-06 RX ORDER — ONDANSETRON 2 MG/ML
8 INJECTION INTRAMUSCULAR; INTRAVENOUS ONCE
Status: COMPLETED | OUTPATIENT
Start: 2023-12-06 | End: 2023-12-06

## 2023-12-06 RX ADMIN — ONDANSETRON 8 MG: 2 INJECTION INTRAMUSCULAR; INTRAVENOUS at 15:53

## 2023-12-06 RX ADMIN — HEPARIN 500 UNITS: 100 SYRINGE at 17:16

## 2023-12-06 RX ADMIN — LIDOCAINE HYDROCHLORIDE 0.5 ML: 10 INJECTION, SOLUTION EPIDURAL; INFILTRATION; INTRACAUDAL; PERINEURAL at 15:47

## 2023-12-06 ASSESSMENT — PAIN DESCRIPTION - PAIN TYPE: TYPE: CHRONIC PAIN;NEUROPATHIC PAIN

## 2023-12-06 ASSESSMENT — FIBROSIS 4 INDEX: FIB4 SCORE: 1.46

## 2023-12-06 NOTE — PROGRESS NOTES
"Pharmacy Chemotherapy Verification:    Patient Name: Bart Ramsey  Dx: small cell lung cancer  Study Protocol: [id number]  Participant # [id number]    HLX10 or placebo 300 mg IV over 30-60 min on day 1  Cisplatin 75 mg/m2 IV over 60 min on day 1  Etoposide 100 mg/m2 IV over 1-2 hours on days 1-3  Every 21 days with concurrent radiation x 4 cycles    followed by  HLX10 or placebo 300 mg IV over 30-60 min on day 1  Every 21 days up to one year  A Randomized, Double-Blind, International Multicenter, Phase III Study to Evaluate the Anti-Tumor Efficacy and Safety of HLX10 (Recombinant Humanized Anti-PD-1 Monoclonal Antibody Injection) or Placebo in Combination with Chemotherapy (Carboplatin/Cisplatin-Etoposide) and Concurrent Radiotherapy in Patients with Limited-Stage Small Cell Lung Cancer (LS-SCLC). [id number]  Allergies: Patient has no known allergies.       /73   Pulse 83   Temp 36.3 °C (97.3 °F) (Temporal)   Resp 18   Ht 1.72 m (5' 7.72\")   Wt 82.6 kg (182 lb 1.6 oz)   SpO2 95%   BMI 27.92 kg/m²      Body surface area is 1.99 meters squared.    Labs 12/04/23:  Plt = 226k   Hgb = 7.8 (ok to proced per Diane Iglesias APN)     SCr = 1.19 mg/dL CrCl ~ 59.49 mL/min     Labs 12/01/23:  ANC~ 6060   AST/ALT/AP = 15/11/61 TBili = 0.4      Labs 11/06/23:   TSH = 0.840   Free T4 = 11.20      Drug Order   (Drug name, dose, route, IV Fluid & volume, frequency, number of doses) Cycle 4, Day 3 of 3 (treatment deferred 1 week due to covid admission)     Previous treatment: C3 Days 1-3 on 11/06-11/08/23   Medication = HLX10 or placebo  Base Dose = 300 mg  Fixed dose, no calculation required  Final Dose = -- mg  Route = IV  Fluid & Volume =  mL  Admin Duration = Over 60 min     Study-supplied medication       First infusion 60 min. If tolerated, subsequent infusions 30 min.   Medication = Cisplatin  Base Dose = 75 mg/m2  Calc Dose: Base Dose x 1.92 m2 = 144 mg  Final Dose = -- mg  Route = " IV  Fluid & Volume =  mL  Admin Duration = Over 60 min    Study-supplied medication        <10% difference, okay to treat with final dose   Medication = Etoposide   Base Dose = 100 mg/m2   Calc Dose: Base Dose x 1.99 m2 = 199 mg  Final Dose = 192 mg  Route = IV  Fluid & Volume =  mL  Admin Duration = Over 1-2 hours    Days 1-3   Study-supplied medication        <10% difference, okay to treat with final dose      By my signature below, I confirm this process was performed independently with the BSA and all final chemotherapy dosing calculations congruent. I have reviewed the above chemotherapy order and that my calculation of the final dose and BSA (when applicable) corroborate those calculations of the  pharmacist. Discrepancies of 10% or greater in the written dose have been addressed and documented within the EPIC Progress notes.    Mainor Yap, PharmD

## 2023-12-07 NOTE — PROGRESS NOTES
Chemotherapy Verification - PRIMARY RN      Height = 172 cm  Weight = 82.6 kg  BSA = 1.99 m2       Medication: Etoposide  Dose: 100 mg/m2  Calculated Dose: 199 mg                             (In mg/m2, AUC, mg/kg)         I confirm this process was performed independently with the BSA and all final chemotherapy dosing calculations congruent.  Any discrepancies of 10% or greater have been addressed with the chemotherapy pharmacist. The resolution of the discrepancy has been documented in the EPIC progress notes.

## 2023-12-07 NOTE — PROGRESS NOTES
Chemotherapy Verification - SECONDARY RN       Height = 1.72 m  Weight = 82.6 kg  BSA = 1.99 m^2       Medication: etoposide  Dose: 100 mg/m^2  Calculated Dose: 199 mg                             (In mg/m2, AUC, mg/kg)     I confirm that this process was performed independently.

## 2023-12-07 NOTE — PROGRESS NOTES
Bill arrives to [facility] for Cycle 4 Day 3 Cycle etoposide for SCLC. Bill denies any new or acute changes. Right upper chest port accessed in sterile fashion, brisk blood return observed. Labs collected 12/04/2023 reviewed, parameters met for treatment today. Pre-medication and chemotherapy administered as ordered, Edenilson tolerated well. Port flushed with NS, + blood return observed, heparin instilled. Port de-accessed, gauze and tape to site. Discharged in NAD, next appointment confirmed.

## 2023-12-08 ENCOUNTER — PATIENT OUTREACH (OUTPATIENT)
Dept: ONCOLOGY | Facility: MEDICAL CENTER | Age: 73
End: 2023-12-08
Payer: COMMERCIAL

## 2023-12-08 NOTE — PROGRESS NOTES
"Follow up call placed to patient for navigation and DST of 4/10.  Pt reported was glad about completing chemo this week.  Pt reports he continues to be very fatigued.  Pt also reporting leg are swelling at the ankle, with \"pressure on legs\".  He reports this has been happening for a while, with most recent swelling this week.  Denies pitting, pain or redness.  Encouraged patient to keep legs elevated when sitting and to reach out to medical oncology if gets worse or persists.  Pt saying everything else is going ok.  Some stressors related to his wife recent hospitalization for COVID coverage being declined by her insurance and she has a cold now as well.  Pt denies questions or needs from navigator at this time.  Vidly message sent with Dec cancer support calendar.  Continue to be available as needed.  "

## 2023-12-12 ENCOUNTER — HOSPITAL ENCOUNTER (OUTPATIENT)
Dept: RADIOLOGY | Facility: MEDICAL CENTER | Age: 73
End: 2023-12-12
Attending: STUDENT IN AN ORGANIZED HEALTH CARE EDUCATION/TRAINING PROGRAM
Payer: COMMERCIAL

## 2023-12-12 DIAGNOSIS — C34.90 SCLC (SMALL CELL LUNG CARCINOMA) (HCC): ICD-10-CM

## 2023-12-12 PROCEDURE — 71260 CT THORAX DX C+: CPT

## 2023-12-12 PROCEDURE — 700117 HCHG RX CONTRAST REV CODE 255: Performed by: STUDENT IN AN ORGANIZED HEALTH CARE EDUCATION/TRAINING PROGRAM

## 2023-12-12 RX ORDER — 0.9 % SODIUM CHLORIDE 0.9 %
3 VIAL (ML) INJECTION PRN
Status: CANCELLED | OUTPATIENT
Start: 2024-01-05

## 2023-12-12 RX ORDER — DIPHENHYDRAMINE HYDROCHLORIDE 50 MG/ML
50 INJECTION INTRAMUSCULAR; INTRAVENOUS PRN
Status: CANCELLED | OUTPATIENT
Start: 2024-01-05

## 2023-12-12 RX ORDER — 0.9 % SODIUM CHLORIDE 0.9 %
10 VIAL (ML) INJECTION PRN
Status: CANCELLED | OUTPATIENT
Start: 2024-01-05

## 2023-12-12 RX ORDER — ONDANSETRON 8 MG/1
8 TABLET, ORALLY DISINTEGRATING ORAL PRN
Status: CANCELLED | OUTPATIENT
Start: 2024-01-05

## 2023-12-12 RX ORDER — PROCHLORPERAZINE MALEATE 10 MG
10 TABLET ORAL EVERY 6 HOURS PRN
Status: CANCELLED | OUTPATIENT
Start: 2024-01-05

## 2023-12-12 RX ORDER — 0.9 % SODIUM CHLORIDE 0.9 %
10 VIAL (ML) INJECTION PRN
Status: CANCELLED | OUTPATIENT
Start: 2023-12-24

## 2023-12-12 RX ORDER — 0.9 % SODIUM CHLORIDE 0.9 %
VIAL (ML) INJECTION PRN
Status: CANCELLED | OUTPATIENT
Start: 2023-12-24

## 2023-12-12 RX ORDER — METHYLPREDNISOLONE SODIUM SUCCINATE 125 MG/2ML
125 INJECTION, POWDER, LYOPHILIZED, FOR SOLUTION INTRAMUSCULAR; INTRAVENOUS PRN
Status: CANCELLED | OUTPATIENT
Start: 2024-01-05

## 2023-12-12 RX ORDER — ONDANSETRON 2 MG/ML
4 INJECTION INTRAMUSCULAR; INTRAVENOUS PRN
Status: CANCELLED | OUTPATIENT
Start: 2024-01-05

## 2023-12-12 RX ORDER — 0.9 % SODIUM CHLORIDE 0.9 %
3 VIAL (ML) INJECTION PRN
Status: CANCELLED | OUTPATIENT
Start: 2023-12-24

## 2023-12-12 RX ORDER — 0.9 % SODIUM CHLORIDE 0.9 %
VIAL (ML) INJECTION PRN
Status: CANCELLED | OUTPATIENT
Start: 2024-01-05

## 2023-12-12 RX ORDER — SODIUM CHLORIDE 9 MG/ML
INJECTION, SOLUTION INTRAVENOUS CONTINUOUS
Status: CANCELLED | OUTPATIENT
Start: 2024-01-05

## 2023-12-12 RX ORDER — EPINEPHRINE 1 MG/ML(1)
0.5 AMPUL (ML) INJECTION PRN
Status: CANCELLED | OUTPATIENT
Start: 2024-01-05

## 2023-12-12 RX ADMIN — IOHEXOL 100 ML: 350 INJECTION, SOLUTION INTRAVENOUS at 16:19

## 2023-12-15 ENCOUNTER — TELEPHONE (OUTPATIENT)
Dept: HEMATOLOGY ONCOLOGY | Facility: MEDICAL CENTER | Age: 73
End: 2023-12-15
Payer: COMMERCIAL

## 2023-12-15 ENCOUNTER — APPOINTMENT (OUTPATIENT)
Dept: ONCOLOGY | Facility: MEDICAL CENTER | Age: 73
End: 2023-12-15
Attending: STUDENT IN AN ORGANIZED HEALTH CARE EDUCATION/TRAINING PROGRAM
Payer: COMMERCIAL

## 2023-12-15 ENCOUNTER — APPOINTMENT (OUTPATIENT)
Dept: RADIOLOGY | Facility: MEDICAL CENTER | Age: 73
End: 2023-12-15
Attending: EMERGENCY MEDICINE
Payer: COMMERCIAL

## 2023-12-15 ENCOUNTER — APPOINTMENT (OUTPATIENT)
Dept: HEMATOLOGY ONCOLOGY | Facility: MEDICAL CENTER | Age: 73
End: 2023-12-15
Payer: COMMERCIAL

## 2023-12-15 ENCOUNTER — HOSPITAL ENCOUNTER (EMERGENCY)
Facility: MEDICAL CENTER | Age: 73
End: 2023-12-16
Attending: EMERGENCY MEDICINE
Payer: COMMERCIAL

## 2023-12-15 DIAGNOSIS — K20.90 ESOPHAGITIS: ICD-10-CM

## 2023-12-15 DIAGNOSIS — D70.9 NEUTROPENIA, UNSPECIFIED TYPE (HCC): ICD-10-CM

## 2023-12-15 DIAGNOSIS — D64.9 ANEMIA, UNSPECIFIED TYPE: ICD-10-CM

## 2023-12-15 LAB
ABO GROUP BLD: NORMAL
ALBUMIN SERPL BCP-MCNC: 3.9 G/DL (ref 3.2–4.9)
ALBUMIN/GLOB SERPL: 1.3 G/DL
ALP SERPL-CCNC: 77 U/L (ref 30–99)
ALT SERPL-CCNC: 9 U/L (ref 2–50)
ANION GAP SERPL CALC-SCNC: 15 MMOL/L (ref 7–16)
AST SERPL-CCNC: 15 U/L (ref 12–45)
BARCODED ABORH UBTYP: 9500
BARCODED PRD CODE UBPRD: NORMAL
BARCODED UNIT NUM UBUNT: NORMAL
BASOPHILS # BLD AUTO: 5 % (ref 0–1.8)
BASOPHILS # BLD: 0.03 K/UL (ref 0–0.12)
BILIRUB SERPL-MCNC: 0.5 MG/DL (ref 0.1–1.5)
BLD GP AB SCN SERPL QL: NORMAL
BUN SERPL-MCNC: 30 MG/DL (ref 8–22)
CALCIUM ALBUM COR SERPL-MCNC: 8.2 MG/DL (ref 8.5–10.5)
CALCIUM SERPL-MCNC: 8.1 MG/DL (ref 8.4–10.2)
CHLORIDE SERPL-SCNC: 99 MMOL/L (ref 96–112)
CO2 SERPL-SCNC: 23 MMOL/L (ref 20–33)
COMPONENT R 8504R: NORMAL
CREAT SERPL-MCNC: 1.45 MG/DL (ref 0.5–1.4)
D DIMER PPP IA.FEU-MCNC: 3.05 UG/ML (FEU) (ref 0–0.5)
EKG IMPRESSION: NORMAL
EOSINOPHIL # BLD AUTO: 0.02 K/UL (ref 0–0.51)
EOSINOPHIL NFR BLD: 4 % (ref 0–6.9)
ERYTHROCYTE [DISTWIDTH] IN BLOOD BY AUTOMATED COUNT: 51.3 FL (ref 35.9–50)
GFR SERPLBLD CREATININE-BSD FMLA CKD-EPI: 51 ML/MIN/1.73 M 2
GLOBULIN SER CALC-MCNC: 3 G/DL (ref 1.9–3.5)
GLUCOSE SERPL-MCNC: 108 MG/DL (ref 65–99)
HCT VFR BLD AUTO: 20.6 % (ref 42–52)
HGB BLD-MCNC: 7.3 G/DL (ref 14–18)
LYMPHOCYTES # BLD AUTO: 0.38 K/UL (ref 1–4.8)
LYMPHOCYTES NFR BLD: 76 % (ref 22–41)
MANUAL DIFF BLD: NORMAL
MCH RBC QN AUTO: 32.7 PG (ref 27–33)
MCHC RBC AUTO-ENTMCNC: 35.4 G/DL (ref 32.3–36.5)
MCV RBC AUTO: 92.4 FL (ref 81.4–97.8)
METAMYELOCYTES NFR BLD MANUAL: 3 %
MONOCYTES # BLD AUTO: 0.03 K/UL (ref 0–0.85)
MONOCYTES NFR BLD AUTO: 6 % (ref 0–13.4)
NEUTROPHILS # BLD AUTO: 0.03 K/UL (ref 1.82–7.42)
NEUTROPHILS NFR BLD: 5 % (ref 44–72)
NEUTS BAND NFR BLD MANUAL: 1 % (ref 0–10)
NRBC # BLD AUTO: 0 K/UL
NRBC BLD-RTO: 0 /100 WBC (ref 0–0.2)
PLATELET # BLD AUTO: 25 K/UL (ref 164–446)
PLATELET BLD QL SMEAR: NORMAL
PLATELETS.RETICULATED NFR BLD AUTO: 4.3 % (ref 0.6–13.1)
PMV BLD AUTO: 10.9 FL (ref 9–12.9)
POTASSIUM SERPL-SCNC: 3.8 MMOL/L (ref 3.6–5.5)
PRODUCT TYPE UPROD: NORMAL
PROT SERPL-MCNC: 6.9 G/DL (ref 6–8.2)
RBC # BLD AUTO: 2.23 M/UL (ref 4.7–6.1)
RBC BLD AUTO: NORMAL
RH BLD: NORMAL
SODIUM SERPL-SCNC: 137 MMOL/L (ref 135–145)
TROPONIN T SERPL-MCNC: 23 NG/L (ref 6–19)
TROPONIN T SERPL-MCNC: 25 NG/L (ref 6–19)
UNIT STATUS USTAT: NORMAL
VARIANT LYMPHS BLD QL SMEAR: NORMAL
WBC # BLD AUTO: 0.5 K/UL (ref 4.8–10.8)

## 2023-12-15 PROCEDURE — 71045 X-RAY EXAM CHEST 1 VIEW: CPT

## 2023-12-15 PROCEDURE — 84484 ASSAY OF TROPONIN QUANT: CPT

## 2023-12-15 PROCEDURE — 86850 RBC ANTIBODY SCREEN: CPT

## 2023-12-15 PROCEDURE — 80053 COMPREHEN METABOLIC PANEL: CPT

## 2023-12-15 PROCEDURE — 93005 ELECTROCARDIOGRAM TRACING: CPT

## 2023-12-15 PROCEDURE — 700117 HCHG RX CONTRAST REV CODE 255: Performed by: EMERGENCY MEDICINE

## 2023-12-15 PROCEDURE — 71275 CT ANGIOGRAPHY CHEST: CPT

## 2023-12-15 PROCEDURE — A9270 NON-COVERED ITEM OR SERVICE: HCPCS | Performed by: EMERGENCY MEDICINE

## 2023-12-15 PROCEDURE — 85027 COMPLETE CBC AUTOMATED: CPT

## 2023-12-15 PROCEDURE — 85055 RETICULATED PLATELET ASSAY: CPT

## 2023-12-15 PROCEDURE — 85007 BL SMEAR W/DIFF WBC COUNT: CPT

## 2023-12-15 PROCEDURE — 86901 BLOOD TYPING SEROLOGIC RH(D): CPT

## 2023-12-15 PROCEDURE — 36415 COLL VENOUS BLD VENIPUNCTURE: CPT

## 2023-12-15 PROCEDURE — 700105 HCHG RX REV CODE 258: Performed by: EMERGENCY MEDICINE

## 2023-12-15 PROCEDURE — 700102 HCHG RX REV CODE 250 W/ 637 OVERRIDE(OP): Performed by: EMERGENCY MEDICINE

## 2023-12-15 PROCEDURE — 85379 FIBRIN DEGRADATION QUANT: CPT

## 2023-12-15 PROCEDURE — 99285 EMERGENCY DEPT VISIT HI MDM: CPT

## 2023-12-15 PROCEDURE — 86900 BLOOD TYPING SEROLOGIC ABO: CPT

## 2023-12-15 PROCEDURE — 93005 ELECTROCARDIOGRAM TRACING: CPT | Performed by: EMERGENCY MEDICINE

## 2023-12-15 RX ORDER — OMEPRAZOLE 40 MG/1
40 CAPSULE, DELAYED RELEASE ORAL DAILY
Status: ON HOLD | COMMUNITY
End: 2023-12-23

## 2023-12-15 RX ORDER — SODIUM CHLORIDE 9 MG/ML
1000 INJECTION, SOLUTION INTRAVENOUS ONCE
Status: COMPLETED | OUTPATIENT
Start: 2023-12-15 | End: 2023-12-16

## 2023-12-15 RX ORDER — SUCRALFATE ORAL 1 G/10ML
1 SUSPENSION ORAL 2 TIMES DAILY
Qty: 200 ML | Refills: 0 | Status: ON HOLD | OUTPATIENT
Start: 2023-12-15 | End: 2023-12-23

## 2023-12-15 RX ORDER — SUCRALFATE ORAL 1 G/10ML
1 SUSPENSION ORAL ONCE
Status: COMPLETED | OUTPATIENT
Start: 2023-12-15 | End: 2023-12-15

## 2023-12-15 RX ADMIN — IOHEXOL 65 ML: 350 INJECTION, SOLUTION INTRAVENOUS at 17:57

## 2023-12-15 RX ADMIN — SODIUM CHLORIDE 1000 ML: 9 INJECTION, SOLUTION INTRAVENOUS at 18:00

## 2023-12-15 RX ADMIN — SUCRALFATE 1 G: 1 SUSPENSION ORAL at 20:03

## 2023-12-15 ASSESSMENT — FIBROSIS 4 INDEX: FIB4 SCORE: 1.46

## 2023-12-15 NOTE — TELEPHONE ENCOUNTER
Pt called r/t continuing to feel sick past when he usually does after his infusion.  Pt in the past c/o having pain mid chest when swallowing but that it would go away.  Pt is stating that the pain has amalia more persistent starting on the right side of his chest about the nipple line and going all the way around to his back.  Discussed that he should go to the ED to rule out any serious issues.  Pt stated he has had a couple of heart attacks and this does not feel that way.  Educated that he really needs to be checked out as this does not seem exactly like it was before since it is not going away.  Pt verbalized that he will go to the ED.

## 2023-12-16 VITALS
BODY MASS INDEX: 25.37 KG/M2 | RESPIRATION RATE: 16 BRPM | TEMPERATURE: 98.1 F | SYSTOLIC BLOOD PRESSURE: 136 MMHG | DIASTOLIC BLOOD PRESSURE: 72 MMHG | WEIGHT: 171.3 LBS | HEART RATE: 67 BPM | OXYGEN SATURATION: 95 % | HEIGHT: 69 IN

## 2023-12-16 LAB — EKG IMPRESSION: NORMAL

## 2023-12-16 PROCEDURE — 36430 TRANSFUSION BLD/BLD COMPNT: CPT

## 2023-12-16 PROCEDURE — 93005 ELECTROCARDIOGRAM TRACING: CPT | Performed by: EMERGENCY MEDICINE

## 2023-12-16 PROCEDURE — P9016 RBC LEUKOCYTES REDUCED: HCPCS

## 2023-12-16 PROCEDURE — 86923 COMPATIBILITY TEST ELECTRIC: CPT

## 2023-12-16 PROCEDURE — 86945 BLOOD PRODUCT/IRRADIATION: CPT

## 2023-12-16 NOTE — TELEPHONE ENCOUNTER
On Call / After Hours Call  Call on 12/15/2023 at 7:12 PM from Bart Ramsey    PCP: Pcp Pt States None  Onc: Dr. AYSHA Fuller    Patient is diagnosed with SCLC and is currently undergoing treatment.  - Treatment regimen consists of: Cis, etoposide, HLX10 vs placebo w/ RT started 9/18/23   - Last treatment was: 12/4-12/6/23 (C4)  - Next treatment is due: 12/24/23 (C5)        Pt / ED provider reports:  - Minimally elevated troponin; negative CTA, negative chest xray  - burning at mid chest more like heartburn (pt w/ h/o XRT)  - Neutropenic (ANC 30)  - Hgb 7.3    Conferred with Dr. Fuller:  - Pegfilgrastim support is not indicated as limited stage LC patients do not respond well to it    Plan:  - RBC x1 is indicated, provider will discuss with pt  - No Granix at this time, patient will be educated re Neutropenic precautions monitoring at home  - OK to give Carafate for burning mid chest pain and monitor  - Office RN will follow up w/ pt early next week  - Return to ER as indicated      CTA - Chest  12/15/2023 5:41 PM  HISTORY/REASON FOR EXAM:  Chest pain, history of left lower lobe lung cancer status post therapy.     TECHNIQUE/EXAM DESCRIPTION:  CT angiogram scan for pulmonary embolism with contrast, with reconstructions.     1.25 mm helical sections were obtained from the lung apices through the lung bases following the rapid bolus administration of 65 mL of Omnipaque 350 nonionic contrast. Thin-section overlapping reconstruction interval was utilized. Coronal   reconstructions were generated from the axial data. MIP post processing was performed and utilized for the interpretation.     Low dose optimization technique was utilized for this CT exam including automated exposure control and adjustment of the mA and/or kV according to patient size.     COMPARISON: 12/12/2023     FINDINGS:  Pulmonary Embolism: None.     Lungs: Emphysema. Treated left lower lobe lesion measures 1.1 x 0.8 cm, stable since prior.  Stable groundglass nodule in the superior segment of the left lower lobe measuring 1 x 0.9 cm. No new pulmonary nodules or masses. Calcified granuloma in the   lingula. No pleural effusions.     Mediastinum: Unremarkable thyroid. No mediastinal mass. Mediastinal granulomas.     Nodes: Several paratracheal nodes measure up to 8 mm short axis, unchanged since prior. Bilateral hilar nodes are not enlarged by size criteria.     Heart: Not enlarged. No pericardial effusion.     Aorta and great vessels: No aneurysm.     Musculoskeletal structures: No acute fracture or destructive lesion.     Upper abdomen: Cholecystectomy. Calcified splenic granulomas. 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     Right chest port.     IMPRESSION:  1.  No pulmonary embolus.  2.  Treated left lower lobe lesion, stable in size and morphology.  3.  Unchanged groundglass nodule in the superior segment of the left lower lobe.  4.  No new pulmonary nodules or masses.       Latest Reference Range & Units 12/15/23 15:56 12/15/23 18:06   Sodium 135 - 145 mmol/L 137    Potassium 3.6 - 5.5 mmol/L 3.8    Chloride 96 - 112 mmol/L 99    Co2 20 - 33 mmol/L 23    Anion Gap 7.0 - 16.0  15.0    Glucose 65 - 99 mg/dL 108 (H)    Bun 8 - 22 mg/dL 30 (H)    Creatinine 0.50 - 1.40 mg/dL 1.45 (H)    GFR (CKD-EPI) >60 mL/min/1.73 m 2 51 !    Calcium 8.4 - 10.2 mg/dL 8.1 (L)    Correct Calcium 8.5 - 10.5 mg/dL 8.2 (L)    AST(SGOT) 12 - 45 U/L 15    ALT(SGPT) 2 - 50 U/L 9    Alkaline Phosphatase 30 - 99 U/L 77    Total Bilirubin 0.1 - 1.5 mg/dL 0.5    Albumin 3.2 - 4.9 g/dL 3.9    Total Protein 6.0 - 8.2 g/dL 6.9    Globulin 1.9 - 3.5 g/dL 3.0    A-G Ratio g/dL 1.3    Troponin T 6 - 19 ng/L 25 (H) 23 (H)   (H): Data is abnormally high  !: Data is abnormal  (L): Data is abnormally low      MIKAL Kerr   [facility]  Office of Patience Carreon Thomas, &  Roc  Phone: [phone]  Fax: [phone]

## 2023-12-16 NOTE — ED PROVIDER NOTES
"ED Provider Note    CHIEF COMPLAINT  Chief Complaint   Patient presents with    Chest Pain     Reports CP midsternal and radiating to bilat sides of lower chest. Also reports pain wraps into L side of back. Pt. Is being treated with chemo/radiation for small cell carcinoma- reports radiation to L \"lower lobe\" of lung. Denies fevers. +SOB per pt.       EXTERNAL RECORDS REVIEWED  Outpatient Notes   infusion therapy, oncology.    HPI/ROS  LIMITATION TO HISTORY   Select: : None  OUTSIDE HISTORIAN(S):  None    Bart Ramsey is a 73 y.o. male who presents here for evaluation of pain with swallowing.  Patient states that is what the \"chest pain is typically from.\"  He has no fever or chills, no diaphoresis, no vomiting.  Patient states he does have some intermittent shortness of breath with the discomfort, but states that he is currently on undergoing chemo and radiation.    PAST MEDICAL HISTORY   has a past medical history of Arthritis, Bowel habit changes, Bronchitis, Cancer (HCC) (2023), COPD (chronic obstructive pulmonary disease) (HCC), Diabetes (HCC), Emphysema of lung (HCC), High cholesterol (2023), Myocardial infarct (HCC), Pneumonia, Psychiatric problem (2023), and Sleep apnea.    SURGICAL HISTORY   has a past surgical history that includes other cardiac surgery; other; other; gastroscopy-endo (2017); colonoscopy - endo (2017); cholecystectomy; tonsillectomy; inguinal hernia repair (Right); bronchoscopy,diagnostic (N/A, 2023); and endobronchial us add-on (N/A, 2023).    FAMILY HISTORY  Family History   Problem Relation Age of Onset    Cancer Mother         lung?       SOCIAL HISTORY  Social History     Tobacco Use    Smoking status: Former     Current packs/day: 0.00     Average packs/day: 0.5 packs/day for 14.0 years (7.0 ttl pk-yrs)     Types: Cigarettes     Start date: 1980     Quit date:      Years since quittin.9    Smokeless tobacco: Not on file    " "Tobacco comments:     Pt quit smoking cigarettes, 2000.  1/2 pack per day, smoked 20 years    Vaping Use    Vaping Use: Never used   Substance and Sexual Activity    Alcohol use: Not Currently     Alcohol/week: 8.4 oz     Types: 14 Shots of liquor per week     Comment: daily    Drug use: Not Currently     Types: Marijuana     Comment: quit 2023    Sexual activity: Not on file       CURRENT MEDICATIONS  Home Medications       Reviewed by Yamilex Sweet R.N. (Registered Nurse) on 12/15/23 at 1547  Med List Status: Not Addressed     Medication Last Dose Status   albuterol 108 (90 Base) MCG/ACT Aero Soln inhalation aerosol  Active   aspirin EC (ECOTRIN) 81 MG Tablet Delayed Response  Active   baclofen (LIORESAL) 10 MG Tab  Active   cetirizine (ZYRTEC) 10 MG Tab  Active   Cyanocobalamin (VITAMIN B-12) 1000 MCG Tab  Active   fluticasone-salmeterol (ADVAIR) 250-50 MCG/ACT AEROSOL POWDER, BREATH ACTIVATED  Active   gabapentin (NEURONTIN) 300 MG Cap  Active   guaiFENesin ER (MUCINEX) 600 MG TABLET SR 12 HR  Active   metFORMIN (GLUCOPHAGE) 500 MG Tab  Active   Omega-3 Fatty Acids (FISH OIL) 1000 MG Cap capsule  Active   ondansetron (ZOFRAN) 4 MG Tab tablet  Active   prochlorperazine (COMPAZINE) 10 MG Tab  Active   sucralfate (CARAFATE) 1 GM Tab  Active   vitamin D3 (CHOLECALCIFEROL) 1000 Unit (25 mcg) Tab  Active                    ALLERGIES  No Known Allergies    PHYSICAL EXAM  VITAL SIGNS: /68   Pulse 92   Temp 36.6 °C (97.8 °F) (Temporal)   Resp 20   Ht 1.753 m (5' 9\")   Wt 77.7 kg (171 lb 4.8 oz)   SpO2 100%   BMI 25.30 kg/m²    Constitutional: Well developed, well nourished.  Mild acute distress.  HEENT: Normocephalic, atraumatic. Posterior pharynx clear and moist.  Eyes:  EOMI. Normal sclera.  Neck: Supple, Full range of motion, nontender.  Chest/Pulmonary: clear to ausculation. Symmetrical expansion.   Cardio: Regular rate and rhythm with no murmur.   Abdomen: Soft, nontender. No peritoneal signs. No " guarding.   Musculoskeletal: No deformity, no edema, neurovascular intact.   Neuro: Clear speech, appropriate, cooperative, cranial nerves II-XII grossly intact.  Psych: Normal mood and affect      DIAGNOSTIC STUDIES / PROCEDURES  Results for orders placed or performed during the hospital encounter of 12/15/23   CBC with Differential   Result Value Ref Range    WBC 0.5 (LL) 4.8 - 10.8 K/uL    RBC 2.23 (L) 4.70 - 6.10 M/uL    Hemoglobin 7.3 (L) 14.0 - 18.0 g/dL    Hematocrit 20.6 (L) 42.0 - 52.0 %    MCV 92.4 81.4 - 97.8 fL    MCH 32.7 27.0 - 33.0 pg    MCHC 35.4 32.3 - 36.5 g/dL    RDW 51.3 (H) 35.9 - 50.0 fL    Platelet Count 25 (L) 164 - 446 K/uL    MPV 10.9 9.0 - 12.9 fL    Neutrophils-Polys 5.00 (L) 44.00 - 72.00 %    Lymphocytes 76.00 (H) 22.00 - 41.00 %    Monocytes 6.00 0.00 - 13.40 %    Eosinophils 4.00 0.00 - 6.90 %    Basophils 5.00 (H) 0.00 - 1.80 %    Nucleated RBC 0.00 0.00 - 0.20 /100 WBC    Neutrophils (Absolute) 0.03 (LL) 1.82 - 7.42 K/uL    Lymphs (Absolute) 0.38 (L) 1.00 - 4.80 K/uL    Monos (Absolute) 0.03 0.00 - 0.85 K/uL    Eos (Absolute) 0.02 0.00 - 0.51 K/uL    Baso (Absolute) 0.03 0.00 - 0.12 K/uL    NRBC (Absolute) 0.00 K/uL   Complete Metabolic Panel (CMP)   Result Value Ref Range    Sodium 137 135 - 145 mmol/L    Potassium 3.8 3.6 - 5.5 mmol/L    Chloride 99 96 - 112 mmol/L    Co2 23 20 - 33 mmol/L    Anion Gap 15.0 7.0 - 16.0    Glucose 108 (H) 65 - 99 mg/dL    Bun 30 (H) 8 - 22 mg/dL    Creatinine 1.45 (H) 0.50 - 1.40 mg/dL    Calcium 8.1 (L) 8.4 - 10.2 mg/dL    Correct Calcium 8.2 (L) 8.5 - 10.5 mg/dL    AST(SGOT) 15 12 - 45 U/L    ALT(SGPT) 9 2 - 50 U/L    Alkaline Phosphatase 77 30 - 99 U/L    Total Bilirubin 0.5 0.1 - 1.5 mg/dL    Albumin 3.9 3.2 - 4.9 g/dL    Total Protein 6.9 6.0 - 8.2 g/dL    Globulin 3.0 1.9 - 3.5 g/dL    A-G Ratio 1.3 g/dL   Troponins NOW   Result Value Ref Range    Troponin T 25 (H) 6 - 19 ng/L   Troponins in two (2) hours   Result Value Ref Range    Troponin T  23 (H) 6 - 19 ng/L   ESTIMATED GFR   Result Value Ref Range    GFR (CKD-EPI) 51 (A) >60 mL/min/1.73 m 2   D-DIMER   Result Value Ref Range    D-Dimer 3.05 (H) 0.00 - 0.50 ug/mL (FEU)   DIFFERENTIAL MANUAL   Result Value Ref Range    Bands-Stabs 1.00 0.00 - 10.00 %    Metamyelocytes 3.00 %    Manual Diff Status PERFORMED    PLATELET ESTIMATE   Result Value Ref Range    Plt Estimation Significantly D    MORPHOLOGY   Result Value Ref Range    RBC Morphology Normal     Reactive Lymphocytes Few    IMMATURE PLT FRACTION   Result Value Ref Range    Imm. Plt Fraction 4.3 0.6 - 13.1 %   EKG   Result Value Ref Range    Report       [facility] Emergency Dept.    Test Date:  2023-12-15  Pt Name:    EMMIE GUTIERREZ           Department: [facility]  MRN:        [id number]                      Room:  Gender:     Male                         Technician: [id number]  :        1950                   Requested By:ER TRIAGE PROTOCOL  Order #:    [id number]                    Reading MD:    Measurements  Intervals                                Axis  Rate:       72                           P:          -19  UT:         158                          QRS:        4  QRSD:       120                          T:          49  QT:         365  QTc:        400    Interpretive Statements  Sinus rhythm  Nonspecific intraventricular conduction delay  Baseline wander in lead(s) V3  Compared to ECG 2023 18:38:36  Intraventricular conduction delay now present         EKG;  nsr 72. No st elevation, no st depression, qtc 400. Compared to EKG from 23    RADIOLOGY  I have independently interpreted the diagnostic imaging associated with this visit and am waiting the final reading from the radiologist.   My preliminary interpretation is as follows: see below  Radiologist interpretation:   CT-CTA CHEST PULMONARY ARTERY W/ RECONS   Final Result      1.  No pulmonary embolus.   2.  Treated left lower lobe lesion, stable in size  and morphology.   3.  Unchanged groundglass nodule in the superior segment of the left lower lobe.   4.  No new pulmonary nodules or masses.         DX-CHEST-PORTABLE (1 VIEW)   Final Result      No acute cardiac or pulmonary abnormalities are identified.            COURSE & MEDICAL DECISION MAKING    Pt will be discharged home in stable condition.     Placed in observation status at 7:44 PM, 12/15/2023    7:27 PM  Case discussed with Heme / onc NP , who also spoke to Dr. Fuller regarding this patient.  She would like to give him one unit of blood.   No granix.  Educated about neutropenic precautions, the RN will call him Monday, order Carafate.  She reviewed the patient's previous labs and today's labs.  Patient okay for outpatient follow-up.    7:33 PM  I spoke to the pt. He would like one unit of blood (he usually gets transfused below 7.5, and he will then go home, for follow up.  He is aware of neutropenic conditions, and we will do Carafate as well.    INITIAL ASSESSMENT, COURSE AND PLAN  Care Narrative: This is a 73-year-old male here for evaluation of esophageal reflux after radiation.  Patient states he usually has reflux similar to this after radiation, and intermittently has had this over the last few months.  We did a chest pain workup.  That showed his troponin trending downward.  EKG unremarkable, chest x-ray clear.  Nursing staff protocol order did a D-dimer, which was elevated.  CT chest was no acute finding.  Patient was then hydrated with IV fluids as well based on renal function.    DISPOSITION AND DISCUSSIONS  I have discussed management of the patient with the following physicians and JAE's:  heme /onc NP    Discussion of management with other QHP or appropriate source(s): None     Escalation of care considered, and ultimately not performed:none    Barriers to care at this time, including but not limited to: Patient does not have established PCP.     Decision tools and prescription drugs  considered including, but not limited to:  none .    FINAL DIAGNOSIS  Esophageal reflux  Neutropenia        Electronically signed by: Ricky Gonzalez D.O., 12/15/2023 5:09 PM

## 2023-12-16 NOTE — DISCHARGE INSTRUCTIONS
Make sure that you stay out of the general population and wear a mask at all times.  Rest, increase fluids, eat foods high in iron.  Follow-up with [facility] this next week for recheck  Return if any problems or worsening.

## 2023-12-16 NOTE — ED NOTES
CT Scan done and pending result    2nd troponin blood test drawn and sent to lab.    IV NS infusing as ordered.

## 2023-12-16 NOTE — ED NOTES
Med Rec completed per patient   Allergies reviewed    Patient states that he gets an unknown infusion every 3 weeks

## 2023-12-16 NOTE — DISCHARGE SUMMARY
"  ED Observation Discharge Summary    Patient:Bart Ramsey  Patient : 1950  Patient MRN: [id number]  Patient PCP: Pcp Pt States None    Admit Date: 12/15/2023  Discharge Date and Time: 23 3:11 AM  Discharge Diagnosis: Esophagitis, neutropenia, anemia  Discharge Attending: Jacqueline Soni D.O.  Discharge Service: ED Observation    ED Course  Bart is a 73 y.o. male who was evaluated at [facility] for nonspecific chest pain.  His laboratories revealed him to be very neutropenic and anemic requiring a blood transfusion.  He had 1 episode of nonspecific chest pain during the blood transfusion and a repeat EKG was performed showing no acute changes.  He received a full unit of blood and is stable with no fever or any other complaints other than being chilled from his room.  He will be discharged home in stable and improved condition to follow-up with [facility] oncology for further evaluation and treatment.    Discharge Exam:  /72   Pulse 67   Temp 36.7 °C (98.1 °F) (Temporal)   Resp 16   Ht 1.753 m (5' 9\")   Wt 77.7 kg (171 lb 4.8 oz)   SpO2 95%   BMI 25.30 kg/m² .    Constitutional: Awake and alert. Nontoxic  HENT:  Grossly normal  Eyes: Grossly normal  Neck: Normal range of motion, supple  Cardiovascular: Normal heart rate no murmur  Thorax & Lungs: No respiratory distress  Abdomen: Nontender, soft.  Skin: Extremely pale and cool  Extremities: Well perfused  Psychiatric: Affect normal    Labs  Results for orders placed or performed during the hospital encounter of 12/15/23   CBC with Differential   Result Value Ref Range    WBC 0.5 (LL) 4.8 - 10.8 K/uL    RBC 2.23 (L) 4.70 - 6.10 M/uL    Hemoglobin 7.3 (L) 14.0 - 18.0 g/dL    Hematocrit 20.6 (L) 42.0 - 52.0 %    MCV 92.4 81.4 - 97.8 fL    MCH 32.7 27.0 - 33.0 pg    MCHC 35.4 32.3 - 36.5 g/dL    RDW 51.3 (H) 35.9 - 50.0 fL    Platelet Count 25 (L) 164 - 446 K/uL    MPV 10.9 9.0 - 12.9 fL    Neutrophils-Polys 5.00 (L) 44.00 - 72.00 %    " Lymphocytes 76.00 (H) 22.00 - 41.00 %    Monocytes 6.00 0.00 - 13.40 %    Eosinophils 4.00 0.00 - 6.90 %    Basophils 5.00 (H) 0.00 - 1.80 %    Nucleated RBC 0.00 0.00 - 0.20 /100 WBC    Neutrophils (Absolute) 0.03 (LL) 1.82 - 7.42 K/uL    Lymphs (Absolute) 0.38 (L) 1.00 - 4.80 K/uL    Monos (Absolute) 0.03 0.00 - 0.85 K/uL    Eos (Absolute) 0.02 0.00 - 0.51 K/uL    Baso (Absolute) 0.03 0.00 - 0.12 K/uL    NRBC (Absolute) 0.00 K/uL   Complete Metabolic Panel (CMP)   Result Value Ref Range    Sodium 137 135 - 145 mmol/L    Potassium 3.8 3.6 - 5.5 mmol/L    Chloride 99 96 - 112 mmol/L    Co2 23 20 - 33 mmol/L    Anion Gap 15.0 7.0 - 16.0    Glucose 108 (H) 65 - 99 mg/dL    Bun 30 (H) 8 - 22 mg/dL    Creatinine 1.45 (H) 0.50 - 1.40 mg/dL    Calcium 8.1 (L) 8.4 - 10.2 mg/dL    Correct Calcium 8.2 (L) 8.5 - 10.5 mg/dL    AST(SGOT) 15 12 - 45 U/L    ALT(SGPT) 9 2 - 50 U/L    Alkaline Phosphatase 77 30 - 99 U/L    Total Bilirubin 0.5 0.1 - 1.5 mg/dL    Albumin 3.9 3.2 - 4.9 g/dL    Total Protein 6.9 6.0 - 8.2 g/dL    Globulin 3.0 1.9 - 3.5 g/dL    A-G Ratio 1.3 g/dL   Troponins NOW   Result Value Ref Range    Troponin T 25 (H) 6 - 19 ng/L   Troponins in two (2) hours   Result Value Ref Range    Troponin T 23 (H) 6 - 19 ng/L   ESTIMATED GFR   Result Value Ref Range    GFR (CKD-EPI) 51 (A) >60 mL/min/1.73 m 2   D-DIMER   Result Value Ref Range    D-Dimer 3.05 (H) 0.00 - 0.50 ug/mL (FEU)   DIFFERENTIAL MANUAL   Result Value Ref Range    Bands-Stabs 1.00 0.00 - 10.00 %    Metamyelocytes 3.00 %    Manual Diff Status PERFORMED    PLATELET ESTIMATE   Result Value Ref Range    Plt Estimation Significantly D    MORPHOLOGY   Result Value Ref Range    RBC Morphology Normal     Reactive Lymphocytes Few    IMMATURE PLT FRACTION   Result Value Ref Range    Imm. Plt Fraction 4.3 0.6 - 13.1 %   COD - Adult (Type and Screen)   Result Value Ref Range    ABO Grouping Only O     Rh Grouping Only NEG     Antibody Screen-Cod NEG     Component R        RI6                 RedBloodCellsIRR    [id number]   transfused   23   00:18      Product Type Red Blood Cells IRR LR Pheresis     Dispense Status transfused     Unit Number (Barcoded) [id number]     Product Code (Barcoded) N9235H46     Blood Type (Barcoded) 9500    EKG   Result Value Ref Range    Report       [facility]    Test Date:  2023-12-15  Pt Name:    [facility]           Department: [facility]  MRN:        [id number]                      Room:  Gender:     Male                         Technician: [id number]  :        1950                   Requested By:ER TRIAGE PROTOCOL  Order #:    [id number]                    Reading MD:    Measurements  Intervals                                Axis  Rate:       72                           P:          -19  NY:         158                          QRS:        4  QRSD:       120                          T:          49  QT:         365  QTc:        400    Interpretive Statements  Sinus rhythm  Nonspecific intraventricular conduction delay  Baseline wander in lead(s) V3  Compared to ECG 2023 18:38:36  Intraventricular conduction delay now present     EKG   Result Value Ref Range    Report       [facility]    Test Date:  2023  Pt Name:    [facility]           Department: [facility]  MRN:        [id number]                      [facility]  Gender:     Male                         Technician: [id number]  :        1950                   Requested By:JULIÁN LOPEZ  Order #:    [id number]                    Reading MD:    Measurements  Intervals                                Axis  Rate:       71                           P:          37  NY:         172                          QRS:        -4  QRSD:       120                          T:          57  QT:         367  QTc:        399    Interpretive Statements  Sinus rhythm  Nonspecific intraventricular  conduction delay  Compared to ECG 12/15/2023 15:37:32  No significant changes         Radiology  CT-CTA CHEST PULMONARY ARTERY W/ RECONS   Final Result      1.  No pulmonary embolus.   2.  Treated left lower lobe lesion, stable in size and morphology.   3.  Unchanged groundglass nodule in the superior segment of the left lower lobe.   4.  No new pulmonary nodules or masses.         DX-CHEST-PORTABLE (1 VIEW)   Final Result      No acute cardiac or pulmonary abnormalities are identified.          Medications:   New Prescriptions    SUCRALFATE (CARAFATE) 1 GM/10ML SUSPENSION    Take 10 mL by mouth 2 times a day for 10 days.       My final assessment includes esophagitis, neutropenia, anemia of chronic disease  Upon Reevaluation, the patient's condition has: Improved; and will be discharged.    Patient discharged from ED Observation status at 3:00am (Time) 12/16/23 (Date).     Total time spent on this ED Observation discharge encounter is > 30 Minutes    Electronically signed by: Jacqueline Soni D.O., 12/16/2023 3:11 AM

## 2023-12-16 NOTE — ED NOTES
Per Alexus from the blood bank the PRBC would need to irradiated which could take over an hour to process, she will call me when the unit of blood is ready.  Patient has been informed about the delay in the blood transfusion.    Consent for the blood transfusion has been signed.

## 2023-12-17 ENCOUNTER — APPOINTMENT (OUTPATIENT)
Dept: RADIOLOGY | Facility: MEDICAL CENTER | Age: 73
DRG: 808 | End: 2023-12-17
Attending: EMERGENCY MEDICINE
Payer: COMMERCIAL

## 2023-12-17 ENCOUNTER — HOSPITAL ENCOUNTER (INPATIENT)
Facility: MEDICAL CENTER | Age: 73
LOS: 6 days | DRG: 808 | End: 2023-12-23
Attending: EMERGENCY MEDICINE | Admitting: HOSPITALIST
Payer: COMMERCIAL

## 2023-12-17 DIAGNOSIS — J44.9 CHRONIC OBSTRUCTIVE PULMONARY DISEASE, UNSPECIFIED COPD TYPE (HCC): ICD-10-CM

## 2023-12-17 DIAGNOSIS — D70.9 NEUTROPENIC FEVER (HCC): ICD-10-CM

## 2023-12-17 DIAGNOSIS — J44.9 COPD WITHOUT EXACERBATION (HCC): ICD-10-CM

## 2023-12-17 DIAGNOSIS — R50.81 NEUTROPENIC FEVER (HCC): ICD-10-CM

## 2023-12-17 DIAGNOSIS — K20.80 RADIATION-INDUCED ESOPHAGITIS: ICD-10-CM

## 2023-12-17 DIAGNOSIS — T66.XXXA RADIATION-INDUCED ESOPHAGITIS: ICD-10-CM

## 2023-12-17 DIAGNOSIS — E83.42 HYPOMAGNESEMIA: ICD-10-CM

## 2023-12-17 DIAGNOSIS — E43 SEVERE PROTEIN-CALORIE MALNUTRITION (HCC): ICD-10-CM

## 2023-12-17 PROBLEM — E87.1 HYPONATREMIA: Status: ACTIVE | Noted: 2023-12-17

## 2023-12-17 PROBLEM — D69.6 THROMBOCYTOPENIA (HCC): Status: ACTIVE | Noted: 2023-12-17

## 2023-12-17 PROBLEM — E83.39 HYPOPHOSPHATEMIA: Status: ACTIVE | Noted: 2023-12-17

## 2023-12-17 LAB
ALBUMIN SERPL BCP-MCNC: 3.6 G/DL (ref 3.2–4.9)
ALBUMIN/GLOB SERPL: 1.1 G/DL
ALP SERPL-CCNC: 96 U/L (ref 30–99)
ALT SERPL-CCNC: 9 U/L (ref 2–50)
ANION GAP SERPL CALC-SCNC: 17 MMOL/L (ref 7–16)
ANISOCYTOSIS BLD QL SMEAR: ABNORMAL
APPEARANCE UR: CLEAR
AST SERPL-CCNC: 15 U/L (ref 12–45)
BASOPHILS # BLD AUTO: 0 % (ref 0–1.8)
BASOPHILS # BLD: 0 K/UL (ref 0–0.12)
BILIRUB SERPL-MCNC: 0.9 MG/DL (ref 0.1–1.5)
BILIRUB UR QL STRIP.AUTO: NEGATIVE
BUN SERPL-MCNC: 20 MG/DL (ref 8–22)
CALCIUM ALBUM COR SERPL-MCNC: 8.2 MG/DL (ref 8.5–10.5)
CALCIUM SERPL-MCNC: 7.9 MG/DL (ref 8.4–10.2)
CHLORIDE SERPL-SCNC: 95 MMOL/L (ref 96–112)
CO2 SERPL-SCNC: 22 MMOL/L (ref 20–33)
COLOR UR: YELLOW
CREAT SERPL-MCNC: 1.26 MG/DL (ref 0.5–1.4)
EOSINOPHIL # BLD AUTO: 0 K/UL (ref 0–0.51)
EOSINOPHIL NFR BLD: 0 % (ref 0–6.9)
ERYTHROCYTE [DISTWIDTH] IN BLOOD BY AUTOMATED COUNT: 47.5 FL (ref 35.9–50)
FLUAV RNA SPEC QL NAA+PROBE: NEGATIVE
FLUBV RNA SPEC QL NAA+PROBE: NEGATIVE
GFR SERPLBLD CREATININE-BSD FMLA CKD-EPI: 60 ML/MIN/1.73 M 2
GLOBULIN SER CALC-MCNC: 3.2 G/DL (ref 1.9–3.5)
GLUCOSE BLD STRIP.AUTO-MCNC: 151 MG/DL (ref 65–99)
GLUCOSE SERPL-MCNC: 132 MG/DL (ref 65–99)
GLUCOSE UR STRIP.AUTO-MCNC: NEGATIVE MG/DL
HCT VFR BLD AUTO: 21.9 % (ref 42–52)
HGB BLD-MCNC: 7.7 G/DL (ref 14–18)
HYPOCHROMIA BLD QL SMEAR: ABNORMAL
KETONES UR STRIP.AUTO-MCNC: 40 MG/DL
LACTATE SERPL-SCNC: 1.6 MMOL/L (ref 0.5–2)
LEUKOCYTE ESTERASE UR QL STRIP.AUTO: NEGATIVE
LIPASE SERPL-CCNC: 6 U/L (ref 11–82)
LYMPHOCYTES # BLD AUTO: 0.23 K/UL (ref 1–4.8)
LYMPHOCYTES NFR BLD: 76 % (ref 22–41)
MACROCYTES BLD QL SMEAR: ABNORMAL
MAGNESIUM SERPL-MCNC: 1 MG/DL (ref 1.5–2.5)
MANUAL DIFF BLD: NORMAL
MCH RBC QN AUTO: 32.1 PG (ref 27–33)
MCHC RBC AUTO-ENTMCNC: 35.2 G/DL (ref 32.3–36.5)
MCV RBC AUTO: 91.3 FL (ref 81.4–97.8)
MICRO URNS: ABNORMAL
MICROCYTES BLD QL SMEAR: ABNORMAL
MONOCYTES # BLD AUTO: 0.04 K/UL (ref 0–0.85)
MONOCYTES NFR BLD AUTO: 12 % (ref 0–13.4)
NEUTROPHILS # BLD AUTO: 0.04 K/UL (ref 1.82–7.42)
NEUTROPHILS NFR BLD: 12 % (ref 44–72)
NITRITE UR QL STRIP.AUTO: NEGATIVE
NRBC # BLD AUTO: 0 K/UL
NRBC BLD-RTO: 0 /100 WBC (ref 0–0.2)
PH UR STRIP.AUTO: 5.5 [PH] (ref 5–8)
PHOSPHATE SERPL-MCNC: 2 MG/DL (ref 2.5–4.5)
PLATELET # BLD AUTO: 27 K/UL (ref 164–446)
PLATELET BLD QL SMEAR: NORMAL
PLATELETS.RETICULATED NFR BLD AUTO: 3.4 % (ref 0.6–13.1)
PMV BLD AUTO: 10.1 FL (ref 9–12.9)
POTASSIUM SERPL-SCNC: 3.4 MMOL/L (ref 3.6–5.5)
PROT SERPL-MCNC: 6.8 G/DL (ref 6–8.2)
PROT UR QL STRIP: NEGATIVE MG/DL
RBC # BLD AUTO: 2.4 M/UL (ref 4.7–6.1)
RBC BLD AUTO: PRESENT
RBC UR QL AUTO: NEGATIVE
RSV RNA SPEC QL NAA+PROBE: NEGATIVE
SARS-COV-2 RNA RESP QL NAA+PROBE: NOTDETECTED
SODIUM SERPL-SCNC: 134 MMOL/L (ref 135–145)
SP GR UR STRIP.AUTO: 1.02
SPECIMEN SOURCE: NORMAL
WBC # BLD AUTO: 0.3 K/UL (ref 4.8–10.8)

## 2023-12-17 PROCEDURE — 71045 X-RAY EXAM CHEST 1 VIEW: CPT

## 2023-12-17 PROCEDURE — 700102 HCHG RX REV CODE 250 W/ 637 OVERRIDE(OP): Performed by: HOSPITALIST

## 2023-12-17 PROCEDURE — 80053 COMPREHEN METABOLIC PANEL: CPT

## 2023-12-17 PROCEDURE — 700111 HCHG RX REV CODE 636 W/ 250 OVERRIDE (IP): Mod: JZ | Performed by: EMERGENCY MEDICINE

## 2023-12-17 PROCEDURE — 700105 HCHG RX REV CODE 258: Performed by: HOSPITALIST

## 2023-12-17 PROCEDURE — 94640 AIRWAY INHALATION TREATMENT: CPT

## 2023-12-17 PROCEDURE — 700105 HCHG RX REV CODE 258: Performed by: EMERGENCY MEDICINE

## 2023-12-17 PROCEDURE — 84100 ASSAY OF PHOSPHORUS: CPT

## 2023-12-17 PROCEDURE — 96366 THER/PROPH/DIAG IV INF ADDON: CPT

## 2023-12-17 PROCEDURE — 87040 BLOOD CULTURE FOR BACTERIA: CPT | Mod: 91

## 2023-12-17 PROCEDURE — 700102 HCHG RX REV CODE 250 W/ 637 OVERRIDE(OP): Performed by: EMERGENCY MEDICINE

## 2023-12-17 PROCEDURE — 83690 ASSAY OF LIPASE: CPT

## 2023-12-17 PROCEDURE — 85007 BL SMEAR W/DIFF WBC COUNT: CPT

## 2023-12-17 PROCEDURE — 99223 1ST HOSP IP/OBS HIGH 75: CPT | Performed by: HOSPITALIST

## 2023-12-17 PROCEDURE — 0241U HCHG SARS-COV-2 COVID-19 NFCT DS RESP RNA 4 TRGT MIC: CPT

## 2023-12-17 PROCEDURE — 96365 THER/PROPH/DIAG IV INF INIT: CPT

## 2023-12-17 PROCEDURE — 770020 HCHG ROOM/CARE - TELE (206)

## 2023-12-17 PROCEDURE — 82962 GLUCOSE BLOOD TEST: CPT

## 2023-12-17 PROCEDURE — 83735 ASSAY OF MAGNESIUM: CPT

## 2023-12-17 PROCEDURE — 81003 URINALYSIS AUTO W/O SCOPE: CPT

## 2023-12-17 PROCEDURE — 85055 RETICULATED PLATELET ASSAY: CPT

## 2023-12-17 PROCEDURE — 99285 EMERGENCY DEPT VISIT HI MDM: CPT

## 2023-12-17 PROCEDURE — 94760 N-INVAS EAR/PLS OXIMETRY 1: CPT

## 2023-12-17 PROCEDURE — 96368 THER/DIAG CONCURRENT INF: CPT

## 2023-12-17 PROCEDURE — 87086 URINE CULTURE/COLONY COUNT: CPT

## 2023-12-17 PROCEDURE — 700111 HCHG RX REV CODE 636 W/ 250 OVERRIDE (IP): Performed by: HOSPITALIST

## 2023-12-17 PROCEDURE — 83605 ASSAY OF LACTIC ACID: CPT

## 2023-12-17 PROCEDURE — A9270 NON-COVERED ITEM OR SERVICE: HCPCS | Performed by: EMERGENCY MEDICINE

## 2023-12-17 PROCEDURE — 700101 HCHG RX REV CODE 250: Performed by: HOSPITALIST

## 2023-12-17 PROCEDURE — A9270 NON-COVERED ITEM OR SERVICE: HCPCS | Performed by: HOSPITALIST

## 2023-12-17 PROCEDURE — 96367 TX/PROPH/DG ADDL SEQ IV INF: CPT

## 2023-12-17 PROCEDURE — C9803 HOPD COVID-19 SPEC COLLECT: HCPCS | Performed by: EMERGENCY MEDICINE

## 2023-12-17 PROCEDURE — 83036 HEMOGLOBIN GLYCOSYLATED A1C: CPT

## 2023-12-17 PROCEDURE — 36415 COLL VENOUS BLD VENIPUNCTURE: CPT

## 2023-12-17 PROCEDURE — 85027 COMPLETE CBC AUTOMATED: CPT

## 2023-12-17 RX ORDER — SUCRALFATE ORAL 1 G/10ML
1 SUSPENSION ORAL 2 TIMES DAILY
Status: DISCONTINUED | OUTPATIENT
Start: 2023-12-17 | End: 2023-12-22

## 2023-12-17 RX ORDER — ONDANSETRON 2 MG/ML
4 INJECTION INTRAMUSCULAR; INTRAVENOUS EVERY 4 HOURS PRN
Status: DISCONTINUED | OUTPATIENT
Start: 2023-12-17 | End: 2023-12-23 | Stop reason: HOSPADM

## 2023-12-17 RX ORDER — GUAIFENESIN/DEXTROMETHORPHAN 100-10MG/5
10 SYRUP ORAL EVERY 6 HOURS PRN
Status: DISCONTINUED | OUTPATIENT
Start: 2023-12-17 | End: 2023-12-17

## 2023-12-17 RX ORDER — VITAMIN B COMPLEX
1000 TABLET ORAL DAILY
Status: DISCONTINUED | OUTPATIENT
Start: 2023-12-18 | End: 2023-12-17

## 2023-12-17 RX ORDER — GABAPENTIN 300 MG/1
600 CAPSULE ORAL 2 TIMES DAILY
Status: DISCONTINUED | OUTPATIENT
Start: 2023-12-17 | End: 2023-12-17

## 2023-12-17 RX ORDER — BISACODYL 10 MG
10 SUPPOSITORY, RECTAL RECTAL
Status: DISCONTINUED | OUTPATIENT
Start: 2023-12-17 | End: 2023-12-17

## 2023-12-17 RX ORDER — GUAIFENESIN 600 MG/1
600 TABLET, EXTENDED RELEASE ORAL EVERY 12 HOURS
Status: DISCONTINUED | OUTPATIENT
Start: 2023-12-17 | End: 2023-12-23 | Stop reason: HOSPADM

## 2023-12-17 RX ORDER — OXYCODONE HYDROCHLORIDE 5 MG/1
2.5 TABLET ORAL
Status: DISCONTINUED | OUTPATIENT
Start: 2023-12-17 | End: 2023-12-23 | Stop reason: HOSPADM

## 2023-12-17 RX ORDER — ONDANSETRON 4 MG/1
4 TABLET, ORALLY DISINTEGRATING ORAL EVERY 4 HOURS PRN
Status: DISCONTINUED | OUTPATIENT
Start: 2023-12-17 | End: 2023-12-23 | Stop reason: HOSPADM

## 2023-12-17 RX ORDER — MAGNESIUM SULFATE HEPTAHYDRATE 40 MG/ML
4 INJECTION, SOLUTION INTRAVENOUS ONCE
Status: COMPLETED | OUTPATIENT
Start: 2023-12-17 | End: 2023-12-18

## 2023-12-17 RX ORDER — AMOXICILLIN 250 MG
2 CAPSULE ORAL 2 TIMES DAILY
Status: DISCONTINUED | OUTPATIENT
Start: 2023-12-18 | End: 2023-12-17

## 2023-12-17 RX ORDER — SODIUM CHLORIDE 9 MG/ML
INJECTION, SOLUTION INTRAVENOUS CONTINUOUS
Status: DISCONTINUED | OUTPATIENT
Start: 2023-12-17 | End: 2023-12-23 | Stop reason: HOSPADM

## 2023-12-17 RX ORDER — ONDANSETRON 4 MG/1
4 TABLET, ORALLY DISINTEGRATING ORAL EVERY 4 HOURS PRN
Status: DISCONTINUED | OUTPATIENT
Start: 2023-12-17 | End: 2023-12-17

## 2023-12-17 RX ORDER — AMOXICILLIN 250 MG
2 CAPSULE ORAL 2 TIMES DAILY
Status: DISCONTINUED | OUTPATIENT
Start: 2023-12-17 | End: 2023-12-17

## 2023-12-17 RX ORDER — SODIUM CHLORIDE, SODIUM LACTATE, POTASSIUM CHLORIDE, AND CALCIUM CHLORIDE .6; .31; .03; .02 G/100ML; G/100ML; G/100ML; G/100ML
30 INJECTION, SOLUTION INTRAVENOUS ONCE
Status: COMPLETED | OUTPATIENT
Start: 2023-12-17 | End: 2023-12-17

## 2023-12-17 RX ORDER — HYDROMORPHONE HYDROCHLORIDE 1 MG/ML
0.25 INJECTION, SOLUTION INTRAMUSCULAR; INTRAVENOUS; SUBCUTANEOUS
Status: DISCONTINUED | OUTPATIENT
Start: 2023-12-17 | End: 2023-12-17

## 2023-12-17 RX ORDER — ACETAMINOPHEN 325 MG/1
650 TABLET ORAL EVERY 6 HOURS PRN
Status: DISCONTINUED | OUTPATIENT
Start: 2023-12-17 | End: 2023-12-17

## 2023-12-17 RX ORDER — ALBUTEROL SULFATE 90 UG/1
2 AEROSOL, METERED RESPIRATORY (INHALATION) EVERY 4 HOURS PRN
Status: DISCONTINUED | OUTPATIENT
Start: 2023-12-17 | End: 2023-12-23 | Stop reason: HOSPADM

## 2023-12-17 RX ORDER — OXYCODONE HYDROCHLORIDE 5 MG/1
5 TABLET ORAL
Status: DISCONTINUED | OUTPATIENT
Start: 2023-12-17 | End: 2023-12-17

## 2023-12-17 RX ORDER — LANSOPRAZOLE 30 MG/1
30 TABLET, ORALLY DISINTEGRATING, DELAYED RELEASE ORAL DAILY
Status: DISCONTINUED | OUTPATIENT
Start: 2023-12-18 | End: 2023-12-17

## 2023-12-17 RX ORDER — OMEPRAZOLE 20 MG/1
40 CAPSULE, DELAYED RELEASE ORAL DAILY
Status: DISCONTINUED | OUTPATIENT
Start: 2023-12-18 | End: 2023-12-22

## 2023-12-17 RX ORDER — ACETAMINOPHEN 325 MG/1
650 TABLET ORAL EVERY 6 HOURS PRN
Status: DISCONTINUED | OUTPATIENT
Start: 2023-12-17 | End: 2023-12-23 | Stop reason: HOSPADM

## 2023-12-17 RX ORDER — GABAPENTIN 300 MG/1
600 CAPSULE ORAL 2 TIMES DAILY
Status: DISCONTINUED | OUTPATIENT
Start: 2023-12-17 | End: 2023-12-23 | Stop reason: HOSPADM

## 2023-12-17 RX ORDER — SUCRALFATE ORAL 1 G/10ML
1 SUSPENSION ORAL 2 TIMES DAILY
Status: DISCONTINUED | OUTPATIENT
Start: 2023-12-18 | End: 2023-12-17

## 2023-12-17 RX ORDER — SUCRALFATE ORAL 1 G/10ML
1 SUSPENSION ORAL 2 TIMES DAILY
Status: DISCONTINUED | OUTPATIENT
Start: 2023-12-17 | End: 2023-12-17

## 2023-12-17 RX ORDER — ASPIRIN 81 MG/1
81 TABLET ORAL DAILY
Status: DISCONTINUED | OUTPATIENT
Start: 2023-12-18 | End: 2023-12-23 | Stop reason: HOSPADM

## 2023-12-17 RX ORDER — GUAIFENESIN 200 MG/10ML
5 LIQUID ORAL EVERY 4 HOURS
Status: DISCONTINUED | OUTPATIENT
Start: 2023-12-17 | End: 2023-12-17

## 2023-12-17 RX ORDER — ASPIRIN 81 MG/1
81 TABLET ORAL DAILY
Status: DISCONTINUED | OUTPATIENT
Start: 2023-12-18 | End: 2023-12-17

## 2023-12-17 RX ORDER — AMOXICILLIN 250 MG
2 CAPSULE ORAL 2 TIMES DAILY
Status: DISCONTINUED | OUTPATIENT
Start: 2023-12-18 | End: 2023-12-20

## 2023-12-17 RX ORDER — HYDROMORPHONE HYDROCHLORIDE 1 MG/ML
0.25 INJECTION, SOLUTION INTRAMUSCULAR; INTRAVENOUS; SUBCUTANEOUS
Status: DISCONTINUED | OUTPATIENT
Start: 2023-12-17 | End: 2023-12-23 | Stop reason: HOSPADM

## 2023-12-17 RX ORDER — OXYCODONE HYDROCHLORIDE 5 MG/1
2.5 TABLET ORAL
Status: DISCONTINUED | OUTPATIENT
Start: 2023-12-17 | End: 2023-12-17

## 2023-12-17 RX ORDER — VITAMIN B COMPLEX
1000 TABLET ORAL DAILY
Status: DISCONTINUED | OUTPATIENT
Start: 2023-12-18 | End: 2023-12-23 | Stop reason: HOSPADM

## 2023-12-17 RX ORDER — ASPIRIN 81 MG/1
81 TABLET, CHEWABLE ORAL DAILY
Status: DISCONTINUED | OUTPATIENT
Start: 2023-12-18 | End: 2023-12-17

## 2023-12-17 RX ORDER — POLYETHYLENE GLYCOL 3350 17 G/17G
1 POWDER, FOR SOLUTION ORAL
Status: DISCONTINUED | OUTPATIENT
Start: 2023-12-17 | End: 2023-12-20

## 2023-12-17 RX ORDER — ACETAMINOPHEN 325 MG/1
650 TABLET ORAL ONCE
Status: COMPLETED | OUTPATIENT
Start: 2023-12-17 | End: 2023-12-17

## 2023-12-17 RX ORDER — DEXTROSE MONOHYDRATE 25 G/50ML
25 INJECTION, SOLUTION INTRAVENOUS
Status: DISCONTINUED | OUTPATIENT
Start: 2023-12-17 | End: 2023-12-23 | Stop reason: HOSPADM

## 2023-12-17 RX ORDER — OMEPRAZOLE 20 MG/1
40 CAPSULE, DELAYED RELEASE ORAL DAILY
Status: DISCONTINUED | OUTPATIENT
Start: 2023-12-18 | End: 2023-12-17

## 2023-12-17 RX ORDER — POLYETHYLENE GLYCOL 3350 17 G/17G
1 POWDER, FOR SOLUTION ORAL
Status: DISCONTINUED | OUTPATIENT
Start: 2023-12-17 | End: 2023-12-17

## 2023-12-17 RX ORDER — BISACODYL 10 MG
10 SUPPOSITORY, RECTAL RECTAL
Status: DISCONTINUED | OUTPATIENT
Start: 2023-12-17 | End: 2023-12-20

## 2023-12-17 RX ORDER — BACLOFEN 10 MG/1
10 TABLET ORAL NIGHTLY PRN
Status: DISCONTINUED | OUTPATIENT
Start: 2023-12-17 | End: 2023-12-17

## 2023-12-17 RX ORDER — GUAIFENESIN 600 MG/1
600 TABLET, EXTENDED RELEASE ORAL EVERY 12 HOURS
Status: DISCONTINUED | OUTPATIENT
Start: 2023-12-17 | End: 2023-12-17

## 2023-12-17 RX ORDER — BACLOFEN 10 MG/1
10 TABLET ORAL NIGHTLY PRN
Status: DISCONTINUED | OUTPATIENT
Start: 2023-12-17 | End: 2023-12-23 | Stop reason: HOSPADM

## 2023-12-17 RX ORDER — GUAIFENESIN/DEXTROMETHORPHAN 100-10MG/5
10 SYRUP ORAL EVERY 6 HOURS PRN
Status: DISCONTINUED | OUTPATIENT
Start: 2023-12-17 | End: 2023-12-23 | Stop reason: HOSPADM

## 2023-12-17 RX ORDER — HYDRALAZINE HYDROCHLORIDE 20 MG/ML
10 INJECTION INTRAMUSCULAR; INTRAVENOUS EVERY 4 HOURS PRN
Status: DISCONTINUED | OUTPATIENT
Start: 2023-12-17 | End: 2023-12-23 | Stop reason: HOSPADM

## 2023-12-17 RX ORDER — GABAPENTIN 300 MG/1
600 CAPSULE ORAL 2 TIMES DAILY
Status: DISCONTINUED | OUTPATIENT
Start: 2023-12-18 | End: 2023-12-17

## 2023-12-17 RX ORDER — MAGNESIUM SULFATE HEPTAHYDRATE 40 MG/ML
2 INJECTION, SOLUTION INTRAVENOUS ONCE
Status: COMPLETED | OUTPATIENT
Start: 2023-12-17 | End: 2023-12-17

## 2023-12-17 RX ORDER — LIDOCAINE HYDROCHLORIDE 20 MG/ML
15 SOLUTION OROPHARYNGEAL
Status: DISCONTINUED | OUTPATIENT
Start: 2023-12-17 | End: 2023-12-23 | Stop reason: HOSPADM

## 2023-12-17 RX ORDER — OXYCODONE HYDROCHLORIDE 5 MG/1
5 TABLET ORAL
Status: DISCONTINUED | OUTPATIENT
Start: 2023-12-17 | End: 2023-12-23 | Stop reason: HOSPADM

## 2023-12-17 RX ADMIN — MOMETASONE FUROATE AND FORMOTEROL FUMARATE DIHYDRATE 2 PUFF: 200; 5 AEROSOL RESPIRATORY (INHALATION) at 19:44

## 2023-12-17 RX ADMIN — INSULIN HUMAN 2 UNITS: 100 INJECTION, SOLUTION PARENTERAL at 22:11

## 2023-12-17 RX ADMIN — ACETAMINOPHEN 650 MG: 325 TABLET ORAL at 15:30

## 2023-12-17 RX ADMIN — SODIUM CHLORIDE: 9 INJECTION, SOLUTION INTRAVENOUS at 19:34

## 2023-12-17 RX ADMIN — SODIUM CHLORIDE, POTASSIUM CHLORIDE, SODIUM LACTATE AND CALCIUM CHLORIDE 2313 ML: 600; 310; 30; 20 INJECTION, SOLUTION INTRAVENOUS at 15:27

## 2023-12-17 RX ADMIN — MAGNESIUM SULFATE HEPTAHYDRATE 2 G: 2 INJECTION, SOLUTION INTRAVENOUS at 16:42

## 2023-12-17 RX ADMIN — ACYCLOVIR SODIUM 700 MG: 50 INJECTION, SOLUTION INTRAVENOUS at 22:04

## 2023-12-17 RX ADMIN — PIPERACILLIN AND TAZOBACTAM 4.5 G: 4; .5 INJECTION, POWDER, FOR SOLUTION INTRAVENOUS at 20:31

## 2023-12-17 RX ADMIN — VANCOMYCIN HYDROCHLORIDE 2000 MG: 5 INJECTION, POWDER, LYOPHILIZED, FOR SOLUTION INTRAVENOUS at 17:43

## 2023-12-17 RX ADMIN — SUCRALFATE 1 G: 1 SUSPENSION ORAL at 21:54

## 2023-12-17 RX ADMIN — MAGNESIUM SULFATE IN WATER FOR 4 G: 40 INJECTION INTRAVENOUS at 20:25

## 2023-12-17 RX ADMIN — OXYCODONE HYDROCHLORIDE 2.5 MG: 5 TABLET ORAL at 22:37

## 2023-12-17 RX ADMIN — CEFEPIME 2 G: 2 INJECTION, POWDER, FOR SOLUTION INTRAVENOUS at 16:43

## 2023-12-17 RX ADMIN — POTASSIUM PHOSPHATE, MONOBASIC AND POTASSIUM PHOSPHATE, DIBASIC 30 MMOL: 224; 236 INJECTION, SOLUTION, CONCENTRATE INTRAVENOUS at 19:52

## 2023-12-17 RX ADMIN — GUAIFENESIN 600 MG: 600 TABLET, EXTENDED RELEASE ORAL at 21:54

## 2023-12-17 RX ADMIN — GABAPENTIN 600 MG: 300 CAPSULE ORAL at 21:54

## 2023-12-17 ASSESSMENT — COGNITIVE AND FUNCTIONAL STATUS - GENERAL
DRESSING REGULAR LOWER BODY CLOTHING: A LITTLE
STANDING UP FROM CHAIR USING ARMS: A LOT
MOVING FROM LYING ON BACK TO SITTING ON SIDE OF FLAT BED: A LITTLE
WALKING IN HOSPITAL ROOM: A LOT
DRESSING REGULAR UPPER BODY CLOTHING: A LITTLE
MOBILITY SCORE: 15
SUGGESTED CMS G CODE MODIFIER MOBILITY: CK
DAILY ACTIVITIY SCORE: 22
TURNING FROM BACK TO SIDE WHILE IN FLAT BAD: A LITTLE
CLIMB 3 TO 5 STEPS WITH RAILING: A LOT
MOVING TO AND FROM BED TO CHAIR: A LITTLE
SUGGESTED CMS G CODE MODIFIER DAILY ACTIVITY: CJ

## 2023-12-17 ASSESSMENT — ENCOUNTER SYMPTOMS
BRUISES/BLEEDS EASILY: 0
HEADACHES: 0
DOUBLE VISION: 0
PALPITATIONS: 0
VOMITING: 0
SHORTNESS OF BREATH: 1
FEVER: 1
WHEEZING: 0
COUGH: 1
EYES NEGATIVE: 1
BLOOD IN STOOL: 0
ABDOMINAL PAIN: 0
CONSTIPATION: 0
DEPRESSION: 0
SORE THROAT: 1
DIAPHORESIS: 0
DIZZINESS: 0
LOSS OF CONSCIOUSNESS: 0
DIARRHEA: 0
SEIZURES: 0
WEIGHT LOSS: 1
PSYCHIATRIC NEGATIVE: 1
BLURRED VISION: 0
NEUROLOGICAL NEGATIVE: 1
MYALGIAS: 1
NAUSEA: 0
FOCAL WEAKNESS: 0
CHILLS: 1
NERVOUS/ANXIOUS: 0
HEMOPTYSIS: 0
MEMORY LOSS: 0
HEARTBURN: 1
CARDIOVASCULAR NEGATIVE: 1

## 2023-12-17 ASSESSMENT — LIFESTYLE VARIABLES
AVERAGE NUMBER OF DAYS PER WEEK YOU HAVE A DRINK CONTAINING ALCOHOL: 0
CONSUMPTION TOTAL: NEGATIVE
HAVE PEOPLE ANNOYED YOU BY CRITICIZING YOUR DRINKING: NO
ON A TYPICAL DAY WHEN YOU DRINK ALCOHOL HOW MANY DRINKS DO YOU HAVE: 0
HAVE YOU EVER FELT YOU SHOULD CUT DOWN ON YOUR DRINKING: NO
TOTAL SCORE: 0
EVER FELT BAD OR GUILTY ABOUT YOUR DRINKING: NO
TOTAL SCORE: 0
TOTAL SCORE: 0
HOW MANY TIMES IN THE PAST YEAR HAVE YOU HAD 5 OR MORE DRINKS IN A DAY: 0
ALCOHOL_USE: NO
DOES PATIENT WANT TO STOP DRINKING: NO
EVER HAD A DRINK FIRST THING IN THE MORNING TO STEADY YOUR NERVES TO GET RID OF A HANGOVER: NO
SUBSTANCE_ABUSE: 0

## 2023-12-17 ASSESSMENT — FIBROSIS 4 INDEX
FIB4 SCORE: 14.6
FIB4 SCORE: 13.52
FIB4 SCORE: 13.52

## 2023-12-17 ASSESSMENT — COPD QUESTIONNAIRES
HAVE YOU SMOKED AT LEAST 100 CIGARETTES IN YOUR ENTIRE LIFE: YES
DURING THE PAST 4 WEEKS HOW MUCH DID YOU FEEL SHORT OF BREATH: SOME OF THE TIME
DO YOU EVER COUGH UP ANY MUCUS OR PHLEGM?: YES, A FEW DAYS A WEEK OR MONTH
COPD SCREENING SCORE: 8

## 2023-12-17 ASSESSMENT — PATIENT HEALTH QUESTIONNAIRE - PHQ9
SUM OF ALL RESPONSES TO PHQ9 QUESTIONS 1 AND 2: 0
2. FEELING DOWN, DEPRESSED, IRRITABLE, OR HOPELESS: NOT AT ALL
1. LITTLE INTEREST OR PLEASURE IN DOING THINGS: NOT AT ALL

## 2023-12-17 ASSESSMENT — PAIN DESCRIPTION - PAIN TYPE
TYPE: ACUTE PAIN

## 2023-12-17 NOTE — ED NOTES
Viral swab collected and sent to the lab.  Pt medicated for fever.  ERP has been to bedside.  pCXR bedside at this time.

## 2023-12-17 NOTE — ED NOTES
Medication history reviewed with pt. Med rec is complete.  Allergies reviewed, per pt    Pt reports that he stopped the infusion that he receives from [facility] every 3 weeks, about 10 days ago.      Patient has not had any outpatient antibiotics in the last 30 days.    Pt is not on any anticoagulants

## 2023-12-17 NOTE — ED TRIAGE NOTES
"Bart [facility]  73 y.o.  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  Pt febrile, reports recent runny nose and fatigue.  PIV placed pta and pt was given 1 neb breathing treatment although spo2 wnl pta.  Pt states he was here last week needing a blood transfusion for anemia.  /73   Pulse 99   Temp (!) 38.7 °C (101.6 °F) (Temporal)   Resp 20   Ht 1.753 m (5' 9\")   Wt 77.1 kg (170 lb)   SpO2 94%   BMI 25.10 kg/m²     "

## 2023-12-18 LAB
ABO GROUP BLD: NORMAL
ANION GAP SERPL CALC-SCNC: 12 MMOL/L (ref 7–16)
BARCODED ABORH UBTYP: 5100
BARCODED ABORH UBTYP: 9500
BARCODED ABORH UBTYP: 9500
BARCODED PRD CODE UBPRD: NORMAL
BARCODED UNIT NUM UBUNT: NORMAL
BLD GP AB SCN SERPL QL: NORMAL
BUN SERPL-MCNC: 18 MG/DL (ref 8–22)
CALCIUM SERPL-MCNC: 7.2 MG/DL (ref 8.4–10.2)
CHLORIDE SERPL-SCNC: 97 MMOL/L (ref 96–112)
CO2 SERPL-SCNC: 22 MMOL/L (ref 20–33)
COMPONENT R 8504R: NORMAL
CREAT SERPL-MCNC: 1.18 MG/DL (ref 0.5–1.4)
CRP SERPL HS-MCNC: 16.55 MG/DL (ref 0–0.75)
ERYTHROCYTE [DISTWIDTH] IN BLOOD BY AUTOMATED COUNT: 48.1 FL (ref 35.9–50)
EST. AVERAGE GLUCOSE BLD GHB EST-MCNC: 123 MG/DL
GFR SERPLBLD CREATININE-BSD FMLA CKD-EPI: 65 ML/MIN/1.73 M 2
GLUCOSE BLD STRIP.AUTO-MCNC: 128 MG/DL (ref 65–99)
GLUCOSE BLD STRIP.AUTO-MCNC: 146 MG/DL (ref 65–99)
GLUCOSE BLD STRIP.AUTO-MCNC: 157 MG/DL (ref 65–99)
GLUCOSE SERPL-MCNC: 143 MG/DL (ref 65–99)
HBA1C MFR BLD: 5.9 % (ref 4–5.6)
HCT VFR BLD AUTO: 19.3 % (ref 42–52)
HGB BLD-MCNC: 6.8 G/DL (ref 14–18)
HGB BLD-MCNC: 7.6 G/DL (ref 14–18)
MAGNESIUM SERPL-MCNC: 2 MG/DL (ref 1.5–2.5)
MCH RBC QN AUTO: 32.2 PG (ref 27–33)
MCHC RBC AUTO-ENTMCNC: 35.2 G/DL (ref 32.3–36.5)
MCV RBC AUTO: 91.5 FL (ref 81.4–97.8)
PLATELET # BLD AUTO: 29 K/UL (ref 164–446)
PLATELETS.RETICULATED NFR BLD AUTO: 4.7 % (ref 0.6–13.1)
PMV BLD AUTO: 10.6 FL (ref 9–12.9)
POTASSIUM SERPL-SCNC: 3.3 MMOL/L (ref 3.6–5.5)
PREALB SERPL-MCNC: 10.1 MG/DL (ref 18–38)
PROCALCITONIN SERPL-MCNC: 0.22 NG/ML
PRODUCT TYPE UPROD: NORMAL
RBC # BLD AUTO: 2.11 M/UL (ref 4.7–6.1)
RH BLD: NORMAL
SODIUM SERPL-SCNC: 131 MMOL/L (ref 135–145)
UNIT STATUS USTAT: NORMAL
WBC # BLD AUTO: 0.6 K/UL (ref 4.8–10.8)

## 2023-12-18 PROCEDURE — 94664 DEMO&/EVAL PT USE INHALER: CPT

## 2023-12-18 PROCEDURE — A9270 NON-COVERED ITEM OR SERVICE: HCPCS | Performed by: HOSPITALIST

## 2023-12-18 PROCEDURE — 84134 ASSAY OF PREALBUMIN: CPT

## 2023-12-18 PROCEDURE — 86901 BLOOD TYPING SEROLOGIC RH(D): CPT

## 2023-12-18 PROCEDURE — 770020 HCHG ROOM/CARE - TELE (206)

## 2023-12-18 PROCEDURE — 700111 HCHG RX REV CODE 636 W/ 250 OVERRIDE (IP): Mod: JZ | Performed by: HOSPITALIST

## 2023-12-18 PROCEDURE — 99233 SBSQ HOSP IP/OBS HIGH 50: CPT | Performed by: INTERNAL MEDICINE

## 2023-12-18 PROCEDURE — 85027 COMPLETE CBC AUTOMATED: CPT

## 2023-12-18 PROCEDURE — 86900 BLOOD TYPING SEROLOGIC ABO: CPT

## 2023-12-18 PROCEDURE — 94640 AIRWAY INHALATION TREATMENT: CPT

## 2023-12-18 PROCEDURE — 80048 BASIC METABOLIC PNL TOTAL CA: CPT

## 2023-12-18 PROCEDURE — 97165 OT EVAL LOW COMPLEX 30 MIN: CPT

## 2023-12-18 PROCEDURE — 92610 EVALUATE SWALLOWING FUNCTION: CPT

## 2023-12-18 PROCEDURE — 36415 COLL VENOUS BLD VENIPUNCTURE: CPT

## 2023-12-18 PROCEDURE — 97535 SELF CARE MNGMENT TRAINING: CPT

## 2023-12-18 PROCEDURE — 85018 HEMOGLOBIN: CPT

## 2023-12-18 PROCEDURE — 700105 HCHG RX REV CODE 258: Performed by: HOSPITALIST

## 2023-12-18 PROCEDURE — 36430 TRANSFUSION BLD/BLD COMPNT: CPT

## 2023-12-18 PROCEDURE — 83735 ASSAY OF MAGNESIUM: CPT

## 2023-12-18 PROCEDURE — 30233N1 TRANSFUSION OF NONAUTOLOGOUS RED BLOOD CELLS INTO PERIPHERAL VEIN, PERCUTANEOUS APPROACH: ICD-10-PCS | Performed by: INTERNAL MEDICINE

## 2023-12-18 PROCEDURE — P9016 RBC LEUKOCYTES REDUCED: HCPCS

## 2023-12-18 PROCEDURE — 84145 PROCALCITONIN (PCT): CPT

## 2023-12-18 PROCEDURE — 86923 COMPATIBILITY TEST ELECTRIC: CPT

## 2023-12-18 PROCEDURE — 86140 C-REACTIVE PROTEIN: CPT

## 2023-12-18 PROCEDURE — 82962 GLUCOSE BLOOD TEST: CPT

## 2023-12-18 PROCEDURE — 700102 HCHG RX REV CODE 250 W/ 637 OVERRIDE(OP): Performed by: HOSPITALIST

## 2023-12-18 PROCEDURE — 97162 PT EVAL MOD COMPLEX 30 MIN: CPT

## 2023-12-18 PROCEDURE — 86850 RBC ANTIBODY SCREEN: CPT

## 2023-12-18 PROCEDURE — 85055 RETICULATED PLATELET ASSAY: CPT

## 2023-12-18 PROCEDURE — 94760 N-INVAS EAR/PLS OXIMETRY 1: CPT

## 2023-12-18 PROCEDURE — 700101 HCHG RX REV CODE 250: Performed by: HOSPITALIST

## 2023-12-18 RX ORDER — ENOXAPARIN SODIUM 100 MG/ML
40 INJECTION SUBCUTANEOUS DAILY
Status: DISCONTINUED | OUTPATIENT
Start: 2023-12-18 | End: 2023-12-18

## 2023-12-18 RX ADMIN — PIPERACILLIN AND TAZOBACTAM 4.5 G: 4; .5 INJECTION, POWDER, FOR SOLUTION INTRAVENOUS at 00:39

## 2023-12-18 RX ADMIN — SUCRALFATE 1 G: 1 SUSPENSION ORAL at 17:19

## 2023-12-18 RX ADMIN — SODIUM CHLORIDE: 9 INJECTION, SOLUTION INTRAVENOUS at 08:35

## 2023-12-18 RX ADMIN — OXYCODONE HYDROCHLORIDE 2.5 MG: 5 TABLET ORAL at 05:48

## 2023-12-18 RX ADMIN — GUAIFENESIN 600 MG: 600 TABLET, EXTENDED RELEASE ORAL at 17:19

## 2023-12-18 RX ADMIN — INSULIN HUMAN 2 UNITS: 100 INJECTION, SOLUTION PARENTERAL at 05:51

## 2023-12-18 RX ADMIN — PIPERACILLIN AND TAZOBACTAM 4.5 G: 4; .5 INJECTION, POWDER, FOR SOLUTION INTRAVENOUS at 08:35

## 2023-12-18 RX ADMIN — GABAPENTIN 600 MG: 300 CAPSULE ORAL at 17:19

## 2023-12-18 RX ADMIN — SUCRALFATE 1 G: 1 SUSPENSION ORAL at 05:32

## 2023-12-18 RX ADMIN — OMEPRAZOLE 40 MG: 20 CAPSULE, DELAYED RELEASE ORAL at 05:33

## 2023-12-18 RX ADMIN — MOMETASONE FUROATE AND FORMOTEROL FUMARATE DIHYDRATE 2 PUFF: 200; 5 AEROSOL RESPIRATORY (INHALATION) at 17:20

## 2023-12-18 RX ADMIN — GABAPENTIN 600 MG: 300 CAPSULE ORAL at 05:33

## 2023-12-18 RX ADMIN — GUAIFENESIN 600 MG: 600 TABLET, EXTENDED RELEASE ORAL at 05:33

## 2023-12-18 RX ADMIN — Medication 1000 UNITS: at 05:33

## 2023-12-18 RX ADMIN — ACYCLOVIR SODIUM 700 MG: 50 INJECTION, SOLUTION INTRAVENOUS at 08:48

## 2023-12-18 RX ADMIN — PIPERACILLIN AND TAZOBACTAM 4.5 G: 4; .5 INJECTION, POWDER, FOR SOLUTION INTRAVENOUS at 23:32

## 2023-12-18 RX ADMIN — LIDOCAINE HYDROCHLORIDE 15 ML: 20 SOLUTION ORAL at 08:41

## 2023-12-18 RX ADMIN — MOMETASONE FUROATE AND FORMOTEROL FUMARATE DIHYDRATE 2 PUFF: 200; 5 AEROSOL RESPIRATORY (INHALATION) at 06:53

## 2023-12-18 RX ADMIN — ONDANSETRON 4 MG: 2 INJECTION INTRAMUSCULAR; INTRAVENOUS at 04:08

## 2023-12-18 RX ADMIN — PIPERACILLIN AND TAZOBACTAM 4.5 G: 4; .5 INJECTION, POWDER, FOR SOLUTION INTRAVENOUS at 17:22

## 2023-12-18 RX ADMIN — DOCUSATE SODIUM 50MG AND SENNOSIDES 8.6MG 2 TABLET: 8.6; 5 TABLET, FILM COATED ORAL at 17:19

## 2023-12-18 RX ADMIN — ASPIRIN 81 MG: 81 TABLET, COATED ORAL at 05:33

## 2023-12-18 ASSESSMENT — COGNITIVE AND FUNCTIONAL STATUS - GENERAL
SUGGESTED CMS G CODE MODIFIER DAILY ACTIVITY: CJ
MOVING FROM LYING ON BACK TO SITTING ON SIDE OF FLAT BED: A LITTLE
DAILY ACTIVITIY SCORE: 22
SUGGESTED CMS G CODE MODIFIER MOBILITY: CK
HELP NEEDED FOR BATHING: A LITTLE
TOILETING: A LITTLE
MOBILITY SCORE: 18
CLIMB 3 TO 5 STEPS WITH RAILING: A LOT
MOVING TO AND FROM BED TO CHAIR: A LITTLE
WALKING IN HOSPITAL ROOM: A LITTLE
STANDING UP FROM CHAIR USING ARMS: A LITTLE

## 2023-12-18 ASSESSMENT — PAIN DESCRIPTION - PAIN TYPE
TYPE: ACUTE PAIN

## 2023-12-18 ASSESSMENT — GAIT ASSESSMENTS
GAIT LEVEL OF ASSIST: CONTACT GUARD ASSIST
DEVIATION: DECREASED BASE OF SUPPORT
DISTANCE (FEET): 15
ASSISTIVE DEVICE: FRONT WHEEL WALKER
DISTANCE (FEET): 30

## 2023-12-18 ASSESSMENT — FIBROSIS 4 INDEX: FIB4 SCORE: 13.52

## 2023-12-18 ASSESSMENT — ACTIVITIES OF DAILY LIVING (ADL): TOILETING: INDEPENDENT

## 2023-12-18 NOTE — ASSESSMENT & PLAN NOTE
Monitor H&H if Brosseau 7 or 21 transfuse  S/p 2 units PRBC  Hgb 8.0, from 8.4 after transfusion  Iron deficiency anemia, TIBC is low, patient will not benefit from IV iron.  Oral iron may complicate his erosive esophagitis.

## 2023-12-18 NOTE — THERAPY
Speech Language Pathology   Clinical Swallow Evaluation     Patient Name: Bart Ramsey  AGE:  73 y.o., SEX:  male  Medical Record #: [id number]  Date of Service: 12/18/2023      History of Present Illness  Pt is a 72 y/o male admitted 12/17/23 with generalized weakness, difficulty swallowing, and mild SOB after undergoing recent chemo (last on 12/7/23). Found to have neutropenic fevers and radiation-induced esophagitis.    PMHx: COPD, lung cancer w/ current chemo, diabetes, MI, pneumonia. Pt also had recent ER visit on 12/15, but was diagnosed with esophagitis and given medication for same.   No previous SLP notes found in EMR.     CXR 12/17: negative.     General Information:  Vitals  O2 (LPM): 3  O2 Delivery Device: Nasal Cannula  Level of Consciousness: Alert, Awake  Patient Behaviors: Fatigue  Orientation: Oriented x 4  Follows Directives: Yes    Prior Living Situation & Level of Function:  Prior Services: None  Lives with - Patient's Self Care Capacity: Spouse, Child Less than 18 Years of Age  Communication: WFL  Swallowing: Impaired; progressive dysphagia w/ ~25lb weight loss since onset of chemo/radiation treatment ~4 months ago     Oral Mechanism Evaluation:  Dentition: Edentulous, Upper denture, Other (see comments) (Upper plate not available, but pt reports his son can bring it in)   Facial Symmetry: Equal  Facial Sensation: Equal     Labial Observations: WFL   Lingual Observations: Midline  Motor Speech: WFL       Laryngeal Function:  Secretion Management: Adequate  Voice Quality: WFL  Max Phonation Time (seconds): 5  Cough: Perceptually WNL    Subjective  RN cleared patient for CSE. Patient awake, alert, fatigued, but very pleasant and cooperative. RN reports NGT was attempted, but was unable to be placed. Patient currently on SB6/TN0 diet with lunch tray outside room pending CSE.      Assessment  Current Method of Nutrition: Oral diet (SB6/TN0 diet (soft/bite-sized w/ thins))  Positioning:  "Jason's (60-90 degrees)  Bolus Administration: Patient  O2 (LPM): 3 O2 Delivery Device: Nasal Cannula  Factor(s) Affecting Performance: Other (see comments), Impaired endurance (Right-sided chest pain (consistent w/ recent presentation)  Tracheostomy : No     Swallowing Trials:  Swallowing Trials  Ice: WFL  Thin Liquid (TN0): WFL  Liquidised (LQ3): WFL  Pureed (PU4): WFL  Soft & Bite Sized (SB6): WFL      Comments: Patient seen this date for CSE. Patient awake, alert, and very pleasant and cooperative. Patient endorsed recent dysphagia w/ mid-esophageal globus sensation and sharp, burning pain in right mid-lung area with PO intake, regardless of texture. He reports the medication he received in the ER the other day has only minimally helped with pain/symptoms. Patient reports symptoms have gotten progressively worse over the past few months since start of chemo and radiation for his lung cancer. Patient also endorses Covid and pneumonia (unsure if Covid pneumonia vs other) last month with recent 25lb weight loss in the past few months. He also reports decreased PO intake d/t same and lingual sores from chemo, though none were noted during oral motor evaluation and he reports this is not currently causing him pain.     Patient consumed PO trials of ice, thins via cup sip and straw, purees, pudding, and soft solids. Patient self-delivered all PO trials with appropriate bolus size and rate. Patient endorsed pain with every bite in right-side of chest and mid-esophagus, which can be consistent with esophageal dysphagia vs silent aspiration vs other. Patient denied esophageal globus sensation of \"feeling like it's stuck\" but endorsed \"sharp, stabbing pain by my right lung\" (pt has had cardiac w/u recently and it was negative per EMR). Patient declined upgraded trials d/t pain and fatigue. No s/sx of aspiration across any textures trialed today.     Clinical Impressions  Patient presents with s/sx of pharyngoesphageal " "dysphagia in the setting of recent chemo and radiation for lung cancer, with unintentional weight loss. Patient would benefit from MBSS tomorrow to further evaluate swallow function and r/o silent aspiration. Patient agreeable and appreciative; \"You're the first one who has done anything about this, explained anything, and is looking for answers. Thank you.\" SLP will plan for MBSS tomorrow. Ok to continue SB6/TN0 diet pending MBSS.     Recommendations  Diet Consistency: SB6/TN0 (soft/bite-sized w/ thins)  Instrumentation: VFSS (MBSS)  Medication: Whole with liquid, As tolerated  Supervision: Close supervision - patient may be left alone for less than 5 minutes at a time, Encourage self-feeding  Positioning: Fully upright and midline during oral intake, Remain upright for 30 minutes after oral intake, Meals sitting upright in a chair, as tolerated  Risk Management : Small bites/sips, Slow rate of intake, Reduce environmental distractions, Physical mobility, as tolerated  Oral Care: Q8h    SLP Treatment Plan  Treatment Plan: Dysphagia Treatment  SLP Frequency: 4x Per Week  Estimated Duration: Until Therapy Goals Met    Anticipated Discharge Needs  Discharge Recommendations: Other (Pending results of MBSS)   Therapy Recommendations Upon DC: Dysphagia Training, Community Re-Integration, Patient / Family / Caregiver Education      Patient / Family Goals  Patient / Family Goal #1: \"I'd rather be sick than have that NGT attempted again\"  Short Term Goals  Short Term Goal # 1: Patient will participate in MBSS to further evaluate oropharyngeal and pharyngoesophageal swallow function and r/o silent aspiration.    Diane Rajput, SLP   "

## 2023-12-18 NOTE — PROGRESS NOTES
Received report from Eli GARCIA.  Patient in bed, eyes close with chest rise noted and unlabored breathing. Bed is locked and in lowest position. Bed alarm activated. Per NOC RN, waiting on pharmacy to bring acyclovir medication to unit.    0706: MD Beckman notified of 6.8 Hemoglobin. Pending new orders.

## 2023-12-18 NOTE — ASSESSMENT & PLAN NOTE
Sig drop in platelets, neutropenia and anemia  Transfuse 1 unit pRBC 12/18 for hb 6.8  No obvious signs of bleed  Monitor CBC

## 2023-12-18 NOTE — ASSESSMENT & PLAN NOTE
Ongoing small cell lung cancer treatment with Dr. Yamilex Fuller of [facility] oncology  CTPA upon admission with stable dz

## 2023-12-18 NOTE — RESPIRATORY CARE
"   COPD EDUCATION by COPD CLINICAL EDUCATOR  12/18/2023 at 9:59 AM by Angelica Alberto RRT     Patient reviewed by COPD education team. Patient does have a history or diagnosis of COPD and is a former smoker.  Patient is not interested in the COPD program provided booklet. Provided spacer with instruction for use, care, and cleaning.    COPD Screen  COPD Risk Screening  Do you have a history of COPD?: Yes (Pt goes to [facility] for care)  Do you have a Pulmonologist?: Yes  COPD Population Screener  During the past 4 weeks, how much did you feel short of breath?: Some of the time  Do you ever cough up any mucus or phlegm?: Yes, a few days a week or month  In the past 12 months, you do less than you used to because of your breathing problems: Strongly agree  Have you smoked at least 100 cigarettes in your entire life?: Yes  How old are you?: 60+  COPD Screening Score: 8  COPD Coordinator Not Recommended: Yes    COPD Assessment  COPD Clinical Specialists ONLY  COPD Education Initiated: Yes--30 Day Readmission, Yes--Short Intervention  Is this a COPD exacerbation patient?: No  DME Company: [employer] Equipment Type: oxygen  Physician Name: [facility]  Pulmonologist Name: [facility]  Referrals Initiated: Yes  Pulmonary Rehab: N/A  Smoking Cessation: N/A  Hospice: N/A  Home Health Care: N/A  Mobile Urgent Care Services: N/A  Geriatric Specialty Group: N/A  Private In-Home Care Agency: N/A  $ Demo/Eval of SVN's, MDI's and Aerosols: Yes  (OP) Pulmonary Function Testing: Yes    PFT Results    No results found for: \"PFT\"    Meds to Beds  [facility] for all eligible patients at discharge.  Would you like to opt out of this program for any reason?: No - Stay Opted In     MY COPD ACTION PLAN     It is recommended that patients and physicians /healthcare providers complete this action plan together. This plan should be discussed at each physician visit and updated as needed.    The green, " "yellow and red zones show groups of symptoms of COPD. This list of symptoms is not comprehensive, and you may experience other symptoms. In the \"Actions\" column, your healthcare provider has recommended actions for you to take based on your symptoms.    Patient Name: Bart Ramsey   YOB: 1950   Last Updated on:     Green Zone:  I am doing well today Actions     Usual activitiy and exercise level   Take daily medications     Usual amounts of cough and phlegm/mucus   Use oxygen as prescribed     Sleep well at night   Continue regular exercise/diet plan     Appetite is good   At all times avoid cigarette smoke, inhaled irritants     Daily Medications (these medications are taken every day):                Yellow Zone:  I am having a bad day or a COPD flare Actions     More breathless than usual   Continue daily medications     I have less energy for my daily activities   Use quick relief inhaler as ordered     Increased or thicker phlegm/mucus   Use oxygen as prescribed     Using quick relief inhaler/nebulizer more often   Get plenty of rest     Swelling of ankles more than usual   Use pursed lip breathing     More coughing than usual   At all times avoid cigarette smoke, inhaled irritants     I feel like I have a \"chest cold\"     Poor sleep and my symptoms woke me up     My appetite is not good     My medicine is not helping      Call provider immediately if symptoms don’t improve     Continue daily medications, add rescue medications:               Medications to be used during a flare up, (as Discussed with Provider):              Red Zone:  I need urgent medical care Actions     Severe shortness of breath even at rest   Call 911 or seek medical care immediately     Not able to do any activity because of breathing      Fever or shaking chills      Feeling confused or very drowsy       Chest pains      Coughing up blood                  "

## 2023-12-18 NOTE — THERAPY
Occupational Therapy   Initial Evaluation     Patient Name: Bart Ramsey  Age:  73 y.o., Sex:  male  Medical Record #: [id number]  Today's Date: 12/18/2023     Precautions  Precautions: Fall Risk  Comments: poor activity tolerance    Assessment  Patient is 73 y.o. male presented 12/17/23 w/ generalized weakness and shortness of breath.  Admitted with a diagnosis of neutropenic fever.  Pt receiving chemotherapy tx - last tx 12/7/23.  Pt resides in a [facility] in [place], [place] w/ his wife a 6 yo granddaughter.  Wife is available to assist as needed.  Son and daughter reside in the area and are available to assist on a limited basis.  PLOF Mod I to Indep for ADL's, transfers and ambulation w/ intermittent use of SPC/4WW.  Therapist reviewed environmental/home safety, fall precautions, AE/DME, ADL's and transfers.      Plan    Occupational Therapy Initial Treatment Plan   Duration: (P) Evaluation only    DC Equipment Recommendations: (P) None  Discharge Recommendations: (P) Anticipate that the patient will have no further occupational therapy needs after discharge from the hospital     Subjective    Pt was alert and cooperative w/ tx.     Objective       12/18/23 0800    Services   Is patient using  services for this encounter? No   Initial Contact Note    Initial Contact Note Order Received and Verified, Evaluation Only - Patient Does Not Require Further Acute Occupational Therapy at this Time.  However, May Benefit from Post Acute Therapy for Higher Level Functional Deficits.   Prior Living Situation   Prior Services None   Housing / Facility 1 Story House   Steps Into Home 1   Steps In Home 0   Bathroom Set up Walk In Shower;Grab Bars;Shower Chair   Equipment Owned 4-Wheel Walker;Single Point Cane;Tub / Shower Seat;Grab Bar(s) In Tub / Shower   Lives with - Patient's Self Care Capacity Spouse;Child Less than 18 Years of Age   Comments Pt resides in a SL in [place], [place] w/ his wife a 6 yo  granddaughter.  Wife is available to assist as needed.  Son and daughter reside in the area and are available to assist on a limited basis.  PLOF Mod I to Indep for ADL's, transfers and ambulation w/ intermittent use of SPC/4WW.   Prior Level of ADL Function   Self Feeding Independent   Grooming / Hygiene Independent   Bathing Independent   Dressing Independent   Toileting Independent   Prior Level of IADL Function   Medication Management Independent   Kitchen Mobility Independent   Finances Independent   Home Management Independent   Shopping Independent   Prior Level Of Mobility Independent Without Device in Community;Independent With Device in Community;Independent With Steps in Community;Independent Without Device in Home;Independent With Device in Home;Independent With Steps in Home   Driving / Transportation Driving Independent   Occupation (Pre-Hospital Vocational) Retired Due To Age   History of Falls   History of Falls No   Vitals   Pulse 81   Patient BP Position Sitting   Pulse Oximetry 94 %   O2 Delivery Device Room air w/o2 available   Pain   Intervention Declines   Pain 0 - 10 Group   Pain Rating Scale (NPRS) 0   Cognition    Level of Consciousness Alert   Passive ROM Upper Body   Passive ROM Upper Body WDL   Active ROM Upper Body   Active ROM Upper Body  WDL   Dominant Hand Right   Strength Upper Body   Upper Body Strength  WDL   Upper Body Muscle Tone   Upper Body Muscle Tone  WDL   Coordination Upper Body   Coordination WDL   Balance Assessment   Sitting Balance (Static) Good   Sitting Balance (Dynamic) Good   Standing Balance (Static) Fair   Standing Balance (Dynamic) Fair   Weight Shift Sitting Good   Weight Shift Standing Fair   Comments OOB FWW   Bed Mobility    Supine to Sit Supervised   Sit to Supine Supervised   ADL Assessment   Grooming Modified Independent;Standing   Upper Body Dressing Independent   Lower Body Dressing Modified Independent   Toileting Supervision   How much help from  another person does the patient currently need...   Putting on and taking off regular lower body clothing? 4   Bathing (including washing, rinsing, and drying)? 3   Toileting, which includes using a toilet, bedpan, or urinal? 3   Putting on and taking off regular upper body clothing? 4   Taking care of personal grooming such as brushing teeth? 4   Eating meals? 4   6 Clicks Daily Activity Score 22   Functional Mobility   Sit to Stand Supervised   Bed, Chair, Wheelchair Transfer Contact Guard Assist   Toilet Transfers Contact Guard Assist   Transfer Method Stand Step   Mobility Sup FWW   Distance (Feet) 15   # of Times Distance was Traveled 2   Edema / Skin Assessment   Edema / Skin  Not Assessed   Education Group   Education Provided Home Safety;Role of Occupational Therapist;Activities of Daily Living   Role of Occupational Therapist Patient Response Patient;Acceptance;Explanation;Verbal Demonstration   Home Safety Patient Response Patient;Acceptance;Explanation;Demonstration;Verbal Demonstration;Action Demonstration   ADL Patient Response Patient;Acceptance;Explanation;Demonstration;Teach Back;Verbal Demonstration;Action Demonstration   Occupational Therapy Initial Treatment Plan    Duration Evaluation only   Anticipated Discharge Equipment and Recommendations   DC Equipment Recommendations None   Discharge Recommendations Anticipate that the patient will have no further occupational therapy needs after discharge from the hospital

## 2023-12-18 NOTE — PROGRESS NOTES
4 Eyes Skin Assessment Completed by Litzy, RN and JOSE Benitez.    Head WDL  Ears Redness  Nose WDL  Mouth WDL  Neck Redness and Blanching  Breast/Chest WDL  Shoulder Blades WDL  Spine WDL  (R) Arm/Elbow/Hand WDL  (L) Arm/Elbow/Hand WDL  Abdomen WDL  Groin WDL  Scrotum/Coccyx/Buttocks WDL  (R) Leg Edema/ varicose veins  (L) Leg Edema/ varicose veins  (R) Heel/Foot/Toe Blanching and Boggy  (L) Heel/Foot/Toe Blanching and Boggy          Devices In Places Tele Box      Interventions In Place N/A    Possible Skin Injury No    Pictures Uploaded Into Epic N/A  Wound Consult Placed N/A  RN Wound Prevention Protocol Ordered No

## 2023-12-18 NOTE — H&P
Hospital Medicine History & Physical Note    Date of Service  12/17/2023    Primary Care Physician  Pcp Pt States None    Consultants  None    Specialist Names: None    Code Status  Full Code    Chief Complaint  Chief Complaint   Patient presents with    Weakness    Shortness of Breath    Loss of Appetite       History of Presenting Illness  Bart Ramsey is a 73 y.o. male who presented 12/17/2023 with generalized weakness and mild shortness of breath.  Patient comes and is evaluated and is found to be absolute neutropenic with a white blood cell count of 0.04.  Patient has fevers with a temperature of 38.7 on initial evaluation.  Patient has been getting chemotherapy and most recent had he had chemotherapy on December 7.  The patient is about 10 days out in this point has developed neutropenic fevers and will be getting IV vancomycin and Zosyn and we will also give him IV acyclovir.  Currently there is no indication for antifungal treatment as he has not had any previous antibiotics that would predispose him to fungal infections.  He does have a port in place through which we will be giving vancomycin.  Blood cultures have been taken and these will be followed.  Chest x-ray currently is clear and urine analysis currently is clear.  The patient has a very difficult time swallowing and has been losing weight.  The reason is that he has developed radiation esophagitis and he cannot take food.  We will need to put a feeding tube in so we can get him proper nutrition and he is agreeable for this..    I discussed the plan of care with patient, bedside RN, and emergency room physician Dr. Hess of the problem .    Review of Systems  Review of Systems   Constitutional:  Positive for chills, fever, malaise/fatigue and weight loss. Negative for diaphoresis.   HENT:  Positive for sore throat.         Difficulty swallowing   Eyes: Negative.  Negative for blurred vision and double vision.   Respiratory:  Positive for  cough and shortness of breath. Negative for hemoptysis and wheezing.    Cardiovascular: Negative.  Negative for chest pain, palpitations and leg swelling.   Gastrointestinal:  Positive for heartburn. Negative for abdominal pain, blood in stool, constipation, diarrhea, nausea and vomiting.   Genitourinary: Negative.  Negative for frequency, hematuria and urgency.   Musculoskeletal:  Positive for myalgias. Negative for joint pain.   Skin: Negative.  Negative for itching and rash.   Neurological: Negative.  Negative for dizziness, focal weakness, seizures, loss of consciousness and headaches.   Endo/Heme/Allergies: Negative.  Does not bruise/bleed easily.   Psychiatric/Behavioral: Negative.  Negative for depression, memory loss, substance abuse and suicidal ideas. The patient is not nervous/anxious.    All other systems reviewed and are negative.      Past Medical History   has a past medical history of Arthritis, Bowel habit changes, Bronchitis, Cancer ([facility]) (09/29/2023), COPD (chronic obstructive pulmonary disease) ([facility]), Diabetes ([facility]), Emphysema of lung ([facility]), High cholesterol (08/2023), Myocardial infarct ([facility]), Pneumonia, Psychiatric problem (08/2023), and Sleep apnea.    Surgical History   has a past surgical history that includes other cardiac surgery; other; other; gastroscopy-endo (03/31/2017); colonoscopy - endo (03/31/2017); cholecystectomy; tonsillectomy; inguinal hernia repair (Right); pr bronchoscopy,diagnostic (N/A, 8/29/2023); and endobronchial us add-on (N/A, 8/29/2023).     Family History  family history includes Cancer in his mother.   Family history reviewed with patient. There is no family history that is pertinent to the chief complaint.     Social History   reports that he quit smoking about 29 years ago. His smoking use included cigarettes. He started smoking about 43 years ago. He has a 7.0 pack-year smoking history. He does not have any smokeless tobacco history on file. He reports that he  does not currently use alcohol after a past usage of about 8.4 oz of alcohol per week. He reports that he does not currently use drugs after having used the following drugs: Marijuana.    Allergies  No Known Allergies    Medications  Prior to Admission Medications   Prescriptions Last Dose Informant Patient Reported? Taking?   Cyanocobalamin (VITAMIN B-12) 1000 MCG Tab 12/15/2023 at AM Patient Yes No   Sig: Take 1,000 mcg by mouth every day.   Omega-3 Fatty Acids (FISH OIL) 1000 MG Cap capsule 12/14/2023 at Unknown Patient Yes No   Sig: Take 1,000 mg by mouth every day.   albuterol 108 (90 Base) MCG/ACT Aero Soln inhalation aerosol > 1 week at Unknown Patient No No   Sig: Inhale 2 Puffs every four hours as needed for Shortness of Breath for up to 30 days.   aspirin EC (ECOTRIN) 81 MG Tablet Delayed Response 12/15/2023 at AM Patient Yes No   Sig: Take 81 mg by mouth every day.   baclofen (LIORESAL) 10 MG Tab 12/14/2023 at PM Patient Yes No   Sig: Take 10 mg by mouth at bedtime as needed. Indications: Muscle Spasm   fluticasone-salmeterol (ADVAIR) 250-50 MCG/ACT AEROSOL POWDER, BREATH ACTIVATED 12/15/2023 at AM Patient Yes No   Sig: Inhale 1 Puff 2 times a day. Indications: Asthma   gabapentin (NEURONTIN) 300 MG Cap 12/15/2023 at AM Patient Yes No   Sig: Take 600 mg by mouth 2 times a day.   guaiFENesin ER (MUCINEX) 600 MG TABLET SR 12 HR 12/15/2023 at AM Patient No No   Sig: Take 1 Tablet by mouth every 12 hours.   metFORMIN (GLUCOPHAGE) 500 MG Tab 12/12/2023 at Unknown Patient Yes No   Sig: Take 750 mg by mouth 2 times a day with meals. 500 mg x 1.5 tab = 750 mg   omeprazole (PRILOSEC) 40 MG delayed-release capsule 12/15/2023 at AM Patient Yes No   Sig: Take 40 mg by mouth every day.   ondansetron (ZOFRAN) 4 MG Tab tablet 12/14/2023 at Unknown Patient No No   Sig: Take 1 Tablet by mouth every four hours as needed for Nausea/Vomiting (for nausea, vomiting).   prochlorperazine (COMPAZINE) 10 MG Tab PRN Patient No No    Sig: Take 1 Tablet by mouth every 6 hours as needed (for nausea, vomiting).   Patient taking differently: Take 10 mg by mouth every 6 hours as needed for Nausea/Vomiting (for nausea, vomiting).   sucralfate (CARAFATE) 1 GM/10ML Suspension 12/17/2023 at 1000 Patient No No   Sig: Take 10 mL by mouth 2 times a day for 10 days.   Patient taking differently: Take 1 g by mouth 2 times a day. Pt started on 12/15/2023 for 10 day course   vitamin D3 (CHOLECALCIFEROL) 1000 Unit (25 mcg) Tab 12/15/2023 at AM Patient Yes No   Sig: Take 1,000 Units by mouth every day.      Facility-Administered Medications: None       Physical Exam  Temp:  [37.7 °C (99.8 °F)-38.7 °C (101.6 °F)] 37.7 °C (99.8 °F)  Pulse:  [71-99] 80  Resp:  [16-20] 18  BP: (103-139)/(55-73) 108/55  SpO2:  [90 %-95 %] 93 %  Blood Pressure : 108/55   Temperature: 37.7 °C (99.8 °F)   Pulse: 80   Respiration: 18   Pulse Oximetry: 93 %       Physical Exam  Vitals and nursing note reviewed. Exam conducted with a chaperone present.   Constitutional:       General: He is not in acute distress.     Appearance: Normal appearance. He is well-developed and underweight. He is ill-appearing. He is not toxic-appearing or diaphoretic.   HENT:      Head: Normocephalic and atraumatic.      Right Ear: External ear normal.      Left Ear: External ear normal.      Nose: Nose normal.      Mouth/Throat:      Mouth: Mucous membranes are moist.      Pharynx: Oropharynx is clear.   Eyes:      Extraocular Movements: Extraocular movements intact.      Conjunctiva/sclera: Conjunctivae normal.      Pupils: Pupils are equal, round, and reactive to light.   Neck:      Thyroid: No thyromegaly.      Vascular: No JVD.   Cardiovascular:      Rate and Rhythm: Normal rate and regular rhythm.      Pulses: Normal pulses.      Heart sounds: Normal heart sounds.   Pulmonary:      Effort: Pulmonary effort is normal.      Breath sounds: Examination of the right-lower field reveals decreased breath  sounds. Examination of the left-lower field reveals decreased breath sounds. Decreased breath sounds present.   Chest:      Chest wall: No tenderness.   Abdominal:      General: Abdomen is flat. Bowel sounds are normal. There is no distension.      Palpations: Abdomen is soft. There is no mass.      Tenderness: There is no abdominal tenderness. There is no guarding or rebound.   Musculoskeletal:         General: Normal range of motion.      Cervical back: Normal range of motion and neck supple.      Right lower leg: No edema.      Left lower leg: No edema.   Lymphadenopathy:      Cervical: No cervical adenopathy.   Skin:     General: Skin is warm and dry.      Capillary Refill: Capillary refill takes less than 2 seconds.      Coloration: Skin is pale.      Findings: No rash.   Neurological:      General: No focal deficit present.      Mental Status: He is alert and oriented to person, place, and time. Mental status is at baseline.      GCS: GCS eye subscore is 4. GCS verbal subscore is 5. GCS motor subscore is 6.      Cranial Nerves: No cranial nerve deficit.      Deep Tendon Reflexes: Reflexes are normal and symmetric.   Psychiatric:         Mood and Affect: Mood normal.         Behavior: Behavior normal.         Thought Content: Thought content normal.         Judgment: Judgment normal.         Laboratory:  Recent Labs     12/15/23  1556 12/17/23  1516   WBC 0.5* 0.3*   RBC 2.23* 2.40*   HEMOGLOBIN 7.3* 7.7*   HEMATOCRIT 20.6* 21.9*   MCV 92.4 91.3   MCH 32.7 32.1   MCHC 35.4 35.2   RDW 51.3* 47.5   PLATELETCT 25* 27*   MPV 10.9 10.1     Recent Labs     12/15/23  1556 12/17/23  1516   SODIUM 137 134*   POTASSIUM 3.8 3.4*   CHLORIDE 99 95*   CO2 23 22   GLUCOSE 108* 132*   BUN 30* 20   CREATININE 1.45* 1.26   CALCIUM 8.1* 7.9*     Recent Labs     12/15/23  1556 12/17/23  1516   ALTSGPT 9 9   ASTSGOT 15 15   ALKPHOSPHAT 77 96   TBILIRUBIN 0.5 0.9   LIPASE  --  6*   GLUCOSE 108* 132*         No results for input(s):  "\"NTPROBNP\" in the last 72 hours.      Recent Labs     12/15/23  1556 12/15/23  1806   TROPONINT 25* 23*       Imaging:  DX-CHEST-PORTABLE (1 VIEW)   Final Result      No evidence of acute cardiopulmonary process.          X-Ray:  I have personally reviewed the images and compared with prior images.  EKG:  I have personally reviewed the images and compared with prior images.    Assessment/Plan:  Justification for Admission Status  I anticipate this patient will require at least two midnights for appropriate medical management, necessitating inpatient admission because patient has neutropenic fevers and require at least 48 hours of inpatient management for IV antibiotics and evaluation of cultures    Patient will need a Telemetry bed on MEDICAL service .  The need is secondary to neutropenic fever.    * Neutropenic fever (HCC)- (present on admission)  Assessment & Plan  Patient has developed neutropenic fevers.  Patient's neutropenia is down to 0.3.  Absolute neutrophil count is 0.04 and absolute lymphocyte count is 0.23  Possible bacterial infection as well as viral infection  Start vancomycin as he does have a port, start Zosyn as the patient has underlying chemotherapy and is immunocompromise  Start acyclovir as the patient could have an underlying viral infection  Follow procalcitonin level lactic acid levels  Follow-up blood culture  So far chest x-ray was normal and urine analysis did not reveal an infection.    Hypomagnesemia  Assessment & Plan  Initial magnesium 1.0 on admission  4 g of IV magnesium  Repeat daily magnesium levels    Radiation-induced esophagitis- (present on admission)  Assessment & Plan  Patient has severe radiation-induced esophagitis and has not been able to eat and has not been getting nutrition this is most likely why he is developed fevers.  The patient at this point does agree to a feeding tube being placed and thus an NG tube will be requested and then we will initiate nutrition " through it.    Thrombocytopenia (HCC)- (present on admission)  Assessment & Plan  Patient has significant thrombocytopenia with platelet count only being 27  At this point I am not going to give him DVT prophylaxis as this could induce severe bleeding  Monitor platelet counts currently is not bleeding and he does not need a transfusion.    Hyponatremia  Assessment & Plan  Mild hyponatremia at 134  Give fluid resuscitation monitor sodium levels    Hypophosphatemia  Assessment & Plan  Phosphorus is down to 2.0 give potassium phosphate and monitor phosphate levels    COPD without exacerbation (HCC)- (present on admission)  Assessment & Plan  History of COPD currently not in acute exacerbation  Continue albuterol and Dulera  Guaifenesin for mucolysis    Type 2 diabetes mellitus with diabetic polyneuropathy, without long-term current use of insulin (HCC)- (present on admission)  Assessment & Plan  Accu-Cheks with sign scale coverage  Most recent hemoglobin A1c is 6.3      Hypokalemia due to inadequate potassium intake- (present on admission)  Assessment & Plan  Hypokalemia at 3.4  Giving potassium phosphate and thus we will monitor potassium levels as well    SCLC (small cell lung carcinoma) (HCC)- (present on admission)  Assessment & Plan  Ongoing small cell lung cancer treatment with Dr. Yamilex Fuller of [facility]ology    Coronary artery disease involving native coronary artery of native heart without angina pectoris- (present on admission)  Assessment & Plan  History of coronary artery disease  Currently chest pain-free  Monitor troponin levels and optimize medication management    Anemia associated with chemotherapy- (present on admission)  Assessment & Plan  Monitor H&H if [address] 7 or 21 transfuse  Currently 7.7 and 21.9        VTE prophylaxis: SCDs/TEDs

## 2023-12-18 NOTE — ED NOTES
Fiona from Lab called with critical result of WBC = 0.3, ANC 0.04 at 1637. Critical lab result read back to Fiona.   Dr. Tay notified of critical lab result at 1637.  Critical lab result read back by Dr. Tay.

## 2023-12-18 NOTE — ASSESSMENT & PLAN NOTE
History of COPD currently not in acute exacerbation  Continue albuterol and Dulera  Guaifenesin for mucolysis

## 2023-12-18 NOTE — DISCHARGE PLANNING
"Case Management Discharge Planning    Admission Date: 12/17/2023  GMLOS: 2.7  ALOS: 1    6-Clicks ADL Score: 22  6-Clicks Mobility Score: 18      Anticipated Discharge Dispo: Discharge Disposition: Discharged to home/self care (01)    DME Needed: No    Action(s) Taken: DC Assessment Complete (See below)    Escalations Completed: None    Medically Clear: No    Barriers to Discharge: Medical clearance    Course of Action  **1203  LSW spoke to patient at bedside. Confirmed facesheet information. Patient reports he lives with his spouse, Kirby. Patient owns a wheelchair and FWW. Reports he does not use oxygen at home. Per chart review, patient has had [facility] and [facility] oxygen in the past. LSW discussed physical therapy's recommendation for home health. Patient said \"I'm not sure I want that.\" LSW explained what home health services entail. LSW to follow up with patient tomorrow on whether he wants home health or not.    Care Transition Team Assessment    Information Source  Orientation Level: Oriented X4  Information Given By: Patient  Who is responsible for making decisions for patient? : Patient    Readmission Evaluation  Is this a readmission?: Yes - unplanned readmission    Elopement Risk  Legal Hold: No  Ambulatory or Self Mobile in Wheelchair: No-Not an Elopement Risk  Disoriented: No  Psychiatric Symptoms: None  History of Wandering: No  Elopement this Admit: No  Elopement Risk: Not at Risk for Elopement    Interdisciplinary Discharge Planning  Primary Care Physician: VACHRISTOPH/ Tete PRITCHETT  Lives with - Patient's Self Care Capacity: Spouse, Child Less than 18 Years of Age  Patient or legal guardian wants to designate a caregiver: No  Support Systems: Children, Spouse / Significant Other  Housing / Facility: [address]  Prior Services: None  Durable Medical Equipment: Other - Specify (Cane)  DME Provider / Phone: [facility]    Discharge Preparedness  What is your plan after discharge?: Home with help  What are your " discharge supports?: Spouse  Prior Functional Level: Ambulatory, Independent with Activities of Daily Living, Independent with Medication Management  Difficulity with ADLs: None    Functional Assesment  Prior Functional Level: Ambulatory, Independent with Activities of Daily Living, Independent with Medication Management    Finances  Financial Barriers to Discharge: No  Prescription Coverage: Yes    Vision / Hearing Impairment  Vision Impairment : Yes  Right Eye Vision: Wears Glasses  Left Eye Vision: Wears Glasses  Hearing Impairment : Yes  Hearing Impairment: Both Ears, Hearing Device Not Available  Does Pt Need Special Equipment for the Hearing Impaired?: No    Advance Directive  Advance Directive?: None    Domestic Abuse  Have you ever been the victim of abuse or violence?: No  Physical Abuse or Sexual Abuse: No  Verbal Abuse or Emotional Abuse: No  Possible Abuse/Neglect Reported to:: Not Applicable    Discharge Risks or Barriers  Discharge risks or barriers?: No    Anticipated Discharge Information  Discharge Disposition: Discharged to home/self care (01)

## 2023-12-18 NOTE — PROGRESS NOTES
Assumed care of patient at bedside report from day shift RN. Updated on POC. Patient currently A & O x 4, on room air O2 90%, up in bed, without complaints of acute pain. Assessment completed. Call light within reach. Whiteboard updated. Fall precautions in place. Bed locked and in lowest position. All questions answered. No other needs indicated at this time.   Regardin Weeks pregnant having contraction that are two & a half to four & a half minutes apart  ----- Message from Lindy Clements sent at 2023  5:55 PM EDT -----  '' I'm 37 weeks pregnant having contraction that are two and a half to 4 and half minutes apart they last 52 second.''

## 2023-12-18 NOTE — ED PROVIDER NOTES
"ED Provider Note    CHIEF COMPLAINT  Chief Complaint   Patient presents with    Weakness    Shortness of Breath    Loss of Appetite       EXTERNAL RECORDS REVIEWED  Outpatient Notes note from the oncology APRN was reviewed when the emergency physician called her on December 15.  Patient is diagnosed with small cell lung cancer and currently undergoing treatment.  Treatment regimen consist of cyst, etoposide, HL X10 versus placebo with RT started September 18, 2023 .  Last round of chemo was December 4 through 6 (cycle 4)    HPI/ROS  LIMITATION TO HISTORY   Select: : None  OUTSIDE HISTORIAN(S):  None    Bart Ramsey is a 73 y.o. male who presents to the ER with complaint of persistent pain in the chest and throat and pain with swallowing which has been going on since beginning chemotherapy and radiation for small cell lung cancer.  He was seen here on December 15 with complaint of chest pain.  He was diagnosed with esophagitis.  He was sent home on some Carafate.  He says the Carafate is helping a little bit but it is slowly helping.  He still has quite a lot of discomfort when he swallows anything.  He says that he can take down some water but is hard to do so.  He says, however, it is really hard to get down ensure because it hurts even worse.  The patient says he is \"felt cold all day.\"  He did not know it a fever but he was febrile to 101.6 upon arrival here to the ER.  He had a cough but the cough has been going on for months.  Nothing new or different.  He has had some runny nose lately but he says that is also been going on for a while.  No headache.  He has sore throat but that is related to the pain with swallowing which she has had since starting chemo and radiation.  No abdominal pain.  No diarrhea.  No urinary symptoms.  No dysuria.  No cloudy or foul-smelling urine.  No vomiting.  Patient denies any abscesses or rashes.  He has a port in the right chest wall.  He denies any discomfort with his " port.    PAST MEDICAL HISTORY   has a past medical history of Arthritis, Bowel habit changes, Bronchitis, Cancer (HCC) (2023), COPD (chronic obstructive pulmonary disease) (HCC), Diabetes (HCC), Emphysema of lung (HCC), High cholesterol (2023), Myocardial infarct (HCC), Pneumonia, Psychiatric problem (2023), and Sleep apnea.    SURGICAL HISTORY   has a past surgical history that includes other cardiac surgery; other; other; gastroscopy-endo (2017); colonoscopy - endo (2017); cholecystectomy; tonsillectomy; inguinal hernia repair (Right); bronchoscopy,diagnostic (N/A, 2023); and endobronchial us add-on (N/A, 2023).    FAMILY HISTORY  Family History   Problem Relation Age of Onset    Cancer Mother         lung?       SOCIAL HISTORY  Social History     Tobacco Use    Smoking status: Former     Current packs/day: 0.00     Average packs/day: 0.5 packs/day for 14.0 years (7.0 ttl pk-yrs)     Types: Cigarettes     Start date: 1980     Quit date:      Years since quittin.9    Smokeless tobacco: Not on file    Tobacco comments:     Pt quit smoking cigarettes, 2000.  1/2 pack per day, smoked 20 years    Vaping Use    Vaping Use: Never used   Substance and Sexual Activity    Alcohol use: Not Currently     Alcohol/week: 8.4 oz     Types: 14 Shots of liquor per week     Comment: daily    Drug use: Not Currently     Types: Marijuana     Comment: quit     Sexual activity: Not on file       CURRENT MEDICATIONS  Home Medications       Reviewed by Janis Morocho (Pharmacy Tech) on 23 at 1522  Med List Status: Complete     Medication Last Dose Status   albuterol 108 (90 Base) MCG/ACT Aero Soln inhalation aerosol > 1 week Active   aspirin EC (ECOTRIN) 81 MG Tablet Delayed Response 12/15/2023 Active   baclofen (LIORESAL) 10 MG Tab 2023 Active   Cyanocobalamin (VITAMIN B-12) 1000 MCG Tab 12/15/2023 Active   fluticasone-salmeterol (ADVAIR) 250-50 MCG/ACT AEROSOL  "POWDER, BREATH ACTIVATED 12/15/2023 Active   gabapentin (NEURONTIN) 300 MG Cap 12/15/2023 Active   guaiFENesin ER (MUCINEX) 600 MG TABLET SR 12 HR 12/15/2023 Active   metFORMIN (GLUCOPHAGE) 500 MG Tab 12/12/2023 Active   Omega-3 Fatty Acids (FISH OIL) 1000 MG Cap capsule 12/14/2023 Active   omeprazole (PRILOSEC) 40 MG delayed-release capsule 12/15/2023 Active   ondansetron (ZOFRAN) 4 MG Tab tablet 12/14/2023 Active   prochlorperazine (COMPAZINE) 10 MG Tab PRN Active   sucralfate (CARAFATE) 1 GM/10ML Suspension 12/17/2023 Active   vitamin D3 (CHOLECALCIFEROL) 1000 Unit (25 mcg) Tab 12/15/2023 Active                    ALLERGIES  No Known Allergies    PHYSICAL EXAM  VITAL SIGNS: /58   Pulse 76   Temp 37.7 °C (99.8 °F) (Temporal)   Resp 20   Ht 1.753 m (5' 9\")   Wt 77.1 kg (170 lb)   SpO2 93%   BMI 25.10 kg/m²   Constitutional: Chronically ill-appearing.  Appears weak.  HENT: Normocephalic, Atraumatic, Bilateral external ears normal, there is mild erythema in the posterior pharynx.  No obvious thrush.  Eyes: PERRL, EOMI, Conjunctiva normal, No discharge.   Neck: Normal range of motion, supple, nontender  Lymphatic: No lymphadenopathy noted.   Cardiovascular: Normal heart rate, Normal rhythm, No murmurs, rubs or gallops   Thorax & Lungs: Decreased breath sounds throughout.  No respiratory distress,  No wheezing rales, or rhonchi; No chest tenderness. No crepitus or subQ air  Abdomen: Soft, good bowel sounds, no guarding no rebound, no masses, no pulsatile mass, no tenderness, no distention  Skin: Warm, Dry, No erythema, No rash.   Back: No tenderness, No CVA tenderness.   Extremities: 2+ dp and pt pulses bilateral LEs;  Nontender; no pretibial edema  Neurologic: Alert & oriented x 4, clear speech  Psychiatric: appropriate, normal affect     DIAGNOSTIC STUDIES / PROCEDURES      LABS  Results for orders placed or performed during the hospital encounter of 12/17/23   Lactic acid (lactate)   Result Value Ref " Range    Lactic Acid 1.6 0.5 - 2.0 mmol/L   CBC With Differential   Result Value Ref Range    WBC 0.3 (LL) 4.8 - 10.8 K/uL    RBC 2.40 (L) 4.70 - 6.10 M/uL    Hemoglobin 7.7 (L) 14.0 - 18.0 g/dL    Hematocrit 21.9 (L) 42.0 - 52.0 %    MCV 91.3 81.4 - 97.8 fL    MCH 32.1 27.0 - 33.0 pg    MCHC 35.2 32.3 - 36.5 g/dL    RDW 47.5 35.9 - 50.0 fL    Platelet Count 27 (L) 164 - 446 K/uL    MPV 10.1 9.0 - 12.9 fL    Neutrophils-Polys 12.00 (L) 44.00 - 72.00 %    Lymphocytes 76.00 (H) 22.00 - 41.00 %    Monocytes 12.00 0.00 - 13.40 %    Eosinophils 0.00 0.00 - 6.90 %    Basophils 0.00 0.00 - 1.80 %    Nucleated RBC 0.00 0.00 - 0.20 /100 WBC    Neutrophils (Absolute) 0.04 (LL) 1.82 - 7.42 K/uL    Lymphs (Absolute) 0.23 (L) 1.00 - 4.80 K/uL    Monos (Absolute) 0.04 0.00 - 0.85 K/uL    Eos (Absolute) 0.00 0.00 - 0.51 K/uL    Baso (Absolute) 0.00 0.00 - 0.12 K/uL    NRBC (Absolute) 0.00 K/uL    Hypochromia 2+ (A)     Anisocytosis 2+ (A)     Macrocytosis 1+     Microcytosis 1+    Comp Metabolic Panel   Result Value Ref Range    Sodium 134 (L) 135 - 145 mmol/L    Potassium 3.4 (L) 3.6 - 5.5 mmol/L    Chloride 95 (L) 96 - 112 mmol/L    Co2 22 20 - 33 mmol/L    Anion Gap 17.0 (H) 7.0 - 16.0    Glucose 132 (H) 65 - 99 mg/dL    Bun 20 8 - 22 mg/dL    Creatinine 1.26 0.50 - 1.40 mg/dL    Calcium 7.9 (L) 8.4 - 10.2 mg/dL    Correct Calcium 8.2 (L) 8.5 - 10.5 mg/dL    AST(SGOT) 15 12 - 45 U/L    ALT(SGPT) 9 2 - 50 U/L    Alkaline Phosphatase 96 30 - 99 U/L    Total Bilirubin 0.9 0.1 - 1.5 mg/dL    Albumin 3.6 3.2 - 4.9 g/dL    Total Protein 6.8 6.0 - 8.2 g/dL    Globulin 3.2 1.9 - 3.5 g/dL    A-G Ratio 1.1 g/dL   CoV-2, FLU A/B, and RSV by PCR (2-4 Hours [facility]) : Collect NP swab in VTM    Specimen: Respirate   Result Value Ref Range    Influenza virus A RNA Negative Negative    Influenza virus B, PCR Negative Negative    RSV, PCR Negative Negative    SARS-CoV-2 by PCR NotDetected     SARS-CoV-2 Source NP Swab    Magnesium   Result  "Value Ref Range    Magnesium 1.0 (L) 1.5 - 2.5 mg/dL   Phosphorus   Result Value Ref Range    Phosphorus 2.0 (L) 2.5 - 4.5 mg/dL   LIPASE   Result Value Ref Range    Lipase 6 (L) 11 - 82 U/L   ESTIMATED GFR   Result Value Ref Range    GFR (CKD-EPI) 60 >60 mL/min/1.73 m 2   DIFFERENTIAL MANUAL   Result Value Ref Range    Manual Diff Status PERFORMED    PLATELET ESTIMATE   Result Value Ref Range    Plt Estimation Significantly D    MORPHOLOGY   Result Value Ref Range    RBC Morphology Present    IMMATURE PLT FRACTION   Result Value Ref Range    Imm. Plt Fraction 3.4 0.6 - 13.1 %        RADIOLOGY  I have independently interpreted the diagnostic imaging associated with this visit and am waiting the final reading from the radiologist.     My preliminary interpretation is as follows: ER MD is reviewed the patient's chest x-ray.  No obvious infiltrates.    Radiologist interpretation:   DX-CHEST-PORTABLE (1 VIEW)   Final Result      No evidence of acute cardiopulmonary process.           COURSE & MEDICAL DECISION MAKING    ED Observation Status? No; Patient does not meet criteria for ED Observation.     INITIAL ASSESSMENT, COURSE AND PLAN  Care Narrative: Patient presents to the ER complaining of persistent pain in his throat and chest, particularly with swallowing, since starting chemotherapy and radiation.  The patient was seen here in the ER on December 15 for same complaint.  He underwent a chest pain rule out here in the ER and was ultimately discharged home with diagnosis of esophagitis.  He was given IV fluids and he was also transfused a unit of blood at the recommendation of the heme-onc NP.  Patient is currently undergoing chemotherapy and radiation for small cell lung cancer.  Patient was here on December 15, he was found to be neutropenic.  He was also pancytopenic.  However, he was not febrile.  Patient presents today because he is \"just ill not feeling well\" and is still having a lot of pain with swallowing.  " He says it is very difficult to even swallow Ensure.  The Carafate is helping, albeit slowly.  Here in the ER he is febrile to 101.6.  He has had a cough, but for several months.  Nothing new or different.  He had runny nose, also for several months.  Nothing new or different.  No headache.  His sore throat pain is worse with swallowing and has been going on for a while.  No abdominal pain.  No vomiting or diarrhea.  No urinary symptoms.  Patient does not have any pain or tenderness around his port.  At this time patient has neutropenic fever.  He was pancultured, including order to culture the port.  Patient was given IV cefepime.  He was also given IV fluids per sepsis protocol.  Patient is indeed pancytopenic and in today with a white blood cell count of 0.3 and an ANC of 0.04.  Hemoglobin is stable at 7.7.  Magnesium is low at 1.0.  Was supplemented here in the ER.  Phosphorus is also low.  At this time I do not have a clear source of fever.  He is negative for COVID, flu and RSV.  However, urinalysis has yet to be obtained.  Thankfully lactic acid level is 1.6.  He is also hemodynamically stable.  He was given Tylenol for his fever.  He will need to be admitted for neutropenic fever and cultures.  I spoke with the hospitalist on-call and he will kindly evaluate the patient for hospitalization.      1640: discussed with Dr. Ho, hospitalist on-call and he will kindly evaluate the patient hospitalization.      HYDRATION: Based on the patient's presentation of Sepsis the patient was given IV fluids. IV Hydration was used because oral hydration was not as rapid as required. Upon recheck following hydration, the patient was improved.      ADDITIONAL PROBLEM LIST  Problem #1: Pain with swallowing and chest discomfort since starting chemo and radiation  Problem #2: Fever of 101.6 with recent neutropenia    DISPOSITION AND DISCUSSIONS  I have discussed management of the patient with the following physicians and  JAE's: Hospitalist    Discussion of management with other QHP or appropriate source(s): None     Escalation of care considered, and ultimately not performed: Patient has no abdominal pain or tenderness.  No need for CT scan abdomen pelvis at this time.    Barriers to care at this time, including but not limited to: Patient does not have established PCP.     Decision tools and prescription drugs considered including, but not limited to: Antibiotics patient was given cefepime per neutropenic fever protocol. .    CRITICAL CARE  The very real possibilty of a deterioration of this patient's condition required the highest level of my preparedness for sudden, emergent intervention.  I provided critical care services, which included medication orders, frequent reevaluations of the patient's condition and response to treatment, ordering and reviewing test results, and discussing the case with various consultants.  The critical care time associated with the care of the patient was 35 minutes. Review chart for interventions. This time is exclusive of any other billable procedures.      FINAL DIAGNOSIS  1. Neutropenic fever (HCC) Acute   .  The critical care time associated with the care of the patient was 35 minutes.    This dictation has been created using voice recognition software. The accuracy of the dictation is limited by the abilities of the software. I expect there may be some errors of grammar and possibly content. I made every attempt to manually correct the errors within my dictation. However, errors related to voice recognition software may still exist and should be interpreted within the appropriate context.     Electronically signed by: Anay Tay M.D., 12/17/2023 4:46 PM

## 2023-12-18 NOTE — PROGRESS NOTES
1035: Patient having oxygen <90% on room air after working with OT. 3L oxygen nasal cannula applied.     1100: MD Beckman notified during rounds of patient on 3 L oxygen nasal canula after working with OT.

## 2023-12-18 NOTE — PROGRESS NOTES
0542: Lab called regarding a critical WBC of 0.6.    0547: Dr. Mcgee notified of WBC of 0.6 and of a hemoglobin of 6.8.

## 2023-12-18 NOTE — PROGRESS NOTES
Pharmacy Vancomycin Kinetics Note for 12/17/2023     73 y.o. male on Vancomycin day # 1     Vancomycin Indication (AUC Dosing): Sepsis -  (Neutropenic fever with CVL)    Provider specified end date: 12/24/23    Active Antibiotics (From admission, onward)      Ordered     Ordering Provider       Sun Dec 17, 2023  9:11 PM    12/17/23 2111  vancomycin 1250 mg/250mL NS IVPB premix  (vancomycin (VANCOCIN) IV (LD + Maintenance))  EVERY 24 HOURS         REYNA Welch Dec 17, 2023  5:36 PM    12/17/23 1736  acyclovir (Zovirax) 700 mg in  mL IVPB  EVERY 8 HOURS         REYNA Welch Dec 17, 2023  5:18 PM    12/17/23 1718  vancomycin 2000 mg/500mL IVPB premix 2,000 mg  (vancomycin (VANCOCIN) IV (LD + Maintenance))  ONCE         REYNA Welch Dec 17, 2023  5:08 PM    12/17/23 1708  MD Alert...Vancomycin per Pharmacy  PHARMACY TO DOSE        Question:  Indication(s) for vancomycin?  Answer:  Neutropenic fever    Narinder Ho M.D.    12/17/23 1708  piperacillin-tazobactam (Zosyn) 4.5 g in  mL IVPB  (piperacillin-tazobactam (ZOSYN) IV (Extended-infusion) PANEL )  ONCE        See Hyperspace for full Linked Orders Report.    Narinder Ho M.D.    12/17/23 1708  piperacillin-tazobactam (Zosyn) 4.5 g in  mL IVPB  (piperacillin-tazobactam (ZOSYN) IV (Extended-infusion) PANEL )  EVERY 8 HOURS        See Hyperspace for full Linked Orders Report.    Narinder Ho M.D.          Dosing Weight: 81 kg (178 lb 9.2 oz)    Admission History: Admitted on 12/17/2023 for Neutropenic fever (HCC) [D70.9, R50.81]  Pertinent history: 72 yo man with small cell lung cancer currently undergoing chemotherapy and radiation presents with neutropenic fever. Patient has a single lumen CVL. Patient was sent to the ER on 12/15 for evlauation of malaise in the setting of neutropenia but was afebrile at that time.    Allergies: Patient has no known allergies.     Pertinent cultures to date:    Results       Procedure Component Value Units Date/Time    Urinalysis [[id number]] Collected: 12/17/23 2045    Order Status: Completed Specimen: Urine Updated: 12/17/23 2104     Micro Urine Req see below     Comment: Microscopic examination not performed when specimen is clear  and chemically negative for protein, blood, leukocyte esterase  and nitrite.         Narrative:      Indication for culture:->Evaluation for sepsis without a  clear source of infection    Urine Culture (New) [[id number]] Collected: 12/17/23 2045    Order Status: Sent Specimen: Urine Updated: 12/17/23 2056    Narrative:      Indication for culture:->Evaluation for sepsis without a  clear source of infection    CoV-2, FLU A/B, and RSV by PCR (2-4 Hours [employer]) : Collect NP swab in VTM [[id number]] Collected: 12/17/23 1528    Order Status: Completed Specimen: Respirate Updated: 12/17/23 1613     Influenza virus A RNA Negative     Influenza virus B, PCR Negative     RSV, PCR Negative     SARS-CoV-2 by PCR NotDetected     Comment: [facility] providers: PLEASE REFER TO DE-ESCALATION AND RETESTING PROTOCOL  on [email or web address]    **The [place] GeneXpert Xpress SARS-CoV-2 RT-PCR Test has been made  available for use under the Emergency Use Authorization (EUA) only.          SARS-CoV-2 Source NP Swab    Blood Culture - Draw one set from central line if present [[id number]] Collected: 12/17/23 1516    Order Status: Sent Specimen: Blood from Line Updated: 12/17/23 1529    Narrative:      Blood Cultures X2. Draw one blood culture from central line  (including implanted port) and one blood culture  peripherally. If no central line present draw blood cultures  times two peripherally from different sites    Blood Culture - Draw one set from central line if present [[id number]] Collected: 12/17/23 1516    Order Status: Sent Specimen: Blood from Peripheral Updated: 12/17/23 1528    Narrative:      Blood Cultures X2. Draw one blood culture from central  "line  (including implanted port) and one blood culture  peripherally. If no central line present draw blood cultures  times two peripherally from different sites    BLOOD CULTURE [[id number]] Collected: 23 0000    Order Status: Canceled Specimen: Other from Peripheral     BLOOD CULTURE [[id number]] Collected: 23 0000    Order Status: Canceled Specimen: Other from Peripheral           Labs:  Estimated Creatinine Clearance: 52.2 mL/min (by C-G formula based on SCr of 1.26 mg/dL).  Recent Labs     12/15/23  1556 23  1516   WBC 0.5* 0.3*   NEUTSPOLYS 5.00* 12.00*   BANDSSTABS 1.00  --      Recent Labs     12/15/23  1556 23  1516   BUN 30* 20   CREATININE 1.45* 1.26   ALBUMIN 3.9 3.6     No intake or output data in the 24 hours ending 23 2116   /50   Pulse 80   Temp 36.7 °C (98.1 °F) (Oral)   Resp 16   Ht 1.753 m (5' 9\")   Wt 80.7 kg (177 lb 14.6 oz)   SpO2 91%  Temp (24hrs), Av.6 °C (99.6 °F), Min:36.7 °C (98.1 °F), Max:38.7 °C (101.6 °F)      List concerns for Vancomycin clearance: Age;Hypermetabolic State (SIRS);Nephrotoxic drugs;Receipt of contrast dye    Pharmacokinetics:   AUC kinetics:   Ke (hr ^-1): 0.0476 hr^-1  Half life: 14.56 hr  Clearance: 2.506  Estimated TDD: 1253  Estimated Dose: 867  Estimated interval: 16.6    A/P:   -  Vancomycin dose: 2000mg loading dose followed by 1250mg q12h    -  Next vancomycin level(s): not ordered    -  Predicted vancomycin AUC from initial AUC test calculator: 499 mg·hr/L    -  Comments: patient with multiple renal risk factors. Monitor renal function closely with low threshold to check trough early or change to level based dosing if Cr worsens.    Angel Shaver, PharmD, BCPS    "

## 2023-12-18 NOTE — PROGRESS NOTES
Telemetry Shift Summary     Rhythm: SR  HR: 77-95  Ectopy: r PAC/PVC  Measurements: 0.18/0.08/0.40    Normal Values  Rhythm: SR  HR:   Measurements: 0.12-0.20 / 0.04-0.10 / 0.30-0.52    Strip reviewed and placed in chart

## 2023-12-18 NOTE — ASSESSMENT & PLAN NOTE
Patient with chemo induced neutropenia now febrile  CXR without sig abnormality  CTA ruled out PE, stable lung lesion, no obvious signs of PNA  Incentive spirometer  COVID/RSV/flu neg  BCX NGTD, UCX NG    ANC 1.63  Completed IV Zosyn 5 days.  Neutropenic fever is a result from patient's exudative esophagitis.

## 2023-12-18 NOTE — ED NOTES
Spoke with ERP regarding need for urine, pt unable to void.  Holding off straight cath at this time.  Pt afebrile.  Magnesium and IV abx infusing per MAR.  Admit orders rec'd.  Pending bed assignment.

## 2023-12-18 NOTE — ASSESSMENT & PLAN NOTE
History of coronary artery disease  Currently chest pain-free  Monitor troponin levels and optimize medication management

## 2023-12-18 NOTE — ASSESSMENT & PLAN NOTE
Magnesium level remains low at 1.4 despite IV replacement  Continuing IV magnesium replacement, monitoring closely for hypotension  Recheck labs in the morning

## 2023-12-18 NOTE — PROGRESS NOTES
1828: Received report from Elina GARCIA in ED.     1850: Patient admitted onto unit.    1910: Report given to [facility] JOSE Benitez. 2RN Skin check completed. Patient placed on telemetry box. Bed is locked and in lowest position. Patient is awake, alert and oriented x4.

## 2023-12-18 NOTE — CARE PLAN
The patient is Watcher - Medium risk of patient condition declining or worsening    Shift Goals  Clinical Goals: Monitor H&H, maintain neutropenic precautions, monitor fever  Patient Goals: Rest and comfort  Family Goals: LEEANNE    Progress made toward(s) clinical / shift goals:    Problem: Knowledge Deficit - Standard  Goal: Patient and family/care givers will demonstrate understanding of plan of care, disease process/condition, diagnostic tests and medications  Outcome: Progressing  Note: Reviewed plan of care with patient. Explained ABX, blood transfusion, and IV fluids. Answered patient questions and encouraged patient to voice any concerns or questions. PT/OT and SLP involved in patient care.     Problem: Hemodynamics  Goal: Patient's hemodynamics, fluid balance and neurologic status will be stable or improve  Outcome: Progressing  Note: Patient received 1 unit of blood during shift due to hemoglobin <7 per MD orders. No transfusion reactions noted.     Problem: Infection - Standard  Goal: Patient will remain free from infection  Outcome: Progressing  Note: Patient on neutropenic precautions. Patient's fever <100.0 (f) during shift. Patient encouraged to report flu-like symptoms and/or any other increases in discomfort. Protective precautions signs placed for communication with staff and visitors. Patient receiving antibiotics per orders.        Patient is not progressing towards the following goals:      Problem: Respiratory  Goal: Patient will achieve/maintain optimum respiratory ventilation and gas exchange  Outcome: Not Progressing  Note: Patient requiring oxygen during shift after working with OT. Patient on 3LNC to maintain oxygen >90%. Continuous pulse oximeter applied and encouraged patient to report any shortness of breath.

## 2023-12-18 NOTE — CARE PLAN
The patient is Watcher - Medium risk of patient condition declining or worsening    Shift Goals  Clinical Goals: Neutropenic precausions, IVF, and pain management  Patient Goals: Rest and comfort  Family Goals: LEEANNE    Progress made toward(s) clinical / shift goals:    Problem: Hemodynamics  Goal: Patient's hemodynamics, fluid balance and neurologic status will be stable or improve  Outcome: Progressing   Pt is on continuous fluids, neurological status is being monitored. See flow sheets and MAR.  Problem: Risk for Aspiration  Goal: Patient's risk for aspiration will be absent or decrease  Outcome: Progressing   Pt has not felt the need of aspirating during this shift.  Problem: Urinary Elimination  Goal: Establish and maintain regular urinary output  Outcome: Progressing   Pt has been able to void without any complications.     Patient is not progressing towards the following goals:

## 2023-12-18 NOTE — ASSESSMENT & PLAN NOTE
Patient has severe radiation-induced esophagitis and has not been able to eat and has not been getting nutrition this is most likely why he is developed fevers.  NGT unsuccessful I do not think it's indicated  Add Cepachol spray, viscous lidocaine      I discussed the results which showed patient had erosive and exudative esophagitis at the end of the proximal third of the esophagus, biopsies were taken.  Is likely due to radiation esophagitis, worsened by pill esophagitis and acid reflux.  Patient will be on twice daily PPI and Carafate 4 times daily.  I have started a full liquid diet.  I suggested for patient he will likely be on liquid diet at home for the next 2 to 3 weeks hopefully to allow healing of his esophagus.

## 2023-12-18 NOTE — THERAPY
Physical Therapy   Initial Evaluation     Patient Name: Bart Ramsey  Age:  73 y.o., Sex:  male  Medical Record #: [id number]  Today's Date: 12/18/2023     Precautions  Precautions: Fall Risk  Comments: poor activity tolerance    Assessment  Patient is 73 y.o. male who was recently admitted for weakness, SOB, loss of appetite due to difficulty with swallowing secondary to radiation induced esophagitis, and neutropenic fever. Pt also has a hx of lung CA and is currently undergoing treatment. Pt was agreeable to therapy evaluation and presented to PT with impaired balance, impaired gait, weakness, and poor upright activity tolerance. These impairments are limiting the patients ability to safely perform bed mobility, sit<>stands, transfers, ambulation, and step management. Pt was primarily limited due to lightheadedness and was only able to tolerate about 30 ft of ambulation and had to return back to bed. Pt will continue to benefit from skilled PT while in house, anticipate pt to be able to d/c back home once activity tolerance and lightheadedness improve. Recommend HH therapy at this time, however, if pt does not progress may require placement prior to d/c home, will continue to follow.     Plan    Physical Therapy Initial Treatment Plan   Treatment Plan : Bed Mobility, Equipment, Gait Training, Manual Therapy, Neuro Re-Education / Balance, Self Care / Home Evaluation, Stair Training, Therapeutic Activities, Therapeutic Exercise  Treatment Frequency: 4 Times per Week  Duration: Until Therapy Goals Met    DC Equipment Recommendations: Front-Wheel Walker  Discharge Recommendations: Other - (anticipate pt progress while in house and d/c home with family support and HH therapy services)     Objective       12/18/23 1015   Initial Contact Note    Initial Contact Note Order Received and Verified, Physical Therapy Evaluation in Progress with Full Report to Follow.   Precautions   Precautions Fall Risk   Comments  poor activity tolerance   Pain 0 - 10 Group   Therapist Pain Assessment Nurse Notified;0   Prior Living Situation   Prior Services None   Housing / Facility 1 Story House   Steps Into Home 1   Steps In Home 0   Bathroom Set up Walk In Shower;Grab Bars;Shower Chair   Equipment Owned 4-Wheel Walker;Single Point Cane   Lives with - Patient's Self Care Capacity Spouse;Child Less than 18 Years of Age   Comments pt states spouse and grand dtr can assist upon d/c to home   Prior Level of Functional Mobility   Bed Mobility Independent   Transfer Status Independent   Ambulation Independent   Ambulation Distance   (community)   Assistive Devices Used None   Stairs Independent   Comments pt reports of an IPLOF   History of Falls   History of Falls No   Cognition    Cognition / Consciousness WDL   Level of Consciousness Alert   Comments pleasant/cooperative   Passive ROM Upper Body   Passive ROM Upper Body WDL   Active ROM Upper Body   Active ROM Upper Body  WDL   Strength Upper Body   Upper Body Strength  WDL   Sensation Upper Body   Upper Extremity Sensation  WDL   Upper Body Muscle Tone   Upper Body Muscle Tone  WDL   Passive ROM Lower Body   Passive ROM Lower Body WDL   Active ROM Lower Body    Active ROM Lower Body  WDL   Strength Lower Body   Lower Body Strength  X   Gross Strength Generalized Weakness, Equal Bilaterally   Comments presents with gross strength of 4+/5 for B LE, able to demo functional strength for B LE   Sensation Lower Body   Lower Extremity Sensation   WDL   Lower Body Muscle Tone   Lower Body Muscle Tone  WDL   Neurological Concerns   Neurological Concerns No   Coordination Upper Body   Coordination WDL   Coordination Lower Body    Coordination Lower Body  WDL   Balance Assessment   Sitting Balance (Static) Fair +   Sitting Balance (Dynamic) Fair   Standing Balance (Static) Fair   Standing Balance (Dynamic) Fair   Weight Shift Sitting Fair   Weight Shift Standing Fair   Comments w/fww use, difficulty  maintain sitting balance while putting on his beanie, presented with L lateral and posterior lean. Once pt was able to scooted to EOB pt demonstrated with improved balance   Bed Mobility    Supine to Sit Contact Guard Assist   Sit to Supine Supervised   Scooting Standby Assist   Comments HOB flat and rails up   Gait Analysis   Gait Level Of Assist Contact Guard Assist   Assistive Device Front Wheel Walker   Distance (Feet) 30   # of Times Distance was Traveled 1   Deviation Decreased Base Of Support   Weight Bearing Status fwb   Comments limited by lightheadedness and fatigue   Functional Mobility   Sit to Stand Contact Guard Assist   Bed, Chair, Wheelchair Transfer Contact Guard Assist   Transfer Method Stand Step   Mobility EOB, sit<>stand, ambulation, back to bed   How much difficulty does the patient currently have...   Turning over in bed (including adjusting bedclothes, sheets and blankets)? 4   Sitting down on and standing up from a chair with arms (e.g., wheelchair, bedside commode, etc.) 3   Moving from lying on back to sitting on the side of the bed? 3   How much help from another person does the patient currently need...   Moving to and from a bed to a chair (including a wheelchair)? 3   Need to walk in a hospital room? 3   Climbing 3-5 steps with a railing? 2   6 clicks Mobility Score 18   Activity Tolerance   Sitting in Chair NT   Sitting Edge of Bed 5 mins   Standing 5 mins   Comments limited by lightheadedness and fatigue   Edema / Skin Assessment   Edema / Skin  Not Assessed   Patient / Family Goals    Patient / Family Goal #1 to go home   Short Term Goals    Short Term Goal # 1 pt will go supine<>sit w/hob flat and rails down w/spv in 6tx   Short Term Goal # 2 pt will go sit<>stand w/fww w/spv in 6tx   Short Term Goal # 3 pt will transfer bed<>chair w/fww w/spv in 6tx   Short Term Goal # 4 pt will ambulate for 150ft w/fww w/spv in 6tx   Short Term Goal # 5 pt will go up/down 1 step w/fww w/spv in  6tx   Education Group   Education Provided Role of Physical Therapist   Role of Physical Therapist Patient Response Patient;Acceptance;Explanation;Demonstration;Verbal Demonstration;Action Demonstration   Physical Therapy Initial Treatment Plan    Treatment Plan  Bed Mobility;Equipment;Gait Training;Manual Therapy;Neuro Re-Education / Balance;Self Care / Home Evaluation;Stair Training;Therapeutic Activities;Therapeutic Exercise   Treatment Frequency 4 Times per Week   Duration Until Therapy Goals Met   Problem List    Problems Impaired Bed Mobility;Impaired Transfers;Impaired Ambulation;Functional Strength Deficit;Impaired Balance;Decreased Activity Tolerance   Anticipated Discharge Equipment and Recommendations   DC Equipment Recommendations Front-Wheel Walker   Discharge Recommendations Other -  (anticipate pt progress while in house and d/c home with family support and HH therapy services)   Interdisciplinary Plan of Care Collaboration   IDT Collaboration with  Nursing   Patient Position at End of Therapy In Bed;Tray Table within Reach;Phone within Reach;Call Light within Reach   Collaboration Comments aware of visit and recs   Session Information   Date / Session Number  12/18-1 (1/4, 12/24)

## 2023-12-18 NOTE — PROGRESS NOTES
Hospital Medicine Daily Progress Note    Date of Service  12/18/2023    Chief Complaint  Bart Ramsey is a 73 y.o. male admitted 12/17/2023 with weakness    Hospital Course  74 yo w/ small cell lung carcinoma on chemo (clinical trial?) includes cisplatin, etoposide, HLCX10 or placebo, DM2, CAD, anemia, COPD, presented with shortness of breath and weakness. Last chemo on 12/6. Presented and found to be febrile, ANC count of 400, admitted for neutropenic fever of unclear source.    Interval Problem Update  - patient reports mild sob  - normally does not wear oxygen  - no n/v, diarrhea, abd pain, cp, URI sxs or dysuria  - cultures pending  - pt asked to order SLP  - hb 6.8 , 1unit prbc ordered  - per rn, ngt placement was attempted and resulted in nose bleed  - K 3.3, repleted    I have discussed this patient's plan of care and discharge plan at IDT rounds today with Case Management, Nursing, Nursing leadership, and other members of the IDT team.    Code Status  Full Code    Disposition  The patient is not medically cleared for discharge to home or a post-acute facility.  Anticipate discharge to: home with organized home healthcare and close outpatient follow-up    I have placed the appropriate orders for post-discharge needs.    Review of Systems  Review of Systems   All other systems reviewed and are negative.       Physical Exam  Temp:  [36.4 °C (97.5 °F)-37.7 °C (99.8 °F)] 36.9 °C (98.5 °F)  Pulse:  [71-89] 89  Resp:  [12-20] 18  BP: (101-132)/(45-63) 122/55  SpO2:  [88 %-95 %] 93 %    Physical Exam  General: NAD, resting comfortably  HEENT: PERRLA, EOMI  Cards: RRR  Pulm: rhonchi noted on L side, no wheezing  Abdomen: soft, NTND, + bowel sounds, no rebound tenderness or guarding  MSK: normal ROM of upper and lower extremities  Neuro: CN II-XII grossly intact, sensation/strength intact, AAOx3  Psych: Appropriate mood   Fluids    Intake/Output Summary (Last 24 hours) at 12/18/2023 1533  Last data filed at  12/18/2023 1500  Gross per 24 hour   Intake 358 ml   Output 440 ml   Net -82 ml       Laboratory  Recent Labs     12/15/23  1556 12/17/23  1516 12/18/23  0437   WBC 0.5* 0.3* 0.6*   RBC 2.23* 2.40* 2.11*   HEMOGLOBIN 7.3* 7.7* 6.8*   HEMATOCRIT 20.6* 21.9* 19.3*   MCV 92.4 91.3 91.5   MCH 32.7 32.1 32.2   MCHC 35.4 35.2 35.2   RDW 51.3* 47.5 48.1   PLATELETCT 25* 27* 29*   MPV 10.9 10.1 10.6     Recent Labs     12/15/23  1556 12/17/23  1516 12/18/23  0437   SODIUM 137 134* 131*   POTASSIUM 3.8 3.4* 3.3*   CHLORIDE 99 95* 97   CO2 23 22 22   GLUCOSE 108* 132* 143*   BUN 30* 20 18   CREATININE 1.45* 1.26 1.18   CALCIUM 8.1* 7.9* 7.2*                   Imaging  DX-CHEST-PORTABLE (1 VIEW)   Final Result      No evidence of acute cardiopulmonary process.           Assessment/Plan  * Neutropenic fever (HCC)- (present on admission)  Assessment & Plan  Patient with chemo induced neutropenia now febrile  CXR without sig abnormality  CTA ruled out PE, stable lung lesion, no obvious signs of PNA  Incentive spirometer  COVID/RSV/flu neg  BCX NGTD, f/u UCX  Narrow abx to IV Zosyn    Antineoplastic chemotherapy induced pancytopenia (CODE) (HCC)- (present on admission)  Assessment & Plan  Sig drop in platelets, neutropenia and anemia  Transfuse 1 unit pRBC today  No obvious signs of bleed  Monitor CBC    Thrombocytopenia (HCC)- (present on admission)  Assessment & Plan  Patient has significant thrombocytopenia with platelet count only being 27  At this point I am not going to give him DVT prophylaxis as this could induce severe bleeding  Monitor platelet counts currently is not bleeding and he does not need a transfusion.    Hyponatremia  Assessment & Plan  Mild hyponatremia at 134  Give fluid resuscitation monitor sodium levels    Hypophosphatemia  Assessment & Plan  Phosphorus is down to 2.0 give potassium phosphate and monitor phosphate levels    Hypomagnesemia  Assessment & Plan  Initial magnesium 1.0 on admission  4 g of IV  magnesium  Repeat daily magnesium levels    Anemia associated with chemotherapy- (present on admission)  Assessment & Plan  Monitor H&H if Brosseau 7 or 21 transfuse  Hb 6.8, 1 unit pRBC ordered  Monitor CBC closely    COPD without exacerbation (HCC)- (present on admission)  Assessment & Plan  History of COPD currently not in acute exacerbation  Continue albuterol and Dulera  Guaifenesin for mucolysis    Type 2 diabetes mellitus with diabetic polyneuropathy, without long-term current use of insulin (HCC)- (present on admission)  Assessment & Plan  Accu-Cheks with sign scale coverage  Most recent hemoglobin A1c is 6.3      Hypokalemia due to inadequate potassium intake- (present on admission)  Assessment & Plan  Hypokalemia at 3.4  Giving potassium phosphate and thus we will monitor potassium levels as well    Radiation-induced esophagitis- (present on admission)  Assessment & Plan  Patient has severe radiation-induced esophagitis and has not been able to eat and has not been getting nutrition this is most likely why he is developed fevers.  NGT unsuccessful I do not think it's indicated  Cepachol spray, magic mouthwash  Soft diet, SLP eval    SCLC (small cell lung carcinoma) (HCC)- (present on admission)  Assessment & Plan  Ongoing small cell lung cancer treatment with Dr. Yamilex Fuller of [facility] oncology    Coronary artery disease involving native coronary artery of native heart without angina pectoris- (present on admission)  Assessment & Plan  History of coronary artery disease  Currently chest pain-free  Monitor troponin levels and optimize medication management         VTE prophylaxis: none platelet <50    I have performed a physical exam and reviewed and updated ROS and Plan today (12/18/2023). In review of yesterday's note (12/17/2023), there are no changes except as documented above.

## 2023-12-19 ENCOUNTER — APPOINTMENT (OUTPATIENT)
Dept: RADIOLOGY | Facility: MEDICAL CENTER | Age: 73
DRG: 808 | End: 2023-12-19
Attending: INTERNAL MEDICINE
Payer: COMMERCIAL

## 2023-12-19 LAB
ALBUMIN SERPL BCP-MCNC: 2.7 G/DL (ref 3.2–4.9)
ALBUMIN/GLOB SERPL: 1 G/DL
ALP SERPL-CCNC: 69 U/L (ref 30–99)
ALT SERPL-CCNC: 8 U/L (ref 2–50)
ANION GAP SERPL CALC-SCNC: 11 MMOL/L (ref 7–16)
AST SERPL-CCNC: 12 U/L (ref 12–45)
BASOPHILS # BLD AUTO: 0 % (ref 0–1.8)
BASOPHILS # BLD: 0 K/UL (ref 0–0.12)
BILIRUB SERPL-MCNC: 0.6 MG/DL (ref 0.1–1.5)
BUN SERPL-MCNC: 16 MG/DL (ref 8–22)
CALCIUM ALBUM COR SERPL-MCNC: 8.2 MG/DL (ref 8.5–10.5)
CALCIUM SERPL-MCNC: 7.2 MG/DL (ref 8.4–10.2)
CHLORIDE SERPL-SCNC: 100 MMOL/L (ref 96–112)
CO2 SERPL-SCNC: 22 MMOL/L (ref 20–33)
CREAT SERPL-MCNC: 1.1 MG/DL (ref 0.5–1.4)
EOSINOPHIL # BLD AUTO: 0 K/UL (ref 0–0.51)
EOSINOPHIL NFR BLD: 0 % (ref 0–6.9)
ERYTHROCYTE [DISTWIDTH] IN BLOOD BY AUTOMATED COUNT: 50.6 FL (ref 35.9–50)
GFR SERPLBLD CREATININE-BSD FMLA CKD-EPI: 71 ML/MIN/1.73 M 2
GLOBULIN SER CALC-MCNC: 2.7 G/DL (ref 1.9–3.5)
GLUCOSE BLD STRIP.AUTO-MCNC: 103 MG/DL (ref 65–99)
GLUCOSE BLD STRIP.AUTO-MCNC: 117 MG/DL (ref 65–99)
GLUCOSE BLD STRIP.AUTO-MCNC: 120 MG/DL (ref 65–99)
GLUCOSE BLD STRIP.AUTO-MCNC: 129 MG/DL (ref 65–99)
GLUCOSE BLD STRIP.AUTO-MCNC: 130 MG/DL (ref 65–99)
GLUCOSE SERPL-MCNC: 108 MG/DL (ref 65–99)
HCT VFR BLD AUTO: 20 % (ref 42–52)
HGB BLD-MCNC: 7.1 G/DL (ref 14–18)
LYMPHOCYTES # BLD AUTO: 1.15 K/UL (ref 1–4.8)
LYMPHOCYTES NFR BLD: 72 % (ref 22–41)
MAGNESIUM SERPL-MCNC: 1.6 MG/DL (ref 1.5–2.5)
MANUAL DIFF BLD: NORMAL
MCH RBC QN AUTO: 32.4 PG (ref 27–33)
MCHC RBC AUTO-ENTMCNC: 35.5 G/DL (ref 32.3–36.5)
MCV RBC AUTO: 91.3 FL (ref 81.4–97.8)
MONOCYTES # BLD AUTO: 0.19 K/UL (ref 0–0.85)
MONOCYTES NFR BLD AUTO: 12 % (ref 0–13.4)
NEUTROPHILS # BLD AUTO: 0.26 K/UL (ref 1.82–7.42)
NEUTROPHILS NFR BLD: 16 % (ref 44–72)
NRBC # BLD AUTO: 0 K/UL
NRBC BLD-RTO: 0 /100 WBC (ref 0–0.2)
PHOSPHATE SERPL-MCNC: 1.8 MG/DL (ref 2.5–4.5)
PLATELET # BLD AUTO: 37 K/UL (ref 164–446)
PLATELET BLD QL SMEAR: NORMAL
PLATELETS.RETICULATED NFR BLD AUTO: 5.3 % (ref 0.6–13.1)
PMV BLD AUTO: 10.9 FL (ref 9–12.9)
POTASSIUM SERPL-SCNC: 2.9 MMOL/L (ref 3.6–5.5)
POTASSIUM SERPL-SCNC: 3.3 MMOL/L (ref 3.6–5.5)
PROT SERPL-MCNC: 5.4 G/DL (ref 6–8.2)
RBC # BLD AUTO: 2.19 M/UL (ref 4.7–6.1)
RBC BLD AUTO: NORMAL
SODIUM SERPL-SCNC: 133 MMOL/L (ref 135–145)
WBC # BLD AUTO: 1.6 K/UL (ref 4.8–10.8)

## 2023-12-19 PROCEDURE — 83735 ASSAY OF MAGNESIUM: CPT

## 2023-12-19 PROCEDURE — A9270 NON-COVERED ITEM OR SERVICE: HCPCS | Performed by: INTERNAL MEDICINE

## 2023-12-19 PROCEDURE — 700105 HCHG RX REV CODE 258: Performed by: INTERNAL MEDICINE

## 2023-12-19 PROCEDURE — 92611 MOTION FLUOROSCOPY/SWALLOW: CPT

## 2023-12-19 PROCEDURE — 84100 ASSAY OF PHOSPHORUS: CPT

## 2023-12-19 PROCEDURE — 700102 HCHG RX REV CODE 250 W/ 637 OVERRIDE(OP): Performed by: INTERNAL MEDICINE

## 2023-12-19 PROCEDURE — 770020 HCHG ROOM/CARE - TELE (206)

## 2023-12-19 PROCEDURE — 74230 X-RAY XM SWLNG FUNCJ C+: CPT

## 2023-12-19 PROCEDURE — 85055 RETICULATED PLATELET ASSAY: CPT

## 2023-12-19 PROCEDURE — 85007 BL SMEAR W/DIFF WBC COUNT: CPT

## 2023-12-19 PROCEDURE — 700105 HCHG RX REV CODE 258: Performed by: HOSPITALIST

## 2023-12-19 PROCEDURE — A9270 NON-COVERED ITEM OR SERVICE: HCPCS | Performed by: HOSPITALIST

## 2023-12-19 PROCEDURE — 94640 AIRWAY INHALATION TREATMENT: CPT

## 2023-12-19 PROCEDURE — 99233 SBSQ HOSP IP/OBS HIGH 50: CPT | Performed by: INTERNAL MEDICINE

## 2023-12-19 PROCEDURE — 85027 COMPLETE CBC AUTOMATED: CPT

## 2023-12-19 PROCEDURE — 82962 GLUCOSE BLOOD TEST: CPT

## 2023-12-19 PROCEDURE — 80053 COMPREHEN METABOLIC PANEL: CPT

## 2023-12-19 PROCEDURE — 700111 HCHG RX REV CODE 636 W/ 250 OVERRIDE (IP): Mod: JZ | Performed by: HOSPITALIST

## 2023-12-19 PROCEDURE — 94760 N-INVAS EAR/PLS OXIMETRY 1: CPT

## 2023-12-19 PROCEDURE — 84132 ASSAY OF SERUM POTASSIUM: CPT

## 2023-12-19 PROCEDURE — 700102 HCHG RX REV CODE 250 W/ 637 OVERRIDE(OP): Performed by: HOSPITALIST

## 2023-12-19 PROCEDURE — 36415 COLL VENOUS BLD VENIPUNCTURE: CPT

## 2023-12-19 PROCEDURE — 700101 HCHG RX REV CODE 250: Performed by: INTERNAL MEDICINE

## 2023-12-19 RX ORDER — POTASSIUM CHLORIDE 20 MEQ/1
40 TABLET, EXTENDED RELEASE ORAL 2 TIMES DAILY
Status: DISCONTINUED | OUTPATIENT
Start: 2023-12-19 | End: 2023-12-19

## 2023-12-19 RX ORDER — POTASSIUM CHLORIDE 20 MEQ/1
40 TABLET, EXTENDED RELEASE ORAL ONCE
Status: DISCONTINUED | OUTPATIENT
Start: 2023-12-19 | End: 2023-12-19

## 2023-12-19 RX ORDER — LANOLIN ALCOHOL/MO/W.PET/CERES
400 CREAM (GRAM) TOPICAL DAILY
Status: DISCONTINUED | OUTPATIENT
Start: 2023-12-19 | End: 2023-12-23

## 2023-12-19 RX ORDER — GAUZE BANDAGE 2" X 2"
100 BANDAGE TOPICAL DAILY
Status: DISCONTINUED | OUTPATIENT
Start: 2023-12-19 | End: 2023-12-23 | Stop reason: HOSPADM

## 2023-12-19 RX ORDER — M-VIT,TX,IRON,MINS/CALC/FOLIC 27MG-0.4MG
1 TABLET ORAL DAILY
Status: DISCONTINUED | OUTPATIENT
Start: 2023-12-19 | End: 2023-12-23 | Stop reason: HOSPADM

## 2023-12-19 RX ORDER — POTASSIUM CHLORIDE 20 MEQ/1
40 TABLET, EXTENDED RELEASE ORAL 2 TIMES DAILY
Status: DISCONTINUED | OUTPATIENT
Start: 2023-12-19 | End: 2023-12-22

## 2023-12-19 RX ADMIN — SODIUM CHLORIDE: 9 INJECTION, SOLUTION INTRAVENOUS at 00:46

## 2023-12-19 RX ADMIN — GABAPENTIN 600 MG: 300 CAPSULE ORAL at 17:34

## 2023-12-19 RX ADMIN — PIPERACILLIN AND TAZOBACTAM 4.5 G: 4; .5 INJECTION, POWDER, FOR SOLUTION INTRAVENOUS at 23:52

## 2023-12-19 RX ADMIN — PIPERACILLIN AND TAZOBACTAM 4.5 G: 4; .5 INJECTION, POWDER, FOR SOLUTION INTRAVENOUS at 08:42

## 2023-12-19 RX ADMIN — SUCRALFATE 1 G: 1 SUSPENSION ORAL at 05:57

## 2023-12-19 RX ADMIN — MOMETASONE FUROATE AND FORMOTEROL FUMARATE DIHYDRATE 2 PUFF: 200; 5 AEROSOL RESPIRATORY (INHALATION) at 19:49

## 2023-12-19 RX ADMIN — PIPERACILLIN AND TAZOBACTAM 4.5 G: 4; .5 INJECTION, POWDER, FOR SOLUTION INTRAVENOUS at 18:00

## 2023-12-19 RX ADMIN — POTASSIUM PHOSPHATE, MONOBASIC AND POTASSIUM PHOSPHATE, DIBASIC 30 MMOL: 224; 236 INJECTION, SOLUTION, CONCENTRATE INTRAVENOUS at 08:45

## 2023-12-19 RX ADMIN — SUCRALFATE 1 G: 1 SUSPENSION ORAL at 17:34

## 2023-12-19 RX ADMIN — MOMETASONE FUROATE AND FORMOTEROL FUMARATE DIHYDRATE 2 PUFF: 200; 5 AEROSOL RESPIRATORY (INHALATION) at 07:36

## 2023-12-19 RX ADMIN — POTASSIUM CHLORIDE 40 MEQ: 1500 TABLET, EXTENDED RELEASE ORAL at 12:58

## 2023-12-19 RX ADMIN — LIDOCAINE HYDROCHLORIDE 5 ML: 20 SOLUTION ORAL; TOPICAL at 12:58

## 2023-12-19 RX ADMIN — LIDOCAINE HYDROCHLORIDE 5 ML: 20 SOLUTION ORAL; TOPICAL at 23:48

## 2023-12-19 RX ADMIN — Medication 1000 UNITS: at 05:57

## 2023-12-19 RX ADMIN — THIAMINE HCL TAB 100 MG 100 MG: 100 TAB at 15:14

## 2023-12-19 RX ADMIN — LIDOCAINE HYDROCHLORIDE 5 ML: 20 SOLUTION ORAL; TOPICAL at 17:32

## 2023-12-19 RX ADMIN — GABAPENTIN 600 MG: 300 CAPSULE ORAL at 05:57

## 2023-12-19 RX ADMIN — Medication 400 MG: at 13:04

## 2023-12-19 RX ADMIN — GUAIFENESIN 600 MG: 600 TABLET, EXTENDED RELEASE ORAL at 17:34

## 2023-12-19 RX ADMIN — OMEPRAZOLE 40 MG: 20 CAPSULE, DELAYED RELEASE ORAL at 05:57

## 2023-12-19 RX ADMIN — GUAIFENESIN 600 MG: 600 TABLET, EXTENDED RELEASE ORAL at 05:57

## 2023-12-19 RX ADMIN — MULTIPLE VITAMINS W/ MINERALS TAB 1 TABLET: TAB at 15:14

## 2023-12-19 ASSESSMENT — PAIN DESCRIPTION - PAIN TYPE
TYPE: ACUTE PAIN
TYPE: ACUTE PAIN

## 2023-12-19 NOTE — CARE PLAN
The patient is Stable - Low risk of patient condition declining or worsening    Shift Goals  Clinical Goals: Rest, monitor temp, IV abx  Patient Goals: Rest  Family Goals: LEEANNE    Progress made toward(s) clinical / shift goals:    Problem: Knowledge Deficit - Standard  Goal: Patient and family/care givers will demonstrate understanding of plan of care, disease process/condition, diagnostic tests and medications  Description: Target End Date:  1-3 days or as soon as patient condition allows    Document in Patient Education    1.  Patient and family/caregiver oriented to unit, equipment, visitation policy and means for communicating concern  2.  Complete/review Learning Assessment  3.  Assess knowledge level of disease process/condition, treatment plan, diagnostic tests and medications  4.  Explain disease process/condition, treatment plan, diagnostic tests and medications  Outcome: Progressing     Problem: Self Care  Goal: Patient will have the ability to perform ADLs independently or with assistance (bathe, groom, dress, toilet and feed)  Description: Target End Date:  Prior to discharge or change in level of care    Document on ADL flowsheet    1.  Assess the capability and level of deficiency to perform ADLs  2.  Encourage family/care giver involvement  3.  Provide assistive devices  4.  Consider PT/OT evaluations  5.  Maintain support, give positive feedback, encourage self-care allowing extra time and verbal cuing as needed  6.  Avoid doing something for patients they can do themselves, but provide assistance as needed  7.  Assist in anticipating/planning individual needs  8.  Collaborate with Case Management and  to meet discharge needs  Outcome: Progressing

## 2023-12-19 NOTE — PROGRESS NOTES
Modified diet per scope of practice and patient request to try softer foods. MD Beckman notified during rounds.

## 2023-12-19 NOTE — DISCHARGE PLANNING
Case Management Discharge Planning    Admission Date: 12/17/2023  GMLOS: 2.7  ALOS: 2    6-Clicks ADL Score: 22  6-Clicks Mobility Score: 18      Anticipated Discharge Dispo: Discharge Disposition: Discharged to home/self care (01)    DME Needed: No    Action(s) Taken: Choice obtained    Escalations Completed: None    Medically Clear: No    Barriers to Discharge: Medical clearance    Course of Action  **0987  LSW spoke to patient at bedside. Discussed home health. Per patient, he was able to talk to his wife and they both agreed home health would be a good idea. Choice obtained for [facility]. Signed choice form faxed to [facility].    **1025  Call to [facility]. No answer. Voicemail with callback number left for intake.    **1201  Patient accepted for services by [facility].

## 2023-12-19 NOTE — PROGRESS NOTES
Hospital Medicine Daily Progress Note    Date of Service  12/19/2023    Chief Complaint  Bart Ramsey is a 73 y.o. male admitted 12/17/2023 with weakness    Hospital Course  74 yo w/ small cell lung carcinoma on chemo (clinical trial?) includes cisplatin, etoposide, HLCX10 or placebo, DM2, CAD, anemia, COPD, presented with shortness of breath and weakness. Last chemo on 12/6. Presented and found to be febrile, ANC count of 400, admitted for neutropenic fever of unclear source.    Interval Problem Update  - patient feels well no new complaints  - stable on 2-3L O2  - cultures thus far all neg  - no fevers  -   - HH ordered  - low K again, low PHosph      I have discussed this patient's plan of care and discharge plan at IDT rounds today with Case Management, Nursing, Nursing leadership, and other members of the IDT team.    Code Status  Full Code    Disposition  The patient is not medically cleared for discharge to home or a post-acute facility.  Anticipate discharge to: home with close outpatient follow-up    I have placed the appropriate orders for post-discharge needs.    Review of Systems  Review of Systems   All other systems reviewed and are negative.       Physical Exam  Temp:  [36.6 °C (97.8 °F)-37.3 °C (99.1 °F)] 36.6 °C (97.8 °F)  Pulse:  [70-89] 70  Resp:  [16-18] 16  BP: (117-122)/(55-86) 120/59  SpO2:  [93 %-100 %] 98 %    Physical Exam  General: NAD, resting comfortably  HEENT: PERRLA, EOMI  Cards: RRR  Pulm: rhonchi noted on L side, no wheezing  Abdomen: soft, NTND, + bowel sounds, no rebound tenderness or guarding  MSK: normal ROM of upper and lower extremities  Neuro: CN II-XII grossly intact, sensation/strength intact, AAOx3  Psych: Appropriate mood   Fluids    Intake/Output Summary (Last 24 hours) at 12/19/2023 1251  Last data filed at 12/18/2023 1730  Gross per 24 hour   Intake 858 ml   Output --   Net 858 ml       Laboratory  Recent Labs     12/17/23  1516 12/18/23  7786  12/18/23  1728 12/19/23  0146   WBC 0.3* 0.6*  --  1.6*   RBC 2.40* 2.11*  --  2.19*   HEMOGLOBIN 7.7* 6.8* 7.6* 7.1*   HEMATOCRIT 21.9* 19.3*  --  20.0*   MCV 91.3 91.5  --  91.3   MCH 32.1 32.2  --  32.4   MCHC 35.2 35.2  --  35.5   RDW 47.5 48.1  --  50.6*   PLATELETCT 27* 29*  --  37*   MPV 10.1 10.6  --  10.9     Recent Labs     12/17/23  1516 12/18/23  0437 12/19/23  0146 12/19/23  0943   SODIUM 134* 131* 133*  --    POTASSIUM 3.4* 3.3* 2.9* 3.3*   CHLORIDE 95* 97 100  --    CO2 22 22 22  --    GLUCOSE 132* 143* 108*  --    BUN 20 18 16  --    CREATININE 1.26 1.18 1.10  --    CALCIUM 7.9* 7.2* 7.2*  --                    Imaging  DX-CHEST-PORTABLE (1 VIEW)   Final Result      No evidence of acute cardiopulmonary process.      DX-ESOPHAGUS - [id number]    (Results Pending)        Assessment/Plan  * Neutropenic fever (HCC)- (present on admission)  Assessment & Plan  Patient with chemo induced neutropenia now febrile  CXR without sig abnormality  CTA ruled out PE, stable lung lesion, no obvious signs of PNA  Incentive spirometer  COVID/RSV/flu neg  BCX NGTD, UCX NG  Narrow abx to IV Zosyn  Consider Levofloxacin tomorrow for total 5-7 day course  Once ANC>500 and no fever on oral abx, would dc -possibly thurs?    Antineoplastic chemotherapy induced pancytopenia (CODE) (HCC)- (present on admission)  Assessment & Plan  Sig drop in platelets, neutropenia and anemia  Transfuse 1 unit pRBC 12/18 for hb 6.8  No obvious signs of bleed  Monitor CBC    Thrombocytopenia (HCC)- (present on admission)  Assessment & Plan  Patient has significant thrombocytopenia with platelet count only being 27  At this point I am not going to give him DVT prophylaxis as this could induce severe bleeding  Monitor platelet counts currently is not bleeding and he does not need a transfusion.  Hold home asa    Hyponatremia  Assessment & Plan  Mild hyponatremia at 134  Give fluid resuscitation monitor sodium  levels    Hypophosphatemia  Assessment & Plan  Phosphorus is down to 2.0 give potassium phosphate and monitor phosphate levels  Giving K phos again    Hypomagnesemia  Assessment & Plan  Initial magnesium 1.0 on admission  4 g of IV magnesium  Repeat daily magnesium levels  Start supplements    Anemia associated with chemotherapy- (present on admission)  Assessment & Plan  Monitor H&H if Brosseau 7 or 21 transfuse  Hb 6.8, 1 unit pRBC ordered  Monitor CBC closely    COPD without exacerbation (HCC)- (present on admission)  Assessment & Plan  History of COPD currently not in acute exacerbation  Continue albuterol and Dulera  Guaifenesin for mucolysis    Type 2 diabetes mellitus with diabetic polyneuropathy, without long-term current use of insulin ([facility])- (present on admission)  Assessment & Plan  Accu-Cheks with sign scale coverage  Most recent hemoglobin A1c is 6.3      Hypokalemia- (present on admission)  Assessment & Plan  Sig low K of 2.9  Repleting aggressively  Suspect needs standing potassium and magnesium given poor PO intake  Start MG daily, K daily    Radiation-induced esophagitis- (present on admission)  Assessment & Plan  Patient has severe radiation-induced esophagitis and has not been able to eat and has not been getting nutrition this is most likely why he is developed fevers.  NGT unsuccessful I do not think it's indicated  Add Cepachol spray, viscous lidocaine  Soft diet, SLP eval  Nutrition consult  Has low mg, k, phosph all indicating poor nutrition  I discussed with pharmacy they will add MBX and this is something patient can get at [facility] pharmacy    SCLC (small cell lung carcinoma) (HCC)- (present on admission)  Assessment & Plan  Ongoing small cell lung cancer treatment with Dr. Yamilex Fuller of [facility] oncology  CTPA upon admission with stable dz    Coronary artery disease involving native coronary artery of native heart without angina pectoris- (present on admission)  Assessment &  Plan  History of coronary artery disease  Currently chest pain-free  Monitor troponin levels and optimize medication management         VTE prophylaxis: none platelet <50    I have performed a physical exam and reviewed and updated ROS and Plan today (12/19/2023). In review of yesterday's note (12/18/2023), there are no changes except as documented above.

## 2023-12-19 NOTE — PROGRESS NOTES
0248-  Tanisha from lab called regarding a critical WBC of 1.6 and absolute neutrophil count of 0.26.    0256-  Dr Mcgee notified of WBC of 1.6 and ANC of 0.26.

## 2023-12-19 NOTE — THERAPY
Speech Language Pathology   Videofluoroscopic Swallow Study Evaluation     Patient Name: Bart Ramsey  AGE:  73 y.o., SEX:  male  Medical Record #: [id number]  Date of Service: 12/19/2023      History of Present Illness  Pt is a 72 y/o male admitted 12/17/23 with generalized weakness, difficulty swallowing, and mild SOB after undergoing recent chemo (last on 12/7/23). Found to have neutropenic fevers and radiation-induced esophagitis.     PMHx: COPD, lung cancer w/ current chemo, diabetes, MI, pneumonia. Pt also had recent ER visit on 12/15, but was diagnosed with esophagitis and given medication for same.   No previous SLP notes found in EMR.      CXR 12/17: negative.     Pertinent Information  Current Method of Nutrition: Oral diet (Pureed solids/Thin liquids - downgraded last night by nsg per pt request)  Patient Behaviors: Fatigue  Dentition: Upper denture  Secretion Management: Adequate  Feeding Tube: None  Factor(s) Affecting Performance: Impaired endurance, Other (see comments) (constant chest pain)    Discussed the risks, benefits, and alternatives of the VFSS procedure. Patient acknowledged and agreed to proceed.    Assessment  Videofluoroscopic Swallow Study was conducted in the Lateral, A-P projection(s) to evaluate oropharyngeal swallow function. A radiology tech was present to assist with the procedure.    Positioning: Seated (AP view), Seated upright in fluoroscopy chair  Anatomic View: WFL  Bolus Administration: SLP, Patient  PO Barium Contrast Trials: Varibar thin, Varibar nectar (mildly thick), Liquidised mixed with Varibar powder, Varibar pudding, Soft & Bite sized coated with Varibar powder, Regular solid coated with Varibar pudding, Mixed consistency with Varibar powder      Consistency PAS Score Timing Residue Comments   Thin Liquid 2 (shallow, w/ straw sip) During swallow Vallecular Residue: Mild (5%-25%)  Pyriform Sinus Residue: Trace (1%-5%) Tsp, cup, straw - PAS 1  Straw (liq  rinse) - PAS 2   Mildly Thick 1 N/A Vallecular Residue: Trace (1%-5%)  Pyriform Sinus Residue: Trace (1%-5%) cup   Liquidised 1 N/A Vallecular Residue: Trace (1%-5%)  Pyriform Sinus Residue: Trace (1%-5%)    Pudding 1 N/A Vallecular Residue: Mild (5%-25%)  Pyriform Sinus Residue: None (0%)    Soft & Bite Sized 1 N/A Vallecular Residue: Trace (1%-5%)  Pyriform Sinus Residue: Trace (1%-5%) AP view only   Regular Solid 1 N/A Vallecular Residue: Trace (1%-5%)  Pyriform Sinus Residue: Trace (1%-5%)    Mixed 1 N/A Vallecular Residue: Trace (1%-5%)  Pyriform Sinus Residue: Trace (1%-5%)      Penetration-Aspiration Scale (PAS)  1     No contrast enters airway  2     Contrast enters the airway, remains above the vocal folds, and is ejected from the airway (not seen in the airway at the end of the swallow).  3     Contrast enters the airway, remains above the vocal folds, and is not ejected from the airway (is seen in the airway after the swallow).  4     Contrast enters the airway, contacts the vocal folds, and is ejected from the airway.  5     Contrast enters the airway, contacts the vocal folds, and is not ejected from the airway  6     Contrast enters the airway, crosses the plane of the vocal folds, and is ejected from the airway.  7     Contrast enters the airway, crosses the plane of the vocal folds, and is not ejected from the airway despite effort.  8     Contrast enters the airway, crosses the plane of the vocal folds, is not ejected from the airway and there is no response to aspiration.       Oral phase:  Reduced orolingual bolus control w/ thin liquid residue spilling to pyriforms before a secondary swallow was initiated. Adequate mastication of solids. Piecemeal deglutition w/ chewable solids and larger sips of liquid. Mild lingual residue following intake of liquids and pudding, cleared w/ second swallow.     Pharyngeal phase:  Delayed pharyngeal swallow onset initially with hyoid burst occurring as thin  liquid tsp settled in the pyriforms, though timeliness improved with subsequent swallows. Deficits marked by reduced tongue base retraction, reduced pharyngeal shortening and delayed laryngeal vestibule closure. Otherwise, pharyngeal stripping wave was intact, epiglottis fully inverted and UES distension was complete. Deficits resulting in the following:  -Shallow penetration of thin liquid via straw with spontaneous ejection upon completion of the swallow  -Trace vallecular residue with mildly thick liquids, applesauce, fruit and a cracker; mild vallecular residue w/ thin liquids and pudding  -Trace pyriform sinus residue across intake    Esophageal phase (AP view):  Retention of thin liquids, applesauce and soft/bite sized solids in the mid esophagus  Retrograde flow of all consistencies (most pronounced w/ liquids) from the lower to mid esophagus with delayed transit/emptying    Compensatory Strategies:  Second swallow: effective to clear oropharyngeal residue    Severity Rating:   Severity Rating: DIGEST Safety Grade  DIGEST Safety Grade: Mild    Clinical Impressions  The pt presents with a mild oropharyngeal-esophageal dysphagia, likely related to development of radiation-induced esophagitis and overall deconditioning/weakness. Dysphagia was most notable for esophageal retention, delayed transit/emptying and retrograde flow of all consistencies to the mid esophagus. Diet modification is a consideration only for pt comfort. He may decide to upgrade/downgrade his diet texture for pleasure/comfort purposes. Risk for developing aspiration pneumonia is low as no aspiration (descending or ascending) was seen on this study. However, risk for malnutrition is high due to chest pain with every swallow, regardless of texture with pt taking a long time to consume PO. He may benefit from oral supplements, analgesics (lidocaine wash), and a GI consult to determine any other appropriate interventions to improve esophageal  "swallow function/comfort.      Recommendations  Diet Consistency: Minced/Moist solids, Thin liquids, per pt preference (ok for nursing to upgrade/downgrade diet per pt request for comfort/pleasure)  Medication: As tolerated (consider crushing if less painful for pt)  Supervision: Distant supervision - check on patient 2-3 times per meal  Positioning: Fully upright and midline during oral intake, Remain upright for 30-45 minutes after oral intake  Strategies: Small bites/sips, Slow rate of intake, Multiple swallows (2) per bite/sips (Remain upright following meals)  Oral Care:  (TID post meals)  Additional Instrumentation: None  Referrals: GI, dietician       SLP Treatment Plan  Treatment Plan: Dysphagia Treatment  SLP Frequency: 3x Per Week  Estimated Duration: Until Therapy Goals Met      Anticipated Discharge Needs  Discharge Recommendations: Recommend home health for continued speech therapy services   Therapy Recommendations Upon DC: Dysphagia Training, Patient / Family / Caregiver Education        Patient / Family Goals  Patient / Family Goal #1: \"I'd rather be sick than have that NGT attempted again\"  Goal #1 Outcome: Progressing as expected  Short Term Goal # 1: Patient will participate in MBSS to further evaluate oropharyngeal and pharyngoesophageal swallow function and r/o silent aspiration.  Goal Outcome # 1: Goal met, new goal added  Short Term Goal # 1 B : Patient will consume least retrictive diet with no s/sx of aspiration and pain <4/10 with use of compensations and diet modifcations as needed.  Short Term Goal # 2: Patient will complete BoT and pharyngeal constriction exericses x10-15 reps each given min cues.      Kalani Rosas MS,CCC-SLP   "

## 2023-12-19 NOTE — PROGRESS NOTES
Telemetry Shift Summary    Rhythm SR  HR Range 74-83  Ectopy rPAC, rPVC  Measurements 0.15/0.10/0.38    Normal Values  Rhythm SR  HR Range:   Measurements: 0.12-0.20/0.06-0.10/0.30-0.52

## 2023-12-19 NOTE — DIETARY
"Nutrition services: Day 2 of admit.  Bart Ramsey is a 73 y.o. male with admitting DX of Neutropenic fever (HCC) [D70.9, R50.81]     Consult received for MST 3: yes to wt loss, unsure amount, +poor PO intake PTA. Also received kcal count consult per Dr Beckman. Per discussion w/ MD, assessment/interventions ok, no kcal count at this time. RD visited pt at bedside. Pt reports yesterday ate a little better (25-50% x 1 meal per ADLs) than he has been, but still not well enough. Prior to yesterday, had 3-4 days of PO intake at 1% of his normal; states prior to that PO intake has been 4-8% of normal x 6 weeks due to R chest pain associated w/ swallowing. Pt has been seen by SLP this admit, no s/sx of aspiration, and current Level 5 - minced/moist diet order is per pt's request for comfort). Pt reported to this RD that chest pain w/ swallowing is reason for very poor PO intake/wt loss, though also states intake was poor during chemo/radiation treatment regimen x12 weeks. States no difference in pain level between food and fluids. He drinks oral nutrition supplements at home, and would like a Boost supplement QD. Pt states 20-lb wt loss in the past 6 weeks, down to 165 lb on his home scale. Pt consented to Nutrition-focused physical exam (NFPE).    Assessment:  Height: 175.3 cm (5' 9\")  Weight: 81.7 kg (180 lb 1.9 oz)  Body mass index is 26.6 kg/m²., BMI classification: Overweight; within ideal BMI range for age  Diet/Intake: Level 5 - minced/moist, Level 0 - thin liquids, supplements, consistent CHO: PO intake meals per ADLs: <25-50% x 2 meals    Evaluation:   PMHx includes COPD, T2DM, MI, lung cancer. Current dx list includes neutropenic fever, chemo-associated anemia, hypokalemia, hypomagnesemia, hyponatremia, hypophosphatemia, radiation-induced esophagitis, small cell lung carcinoma.  Per chart notes, NGT attempt unsuccessful yesterday, resulting in a nosebleed. Per SLP note, pt states \"I'd rather be sick " "than have that NGT attempted again.\"  Pt followed by [facility] RD, last visit documented 12/4/23: Pt reports \"eating like a horse\" since being DC from the hospital last month. When asked about meal frequency pt reports constantly eating, pt did not quantify meal times/frequency. Current foods include soups, burritos, sloppy ernesto's, muffins, milk, oatmeal, etc. Pt also reports eating big portions with 1 Equate supplement/d.    Labs: K+ 3.3, glu 103-157, Ca 7.2, corrected Ca 8.2, alb 2.7, phos 1.8, A1c 5.9%, CRP 16.55  MAR: neurontin, SSI (dose not req), mag oxide, NS,  prilosec, zosyn, Kdur, Kphos IV in D5, pericolace, carafate, cholecalciferol, MBX, lidocaine  No documented wounds or edema  Last BM: not documented  NFPE: temporal depressions, hollowing of the orbital region, decreased muscle tone in clavicle region w/ some protrusion of acromion, decreased muscle tone/depression to dorsal hand  Per chart hx, pt's wt highly variable; general trend of 13lb wt loss (7.1%) x 2.5 months which is significant; most recent wts seem to be more consistent with 167-171 lb, not current 180.1 lb    Malnutrition Risk: Pt meets 2 ASPEN criteria for moderate chronic disease related malnutrition r/t small cell lung carcinoma and treatments/outcomes including radiation-induced esophagitis AEB pt presents w/ moderate muscle wasting and moderate fat wasting on NFPE.    Recommendations/Plan:  MD aware of/already following low electrolytes. Pt is a refeeding syndrome risk: recommended to MD to supplement 100 mg Thiamine x 7 days to reduce risk of refeeding. Continue to follow electrolytes and replace PRN.   TID Glucerna shakes w/ meals to optimize PO intake. Consider increasing frequency if pt's PO intake of meals does not improve.  Encourage intake of meals and supplements >75%.  Document intake of all PO as % taken in ADL's to provide interdisciplinary communication across all shifts.   Monitor weight.  Nutrition rep will continue to see " patient for ongoing meal and snack preferences.     RD following.

## 2023-12-19 NOTE — DISCHARGE PLANNING
Received Choice Form @: 7902  Agency/Facility Name: [facility]  Referral Sent Per Choice Form @: 9644

## 2023-12-19 NOTE — PROGRESS NOTES
Telemetry Shift Summary     Rhythm: SR   HR Range:67-87  Ectopy: r-oPVC rPAC  Measurements: 0.18/0.08/0.40    Normal Values  Measurements: 0.12- 0.20 / 0.08-0.10 / 0.30-0.52

## 2023-12-20 ENCOUNTER — TELEPHONE (OUTPATIENT)
Dept: HEMATOLOGY ONCOLOGY | Facility: MEDICAL CENTER | Age: 73
End: 2023-12-20
Payer: COMMERCIAL

## 2023-12-20 PROBLEM — E43 SEVERE PROTEIN-CALORIE MALNUTRITION (HCC): Status: ACTIVE | Noted: 2023-12-20

## 2023-12-20 LAB
ANION GAP SERPL CALC-SCNC: 10 MMOL/L (ref 7–16)
BACTERIA UR CULT: NORMAL
BASOPHILS # BLD AUTO: 0 % (ref 0–1.8)
BASOPHILS # BLD: 0 K/UL (ref 0–0.12)
BUN SERPL-MCNC: 12 MG/DL (ref 8–22)
CALCIUM SERPL-MCNC: 7 MG/DL (ref 8.4–10.2)
CHLORIDE SERPL-SCNC: 100 MMOL/L (ref 96–112)
CO2 SERPL-SCNC: 23 MMOL/L (ref 20–33)
CREAT SERPL-MCNC: 1.11 MG/DL (ref 0.5–1.4)
EOSINOPHIL # BLD AUTO: 0 K/UL (ref 0–0.51)
EOSINOPHIL NFR BLD: 0 % (ref 0–6.9)
ERYTHROCYTE [DISTWIDTH] IN BLOOD BY AUTOMATED COUNT: 49.4 FL (ref 35.9–50)
GFR SERPLBLD CREATININE-BSD FMLA CKD-EPI: 70 ML/MIN/1.73 M 2
GLUCOSE BLD STRIP.AUTO-MCNC: 102 MG/DL (ref 65–99)
GLUCOSE BLD STRIP.AUTO-MCNC: 106 MG/DL (ref 65–99)
GLUCOSE BLD STRIP.AUTO-MCNC: 109 MG/DL (ref 65–99)
GLUCOSE BLD STRIP.AUTO-MCNC: 98 MG/DL (ref 65–99)
GLUCOSE SERPL-MCNC: 105 MG/DL (ref 65–99)
HCT VFR BLD AUTO: 19.5 % (ref 42–52)
HCT VFR BLD AUTO: 25.4 % (ref 42–52)
HGB BLD-MCNC: 6.9 G/DL (ref 14–18)
HGB BLD-MCNC: 8.4 G/DL (ref 14–18)
LYMPHOCYTES # BLD AUTO: 0.51 K/UL (ref 1–4.8)
LYMPHOCYTES NFR BLD: 22 % (ref 22–41)
MAGNESIUM SERPL-MCNC: 1.3 MG/DL (ref 1.5–2.5)
MAGNESIUM SERPL-MCNC: 1.3 MG/DL (ref 1.5–2.5)
MANUAL DIFF BLD: ABNORMAL
MCH RBC QN AUTO: 32.1 PG (ref 27–33)
MCHC RBC AUTO-ENTMCNC: 35.4 G/DL (ref 32.3–36.5)
MCV RBC AUTO: 90.7 FL (ref 81.4–97.8)
MONOCYTES # BLD AUTO: 0.46 K/UL (ref 0–0.85)
MONOCYTES NFR BLD AUTO: 20 % (ref 0–13.4)
MYELOCYTES NFR BLD MANUAL: 6 %
NEUTROPHILS # BLD AUTO: 1.2 K/UL (ref 1.82–7.42)
NEUTROPHILS NFR BLD: 40 % (ref 44–72)
NEUTS BAND NFR BLD MANUAL: 12 % (ref 0–10)
NRBC # BLD AUTO: 0 K/UL
NRBC BLD-RTO: 0 /100 WBC (ref 0–0.2)
PHOSPHATE SERPL-MCNC: 1.4 MG/DL (ref 2.5–4.5)
PLATELET # BLD AUTO: 50 K/UL (ref 164–446)
PLATELET BLD QL SMEAR: NORMAL
PLATELETS.RETICULATED NFR BLD AUTO: 5.2 % (ref 0.6–13.1)
PMV BLD AUTO: 10.8 FL (ref 9–12.9)
POTASSIUM SERPL-SCNC: 3.3 MMOL/L (ref 3.6–5.5)
RBC # BLD AUTO: 2.15 M/UL (ref 4.7–6.1)
RBC BLD AUTO: NORMAL
SIGNIFICANT IND 70042: NORMAL
SITE SITE: NORMAL
SODIUM SERPL-SCNC: 133 MMOL/L (ref 135–145)
SOURCE SOURCE: NORMAL
WBC # BLD AUTO: 2.3 K/UL (ref 4.8–10.8)

## 2023-12-20 PROCEDURE — 700105 HCHG RX REV CODE 258: Performed by: HOSPITALIST

## 2023-12-20 PROCEDURE — 94760 N-INVAS EAR/PLS OXIMETRY 1: CPT

## 2023-12-20 PROCEDURE — 92526 ORAL FUNCTION THERAPY: CPT

## 2023-12-20 PROCEDURE — 84100 ASSAY OF PHOSPHORUS: CPT

## 2023-12-20 PROCEDURE — 85007 BL SMEAR W/DIFF WBC COUNT: CPT

## 2023-12-20 PROCEDURE — 85014 HEMATOCRIT: CPT

## 2023-12-20 PROCEDURE — 85018 HEMOGLOBIN: CPT

## 2023-12-20 PROCEDURE — A9270 NON-COVERED ITEM OR SERVICE: HCPCS | Performed by: HOSPITALIST

## 2023-12-20 PROCEDURE — 700102 HCHG RX REV CODE 250 W/ 637 OVERRIDE(OP): Performed by: INTERNAL MEDICINE

## 2023-12-20 PROCEDURE — A9270 NON-COVERED ITEM OR SERVICE: HCPCS | Performed by: INTERNAL MEDICINE

## 2023-12-20 PROCEDURE — 85027 COMPLETE CBC AUTOMATED: CPT

## 2023-12-20 PROCEDURE — 80048 BASIC METABOLIC PNL TOTAL CA: CPT

## 2023-12-20 PROCEDURE — 700102 HCHG RX REV CODE 250 W/ 637 OVERRIDE(OP): Performed by: HOSPITALIST

## 2023-12-20 PROCEDURE — 94640 AIRWAY INHALATION TREATMENT: CPT

## 2023-12-20 PROCEDURE — 700111 HCHG RX REV CODE 636 W/ 250 OVERRIDE (IP): Mod: JZ | Performed by: INTERNAL MEDICINE

## 2023-12-20 PROCEDURE — 83735 ASSAY OF MAGNESIUM: CPT

## 2023-12-20 PROCEDURE — 700111 HCHG RX REV CODE 636 W/ 250 OVERRIDE (IP): Mod: JZ | Performed by: HOSPITALIST

## 2023-12-20 PROCEDURE — P9016 RBC LEUKOCYTES REDUCED: HCPCS

## 2023-12-20 PROCEDURE — 36430 TRANSFUSION BLD/BLD COMPNT: CPT

## 2023-12-20 PROCEDURE — 770020 HCHG ROOM/CARE - TELE (206)

## 2023-12-20 PROCEDURE — 86923 COMPATIBILITY TEST ELECTRIC: CPT

## 2023-12-20 PROCEDURE — 85055 RETICULATED PLATELET ASSAY: CPT

## 2023-12-20 PROCEDURE — 86945 BLOOD PRODUCT/IRRADIATION: CPT

## 2023-12-20 PROCEDURE — 82962 GLUCOSE BLOOD TEST: CPT | Mod: 91

## 2023-12-20 PROCEDURE — 99233 SBSQ HOSP IP/OBS HIGH 50: CPT | Performed by: INTERNAL MEDICINE

## 2023-12-20 RX ORDER — MAGNESIUM SULFATE HEPTAHYDRATE 40 MG/ML
2 INJECTION, SOLUTION INTRAVENOUS ONCE
Status: COMPLETED | OUTPATIENT
Start: 2023-12-20 | End: 2023-12-20

## 2023-12-20 RX ADMIN — OMEPRAZOLE 40 MG: 20 CAPSULE, DELAYED RELEASE ORAL at 05:55

## 2023-12-20 RX ADMIN — MOMETASONE FUROATE AND FORMOTEROL FUMARATE DIHYDRATE 2 PUFF: 200; 5 AEROSOL RESPIRATORY (INHALATION) at 05:56

## 2023-12-20 RX ADMIN — Medication 1000 UNITS: at 05:55

## 2023-12-20 RX ADMIN — SUCRALFATE 1 G: 1 SUSPENSION ORAL at 18:19

## 2023-12-20 RX ADMIN — MOMETASONE FUROATE AND FORMOTEROL FUMARATE DIHYDRATE 2 PUFF: 200; 5 AEROSOL RESPIRATORY (INHALATION) at 19:29

## 2023-12-20 RX ADMIN — LIDOCAINE HYDROCHLORIDE 5 ML: 20 SOLUTION ORAL; TOPICAL at 11:56

## 2023-12-20 RX ADMIN — GUAIFENESIN 600 MG: 600 TABLET, EXTENDED RELEASE ORAL at 18:19

## 2023-12-20 RX ADMIN — PIPERACILLIN AND TAZOBACTAM 4.5 G: 4; .5 INJECTION, POWDER, FOR SOLUTION INTRAVENOUS at 09:11

## 2023-12-20 RX ADMIN — GUAIFENESIN 600 MG: 600 TABLET, EXTENDED RELEASE ORAL at 05:55

## 2023-12-20 RX ADMIN — Medication 400 MG: at 05:55

## 2023-12-20 RX ADMIN — PIPERACILLIN AND TAZOBACTAM 4.5 G: 4; .5 INJECTION, POWDER, FOR SOLUTION INTRAVENOUS at 18:23

## 2023-12-20 RX ADMIN — DIBASIC SODIUM PHOSPHATE, MONOBASIC POTASSIUM PHOSPHATE AND MONOBASIC SODIUM PHOSPHATE 500 MG: 852; 155; 130 TABLET ORAL at 18:19

## 2023-12-20 RX ADMIN — POTASSIUM CHLORIDE 40 MEQ: 1500 TABLET, EXTENDED RELEASE ORAL at 05:55

## 2023-12-20 RX ADMIN — GABAPENTIN 600 MG: 300 CAPSULE ORAL at 18:19

## 2023-12-20 RX ADMIN — LIDOCAINE HYDROCHLORIDE 5 ML: 20 SOLUTION ORAL; TOPICAL at 05:56

## 2023-12-20 RX ADMIN — MULTIPLE VITAMINS W/ MINERALS TAB 1 TABLET: TAB at 05:55

## 2023-12-20 RX ADMIN — THIAMINE HCL TAB 100 MG 100 MG: 100 TAB at 05:55

## 2023-12-20 RX ADMIN — MAGNESIUM SULFATE HEPTAHYDRATE 2 G: 2 INJECTION, SOLUTION INTRAVENOUS at 18:25

## 2023-12-20 RX ADMIN — SUCRALFATE 1 G: 1 SUSPENSION ORAL at 05:55

## 2023-12-20 RX ADMIN — GABAPENTIN 600 MG: 300 CAPSULE ORAL at 05:55

## 2023-12-20 RX ADMIN — LIDOCAINE HYDROCHLORIDE 5 ML: 20 SOLUTION ORAL; TOPICAL at 18:19

## 2023-12-20 RX ADMIN — POTASSIUM CHLORIDE 40 MEQ: 1500 TABLET, EXTENDED RELEASE ORAL at 18:19

## 2023-12-20 ASSESSMENT — ENCOUNTER SYMPTOMS
VOMITING: 0
MYALGIAS: 0
ROS GI COMMENTS: ESOPHAGEAL DYSPHAGIA
EYES NEGATIVE: 1
DEPRESSION: 1
INSOMNIA: 0
ABDOMINAL PAIN: 0
HEADACHES: 0
CONSTIPATION: 0
SHORTNESS OF BREATH: 0
FEVER: 0
WEAKNESS: 1
HEMOPTYSIS: 0
DIARRHEA: 0
COUGH: 0
BACK PAIN: 0
PALPITATIONS: 0
NECK PAIN: 0
NAUSEA: 0
CHILLS: 0
DIZZINESS: 0

## 2023-12-20 ASSESSMENT — PAIN DESCRIPTION - PAIN TYPE
TYPE: ACUTE PAIN
TYPE: ACUTE PAIN

## 2023-12-20 ASSESSMENT — FIBROSIS 4 INDEX: FIB4 SCORE: 6.19

## 2023-12-20 NOTE — PROGRESS NOTES
Telemetry Shift Summary    Rhythm SR  HR Range 69-86  Ectopy r-oPVC, oPAC, rBig  Measurements 0.18/0.08/0.40      Normal Values  Rhythm SR  HR Range:   Measurements: 0.12-0.20/0.06-0.10/0.30-0.52

## 2023-12-20 NOTE — THERAPY
"Speech Language Pathology   Daily Treatment     Patient Name: Bart Ramsey  AGE:  73 y.o., SEX:  male  Medical Record #: [id number]  Date of Service: 12/20/2023      Precautions:  Precautions: Fall Risk, Swallow Precautions    Subjective  RN cleared patient for dysphagia tx. Patient received awake and alert in bed. Flat affect noted, but pt pleasant and cooperative and agreeable to tx objectives.     Assessment  Patient reported minimal improvement in esophageal pain w/ diet downgrade to MM5/TN0 after MBSS yesterday. MBSS results reviewed again with patient today. Patient consumed PO trials of thin liquids via straw. After 3-4 sips, patient reported \"Even this hurts quite a bit\" and rated his esophageal pain as a 6/10. Patient reported pain clears after 3-5 minutes, likely after bolus passes d/t dysmotility and esophagitis. Patient declined further trials d/t pain. Patient instructed on BoT retraction and pharyngeal constriction exercises and completed each with \"good\" accuracy. Patient was instructed to complete exercises 10-15 reps at a time, 3-5 sets per day. Discussed options to upgrade patient back to SB6/TN0 diet, but patient declined, reporting \"that was too hard to get down yesterday\" and reported preference for continuing on current MM5/TN0 diet; \"I don't really care about the texture, as long as it tastes good.\"     Clinical Impressions  Patient continues to presents with mild pharyngo-esophageal dysphagia, as noted on MBSS yesterday. Patient continues to report frequent 6/10 pain in esophagus/chest when swallowing even low-level textures, such as thin liquids and purees. Patient prefers to stay on MM5/TN0 diet (minced/moist solids w/ thin liquids), but is ok to upgrade/downgrade diet texture per preference. Strongly recommend GI consult to further evaluate/treat esophageal dysphagia; RN and MD aware.     Recommendations  Treatment Completed: Dysphagia Treatment  Consult Referral(s): " "Gastroenterologist    Dysphagia Treatment  Diet Consistency: MM5/TN0 (minced/moist solids w/ thin liquids); ok for nursing to upgrade/downgrade diet per pt request for comfort/pleasure  Instrumentation: None indicated at this time  Medication: As tolerated (consider crushing if less painful for pt)  Supervision: Distant supervision - check on patient 2-3 times per meal, Encourage self-feeding  Positioning: Fully upright and midline during oral intake, Remain upright for 30 minutes after oral intake  Risk Management : Small bites/sips, Slow rate of intake  Oral Care: BID    SLP Treatment Plan  Treatment Plan: Dysphagia Treatment  SLP Frequency: 3x Per Week  Estimated Duration: Until Therapy Goals Met    Anticipated Discharge Needs  Discharge Recommendations: Recommend home health for continued speech therapy services  Therapy Recommendations Upon DC: Dysphagia Training, Community Re-Integration, Patient / Family / Caregiver Education    Patient / Family Goals  Patient / Family Goal #1: \"I'd rather be sick than have that NGT attempted again\"  Goal #1 Outcome: Progressing as expected  Short Term Goals  Short Term Goal # 1: Patient will participate in MBSS to further evaluate oropharyngeal and pharyngoesophageal swallow function and r/o silent aspiration.  Short Term Goal # 1 B : Patient will consume least retrictive diet with no s/sx of aspiration and pain <4/10 with use of compensations and diet modifcations as needed.  Goal Outcome  # 1 B: Progressing slower than expected  Short Term Goal # 2: Patient will complete BoT and pharyngeal constriction exericses x10-15 reps each given min cues.  Goal Outcome # 2 : Progressing as expected    Diane Rajput, SLP  "

## 2023-12-20 NOTE — CARE PLAN
The patient is Watcher - Medium risk of patient condition declining or worsening    Shift Goals  Clinical Goals: monitor temp, IV abx, wean O2  Patient Goals: rest  Family Goals: LEEANNE    Progress made toward(s) clinical / shift goals:  afebrile this shift, O2 down to 1.5LNC. IV abx continued. Electrolytes replenished.       Problem: Knowledge Deficit - Standard  Goal: Patient and family/care givers will demonstrate understanding of plan of care, disease process/condition, diagnostic tests and medications  Outcome: Progressing     Problem: Fall Risk  Goal: Patient will remain free from falls  Outcome: Progressing     Problem: Psychosocial  Goal: Patient's level of anxiety will decrease  Outcome: Progressing     Problem: Bowel Elimination  Goal: Establish and maintain regular bowel function  Outcome: Progressing       Patient is not progressing towards the following goals:

## 2023-12-20 NOTE — PROGRESS NOTES
Telemetry Shift Summary     Rhythm: SR  HR: 74-84  Ectopy: r-oPVC, rPAC  Measurements: 0.16/0.08/0.34       Normal Values  Rhythm: SR  HR:   Measurements: 0.12-0.20 / 0.04-0.10 / 0.30-0.52

## 2023-12-20 NOTE — CARE PLAN
The patient is Watcher - Medium risk of patient condition declining or worsening    Shift Goals  Clinical Goals: IV ABX, IV fluids, monitor O2  Patient Goals: rest  Family Goals: adrien    Progress made toward(s) clinical / shift goals:    Problem: Pain - Standard  Goal: Alleviation of pain or a reduction in pain to the patient’s comfort goal  Description: Target End Date:  Prior to discharge or change in level of care    Document on Vitals flowsheet    1.  Document pain using the appropriate pain scale per order or unit policy  2.  Educate and implement non-pharmacologic comfort measures (i.e. relaxation, distraction, massage, cold/heat therapy, etc.)  3.  Pain management medications as ordered  4.  Reassess pain after pain med administration per policy  5.  If opiods administered assess patient's response to pain medication is appropriate per POSS sedation scale  6.  Follow pain management plan developed in collaboration with patient and interdisciplinary team (including palliative care or pain specialists if applicable)  Outcome: Progressing  Note: Pt currently declines pain, aware to inform RN of breakthrough pain     Problem: Skin Integrity  Goal: Skin integrity is maintained or improved  Description: Target End Date:  Prior to discharge or change in level of care    Document interventions on Skin Risk/[facility] flowsheet groups and corresponding LDA    1.  Assess and monitor skin integrity, appearance and/or temperature  2.  Assess risk factors for impaired skin integrity and/or pressures ulcers  3.  Implement precautions to protect skin integrity in collaboration with interdisciplinary team  4.  Implement pressure ulcer prevention protocol if at risk for skin breakdown  5.  Confirm wound care consult if at risk for skin breakdown  6.  Ensure patient use of pressure relieving devices  (Low air loss bed, waffle overlay, heel protectors, ROHO cushion, etc)  Outcome: Progressing  Note: Pt able to turn self in bed as  well as ambulate     Problem: Fall Risk  Goal: Patient will remain free from falls  Description: Target End Date:  Prior to discharge or change in level of care    Document interventions on the [facility] Fall Risk Assessment    1.  Assess for fall risk factors  2.  Implement fall precautions  Outcome: Progressing  Note: Bed low and locked, bed alarm on, call light within reach     Problem: Respiratory  Goal: Patient will achieve/maintain optimum respiratory ventilation and gas exchange  Description: Target End Date:  Prior to discharge or change in level of care    Document on Assessment flowsheet    1.  Assess and monitor rate, rhythm, depth and effort of respiration  2.  Breath sounds assessed qshift and/or as needed  3.  Assess O2 saturation, administer/titrate oxygen as ordered  4.  Position patient for maximum ventilatory efficiency  5.  Turn, cough, and deep breath with splinting to improve effectiveness  6.  Collaborate with RT to administer medication/treatments per order  7.  Encourage use of incentive spirometer and encourage patient to cough after use and utilize splinting techniques if applicable  8.  Airway suctioning  9.  Monitor sputum production for changes in color, consistency and frequency  10. Perform frequent oral hygiene  11. Alternate physical activity with rest periods  Outcome: Progressing  Note: Pt O2>90% on 2L nasal cannula       Patient is not progressing towards the following goals:

## 2023-12-20 NOTE — TELEPHONE ENCOUNTER
Patient calling to cancel his appointment on 12/21/23, has he is currently hospitalized.  He's probably not going to make the 12/24/23 infusion appointment either.    Patient thought he was done with chemo.

## 2023-12-21 ENCOUNTER — APPOINTMENT (OUTPATIENT)
Dept: RADIOLOGY | Facility: MEDICAL CENTER | Age: 73
DRG: 808 | End: 2023-12-21
Attending: INTERNAL MEDICINE
Payer: COMMERCIAL

## 2023-12-21 ENCOUNTER — ANESTHESIA EVENT (OUTPATIENT)
Dept: SURGERY | Facility: MEDICAL CENTER | Age: 73
DRG: 808 | End: 2023-12-21
Payer: COMMERCIAL

## 2023-12-21 ENCOUNTER — APPOINTMENT (OUTPATIENT)
Dept: ONCOLOGY | Facility: MEDICAL CENTER | Age: 73
End: 2023-12-21
Attending: STUDENT IN AN ORGANIZED HEALTH CARE EDUCATION/TRAINING PROGRAM
Payer: COMMERCIAL

## 2023-12-21 ENCOUNTER — APPOINTMENT (OUTPATIENT)
Dept: RADIOLOGY | Facility: MEDICAL CENTER | Age: 73
DRG: 808 | End: 2023-12-21
Attending: NURSE PRACTITIONER
Payer: COMMERCIAL

## 2023-12-21 ENCOUNTER — APPOINTMENT (OUTPATIENT)
Dept: HEMATOLOGY ONCOLOGY | Facility: MEDICAL CENTER | Age: 73
End: 2023-12-21
Payer: COMMERCIAL

## 2023-12-21 LAB
ALBUMIN SERPL BCP-MCNC: 2.7 G/DL (ref 3.2–4.9)
BASOPHILS # BLD AUTO: 0.3 % (ref 0–1.8)
BASOPHILS # BLD: 0.01 K/UL (ref 0–0.12)
BUN SERPL-MCNC: 8 MG/DL (ref 8–22)
CALCIUM ALBUM COR SERPL-MCNC: 8.5 MG/DL (ref 8.5–10.5)
CALCIUM SERPL-MCNC: 7.5 MG/DL (ref 8.4–10.2)
CHLORIDE SERPL-SCNC: 101 MMOL/L (ref 96–112)
CO2 SERPL-SCNC: 22 MMOL/L (ref 20–33)
CREAT SERPL-MCNC: 0.97 MG/DL (ref 0.5–1.4)
EOSINOPHIL # BLD AUTO: 0.02 K/UL (ref 0–0.51)
EOSINOPHIL NFR BLD: 0.7 % (ref 0–6.9)
ERYTHROCYTE [DISTWIDTH] IN BLOOD BY AUTOMATED COUNT: 49.7 FL (ref 35.9–50)
GFR SERPLBLD CREATININE-BSD FMLA CKD-EPI: 82 ML/MIN/1.73 M 2
GLUCOSE BLD STRIP.AUTO-MCNC: 120 MG/DL (ref 65–99)
GLUCOSE BLD STRIP.AUTO-MCNC: 120 MG/DL (ref 65–99)
GLUCOSE BLD STRIP.AUTO-MCNC: 136 MG/DL (ref 65–99)
GLUCOSE BLD STRIP.AUTO-MCNC: 99 MG/DL (ref 65–99)
GLUCOSE SERPL-MCNC: 95 MG/DL (ref 65–99)
HCT VFR BLD AUTO: 22.8 % (ref 42–52)
HGB BLD-MCNC: 8 G/DL (ref 14–18)
IMM GRANULOCYTES # BLD AUTO: 0.12 K/UL (ref 0–0.11)
IMM GRANULOCYTES NFR BLD AUTO: 4.1 % (ref 0–0.9)
LYMPHOCYTES # BLD AUTO: 0.46 K/UL (ref 1–4.8)
LYMPHOCYTES NFR BLD: 15.5 % (ref 22–41)
MAGNESIUM SERPL-MCNC: 1.5 MG/DL (ref 1.5–2.5)
MCH RBC QN AUTO: 31.6 PG (ref 27–33)
MCHC RBC AUTO-ENTMCNC: 35.1 G/DL (ref 32.3–36.5)
MCV RBC AUTO: 90.1 FL (ref 81.4–97.8)
MONOCYTES # BLD AUTO: 0.72 K/UL (ref 0–0.85)
MONOCYTES NFR BLD AUTO: 24.3 % (ref 0–13.4)
NEUTROPHILS # BLD AUTO: 1.63 K/UL (ref 1.82–7.42)
NEUTROPHILS NFR BLD: 55.1 % (ref 44–72)
NRBC # BLD AUTO: 0 K/UL
NRBC BLD-RTO: 0 /100 WBC (ref 0–0.2)
PHOSPHATE SERPL-MCNC: 1.5 MG/DL (ref 2.5–4.5)
PLATELET # BLD AUTO: 73 K/UL (ref 164–446)
PLATELETS.RETICULATED NFR BLD AUTO: 4.1 % (ref 0.6–13.1)
PMV BLD AUTO: 10.8 FL (ref 9–12.9)
POTASSIUM SERPL-SCNC: 3.3 MMOL/L (ref 3.6–5.5)
RBC # BLD AUTO: 2.53 M/UL (ref 4.7–6.1)
SODIUM SERPL-SCNC: 134 MMOL/L (ref 135–145)
WBC # BLD AUTO: 3 K/UL (ref 4.8–10.8)

## 2023-12-21 PROCEDURE — 700111 HCHG RX REV CODE 636 W/ 250 OVERRIDE (IP): Mod: JZ | Performed by: INTERNAL MEDICINE

## 2023-12-21 PROCEDURE — A9270 NON-COVERED ITEM OR SERVICE: HCPCS | Performed by: INTERNAL MEDICINE

## 2023-12-21 PROCEDURE — 83735 ASSAY OF MAGNESIUM: CPT

## 2023-12-21 PROCEDURE — 700102 HCHG RX REV CODE 250 W/ 637 OVERRIDE(OP): Performed by: HOSPITALIST

## 2023-12-21 PROCEDURE — 700105 HCHG RX REV CODE 258: Performed by: INTERNAL MEDICINE

## 2023-12-21 PROCEDURE — 85025 COMPLETE CBC W/AUTO DIFF WBC: CPT

## 2023-12-21 PROCEDURE — 94640 AIRWAY INHALATION TREATMENT: CPT

## 2023-12-21 PROCEDURE — 770006 HCHG ROOM/CARE - MED/SURG/GYN SEMI*

## 2023-12-21 PROCEDURE — 80069 RENAL FUNCTION PANEL: CPT

## 2023-12-21 PROCEDURE — 700101 HCHG RX REV CODE 250: Performed by: INTERNAL MEDICINE

## 2023-12-21 PROCEDURE — 99233 SBSQ HOSP IP/OBS HIGH 50: CPT | Performed by: INTERNAL MEDICINE

## 2023-12-21 PROCEDURE — 85055 RETICULATED PLATELET ASSAY: CPT

## 2023-12-21 PROCEDURE — 82962 GLUCOSE BLOOD TEST: CPT | Mod: 91

## 2023-12-21 PROCEDURE — 74220 X-RAY XM ESOPHAGUS 1CNTRST: CPT

## 2023-12-21 PROCEDURE — 700117 HCHG RX CONTRAST REV CODE 255: Performed by: NURSE PRACTITIONER

## 2023-12-21 PROCEDURE — 700111 HCHG RX REV CODE 636 W/ 250 OVERRIDE (IP): Mod: JZ | Performed by: HOSPITALIST

## 2023-12-21 PROCEDURE — 700102 HCHG RX REV CODE 250 W/ 637 OVERRIDE(OP): Performed by: INTERNAL MEDICINE

## 2023-12-21 PROCEDURE — 94760 N-INVAS EAR/PLS OXIMETRY 1: CPT

## 2023-12-21 PROCEDURE — 36415 COLL VENOUS BLD VENIPUNCTURE: CPT

## 2023-12-21 PROCEDURE — 97602 WOUND(S) CARE NON-SELECTIVE: CPT

## 2023-12-21 PROCEDURE — 700105 HCHG RX REV CODE 258: Performed by: HOSPITALIST

## 2023-12-21 PROCEDURE — A9270 NON-COVERED ITEM OR SERVICE: HCPCS | Performed by: HOSPITALIST

## 2023-12-21 RX ORDER — MAGNESIUM SULFATE HEPTAHYDRATE 40 MG/ML
2 INJECTION, SOLUTION INTRAVENOUS ONCE
Status: COMPLETED | OUTPATIENT
Start: 2023-12-21 | End: 2023-12-21

## 2023-12-21 RX ADMIN — LIDOCAINE HYDROCHLORIDE 5 ML: 20 SOLUTION ORAL; TOPICAL at 17:31

## 2023-12-21 RX ADMIN — LIDOCAINE HYDROCHLORIDE 5 ML: 20 SOLUTION ORAL; TOPICAL at 00:29

## 2023-12-21 RX ADMIN — GABAPENTIN 600 MG: 300 CAPSULE ORAL at 17:34

## 2023-12-21 RX ADMIN — SODIUM CHLORIDE: 9 INJECTION, SOLUTION INTRAVENOUS at 18:22

## 2023-12-21 RX ADMIN — POTASSIUM CHLORIDE 40 MEQ: 1500 TABLET, EXTENDED RELEASE ORAL at 05:39

## 2023-12-21 RX ADMIN — THIAMINE HCL TAB 100 MG 100 MG: 100 TAB at 05:40

## 2023-12-21 RX ADMIN — GUAIFENESIN 600 MG: 600 TABLET, EXTENDED RELEASE ORAL at 05:40

## 2023-12-21 RX ADMIN — Medication 1000 UNITS: at 05:40

## 2023-12-21 RX ADMIN — GUAIFENESIN 600 MG: 600 TABLET, EXTENDED RELEASE ORAL at 17:34

## 2023-12-21 RX ADMIN — PIPERACILLIN AND TAZOBACTAM 4.5 G: 4; .5 INJECTION, POWDER, FOR SOLUTION INTRAVENOUS at 00:36

## 2023-12-21 RX ADMIN — OMEPRAZOLE 40 MG: 20 CAPSULE, DELAYED RELEASE ORAL at 05:39

## 2023-12-21 RX ADMIN — IOHEXOL 100 ML: 350 INJECTION, SOLUTION INTRAVENOUS at 13:00

## 2023-12-21 RX ADMIN — GABAPENTIN 600 MG: 300 CAPSULE ORAL at 05:39

## 2023-12-21 RX ADMIN — MAGNESIUM SULFATE HEPTAHYDRATE 2 G: 2 INJECTION, SOLUTION INTRAVENOUS at 08:31

## 2023-12-21 RX ADMIN — LIDOCAINE HYDROCHLORIDE 5 ML: 20 SOLUTION ORAL; TOPICAL at 05:43

## 2023-12-21 RX ADMIN — SUCRALFATE 1 G: 1 SUSPENSION ORAL at 05:42

## 2023-12-21 RX ADMIN — SUCRALFATE 1 G: 1 SUSPENSION ORAL at 17:32

## 2023-12-21 RX ADMIN — MOMETASONE FUROATE AND FORMOTEROL FUMARATE DIHYDRATE 2 PUFF: 200; 5 AEROSOL RESPIRATORY (INHALATION) at 08:30

## 2023-12-21 RX ADMIN — MOMETASONE FUROATE AND FORMOTEROL FUMARATE DIHYDRATE 2 PUFF: 200; 5 AEROSOL RESPIRATORY (INHALATION) at 17:30

## 2023-12-21 RX ADMIN — POTASSIUM PHOSPHATE, MONOBASIC AND POTASSIUM PHOSPHATE, DIBASIC 30 MMOL: 224; 236 INJECTION, SOLUTION, CONCENTRATE INTRAVENOUS at 11:26

## 2023-12-21 RX ADMIN — MULTIPLE VITAMINS W/ MINERALS TAB 1 TABLET: TAB at 05:40

## 2023-12-21 RX ADMIN — Medication 400 MG: at 05:42

## 2023-12-21 RX ADMIN — PIPERACILLIN AND TAZOBACTAM 4.5 G: 4; .5 INJECTION, POWDER, FOR SOLUTION INTRAVENOUS at 08:32

## 2023-12-21 ASSESSMENT — ENCOUNTER SYMPTOMS
FEVER: 0
ABDOMINAL PAIN: 0
DIARRHEA: 0
CONSTIPATION: 0
DEPRESSION: 1
ROS GI COMMENTS: ESOPHAGEAL DYSPHAGIA
NAUSEA: 0
INSOMNIA: 0
NECK PAIN: 0
BACK PAIN: 0
SHORTNESS OF BREATH: 0
WEAKNESS: 1
EYES NEGATIVE: 1
PALPITATIONS: 0
VOMITING: 0
HEMOPTYSIS: 0
CHILLS: 0
DIZZINESS: 0
MYALGIAS: 0
COUGH: 0
HEADACHES: 0

## 2023-12-21 ASSESSMENT — PAIN DESCRIPTION - PAIN TYPE
TYPE: ACUTE PAIN

## 2023-12-21 NOTE — PROGRESS NOTES
Received report from [facility]. Pt reported no pain or discomfort at this time, POC discussed. Call light and belongings within reach. Bed locked and in low position. Alarm on and fall precautions in place.

## 2023-12-21 NOTE — PROGRESS NOTES
Telemetry Shift Summary    Rhythm SR  HR Range 65-78  Ectopy r-fPVC, rPAC  Measurements 0.14/0.08/0.36      Normal Values  Rhythm SR  HR Range:   Measurements: 0.12-0.20/0.06-0.10/0.30-0.52

## 2023-12-21 NOTE — CONSULTS
Gastroenterology Initial Consult Note               Author:  Dano Ovideo M.D. with MIKAL Mckenna Date & Time Created: 12/20/2023 5:28 PM       Patient ID:  Name:             Bart Ramsey  YOB: 1950  Age:                 73 y.o.  male  MRN:               [id number]      Referring Provider:  Angel Cortes MD      GI Chief Complaint:  Dysphagia      History of Present Illness:    He is a 73-year-old male patient seen in consultation for dysphagia.  He has a past medical history that includes coronary artery disease, type 2 diabetes mellitus, COPD, and small cell lung carcinoma currently undergoing treatment with oncology.  The patient states that in the last few months he has had both chemo and radiation for his left lower lobe lung cancer.  Since starting chemoradiation he reports progressive esophageal dysphagia.  He reports both solids and liquids can get stuck after he swallows in the mid to distal chest.  Sometimes the bolus only hangs out for a moment and it goes into his stomach.  Other times, he states it will stop for minutes up to hours.  He reports chest discomfort when this occurs.  He reports pain with breathing.  He was admitted on 12/17 for neutropenia with fever.  He does have pancytopenia. Absolute neutrophil count improving. He is on neutropenic precautions.       Review of Systems:  Review of Systems   Constitutional:  Positive for malaise/fatigue. Negative for chills.   HENT: Negative.     Eyes: Negative.    Respiratory:  Negative for cough and hemoptysis.    Cardiovascular:  Positive for chest pain (With inspiration or swallowing).   Gastrointestinal:         Esophageal dysphagia   Genitourinary:  Negative for dysuria and urgency.   Musculoskeletal:  Negative for myalgias and neck pain.   Skin: Negative.    Neurological:  Positive for weakness. Negative for dizziness.   Psychiatric/Behavioral:  Positive for depression. The patient does not have  insomnia.              Past Medical History:  Past Medical History:   Diagnosis Date    Arthritis     knee    Bowel habit changes     diarrhea    Bronchitis     Cancer (HCC) 09/29/2023    small cell lung cancer    COPD (chronic obstructive pulmonary disease) (HCC)     Diabetes (HCC)     diet controlled, no medications.    Emphysema of lung (HCC)     COPD- no medications    High cholesterol 08/2023    Was on a statin, MD monitoring, not currently on meds    Myocardial infarct ([facility])     1994,1996,2000    Pneumonia     Psychiatric problem 08/2023    depression, son passed away recently, no meds    Sleep apnea     cpap     Active Hospital Problems    Diagnosis     Neutropenic fever ([facility]) [D70.9, R50.81]     Hypomagnesemia [E83.42]     Hypophosphatemia [E83.39]     Hyponatremia [E87.1]     Thrombocytopenia (HCC) [D69.6]     Anemia associated with chemotherapy [D64.81, T45.1X5A]     COPD without exacerbation ([facility]) [J44.9]     Type 2 diabetes mellitus with diabetic polyneuropathy, without long-term current use of insulin ([facility]) [E11.42]     Radiation-induced esophagitis [K20.80, T66.XXXA]     Hypokalemia [E87.6]     Antineoplastic chemotherapy induced pancytopenia (CODE) ([facility]) [D61.810]     SCLC (small cell lung carcinoma) ([facility]) [C34.90]     Coronary artery disease involving native coronary artery of native heart without angina pectoris [I25.10]          Past Surgical History:  Past Surgical History:   Procedure Laterality Date    SC BRONCHOSCOPY,DIAGNOSTIC N/A 8/29/2023    Procedure: FIBER OPTIC BRONCHOSCOPY WITH  WASH, BRUSH, BRONCHOALVEOLAR LAVAGE, BIOPSY AND FINE NEEDLE ASPIRATION, ENDOBRONCHIAL ULTRASOUND & NAVIGATION,  ROBOTICS;  Surgeon: Bart Cortez M.D.;  Location: [facility];  Service: Pulmonary Robotic    ENDOBRONCHIAL US ADD-ON N/A 8/29/2023    Procedure: ENDOBRONCHIAL ULTRASOUND (EBUS);  Surgeon: Bart Cortez M.D.;  Location: [facility];  Service: Pulmonary Robotic     GASTROSCOPY-ENDO  03/31/2017    Procedure: GASTROSCOPY-ENDO;  Surgeon: Emerson Eaton M.D.;  Location: ENDOSCOPY [facility];  Service:     COLONOSCOPY - ENDO  03/31/2017    Procedure: COLONOSCOPY - ENDO;  Surgeon: Emerson Eaton M.D.;  Location: ENDOSCOPY [facility];  Service:     CHOLECYSTECTOMY      INGUINAL HERNIA REPAIR Right     OTHER      Tonsillectomy    OTHER      Hernia     OTHER CARDIAC SURGERY      3 stents    TONSILLECTOMY             Hospital Medications:  Current Facility-Administered Medications   Medication Dose Frequency Provider Last Rate Last Admin    magnesium sulfate IVPB premix 2 g  2 g Once Angel Cortes M.D.        phosphorus (K-Phos-Neutral) per tablet 500 mg  500 mg Once Angel Cortes M.D.        MBX (diphenhydrAMINE-lidocaine-Maalox) oral susp Cup 5 mL  5 mL Q6HRS Jennifer Beckman M.D.   5 mL at 12/20/23 1156    magnesium oxide tablet 400 mg  400 mg DAILY Jennifer Beckman M.D.   400 mg at 12/20/23 0555    potassium chloride SA (Kdur) tablet 40 mEq  40 mEq BID Jennifer Beckman M.D.   40 mEq at 12/20/23 0555    thiamine (Vitamin B-1) tablet 100 mg  100 mg DAILY Jennifer Beckman M.D.   100 mg at 12/20/23 0555    therapeutic multivitamin-minerals (Theragran-M) tablet 1 Tablet  1 Tablet DAILY Jennifer Beckman M.D.   1 Tablet at 12/20/23 0555    albuterol inhaler 2 Puff  2 Puff Q4HRS PRN Narinder Ho M.D.        mometasone-formoterol (Dulera) 200-5 MCG/ACT inhaler 2 Puff  2 Puff BID Narinder Ho M.D.   2 Puff at 12/20/23 0556    piperacillin-tazobactam (Zosyn) 4.5 g in  mL IVPB  4.5 g Q8HRS Narinder Ho M.D.   Stopped at 12/20/23 1311    Respiratory Therapy Consult   Continuous RT Narinder Ho M.D.        NS infusion   Continuous Narinder Ho M.D. 83 mL/hr at 12/19/23 0046 New Bag at 12/19/23 0046    Pharmacy Consult Request ...Pain Management Review 1 Each  1 Each PHARMACY TO DOSE Narinder Ho M.D.        hydrALAZINE (Apresoline) injection 10 mg   10 mg Q4HRS PRN Narinder Ho M.D.        ondansetron (Zofran) syringe/vial injection 4 mg  4 mg Q4HRS PRN Narinder Ho M.D.   4 mg at 12/18/23 0408    insulin regular (HumuLIN R,NovoLIN R) injection  2-9 Units 4X/DAY ACHS Narinder Ho M.D.   2 Units at 12/18/23 0551    And    dextrose 50% (D50W) injection 25 g  25 g Q15 MIN PRN Narinder Ho M.D.        lidocaine (Xylocaine) 2 % viscous solution 15 mL  15 mL Q3HRS PRN Narinder Ho M.D.   15 mL at 12/18/23 0841    [Held by provider] aspirin EC tablet 81 mg  81 mg DAILY Narinder Ho M.D.   81 mg at 12/18/23 0533    gabapentin (Neurontin) capsule 600 mg  600 mg BID Narinder Ho M.D.   600 mg at 12/20/23 0555    guaiFENesin ER (Mucinex) tablet 600 mg  600 mg Q12HRS SAM WelchDShad   600 mg at 12/20/23 0555    omeprazole (PriLOSEC) capsule 40 mg  40 mg DAILY SAM WelchDShad   40 mg at 12/20/23 0555    sucralfate (Carafate) 1 GM/10ML suspension 1 g  1 g BID Narinder Ho M.D.   1 g at 12/20/23 0555    vitamin D3 (Cholecalciferol) tablet 1,000 Units  1,000 Units DAILY Narinder Ho M.D.   1,000 Units at 12/20/23 0555    acetaminophen (Tylenol) tablet 650 mg  650 mg Q6HRS PRN Narinder Ho M.D.        baclofen (Lioresal) tablet 10 mg  10 mg HS PRN Narinder Ho M.D.        guaiFENesin dextromethorphan (Robitussin DM) 100-10 MG/5ML syrup 10 mL  10 mL Q6HRS PRN Narinder Ho M.D.        ondansetron (Zofran ODT) dispertab 4 mg  4 mg Q4HRS PRN Narinder Ho M.D.        oxyCODONE immediate-release (Roxicodone) tablet 2.5 mg  2.5 mg Q3HRS PRN Narinder Ho M.D.   2.5 mg at 12/18/23 0548    Or    oxyCODONE immediate-release (Roxicodone) tablet 5 mg  5 mg Q3HRS PRN Narinder Ho M.D.        Or    HYDROmorphone (Dilaudid) injection 0.25 mg  0.25 mg Q3HRS PRN Narinder Ho M.D.       Last reviewed on 12/17/2023  4:56 PM by Narinder Ho M.D.       Current Outpatient Medications:  Medications Prior to Admission   Medication Sig Dispense Refill Last Dose     omeprazole (PRILOSEC) 40 MG delayed-release capsule Take 40 mg by mouth every day.   12/15/2023 at AM    sucralfate (CARAFATE) 1 GM/10ML Suspension Take 10 mL by mouth 2 times a day for 10 days. (Patient taking differently: Take 1 g by mouth 2 times a day. Pt started on 12/15/2023 for 10 day course) 200 mL 0 12/17/2023 at 1000    guaiFENesin ER (MUCINEX) 600 MG TABLET SR 12 HR Take 1 Tablet by mouth every 12 hours. 28 Tablet 0 12/15/2023 at AM    albuterol 108 (90 Base) MCG/ACT Aero Soln inhalation aerosol Inhale 2 Puffs every four hours as needed for Shortness of Breath for up to 30 days. 18 g 0 > 1 week at Unknown    ondansetron (ZOFRAN) 4 MG Tab tablet Take 1 Tablet by mouth every four hours as needed for Nausea/Vomiting (for nausea, vomiting). 30 Tablet 6 12/14/2023 at Unknown    prochlorperazine (COMPAZINE) 10 MG Tab Take 1 Tablet by mouth every 6 hours as needed (for nausea, vomiting). (Patient taking differently: Take 10 mg by mouth every 6 hours as needed for Nausea/Vomiting (for nausea, vomiting).) 30 Tablet 6 PRN    metFORMIN (GLUCOPHAGE) 500 MG Tab Take 750 mg by mouth 2 times a day with meals. 500 mg x 1.5 tab = 750 mg   12/12/2023 at Unknown    baclofen (LIORESAL) 10 MG Tab Take 10 mg by mouth at bedtime as needed. Indications: Muscle Spasm   12/14/2023 at PM    Cyanocobalamin (VITAMIN B-12) 1000 MCG Tab Take 1,000 mcg by mouth every day.   12/15/2023 at AM    fluticasone-salmeterol (ADVAIR) 250-50 MCG/ACT AEROSOL POWDER, BREATH ACTIVATED Inhale 1 Puff 2 times a day. Indications: Asthma   12/15/2023 at AM    Omega-3 Fatty Acids (FISH OIL) 1000 MG Cap capsule Take 1,000 mg by mouth every day.   12/14/2023 at Unknown    vitamin D3 (CHOLECALCIFEROL) 1000 Unit (25 mcg) Tab Take 1,000 Units by mouth every day.   12/15/2023 at AM    gabapentin (NEURONTIN) 300 MG Cap Take 600 mg by mouth 2 times a day.   12/15/2023 at AM    aspirin EC (ECOTRIN) 81 MG Tablet Delayed Response Take 81 mg by mouth every day.    "12/15/2023 at AM         Medication Allergies:  No Known Allergies      Family Medical History:  Family History   Problem Relation Age of Onset    Cancer Mother         lung?         Social History:  Social History     Socioeconomic History    Marital status:      Spouse name: Not on file    Number of children: Not on file    Years of education: Not on file    Highest education level: Not on file   Occupational History    Not on file   Tobacco Use    Smoking status: Former     Current packs/day: 0.00     Average packs/day: 0.5 packs/day for 14.0 years (7.0 ttl pk-yrs)     Types: Cigarettes     Start date: 1980     Quit date:      Years since quittin.9    Smokeless tobacco: Not on file    Tobacco comments:     Pt quit smoking cigarettes, .  1/2 pack per day, smoked 20 years    Vaping Use    Vaping Use: Never used   Substance and Sexual Activity    Alcohol use: Not Currently     Alcohol/week: 8.4 oz     Types: 14 Shots of liquor per week     Comment: daily    Drug use: Not Currently     Types: Marijuana     Comment: quit     Sexual activity: Not on file   Other Topics Concern    Not on file   Social History Narrative    Not on file     Social Determinants of Health     Financial Resource Strain: Not on file   Food Insecurity: Not on file   Transportation Needs: Not on file   Physical Activity: Not on file   Stress: Not on file   Social Connections: Not on file   Intimate Partner Violence: Not on file   Housing Stability: Not on file         Vital signs:  Weight/BMI: Body mass index is 26.6 kg/m².  BP (!) 145/77   Pulse 84   Temp 36.6 °C (97.8 °F) (Oral)   Resp 16   Ht 1.753 m (5' 9\")   Wt 81.7 kg (180 lb 1.9 oz)   SpO2 92%   Vitals:    23 0801 23 0809 23 1240 23 1555   BP: 126/63  139/72 (!) 145/77   Pulse: 66 74 75 84   Resp: 16 16 16 16   Temp: 36.8 °C (98.2 °F)  36.6 °C (97.8 °F) 36.6 °C (97.8 °F)   TempSrc: Oral  Oral Oral   SpO2: 97% 97% 95% 92%   Weight:  "      Height:         Oxygen Therapy:  Pulse Oximetry: 92 %, O2 (LPM): 0, O2 Delivery Device: None - Room Air    Intake/Output Summary (Last 24 hours) at 12/20/2023 1728  Last data filed at 12/20/2023 1000  Gross per 24 hour   Intake 620 ml   Output 250 ml   Net 370 ml         Physical Exam:  Physical Exam      Labs:  Recent Labs     12/18/23 0437 12/18/23  0831 12/19/23 0146 12/19/23 0943 12/20/23 0034 12/20/23 0936   SODIUM 131*  --  133*  --  133*  --    POTASSIUM 3.3*  --  2.9* 3.3* 3.3*  --    CHLORIDE 97  --  100  --  100  --    CO2 22  --  22  --  23  --    BUN 18  --  16  --  12  --    CREATININE 1.18  --  1.10  --  1.11  --    MAGNESIUM  --    < > 1.6  --  1.3* 1.3*   PHOSPHORUS  --   --  1.8*  --  1.4*  --    CALCIUM 7.2*  --  7.2*  --  7.0*  --     < > = values in this interval not displayed.     Recent Labs     12/18/23 0437 12/19/23 0146 12/20/23 0034   ALTSGPT  --  8  --    ASTSGOT  --  12  --    ALKPHOSPHAT  --  69  --    TBILIRUBIN  --  0.6  --    PREALBUMIN 10.1*  --   --    GLUCOSE 143* 108* 105*     Recent Labs     12/18/23 0437 12/19/23 0146 12/20/23 0034   WBC 0.6* 1.6* 2.3*   NEUTSPOLYS  --  16.00* 40.00*   LYMPHOCYTES  --  72.00* 22.00   MONOCYTES  --  12.00 20.00*   EOSINOPHILS  --  0.00 0.00   BASOPHILS  --  0.00 0.00   ASTSGOT  --  12  --    ALTSGPT  --  8  --    ALKPHOSPHAT  --  69  --    TBILIRUBIN  --  0.6  --      Recent Labs     12/18/23 0437 12/18/23 1728 12/19/23 0146 12/20/23 0034 12/20/23 0936   RBC 2.11*  --  2.19* 2.15*  --    HEMOGLOBIN 6.8*   < > 7.1* 6.9* 8.4*   HEMATOCRIT 19.3*  --  20.0* 19.5* 25.4*   PLATELETCT 29*  --  37* 50*  --     < > = values in this interval not displayed.     Recent Results (from the past 24 hour(s))   POCT glucose device results    Collection Time: 12/19/23  5:34 PM   Result Value Ref Range    POC Glucose, Blood 117 (H) 65 - 99 mg/dL   POCT glucose device results    Collection Time: 12/19/23  8:23 PM   Result Value Ref Range    POC  Glucose, Blood 120 (H) 65 - 99 mg/dL   CBC WITH DIFFERENTIAL    Collection Time: 12/20/23 12:34 AM   Result Value Ref Range    WBC 2.3 (L) 4.8 - 10.8 K/uL    RBC 2.15 (L) 4.70 - 6.10 M/uL    Hemoglobin 6.9 (L) 14.0 - 18.0 g/dL    Hematocrit 19.5 (L) 42.0 - 52.0 %    MCV 90.7 81.4 - 97.8 fL    MCH 32.1 27.0 - 33.0 pg    MCHC 35.4 32.3 - 36.5 g/dL    RDW 49.4 35.9 - 50.0 fL    Platelet Count 50 (L) 164 - 446 K/uL    MPV 10.8 9.0 - 12.9 fL    Neutrophils-Polys 40.00 (L) 44.00 - 72.00 %    Lymphocytes 22.00 22.00 - 41.00 %    Monocytes 20.00 (H) 0.00 - 13.40 %    Eosinophils 0.00 0.00 - 6.90 %    Basophils 0.00 0.00 - 1.80 %    Nucleated RBC 0.00 0.00 - 0.20 /100 WBC    Neutrophils (Absolute) 1.20 (L) 1.82 - 7.42 K/uL    Lymphs (Absolute) 0.51 (L) 1.00 - 4.80 K/uL    Monos (Absolute) 0.46 0.00 - 0.85 K/uL    Eos (Absolute) 0.00 0.00 - 0.51 K/uL    Baso (Absolute) 0.00 0.00 - 0.12 K/uL    NRBC (Absolute) 0.00 K/uL   Basic Metabolic Panel    Collection Time: 12/20/23 12:34 AM   Result Value Ref Range    Sodium 133 (L) 135 - 145 mmol/L    Potassium 3.3 (L) 3.6 - 5.5 mmol/L    Chloride 100 96 - 112 mmol/L    Co2 23 20 - 33 mmol/L    Glucose 105 (H) 65 - 99 mg/dL    Bun 12 8 - 22 mg/dL    Creatinine 1.11 0.50 - 1.40 mg/dL    Calcium 7.0 (L) 8.4 - 10.2 mg/dL    Anion Gap 10.0 7.0 - 16.0   MAGNESIUM    Collection Time: 12/20/23 12:34 AM   Result Value Ref Range    Magnesium 1.3 (L) 1.5 - 2.5 mg/dL   PHOSPHORUS    Collection Time: 12/20/23 12:34 AM   Result Value Ref Range    Phosphorus 1.4 (L) 2.5 - 4.5 mg/dL   ESTIMATED GFR    Collection Time: 12/20/23 12:34 AM   Result Value Ref Range    GFR (CKD-EPI) 70 >60 mL/min/1.73 m 2   DIFFERENTIAL MANUAL    Collection Time: 12/20/23 12:34 AM   Result Value Ref Range    Bands-Stabs 12.00 (H) 0.00 - 10.00 %    Myelocytes 6.00 %    Manual Diff Status PERFORMED    PLATELET ESTIMATE    Collection Time: 12/20/23 12:34 AM   Result Value Ref Range    Plt Estimation Decreased    MORPHOLOGY     Collection Time: 12/20/23 12:34 AM   Result Value Ref Range    RBC Morphology Normal    IMMATURE PLT FRACTION    Collection Time: 12/20/23 12:34 AM   Result Value Ref Range    Imm. Plt Fraction 5.2 0.6 - 13.1 %   POCT glucose device results    Collection Time: 12/20/23  6:07 AM   Result Value Ref Range    POC Glucose, Blood 98 65 - 99 mg/dL   Magnesium    Collection Time: 12/20/23  9:36 AM   Result Value Ref Range    Magnesium 1.3 (L) 1.5 - 2.5 mg/dL   HEMOGLOBIN AND HEMATOCRIT    Collection Time: 12/20/23  9:36 AM   Result Value Ref Range    Hemoglobin 8.4 (L) 14.0 - 18.0 g/dL    Hematocrit 25.4 (L) 42.0 - 52.0 %   POCT glucose device results    Collection Time: 12/20/23 11:57 AM   Result Value Ref Range    POC Glucose, Blood 106 (H) 65 - 99 mg/dL         Radiology Review:  DX-ESOPHAGUS - [id number]   Final Result      DX-CHEST-PORTABLE (1 VIEW)   Final Result      No evidence of acute cardiopulmonary process.            MDM (Data Review):   -Records reviewed and summarized in current documentation  -I personally reviewed and interpreted the laboratory results  -I personally reviewed the radiology images        Medical Decision Making, by Problem:  Active Hospital Problems    Diagnosis     Neutropenic fever (HCC) [D70.9, R50.81]     Hypomagnesemia [E83.42]     Hypophosphatemia [E83.39]     Hyponatremia [E87.1]     Thrombocytopenia (HCC) [D69.6]     Anemia associated with chemotherapy [D64.81, T45.1X5A]     COPD without exacerbation (HCC) [J44.9]     Type 2 diabetes mellitus with diabetic polyneuropathy, without long-term current use of insulin (HCC) [E11.42]     Radiation-induced esophagitis [K20.80, T66.XXXA]     Hypokalemia [E87.6]     Antineoplastic chemotherapy induced pancytopenia (CODE) (HCC) [D61.810]     SCLC (small cell lung carcinoma) (HCC) [C34.90]     Coronary artery disease involving native coronary artery of native heart without angina pectoris [I25.10]             Assessment/Recommendations:  73-year-old male with COPD, type 2 diabetes mellitus, small cell lung cancer who recently underwent chemo and radiation with initial neutropenia and possible neutropenic fevers.  He does have pancytopenia most recent absolute neutrophil count 1.2 and platelets 50 K.  He does have esophageal dysphagia, suspect related to radiation-induced stricture, but cannot rule out other pathology within the esophagus. SLP evaluated the patient without evidence of oropharyngeal dysphagia.     Assessment:  -Esophageal dysphagia  -Small cell lung cancer status post chemo and radiation  -Type 2 diabetes mellitus  -COPD  -Neutropenia and pancytopenia secondary to chemotherapy  -Thrombocytopenia    Plan:  -Diet as recommended by SLP  -Tentative plan for EGD with possible biopsies, dilation on Friday, 12/22/2023 at 12 PM as long as his absolute neutrophil count and platelet count does not worsen.     Risks, benefits, and alternatives of aforementioned procedures were discussed with patient  Consenting patient was given opportunities to ask questions and discuss other options.  Risks including but not limited to perforation, infection, bleeding, missed lesion(s), possible need for surgery(ies) and/or interventional radiology, possible need for repeat procedure(s) and/or additional testing, hospitalization possibly prolonged, cardiac and/or pulmonary event, aspiration, hypoxia, stroke, medication and/or anesthesia reaction, indefinite diagnosis, discomfort, unsuccessful and/or incomplete procedure, ineffective therapy and/or persistent symptoms, damage to adjacent organs and/or vascular structures, and other adverse events possibly life-threatening.  Interactive discussion was undertaken with Layman's terms. I answered questions in full and to satisfaction.  Consenting person(s) stated understanding and acceptance of these risks, and wished to proceed.  Informed consent was given in clear state of  mind.    -Consider gastrografin esophagram tomorrow      GI Attending -    Patient seen, examined, chart reviewed and case discussed and plan arrived at with MARGARET.  Agree with this note.    Will check esophagram and then consider EGD    Dano Oviedo M.D.   MIKAL Mckenna      Thank you for inviting me to participate in the care of this patient. Please do not hesitate to call GI consultants with additional questions/concerns or changes in the patient's clinical status at [phone].      Core Quality Measures   Reviewed items:  Labs, Medications and Radiology reports reviewed

## 2023-12-21 NOTE — CARE PLAN
The patient is Watcher - Medium risk of patient condition declining or worsening    Shift Goals  Clinical Goals: continue IV ABX,monitor while off o2  Patient Goals: rest  Family Goals: adrien    Progress made toward(s) clinical / shift goals:  patient tolerating room air, IV ABX continued, replacing mag and phos. Patient consulted by GI      Problem: Knowledge Deficit - Standard  Goal: Patient and family/care givers will demonstrate understanding of plan of care, disease process/condition, diagnostic tests and medications  Outcome: Progressing     Problem: Skin Integrity  Goal: Skin integrity is maintained or improved  Outcome: Progressing     Problem: Fall Risk  Goal: Patient will remain free from falls  Outcome: Progressing       Patient is not progressing towards the following goals:

## 2023-12-21 NOTE — CARE PLAN
The patient is Watcher - Medium risk of patient condition declining or worsening    Shift Goals  Clinical Goals: Abx, electrolyte replacements, barium swallow, EGD  Patient Goals: Rest  Family Goals: adrien    Progress made toward(s) clinical / shift goals:    Problem: Knowledge Deficit - Standard  Goal: Patient and family/care givers will demonstrate understanding of plan of care, disease process/condition, diagnostic tests and medications  Outcome: Progressing  Note: Awaiting test results for swallow study, awaiting for EGD scheduled for tomorrow.      Problem: Skin Integrity  Goal: Skin integrity is maintained or improved  Outcome: Progressing  Note: Wound consult complete, continue mepilex, barrier cream and offloading.      Problem: Fall Risk  Goal: Patient will remain free from falls  Outcome: Progressing       Patient is not progressing towards the following goals: Awaiting test results.

## 2023-12-21 NOTE — PROGRESS NOTES
Gastroenterology Progress Note               Author:  Dano Oviedo M.D. with MIKAL Mckenna Date & Time Created: 12/21/2023 12:31 PM       Patient ID:  Name:             Bart Ramsey  YOB: 1950  Age:                 73 y.o.  male  MRN:               [id number]      Referring Provider:  Angel Cortes MD      GI Chief Complaint:  Dysphagia      History of Present Illness:    He is a 73-year-old male patient seen in consultation for dysphagia.  He has a past medical history that includes coronary artery disease, type 2 diabetes mellitus, COPD, and small cell lung carcinoma currently undergoing treatment with oncology.  The patient states that in the last few months he has had both chemo and radiation for his left lower lobe lung cancer.  Since starting chemoradiation he reports progressive esophageal dysphagia.  He reports both solids and liquids can get stuck after he swallows in the mid to distal chest.  Sometimes the bolus only hangs out for a moment and it goes into his stomach.  Other times, he states it will stop for minutes up to hours.  He reports chest discomfort when this occurs.  He reports pain with breathing.  He was admitted on 12/17 for neutropenia with fever.  He does have pancytopenia. Absolute neutrophil count improving. He is on neutropenic precautions.       Interval History  12/21/2023: Stable. No new symptoms. Tolerating a liquid diet.     Review of Systems:  Review of Systems   Constitutional:  Positive for malaise/fatigue. Negative for chills.   HENT: Negative.     Eyes: Negative.    Respiratory:  Negative for cough and hemoptysis.    Cardiovascular:  Positive for chest pain (With inspiration or swallowing).   Gastrointestinal:         Esophageal dysphagia   Genitourinary:  Negative for dysuria and urgency.   Musculoskeletal:  Negative for myalgias and neck pain.   Skin: Negative.    Neurological:  Positive for weakness. Negative for dizziness.    Psychiatric/Behavioral:  Positive for depression. The patient does not have insomnia.              Past Medical History:  Past Medical History:   Diagnosis Date    Arthritis     knee    Bowel habit changes     diarrhea    Bronchitis     Cancer (HCC) 09/29/2023    small cell lung cancer    COPD (chronic obstructive pulmonary disease) (HCC)     Diabetes (HCC)     diet controlled, no medications.    Emphysema of lung (HCC)     COPD- no medications    High cholesterol 08/2023    Was on a statin, MD monitoring, not currently on meds    Myocardial infarct ([facility])     1994,1996,2000    Pneumonia     Psychiatric problem 08/2023    depression, son passed away recently, no meds    Sleep apnea     cpap     Active Hospital Problems    Diagnosis     Severe protein-calorie malnutrition (HCC) [E43]     Neutropenic fever ([facility]) [D70.9, R50.81]     Hypomagnesemia [E83.42]     Hypophosphatemia [E83.39]     Hyponatremia [E87.1]     Thrombocytopenia (HCC) [D69.6]     Anemia associated with chemotherapy [D64.81, T45.1X5A]     COPD without exacerbation ([facility]) [J44.9]     Type 2 diabetes mellitus with diabetic polyneuropathy, without long-term current use of insulin ([facility]) [E11.42]     Radiation-induced esophagitis [K20.80, T66.XXXA]     Hypokalemia [E87.6]     Antineoplastic chemotherapy induced pancytopenia (CODE) ([facility]) [D61.810]     SCLC (small cell lung carcinoma) ([facility]) [C34.90]     Coronary artery disease involving native coronary artery of native heart without angina pectoris [I25.10]          Past Surgical History:  Past Surgical History:   Procedure Laterality Date    AR BRONCHOSCOPY,DIAGNOSTIC N/A 8/29/2023    Procedure: FIBER OPTIC BRONCHOSCOPY WITH  WASH, BRUSH, BRONCHOALVEOLAR LAVAGE, BIOPSY AND FINE NEEDLE ASPIRATION, ENDOBRONCHIAL ULTRASOUND & NAVIGATION,  ROBOTICS;  Surgeon: Bart Cortez M.D.;  Location: SURGERY [facility];  Service: Pulmonary Robotic    ENDOBRONCHIAL US ADD-ON N/A 8/29/2023    Procedure: ENDOBRONCHIAL  ULTRASOUND (EBUS);  Surgeon: Bart Cortez M.D.;  Location: SURGERY [facility];  Service: Pulmonary Robotic    GASTROSCOPY-ENDO  03/31/2017    Procedure: GASTROSCOPY-ENDO;  Surgeon: Emerson Eaton M.D.;  Location: ENDOSCOPY [facility];  Service:     COLONOSCOPY - ENDO  03/31/2017    Procedure: COLONOSCOPY - ENDO;  Surgeon: Emerson Eaton M.D.;  Location: ENDOSCOPY [facility];  Service:     CHOLECYSTECTOMY      INGUINAL HERNIA REPAIR Right     OTHER      Tonsillectomy    OTHER      Hernia     OTHER CARDIAC SURGERY      3 stents    TONSILLECTOMY             Hospital Medications:  Current Facility-Administered Medications   Medication Dose Frequency Provider Last Rate Last Admin    potassium phosphate IVPB 30 mmol in 500 mL D5W (premix)  30 mmol Once Angel Cortes M.D. 83.3 mL/hr at 12/21/23 1126 30 mmol at 12/21/23 1126    MBX (diphenhydrAMINE-lidocaine-Maalox) oral susp Cup 5 mL  5 mL Q6HRS Jennifer Beckman M.D.   5 mL at 12/21/23 0543    magnesium oxide tablet 400 mg  400 mg DAILY Jennifer Beckman M.D.   400 mg at 12/21/23 0542    [Held by provider] potassium chloride SA (Kdur) tablet 40 mEq  40 mEq BID Jennifer Beckman M.D.   40 mEq at 12/21/23 0539    thiamine (Vitamin B-1) tablet 100 mg  100 mg DAILY Jennifer Beckman M.D.   100 mg at 12/21/23 0540    therapeutic multivitamin-minerals (Theragran-M) tablet 1 Tablet  1 Tablet DAILY Jennifer Beckman M.D.   1 Tablet at 12/21/23 0540    albuterol inhaler 2 Puff  2 Puff Q4HRS PRN Narinder Ho M.D.        mometasone-formoterol (Dulera) 200-5 MCG/ACT inhaler 2 Puff  2 Puff BID Narinder Ho M.D.   2 Puff at 12/21/23 0830    Respiratory Therapy Consult   Continuous RT Narinder Ho M.D.        NS infusion   Continuous Narinder Ho M.D. 83 mL/hr at 12/21/23 0638 Rate Verify at 12/21/23 0638    Pharmacy Consult Request ...Pain Management Review 1 Each  1 Each PHARMACY TO DOSE Narinder Ho M.D.        hydrALAZINE (Apresoline) injection  10 mg  10 mg Q4HRS PRN Narinder Ho M.D.        ondansetron (Zofran) syringe/vial injection 4 mg  4 mg Q4HRS PRN Narinder Ho M.D.   4 mg at 12/18/23 0408    insulin regular (HumuLIN R,NovoLIN R) injection  2-9 Units 4X/DAY ACHS Narinder Ho M.D.   2 Units at 12/18/23 0551    And    dextrose 50% (D50W) injection 25 g  25 g Q15 MIN PRN Narinder Ho M.D.        lidocaine (Xylocaine) 2 % viscous solution 15 mL  15 mL Q3HRS PRN Narinder Ho M.D.   15 mL at 12/18/23 0841    [Held by provider] aspirin EC tablet 81 mg  81 mg DAILY Narinder Ho M.D.   81 mg at 12/18/23 0533    gabapentin (Neurontin) capsule 600 mg  600 mg BID SAM WelchDShad   600 mg at 12/21/23 0539    guaiFENesin ER (Mucinex) tablet 600 mg  600 mg Q12HRS SAM WelchDShad   600 mg at 12/21/23 0540    omeprazole (PriLOSEC) capsule 40 mg  40 mg DAILY Narinder Ho M.D.   40 mg at 12/21/23 0539    sucralfate (Carafate) 1 GM/10ML suspension 1 g  1 g BID Narinder Ho M.D.   1 g at 12/21/23 0542    vitamin D3 (Cholecalciferol) tablet 1,000 Units  1,000 Units DAILY Narinder Ho M.D.   1,000 Units at 12/21/23 0540    acetaminophen (Tylenol) tablet 650 mg  650 mg Q6HRS PRN Narinder Ho M.D.        baclofen (Lioresal) tablet 10 mg  10 mg HS PRN Narinder Ho M.D.        guaiFENesin dextromethorphan (Robitussin DM) 100-10 MG/5ML syrup 10 mL  10 mL Q6HRS PRN Narinder Ho M.D.        ondansetron (Zofran ODT) dispertab 4 mg  4 mg Q4HRS PRN Narinder Ho M.D.        oxyCODONE immediate-release (Roxicodone) tablet 2.5 mg  2.5 mg Q3HRS PRN Narinder Ho M.D.   2.5 mg at 12/18/23 0548    Or    oxyCODONE immediate-release (Roxicodone) tablet 5 mg  5 mg Q3HRS PRN Narinder Ho M.D.        Or    HYDROmorphone (Dilaudid) injection 0.25 mg  0.25 mg Q3HRS PRN Narinder Ho M.D.       Last reviewed on 12/17/2023  4:56 PM by Narinder Ho M.D.       Current Outpatient Medications:  Medications Prior to Admission   Medication Sig Dispense Refill  Last Dose    omeprazole (PRILOSEC) 40 MG delayed-release capsule Take 40 mg by mouth every day.   12/15/2023 at AM    sucralfate (CARAFATE) 1 GM/10ML Suspension Take 10 mL by mouth 2 times a day for 10 days. (Patient taking differently: Take 1 g by mouth 2 times a day. Pt started on 12/15/2023 for 10 day course) 200 mL 0 12/17/2023 at 1000    guaiFENesin ER (MUCINEX) 600 MG TABLET SR 12 HR Take 1 Tablet by mouth every 12 hours. 28 Tablet 0 12/15/2023 at AM    albuterol 108 (90 Base) MCG/ACT Aero Soln inhalation aerosol Inhale 2 Puffs every four hours as needed for Shortness of Breath for up to 30 days. 18 g 0 > 1 week at Unknown    ondansetron (ZOFRAN) 4 MG Tab tablet Take 1 Tablet by mouth every four hours as needed for Nausea/Vomiting (for nausea, vomiting). 30 Tablet 6 12/14/2023 at Unknown    prochlorperazine (COMPAZINE) 10 MG Tab Take 1 Tablet by mouth every 6 hours as needed (for nausea, vomiting). (Patient taking differently: Take 10 mg by mouth every 6 hours as needed for Nausea/Vomiting (for nausea, vomiting).) 30 Tablet 6 PRN    metFORMIN (GLUCOPHAGE) 500 MG Tab Take 750 mg by mouth 2 times a day with meals. 500 mg x 1.5 tab = 750 mg   12/12/2023 at Unknown    baclofen (LIORESAL) 10 MG Tab Take 10 mg by mouth at bedtime as needed. Indications: Muscle Spasm   12/14/2023 at PM    Cyanocobalamin (VITAMIN B-12) 1000 MCG Tab Take 1,000 mcg by mouth every day.   12/15/2023 at AM    fluticasone-salmeterol (ADVAIR) 250-50 MCG/ACT AEROSOL POWDER, BREATH ACTIVATED Inhale 1 Puff 2 times a day. Indications: Asthma   12/15/2023 at AM    Omega-3 Fatty Acids (FISH OIL) 1000 MG Cap capsule Take 1,000 mg by mouth every day.   12/14/2023 at Unknown    vitamin D3 (CHOLECALCIFEROL) 1000 Unit (25 mcg) Tab Take 1,000 Units by mouth every day.   12/15/2023 at AM    gabapentin (NEURONTIN) 300 MG Cap Take 600 mg by mouth 2 times a day.   12/15/2023 at AM    aspirin EC (ECOTRIN) 81 MG Tablet Delayed Response Take 81 mg by mouth  "every day.   12/15/2023 at AM         Medication Allergies:  No Known Allergies      Family Medical History:  Family History   Problem Relation Age of Onset    Cancer Mother         lung?         Social History:  Social History     Socioeconomic History    Marital status:      Spouse name: Not on file    Number of children: Not on file    Years of education: Not on file    Highest education level: Not on file   Occupational History    Not on file   Tobacco Use    Smoking status: Former     Current packs/day: 0.00     Average packs/day: 0.5 packs/day for 14.0 years (7.0 ttl pk-yrs)     Types: Cigarettes     Start date: 1980     Quit date:      Years since quittin.9    Smokeless tobacco: Not on file    Tobacco comments:     Pt quit smoking cigarettes, .  1/2 pack per day, smoked 20 years    Vaping Use    Vaping Use: Never used   Substance and Sexual Activity    Alcohol use: Not Currently     Alcohol/week: 8.4 oz     Types: 14 Shots of liquor per week     Comment: daily    Drug use: Not Currently     Types: Marijuana     Comment: quit     Sexual activity: Not on file   Other Topics Concern    Not on file   Social History Narrative    Not on file     Social Determinants of Health     Financial Resource Strain: Not on file   Food Insecurity: Not on file   Transportation Needs: Not on file   Physical Activity: Not on file   Stress: Not on file   Social Connections: Not on file   Intimate Partner Violence: Not on file   Housing Stability: Not on file         Vital signs:  Weight/BMI: Body mass index is 27.84 kg/m².  BP (!) 143/81   Pulse 68   Temp 36.4 °C (97.6 °F) (Oral)   Resp 16   Ht 1.753 m (5' 9\")   Wt 85.5 kg (188 lb 7.9 oz)   SpO2 96%   Vitals:    23 0153 23 0209 23 0312 23 0829   BP:   (!) 149/80 (!) 143/81   Pulse:   74 68   Resp:   16 16   Temp:   36.9 °C (98.4 °F) 36.4 °C (97.6 °F)   TempSrc:   Oral Oral   SpO2: (!) 85% 94% 95% 96%   Weight:       Height: "         Oxygen Therapy:  Pulse Oximetry: 96 %, O2 (LPM): 0, O2 Delivery Device: None - Room Air    Intake/Output Summary (Last 24 hours) at 12/21/2023 1231  Last data filed at 12/21/2023 0638  Gross per 24 hour   Intake 7487.4 ml   Output 325 ml   Net 7162.4 ml           Physical Exam:  Physical Exam      Labs:  Recent Labs     12/19/23 0146 12/19/23  0943 12/20/23  0034 12/20/23 0936 12/21/23  0335   SODIUM 133*  --  133*  --  134*   POTASSIUM 2.9* 3.3* 3.3*  --  3.3*   CHLORIDE 100  --  100  --  101   CO2 22  --  23  --  22   BUN 16  --  12  --  8   CREATININE 1.10  --  1.11  --  0.97   MAGNESIUM 1.6  --  1.3* 1.3* 1.5   PHOSPHORUS 1.8*  --  1.4*  --  1.5*   CALCIUM 7.2*  --  7.0*  --  7.5*       Recent Labs     12/19/23 0146 12/20/23 0034 12/21/23  0335   ALTSGPT 8  --   --    ASTSGOT 12  --   --    ALKPHOSPHAT 69  --   --    TBILIRUBIN 0.6  --   --    GLUCOSE 108* 105* 95       Recent Labs     12/19/23 0146 12/20/23 0034 12/21/23  0335   WBC 1.6* 2.3* 3.0*   NEUTSPOLYS 16.00* 40.00* 55.10   LYMPHOCYTES 72.00* 22.00 15.50*   MONOCYTES 12.00 20.00* 24.30*   EOSINOPHILS 0.00 0.00 0.70   BASOPHILS 0.00 0.00 0.30   ASTSGOT 12  --   --    ALTSGPT 8  --   --    ALKPHOSPHAT 69  --   --    TBILIRUBIN 0.6  --   --        Recent Labs     12/19/23 0146 12/20/23 0034 12/20/23 0936 12/21/23  0335   RBC 2.19* 2.15*  --  2.53*   HEMOGLOBIN 7.1* 6.9* 8.4* 8.0*   HEMATOCRIT 20.0* 19.5* 25.4* 22.8*   PLATELETCT 37* 50*  --  73*       Recent Results (from the past 24 hour(s))   POCT glucose device results    Collection Time: 12/20/23  6:17 PM   Result Value Ref Range    POC Glucose, Blood 102 (H) 65 - 99 mg/dL   POCT glucose device results    Collection Time: 12/20/23  9:53 PM   Result Value Ref Range    POC Glucose, Blood 109 (H) 65 - 99 mg/dL   CBC WITH DIFFERENTIAL    Collection Time: 12/21/23  3:35 AM   Result Value Ref Range    WBC 3.0 (L) 4.8 - 10.8 K/uL    RBC 2.53 (L) 4.70 - 6.10 M/uL    Hemoglobin 8.0 (L) 14.0 -  18.0 g/dL    Hematocrit 22.8 (L) 42.0 - 52.0 %    MCV 90.1 81.4 - 97.8 fL    MCH 31.6 27.0 - 33.0 pg    MCHC 35.1 32.3 - 36.5 g/dL    RDW 49.7 35.9 - 50.0 fL    Platelet Count 73 (L) 164 - 446 K/uL    MPV 10.8 9.0 - 12.9 fL    Neutrophils-Polys 55.10 44.00 - 72.00 %    Lymphocytes 15.50 (L) 22.00 - 41.00 %    Monocytes 24.30 (H) 0.00 - 13.40 %    Eosinophils 0.70 0.00 - 6.90 %    Basophils 0.30 0.00 - 1.80 %    Immature Granulocytes 4.10 (H) 0.00 - 0.90 %    Nucleated RBC 0.00 0.00 - 0.20 /100 WBC    Neutrophils (Absolute) 1.63 (L) 1.82 - 7.42 K/uL    Lymphs (Absolute) 0.46 (L) 1.00 - 4.80 K/uL    Monos (Absolute) 0.72 0.00 - 0.85 K/uL    Eos (Absolute) 0.02 0.00 - 0.51 K/uL    Baso (Absolute) 0.01 0.00 - 0.12 K/uL    Immature Granulocytes (abs) 0.12 (H) 0.00 - 0.11 K/uL    NRBC (Absolute) 0.00 K/uL   MAGNESIUM    Collection Time: 12/21/23  3:35 AM   Result Value Ref Range    Magnesium 1.5 1.5 - 2.5 mg/dL   Renal Function Panel    Collection Time: 12/21/23  3:35 AM   Result Value Ref Range    Sodium 134 (L) 135 - 145 mmol/L    Potassium 3.3 (L) 3.6 - 5.5 mmol/L    Chloride 101 96 - 112 mmol/L    Co2 22 20 - 33 mmol/L    Glucose 95 65 - 99 mg/dL    Creatinine 0.97 0.50 - 1.40 mg/dL    Bun 8 8 - 22 mg/dL    Calcium 7.5 (L) 8.4 - 10.2 mg/dL    Correct Calcium 8.5 8.5 - 10.5 mg/dL    Phosphorus 1.5 (L) 2.5 - 4.5 mg/dL    Albumin 2.7 (L) 3.2 - 4.9 g/dL   IMMATURE PLT FRACTION    Collection Time: 12/21/23  3:35 AM   Result Value Ref Range    Imm. Plt Fraction 4.1 0.6 - 13.1 %   ESTIMATED GFR    Collection Time: 12/21/23  3:35 AM   Result Value Ref Range    GFR (CKD-EPI) 82 >60 mL/min/1.73 m 2   POCT glucose device results    Collection Time: 12/21/23  7:14 AM   Result Value Ref Range    POC Glucose, Blood 99 65 - 99 mg/dL   POCT glucose device results    Collection Time: 12/21/23 11:28 AM   Result Value Ref Range    POC Glucose, Blood 136 (H) 65 - 99 mg/dL         Radiology Review:  DX-ESOPHAGUS - [id number]   Final  Result      DX-CHEST-PORTABLE (1 VIEW)   Final Result      No evidence of acute cardiopulmonary process.      DX-ESOPHAGUS BARIUM SWALLOW SINGLE CONTRAST    (Results Pending)         MDM (Data Review):   -Records reviewed and summarized in current documentation  -I personally reviewed and interpreted the laboratory results  -I personally reviewed the radiology images        Medical Decision Making, by Problem:  Active Hospital Problems    Diagnosis     Severe protein-calorie malnutrition (HCC) [E43]     Neutropenic fever (HCC) [D70.9, R50.81]     Hypomagnesemia [E83.42]     Hypophosphatemia [E83.39]     Hyponatremia [E87.1]     Thrombocytopenia (HCC) [D69.6]     Anemia associated with chemotherapy [D64.81, T45.1X5A]     COPD without exacerbation (HCC) [J44.9]     Type 2 diabetes mellitus with diabetic polyneuropathy, without long-term current use of insulin (HCC) [E11.42]     Radiation-induced esophagitis [K20.80, T66.XXXA]     Hypokalemia [E87.6]     Antineoplastic chemotherapy induced pancytopenia (CODE) (HCC) [D61.810]     SCLC (small cell lung carcinoma) (HCC) [C34.90]     Coronary artery disease involving native coronary artery of native heart without angina pectoris [I25.10]            Assessment/Recommendations:  73-year-old male with COPD, type 2 diabetes mellitus, small cell lung cancer who recently underwent chemo and radiation with initial neutropenia and possible neutropenic fevers.  He does have pancytopenia most recent absolute neutrophil count 1.63 and platelets 73 K.  He does have esophageal dysphagia, suspect related to radiation-induced stricture, but cannot rule out other pathology within the esophagus. SLP evaluated the patient without evidence of oropharyngeal dysphagia.     Assessment:  -Esophageal dysphagia  -Small cell lung cancer status post chemo and radiation  -Type 2 diabetes mellitus  -COPD  -Neutropenia and pancytopenia secondary to chemotherapy  -Thrombocytopenia    Plan:  -Diet as  recommended by SLP, NPO past midnight  - EGD with possible biopsies, dilation on Friday, 12/22/2023 at 12 PM as long as his absolute neutrophil count and platelet count does not worsen.     -Gastrografin esophagram today    GI Attending -    Patient seen, examined, chart reviewed and case discussed and plan arrived at with A.P.R.N.  Agree with this note.    Will proceed with EGD tomorrow.    Dano Oviedo M.D.   ORI Mckenna.P.R.JOSE LUIS.      Thank you for inviting me to participate in the care of this patient. Please do not hesitate to call GI consultants with additional questions/concerns or changes in the patient's clinical status at [phone].      Core Quality Measures   Reviewed items:  Labs, Medications and Radiology reports reviewed

## 2023-12-21 NOTE — PROGRESS NOTES
Hospital Medicine Daily Progress Note    Date of Service  12/20/2023    Chief Complaint  Bart Ramsey is a 73 y.o. male admitted 12/17/2023 with   Chief Complaint   Patient presents with    Weakness    Shortness of Breath    Loss of Appetite     Hospital Course  No notes on file    Interval Problem Update  Patient stated he has had problems with his swallowing for a while now, [facility] which they found Salmon's esophagus.  - I spoke with speech therapy, there is no concern of oropharyngeal dysphagia but distal esophagus was concerning.  - I consulted and spoke with gastroenterology consultants about potential EGD or other means to evaluate patient's esophageal dysphagia.  We will proceed with a Gastrografin esophagram tomorrow.  Tentative EGD on Friday.  - I reviewed labs, Patient had transfusion, hemoglobin 8.4 afterwards.  ANC is 1.2.  Sodium 133, potassium 3.3 replacing orally.  Patient's phosphorus 1.4 replacing orally, and magnesium 1.3, replacing via IV.    I have discussed this patient's plan of care and discharge plan at IDT rounds today with Case Management, Nursing, Nursing leadership, and other members of the IDT team.    Consultants/Specialty  GI    Code Status  Full Code    Disposition  The patient is not medically cleared for discharge to home or a post-acute facility.      I have placed the appropriate orders for post-discharge needs.    Review of Systems  Review of Systems   Constitutional:  Positive for malaise/fatigue. Negative for chills and fever.   Respiratory:  Negative for cough and shortness of breath.    Cardiovascular:  Negative for chest pain and palpitations.   Gastrointestinal:  Negative for abdominal pain, constipation, diarrhea, nausea and vomiting.        Difficulty swallowing, food becoming stuck in the distal esophagus   Musculoskeletal:  Negative for back pain and joint pain.   Neurological:  Positive for weakness. Negative for dizziness  and headaches.   All other systems reviewed and are negative.       Physical Exam  Temp:  [36.6 °C (97.8 °F)-36.9 °C (98.5 °F)] 36.8 °C (98.3 °F)  Pulse:  [66-84] 78  Resp:  [16-18] 16  BP: (117-145)/(50-77) 144/67  SpO2:  [92 %-98 %] 92 %    Physical Exam  Vitals and nursing note reviewed.   Constitutional:       General: He is not in acute distress.     Appearance: He is not diaphoretic.      Comments: Frail, thin appearing elderly male   HENT:      Head: Normocephalic and atraumatic.      Comments: Temporal muscle wasting noted     Nose: Nose normal. No congestion.      Mouth/Throat:      Mouth: Mucous membranes are dry.      Pharynx: No oropharyngeal exudate.   Eyes:      General: No scleral icterus.     Extraocular Movements: Extraocular movements intact.   Cardiovascular:      Rate and Rhythm: Normal rate and regular rhythm.      Pulses: Normal pulses.      Heart sounds: Normal heart sounds. No murmur heard.  Pulmonary:      Effort: Pulmonary effort is normal. No respiratory distress.      Breath sounds: Normal breath sounds. No wheezing or rales.   Abdominal:      General: Abdomen is flat. Bowel sounds are normal. There is no distension.      Palpations: Abdomen is soft.      Tenderness: There is no abdominal tenderness.   Musculoskeletal:         General: No swelling or tenderness. Normal range of motion.      Cervical back: Normal range of motion and neck supple.      Comments: Sarcopenic. B/L dorsal hands visible muscle atrophy.   Skin:     General: Skin is warm.      Capillary Refill: Capillary refill takes less than 2 seconds.      Coloration: Skin is not jaundiced or pale.   Neurological:      General: No focal deficit present.      Mental Status: He is alert and oriented to person, place, and time. Mental status is at baseline.      Motor: No weakness.   Psychiatric:         Mood and Affect: Mood normal.         Behavior: Behavior normal.         Thought Content: Thought content normal.          Judgment: Judgment normal.         Fluids    Intake/Output Summary (Last 24 hours) at 12/20/2023 2057  Last data filed at 12/20/2023 1952  Gross per 24 hour   Intake 620 ml   Output 575 ml   Net 45 ml       Laboratory  Recent Labs     12/18/23  0437 12/18/23  1728 12/19/23  0146 12/20/23  0034 12/20/23  0936   WBC 0.6*  --  1.6* 2.3*  --    RBC 2.11*  --  2.19* 2.15*  --    HEMOGLOBIN 6.8*   < > 7.1* 6.9* 8.4*   HEMATOCRIT 19.3*  --  20.0* 19.5* 25.4*   MCV 91.5  --  91.3 90.7  --    MCH 32.2  --  32.4 32.1  --    MCHC 35.2  --  35.5 35.4  --    RDW 48.1  --  50.6* 49.4  --    PLATELETCT 29*  --  37* 50*  --    MPV 10.6  --  10.9 10.8  --     < > = values in this interval not displayed.     Recent Labs     12/18/23  0437 12/19/23  0146 12/19/23  0943 12/20/23  0034   SODIUM 131* 133*  --  133*   POTASSIUM 3.3* 2.9* 3.3* 3.3*   CHLORIDE 97 100  --  100   CO2 22 22  --  23   GLUCOSE 143* 108*  --  105*   BUN 18 16  --  12   CREATININE 1.18 1.10  --  1.11   CALCIUM 7.2* 7.2*  --  7.0*                   Imaging  DX-ESOPHAGUS - [id number]   Final Result      DX-CHEST-PORTABLE (1 VIEW)   Final Result      No evidence of acute cardiopulmonary process.      DX-ESOPHAGUS BARIUM SWALLOW SINGLE CONTRAST    (Results Pending)        Assessment/Plan  * Neutropenic fever (HCC)- (present on admission)  Assessment & Plan  Patient with chemo induced neutropenia now febrile  CXR without sig abnormality  CTA ruled out PE, stable lung lesion, no obvious signs of PNA  Incentive spirometer  COVID/RSV/flu neg  BCX NGTD, UCX NG    Continue IV Zosyn    Severe protein-calorie malnutrition (HCC)- (present on admission)  Assessment & Plan  Patient has had weeks of decreased eating, having a oral pharyngeal versus esophageal dysphagia  Dietitian agreed patient does meet Aspen criteria for severe malnutrition  Continue boost supplements    Thrombocytopenia (HCC)- (present on admission)  Assessment & Plan  Plt 50, monitor  May need platelets  for procedure if decreased    Hyponatremia- (present on admission)  Assessment & Plan  Na 133, continue IVF    Hypophosphatemia- (present on admission)  Assessment & Plan  Phos 1.4, replacing via PO  I am repeating labs in the morning.    Hypomagnesemia- (present on admission)  Assessment & Plan  Low serum magnesium levels, 1.3  I am replacing via IV, monitoring closely for hypotension side effect  I am repeating labs in AM    Anemia associated with chemotherapy- (present on admission)  Assessment & Plan  Monitor H&H if Brosseau 7 or 21 transfuse  S/p 2 units PRBC  Repeating labs tomorrow    COPD without exacerbation (HCC)- (present on admission)  Assessment & Plan  History of COPD currently not in acute exacerbation  Continue albuterol and Dulera  Guaifenesin for mucolysis    Type 2 diabetes mellitus with diabetic polyneuropathy, without long-term current use of insulin (HCC)- (present on admission)  Assessment & Plan  Accu-Cheks with sign scale coverage  Most recent hemoglobin A1c is 6.3      Hypokalemia- (present on admission)  Assessment & Plan  K 3.3, replacing orally  I am repeating labs in the morning.    Radiation-induced esophagitis- (present on admission)  Assessment & Plan  Patient has severe radiation-induced esophagitis and has not been able to eat and has not been getting nutrition this is most likely why he is developed fevers.  NGT unsuccessful I do not think it's indicated  Add Cepachol spray, viscous lidocaine    - I spoke with speech therapy, there is no concern of oropharyngeal dysphagia but distal esophagus was concerning.  - I consulted and spoke with gastroenterology consultants about potential EGD or other means to evaluate patient's esophageal dysphagia.  We will proceed with a Gastrografin esophagram tomorrow.  Tentative EGD on Friday.    Antineoplastic chemotherapy induced pancytopenia (CODE) ([facility])- (present on admission)  Assessment & Plan  Sig drop in platelets, neutropenia and  anemia  Transfuse 1 unit pRBC 12/18 for hb 6.8  No obvious signs of bleed  Monitor CBC    SCLC (small cell lung carcinoma) (HCC)- (present on admission)  Assessment & Plan  Ongoing small cell lung cancer treatment with Dr. Yamilex Fuller of [facility] oncology  [facility] upon admission with stable dz    Coronary artery disease involving native coronary artery of native heart without angina pectoris- (present on admission)  Assessment & Plan  History of coronary artery disease  Currently chest pain-free  Monitor troponin levels and optimize medication management         VTE prophylaxis:   SCDs/TEDs   pharmacologic prophylaxis contraindicated due to anemia      I have performed a physical exam and reviewed and updated ROS and Plan today (12/20/2023). In review of yesterday's note (12/19/2023), there are no changes except as documented above.      Total time spent 51 minutes. I spent greater than 50% of the time for patient care, counseling, and coordination on this date, including unit/floor time, and face-to-face time with the patient as per interval events, my own review of patient's imaging and lab analysis and developing my assessment and plan above.

## 2023-12-21 NOTE — PROGRESS NOTES
Telemetry Shift Summary    Rhythm SR  HR Range 69-96  Ectopy r-fPVC, rCoup/PAC  Measurements 0.16/0.08/0.36      Normal Values  Rhythm SR  HR Range:   Measurements: 0.12-0.20/0.06-0.10/0.30-0.52

## 2023-12-21 NOTE — PROGRESS NOTES
This RN contacted Dr. Garland via Vo[employer] at 0008 hours concerning IV access. Pt has right chest port that has not been accessed since admission. Pt's current peripheral IV needs to be replaced, and pt states he is a difficult IV start. Dr. Garland stated to wait until day shift to discuss port access with the day team. Pt updated on plan.

## 2023-12-21 NOTE — PROGRESS NOTES
Hospital Medicine Daily Progress Note    Date of Service  12/21/2023    Chief Complaint  Bart Ramsey is a 73 y.o. male admitted 12/17/2023 with   Chief Complaint   Patient presents with    Weakness    Shortness of Breath    Loss of Appetite     Hospital Course  No notes on file    Interval Problem Update  12/20:  Patient stated he has had problems with his swallowing for a while now, [facility] which they found Salmon's esophagus.  - I spoke with speech therapy, there is no concern of oropharyngeal dysphagia but distal esophagus was concerning.  - I consulted and spoke with gastroenterology consultants about potential EGD or other means to evaluate patient's esophageal dysphagia.  We will proceed with a Gastrografin esophagram tomorrow.  Tentative EGD on Friday.  - I reviewed labs, Patient had transfusion, hemoglobin 8.4 afterwards.  ANC is 1.2.  Sodium 133, potassium 3.3 replacing orally.  Patient's phosphorus 1.4 replacing orally, and magnesium 1.3, replacing via IV.    12/21:  GIC consulted, pending EGD for esophageal dysphagia potentially Friday, pending esophagram today.  I reviewed labs hemoglobin 8.0, platelet 73, sodium 134, potassium 3.3, Phos 1.5, mag 1.4 replacing via IV.  Continue IV fluids.  ANC 1.63, WBC 3.0. Continue IV zosyn, day 5/5. Patient has been afebrile.    I have discussed this patient's plan of care and discharge plan at IDT rounds today with Case Management, Nursing, Nursing leadership, and other members of the IDT team.    Consultants/Specialty  GI    Code Status  Full Code    Disposition  The patient is not medically cleared for discharge to home or a post-acute facility.  Anticipate discharge to: home with close outpatient follow-up    I have placed the appropriate orders for post-discharge needs.    Review of Systems  Review of Systems   Constitutional:  Positive for malaise/fatigue. Negative for chills and fever.   Respiratory:  Negative for  cough and shortness of breath.    Cardiovascular:  Negative for chest pain and palpitations.   Gastrointestinal:  Negative for abdominal pain, constipation, diarrhea, nausea and vomiting.        Difficulty swallowing, food becoming stuck in the distal esophagus   Musculoskeletal:  Negative for back pain and joint pain.   Neurological:  Positive for weakness. Negative for dizziness and headaches.   All other systems reviewed and are negative.       Physical Exam  Temp:  [36.6 °C (97.8 °F)-36.9 °C (98.4 °F)] 36.9 °C (98.4 °F)  Pulse:  [66-84] 74  Resp:  [14-16] 16  BP: (126-149)/(63-80) 149/80  SpO2:  [85 %-97 %] 95 %    Physical Exam  Vitals and nursing note reviewed.   Constitutional:       General: He is not in acute distress.     Appearance: He is not diaphoretic.      Comments: Frail, thin appearing elderly male   HENT:      Head: Normocephalic and atraumatic.      Comments: Temporal muscle wasting noted     Nose: Nose normal. No congestion.      Mouth/Throat:      Mouth: Mucous membranes are dry.      Pharynx: No oropharyngeal exudate.   Eyes:      General: No scleral icterus.     Extraocular Movements: Extraocular movements intact.   Cardiovascular:      Rate and Rhythm: Normal rate and regular rhythm.      Pulses: Normal pulses.      Heart sounds: Normal heart sounds. No murmur heard.  Pulmonary:      Effort: Pulmonary effort is normal. No respiratory distress.      Breath sounds: Normal breath sounds. No wheezing or rales.   Abdominal:      General: Abdomen is flat. Bowel sounds are normal. There is no distension.      Palpations: Abdomen is soft.      Tenderness: There is no abdominal tenderness.   Musculoskeletal:         General: No swelling or tenderness. Normal range of motion.      Cervical back: Normal range of motion and neck supple.      Comments: Sarcopenic. B/L dorsal hands visible muscle atrophy.   Skin:     General: Skin is warm.      Capillary Refill: Capillary refill takes less than 2 seconds.       Coloration: Skin is not jaundiced or pale.   Neurological:      General: No focal deficit present.      Mental Status: He is alert and oriented to person, place, and time. Mental status is at baseline.      Motor: No weakness.   Psychiatric:         Mood and Affect: Mood normal.         Behavior: Behavior normal.         Thought Content: Thought content normal.         Judgment: Judgment normal.         Fluids    Intake/Output Summary (Last 24 hours) at 12/21/2023 0727  Last data filed at 12/21/2023 0638  Gross per 24 hour   Intake 7727.4 ml   Output 325 ml   Net 7402.4 ml         Laboratory  Recent Labs     12/19/23  0146 12/20/23  0034 12/20/23  0936 12/21/23  0335   WBC 1.6* 2.3*  --  3.0*   RBC 2.19* 2.15*  --  2.53*   HEMOGLOBIN 7.1* 6.9* 8.4* 8.0*   HEMATOCRIT 20.0* 19.5* 25.4* 22.8*   MCV 91.3 90.7  --  90.1   MCH 32.4 32.1  --  31.6   MCHC 35.5 35.4  --  35.1   RDW 50.6* 49.4  --  49.7   PLATELETCT 37* 50*  --  73*   MPV 10.9 10.8  --  10.8       Recent Labs     12/19/23  0146 12/19/23  0943 12/20/23  0034 12/21/23  0335   SODIUM 133*  --  133* 134*   POTASSIUM 2.9* 3.3* 3.3* 3.3*   CHLORIDE 100  --  100 101   CO2 22  --  23 22   GLUCOSE 108*  --  105* 95   BUN 16  --  12 8   CREATININE 1.10  --  1.11 0.97   CALCIUM 7.2*  --  7.0* 7.5*                     Imaging  DX-ESOPHAGUS - [id number]   Final Result      DX-CHEST-PORTABLE (1 VIEW)   Final Result      No evidence of acute cardiopulmonary process.      DX-ESOPHAGUS BARIUM SWALLOW SINGLE CONTRAST    (Results Pending)        Assessment/Plan  * Neutropenic fever (HCC)- (present on admission)  Assessment & Plan  Patient with chemo induced neutropenia now febrile  CXR without sig abnormality  CTA ruled out PE, stable lung lesion, no obvious signs of PNA  Incentive spirometer  COVID/RSV/flu neg  BCX NGTD, UCX NG    ANC 1.63  Continue IV zosyn, end later today day 5/5    Severe protein-calorie malnutrition (HCC)- (present on admission)  Assessment &  Plan  Patient has had weeks of decreased eating, having a oral pharyngeal versus esophageal dysphagia  Dietitian agreed patient does meet Aspen criteria for severe malnutrition  Continue boost supplements  [facility] consulted, pending EGD for esophageal dysphagia potentially Friday, pending esophagram today.    Radiation-induced esophagitis- (present on admission)  Assessment & Plan  Patient has severe radiation-induced esophagitis and has not been able to eat and has not been getting nutrition this is most likely why he is developed fevers.  NGT unsuccessful I do not think it's indicated  Add Cepachol spray, viscous lidocaine    - I spoke with speech therapy, there is no concern of oropharyngeal dysphagia but distal esophagus was concerning.  - I consulted and spoke with gastroenterology consultants about potential EGD or other means to evaluate patient's esophageal dysphagia.  We will proceed with a Gastrografin esophagram tomorrow.  Tentative EGD on Friday.    12/21:  [facility] consulted, pending EGD for esophageal dysphagia potentially Friday, pending esophagram today.    Hyponatremia- (present on admission)  Assessment & Plan  Na 134, continue IVF    Hypophosphatemia- (present on admission)  Assessment & Plan  Phos 1.5, replacing via IV    Hypomagnesemia- (present on admission)  Assessment & Plan  Low serum magnesium levels, 1.4  I am replacing via IV, monitoring closely for hypotension side effect  I am repeating labs in AM    Hypokalemia- (present on admission)  Assessment & Plan  K 3.3, replacing via IV    Thrombocytopenia (HCC)- (present on admission)  Assessment & Plan  Plt 73    Anemia associated with chemotherapy- (present on admission)  Assessment & Plan  Monitor H&H if Brosseau 7 or 21 transfuse  S/p 2 units PRBC  Hgb 8.0, from 8.4 after transfusion  GIC consulted    COPD without exacerbation (HCC)- (present on admission)  Assessment & Plan  History of COPD currently not in acute exacerbation  Continue albuterol  and Dulera  Guaifenesin for mucolysis    Type 2 diabetes mellitus with diabetic polyneuropathy, without long-term current use of insulin (HCC)- (present on admission)  Assessment & Plan  Accu-Cheks with sign scale coverage  Most recent hemoglobin A1c is 6.3      Antineoplastic chemotherapy induced pancytopenia (CODE) (HCC)- (present on admission)  Assessment & Plan  Sig drop in platelets, neutropenia and anemia  Transfuse 1 unit pRBC 12/18 for hb 6.8  No obvious signs of bleed  Monitor CBC    SCLC (small cell lung carcinoma) (HCC)- (present on admission)  Assessment & Plan  Ongoing small cell lung cancer treatment with Dr. Yamilex Fuller of [facility]ology  [facility] with [facility] dz    Coronary artery disease involving native coronary artery of native heart without angina pectoris- (present on admission)  Assessment & Plan  History of coronary artery disease  Currently chest pain-free  Monitor troponin levels and optimize medication management         VTE prophylaxis:   SCDs/TEDs      I have performed a physical exam and reviewed and updated ROS and Plan today (12/21/2023). In review of yesterday's note (12/20/2023), there are no changes except as documented above.      Total time spent 52 minutes.    This included my review of patient's overnight RN notes, face to face interview, physical examination, lab analysis.  Also includes my documented assessments and interventions above.  I spoke with specialist GI.  In addition, I spoke with entire care team on patient's treatment plan and DC planning on IDT rounds.

## 2023-12-21 NOTE — CARE PLAN
The patient is Stable - Low risk of patient condition declining or worsening    Shift Goals  Clinical Goals: IV ABX, Safety, Protective Isolation,  Patient Goals: Rest  Family Goals: adrien    Progress made toward(s) clinical / shift goals:    Problem: Knowledge Deficit - Standard  Goal: Patient and family/care givers will demonstrate understanding of plan of care, disease process/condition, diagnostic tests and medications  Outcome: Progressing     Problem: Pain - Standard  Goal: Alleviation of pain or a reduction in pain to the patient’s comfort goal  Outcome: Progressing     Problem: Skin Integrity  Goal: Skin integrity is maintained or improved  Outcome: Progressing     Problem: Fall Risk  Goal: Patient will remain free from falls  Outcome: Progressing       Patient is not progressing towards the following goals:

## 2023-12-21 NOTE — ASSESSMENT & PLAN NOTE
Patient has had weeks of decreased eating, having a oral pharyngeal versus esophageal dysphagia  Dietitian agreed patient does meet Aspen criteria for severe malnutrition  Continue boost supplements  I counseled patient on a full liquid diet at home.  I explained to him he will need to do multiple small liquid meals, in between protein shake or or boost supplements possibly 2 or 3 times a day for the next 2 to 3 weeks.  Patient's difficulty eating was confirmed to be erosive, exudative esophagitis.

## 2023-12-21 NOTE — PROGRESS NOTES
4 Eyes Skin Assessment Completed by JOSE Gaytan and JOSE Ingram.    Head WDL  Ears WDL  Nose WDL  Mouth WDL  Neck WDL  Breast/Chest WDL  Shoulder Blades WDL  Spine WDL  (R) Arm/Elbow/Hand Bruising and Scab  (L) Arm/Elbow/Hand Bruising and Scab  Abdomen WDL  Groin WDL  Scrotum/Coccyx/Buttocks Redness, Blanching, and Discoloration  (R) Leg Bruising and Edema  (L) Leg Bruising and Edema  (R) Heel/Foot/Toe Redness, Blanching, and Boggy  (L) Heel/Foot/Toe Redness, Blanching, and Boggy          Devices In Places Tele Box, Blood Pressure Cuff, and Pulse Ox      Interventions In Place Sacral Mepilex, Pillows, Barrier Cream, Heels Loaded W/Pillows, and Pressure Redistribution Mattress    Possible Skin Injury Yes    Pictures Uploaded Into Epic Yes  Wound Consult Placed Yes  RN Wound Prevention Protocol Ordered Yes

## 2023-12-21 NOTE — WOUND TEAM
[facility]  Inpatient Services  Wound and Skin Care Brief Evaluation    Admission Date: 12/17/2023     Last order of IP CONSULT TO WOUND CARE was found on 12/21/2023 from Hospital Encounter on 12/17/2023     HPI, PMH, SH: Reviewed    Chief Complaint   Patient presents with    Weakness    Shortness of Breath    Loss of Appetite     Diagnosis: Neutropenic fever (HCC) [D70.9, R50.81]    Unit where seen by Wound Team: 1107/01     Wound consult placed regarding sacrum. Chart and images reviewed. This discussed with bedside RN. This clinician and Wound RN Thi in to assess patient. Patient pleasant and agreeable. Patient sacrum with blanchable partial thickness wound likely r/t friction. Non-selectively debrided with Moist warm washcloth. Continue with offloading dressing to prevent further trauma to skin.    BL heels also assessed during encounter and found to be pink and intact.      No pressure injuries or advanced wound care needs identified. Wound consult completed. No further follow up unless indicated and consulted.     Wound 12/20/23 Partial Thickness Wound Sacrum Midline r/t friction (Active)   Date First Assessed/Time First Assessed: 12/20/23 2328   Present on Original Admission: (c) No  Primary Wound Type: Partial Thickness Wound  Location: Sacrum  Wound Orientation: Midline  Wound Description (Comments): r/t friction      Assessments 12/21/2023 12:00 PM   Wound Image     Site Assessment Pink;Red   Periwound Assessment Pink;Red   Margins Attached edges;Defined edges   Closure Secondary intention   Drainage Amount Scant   Drainage Description Serosanguineous   Treatments Cleansed;Site care;Offloading   Wound Cleansing Foam Cleanser/Washcloth   Dressing Status Clean;Dry;Intact   Dressing Changed Reinforced   Dressing Cleansing/Solutions Not Applicable   Dressing Options Offloading Dressing - Sacral   Dressing Change/Treatment Frequency Every 72 hrs, and As Needed   NEXT Dressing Change/Treatment  Date 12/23/23   NEXT Weekly Photo (Inpatient Only) 12/28/23   Wound Team Following Not following     Left Heel      Right Heel        PREVENTATIVE INTERVENTIONS:    Q shift [facility] - performed per nursing policy  Q shift pressure point assessments - performed per nursing policy    Surface/Positioning  Standard/trauma mattress - Currently in Place  Waffle overlay  - Ordered    Offloading/Redistribution  Sacral offloading dressing (Silicone dressing) - Currently in Place  Heel offloading dressing (Silicone dressing) - Currently in Place

## 2023-12-22 ENCOUNTER — ANESTHESIA (OUTPATIENT)
Dept: SURGERY | Facility: MEDICAL CENTER | Age: 73
DRG: 808 | End: 2023-12-22
Payer: COMMERCIAL

## 2023-12-22 LAB
ALBUMIN SERPL BCP-MCNC: 2.8 G/DL (ref 3.2–4.9)
APTT PPP: 27.5 SEC (ref 24.7–36)
BACTERIA BLD CULT: NORMAL
BACTERIA BLD CULT: NORMAL
BUN SERPL-MCNC: 5 MG/DL (ref 8–22)
CALCIUM ALBUM COR SERPL-MCNC: 8.7 MG/DL (ref 8.5–10.5)
CALCIUM SERPL-MCNC: 7.7 MG/DL (ref 8.4–10.2)
CHLORIDE SERPL-SCNC: 99 MMOL/L (ref 96–112)
CO2 SERPL-SCNC: 22 MMOL/L (ref 20–33)
CREAT SERPL-MCNC: 0.85 MG/DL (ref 0.5–1.4)
ERYTHROCYTE [DISTWIDTH] IN BLOOD BY AUTOMATED COUNT: 50.7 FL (ref 35.9–50)
GFR SERPLBLD CREATININE-BSD FMLA CKD-EPI: 91 ML/MIN/1.73 M 2
GLUCOSE BLD STRIP.AUTO-MCNC: 115 MG/DL (ref 65–99)
GLUCOSE BLD STRIP.AUTO-MCNC: 143 MG/DL (ref 65–99)
GLUCOSE SERPL-MCNC: 97 MG/DL (ref 65–99)
HCT VFR BLD AUTO: 26.4 % (ref 42–52)
HGB BLD-MCNC: 9.2 G/DL (ref 14–18)
INR PPP: 1.12 (ref 0.87–1.13)
MAGNESIUM SERPL-MCNC: 1.4 MG/DL (ref 1.5–2.5)
MCH RBC QN AUTO: 31.7 PG (ref 27–33)
MCHC RBC AUTO-ENTMCNC: 34.8 G/DL (ref 32.3–36.5)
MCV RBC AUTO: 91 FL (ref 81.4–97.8)
PATHOLOGY CONSULT NOTE: NORMAL
PHOSPHATE SERPL-MCNC: 1.8 MG/DL (ref 2.5–4.5)
PLATELET # BLD AUTO: 100 K/UL (ref 164–446)
PLATELETS.RETICULATED NFR BLD AUTO: 3 % (ref 0.6–13.1)
PMV BLD AUTO: 9.8 FL (ref 9–12.9)
POTASSIUM SERPL-SCNC: 3.6 MMOL/L (ref 3.6–5.5)
PROTHROMBIN TIME: 14.8 SEC (ref 12–14.6)
RBC # BLD AUTO: 2.9 M/UL (ref 4.7–6.1)
SIGNIFICANT IND 70042: NORMAL
SIGNIFICANT IND 70042: NORMAL
SITE SITE: NORMAL
SITE SITE: NORMAL
SODIUM SERPL-SCNC: 133 MMOL/L (ref 135–145)
SOURCE SOURCE: NORMAL
SOURCE SOURCE: NORMAL
WBC # BLD AUTO: 4.8 K/UL (ref 4.8–10.8)

## 2023-12-22 PROCEDURE — 0DB58ZX EXCISION OF ESOPHAGUS, VIA NATURAL OR ARTIFICIAL OPENING ENDOSCOPIC, DIAGNOSTIC: ICD-10-PCS | Performed by: INTERNAL MEDICINE

## 2023-12-22 PROCEDURE — 88312 SPECIAL STAINS GROUP 1: CPT

## 2023-12-22 PROCEDURE — 94640 AIRWAY INHALATION TREATMENT: CPT

## 2023-12-22 PROCEDURE — 85730 THROMBOPLASTIN TIME PARTIAL: CPT

## 2023-12-22 PROCEDURE — 97530 THERAPEUTIC ACTIVITIES: CPT

## 2023-12-22 PROCEDURE — 160002 HCHG RECOVERY MINUTES (STAT): Performed by: INTERNAL MEDICINE

## 2023-12-22 PROCEDURE — 85027 COMPLETE CBC AUTOMATED: CPT

## 2023-12-22 PROCEDURE — C1769 GUIDE WIRE: HCPCS | Performed by: INTERNAL MEDICINE

## 2023-12-22 PROCEDURE — 700111 HCHG RX REV CODE 636 W/ 250 OVERRIDE (IP): Mod: JZ | Performed by: INTERNAL MEDICINE

## 2023-12-22 PROCEDURE — 700102 HCHG RX REV CODE 250 W/ 637 OVERRIDE(OP): Performed by: INTERNAL MEDICINE

## 2023-12-22 PROCEDURE — 700105 HCHG RX REV CODE 258: Performed by: INTERNAL MEDICINE

## 2023-12-22 PROCEDURE — 160048 HCHG OR STATISTICAL LEVEL 1-5: Performed by: INTERNAL MEDICINE

## 2023-12-22 PROCEDURE — 700102 HCHG RX REV CODE 250 W/ 637 OVERRIDE(OP): Performed by: HOSPITALIST

## 2023-12-22 PROCEDURE — 700105 HCHG RX REV CODE 258: Performed by: HOSPITALIST

## 2023-12-22 PROCEDURE — 160009 HCHG ANES TIME/MIN: Performed by: INTERNAL MEDICINE

## 2023-12-22 PROCEDURE — 160202 HCHG ENDO MINUTES - 1ST 30 MINS LEVEL 3: Performed by: INTERNAL MEDICINE

## 2023-12-22 PROCEDURE — 0D758ZZ DILATION OF ESOPHAGUS, VIA NATURAL OR ARTIFICIAL OPENING ENDOSCOPIC: ICD-10-PCS | Performed by: INTERNAL MEDICINE

## 2023-12-22 PROCEDURE — 85610 PROTHROMBIN TIME: CPT

## 2023-12-22 PROCEDURE — 160035 HCHG PACU - 1ST 60 MINS PHASE I: Performed by: INTERNAL MEDICINE

## 2023-12-22 PROCEDURE — 86945 BLOOD PRODUCT/IRRADIATION: CPT

## 2023-12-22 PROCEDURE — 82962 GLUCOSE BLOOD TEST: CPT

## 2023-12-22 PROCEDURE — 770001 HCHG ROOM/CARE - MED/SURG/GYN PRIV*

## 2023-12-22 PROCEDURE — 83735 ASSAY OF MAGNESIUM: CPT

## 2023-12-22 PROCEDURE — 85055 RETICULATED PLATELET ASSAY: CPT

## 2023-12-22 PROCEDURE — A9270 NON-COVERED ITEM OR SERVICE: HCPCS | Performed by: HOSPITALIST

## 2023-12-22 PROCEDURE — 700111 HCHG RX REV CODE 636 W/ 250 OVERRIDE (IP): Performed by: ANESTHESIOLOGY

## 2023-12-22 PROCEDURE — 94760 N-INVAS EAR/PLS OXIMETRY 1: CPT

## 2023-12-22 PROCEDURE — 88305 TISSUE EXAM BY PATHOLOGIST: CPT

## 2023-12-22 PROCEDURE — A9270 NON-COVERED ITEM OR SERVICE: HCPCS | Performed by: INTERNAL MEDICINE

## 2023-12-22 PROCEDURE — 80069 RENAL FUNCTION PANEL: CPT

## 2023-12-22 PROCEDURE — 97116 GAIT TRAINING THERAPY: CPT

## 2023-12-22 PROCEDURE — 99233 SBSQ HOSP IP/OBS HIGH 50: CPT | Performed by: INTERNAL MEDICINE

## 2023-12-22 PROCEDURE — 700101 HCHG RX REV CODE 250: Performed by: ANESTHESIOLOGY

## 2023-12-22 RX ORDER — HALOPERIDOL 5 MG/ML
1 INJECTION INTRAMUSCULAR
Status: DISCONTINUED | OUTPATIENT
Start: 2023-12-22 | End: 2023-12-22 | Stop reason: HOSPADM

## 2023-12-22 RX ORDER — LABETALOL HYDROCHLORIDE 5 MG/ML
5 INJECTION, SOLUTION INTRAVENOUS
Status: DISCONTINUED | OUTPATIENT
Start: 2023-12-22 | End: 2023-12-22 | Stop reason: HOSPADM

## 2023-12-22 RX ORDER — HYDRALAZINE HYDROCHLORIDE 20 MG/ML
5 INJECTION INTRAMUSCULAR; INTRAVENOUS
Status: DISCONTINUED | OUTPATIENT
Start: 2023-12-22 | End: 2023-12-22 | Stop reason: HOSPADM

## 2023-12-22 RX ORDER — SUCRALFATE ORAL 1 G/10ML
1 SUSPENSION ORAL
Status: DISCONTINUED | OUTPATIENT
Start: 2023-12-22 | End: 2023-12-23 | Stop reason: HOSPADM

## 2023-12-22 RX ORDER — OMEPRAZOLE 20 MG/1
40 CAPSULE, DELAYED RELEASE ORAL 2 TIMES DAILY
Status: DISCONTINUED | OUTPATIENT
Start: 2023-12-22 | End: 2023-12-23 | Stop reason: HOSPADM

## 2023-12-22 RX ORDER — EPHEDRINE SULFATE 50 MG/ML
5 INJECTION, SOLUTION INTRAVENOUS
Status: DISCONTINUED | OUTPATIENT
Start: 2023-12-22 | End: 2023-12-22 | Stop reason: HOSPADM

## 2023-12-22 RX ORDER — MAGNESIUM SULFATE HEPTAHYDRATE 40 MG/ML
2 INJECTION, SOLUTION INTRAVENOUS ONCE
Status: DISCONTINUED | OUTPATIENT
Start: 2023-12-22 | End: 2023-12-22

## 2023-12-22 RX ORDER — ONDANSETRON 2 MG/ML
4 INJECTION INTRAMUSCULAR; INTRAVENOUS
Status: DISCONTINUED | OUTPATIENT
Start: 2023-12-22 | End: 2023-12-22 | Stop reason: HOSPADM

## 2023-12-22 RX ORDER — SODIUM CHLORIDE, SODIUM LACTATE, POTASSIUM CHLORIDE, CALCIUM CHLORIDE 600; 310; 30; 20 MG/100ML; MG/100ML; MG/100ML; MG/100ML
INJECTION, SOLUTION INTRAVENOUS CONTINUOUS
Status: DISCONTINUED | OUTPATIENT
Start: 2023-12-22 | End: 2023-12-22 | Stop reason: HOSPADM

## 2023-12-22 RX ORDER — DIPHENHYDRAMINE HYDROCHLORIDE 50 MG/ML
12.5 INJECTION INTRAMUSCULAR; INTRAVENOUS
Status: DISCONTINUED | OUTPATIENT
Start: 2023-12-22 | End: 2023-12-22 | Stop reason: HOSPADM

## 2023-12-22 RX ORDER — LIDOCAINE HYDROCHLORIDE 20 MG/ML
INJECTION, SOLUTION EPIDURAL; INFILTRATION; INTRACAUDAL; PERINEURAL PRN
Status: DISCONTINUED | OUTPATIENT
Start: 2023-12-22 | End: 2023-12-22 | Stop reason: SURG

## 2023-12-22 RX ORDER — SODIUM CHLORIDE, SODIUM LACTATE, POTASSIUM CHLORIDE, CALCIUM CHLORIDE 600; 310; 30; 20 MG/100ML; MG/100ML; MG/100ML; MG/100ML
INJECTION, SOLUTION INTRAVENOUS CONTINUOUS
Status: DISCONTINUED | OUTPATIENT
Start: 2023-12-22 | End: 2023-12-22

## 2023-12-22 RX ORDER — MAGNESIUM SULFATE HEPTAHYDRATE 40 MG/ML
2 INJECTION, SOLUTION INTRAVENOUS ONCE
Status: COMPLETED | OUTPATIENT
Start: 2023-12-22 | End: 2023-12-22

## 2023-12-22 RX ADMIN — OMEPRAZOLE 40 MG: 20 CAPSULE, DELAYED RELEASE ORAL at 18:09

## 2023-12-22 RX ADMIN — GUAIFENESIN 600 MG: 600 TABLET, EXTENDED RELEASE ORAL at 18:09

## 2023-12-22 RX ADMIN — MOMETASONE FUROATE AND FORMOTEROL FUMARATE DIHYDRATE 2 PUFF: 200; 5 AEROSOL RESPIRATORY (INHALATION) at 18:09

## 2023-12-22 RX ADMIN — PROPOFOL 20 MG: 10 INJECTION, EMULSION INTRAVENOUS at 11:42

## 2023-12-22 RX ADMIN — SODIUM CHLORIDE, POTASSIUM CHLORIDE, SODIUM LACTATE AND CALCIUM CHLORIDE: 600; 310; 30; 20 INJECTION, SOLUTION INTRAVENOUS at 11:29

## 2023-12-22 RX ADMIN — PROPOFOL 20 MG: 10 INJECTION, EMULSION INTRAVENOUS at 11:41

## 2023-12-22 RX ADMIN — SODIUM CHLORIDE: 9 INJECTION, SOLUTION INTRAVENOUS at 05:45

## 2023-12-22 RX ADMIN — PROPOFOL 30 MG: 10 INJECTION, EMULSION INTRAVENOUS at 11:40

## 2023-12-22 RX ADMIN — SUCRALFATE 1 G: 1 SUSPENSION ORAL at 16:32

## 2023-12-22 RX ADMIN — SUCRALFATE 1 G: 1 SUSPENSION ORAL at 21:01

## 2023-12-22 RX ADMIN — LIDOCAINE HYDROCHLORIDE 5 ML: 20 SOLUTION ORAL; TOPICAL at 18:09

## 2023-12-22 RX ADMIN — GABAPENTIN 600 MG: 300 CAPSULE ORAL at 18:09

## 2023-12-22 RX ADMIN — LIDOCAINE HYDROCHLORIDE 5 ML: 20 SOLUTION ORAL; TOPICAL at 23:58

## 2023-12-22 RX ADMIN — DIBASIC SODIUM PHOSPHATE, MONOBASIC POTASSIUM PHOSPHATE AND MONOBASIC SODIUM PHOSPHATE 250 MG: 852; 155; 130 TABLET ORAL at 15:22

## 2023-12-22 RX ADMIN — MAGNESIUM SULFATE HEPTAHYDRATE 2 G: 2 INJECTION, SOLUTION INTRAVENOUS at 09:08

## 2023-12-22 RX ADMIN — PROPOFOL 20 MG: 10 INJECTION, EMULSION INTRAVENOUS at 11:44

## 2023-12-22 RX ADMIN — LIDOCAINE HYDROCHLORIDE 60 MG: 20 INJECTION, SOLUTION EPIDURAL; INFILTRATION; INTRACAUDAL at 11:38

## 2023-12-22 RX ADMIN — PROPOFOL 20 MG: 10 INJECTION, EMULSION INTRAVENOUS at 11:43

## 2023-12-22 RX ADMIN — PROPOFOL 70 MG: 10 INJECTION, EMULSION INTRAVENOUS at 11:38

## 2023-12-22 ASSESSMENT — COGNITIVE AND FUNCTIONAL STATUS - GENERAL
SUGGESTED CMS G CODE MODIFIER MOBILITY: CI
MOBILITY SCORE: 23
CLIMB 3 TO 5 STEPS WITH RAILING: A LITTLE

## 2023-12-22 ASSESSMENT — PATIENT HEALTH QUESTIONNAIRE - PHQ9
2. FEELING DOWN, DEPRESSED, IRRITABLE, OR HOPELESS: NOT AT ALL
SUM OF ALL RESPONSES TO PHQ9 QUESTIONS 1 AND 2: 0
1. LITTLE INTEREST OR PLEASURE IN DOING THINGS: NOT AT ALL

## 2023-12-22 ASSESSMENT — PAIN DESCRIPTION - PAIN TYPE
TYPE: ACUTE PAIN

## 2023-12-22 ASSESSMENT — ENCOUNTER SYMPTOMS
ABDOMINAL PAIN: 0
CONSTIPATION: 0
DIARRHEA: 0
BACK PAIN: 0
WEAKNESS: 1
HEADACHES: 0
SHORTNESS OF BREATH: 0
FEVER: 0
DIZZINESS: 0
VOMITING: 0
CHILLS: 0
PALPITATIONS: 0
NAUSEA: 0
COUGH: 0

## 2023-12-22 ASSESSMENT — GAIT ASSESSMENTS
DEVIATION: DECREASED BASE OF SUPPORT
GAIT LEVEL OF ASSIST: SUPERVISED
DISTANCE (FEET): 150
ASSISTIVE DEVICE: FRONT WHEEL WALKER

## 2023-12-22 NOTE — THERAPY
Speech Language Therapy Contact Note    Patient Name: Bart Ramsey  Age:  73 y.o., Sex:  male  Medical Record #: [id number]  Today's Date: 12/22/2023 12/22/23 0914   Treatment Variance   Reason For Missed Therapy Medical - Patient not Able To Participate   Interdisciplinary Plan of Care Collaboration   IDT Collaboration with  Nursing   Collaboration Comments This SLP attempted to see patient for dysphagia tx session. Per EMR, patient is NPO pending GI procedure today. SLP will hold tx and re-attempt as able/appropriate. Of note, patient may resume soft solids (SB6/TN0) w/ thin liquids, minced/moist solids w/ thins liquids (MM5/TN0) or preferred diet for comfort/pleasure from SLP standpoint after GI procedure, if in accordance with GI recs. SLP will follow as able/appropriate, but patient is at low risk for aspiration at this time.

## 2023-12-22 NOTE — PROGRESS NOTES
Telemetry Shift Summary    Rhythm SR  HR Range 69-94  Ectopy r-fPVC, rPAC  Measurements 0.14/0.08/0.40      Normal Values  Rhythm SR  HR Range:   Measurements: 0.12-0.20/0.06-0.10/0.30-0.52

## 2023-12-22 NOTE — DIETARY
Nutrition Update:    Day 5 of admit.  Bart Ramsey is a 73 y.o. male with admitting DX of Neutropenic fever (HCC) [D70.9, R50.81].  Patient being followed to optimize nutrition.    Current Diet: NPO for EGD w/ possible biopsies and dilatation of esophagus today.     Prior to NPO, diet order was for Level 5- minced and moist, consistent CHO (diabetic) w/ Level 0 - thin liquids. Pt was also receiving Boost Glucose Control TID w/ meals. Recorded PO intake was variable at <25% to % w/ avg of 45%. Intake was not at goal.     Supplement order in Epic states that pt prefers chocolate Ensure first and Boost second. Pt is currently receiving Boost Glucose Control due to dx of DM.  A1c on 12/17 was 5.9 and POC glucose has been < 140. Since glucose has been well controlled and pt's PO intake is < goal, supplement changed to chocolate Ensure Plus TID.     Problem: Nutritional:  Goal: Achieve adequate nutritional intake  Description: Patient will consume >75% of meals and supplements.   Outcome: progressing slower than expected.    RD following.

## 2023-12-22 NOTE — PROGRESS NOTES
GI ATTENDING:    Patient seen and examined.  Chart reviewed.  Therapeutic EGD explained at length.    Patient expresses understanding and was given opportunity to ask questions.  He request to proceed.    Consenting person was given an opportunity to ask questions and discuss other options.  Risks including but not limited to perforation, infection, bleeding, missed lesion(s), cardiac and/or pulmonary event, aspiration, stroke, possible need for surgery and/or interventional radiology, hospitalization possibly prolonged, discomfort, unsuccessful and/or incomplete procedure, indefinite diagnosis, ineffective therapy and/or persistent symptoms, possible need for repeat procedures and/or additional testings, damage to adjacent organs and/or vascular structures, medication reaction, disability, death, and other adverse events possibly life-threatening.  Discussion was undertaken with Layman's terms.  Consenting person stated understanding and acceptance of these risks, and wished to proceed.  Informed consent was given in clear state of mind.

## 2023-12-22 NOTE — THERAPY
Physical Therapy   Daily Treatment     Patient Name: Bart Ramsey  Age:  73 y.o., Sex:  male  Medical Record #: [id number]  Today's Date: 12/22/2023     Precautions  Precautions: (P) Fall Risk;Swallow Precautions  Comments: (P) poor activity tolerance    Assessment    Pt is progressing well and able to demonstrate with improved activity tolerance, ambulation distance, balance, and overall functional mobility. Pt was able to transfer to toilet and perform gardenia care with no assist. Pt was able to don/doff disposable briefs. Pt able to ambulate in hallway with FWW use and SPV assist. No physical assist required during ambulation. Pt was primarily limited by fatigue and reports of intermittent dizziness. Anticipate pt to perform well with stairs, no stair training required at this time. Pt is in no acute skilled PT needs at this time as pt has met all goals in acute care. Anticipate pt to d/c home once medically clear and recommend  therapy services for home safety evaluation and progression of functional activity tolerance.     Plan    Treatment Plan Status: (P) Modify Current Treatment Plan  Type of Treatment: Bed Mobility, Equipment, Gait Training, Manual Therapy, Neuro Re-Education / Balance, Self Care / Home Evaluation, Stair Training, Therapeutic Activities, Therapeutic Exercise  Treatment Frequency: 4 Times per Week  Treatment Duration: Until Therapy Goals Met    DC Equipment Recommendations: (P) Front-Wheel Walker  Discharge Recommendations: (P) Recommend home health for continued physical therapy services    Objective     12/22/23 1509   Precautions   Precautions Fall Risk;Swallow Precautions   Comments poor activity tolerance   Pain 0 - 10 Group   Therapist Pain Assessment Nurse Notified;0   Cognition    Cognition / Consciousness WDL   Level of Consciousness Alert   Comments pleasant/cooperative   Passive ROM Lower Body   Passive ROM Lower Body WDL   Active ROM Lower Body    Active ROM Lower Body   WDL   Strength Lower Body   Lower Body Strength  WDL   Sensation Lower Body   Lower Extremity Sensation   WDL   Lower Body Muscle Tone   Lower Body Muscle Tone  WDL   Neurological Concerns   Neurological Concerns No   Balance   Sitting Balance (Static) Good   Sitting Balance (Dynamic) Good   Standing Balance (Static) Good   Standing Balance (Dynamic) Fair +   Weight Shift Sitting Good   Weight Shift Standing Fair   Skilled Intervention Verbal Cuing;Facilitation   Comments w/fww use   Bed Mobility    Supine to Sit Supervised   Sit to Supine Supervised   Scooting Supervised   Skilled Intervention Verbal Cuing   Gait Analysis   Gait Level Of Assist Supervised   Assistive Device Front Wheel Walker   Distance (Feet) 150   # of Times Distance was Traveled 1   Deviation Decreased Base Of Support   Weight Bearing Status fwb   Skilled Intervention Verbal Cuing;Postural Facilitation   Functional Mobility   Sit to Stand Supervised   Bed, Chair, Wheelchair Transfer Supervised   Toilet Transfers Supervised   Transfer Method Stand Step   Mobility EOB, sit<>stand, to toilet, ambulation, back to bed   Skilled Intervention Verbal Cuing   How much difficulty does the patient currently have...   Turning over in bed (including adjusting bedclothes, sheets and blankets)? 4   Sitting down on and standing up from a chair with arms (e.g., wheelchair, bedside commode, etc.) 4   Moving from lying on back to sitting on the side of the bed? 4   How much help from another person does the patient currently need...   Moving to and from a bed to a chair (including a wheelchair)? 4   Need to walk in a hospital room? 4   Climbing 3-5 steps with a railing? 3   6 clicks Mobility Score 23   Activity Tolerance   Sitting in Chair NT   Sitting Edge of Bed 5 mins   Standing 10 mins   Comments limited by fatigue   Patient / Family Goals    Patient / Family Goal #1 to go home   Short Term Goals    Short Term Goal # 1 pt will go supine<>sit w/hob flat and  rails down w/spv in 6tx   Goal Outcome # 1 Goal met   Short Term Goal # 2 pt will go sit<>stand w/fww w/spv in 6tx   Goal Outcome # 2 Goal met   Short Term Goal # 3 pt will transfer bed<>chair w/fww w/spv in 6tx   Goal Outcome # 3 Goal met   Short Term Goal # 4 pt will ambulate for 150ft w/fww w/spv in 6tx   Goal Outcome # 4 Goal met   Short Term Goal # 5 pt will go up/down 1 step w/fww w/spv in 6tx   Goal Outcome # 5 Progressing as expected   Education Group   Role of Physical Therapist Patient Response Patient;Acceptance;Explanation;Demonstration;Verbal Demonstration;Action Demonstration   Physical Therapy Treatment Plan   Physical Therapy Treatment Plan Modify Current Treatment Plan   Reason For Discharge Discharge Secondary to Goals Met   Anticipated Discharge Equipment and Recommendations   DC Equipment Recommendations Front-Wheel Walker   Discharge Recommendations Recommend home health for continued physical therapy services   Interdisciplinary Plan of Care Collaboration   IDT Collaboration with  Nursing   Patient Position at End of Therapy In Bed;Call Light within Reach;Tray Table within Reach;Phone within Reach   Collaboration Comments aware of visit and recs   Session Information   Date / Session Number  12/22 d/c from therapy   Priority 0

## 2023-12-22 NOTE — OR NURSING
1102- Pt brought back to pre- op holding. Assumed care.     1130- Patient allergies and NPO status verified, home medication reconciliation completed and belongings secures. Patient verbalizes understanding of pain scale, expected course of stay and plan of care. Surgical procedure verified with patient. IV patency established. SCD'S in place.

## 2023-12-22 NOTE — ANESTHESIA TIME REPORT
Anesthesia Start and Stop Event Times       Date Time Event    12/22/2023 1128 Ready for Procedure     1134 Anesthesia Start     1153 Anesthesia Stop          Responsible Staff  12/22/23      Name Role Begin End    Carrie Cates M.D. Anesth 1134 1153          Overtime Reason:  no overtime (within assigned shift)    Comments:

## 2023-12-22 NOTE — ANESTHESIA POSTPROCEDURE EVALUATION
Patient: Bart Ramsey    Procedure Summary       Date: 12/22/23 Room / Location:  ENDOSCOPIC ULTRASOUND ROOM / SURGERY [facility]    Anesthesia Start: 1134 Anesthesia Stop: 1153    Procedure: GASTROSCOPY WITH DILATION (Esophagus) Diagnosis: (Odynophagia and esophageal dysphagia)    Surgeons: Dano Oviedo M.D. Responsible Provider: Carrie Cates M.D.    Anesthesia Type: MAC ASA Status: 3            Final Anesthesia Type: MAC  Last vitals  BP   Blood Pressure : (!) 150/81    Temp   36 °C (96.8 °F)    Pulse   72   Resp   15    SpO2   95 %      Anesthesia Post Evaluation    Patient location during evaluation: PACU  Patient participation: complete - patient participated  Level of consciousness: awake and alert    Airway patency: patent  Anesthetic complications: no  Cardiovascular status: hemodynamically stable  Respiratory status: acceptable  Hydration status: euvolemic    PONV: none          There were no known notable events for this encounter.     Nurse Pain Score: 0 (NPRS)

## 2023-12-22 NOTE — PROGRESS NOTES
12-hour chart check complete.    Monitor Summary  Rhythm: SR  Rate: 60's-90's  Ectopy: Rare.occ PVC's/PAC's  Measurements: .16/.08/.40  Strip measured by MT

## 2023-12-22 NOTE — OR NURSING
1151: Patient arrived from [facility] via gurney. Laying on L side, arousable to voice. Denies any discomfort at this time . Pt on neutropenic precautions.  Plan to keep pt in PACU for full hour per [facility] protocol.    1200: 3L NC applied due to O2 sat 86% on RA.  Dr. Oviedo at bedside.     1215: Pt resting with eyes open, denies any discomfort.     1230: No change. Pt's spouse updated of pt status.     1245: Report to JOSE Coley. Pt resting with eyes closed.     1151: Pt meets criteria for transfer to unit. Awaiting transport.     1320: Pt picked up via transport and going to unit.

## 2023-12-22 NOTE — ANESTHESIA PREPROCEDURE EVALUATION
Case: [id number] Date/Time: 12/22/23 1115    Procedure: GASTROSCOPY WITH DILATION    Location:  ENDOSCOPIC ULTRASOUND ROOM / SURGERY [facility]    Surgeons: Dano Oviedo M.D.            Relevant Problems   PULMONARY   (positive) COPD without exacerbation (HCC)   (positive) Community acquired pneumonia of left lower lobe of lung   (positive) Pneumonia of left lower lobe due to infectious organism      CARDIAC   (positive) Coronary artery disease involving native coronary artery of native heart without angina pectoris   (positive) Mesenteric artery stenosis (HCC)         (positive) Renal insufficiency      ENDO   (positive) Type 2 diabetes mellitus with diabetic polyneuropathy, without long-term current use of insulin (HCC)      Other   (positive) Anemia associated with chemotherapy   (positive) Antineoplastic chemotherapy induced pancytopenia (CODE) (HCC)   (positive) Neutropenic fever (HCC)   (positive) Pancytopenia (HCC)   (positive) SCLC (small cell lung carcinoma) (HCC)       Physical Exam    Airway   Mallampati: II  TM distance: >3 FB  Neck ROM: full       Cardiovascular - normal exam  Rhythm: regular  Rate: normal  (-) murmur     Dental - normal exam           Pulmonary - normal exam  Breath sounds clear to auscultation     Abdominal    Neurological - normal exam                   Anesthesia Plan    ASA 3 (Lung cancer on chemo with neutropenia)       Plan - MAC               Induction: intravenous    Postoperative Plan: Postoperative administration of opioids is intended.    Pertinent diagnostic labs and testing reviewed    Informed Consent:    Anesthetic plan and risks discussed with patient.    Use of blood products discussed with: patient whom consented to blood products.

## 2023-12-22 NOTE — OR SURGEON
EGD operative  Note    PreOp Diagnosis: Odynophagia and esophageal dysphagia      PostOp Diagnosis: Same      Procedure(s):  GASTROSCOPY WITH DILATION - Wound Class: None    Surgeon(s):  Dano Oviedo M.D.    Anesthesiologist/Type of Anesthesia:  Anesthesiologist: Carrie Cates M.D./General    Surgical Staff:  Circulator: Sandra Amin R.N.  Endoscopy Technician: Abby Mckeon    Specimens removed if any:  ID Type Source Tests Collected by Time Destination   A : esophagitis at 28 rule out CMV, HSV, Candida Tissue Esophagus PATHOLOGY SPECIMEN Dano Oviedo M.D. 12/22/2023 11:45 AM        Dr. Oviedo  [facility]  [facility]  [phone]    EGD with:  Biopsy     51 Spanish Savary dilation of esophagus    Indication:  Esophageal dysphagia and odynophagia    Sedation:  MAC     Findings:    Esophagus     Erosive and exudative esophagitis at approximately 28 cm from incisors biopsied     Proximal and distal esophageal mucosa unremarkable    Stomach     Hiatal hernia 37 to 45 cm     Unremarkable gastric mucosa    Duodenum     Unremarkable duodenal mucosa    Plan:   Continue sucralfate     Continue PPI therapy     Follow-up pathology    Procedure detail:    Prior to procedure, informed consent was obtained.  Risks, benefits, alternatives, including but not limited to risk of bleeding, infection, perforation, adverse reaction to sedating medicine, failure to identify pathology and death were explained to the patient who accepted all risks.    Patient was prepped in the left lateral position after intubation and sedation was provided by anesthesia.    Scope tip of the Olympus flexible gastroscope was passed in the proximal esophagus.  Proximal, middle and distal thirds of the esophagus were well-visualized.  At the junction between the proximal one third and distal two thirds at approximately 28 cm from the incisors there was circumferential exudative inflammation and erosions.  This was  thoroughly biopsied.  This caused mild luminal narrowing but no obstruction.  The esophageal mucosal distal to this up until 37 cm was unremarkable.  The stomach was entered.  There was a hiatal hernia from 37 to 45 cm.  The gastric pool was suctioned.  Air was insufflated.  The scope was retroflexed view the body fundus and cardia showing the hiatal hernia otherwise unremarkable mucosa.  The scope was straightened to view the antrum and incisura which was unremarkable.  The pylorus was patent, the duodenal bulb sweep second and third portion were unremarkable.  The scope was withdrawn back into the antrum and a Savary guidewire was passed down the scope channel into the antrum.  The scope was removed off of this and a 51 Arabic Savary dilator was lubricated passed over the wire and down the esophagus without resistance.  This was removed along with the wire and the scope was reintroduced.  The esophagus was closely examined and no significant mucosal breaks were seen.  The procedure was deemed complete at this time.  Air and liquid were suctioned.  The scope was withdrawn.  Patient tolerated the procedure well and was sent to recovery without immediate complications.        12/22/2023 11:47 AM Dano Oviedo M.D.

## 2023-12-23 VITALS
TEMPERATURE: 98 F | HEIGHT: 69 IN | OXYGEN SATURATION: 93 % | RESPIRATION RATE: 18 BRPM | BODY MASS INDEX: 25.89 KG/M2 | SYSTOLIC BLOOD PRESSURE: 140 MMHG | WEIGHT: 174.82 LBS | DIASTOLIC BLOOD PRESSURE: 83 MMHG | HEART RATE: 88 BPM

## 2023-12-23 LAB
ALBUMIN SERPL BCP-MCNC: 2.9 G/DL (ref 3.2–4.9)
BASOPHILS # BLD AUTO: 0.2 % (ref 0–1.8)
BASOPHILS # BLD: 0.01 K/UL (ref 0–0.12)
BUN SERPL-MCNC: 5 MG/DL (ref 8–22)
CALCIUM ALBUM COR SERPL-MCNC: 8.8 MG/DL (ref 8.5–10.5)
CALCIUM SERPL-MCNC: 7.9 MG/DL (ref 8.4–10.2)
CHLORIDE SERPL-SCNC: 97 MMOL/L (ref 96–112)
CO2 SERPL-SCNC: 24 MMOL/L (ref 20–33)
CREAT SERPL-MCNC: 0.84 MG/DL (ref 0.5–1.4)
EOSINOPHIL # BLD AUTO: 0.02 K/UL (ref 0–0.51)
EOSINOPHIL NFR BLD: 0.4 % (ref 0–6.9)
ERYTHROCYTE [DISTWIDTH] IN BLOOD BY AUTOMATED COUNT: 49.7 FL (ref 35.9–50)
GFR SERPLBLD CREATININE-BSD FMLA CKD-EPI: 92 ML/MIN/1.73 M 2
GLUCOSE BLD STRIP.AUTO-MCNC: 104 MG/DL (ref 65–99)
GLUCOSE BLD STRIP.AUTO-MCNC: 122 MG/DL (ref 65–99)
GLUCOSE SERPL-MCNC: 104 MG/DL (ref 65–99)
HCT VFR BLD AUTO: 27.1 % (ref 42–52)
HGB BLD-MCNC: 9.5 G/DL (ref 14–18)
IMM GRANULOCYTES # BLD AUTO: 0.16 K/UL (ref 0–0.11)
IMM GRANULOCYTES NFR BLD AUTO: 3.3 % (ref 0–0.9)
LYMPHOCYTES # BLD AUTO: 0.61 K/UL (ref 1–4.8)
LYMPHOCYTES NFR BLD: 12.6 % (ref 22–41)
MAGNESIUM SERPL-MCNC: 1.5 MG/DL (ref 1.5–2.5)
MCH RBC QN AUTO: 31.6 PG (ref 27–33)
MCHC RBC AUTO-ENTMCNC: 35.1 G/DL (ref 32.3–36.5)
MCV RBC AUTO: 90 FL (ref 81.4–97.8)
MONOCYTES # BLD AUTO: 1 K/UL (ref 0–0.85)
MONOCYTES NFR BLD AUTO: 20.7 % (ref 0–13.4)
NEUTROPHILS # BLD AUTO: 3.04 K/UL (ref 1.82–7.42)
NEUTROPHILS NFR BLD: 62.8 % (ref 44–72)
NRBC # BLD AUTO: 0 K/UL
NRBC BLD-RTO: 0 /100 WBC (ref 0–0.2)
PHOSPHATE SERPL-MCNC: 1.9 MG/DL (ref 2.5–4.5)
PLATELET # BLD AUTO: 119 K/UL (ref 164–446)
PMV BLD AUTO: 9.7 FL (ref 9–12.9)
POTASSIUM SERPL-SCNC: 3.5 MMOL/L (ref 3.6–5.5)
RBC # BLD AUTO: 3.01 M/UL (ref 4.7–6.1)
SODIUM SERPL-SCNC: 133 MMOL/L (ref 135–145)
WBC # BLD AUTO: 4.8 K/UL (ref 4.8–10.8)

## 2023-12-23 PROCEDURE — 94760 N-INVAS EAR/PLS OXIMETRY 1: CPT

## 2023-12-23 PROCEDURE — 85025 COMPLETE CBC W/AUTO DIFF WBC: CPT

## 2023-12-23 PROCEDURE — A9270 NON-COVERED ITEM OR SERVICE: HCPCS | Performed by: HOSPITALIST

## 2023-12-23 PROCEDURE — A9270 NON-COVERED ITEM OR SERVICE: HCPCS | Performed by: INTERNAL MEDICINE

## 2023-12-23 PROCEDURE — 83735 ASSAY OF MAGNESIUM: CPT

## 2023-12-23 PROCEDURE — 99239 HOSP IP/OBS DSCHRG MGMT >30: CPT | Performed by: INTERNAL MEDICINE

## 2023-12-23 PROCEDURE — 700102 HCHG RX REV CODE 250 W/ 637 OVERRIDE(OP): Performed by: INTERNAL MEDICINE

## 2023-12-23 PROCEDURE — 700102 HCHG RX REV CODE 250 W/ 637 OVERRIDE(OP): Performed by: HOSPITALIST

## 2023-12-23 PROCEDURE — 80069 RENAL FUNCTION PANEL: CPT

## 2023-12-23 PROCEDURE — 82962 GLUCOSE BLOOD TEST: CPT

## 2023-12-23 PROCEDURE — 36415 COLL VENOUS BLD VENIPUNCTURE: CPT

## 2023-12-23 PROCEDURE — 700105 HCHG RX REV CODE 258: Performed by: HOSPITALIST

## 2023-12-23 RX ORDER — LANOLIN ALCOHOL/MO/W.PET/CERES
100 CREAM (GRAM) TOPICAL DAILY
Qty: 30 TABLET | Refills: 1 | Status: SHIPPED | OUTPATIENT
Start: 2023-12-24 | End: 2024-01-23

## 2023-12-23 RX ORDER — MAGNESIUM GLYCINATE 100 MG
1 CAPSULE ORAL
COMMUNITY
Start: 2023-12-23 | End: 2024-02-16

## 2023-12-23 RX ORDER — MAGNESIUM SULFATE HEPTAHYDRATE 40 MG/ML
4 INJECTION, SOLUTION INTRAVENOUS ONCE
Status: DISCONTINUED | OUTPATIENT
Start: 2023-12-23 | End: 2023-12-23 | Stop reason: HOSPADM

## 2023-12-23 RX ORDER — ALBUTEROL SULFATE 90 UG/1
2 AEROSOL, METERED RESPIRATORY (INHALATION) EVERY 4 HOURS PRN
Qty: 8.5 G | Refills: 1 | Status: SHIPPED | OUTPATIENT
Start: 2023-12-23 | End: 2024-01-22

## 2023-12-23 RX ORDER — OMEPRAZOLE 40 MG/1
40 CAPSULE, DELAYED RELEASE ORAL 2 TIMES DAILY
Qty: 60 CAPSULE | Refills: 1 | Status: SHIPPED | OUTPATIENT
Start: 2023-12-23 | End: 2024-01-22

## 2023-12-23 RX ORDER — SUCRALFATE ORAL 1 G/10ML
1 SUSPENSION ORAL
Qty: 560 ML | Refills: 0 | Status: SHIPPED | OUTPATIENT
Start: 2023-12-23 | End: 2024-01-06

## 2023-12-23 RX ADMIN — Medication 1000 UNITS: at 05:54

## 2023-12-23 RX ADMIN — MULTIPLE VITAMINS W/ MINERALS TAB 1 TABLET: TAB at 05:54

## 2023-12-23 RX ADMIN — Medication 400 MG: at 06:14

## 2023-12-23 RX ADMIN — SUCRALFATE 1 G: 1 SUSPENSION ORAL at 06:14

## 2023-12-23 RX ADMIN — DIBASIC SODIUM PHOSPHATE, MONOBASIC POTASSIUM PHOSPHATE AND MONOBASIC SODIUM PHOSPHATE 250 MG: 852; 155; 130 TABLET ORAL at 08:52

## 2023-12-23 RX ADMIN — GABAPENTIN 600 MG: 300 CAPSULE ORAL at 05:54

## 2023-12-23 RX ADMIN — OMEPRAZOLE 40 MG: 20 CAPSULE, DELAYED RELEASE ORAL at 05:54

## 2023-12-23 RX ADMIN — SUCRALFATE 1 G: 1 SUSPENSION ORAL at 11:41

## 2023-12-23 RX ADMIN — LIDOCAINE HYDROCHLORIDE 5 ML: 20 SOLUTION ORAL; TOPICAL at 05:55

## 2023-12-23 RX ADMIN — GUAIFENESIN 600 MG: 600 TABLET, EXTENDED RELEASE ORAL at 05:54

## 2023-12-23 RX ADMIN — LIDOCAINE HYDROCHLORIDE 5 ML: 20 SOLUTION ORAL; TOPICAL at 11:41

## 2023-12-23 RX ADMIN — SODIUM CHLORIDE: 9 INJECTION, SOLUTION INTRAVENOUS at 00:16

## 2023-12-23 RX ADMIN — THIAMINE HCL TAB 100 MG 100 MG: 100 TAB at 05:54

## 2023-12-23 RX ADMIN — MOMETASONE FUROATE AND FORMOTEROL FUMARATE DIHYDRATE 2 PUFF: 200; 5 AEROSOL RESPIRATORY (INHALATION) at 05:58

## 2023-12-23 ASSESSMENT — ENCOUNTER SYMPTOMS
MYALGIAS: 0
NECK PAIN: 0
COUGH: 0
ROS GI COMMENTS: ESOPHAGEAL DYSPHAGIA
DIZZINESS: 0
INSOMNIA: 0
CHILLS: 0
EYES NEGATIVE: 1
HEMOPTYSIS: 0
DEPRESSION: 1
WEAKNESS: 1

## 2023-12-23 ASSESSMENT — PAIN DESCRIPTION - PAIN TYPE: TYPE: ACUTE PAIN

## 2023-12-23 ASSESSMENT — FIBROSIS 4 INDEX: FIB4 SCORE: 3.1

## 2023-12-23 NOTE — PROGRESS NOTES
4 Eyes Skin Assessment Completed by JOSE Walls and JOSE Hoang.    Head WDL  Ears WDL  Nose WDL  Mouth WDL  Neck WDL  Breast/Chest WDL  Shoulder Blades WDL  Spine WDL  (R) Arm/Elbow/Hand Bruising and Scab  (L) Arm/Elbow/Hand Bruising and Scab  Abdomen WDL  Groin WDL  Scrotum/Coccyx/Buttocks Redness, Blanching, and Discoloration and excoriation  (R) Leg Bruising and Edema  (L) Leg Bruising and Edema  (R) Heel/Foot/Toe Redness, Blanching, and Boggy  (L) Heel/Foot/Toe Redness, Blanching, and Boggy          Devices In Places Blood Pressure Cuff and Pulse Ox      Interventions In Place Sacral Mepilex and Pillows,barrier cream, heels mepilex, waffle overlay    Possible Skin Injury Yes    Pictures Uploaded Into Epic Yes  Wound Consult Placed Yes  RN Wound Prevention Protocol Ordered Yes

## 2023-12-23 NOTE — PROGRESS NOTES
Hospital Medicine Daily Progress Note    Date of Service  12/22/2023    Chief Complaint  Bart Ramsey is a 73 y.o. male admitted 12/17/2023 with   Chief Complaint   Patient presents with    Weakness    Shortness of Breath    Loss of Appetite     Hospital Course  No notes on file    Interval Problem Update  12/20:  Patient stated he has had problems with his swallowing for a while now, was [facility] which they found Salmon's esophagus.  - I spoke with speech therapy, there is no concern of oropharyngeal dysphagia but distal esophagus was concerning.  - I consulted and spoke with gastroenterology consultants about potential EGD or other means to evaluate patient's esophageal dysphagia.  We will proceed with a Gastrografin esophagram tomorrow.  Tentative EGD on Friday.  - I reviewed labs, Patient had transfusion, hemoglobin 8.4 afterwards.  ANC is 1.2.  Sodium 133, potassium 3.3 replacing orally.  Patient's phosphorus 1.4 replacing orally, and magnesium 1.3, replacing via IV.    12/21:  [facility] consulted, pending EGD for esophageal dysphagia potentially Friday, pending esophagram today.  I reviewed labs hemoglobin 8.0, platelet 73, sodium 134, potassium 3.3, Phos 1.5, mag 1.4 replacing via IV.  Continue IV fluids.  ANC 1.63, WBC 3.0. Continue IV zosyn, day 5/5. Patient has been afebrile.    12/22:  Patient is status post EGD  I discussed the results which showed patient had erosive and exudative esophagitis at the end of the proximal third of the esophagus, biopsies were taken.  Is likely due to radiation esophagitis, worsened by pill esophagitis and acid reflux.  Patient will be on twice daily PPI and Carafate 4 times daily.  I have started a full liquid diet.  I suggested for patient he will likely be on liquid diet at home for the next 2 to 3 weeks hopefully to allow healing of his esophagus.    I have discussed this patient's plan of care and discharge plan at IDT rounds today  with Case Management, Nursing, Nursing leadership, and other members of the IDT team.    Consultants/Specialty  GI    Code Status  Full Code    Disposition  The patient is not medically cleared for discharge to home or a post-acute facility.  Anticipate discharge to: home with close outpatient follow-up    I have placed the appropriate orders for post-discharge needs.    Review of Systems  Review of Systems   Constitutional:  Positive for malaise/fatigue. Negative for chills and fever.   Respiratory:  Negative for cough and shortness of breath.    Cardiovascular:  Negative for chest pain and palpitations.   Gastrointestinal:  Negative for abdominal pain, constipation, diarrhea, nausea and vomiting.        Difficulty swallowing, food becoming stuck in the distal esophagus   Musculoskeletal:  Negative for back pain and joint pain.   Neurological:  Positive for weakness. Negative for dizziness and headaches.   All other systems reviewed and are negative.       Physical Exam  Temp:  [36 °C (96.8 °F)-36.9 °C (98.5 °F)] 36.6 °C (97.8 °F)  Pulse:  [64-85] 74  Resp:  [15-18] 17  BP: (114-161)/(71-88) 141/76  SpO2:  [89 %-96 %] 94 %    Physical Exam  Vitals and nursing note reviewed.   Constitutional:       General: He is not in acute distress.     Appearance: He is not diaphoretic.      Comments: Frail, thin appearing elderly male   HENT:      Head: Normocephalic and atraumatic.      Comments: Temporal muscle wasting noted     Nose: Nose normal. No congestion.      Mouth/Throat:      Mouth: Mucous membranes are dry.      Pharynx: No oropharyngeal exudate.   Eyes:      General: No scleral icterus.     Extraocular Movements: Extraocular movements intact.   Cardiovascular:      Rate and Rhythm: Normal rate and regular rhythm.      Pulses: Normal pulses.      Heart sounds: Normal heart sounds. No murmur heard.  Pulmonary:      Effort: Pulmonary effort is normal. No respiratory distress.      Breath sounds: Normal breath sounds.  No wheezing or rales.   Abdominal:      General: Abdomen is flat. Bowel sounds are normal. There is no distension.      Palpations: Abdomen is soft.      Tenderness: There is no abdominal tenderness.   Musculoskeletal:         General: No swelling or tenderness. Normal range of motion.      Cervical back: Normal range of motion and neck supple.      Comments: Sarcopenic. B/L dorsal hands visible muscle atrophy.   Skin:     General: Skin is warm.      Capillary Refill: Capillary refill takes less than 2 seconds.      Coloration: Skin is not jaundiced or pale.   Neurological:      General: No focal deficit present.      Mental Status: He is alert and oriented to person, place, and time. Mental status is at baseline.      Motor: No weakness.   Psychiatric:         Mood and Affect: Mood normal.         Behavior: Behavior normal.         Thought Content: Thought content normal.         Judgment: Judgment normal.         Fluids    Intake/Output Summary (Last 24 hours) at 12/22/2023 1829  Last data filed at 12/22/2023 1600  Gross per 24 hour   Intake 1844.47 ml   Output 2775 ml   Net -930.53 ml       Laboratory  Recent Labs     12/20/23  0034 12/20/23  0936 12/21/23  0335 12/22/23  0506   WBC 2.3*  --  3.0* 4.8   RBC 2.15*  --  2.53* 2.90*   HEMOGLOBIN 6.9* 8.4* 8.0* 9.2*   HEMATOCRIT 19.5* 25.4* 22.8* 26.4*   MCV 90.7  --  90.1 91.0   MCH 32.1  --  31.6 31.7   MCHC 35.4  --  35.1 34.8   RDW 49.4  --  49.7 50.7*   PLATELETCT 50*  --  73* 100*   MPV 10.8  --  10.8 9.8     Recent Labs     12/20/23  0034 12/21/23  0335 12/22/23  0506   SODIUM 133* 134* 133*   POTASSIUM 3.3* 3.3* 3.6   CHLORIDE 100 101 99   CO2 23 22 22   GLUCOSE 105* 95 97   BUN 12 8 5*   CREATININE 1.11 0.97 0.85   CALCIUM 7.0* 7.5* 7.7*     Recent Labs     12/22/23  0506   APTT 27.5   INR 1.12               Imaging  DX-ESOPHAGUS BARIUM SWALLOW SINGLE CONTRAST   Final Result      Limited water-soluble exam performed. No evidence of high-grade stricture or  esophageal leak.      DX-ESOPHAGUS - [id number]   Final Result      DX-CHEST-PORTABLE (1 VIEW)   Final Result      No evidence of acute cardiopulmonary process.           Assessment/Plan  * Neutropenic fever (HCC)- (present on admission)  Assessment & Plan  Patient with chemo induced neutropenia now febrile  CXR without sig abnormality  CTA ruled out PE, stable lung lesion, no obvious signs of PNA  Incentive spirometer  COVID/RSV/flu neg  BCX NGTD, UCX NG    ANC 1.63  Completed IV Zosyn 5 days.  Neutropenic fever is a result from patient's exudative esophagitis.    Severe protein-calorie malnutrition (HCC)- (present on admission)  Assessment & Plan  Patient has had weeks of decreased eating, having a oral pharyngeal versus esophageal dysphagia  Dietitian agreed patient does meet Aspen criteria for severe malnutrition  Continue boost supplements  I counseled patient on a full liquid diet at home.  I explained to him he will need to do multiple small liquid meals, in between protein shake or or boost supplements possibly 2 or 3 times a day for the next 2 to 3 weeks.  Patient's difficulty eating was confirmed to be erosive, exudative esophagitis.    Radiation-induced esophagitis- (present on admission)  Assessment & Plan  Patient has severe radiation-induced esophagitis and has not been able to eat and has not been getting nutrition this is most likely why he is developed fevers.  NGT unsuccessful I do not think it's indicated  Add Cepachol spray, viscous lidocaine      I discussed the results which showed patient had erosive and exudative esophagitis at the end of the proximal third of the esophagus, biopsies were taken.  Is likely due to radiation esophagitis, worsened by pill esophagitis and acid reflux.  Patient will be on twice daily PPI and Carafate 4 times daily.  I have started a full liquid diet.  I suggested for patient he will likely be on liquid diet at home for the next 2 to 3 weeks hopefully to allow  healing of his esophagus.    Hyponatremia- (present on admission)  Assessment & Plan  Sodium 133, continue IV fluids    Hypophosphatemia- (present on admission)  Assessment & Plan  Phos 1.8  Replacing, monitoring closely for refeeding syndrome    Hypomagnesemia- (present on admission)  Assessment & Plan  Magnesium level remains low at 1.4 despite IV replacement  Continuing IV magnesium replacement, monitoring closely for hypotension  Recheck labs in the morning    Hypokalemia- (present on admission)  Assessment & Plan  Potassium 3.6    Thrombocytopenia (HCC)- (present on admission)  Assessment & Plan  Plt 73    Anemia associated with chemotherapy- (present on admission)  Assessment & Plan  Monitor H&H if [place] 7 or 21 transfuse  S/p 2 units PRBC  Hgb 8.0, from 8.4 after transfusion  Iron deficiency anemia, TIBC is low, patient will not benefit from IV iron.  Oral iron may complicate his erosive esophagitis.    COPD without exacerbation ([facility])- (present on admission)  Assessment & Plan  History of COPD currently not in acute exacerbation  Continue albuterol and Dulera  Guaifenesin for mucolysis    Type 2 diabetes mellitus with diabetic polyneuropathy, without long-term current use of insulin ([facility])- (present on admission)  Assessment & Plan  Accu-Cheks with sign scale coverage  Most recent hemoglobin A1c is 6.3      Antineoplastic chemotherapy induced pancytopenia (CODE) ([facility])- (present on admission)  Assessment & Plan  Sig drop in platelets, neutropenia and anemia  Transfuse 1 unit pRBC 12/18 for hb 6.8  No obvious signs of bleed  Monitor CBC    SCLC (small cell lung carcinoma) ([facility])- (present on admission)  Assessment & Plan  Ongoing small cell lung cancer treatment with Dr. Yamilex Fuller of [facility] oncology  CTPA upon admission with stable dz    Coronary artery disease involving native coronary artery of native heart without angina pectoris- (present on admission)  Assessment & Plan  History of coronary artery  disease  Currently chest pain-free  Monitor troponin levels and optimize medication management         VTE prophylaxis:   SCDs/TEDs      I have performed a physical exam and reviewed and updated ROS and Plan today (12/22/2023). In review of yesterday's note (12/21/2023), there are no changes except as documented above.      Total time spent 53 minutes. I spent greater than 50% of the time for patient care, counseling, and coordination on this date, including unit/floor time, and face-to-face time with the patient as per interval events, my own review of patient's imaging and lab analysis and developing my assessment and plan above.

## 2023-12-23 NOTE — PROGRESS NOTES
Pt arrived to floor from MED/TELE. Assumed care of patient. Discussed daily plan of care, oriented patient to floor. On RA, no SOB noted. Pt resting comfortably, wakes easily to voice. Pt denies complaints or concerns at this time. Call light and belongings within reach. Hourly rounding in place.

## 2023-12-23 NOTE — PROGRESS NOTES
Gastroenterology Progress Note               Author:  Dano Oviedo M.D. with MIKAL Mckenna Date & Time Created: 12/23/2023 10:09 AM       Patient ID:  Name:             Bart Ramsey  YOB: 1950  Age:                 73 y.o.  male  MRN:               [id number]      Referring Provider:  Angel Cortes MD      GI Chief Complaint:  Dysphagia      History of Present Illness:    He is a 73-year-old male patient seen in consultation for dysphagia.  He has a past medical history that includes coronary artery disease, type 2 diabetes mellitus, COPD, and small cell lung carcinoma currently undergoing treatment with oncology.  The patient states that in the last few months he has had both chemo and radiation for his left lower lobe lung cancer.  Since starting chemoradiation he reports progressive esophageal dysphagia.  He reports both solids and liquids can get stuck after he swallows in the mid to distal chest.  Sometimes the bolus only hangs out for a moment and it goes into his stomach.  Other times, he states it will stop for minutes up to hours.  He reports chest discomfort when this occurs.  He reports pain with breathing.  He was admitted on 12/17 for neutropenia with fever.  He does have pancytopenia. Absolute neutrophil count improving. He is on neutropenic precautions.       Interval History  12/21/2023: Stable. No new symptoms. Tolerating a liquid diet.     12/22/23:  EGD                          Esophagus             Erosive and exudative esophagitis at approximately 28 cm from incisors biopsied                                     Proximal and distal esophageal mucosa unremarkable     51F Savary passed without mucosal breaks                          Stomach                                      Hiatal hernia 37 to 45 cm                                      Unremarkable gastric mucosa                          Duodenum                                      Unremarkable  duodenal mucosa    12/23/23:  Feeling much better.  Tolerating diet and very pleased.    Review of Systems:  Review of Systems   Constitutional:  Positive for malaise/fatigue. Negative for chills.   HENT: Negative.     Eyes: Negative.    Respiratory:  Negative for cough and hemoptysis.    Cardiovascular:  Positive for chest pain (With inspiration or swallowing).   Gastrointestinal:         Esophageal dysphagia   Genitourinary:  Negative for dysuria and urgency.   Musculoskeletal:  Negative for myalgias and neck pain.   Skin: Negative.    Neurological:  Positive for weakness. Negative for dizziness.   Psychiatric/Behavioral:  Positive for depression. The patient does not have insomnia.              Past Medical History:  Past Medical History:   Diagnosis Date    Arthritis     knee    Bowel habit changes     diarrhea    Bronchitis     Cancer (HCC) 09/29/2023    small cell lung cancer    COPD (chronic obstructive pulmonary disease) ([facility])     Diabetes ([facility])     diet controlled, no medications.    Emphysema of lung ([facility])     COPD- no medications    High cholesterol 08/2023    Was on a statin, MD monitoring, not currently on meds    Myocardial infarct ([facility])     1994,1996,2000    Pneumonia     Psychiatric problem 08/2023    depression, son passed away recently, no meds    Sleep apnea     cpap     Active Hospital Problems    Diagnosis     Severe protein-calorie malnutrition ([facility]) [E43]     Neutropenic fever ([facility]) [D70.9, R50.81]     Hypomagnesemia [E83.42]     Hypophosphatemia [E83.39]     Hyponatremia [E87.1]     Thrombocytopenia (HCC) [D69.6]     Anemia associated with chemotherapy [D64.81, T45.1X5A]     COPD without exacerbation ([facility]) [J44.9]     Type 2 diabetes mellitus with diabetic polyneuropathy, without long-term current use of insulin ([facility]) [E11.42]     Radiation-induced esophagitis [K20.80, T66.XXXA]     Hypokalemia [E87.6]     Antineoplastic chemotherapy induced pancytopenia (CODE) ([facility]) [D61.810]     SCLC (small  cell lung carcinoma) (HCC) [C34.90]     Coronary artery disease involving native coronary artery of native heart without angina pectoris [I25.10]          Past Surgical History:  Past Surgical History:   Procedure Laterality Date    ME UPPER GI ENDOSCOPY,DIAGNOSIS N/A 12/22/2023    Procedure: GASTROSCOPY WITH DILATION;  Surgeon: Dano Oviedo M.D.;  Location: SURGERY [facility];  Service: Gastroenterology    ME BRONCHOSCOPY,DIAGNOSTIC N/A 8/29/2023    Procedure: FIBER OPTIC BRONCHOSCOPY WITH  WASH, BRUSH, BRONCHOALVEOLAR LAVAGE, BIOPSY AND FINE NEEDLE ASPIRATION, ENDOBRONCHIAL ULTRASOUND & NAVIGATION,  ROBOTICS;  Surgeon: Bart Cortez M.D.;  Location: SURGERY [facility];  Service: Pulmonary Robotic    ENDOBRONCHIAL US ADD-ON N/A 8/29/2023    Procedure: ENDOBRONCHIAL ULTRASOUND (EBUS);  Surgeon: Bart Cortez M.D.;  Location: SURGERY [facility];  Service: Pulmonary Robotic    GASTROSCOPY-ENDO  03/31/2017    Procedure: GASTROSCOPY-ENDO;  Surgeon: Emerson Eaton M.D.;  Location: ENDOSCOPY [facility];  Service:     COLONOSCOPY - ENDO  03/31/2017    Procedure: COLONOSCOPY - ENDO;  Surgeon: Emerson Eaton M.D.;  Location: ENDOSCOPY [facility];  Service:     CHOLECYSTECTOMY      INGUINAL HERNIA REPAIR Right     OTHER      Tonsillectomy    OTHER      Hernia     OTHER CARDIAC SURGERY      3 stents    TONSILLECTOMY             Hospital Medications:  Current Facility-Administered Medications   Medication Dose Frequency Provider Last Rate Last Admin    magnesium sulfate IVPB premix 4 g  4 g Once Angel Cortes M.D.        omeprazole (PriLOSEC) capsule 40 mg  40 mg BID Angel Cortes M.D.   40 mg at 12/23/23 0554    sucralfate (Carafate) 1 GM/10ML suspension 1 g  1 g 4X/DAY ACHS Angel Cortes M.D.   1 g at 12/23/23 0614    MBX (diphenhydrAMINE-lidocaine-Maalox) oral susp Cup 5 mL  5 mL Q6HRS Jennifer Beckman M.D.   5 mL at 12/23/23 0555    thiamine (Vitamin B-1) tablet 100 mg   100 mg DAILY Jennifer Beckman M.D.   100 mg at 12/23/23 0554    therapeutic multivitamin-minerals (Theragran-M) tablet 1 Tablet  1 Tablet DAILY Jennifer Beckman M.D.   1 Tablet at 12/23/23 0554    albuterol inhaler 2 Puff  2 Puff Q4HRS PRN Narinder Ho M.D.        mometasone-formoterol (Dulera) 200-5 MCG/ACT inhaler 2 Puff  2 Puff BID Narinder Ho M.D.   2 Puff at 12/23/23 0558    Respiratory Therapy Consult   Continuous RT Narinder Ho M.D.        NS infusion   Continuous Narinder Ho M.D. 83 mL/hr at 12/23/23 0016 New Bag at 12/23/23 0016    Pharmacy Consult Request ...Pain Management Review 1 Each  1 Each PHARMACY TO DOSE Narinder Ho M.D.        hydrALAZINE (Apresoline) injection 10 mg  10 mg Q4HRS PRN Narinder Ho M.D.        ondansetron (Zofran) syringe/vial injection 4 mg  4 mg Q4HRS PRN Narinder Ho M.D.   4 mg at 12/18/23 0408    insulin regular (HumuLIN R,NovoLIN R) injection  2-9 Units 4X/DAY ACHS Narinder Ho M.D.   2 Units at 12/18/23 0551    And    dextrose 50% (D50W) injection 25 g  25 g Q15 MIN PRN Narinder Ho M.D.        lidocaine (Xylocaine) 2 % viscous solution 15 mL  15 mL Q3HRS PRN Narinder Ho M.D.   15 mL at 12/18/23 0841    [Held by provider] aspirin EC tablet 81 mg  81 mg DAILY Narinder Ho M.D.   81 mg at 12/18/23 0533    gabapentin (Neurontin) capsule 600 mg  600 mg BID Narinder Ho M.D.   600 mg at 12/23/23 0554    guaiFENesin ER (Mucinex) tablet 600 mg  600 mg Q12HRS Narinder Ho M.D.   600 mg at 12/23/23 0554    vitamin D3 (Cholecalciferol) tablet 1,000 Units  1,000 Units DAILY Narinder Ho M.D.   1,000 Units at 12/23/23 0554    acetaminophen (Tylenol) tablet 650 mg  650 mg Q6HRS PRN Narinder Ho M.D.        baclofen (Lioresal) tablet 10 mg  10 mg HS PRN Narinder Ho M.D.        guaiFENesin dextromethorphan (Robitussin DM) 100-10 MG/5ML syrup 10 mL  10 mL Q6HRS PRN Narinder Ho M.D.        ondansetron (Zofran ODT) dispertab 4 mg  4 mg Q4HRS PRN  Narinder Ho M.D.        oxyCODONE immediate-release (Roxicodone) tablet 2.5 mg  2.5 mg Q3HRS PRN Narinder Ho M.D.   2.5 mg at 12/18/23 0548    Or    oxyCODONE immediate-release (Roxicodone) tablet 5 mg  5 mg Q3HRS PRN Narinder Ho M.D.        Or    HYDROmorphone (Dilaudid) injection 0.25 mg  0.25 mg Q3HRS PRN Narinder Ho M.D.       Last reviewed on 12/22/2023 11:10 AM by Thi Helton R.N.       Current Outpatient Medications:  Medications Prior to Admission   Medication Sig Dispense Refill Last Dose    [DISCONTINUED] omeprazole (PRILOSEC) 40 MG delayed-release capsule Take 40 mg by mouth every day.   12/15/2023 at AM    [DISCONTINUED] sucralfate (CARAFATE) 1 GM/10ML Suspension Take 10 mL by mouth 2 times a day for 10 days. (Patient taking differently: Take 1 g by mouth 2 times a day. Pt started on 12/15/2023 for 10 day course) 200 mL 0 12/17/2023 at 1000    guaiFENesin ER (MUCINEX) 600 MG TABLET SR 12 HR Take 1 Tablet by mouth every 12 hours. 28 Tablet 0 12/15/2023 at AM    [DISCONTINUED] albuterol 108 (90 Base) MCG/ACT Aero Soln inhalation aerosol Inhale 2 Puffs every four hours as needed for Shortness of Breath for up to 30 days. 18 g 0 > 1 week at Unknown    ondansetron (ZOFRAN) 4 MG Tab tablet Take 1 Tablet by mouth every four hours as needed for Nausea/Vomiting (for nausea, vomiting). 30 Tablet 6 12/14/2023 at Unknown    prochlorperazine (COMPAZINE) 10 MG Tab Take 1 Tablet by mouth every 6 hours as needed (for nausea, vomiting). 30 Tablet 6 PRN    metFORMIN (GLUCOPHAGE) 500 MG Tab Take 750 mg by mouth 2 times a day with meals. 500 mg x 1.5 tab = 750 mg   12/12/2023 at Unknown    baclofen (LIORESAL) 10 MG Tab Take 10 mg by mouth at bedtime as needed. Indications: Muscle Spasm   12/14/2023 at PM    Cyanocobalamin (VITAMIN B-12) 1000 MCG Tab Take 1,000 mcg by mouth every day.   12/15/2023 at AM    fluticasone-salmeterol (ADVAIR) 250-50 MCG/ACT AEROSOL POWDER, BREATH ACTIVATED Inhale 1 Puff 2 times a  day. Indications: Asthma   12/15/2023 at AM    Omega-3 Fatty Acids (FISH OIL) 1000 MG Cap capsule Take 1,000 mg by mouth every day.   2023 at Unknown    vitamin D3 (CHOLECALCIFEROL) 1000 Unit (25 mcg) Tab Take 1,000 Units by mouth every day.   12/15/2023 at AM    gabapentin (NEURONTIN) 300 MG Cap Take 600 mg by mouth 2 times a day.   12/15/2023 at AM    aspirin EC (ECOTRIN) 81 MG Tablet Delayed Response Take 81 mg by mouth every day.   12/15/2023 at AM         Medication Allergies:  No Known Allergies      Family Medical History:  Family History   Problem Relation Age of Onset    Cancer Mother         lung?         Social History:  Social History     Socioeconomic History    Marital status:      Spouse name: Not on file    Number of children: Not on file    Years of education: Not on file    Highest education level: Not on file   Occupational History    Not on file   Tobacco Use    Smoking status: Former     Current packs/day: 0.00     Average packs/day: 0.5 packs/day for 14.0 years (7.0 ttl pk-yrs)     Types: Cigarettes     Start date: 1980     Quit date:      Years since quittin.9    Smokeless tobacco: Not on file    Tobacco comments:     Pt quit smoking cigarettes, .  1/2 pack per day, smoked 20 years    Vaping Use    Vaping Use: Never used   Substance and Sexual Activity    Alcohol use: Not Currently     Alcohol/week: 8.4 oz     Types: 14 Shots of liquor per week     Comment: daily    Drug use: Not Currently     Types: Marijuana     Comment: quit     Sexual activity: Not on file   Other Topics Concern    Not on file   Social History Narrative    Not on file     Social Determinants of Health     Financial Resource Strain: Not on file   Food Insecurity: Not on file   Transportation Needs: Not on file   Physical Activity: Not on file   Stress: Not on file   Social Connections: Not on file   Intimate Partner Violence: Not on file   Housing Stability: Not on file         Vital  "signs:  Weight/BMI: Body mass index is 25.82 kg/m².  BP (!) 148/80   Pulse 72   Temp 37 °C (98.6 °F) (Temporal)   Resp 16   Ht 1.753 m (5' 9\")   Wt 79.3 kg (174 lb 13.2 oz)   SpO2 93%   Vitals:    12/22/23 1338 12/22/23 1700 12/22/23 2058 12/23/23 0449   BP: (!) 149/86 (!) 141/76 (!) 145/82 (!) 148/80   Pulse:  74 83 72   Resp: 16 17 18 16   Temp: 36.6 °C (97.8 °F) 36.6 °C (97.8 °F) 37.1 °C (98.8 °F) 37 °C (98.6 °F)   TempSrc: Oral Temporal Oral Temporal   SpO2:  94% 94% 93%   Weight:    79.3 kg (174 lb 13.2 oz)   Height:         Oxygen Therapy:  Pulse Oximetry: 93 %, O2 (LPM): 0, O2 Delivery Device: None - Room Air    Intake/Output Summary (Last 24 hours) at 12/23/2023 1009  Last data filed at 12/23/2023 0449  Gross per 24 hour   Intake 600 ml   Output 2700 ml   Net -2100 ml           Physical Exam:  Physical Exam      Labs:  Recent Labs     12/21/23  0335 12/22/23  0506 12/23/23  0623   SODIUM 134* 133* 133*   POTASSIUM 3.3* 3.6 3.5*   CHLORIDE 101 99 97   CO2 22 22 24   BUN 8 5* 5*   CREATININE 0.97 0.85 0.84   MAGNESIUM 1.5 1.4* 1.5   PHOSPHORUS 1.5* 1.8* 1.9*   CALCIUM 7.5* 7.7* 7.9*       Recent Labs     12/21/23  0335 12/22/23  0506 12/23/23  0623   GLUCOSE 95 97 104*       Recent Labs     12/21/23  0335 12/22/23  0506 12/23/23  0623   WBC 3.0* 4.8 4.8   NEUTSPOLYS 55.10  --  62.80   LYMPHOCYTES 15.50*  --  12.60*   MONOCYTES 24.30*  --  20.70*   EOSINOPHILS 0.70  --  0.40   BASOPHILS 0.30  --  0.20       Recent Labs     12/21/23  0335 12/22/23  0506 12/23/23  0623   RBC 2.53* 2.90* 3.01*   HEMOGLOBIN 8.0* 9.2* 9.5*   HEMATOCRIT 22.8* 26.4* 27.1*   PLATELETCT 73* 100* 119*   PROTHROMBTM  --  14.8*  --    APTT  --  27.5  --    INR  --  1.12  --        Recent Results (from the past 24 hour(s))   Histology Request    Collection Time: 12/22/23 11:45 AM   Result Value Ref Range    Pathology Request Sent to Histo    POCT glucose device results    Collection Time: 12/22/23  4:31 PM   Result Value Ref Range    " POC Glucose, Blood 115 (H) 65 - 99 mg/dL   POCT glucose device results    Collection Time: 12/22/23  9:04 PM   Result Value Ref Range    POC Glucose, Blood 143 (H) 65 - 99 mg/dL   POCT glucose device results    Collection Time: 12/23/23  6:00 AM   Result Value Ref Range    POC Glucose, Blood 104 (H) 65 - 99 mg/dL   CBC WITH DIFFERENTIAL    Collection Time: 12/23/23  6:23 AM   Result Value Ref Range    WBC 4.8 4.8 - 10.8 K/uL    RBC 3.01 (L) 4.70 - 6.10 M/uL    Hemoglobin 9.5 (L) 14.0 - 18.0 g/dL    Hematocrit 27.1 (L) 42.0 - 52.0 %    MCV 90.0 81.4 - 97.8 fL    MCH 31.6 27.0 - 33.0 pg    MCHC 35.1 32.3 - 36.5 g/dL    RDW 49.7 35.9 - 50.0 fL    Platelet Count 119 (L) 164 - 446 K/uL    MPV 9.7 9.0 - 12.9 fL    Neutrophils-Polys 62.80 44.00 - 72.00 %    Lymphocytes 12.60 (L) 22.00 - 41.00 %    Monocytes 20.70 (H) 0.00 - 13.40 %    Eosinophils 0.40 0.00 - 6.90 %    Basophils 0.20 0.00 - 1.80 %    Immature Granulocytes 3.30 (H) 0.00 - 0.90 %    Nucleated RBC 0.00 0.00 - 0.20 /100 WBC    Neutrophils (Absolute) 3.04 1.82 - 7.42 K/uL    Lymphs (Absolute) 0.61 (L) 1.00 - 4.80 K/uL    Monos (Absolute) 1.00 (H) 0.00 - 0.85 K/uL    Eos (Absolute) 0.02 0.00 - 0.51 K/uL    Baso (Absolute) 0.01 0.00 - 0.12 K/uL    Immature Granulocytes (abs) 0.16 (H) 0.00 - 0.11 K/uL    NRBC (Absolute) 0.00 K/uL   Renal Function Panel    Collection Time: 12/23/23  6:23 AM   Result Value Ref Range    Sodium 133 (L) 135 - 145 mmol/L    Potassium 3.5 (L) 3.6 - 5.5 mmol/L    Chloride 97 96 - 112 mmol/L    Co2 24 20 - 33 mmol/L    Glucose 104 (H) 65 - 99 mg/dL    Creatinine 0.84 0.50 - 1.40 mg/dL    Bun 5 (L) 8 - 22 mg/dL    Calcium 7.9 (L) 8.4 - 10.2 mg/dL    Correct Calcium 8.8 8.5 - 10.5 mg/dL    Phosphorus 1.9 (L) 2.5 - 4.5 mg/dL    Albumin 2.9 (L) 3.2 - 4.9 g/dL   MAGNESIUM    Collection Time: 12/23/23  6:23 AM   Result Value Ref Range    Magnesium 1.5 1.5 - 2.5 mg/dL   ESTIMATED GFR    Collection Time: 12/23/23  6:23 AM   Result Value Ref Range     GFR (CKD-EPI) 92 >60 mL/min/1.73 m 2         Radiology Review:  DX-ESOPHAGUS BARIUM SWALLOW SINGLE CONTRAST   Final Result      Limited water-soluble exam performed. No evidence of high-grade stricture or esophageal leak.      DX-ESOPHAGUS - [id number]   Final Result      DX-CHEST-PORTABLE (1 VIEW)   Final Result      No evidence of acute cardiopulmonary process.            MDM (Data Review):   -Records reviewed and summarized in current documentation  -I personally reviewed and interpreted the laboratory results  -I personally reviewed the radiology images        Medical Decision Making, by Problem:  Active Hospital Problems    Diagnosis     Severe protein-calorie malnutrition (HCC) [E43]     Neutropenic fever (HCC) [D70.9, R50.81]     Hypomagnesemia [E83.42]     Hypophosphatemia [E83.39]     Hyponatremia [E87.1]     Thrombocytopenia (HCC) [D69.6]     Anemia associated with chemotherapy [D64.81, T45.1X5A]     COPD without exacerbation (HCC) [J44.9]     Type 2 diabetes mellitus with diabetic polyneuropathy, without long-term current use of insulin (HCC) [E11.42]     Radiation-induced esophagitis [K20.80, T66.XXXA]     Hypokalemia [E87.6]     Antineoplastic chemotherapy induced pancytopenia (CODE) (HCC) [D61.810]     SCLC (small cell lung carcinoma) (HCC) [C34.90]     Coronary artery disease involving native coronary artery of native heart without angina pectoris [I25.10]            Assessment/Recommendations:  73-year-old male with COPD, type 2 diabetes mellitus, small cell lung cancer who recently underwent chemo and radiation with initial neutropenia and possible neutropenic fevers.  He does have pancytopenia most recent absolute neutrophil count 1.63 and platelets 73 K.  He does have esophageal dysphagia, suspect related to radiation-induced stricture, but cannot rule out other pathology within the esophagus. SLP evaluated the patient without evidence of oropharyngeal dysphagia.     Assessment:  -Esophageal  dysphagia  -Small cell lung cancer status post chemo and radiation  -Type 2 diabetes mellitus  -COPD  -Neutropenia and pancytopenia secondary to chemotherapy  -Thrombocytopenia    Plan:  -Diet as recommended by SLP  -Continue PPI bid  -Continue sucralfate suspension x 2 weeks  -Follow up pathology     **  GI WILL SIGN OFF / STAND BY - PLEASE CALL IF ANY CONCERNS  **    Dano Oviedo M.D.      Thank you for inviting me to participate in the care of this patient. Please do not hesitate to call GI consultants with additional questions/concerns or changes in the patient's clinical status at [phone].      Core Quality Measures   Reviewed items:  Labs, Medications and Radiology reports reviewed

## 2023-12-23 NOTE — PROGRESS NOTES
Pt discharged to home via personal vehicle with spouse. Discharge paperwork reviewed and signed. Prescribed home medications reviewed with pt per discharge summary. VSS. Pt escorted off unit via wheelchair with hospital staff.

## 2023-12-23 NOTE — PROGRESS NOTES
Received bedside report from scottie RN. Pt resting in bed, on RA. Pt denies pain at this time. Tolerates full liquid diet. IV fluid continued on 83 ml/hr. Safety precautions in place, call light within reach, all needs met at this time.

## 2023-12-23 NOTE — DISCHARGE SUMMARY
"Discharge Summary    CHIEF COMPLAINT ON ADMISSION  Chief Complaint   Patient presents with    Weakness    Shortness of Breath    Loss of Appetite       Reason for Admission  EMS     Admission Date  12/17/2023    CODE STATUS  Full Code    HPI & HOSPITAL COURSE  This is \"Bart Ramsey is a 73 y.o. male who presented 12/17/2023 with generalized weakness and mild shortness of breath.  Patient comes and is evaluated and is found to be absolute neutropenic with a white blood cell count of 0.04.  Patient has fevers with a temperature of 38.7 on initial evaluation.  Patient has been getting chemotherapy and most recent had he had chemotherapy on December 7.  The patient is about 10 days out in this point has developed neutropenic fevers and will be getting IV vancomycin and Zosyn and we will also give him IV acyclovir.  Currently there is no indication for antifungal treatment as he has not had any previous antibiotics that would predispose him to fungal infections.  He does have a port in place through which we will be giving vancomycin.  Blood cultures have been taken and these will be followed.  Chest x-ray currently is clear and urine analysis currently is clear.  The patient has a very difficult time swallowing and has been losing weight.  The reason is that he has developed radiation esophagitis and he cannot take food.  We will need to put a feeding tube in so we can get him proper nutrition and he is agreeable for this.. \" (As per admitting physician Dr. Ho).    Patient stated he has had problems with his swallowing for a while now, [facility] which they found Salmon's esophagus. I spoke with speech therapy, there is no concern of oropharyngeal dysphagia but distal esophagus was concerning.  I consulted and spoke with gastroenterology consultants about potential EGD or other means to evaluate patient's esophageal dysphagia.      Patient is status post EGD with . " Ivelisse on 12/22/2023.  I discussed the results which showed patient had erosive and exudative esophagitis at the end of the proximal third of the esophagus, biopsies were taken.  Is likely due to radiation esophagitis, worsened by pill esophagitis and acid reflux.  Patient will be on twice daily PPI and Carafate 4 times daily.  I have started a full liquid diet.  I suggested for patient he will likely be on liquid diet at home for the next 2 to 3 weeks hopefully to allow healing of his esophagus.    As for patient's neutropenic fever, he was treated with IV Zosyn for 5 days.  Patient's condition was due to to his erosive esophagitis which may have allowed for bacterial infection to occur.  Patient's initial ANC was down to 0.04.  Upon discharge his ANC was 3.04.  Patient did not require Granix.    Upon discharge patient did have significant hypomagnesemia, down to 1.5.  He declined to have IV magnesium replacement.  I prescribed patient magnesium glycinate.  I also prescribed the recommended medications with Protonix and Carafate.    Therefore, he is discharged in good and stable condition to home with close outpatient follow-up. Kassy HYMAN.    The patient met 2-midnight criteria for an inpatient stay at the time of discharge.    Discharge Date  12/23/2023    FOLLOW UP ITEMS POST DISCHARGE  With gastroenterology consultants, Dr. Roseline Fuller [facility] oncology      DISCHARGE DIAGNOSES  Principal Problem:    Neutropenic fever (HCC) (POA: Yes)  Active Problems:    Radiation-induced esophagitis (POA: Yes)    Severe protein-calorie malnutrition (HCC) (POA: Yes)    Hypokalemia (POA: Yes)    Hypomagnesemia (POA: Yes)    Hypophosphatemia (POA: Yes)    Hyponatremia (POA: Yes)    Coronary artery disease involving native coronary artery of native heart without angina pectoris (POA: Yes)    SCLC (small cell lung carcinoma) (HCC) (POA: Yes)    Antineoplastic chemotherapy induced pancytopenia (CODE) (HCC) (POA: Yes)    Type 2  diabetes mellitus with diabetic polyneuropathy, without long-term current use of insulin (HCC) (POA: Yes)    COPD without exacerbation (HCC) (POA: Yes)    Anemia associated with chemotherapy (POA: Yes)    Thrombocytopenia (HCC) (POA: Yes)  Resolved Problems:    * No resolved hospital problems. *      FOLLOW UP  Future Appointments   Date Time Provider Department Center   12/29/2023 10:30 AM [facility] QUICK INJECT [facility] [address]   12/31/2023  9:00 AM [facility] [address]   1/18/2024 11:30 AM INFUSION QUICK INJECT ON[facility] [address]   1/21/2024 11:15 AM [facility] [address]     [address]  [facility] [id number]  [phone]          MEDICATIONS ON DISCHARGE     Medication List        START taking these medications        Instructions   Magnesium Glycinate 100 MG Caps   Take 1 Capsule by mouth every day with lunch for 90 days.  Dose: 1 Capsule     thiamine 100 MG tablet  Start taking on: December 24, 2023  Commonly known as: Thiamine   Take 1 Tablet by mouth every day for 30 days.  Dose: 100 mg            CHANGE how you take these medications        Instructions   omeprazole 40 MG delayed-release capsule  What changed: when to take this  Commonly known as: PriLOSEC   Take 1 Capsule by mouth 2 times a day for 30 days.  Dose: 40 mg     prochlorperazine 10 MG Tabs  What changed: reasons to take this  Commonly known as: Compazine   Take 1 Tablet by mouth every 6 hours as needed (for nausea, vomiting).  Dose: 10 mg     sucralfate 1 GM/10ML Susp  What changed: when to take this  Commonly known as: Carafate   Take 10 mL by mouth 4 Times a Day,Before Meals and at Bedtime for 14 days.  Dose: 1 g            CONTINUE taking these medications        Instructions   albuterol 108 (90 Base) MCG/ACT Aers inhalation aerosol   Inhale 2 Puffs every four hours as needed for Shortness of Breath for up to 30 days.  Dose: 2 Puff     aspirin EC 81 MG Tbec  Commonly known as:  Ecotrin   Take 81 mg by mouth every day.  Dose: 81 mg     baclofen 10 MG Tabs  Commonly known as: Lioresal   Take 10 mg by mouth at bedtime as needed. Indications: Muscle Spasm  Dose: 10 mg     fish oil 1000 MG Caps capsule   Take 1,000 mg by mouth every day.  Dose: 1,000 mg     fluticasone-salmeterol 250-50 MCG/ACT Aepb  Commonly known as: Advair   Inhale 1 Puff 2 times a day. Indications: Asthma  Dose: 1 Puff     gabapentin 300 MG Caps  Commonly known as: Neurontin   Take 600 mg by mouth 2 times a day.  Dose: 600 mg     guaiFENesin  MG Tb12  Commonly known as: Mucinex   Take 1 Tablet by mouth every 12 hours.  Dose: 600 mg     metFORMIN 500 MG Tabs  Commonly known as: Glucophage   Take 750 mg by mouth 2 times a day with meals. 500 mg x 1.5 tab = 750 mg  Dose: 750 mg     ondansetron 4 MG Tabs tablet  Commonly known as: Zofran   Take 1 Tablet by mouth every four hours as needed for Nausea/Vomiting (for nausea, vomiting).  Dose: 4 mg     vitamin B-12 1000 MCG Tabs   Take 1,000 mcg by mouth every day.  Dose: 1,000 mcg     vitamin D3 1000 Unit (25 mcg) Tabs  Commonly known as: Cholecalciferol   Take 1,000 Units by mouth every day.  Dose: 1,000 Units              Allergies  No Known Allergies    DIET  Orders Placed This Encounter   Procedures    Diet Order Diet: Full Liquid     Standing Status:   Standing     Number of Occurrences:   1     Order Specific Question:   Diet:     Answer:   Full Liquid [11]       ACTIVITY  As tolerated.  Weight bearing as tolerated    CONSULTATIONS  Gastroenterology consultants    PROCEDURES  EGD    LABORATORY  Lab Results   Component Value Date    SODIUM 133 (L) 12/23/2023    POTASSIUM 3.5 (L) 12/23/2023    CHLORIDE 97 12/23/2023    CO2 24 12/23/2023    GLUCOSE 104 (H) 12/23/2023    BUN 5 (L) 12/23/2023    CREATININE 0.84 12/23/2023        Lab Results   Component Value Date    WBC 4.8 12/23/2023    HEMOGLOBIN 9.5 (L) 12/23/2023    HEMATOCRIT 27.1 (L) 12/23/2023    PLATELETCT 119 (L)  12/23/2023      DX-ESOPHAGUS BARIUM SWALLOW SINGLE CONTRAST   Final Result      Limited water-soluble exam performed. No evidence of high-grade stricture or esophageal leak.      DX-ESOPHAGUS - [id number]   Final Result      DX-CHEST-PORTABLE (1 VIEW)   Final Result      No evidence of acute cardiopulmonary process.          Total time of the discharge process exceeds 36 minutes.

## 2023-12-23 NOTE — CARE PLAN
The patient is Stable - Low risk of patient condition declining or worsening    Shift Goals  Clinical Goals: neutropenic precautions, pain management  Patient Goals: swallow with less pain  Family Goals: LEEANNE    Progress made toward(s) clinical / shift goals: Neutropenic precautions observed at all time. Pt remains afebrile this shift. Denies intolerable pain.     Patient is not progressing towards the following goals:

## 2023-12-23 NOTE — CARE PLAN
The patient is Stable - Low risk of patient condition declining or worsening    Shift Goals  Clinical Goals: Pt will tolerate full liquid diet; continue pt on protective isolation precautions; pt will remain afebrile  Patient Goals: Sleep and rest; home tomorrow  Family Goals: NA    Progress made toward(s) clinical / shift goals:  Pt tolerated full liquid diet with no complaints of pain or nausea overnight; pt remained afebrile throughout the shift.      Problem: Knowledge Deficit - Standard  Goal: Patient and family/care givers will demonstrate understanding of plan of care, disease process/condition, diagnostic tests and medications  Outcome: Progressing     Problem: Pain - Standard  Goal: Alleviation of pain or a reduction in pain to the patient’s comfort goal  Outcome: Progressing     Problem: Skin Integrity  Goal: Skin integrity is maintained or improved  Outcome: Progressing     Problem: Fall Risk  Goal: Patient will remain free from falls  Outcome: Progressing     Problem: Communication  Goal: The ability to communicate needs accurately and effectively will improve  Outcome: Progressing     Problem: Dysphagia  Goal: Dysphagia will improve  Outcome: Progressing     Patient is not progressing towards the following goals: NA

## 2023-12-23 NOTE — PROGRESS NOTES
Bedside report received from night RN. Assumed care of patient. Daily plan of care discussed. Pt resting comfortably in bed with no signs of distress noted. Breathing even and unlabored, chest rise and fall observed. 12 hour chart check complete. Hourly rounding in place.

## 2023-12-29 ENCOUNTER — OUTPATIENT INFUSION SERVICES (OUTPATIENT)
Dept: ONCOLOGY | Facility: MEDICAL CENTER | Age: 73
End: 2023-12-29
Attending: STUDENT IN AN ORGANIZED HEALTH CARE EDUCATION/TRAINING PROGRAM
Payer: COMMERCIAL

## 2023-12-29 ENCOUNTER — HOSPITAL ENCOUNTER (OUTPATIENT)
Dept: HEMATOLOGY ONCOLOGY | Facility: MEDICAL CENTER | Age: 73
End: 2023-12-29
Attending: STUDENT IN AN ORGANIZED HEALTH CARE EDUCATION/TRAINING PROGRAM
Payer: COMMERCIAL

## 2023-12-29 VITALS
HEART RATE: 95 BPM | DIASTOLIC BLOOD PRESSURE: 58 MMHG | SYSTOLIC BLOOD PRESSURE: 110 MMHG | TEMPERATURE: 98.6 F | OXYGEN SATURATION: 96 % | HEIGHT: 69 IN | WEIGHT: 169.2 LBS | BODY MASS INDEX: 25.06 KG/M2

## 2023-12-29 VITALS
HEART RATE: 96 BPM | SYSTOLIC BLOOD PRESSURE: 117 MMHG | TEMPERATURE: 97.5 F | DIASTOLIC BLOOD PRESSURE: 63 MMHG | RESPIRATION RATE: 18 BRPM | OXYGEN SATURATION: 96 %

## 2023-12-29 DIAGNOSIS — G89.3 CANCER RELATED PAIN: ICD-10-CM

## 2023-12-29 DIAGNOSIS — C34.90 SCLC (SMALL CELL LUNG CARCINOMA) (HCC): ICD-10-CM

## 2023-12-29 DIAGNOSIS — Z79.899 ENCOUNTER FOR LONG-TERM (CURRENT) USE OF HIGH-RISK MEDICATION: ICD-10-CM

## 2023-12-29 LAB
ALBUMIN SERPL BCP-MCNC: 3.5 G/DL (ref 3.2–4.9)
ALBUMIN/GLOB SERPL: 1.1 G/DL
ALP SERPL-CCNC: 85 U/L (ref 30–99)
ALT SERPL-CCNC: 9 U/L (ref 2–50)
ANION GAP SERPL CALC-SCNC: 11 MMOL/L (ref 7–16)
APTT PPP: 29.8 SEC (ref 24.7–36)
AST SERPL-CCNC: 13 U/L (ref 12–45)
BASOPHILS # BLD AUTO: 0.5 % (ref 0–1.8)
BASOPHILS # BLD: 0.04 K/UL (ref 0–0.12)
BILIRUB CONJ SERPL-MCNC: <0.2 MG/DL (ref 0.1–0.5)
BILIRUB INDIRECT SERPL-MCNC: NORMAL MG/DL (ref 0–1)
BILIRUB SERPL-MCNC: 0.3 MG/DL (ref 0.1–1.5)
BUN SERPL-MCNC: 20 MG/DL (ref 8–22)
CALCIUM ALBUM COR SERPL-MCNC: 9.1 MG/DL (ref 8.5–10.5)
CALCIUM SERPL-MCNC: 8.7 MG/DL (ref 8.5–10.5)
CHLORIDE SERPL-SCNC: 101 MMOL/L (ref 96–112)
CHOLEST SERPL-MCNC: 176 MG/DL (ref 100–199)
CK SERPL-CCNC: 53 U/L (ref 0–154)
CO2 SERPL-SCNC: 25 MMOL/L (ref 20–33)
CREAT SERPL-MCNC: 1.13 MG/DL (ref 0.5–1.4)
EOSINOPHIL # BLD AUTO: 0 K/UL (ref 0–0.51)
EOSINOPHIL NFR BLD: 0 % (ref 0–6.9)
ERYTHROCYTE [DISTWIDTH] IN BLOOD BY AUTOMATED COUNT: 54.4 FL (ref 35.9–50)
GFR SERPLBLD CREATININE-BSD FMLA CKD-EPI: 68 ML/MIN/1.73 M 2
GLOBULIN SER CALC-MCNC: 3.3 G/DL (ref 1.9–3.5)
GLUCOSE SERPL-MCNC: 159 MG/DL (ref 65–99)
HCT VFR BLD AUTO: 26.4 % (ref 42–52)
HDLC SERPL-MCNC: 31 MG/DL
HGB BLD-MCNC: 9 G/DL (ref 14–18)
IMM GRANULOCYTES # BLD AUTO: 0.04 K/UL (ref 0–0.11)
IMM GRANULOCYTES NFR BLD AUTO: 0.5 % (ref 0–0.9)
INR PPP: 1.06 (ref 0.87–1.13)
LDH SERPL L TO P-CCNC: 197 U/L (ref 107–266)
LDLC SERPL CALC-MCNC: 118 MG/DL
LYMPHOCYTES # BLD AUTO: 0.63 K/UL (ref 1–4.8)
LYMPHOCYTES NFR BLD: 7.8 % (ref 22–41)
MAGNESIUM SERPL-MCNC: 1.5 MG/DL (ref 1.5–2.5)
MCH RBC QN AUTO: 31.5 PG (ref 27–33)
MCHC RBC AUTO-ENTMCNC: 34.1 G/DL (ref 32.3–36.5)
MCV RBC AUTO: 92.3 FL (ref 81.4–97.8)
MONOCYTES # BLD AUTO: 0.92 K/UL (ref 0–0.85)
MONOCYTES NFR BLD AUTO: 11.5 % (ref 0–13.4)
NEUTROPHILS # BLD AUTO: 6.4 K/UL (ref 1.82–7.42)
NEUTROPHILS NFR BLD: 79.7 % (ref 44–72)
NRBC # BLD AUTO: 0 K/UL
NRBC BLD-RTO: 0 /100 WBC (ref 0–0.2)
NT-PROBNP SERPL IA-MCNC: 1623 PG/ML (ref 0–125)
OUTPT INFUS CBC COMMENT OICOM: ABNORMAL
PHOSPHATE SERPL-MCNC: 2 MG/DL (ref 2.5–4.5)
PLATELET # BLD AUTO: 353 K/UL (ref 164–446)
PMV BLD AUTO: 9 FL (ref 9–12.9)
POTASSIUM SERPL-SCNC: 3.7 MMOL/L (ref 3.6–5.5)
PROT SERPL-MCNC: 6.8 G/DL (ref 6–8.2)
PROTHROMBIN TIME: 13.9 SEC (ref 12–14.6)
RBC # BLD AUTO: 2.86 M/UL (ref 4.7–6.1)
SODIUM SERPL-SCNC: 137 MMOL/L (ref 135–145)
T3 SERPL-MCNC: 114 NG/DL (ref 60–181)
T4 FREE SERPL-MCNC: 1.19 NG/DL (ref 0.93–1.7)
TRIGL SERPL-MCNC: 133 MG/DL (ref 0–149)
TROPONIN T SERPL-MCNC: 29 NG/L (ref 6–19)
TSH SERPL DL<=0.005 MIU/L-ACNC: 1.19 UIU/ML (ref 0.38–5.33)
WBC # BLD AUTO: 8 K/UL (ref 4.8–10.8)

## 2023-12-29 PROCEDURE — 83880 ASSAY OF NATRIURETIC PEPTIDE: CPT

## 2023-12-29 PROCEDURE — 84480 ASSAY TRIIODOTHYRONINE (T3): CPT

## 2023-12-29 PROCEDURE — 36591 DRAW BLOOD OFF VENOUS DEVICE: CPT

## 2023-12-29 PROCEDURE — 83615 LACTATE (LD) (LDH) ENZYME: CPT

## 2023-12-29 PROCEDURE — 700111 HCHG RX REV CODE 636 W/ 250 OVERRIDE (IP): Performed by: NURSE PRACTITIONER

## 2023-12-29 PROCEDURE — 80053 COMPREHEN METABOLIC PANEL: CPT

## 2023-12-29 PROCEDURE — 99212 OFFICE O/P EST SF 10 MIN: CPT | Performed by: STUDENT IN AN ORGANIZED HEALTH CARE EDUCATION/TRAINING PROGRAM

## 2023-12-29 PROCEDURE — 85730 THROMBOPLASTIN TIME PARTIAL: CPT

## 2023-12-29 PROCEDURE — 85610 PROTHROMBIN TIME: CPT

## 2023-12-29 PROCEDURE — 85025 COMPLETE CBC W/AUTO DIFF WBC: CPT

## 2023-12-29 PROCEDURE — 84443 ASSAY THYROID STIM HORMONE: CPT

## 2023-12-29 PROCEDURE — 84484 ASSAY OF TROPONIN QUANT: CPT | Mod: 91

## 2023-12-29 PROCEDURE — 83735 ASSAY OF MAGNESIUM: CPT

## 2023-12-29 PROCEDURE — 84439 ASSAY OF FREE THYROXINE: CPT

## 2023-12-29 PROCEDURE — 82550 ASSAY OF CK (CPK): CPT

## 2023-12-29 PROCEDURE — 84100 ASSAY OF PHOSPHORUS: CPT

## 2023-12-29 PROCEDURE — 82248 BILIRUBIN DIRECT: CPT

## 2023-12-29 PROCEDURE — 80061 LIPID PANEL: CPT

## 2023-12-29 PROCEDURE — A4212 NON CORING NEEDLE OR STYLET: HCPCS

## 2023-12-29 PROCEDURE — 82553 CREATINE MB FRACTION: CPT

## 2023-12-29 PROCEDURE — 99214 OFFICE O/P EST MOD 30 MIN: CPT | Performed by: STUDENT IN AN ORGANIZED HEALTH CARE EDUCATION/TRAINING PROGRAM

## 2023-12-29 RX ADMIN — HEPARIN 500 UNITS: 100 SYRINGE at 10:57

## 2023-12-29 ASSESSMENT — ENCOUNTER SYMPTOMS
ABDOMINAL PAIN: 0
DIZZINESS: 0
SPUTUM PRODUCTION: 1
HEADACHES: 0
TREMORS: 0
FOCAL WEAKNESS: 0
CHILLS: 0
NAUSEA: 0
SHORTNESS OF BREATH: 0
MEMORY LOSS: 0
FEVER: 0
VOMITING: 0
TINGLING: 1
HEARTBURN: 1
WHEEZING: 0
COUGH: 1
ORTHOPNEA: 0
DEPRESSION: 0
NECK PAIN: 0
BLURRED VISION: 0
SENSORY CHANGE: 0
PALPITATIONS: 0
SORE THROAT: 0
WEIGHT LOSS: 0
BRUISES/BLEEDS EASILY: 0

## 2023-12-29 ASSESSMENT — FIBROSIS 4 INDEX: FIB4 SCORE: 2.6

## 2023-12-29 NOTE — PROGRESS NOTES
Pt arrived ambulatory to IS for pre chemo labs. POC discussed. R chest port in place with EMLA cream, accessed in sterile field. Brisk blood return noted and labs drawn as ordered. Port flushed, de-accessed, and site covered with gauze and tape. Sees MD today. Discharged from IS in NAD under self care.

## 2023-12-29 NOTE — ADDENDUM NOTE
Encounter addended by: Santo Quintero on: 12/29/2023 11:39 AM   Actions taken: Charge Capture section accepted

## 2023-12-29 NOTE — PROGRESS NOTES
"    Consult Note: Hematology/Oncology     Primary Care:  Pcp Pt States None    Chief Complaint   Patient presents with    Cancer     Prechemo          Current Treatment:     9/18/23   C1: Cis/Etop/HLX10 vs Placebo  10/9/23   C2: Cis/Etop/HLX vs Placebo + RT  10/28/23 C3: Cis/Etop/HLX vs Placebo + RT (treatment deferred 1 week for neutropenia)  11/6/23   C3: Cis/Etop/HLX vs Placebo + RT  12/4/23: C4 Cis/Etop/HLX    Prior Treatment: None    Subjective:   History of Presenting Illness:  Bart Ramsey is a 73 y.o. male who presents with a new diagnosis of SCLC    Patient reports that in February he felt that he was being strangled.  He called the ambulance and he was taken to the ER.  He was placed on BP meds.  He still felt terrible after several days.  [facility]bl[facility] which was removed.  Imaging at that time showed some nodules on his lung.      In March or April 2023 he had a biopsy attempt of his lung nodules.  Per patient biopsy result was inconclusive      PET scan was done on 6/6/2023 which showed a hypermetabolic nodule in the superior segment LEFT lower lobe of lung measuring 2.3 cm consistent with malignancy. Involvement of the LEFT superior hilar lymph node.  No evidence for metastatic disease in the abdomen or pelvis.    He had a bronchoscopy Pathology revealed SCLC.     He had agent orange exposure from [place] ([id number]). Son committed suicide 1 year ago today.  He is raising his granddaughter ([place]).     He quit in 1994 (started in 1966, 28 pack year history).  Used to smoke marijuana.  He states he drinks too much (drinks a \"couple good drinks\" per night).  He has a healthy diet.  He eats a lot of vegetables.     Patient was diagnosed with COVID-19 on 11/13. CT scan showed lower lobe infiltrate. During the stay in the hospital, patient continued receive treatment for community-acquired pneumonia with cefepime and doxycycline; he continued to " feel better.  At discharge, given levofloxacin.    Interval history     Fortunately patient was admitted to the hospital on December 17.  He came in with generalized weakness and shortness of breath found to be severely neutropenic and thus was started on vancomycin and Zosyn patient was having difficulty swallowing and mass he was evaluated by GI and found to have erosive esophagitis which was likely due to radiation esophagitis worsened by pill esophagitis reflux.    Since then he has been slowly getting better.  Trying to build up his strength but feels like he has a cold as he is coughing up sputum that is green-tinged    Past Medical History:   Diagnosis Date    Arthritis     knee    Bowel habit changes     diarrhea    Bronchitis     Cancer ([facility]) 09/29/2023    small cell lung cancer    COPD (chronic obstructive pulmonary disease) ([facility])     Diabetes ([facility])     diet controlled, no medications.    Emphysema of lung ([facility])     COPD- no medications    High cholesterol 08/2023    Was on a statin, MD monitoring, not currently on meds    Myocardial infarct ([facility])     1994,1996,2000    Pneumonia     Psychiatric problem 08/2023    depression, son passed away recently, no meds    Sleep apnea     cpap        Past Surgical History:   Procedure Laterality Date    [place] UPPER GI ENDOSCOPY,DIAGNOSIS N/A 12/22/2023    Procedure: GASTROSCOPY WITH DILATION;  Surgeon: Dano Oviedo M.D.;  Location: [facility];  Service: Gastroenterology    [place] BRONCHOSCOPY,DIAGNOSTIC N/A 8/29/2023    Procedure: FIBER OPTIC BRONCHOSCOPY WITH  WASH, BRUSH, BRONCHOALVEOLAR LAVAGE, BIOPSY AND FINE NEEDLE ASPIRATION, ENDOBRONCHIAL ULTRASOUND & NAVIGATION,  ROBOTICS;  Surgeon: Bart Cortez M.D.;  Location: [facility];  Service: Pulmonary Robotic    ENDOBRONCHIAL US ADD-ON N/A 8/29/2023    Procedure: ENDOBRONCHIAL ULTRASOUND (EBUS);  Surgeon: Bart Cortez M.D.;  Location: [facility];  Service: Pulmonary Robotic     GASTROSCOPY-ENDO  2017    Procedure: GASTROSCOPY-ENDO;  Surgeon: Emerson Eaton M.D.;  Location: ENDOSCOPY [facility];  Service:     COLONOSCOPY - ENDO  2017    Procedure: COLONOSCOPY - ENDO;  Surgeon: Emerson Eaton M.D.;  Location: ENDOSCOPY [facility];  Service:     CHOLECYSTECTOMY      INGUINAL HERNIA REPAIR Right     OTHER      Tonsillectomy    OTHER      Hernia     OTHER CARDIAC SURGERY      3 stents    TONSILLECTOMY         Social History     Tobacco Use    Smoking status: Former     Current packs/day: 0.00     Average packs/day: 0.5 packs/day for 14.0 years (7.0 ttl pk-yrs)     Types: Cigarettes     Start date: 1980     Quit date:      Years since quittin.0    Tobacco comments:     Pt quit smoking cigarettes, 2000.  1/2 pack per day, smoked 20 years    Vaping Use    Vaping Use: Never used   Substance Use Topics    Alcohol use: Not Currently     Alcohol/week: 8.4 oz     Types: 14 Shots of liquor per week     Comment: daily    Drug use: Not Currently     Types: Marijuana     Comment: quit         Family History   Problem Relation Age of Onset    Cancer Mother         lung?       No Known Allergies    Current Outpatient Medications   Medication Sig Dispense Refill    albuterol 108 (90 Base) MCG/ACT Aero Soln inhalation aerosol Inhale 2 Puffs every four hours as needed for Shortness of Breath for up to 30 days. 8.5 g 1    omeprazole (PRILOSEC) 40 MG delayed-release capsule Take 1 Capsule by mouth 2 times a day for 30 days. 60 Capsule 1    sucralfate (CARAFATE) 1 GM/10ML Suspension Take 10 mL by mouth 4 Times a Day,Before Meals and at Bedtime for 14 days. 560 mL 0    thiamine (THIAMINE) 100 MG tablet Take 1 Tablet by mouth every day for 30 days. 30 Tablet 1    Magnesium Glycinate 100 MG Cap Take 1 Capsule by mouth every day with lunch for 90 days.      guaiFENesin ER (MUCINEX) 600 MG TABLET SR 12 HR Take 1 Tablet by mouth every 12 hours. 28 Tablet 0    ondansetron  (ZOFRAN) 4 MG Tab tablet Take 1 Tablet by mouth every four hours as needed for Nausea/Vomiting (for nausea, vomiting). 30 Tablet 6    prochlorperazine (COMPAZINE) 10 MG Tab Take 1 Tablet by mouth every 6 hours as needed (for nausea, vomiting). 30 Tablet 6    metFORMIN (GLUCOPHAGE) 500 MG Tab Take 750 mg by mouth 2 times a day with meals. 500 mg x 1.5 tab = 750 mg      baclofen (LIORESAL) 10 MG Tab Take 10 mg by mouth at bedtime as needed. Indications: Muscle Spasm      Cyanocobalamin (VITAMIN B-12) 1000 MCG Tab Take 1,000 mcg by mouth every day.      fluticasone-salmeterol (ADVAIR) 250-50 MCG/ACT AEROSOL POWDER, BREATH ACTIVATED Inhale 1 Puff 2 times a day. Indications: Asthma      Omega-3 Fatty Acids (FISH OIL) 1000 MG Cap capsule Take 1,000 mg by mouth every day.      vitamin D3 (CHOLECALCIFEROL) 1000 Unit (25 mcg) Tab Take 1,000 Units by mouth every day.      gabapentin (NEURONTIN) 300 MG Cap Take 600 mg by mouth 2 times a day.      aspirin EC (ECOTRIN) 81 MG Tablet Delayed Response Take 81 mg by mouth every day.       No current facility-administered medications for this encounter.     Facility-Administered Medications Ordered in Other Encounters   Medication Dose Route Frequency Provider Last Rate Last Admin    heparin lock flush 100 unit/mL injection 500 Units  500 Units Intracatheter PRN Diane Iglesias, A.P.N.   500 Units at 12/29/23 1057       Review of Systems   Constitutional:  Positive for malaise/fatigue. Negative for chills, fever and weight loss.   HENT:  Positive for tinnitus. Negative for congestion, ear pain, nosebleeds and sore throat.    Eyes:  Negative for blurred vision.   Respiratory:  Positive for cough and sputum production. Negative for shortness of breath and wheezing.    Cardiovascular:  Negative for chest pain, palpitations, orthopnea and leg swelling.   Gastrointestinal:  Positive for heartburn. Negative for abdominal pain, nausea and vomiting.   Genitourinary:  Negative for dysuria,  "frequency and urgency.   Musculoskeletal:  Negative for neck pain.   Neurological:  Positive for tingling. Negative for dizziness, tremors, sensory change, focal weakness and headaches.   Endo/Heme/Allergies:  Does not bruise/bleed easily.   Psychiatric/Behavioral:  Negative for depression, memory loss and suicidal ideas.    All other systems reviewed and are negative.      Problem list, medications, and allergies reviewed by myself today in Epic.     Objective:     Vitals:    12/29/23 1105   BP: 110/58   BP Location: Right arm   Patient Position: Sitting   BP Cuff Size: Adult   Pulse: 95   Temp: 37 °C (98.6 °F)   TempSrc: Temporal   SpO2: 96%   Weight: 76.8 kg (169 lb 3.3 oz)   Height: 1.753 m (5' 9.02\")         DESC; KARNOFSKY SCALE WITH ECOG EQUIVALENT: 90, Able to carry on normal activity; minor signs or symptoms of disease (ECOG equivalent 0)    DISTRESS LEVEL: no acute distress    Physical Exam  Constitutional:       General: He is not in acute distress.     Appearance: Normal appearance.      Comments: Port in place  Tired appearing male   HENT:      Head: Normocephalic and atraumatic.      Nose: Nose normal. No congestion.      Mouth/Throat:      Mouth: Mucous membranes are moist.      Pharynx: Oropharynx is clear.   Eyes:      General: No scleral icterus.     Conjunctiva/sclera: Conjunctivae normal.      Pupils: Pupils are equal, round, and reactive to light.   Cardiovascular:      Rate and Rhythm: Normal rate and regular rhythm.      Pulses: Normal pulses.      Heart sounds: No murmur heard.     No friction rub.   Pulmonary:      Effort: Pulmonary effort is normal. No respiratory distress.      Breath sounds: Normal breath sounds. No stridor. No wheezing or rales.   Chest:      Chest wall: No tenderness.   Abdominal:      General: Abdomen is flat. Bowel sounds are normal. There is no distension.      Palpations: Abdomen is soft. There is no mass.      Tenderness: There is no abdominal tenderness. There is " no guarding or rebound.   Musculoskeletal:         General: No swelling, tenderness or deformity. Normal range of motion.      Cervical back: Normal range of motion and neck supple. No rigidity or tenderness.      Right lower leg: No edema.      Left lower leg: No edema.      Comments: Walks with a cane   Skin:     General: Skin is warm.      Coloration: Skin is not jaundiced or pale.      Findings: No bruising or rash.   Neurological:      General: No focal deficit present.      Mental Status: He is alert and oriented to person, place, and time. Mental status is at baseline.      Motor: Weakness present.   Psychiatric:         Mood and Affect: Mood normal.         Behavior: Behavior normal.         Thought Content: Thought content normal.         Judgment: Judgment normal.         Labs:   Most recent labs reviewed.  Please see the lab tab of chart review    Imaging:   Most recent images below have been independently reviewed by me.  Please see the imaging tab of chart review    9/13/23 CT CAP  1.  Left lower lobe pulmonary masses, very slightly more prominent than on 8/29/2023.  2.  Multiple left pulmonary and pleural metastases.  3.  Left hilar emely metastases.  4.  No evidence of metastatic disease in the abdomen or pelvis.    9/12/2023 PET scan  1.  Interval increased size and intensity of LEFT lower lobe lung mass with new intrapulmonary and pleural metastases indicating progression of disease.  2.  Probable pleural metastasis at the RIGHT lung base posteriorly.  3.  Worsening LEFT hilar adenopathy, metastatic.  4.  No evidence of metastatic disease in the abdomen or pelvis.    6/6/2023 PET scan  1.  Multilobular hypermetabolic nodule in the superior segment LEFT lower lobe of lung measuring 2.3 cm consistent with malignancy.  2.  Involvement of the LEFT superior hilar lymph node.  3.  No evidence for metastatic disease in the abdomen or pelvis.    Pathology    A. Lung, left lower lobe, fine needle aspiration  slides:          Blood only; no epithelial or malignant cells identified.   B. Lung, left lower lobe, core biopsies:          Positive for small cell carcinoma.   C. Lung, left lower lobe, forceps biopsy with touch prep slides:          Positive for small cell carcinoma.   D. Lung, left lower lobe, fine needle aspiration:          Originally submitted for possible flow cytometric analysis,           which was deemed unnecessary.  Tissue will be submitted to           cytology for preparation of a cell block; an addendum will be           generated if there is an unexpected finding.   E. Lymph node, 10L, fine needle aspiration slides:          Malignant cells present, consistent with metastatic small cell   carcinoma.   F.  Lymph node, 10L, core biopsies:            Hypocellular specimen demonstrating few scattered lymphocytes           and few atypical cells suspicious for small cell carcinoma       Assessment/Plan:      Cancer Staging   SCLC (small cell lung carcinoma) (HCC)  Staging form: Lung, AJCC 8th Edition  - Clinical stage from 9/14/2023: Stage IIIA (cT3, cN1, cM0) - Signed by Leonel Patel M.D. on 9/14/2023       Mr. Ramsey who presents today with a new diagnosis of small cell lung cancer, limited stage.     He is currently enrolled in the HLX10 trial.     He is here prior to C5.  His performance status is quite low given that he is still recovering from his recent hospital admission as well as overcoming a cold.  We discussed the pros and cons of delaying treatment for a week and he agrees that this is.        Plan  -Enrolled in the HLX trial   -Defer cycle 5 for 1 week    Any questions and concerns raised by the patient were addressed and answered. Patient denies any further questions.  Patient encouraged to call the office with any concerns or issues.      Roseline Fuller M.D.  Hematology/Oncology

## 2023-12-30 LAB
CK MB SERPL-MCNC: 1.3 NG/ML (ref 0–7.7)
MISCELLANEOUS LAB RESULT MISCLAB: ABNORMAL

## 2023-12-31 ENCOUNTER — APPOINTMENT (OUTPATIENT)
Dept: ONCOLOGY | Facility: MEDICAL CENTER | Age: 73
End: 2023-12-31
Attending: STUDENT IN AN ORGANIZED HEALTH CARE EDUCATION/TRAINING PROGRAM
Payer: COMMERCIAL

## 2024-01-04 ENCOUNTER — TELEPHONE (OUTPATIENT)
Dept: ONCOLOGY | Facility: MEDICAL CENTER | Age: 74
End: 2024-01-04
Payer: MEDICARE

## 2024-01-04 NOTE — TELEPHONE ENCOUNTER
Nutrition Services: Brief Update  Wt Readings from Last 7 Encounters:   12/29/23 76.8 kg (169 lb 3.3 oz)   12/23/23 79.3 kg (174 lb 13.2 oz)   12/15/23 77.7 kg (171 lb 4.8 oz)   12/06/23 82.6 kg (182 lb 1.6 oz)   12/05/23 80.6 kg (177 lb 11.1 oz)   12/04/23 77 kg (169 lb 12.1 oz)   12/01/23 77.6 kg (171 lb 1.2 oz)     Weight Change: Pt's weights appear to be variable (ranges from 76.8 kg- 82.6 kg) but stable since 12/4.    Pertinent Recent Lab work (DATE 12/29): Glucose 159, phosphorus 2.0, A1C 5.9 (12/17/23)    RD called pt to check-in following his last infusion appt on 12/31. Per pt, he is no longer using his walker and is up, walking and feeling stronger. He reports that following his procedure for his esophagus at the hospital ~10 days ago, he continues to follow a liquid diet which he was instructed to follow a liquid diet for 3 weeks. He reports his swallowing has improved since being in the hospital and he is eating well and as much as he can. He states he is still drinking protein shakes and using protein powder. He is eating foods like cream of wheat/oats, soups (lentil, tomato, chicken pot pie etc.), puddings, blended meals and protein shakes. Pt did not have any nutrition questions or concerns at this time.    Plan/Recommend:  RD offered nutrition tips for a liquid diet or recipes but pt declined any additional information at this time  Encouraged pt to continue current diet regimen and including protein shakes daily    Pt verbalizes understanding and is receptive to information provided.   RD available PRN and will make further recommendations as indicated within policy.    [phone]

## 2024-01-05 ENCOUNTER — HOSPITAL ENCOUNTER (OUTPATIENT)
Dept: HEMATOLOGY ONCOLOGY | Facility: MEDICAL CENTER | Age: 74
End: 2024-01-05
Attending: STUDENT IN AN ORGANIZED HEALTH CARE EDUCATION/TRAINING PROGRAM
Payer: COMMERCIAL

## 2024-01-05 ENCOUNTER — OUTPATIENT INFUSION SERVICES (OUTPATIENT)
Dept: ONCOLOGY | Facility: MEDICAL CENTER | Age: 74
End: 2024-01-05
Attending: STUDENT IN AN ORGANIZED HEALTH CARE EDUCATION/TRAINING PROGRAM
Payer: MEDICARE

## 2024-01-05 VITALS
TEMPERATURE: 96.6 F | DIASTOLIC BLOOD PRESSURE: 72 MMHG | HEART RATE: 78 BPM | SYSTOLIC BLOOD PRESSURE: 130 MMHG | RESPIRATION RATE: 16 BRPM | OXYGEN SATURATION: 96 %

## 2024-01-05 VITALS
DIASTOLIC BLOOD PRESSURE: 70 MMHG | HEIGHT: 69 IN | BODY MASS INDEX: 24.16 KG/M2 | OXYGEN SATURATION: 97 % | WEIGHT: 163.14 LBS | TEMPERATURE: 98.4 F | SYSTOLIC BLOOD PRESSURE: 132 MMHG | HEART RATE: 79 BPM

## 2024-01-05 DIAGNOSIS — Z79.899 ENCOUNTER FOR LONG-TERM (CURRENT) USE OF HIGH-RISK MEDICATION: ICD-10-CM

## 2024-01-05 DIAGNOSIS — C34.90 SCLC (SMALL CELL LUNG CARCINOMA) (HCC): ICD-10-CM

## 2024-01-05 LAB
ALBUMIN SERPL BCP-MCNC: 3.8 G/DL (ref 3.2–4.9)
ALBUMIN/GLOB SERPL: 1.1 G/DL
ALP SERPL-CCNC: 70 U/L (ref 30–99)
ALT SERPL-CCNC: 8 U/L (ref 2–50)
ANION GAP SERPL CALC-SCNC: 11 MMOL/L (ref 7–16)
APPEARANCE UR: CLEAR
APTT PPP: 30.5 SEC (ref 24.7–36)
AST SERPL-CCNC: 14 U/L (ref 12–45)
BASOPHILS # BLD AUTO: 1.6 % (ref 0–1.8)
BASOPHILS # BLD: 0.09 K/UL (ref 0–0.12)
BILIRUB CONJ SERPL-MCNC: <0.2 MG/DL (ref 0.1–0.5)
BILIRUB INDIRECT SERPL-MCNC: NORMAL MG/DL (ref 0–1)
BILIRUB SERPL-MCNC: 0.4 MG/DL (ref 0.1–1.5)
BILIRUB UR QL STRIP.AUTO: NEGATIVE
BUN SERPL-MCNC: 32 MG/DL (ref 8–22)
CALCIUM ALBUM COR SERPL-MCNC: 9.9 MG/DL (ref 8.5–10.5)
CALCIUM SERPL-MCNC: 9.7 MG/DL (ref 8.5–10.5)
CHLORIDE SERPL-SCNC: 99 MMOL/L (ref 96–112)
CHOLEST SERPL-MCNC: 182 MG/DL (ref 100–199)
CK SERPL-CCNC: 46 U/L (ref 0–154)
CO2 SERPL-SCNC: 25 MMOL/L (ref 20–33)
COLOR UR: YELLOW
CREAT SERPL-MCNC: 1.42 MG/DL (ref 0.5–1.4)
EOSINOPHIL # BLD AUTO: 0.27 K/UL (ref 0–0.51)
EOSINOPHIL NFR BLD: 4.9 % (ref 0–6.9)
ERYTHROCYTE [DISTWIDTH] IN BLOOD BY AUTOMATED COUNT: 60.3 FL (ref 35.9–50)
GFR SERPLBLD CREATININE-BSD FMLA CKD-EPI: 52 ML/MIN/1.73 M 2
GLOBULIN SER CALC-MCNC: 3.5 G/DL (ref 1.9–3.5)
GLUCOSE SERPL-MCNC: 93 MG/DL (ref 65–99)
GLUCOSE UR STRIP.AUTO-MCNC: NEGATIVE MG/DL
HCT VFR BLD AUTO: 27 % (ref 42–52)
HDLC SERPL-MCNC: 33 MG/DL
HGB BLD-MCNC: 9 G/DL (ref 14–18)
IMM GRANULOCYTES # BLD AUTO: 0.03 K/UL (ref 0–0.11)
IMM GRANULOCYTES NFR BLD AUTO: 0.5 % (ref 0–0.9)
INR PPP: 1.04 (ref 0.87–1.13)
KETONES UR STRIP.AUTO-MCNC: NEGATIVE MG/DL
LDH SERPL L TO P-CCNC: 121 U/L (ref 107–266)
LDLC SERPL CALC-MCNC: 123 MG/DL
LEUKOCYTE ESTERASE UR QL STRIP.AUTO: NEGATIVE
LYMPHOCYTES # BLD AUTO: 1.03 K/UL (ref 1–4.8)
LYMPHOCYTES NFR BLD: 18.7 % (ref 22–41)
MAGNESIUM SERPL-MCNC: 1.7 MG/DL (ref 1.5–2.5)
MCH RBC QN AUTO: 32.1 PG (ref 27–33)
MCHC RBC AUTO-ENTMCNC: 33.3 G/DL (ref 32.3–36.5)
MCV RBC AUTO: 96.4 FL (ref 81.4–97.8)
MICRO URNS: NORMAL
MONOCYTES # BLD AUTO: 0.69 K/UL (ref 0–0.85)
MONOCYTES NFR BLD AUTO: 12.5 % (ref 0–13.4)
NEUTROPHILS # BLD AUTO: 3.41 K/UL (ref 1.82–7.42)
NEUTROPHILS NFR BLD: 61.8 % (ref 44–72)
NITRITE UR QL STRIP.AUTO: NEGATIVE
NRBC # BLD AUTO: 0 K/UL
NRBC BLD-RTO: 0 /100 WBC (ref 0–0.2)
NT-PROBNP SERPL IA-MCNC: 467 PG/ML (ref 0–125)
OUTPT INFUS CBC COMMENT OICOM: ABNORMAL
PH UR STRIP.AUTO: 7 [PH] (ref 5–8)
PHOSPHATE SERPL-MCNC: 3.3 MG/DL (ref 2.5–4.5)
PLATELET # BLD AUTO: 246 K/UL (ref 164–446)
PMV BLD AUTO: 8.9 FL (ref 9–12.9)
POTASSIUM SERPL-SCNC: 4.4 MMOL/L (ref 3.6–5.5)
PROT SERPL-MCNC: 7.3 G/DL (ref 6–8.2)
PROT UR QL STRIP: NEGATIVE MG/DL
PROTHROMBIN TIME: 13.7 SEC (ref 12–14.6)
RBC # BLD AUTO: 2.8 M/UL (ref 4.7–6.1)
RBC UR QL AUTO: NEGATIVE
SODIUM SERPL-SCNC: 135 MMOL/L (ref 135–145)
SP GR UR STRIP.AUTO: 1.01
T3 SERPL-MCNC: 141 NG/DL (ref 60–181)
T4 FREE SERPL-MCNC: 1.23 NG/DL (ref 0.93–1.7)
TRIGL SERPL-MCNC: 132 MG/DL (ref 0–149)
TROPONIN T SERPL-MCNC: 27 NG/L (ref 6–19)
TSH SERPL DL<=0.005 MIU/L-ACNC: 1.85 UIU/ML (ref 0.38–5.33)
UROBILINOGEN UR STRIP.AUTO-MCNC: 0.2 MG/DL
WBC # BLD AUTO: 5.5 K/UL (ref 4.8–10.8)

## 2024-01-05 PROCEDURE — 84100 ASSAY OF PHOSPHORUS: CPT

## 2024-01-05 PROCEDURE — 83735 ASSAY OF MAGNESIUM: CPT

## 2024-01-05 PROCEDURE — 84480 ASSAY TRIIODOTHYRONINE (T3): CPT

## 2024-01-05 PROCEDURE — 85610 PROTHROMBIN TIME: CPT

## 2024-01-05 PROCEDURE — 82550 ASSAY OF CK (CPK): CPT

## 2024-01-05 PROCEDURE — 81003 URINALYSIS AUTO W/O SCOPE: CPT

## 2024-01-05 PROCEDURE — 80061 LIPID PANEL: CPT

## 2024-01-05 PROCEDURE — 85025 COMPLETE CBC W/AUTO DIFF WBC: CPT

## 2024-01-05 PROCEDURE — 83615 LACTATE (LD) (LDH) ENZYME: CPT

## 2024-01-05 PROCEDURE — 36591 DRAW BLOOD OFF VENOUS DEVICE: CPT

## 2024-01-05 PROCEDURE — A4212 NON CORING NEEDLE OR STYLET: HCPCS

## 2024-01-05 PROCEDURE — 85730 THROMBOPLASTIN TIME PARTIAL: CPT

## 2024-01-05 PROCEDURE — 84439 ASSAY OF FREE THYROXINE: CPT

## 2024-01-05 PROCEDURE — 99212 OFFICE O/P EST SF 10 MIN: CPT | Performed by: STUDENT IN AN ORGANIZED HEALTH CARE EDUCATION/TRAINING PROGRAM

## 2024-01-05 PROCEDURE — 700111 HCHG RX REV CODE 636 W/ 250 OVERRIDE (IP): Performed by: NURSE PRACTITIONER

## 2024-01-05 PROCEDURE — 84484 ASSAY OF TROPONIN QUANT: CPT | Mod: 91

## 2024-01-05 PROCEDURE — 82248 BILIRUBIN DIRECT: CPT

## 2024-01-05 PROCEDURE — 80053 COMPREHEN METABOLIC PANEL: CPT

## 2024-01-05 PROCEDURE — 82553 CREATINE MB FRACTION: CPT

## 2024-01-05 PROCEDURE — 84443 ASSAY THYROID STIM HORMONE: CPT

## 2024-01-05 PROCEDURE — 99214 OFFICE O/P EST MOD 30 MIN: CPT | Performed by: STUDENT IN AN ORGANIZED HEALTH CARE EDUCATION/TRAINING PROGRAM

## 2024-01-05 PROCEDURE — 83880 ASSAY OF NATRIURETIC PEPTIDE: CPT

## 2024-01-05 RX ADMIN — HEPARIN 500 UNITS: 100 SYRINGE at 08:34

## 2024-01-05 ASSESSMENT — ENCOUNTER SYMPTOMS
ABDOMINAL PAIN: 0
SPUTUM PRODUCTION: 1
MEMORY LOSS: 0
DEPRESSION: 0
FOCAL WEAKNESS: 0
CHILLS: 0
NECK PAIN: 0
FEVER: 0
SORE THROAT: 0
VOMITING: 0
DIZZINESS: 0
TINGLING: 1
TREMORS: 0
COUGH: 0
NAUSEA: 0
SENSORY CHANGE: 0
PALPITATIONS: 0
WEIGHT LOSS: 0
ORTHOPNEA: 0
SHORTNESS OF BREATH: 0
BLURRED VISION: 0
HEADACHES: 0
BRUISES/BLEEDS EASILY: 0
HEARTBURN: 1
WHEEZING: 0

## 2024-01-05 ASSESSMENT — PAIN DESCRIPTION - PAIN TYPE: TYPE: CHRONIC PAIN

## 2024-01-05 ASSESSMENT — FIBROSIS 4 INDEX: FIB4 SCORE: 0.9

## 2024-01-05 NOTE — ADDENDUM NOTE
Encounter addended by: Santo Quintero on: 1/5/2024 9:48 AM   Actions taken: Charge Capture section accepted

## 2024-01-05 NOTE — PROGRESS NOTES
"    Consult Note: Hematology/Oncology     Primary Care:  Pcp Pt States None    Chief Complaint   Patient presents with    Cancer     Prechemo          Current Treatment:     9/18/23   C1: Cis/Etop/HLX10 vs Placebo  10/9/23   C2: Cis/Etop/HLX vs Placebo + RT  10/28/23 C3: Cis/Etop/HLX vs Placebo + RT (treatment deferred 1 week for neutropenia)  11/6/23   C3: Cis/Etop/HLX vs Placebo + RT  12/4/23: C4 Cis/Etop/HLX  1/6/24: C5 HLX vs placebo    Prior Treatment: None    Subjective:   History of Presenting Illness:  Bart Ramsey is a 73 y.o. male who presents with a new diagnosis of SCLC    Patient reports that in February he felt that he was being strangled.  He called the ambulance and he was taken to the ER.  He was placed on BP meds.  He still felt terrible after several days.  [facility]bl[facility] which was removed.  Imaging at that time showed some nodules on his lung.      In March or April 2023 he had a biopsy attempt of his lung nodules.  Per patient biopsy result was inconclusive      PET scan was done on 6/6/2023 which showed a hypermetabolic nodule in the superior segment LEFT lower lobe of lung measuring 2.3 cm consistent with malignancy. Involvement of the LEFT superior hilar lymph node.  No evidence for metastatic disease in the abdomen or pelvis.    He had a bronchoscopy Pathology revealed SCLC.     He had agent orange exposure from [place] ([id number]). Son committed suicide 1 year ago today.  He is raising his granddaughter ([place]).     He quit in 1994 (started in 1966, 28 pack year history).  Used to smoke marijuana.  He states he drinks too much (drinks a \"couple good drinks\" per night).  He has a healthy diet.  He eats a lot of vegetables.     Patient was diagnosed with COVID-19 on 11/13. CT scan showed lower lobe infiltrate. During the stay in the hospital, patient continued receive treatment for community-acquired pneumonia with cefepime and " doxycycline; he continued to feel better.  At discharge, given levofloxacin.    Patient was admitted to the hospital on December 17.  He came in with generalized weakness and shortness of breath found to be severely neutropenic and thus was started on vancomycin and Zosyn patient was having difficulty swallowing and mass he was evaluated by GI and found to have erosive esophagitis which was likely due to radiation esophagitis worsened by pill esophagitis reflux.    We deferred C5.    Interval History    Patient reports that he is doing better today.  He is no longer coughing up sputum.     No HA, NVD    He continues to have tinnitus and some tingling in hands and feet.     Past Medical History:   Diagnosis Date    Arthritis     knee    Bowel habit changes     diarrhea    Bronchitis     Cancer ([facility]) 09/29/2023    small cell lung cancer    COPD (chronic obstructive pulmonary disease) ([facility])     Diabetes ([facility])     diet controlled, no medications.    Emphysema of lung ([facility])     COPD- no medications    High cholesterol 08/2023    Was on a statin, MD monitoring, not currently on meds    Myocardial infarct ([facility])     1994,1996,2000    Pneumonia     Psychiatric problem 08/2023    depression, son passed away recently, no meds    Sleep apnea     cpap        Past Surgical History:   Procedure Laterality Date    [facility] UPPER GI ENDOSCOPY,DIAGNOSIS N/A 12/22/2023    Procedure: GASTROSCOPY WITH DILATION;  Surgeon: Dano Oviedo M.D.;  Location: [facility];  Service: Gastroenterology    VA BRONCHOSCOPY,DIAGNOSTIC N/A 8/29/2023    Procedure: FIBER OPTIC BRONCHOSCOPY WITH  WASH, BRUSH, BRONCHOALVEOLAR LAVAGE, BIOPSY AND FINE NEEDLE ASPIRATION, ENDOBRONCHIAL ULTRASOUND & NAVIGATION,  ROBOTICS;  Surgeon: Bart Cortez M.D.;  Location: [facility];  Service: Pulmonary Robotic    ENDOBRONCHIAL US ADD-ON N/A 8/29/2023    Procedure: ENDOBRONCHIAL ULTRASOUND (EBUS);  Surgeon: Bart Cortez M.D.;  Location:  SURGERY [facility];  Service: Pulmonary Robotic    GASTROSCOPY-ENDO  2017    Procedure: GASTROSCOPY-ENDO;  Surgeon: Emerson Eaton M.D.;  Location: ENDOSCOPY [facility];  Service:     COLONOSCOPY - ENDO  2017    Procedure: COLONOSCOPY - ENDO;  Surgeon: Emerson Eaton M.D.;  Location: ENDOSCOPY [facility];  Service:     CHOLECYSTECTOMY      INGUINAL HERNIA REPAIR Right     OTHER      Tonsillectomy    OTHER      Hernia     OTHER CARDIAC SURGERY      3 stents    TONSILLECTOMY         Social History     Tobacco Use    Smoking status: Former     Current packs/day: 0.00     Average packs/day: 0.5 packs/day for 14.0 years (7.0 ttl pk-yrs)     Types: Cigarettes     Start date: 1980     Quit date:      Years since quittin.0    Tobacco comments:     Pt quit smoking cigarettes, 2000.  1/2 pack per day, smoked 20 years    Vaping Use    Vaping Use: Never used   Substance Use Topics    Alcohol use: Not Currently     Alcohol/week: 8.4 oz     Types: 14 Shots of liquor per week     Comment: daily    Drug use: Not Currently     Types: Marijuana     Comment: quit         Family History   Problem Relation Age of Onset    Cancer Mother         lung?       No Known Allergies    Current Outpatient Medications   Medication Sig Dispense Refill    albuterol 108 (90 Base) MCG/ACT Aero Soln inhalation aerosol Inhale 2 Puffs every four hours as needed for Shortness of Breath for up to 30 days. 8.5 g 1    omeprazole (PRILOSEC) 40 MG delayed-release capsule Take 1 Capsule by mouth 2 times a day for 30 days. 60 Capsule 1    sucralfate (CARAFATE) 1 GM/10ML Suspension Take 10 mL by mouth 4 Times a Day,Before Meals and at Bedtime for 14 days. 560 mL 0    thiamine (THIAMINE) 100 MG tablet Take 1 Tablet by mouth every day for 30 days. 30 Tablet 1    Magnesium Glycinate 100 MG Cap Take 1 Capsule by mouth every day with lunch for 90 days.      guaiFENesin ER (MUCINEX) 600 MG TABLET SR 12 HR Take 1 Tablet by mouth  every 12 hours. 28 Tablet 0    ondansetron (ZOFRAN) 4 MG Tab tablet Take 1 Tablet by mouth every four hours as needed for Nausea/Vomiting (for nausea, vomiting). 30 Tablet 6    prochlorperazine (COMPAZINE) 10 MG Tab Take 1 Tablet by mouth every 6 hours as needed (for nausea, vomiting). 30 Tablet 6    metFORMIN (GLUCOPHAGE) 500 MG Tab Take 750 mg by mouth 2 times a day with meals. 500 mg x 1.5 tab = 750 mg      baclofen (LIORESAL) 10 MG Tab Take 10 mg by mouth at bedtime as needed. Indications: Muscle Spasm      Cyanocobalamin (VITAMIN B-12) 1000 MCG Tab Take 1,000 mcg by mouth every day.      fluticasone-salmeterol (ADVAIR) 250-50 MCG/ACT AEROSOL POWDER, BREATH ACTIVATED Inhale 1 Puff 2 times a day. Indications: Asthma      Omega-3 Fatty Acids (FISH OIL) 1000 MG Cap capsule Take 1,000 mg by mouth every day.      vitamin D3 (CHOLECALCIFEROL) 1000 Unit (25 mcg) Tab Take 1,000 Units by mouth every day.      gabapentin (NEURONTIN) 300 MG Cap Take 600 mg by mouth 2 times a day.      aspirin EC (ECOTRIN) 81 MG Tablet Delayed Response Take 81 mg by mouth every day.       No current facility-administered medications for this encounter.     Facility-Administered Medications Ordered in Other Encounters   Medication Dose Route Frequency Provider Last Rate Last Admin    heparin lock flush 100 unit/mL injection 500 Units  500 Units Intracatheter PRN Diane Iglesias, A.P.N.   500 Units at 01/05/24 0834       Review of Systems   Constitutional:  Positive for malaise/fatigue. Negative for chills, fever and weight loss.   HENT:  Positive for tinnitus. Negative for congestion, ear pain, nosebleeds and sore throat.    Eyes:  Negative for blurred vision.   Respiratory:  Positive for sputum production (improving). Negative for cough, shortness of breath and wheezing.    Cardiovascular:  Negative for chest pain, palpitations, orthopnea and leg swelling.   Gastrointestinal:  Positive for heartburn. Negative for abdominal pain, nausea and  "vomiting.   Genitourinary:  Negative for dysuria, frequency and urgency.   Musculoskeletal:  Negative for neck pain.   Neurological:  Positive for tingling. Negative for dizziness, tremors, sensory change, focal weakness and headaches.   Endo/Heme/Allergies:  Does not bruise/bleed easily.   Psychiatric/Behavioral:  Negative for depression, memory loss and suicidal ideas.    All other systems reviewed and are negative.      Problem list, medications, and allergies reviewed by myself today in Epic.     Objective:     Vitals:    01/05/24 0909   BP: 132/70   BP Location: Right arm   Patient Position: Sitting   BP Cuff Size: Adult   Pulse: 79   Temp: 36.9 °C (98.4 °F)   TempSrc: Temporal   SpO2: 97%   Weight: 74 kg (163 lb 2.3 oz)   Height: 1.753 m (5' 9.02\")         DESC; KARNOFSKY SCALE WITH ECOG EQUIVALENT: 90, Able to carry on normal activity; minor signs or symptoms of disease (ECOG equivalent 0)    DISTRESS LEVEL: no acute distress    Physical Exam  Constitutional:       General: He is not in acute distress.     Appearance: Normal appearance.      Comments: Port in place  Tired appearing male   HENT:      Head: Normocephalic and atraumatic.      Nose: Nose normal. No congestion.      Mouth/Throat:      Mouth: Mucous membranes are moist.      Pharynx: Oropharynx is clear.   Eyes:      General: No scleral icterus.     Conjunctiva/sclera: Conjunctivae normal.      Pupils: Pupils are equal, round, and reactive to light.   Cardiovascular:      Rate and Rhythm: Normal rate and regular rhythm.      Pulses: Normal pulses.      Heart sounds: No murmur heard.     No friction rub.   Pulmonary:      Effort: Pulmonary effort is normal. No respiratory distress.      Breath sounds: Normal breath sounds. No stridor. No wheezing or rales.   Chest:      Chest wall: No tenderness.   Abdominal:      General: Abdomen is flat. Bowel sounds are normal. There is no distension.      Palpations: Abdomen is soft. There is no mass.      " Tenderness: There is no abdominal tenderness. There is no guarding or rebound.   Musculoskeletal:         General: No swelling, tenderness or deformity. Normal range of motion.      Cervical back: Normal range of motion and neck supple. No rigidity or tenderness.      Right lower leg: No edema.      Left lower leg: No edema.      Comments: Walks with a cane   Skin:     General: Skin is warm.      Coloration: Skin is not jaundiced or pale.      Findings: No bruising or rash.   Neurological:      General: No focal deficit present.      Mental Status: He is alert and oriented to person, place, and time. Mental status is at baseline.      Motor: Weakness present.   Psychiatric:         Mood and Affect: Mood normal.         Behavior: Behavior normal.         Thought Content: Thought content normal.         Judgment: Judgment normal.         Labs:   Most recent labs reviewed.  Please see the lab tab of chart review    Imaging:   Most recent images below have been independently reviewed by me.  Please see the imaging tab of chart review    9/13/23 CT CAP  1.  Left lower lobe pulmonary masses, very slightly more prominent than on 8/29/2023.  2.  Multiple left pulmonary and pleural metastases.  3.  Left hilar emely metastases.  4.  No evidence of metastatic disease in the abdomen or pelvis.    9/12/2023 PET scan  1.  Interval increased size and intensity of LEFT lower lobe lung mass with new intrapulmonary and pleural metastases indicating progression of disease.  2.  Probable pleural metastasis at the RIGHT lung base posteriorly.  3.  Worsening LEFT hilar adenopathy, metastatic.  4.  No evidence of metastatic disease in the abdomen or pelvis.    6/6/2023 PET scan  1.  Multilobular hypermetabolic nodule in the superior segment LEFT lower lobe of lung measuring 2.3 cm consistent with malignancy.  2.  Involvement of the LEFT superior hilar lymph node.  3.  No evidence for metastatic disease in the abdomen or  pelvis.    Pathology    A. Lung, left lower lobe, fine needle aspiration slides:          Blood only; no epithelial or malignant cells identified.   B. Lung, left lower lobe, core biopsies:          Positive for small cell carcinoma.   C. Lung, left lower lobe, forceps biopsy with touch prep slides:          Positive for small cell carcinoma.   D. Lung, left lower lobe, fine needle aspiration:          Originally submitted for possible flow cytometric analysis,           which was deemed unnecessary.  Tissue will be submitted to           cytology for preparation of a cell block; an addendum will be           generated if there is an unexpected finding.   E. Lymph node, 10L, fine needle aspiration slides:          Malignant cells present, consistent with metastatic small cell   carcinoma.   F.  Lymph node, 10L, core biopsies:            Hypocellular specimen demonstrating few scattered lymphocytes           and few atypical cells suspicious for small cell carcinoma       Assessment/Plan:      Cancer Staging   SCLC (small cell lung carcinoma) (HCC)  Staging form: Lung, AJCC 8th Edition  - Clinical stage from 9/14/2023: Stage IIIA (cT3, cN1, cM0) - Signed by Leonel Patel M.D. on 9/14/2023       Mr. Ramsey who presents today with a new diagnosis of small cell lung cancer, limited stage.     He is currently enrolled in the HLX10 trial.     He is here prior to C5.     He is doing better and cleared for treatment.       Plan  -Enrolled in the HLX trial  -cleared for cycle 5     Any questions and concerns raised by the patient were addressed and answered. Patient denies any further questions.  Patient encouraged to call the office with any concerns or issues.      Roseline Fuller M.D.  Hematology/Oncology

## 2024-01-05 NOTE — ADDENDUM NOTE
Encounter addended by: Santo Quintero on: 1/5/2024 9:40 AM   Actions taken: Charge Capture section accepted

## 2024-01-06 LAB
CK MB SERPL-MCNC: 1.3 NG/ML (ref 0–7.7)
MISCELLANEOUS LAB RESULT MISCLAB: NORMAL

## 2024-01-06 NOTE — PROGRESS NOTES
"Pharmacy Chemotherapy Verification:    Patient Name: Bart Ramsey  Dx: small cell lung cancer  Study Protocol: [id number]  Participant # [id number]    HLX10 or placebo 300 mg IV over 30-60 min on day 1  Cisplatin 75 mg/m2 IV over 60 min on day 1  Etoposide 100 mg/m2 IV over 1-2 hours on days 1-3  Every 21 days with concurrent radiation x 4 cycles Completed 12/6/23    followed by  HLX10 or placebo 300 mg IV over 30-60 min on day 1  Every 21 days up to one year  A Randomized, Double-Blind, International Multicenter, Phase III Study to Evaluate the Anti-Tumor Efficacy and Safety of HLX10 (Recombinant Humanized Anti-PD-1 Monoclonal Antibody Injection) or Placebo in Combination with Chemotherapy (Carboplatin/Cisplatin-Etoposide) and Concurrent Radiotherapy in Patients with Limited-Stage Small Cell Lung Cancer (LS-SCLC). [id number]  Allergies: Patient has no known allergies.       /80   Pulse 90   Temp 36.2 °C (97.2 °F) (Temporal)   Resp 18   Ht 1.72 m (5' 7.72\")   Wt 73.3 kg (161 lb 9.6 oz)   SpO2 95%   BMI 24.78 kg/m²      Body surface area is 1.87 meters squared.    Labs 1/5/24:  ANC~ 3410 Plt = 246k   Hgb = 9     SCr = 1.42 mg/dL CrCl ~ 48 mL/min   AST/ALT/AP = WNL TBili = 0.4  Mag = 1.7  K+ = 4.4  Phos 3.3  TSH = 1.85   Free T4 = 1.23    T3 = 141     Drug Order   (Drug name, dose, route, IV Fluid & volume, frequency, number of doses) Cycle 5, Day 1 of 3 (delayed 1 wk 2/2 weakness following hospital admission)  Previous treatment: C4 Days 1-3 on 12/4-12/6/23 (treatment deferred 1 week due to covid admission)      Medication = HLX10 or placebo  Base Dose = 300 mg  Fixed dose, no calculation required  Final Dose = 300 mg  Route = IV  Fluid & Volume =  mL  Admin Duration = Over 60 min     Study-supplied medication       First infusion 60 min. If tolerated, subsequent infusions 30 min.      By my signature below, I confirm this process was performed independently with the BSA and all " final chemotherapy dosing calculations congruent. I have reviewed the above chemotherapy order and that my calculation of the final dose and BSA (when applicable) corroborate those calculations of the  pharmacist. Discrepancies of 10% or greater in the written dose have been addressed and documented within the EPIC Progress notes.    Benita Pena, PharmD, BCOP

## 2024-01-06 NOTE — PROGRESS NOTES
Pt presented for pre-chemo lab draw from port. EMLA removed, rt chest port accessed in sterile fashion. All ordered labs drawn and urine collected. Port flushed, heparinized and de-accessed, gauze drsg placed. Has chemo appt Monday, left on foot to self care.

## 2024-01-08 ENCOUNTER — OUTPATIENT INFUSION SERVICES (OUTPATIENT)
Dept: ONCOLOGY | Facility: MEDICAL CENTER | Age: 74
End: 2024-01-08
Attending: STUDENT IN AN ORGANIZED HEALTH CARE EDUCATION/TRAINING PROGRAM
Payer: MEDICARE

## 2024-01-08 VITALS
HEART RATE: 90 BPM | TEMPERATURE: 97.2 F | RESPIRATION RATE: 18 BRPM | OXYGEN SATURATION: 95 % | SYSTOLIC BLOOD PRESSURE: 133 MMHG | HEIGHT: 68 IN | WEIGHT: 161.6 LBS | DIASTOLIC BLOOD PRESSURE: 80 MMHG | BODY MASS INDEX: 24.49 KG/M2

## 2024-01-08 DIAGNOSIS — C34.90 SCLC (SMALL CELL LUNG CARCINOMA) (HCC): ICD-10-CM

## 2024-01-08 PROCEDURE — 700101 HCHG RX REV CODE 250: Performed by: NURSE PRACTITIONER

## 2024-01-08 PROCEDURE — A4212 NON CORING NEEDLE OR STYLET: HCPCS

## 2024-01-08 PROCEDURE — 700111 HCHG RX REV CODE 636 W/ 250 OVERRIDE (IP): Performed by: STUDENT IN AN ORGANIZED HEALTH CARE EDUCATION/TRAINING PROGRAM

## 2024-01-08 PROCEDURE — A9270 NON-COVERED ITEM OR SERVICE: HCPCS | Performed by: NURSE PRACTITIONER

## 2024-01-08 PROCEDURE — 96413 CHEMO IV INFUSION 1 HR: CPT

## 2024-01-08 RX ORDER — 0.9 % SODIUM CHLORIDE 0.9 %
VIAL (ML) INJECTION PRN
OUTPATIENT
Start: 2024-01-08

## 2024-01-08 RX ORDER — SODIUM CHLORIDE 9 MG/ML
INJECTION, SOLUTION INTRAVENOUS CONTINUOUS
OUTPATIENT
Start: 2024-01-08

## 2024-01-08 RX ORDER — DIPHENHYDRAMINE HCL 25 MG
25 TABLET ORAL ONCE
OUTPATIENT
Start: 2024-01-08 | End: 2024-01-08

## 2024-01-08 RX ORDER — ACETAMINOPHEN 325 MG/1
650 TABLET ORAL PRN
OUTPATIENT
Start: 2024-01-08

## 2024-01-08 RX ORDER — 0.9 % SODIUM CHLORIDE 0.9 %
3 VIAL (ML) INJECTION PRN
OUTPATIENT
Start: 2024-01-08

## 2024-01-08 RX ORDER — 0.9 % SODIUM CHLORIDE 0.9 %
10 VIAL (ML) INJECTION PRN
OUTPATIENT
Start: 2024-01-08

## 2024-01-08 RX ORDER — DIPHENHYDRAMINE HYDROCHLORIDE 50 MG/ML
25 INJECTION INTRAMUSCULAR; INTRAVENOUS PRN
OUTPATIENT
Start: 2024-01-08

## 2024-01-08 RX ADMIN — Medication 300 MG: at 10:19

## 2024-01-08 RX ADMIN — HEPARIN 500 UNITS: 100 SYRINGE at 11:50

## 2024-01-08 ASSESSMENT — PAIN DESCRIPTION - PAIN TYPE: TYPE: CHRONIC PAIN;NEUROPATHIC PAIN

## 2024-01-08 ASSESSMENT — FIBROSIS 4 INDEX: FIB4 SCORE: 1.47

## 2024-01-08 NOTE — PROGRESS NOTES
"Pharmacy Chemotherapy Verification:   Patient Name: Bart Ramsey  Dx: small cell lung cancer  Cycle 5 (delayed 2 weeks d/t illness)  Previous treatment: C3 12/4-12/6  Study Protocol: [id number]  Participant # [id number]    HLX10 or placebo 300 mg IV over 30-60 min on day 1  Cisplatin 75 mg/m2 IV over 60 min on day 1  Etoposide 100 mg/m2 IV over 1-2 hours on days 1-3  Every 21 days with concurrent radiation x4 cycles    followed by  HLX10 or placebo 300 mg IV over 30-60 min on day 1  Every 21 days until DP/UT  A Randomized, Double-Blind, International Multicenter, Phase III Study to Evaluate the Anti-Tumor Efficacy and Safety of HLX10 (Recombinant Humanized Anti-PD-1 Monoclonal Antibody Injection) or Placebo in Combination with Chemotherapy (Carboplatin/Cisplatin-Etoposide) and Concurrent Radiotherapy in Patients with Limited-Stage Small Cell Lung Cancer (LS-SCLC). [id number]      Allergies:  Patient has no known allergies.       /80   Pulse 90   Temp 36.2 °C (97.2 °F) (Temporal)   Resp 18   Ht 1.72 m (5' 7.72\")   Wt 73.3 kg (161 lb 9.6 oz)   SpO2 95%   BMI 24.78 kg/m²  Body surface area is 1.87 meters squared.    Labs 1/5/24:  ANC~ 3410 Plt = 246k   Hgb = 9.0     SCr = 1.42 mg/dL CrCl ~ 47 mL/min   AST/ALT/AP = 14/8/70 TBili = 0.4  TSH = 1.85 Free T4 = 1.23  HLX10 or placebo 300 mg fixed dose = 300 mg IV   Fixed dose, no calculation required = 300 mg IV    Mainor Yap, PharmD  "

## 2024-01-08 NOTE — PROGRESS NOTES
Chemotherapy Verification - SECONDARY RN       Height = 172 cm  Weight = 73.3 kg  BSA = 1.87 m2       Medication: HLX10 or placebo  Dose: 300 mg set dose  Calculated Dose: 300 mg                             (In mg/m2, AUC, mg/kg)     I confirm that this process was performed independently.

## 2024-01-08 NOTE — PROGRESS NOTES
Pt arrived ambulatory to [facility] for D1C5 HLX10 or placebo (study drug) for SCLC. POC discussed with pt and he agrees with plan.     Lab results reviewed form 1/5/2024, ok to proceed with treatment. Port accessed in sterile fashion, brisk blood return noted. Pt medicated per MAR. HLX10 or placebo (study drug) infused over 1 hour. Pt tolerated treatment without s/s adverse reaction. Port with brisk blood return flushed with NS then heparin. Port de-accessed, gauze dressing applied.     Pt discharged to self care, [facility]. Pt's next appt confirmed 1/26/2024 for pre-chemo labs.

## 2024-01-08 NOTE — PROGRESS NOTES
Chemotherapy Verification - PRIMARY RN      Height = 172cm  Weight = 73.3kg  BSA = 1.87m^2       Medication: HLX10 or PLACEBO (STUDY DRUG)  Dose: set dose 300mg  Calculated Dose: set dose 300mg                                  I confirm this process was performed independently with the BSA and all final chemotherapy dosing calculations congruent.  Any discrepancies of 10% or greater have been addressed with the chemotherapy pharmacist. The resolution of the discrepancy has been documented in the EPIC progress notes.

## 2024-01-19 DIAGNOSIS — C34.90 SCLC (SMALL CELL LUNG CARCINOMA) (HCC): ICD-10-CM

## 2024-01-21 ENCOUNTER — HOSPITAL ENCOUNTER (OUTPATIENT)
Dept: RADIOLOGY | Facility: MEDICAL CENTER | Age: 74
End: 2024-01-21
Attending: STUDENT IN AN ORGANIZED HEALTH CARE EDUCATION/TRAINING PROGRAM
Payer: COMMERCIAL

## 2024-01-21 DIAGNOSIS — C34.90 SCLC (SMALL CELL LUNG CARCINOMA) (HCC): ICD-10-CM

## 2024-01-21 PROCEDURE — 71260 CT THORAX DX C+: CPT

## 2024-01-21 PROCEDURE — 700117 HCHG RX CONTRAST REV CODE 255: Performed by: STUDENT IN AN ORGANIZED HEALTH CARE EDUCATION/TRAINING PROGRAM

## 2024-01-21 RX ADMIN — IOHEXOL 100 ML: 350 INJECTION, SOLUTION INTRAVENOUS at 18:43

## 2024-01-22 RX ORDER — 0.9 % SODIUM CHLORIDE 0.9 %
10 VIAL (ML) INJECTION PRN
Status: CANCELLED | OUTPATIENT
Start: 2024-01-25

## 2024-01-22 RX ORDER — SODIUM CHLORIDE 9 MG/ML
INJECTION, SOLUTION INTRAVENOUS CONTINUOUS
Status: CANCELLED | OUTPATIENT
Start: 2024-01-27

## 2024-01-22 RX ORDER — DIPHENHYDRAMINE HYDROCHLORIDE 50 MG/ML
50 INJECTION INTRAMUSCULAR; INTRAVENOUS PRN
Status: CANCELLED | OUTPATIENT
Start: 2024-01-27

## 2024-01-22 RX ORDER — ONDANSETRON 2 MG/ML
4 INJECTION INTRAMUSCULAR; INTRAVENOUS PRN
Status: CANCELLED | OUTPATIENT
Start: 2024-01-27

## 2024-01-22 RX ORDER — 0.9 % SODIUM CHLORIDE 0.9 %
10 VIAL (ML) INJECTION PRN
Status: CANCELLED | OUTPATIENT
Start: 2024-01-27

## 2024-01-22 RX ORDER — METHYLPREDNISOLONE SODIUM SUCCINATE 125 MG/2ML
125 INJECTION, POWDER, LYOPHILIZED, FOR SOLUTION INTRAMUSCULAR; INTRAVENOUS PRN
Status: CANCELLED | OUTPATIENT
Start: 2024-01-27

## 2024-01-22 RX ORDER — 0.9 % SODIUM CHLORIDE 0.9 %
3 VIAL (ML) INJECTION PRN
Status: CANCELLED | OUTPATIENT
Start: 2024-01-25

## 2024-01-22 RX ORDER — PROCHLORPERAZINE MALEATE 10 MG
10 TABLET ORAL EVERY 6 HOURS PRN
Status: CANCELLED | OUTPATIENT
Start: 2024-01-27

## 2024-01-22 RX ORDER — 0.9 % SODIUM CHLORIDE 0.9 %
3 VIAL (ML) INJECTION PRN
Status: CANCELLED | OUTPATIENT
Start: 2024-01-27

## 2024-01-22 RX ORDER — ONDANSETRON 8 MG/1
8 TABLET, ORALLY DISINTEGRATING ORAL PRN
Status: CANCELLED | OUTPATIENT
Start: 2024-01-27

## 2024-01-22 RX ORDER — 0.9 % SODIUM CHLORIDE 0.9 %
VIAL (ML) INJECTION PRN
Status: CANCELLED | OUTPATIENT
Start: 2024-01-27

## 2024-01-22 RX ORDER — EPINEPHRINE 1 MG/ML(1)
0.5 AMPUL (ML) INJECTION PRN
Status: CANCELLED | OUTPATIENT
Start: 2024-01-27

## 2024-01-22 RX ORDER — 0.9 % SODIUM CHLORIDE 0.9 %
VIAL (ML) INJECTION PRN
Status: CANCELLED | OUTPATIENT
Start: 2024-01-25

## 2024-01-23 ENCOUNTER — TELEPHONE (OUTPATIENT)
Dept: HEALTH INFORMATION MANAGEMENT | Facility: OTHER | Age: 74
End: 2024-01-23
Payer: MEDICARE

## 2024-01-26 ENCOUNTER — HOSPITAL ENCOUNTER (OUTPATIENT)
Dept: HEMATOLOGY ONCOLOGY | Facility: MEDICAL CENTER | Age: 74
End: 2024-01-26
Attending: STUDENT IN AN ORGANIZED HEALTH CARE EDUCATION/TRAINING PROGRAM
Payer: COMMERCIAL

## 2024-01-26 ENCOUNTER — RESEARCH ENCOUNTER (OUTPATIENT)
Dept: HEMATOLOGY ONCOLOGY | Facility: MEDICAL CENTER | Age: 74
End: 2024-01-26

## 2024-01-26 ENCOUNTER — OUTPATIENT INFUSION SERVICES (OUTPATIENT)
Dept: ONCOLOGY | Facility: MEDICAL CENTER | Age: 74
End: 2024-01-26
Attending: STUDENT IN AN ORGANIZED HEALTH CARE EDUCATION/TRAINING PROGRAM

## 2024-01-26 VITALS
HEART RATE: 65 BPM | DIASTOLIC BLOOD PRESSURE: 69 MMHG | RESPIRATION RATE: 18 BRPM | SYSTOLIC BLOOD PRESSURE: 132 MMHG | OXYGEN SATURATION: 98 % | TEMPERATURE: 98 F

## 2024-01-26 VITALS
HEART RATE: 69 BPM | DIASTOLIC BLOOD PRESSURE: 64 MMHG | OXYGEN SATURATION: 95 % | SYSTOLIC BLOOD PRESSURE: 122 MMHG | WEIGHT: 169.53 LBS | BODY MASS INDEX: 25.69 KG/M2 | HEIGHT: 68 IN | TEMPERATURE: 98.1 F

## 2024-01-26 DIAGNOSIS — C44.91 BASAL CELL CARCINOMA (BCC), UNSPECIFIED SITE: ICD-10-CM

## 2024-01-26 DIAGNOSIS — C34.90 SCLC (SMALL CELL LUNG CARCINOMA) (HCC): ICD-10-CM

## 2024-01-26 LAB
ALBUMIN SERPL BCP-MCNC: 4 G/DL (ref 3.2–4.9)
ALBUMIN/GLOB SERPL: 1.3 G/DL
ALP SERPL-CCNC: 113 U/L (ref 30–99)
ALT SERPL-CCNC: 47 U/L (ref 2–50)
ANION GAP SERPL CALC-SCNC: 11 MMOL/L (ref 7–16)
APPEARANCE UR: CLEAR
APTT PPP: 29.6 SEC (ref 24.7–36)
AST SERPL-CCNC: 80 U/L (ref 12–45)
BASOPHILS # BLD AUTO: 1 % (ref 0–1.8)
BASOPHILS # BLD: 0.06 K/UL (ref 0–0.12)
BILIRUB CONJ SERPL-MCNC: <0.2 MG/DL (ref 0.1–0.5)
BILIRUB INDIRECT SERPL-MCNC: NORMAL MG/DL (ref 0–1)
BILIRUB SERPL-MCNC: 0.4 MG/DL (ref 0.1–1.5)
BILIRUB UR QL STRIP.AUTO: NEGATIVE
BUN SERPL-MCNC: 30 MG/DL (ref 8–22)
CALCIUM ALBUM COR SERPL-MCNC: 9.2 MG/DL (ref 8.5–10.5)
CALCIUM SERPL-MCNC: 9.2 MG/DL (ref 8.5–10.5)
CHLORIDE SERPL-SCNC: 102 MMOL/L (ref 96–112)
CHOLEST SERPL-MCNC: 180 MG/DL (ref 100–199)
CK SERPL-CCNC: 139 U/L (ref 0–154)
CO2 SERPL-SCNC: 23 MMOL/L (ref 20–33)
COLOR UR: YELLOW
CREAT SERPL-MCNC: 1.3 MG/DL (ref 0.5–1.4)
EOSINOPHIL # BLD AUTO: 0.37 K/UL (ref 0–0.51)
EOSINOPHIL NFR BLD: 6.5 % (ref 0–6.9)
ERYTHROCYTE [DISTWIDTH] IN BLOOD BY AUTOMATED COUNT: 61.4 FL (ref 35.9–50)
GFR SERPLBLD CREATININE-BSD FMLA CKD-EPI: 58 ML/MIN/1.73 M 2
GLOBULIN SER CALC-MCNC: 3 G/DL (ref 1.9–3.5)
GLUCOSE SERPL-MCNC: 96 MG/DL (ref 65–99)
GLUCOSE UR STRIP.AUTO-MCNC: NEGATIVE MG/DL
HCT VFR BLD AUTO: 27.9 % (ref 42–52)
HDLC SERPL-MCNC: 43 MG/DL
HGB BLD-MCNC: 9.2 G/DL (ref 14–18)
IMM GRANULOCYTES # BLD AUTO: 0.01 K/UL (ref 0–0.11)
IMM GRANULOCYTES NFR BLD AUTO: 0.2 % (ref 0–0.9)
INR PPP: 1.02 (ref 0.87–1.13)
KETONES UR STRIP.AUTO-MCNC: NEGATIVE MG/DL
LDH SERPL L TO P-CCNC: 187 U/L (ref 107–266)
LDLC SERPL CALC-MCNC: 120 MG/DL
LEUKOCYTE ESTERASE UR QL STRIP.AUTO: NEGATIVE
LYMPHOCYTES # BLD AUTO: 1.01 K/UL (ref 1–4.8)
LYMPHOCYTES NFR BLD: 17.6 % (ref 22–41)
MAGNESIUM SERPL-MCNC: 1.8 MG/DL (ref 1.5–2.5)
MCH RBC QN AUTO: 32.4 PG (ref 27–33)
MCHC RBC AUTO-ENTMCNC: 33 G/DL (ref 32.3–36.5)
MCV RBC AUTO: 98.2 FL (ref 81.4–97.8)
MICRO URNS: NORMAL
MONOCYTES # BLD AUTO: 0.58 K/UL (ref 0–0.85)
MONOCYTES NFR BLD AUTO: 10.1 % (ref 0–13.4)
NEUTROPHILS # BLD AUTO: 3.7 K/UL (ref 1.82–7.42)
NEUTROPHILS NFR BLD: 64.6 % (ref 44–72)
NITRITE UR QL STRIP.AUTO: NEGATIVE
NRBC # BLD AUTO: 0 K/UL
NRBC BLD-RTO: 0 /100 WBC (ref 0–0.2)
NT-PROBNP SERPL IA-MCNC: 304 PG/ML (ref 0–125)
OUTPT INFUS CBC COMMENT OICOM: ABNORMAL
PH UR STRIP.AUTO: 6.5 [PH] (ref 5–8)
PHOSPHATE SERPL-MCNC: 3.2 MG/DL (ref 2.5–4.5)
PLATELET # BLD AUTO: 146 K/UL (ref 164–446)
PMV BLD AUTO: 9.1 FL (ref 9–12.9)
POTASSIUM SERPL-SCNC: 4.4 MMOL/L (ref 3.6–5.5)
PROT SERPL-MCNC: 7 G/DL (ref 6–8.2)
PROT UR QL STRIP: NEGATIVE MG/DL
PROTHROMBIN TIME: 13.5 SEC (ref 12–14.6)
RBC # BLD AUTO: 2.84 M/UL (ref 4.7–6.1)
RBC UR QL AUTO: NEGATIVE
SODIUM SERPL-SCNC: 136 MMOL/L (ref 135–145)
SP GR UR STRIP.AUTO: 1.01
TRIGL SERPL-MCNC: 87 MG/DL (ref 0–149)
TROPONIN T SERPL-MCNC: 25 NG/L (ref 6–19)
UROBILINOGEN UR STRIP.AUTO-MCNC: 0.2 MG/DL
WBC # BLD AUTO: 5.7 K/UL (ref 4.8–10.8)

## 2024-01-26 PROCEDURE — 84100 ASSAY OF PHOSPHORUS: CPT

## 2024-01-26 PROCEDURE — 700111 HCHG RX REV CODE 636 W/ 250 OVERRIDE (IP): Performed by: NURSE PRACTITIONER

## 2024-01-26 PROCEDURE — 83880 ASSAY OF NATRIURETIC PEPTIDE: CPT

## 2024-01-26 PROCEDURE — 81003 URINALYSIS AUTO W/O SCOPE: CPT

## 2024-01-26 PROCEDURE — 82248 BILIRUBIN DIRECT: CPT

## 2024-01-26 PROCEDURE — 85730 THROMBOPLASTIN TIME PARTIAL: CPT

## 2024-01-26 PROCEDURE — A4212 NON CORING NEEDLE OR STYLET: HCPCS

## 2024-01-26 PROCEDURE — 99212 OFFICE O/P EST SF 10 MIN: CPT | Performed by: STUDENT IN AN ORGANIZED HEALTH CARE EDUCATION/TRAINING PROGRAM

## 2024-01-26 PROCEDURE — 80061 LIPID PANEL: CPT

## 2024-01-26 PROCEDURE — 700101 HCHG RX REV CODE 250: Performed by: NURSE PRACTITIONER

## 2024-01-26 PROCEDURE — 36591 DRAW BLOOD OFF VENOUS DEVICE: CPT

## 2024-01-26 PROCEDURE — 85025 COMPLETE CBC W/AUTO DIFF WBC: CPT

## 2024-01-26 PROCEDURE — 80053 COMPREHEN METABOLIC PANEL: CPT

## 2024-01-26 PROCEDURE — 99214 OFFICE O/P EST MOD 30 MIN: CPT | Performed by: STUDENT IN AN ORGANIZED HEALTH CARE EDUCATION/TRAINING PROGRAM

## 2024-01-26 PROCEDURE — 82553 CREATINE MB FRACTION: CPT

## 2024-01-26 PROCEDURE — 82550 ASSAY OF CK (CPK): CPT

## 2024-01-26 PROCEDURE — 84484 ASSAY OF TROPONIN QUANT: CPT

## 2024-01-26 PROCEDURE — 83735 ASSAY OF MAGNESIUM: CPT

## 2024-01-26 PROCEDURE — 83615 LACTATE (LD) (LDH) ENZYME: CPT

## 2024-01-26 PROCEDURE — 85610 PROTHROMBIN TIME: CPT

## 2024-01-26 RX ADMIN — HEPARIN 500 UNITS: 100 SYRINGE at 11:00

## 2024-01-26 RX ADMIN — LIDOCAINE HYDROCHLORIDE 0.5 ML: 10 INJECTION, SOLUTION EPIDURAL; INFILTRATION; INTRACAUDAL; PERINEURAL at 10:53

## 2024-01-26 ASSESSMENT — ENCOUNTER SYMPTOMS
VOMITING: 0
SHORTNESS OF BREATH: 0
TREMORS: 0
SPUTUM PRODUCTION: 1
FEVER: 0
TINGLING: 1
ABDOMINAL PAIN: 0
DIZZINESS: 0
COUGH: 0
SENSORY CHANGE: 0
DEPRESSION: 0
NECK PAIN: 0
HEARTBURN: 0
NAUSEA: 0
PALPITATIONS: 0
BRUISES/BLEEDS EASILY: 0
WEIGHT LOSS: 0
SORE THROAT: 0
WHEEZING: 0
MEMORY LOSS: 0
CHILLS: 0
FOCAL WEAKNESS: 0
BLURRED VISION: 0
HEADACHES: 0
ORTHOPNEA: 0

## 2024-01-26 ASSESSMENT — FIBROSIS 4 INDEX: FIB4 SCORE: 1.47

## 2024-01-26 NOTE — PROGRESS NOTES
Pt arrives to [facility] for lab draw  via port.  Additional labs from research dept to be drawn today.  RUc port accessed with sterile technique, flushed with NS and brisk blood return observed.  All labs drawn via port.  Port flushed with NS and heparin locked.  Ames needle removed intact.  Site covered with gauze.  Confirmed next appt time on 1/29/2024 with pt.  UA collected and sent to the lab.  Pt dc home to self care.

## 2024-01-26 NOTE — PROGRESS NOTES
"    Consult Note: Hematology/Oncology     Primary Care:  Pcp Pt States None    Chief Complaint   Patient presents with    Lung Cancer     Prechemo          Current Treatment:     9/18/23   C1: Cis/Etop/HLX10 vs Placebo  10/9/23   C2: Cis/Etop/HLX vs Placebo + RT  10/28/23 C3: Cis/Etop/HLX vs Placebo + RT (treatment deferred 1 week for neutropenia)  11/6/23   C3: Cis/Etop/HLX vs Placebo + RT  12/4/23: C4 Cis/Etop/HLX  1/6/24: C5 HLX vs placebo  1/26/24: C6 HLX vs placebo    Prior Treatment: None    Subjective:   History of Presenting Illness:  Bart Ramsey is a 73 y.o. male who presents with a new diagnosis of SCLC    Patient reports that in February he felt that he was being strangled.  He called the ambulance and he was taken to the ER.  He was placed on BP meds.  He still felt terrible after several days.  [facility]bl[facility] which was removed.  Imaging at that time showed some nodules on his lung.      In March or April 2023 he had a biopsy attempt of his lung nodules.  Per patient biopsy result was inconclusive      PET scan was done on 6/6/2023 which showed a hypermetabolic nodule in the superior segment LEFT lower lobe of lung measuring 2.3 cm consistent with malignancy. Involvement of the LEFT superior hilar lymph node.  No evidence for metastatic disease in the abdomen or pelvis.    He had a bronchoscopy Pathology revealed SCLC.     He had agent orange exposure from [place] ([id number]). Son committed suicide 1 year ago today.  He is raising his granddaughter ([place]).     He quit in 1994 (started in 1966, 28 pack year history).  Used to smoke marijuana.  He states he drinks too much (drinks a \"couple good drinks\" per night).  He has a healthy diet.  He eats a lot of vegetables.     Patient was diagnosed with COVID-19 on 11/13. CT scan showed lower lobe infiltrate. During the stay in the hospital, patient continued receive treatment for " community-acquired pneumonia with cefepime and doxycycline; he continued to feel better.  At discharge, given levofloxacin.    Patient was admitted to the hospital on December 17.  He came in with generalized weakness and shortness of breath found to be severely neutropenic and thus was started on vancomycin and Zosyn patient was having difficulty swallowing and mass he was evaluated by GI and found to have erosive esophagitis which was likely due to radiation esophagitis worsened by pill esophagitis reflux.    We deferred C5.    Interval History    Patient reports that he is doing better today.  He has a lot of energy and reports that he actually picks up his washer.    He is taking care of his granddaughter and planning her birthday party.    He denies any diarrhea or rashes.  He has no nausea.  Overall he is doing really well    Past Medical History:   Diagnosis Date    Arthritis     knee    Bowel habit changes     diarrhea    Bronchitis     Cancer ([facility]) 09/29/2023    small cell lung cancer    COPD (chronic obstructive pulmonary disease) ([facility])     Diabetes ([facility])     diet controlled, no medications.    Emphysema of lung ([facility])     COPD- no medications    High cholesterol 08/2023    Was on a statin, MD monitoring, not currently on meds    Myocardial infarct ([facility])     1994,1996,2000    Pneumonia     Psychiatric problem 08/2023    depression, son passed away recently, no meds    Sleep apnea     cpap        Past Surgical History:   Procedure Laterality Date    FL UPPER GI ENDOSCOPY,DIAGNOSIS N/A 12/22/2023    Procedure: GASTROSCOPY WITH DILATION;  Surgeon: Dano Oviedo M.D.;  Location: [facility];  Service: Gastroenterology    FL BRONCHOSCOPY,DIAGNOSTIC N/A 8/29/2023    Procedure: FIBER OPTIC BRONCHOSCOPY WITH  WASH, BRUSH, BRONCHOALVEOLAR LAVAGE, BIOPSY AND FINE NEEDLE ASPIRATION, ENDOBRONCHIAL ULTRASOUND & NAVIGATION,  ROBOTICS;  Surgeon: Bart Cortez M.D.;  Location: [facility];   Service: Pulmonary Robotic    ENDOBRONCHIAL US ADD-ON N/A 2023    Procedure: ENDOBRONCHIAL ULTRASOUND (EBUS);  Surgeon: Bart Cortez M.D.;  Location: SURGERY [facility];  Service: Pulmonary Robotic    GASTROSCOPY-ENDO  2017    Procedure: GASTROSCOPY-ENDO;  Surgeon: Emerson Eaton M.D.;  Location: ENDOSCOPY [facility];  Service:     COLONOSCOPY - ENDO  2017    Procedure: COLONOSCOPY - ENDO;  Surgeon: Emerson Eaton M.D.;  Location: ENDOSCOPY [facility];  Service:     CHOLECYSTECTOMY      INGUINAL HERNIA REPAIR Right     OTHER      Tonsillectomy    OTHER      Hernia     OTHER CARDIAC SURGERY      3 stents    TONSILLECTOMY         Social History     Tobacco Use    Smoking status: Former     Current packs/day: 0.00     Average packs/day: 0.5 packs/day for 14.0 years (7.0 ttl pk-yrs)     Types: Cigarettes     Start date: 1980     Quit date:      Years since quittin.0    Tobacco comments:     Pt quit smoking cigarettes, 2000.  1/2 pack per day, smoked 20 years    Vaping Use    Vaping Use: Never used   Substance Use Topics    Alcohol use: Not Currently     Alcohol/week: 8.4 oz     Types: 14 Shots of liquor per week     Comment: daily    Drug use: Not Currently     Types: Marijuana     Comment: quit         Family History   Problem Relation Age of Onset    Cancer Mother         lung?       No Known Allergies    Current Outpatient Medications   Medication Sig Dispense Refill    Magnesium Glycinate 100 MG Cap Take 1 Capsule by mouth every day with lunch for 90 days.      guaiFENesin ER (MUCINEX) 600 MG TABLET SR 12 HR Take 1 Tablet by mouth every 12 hours. 28 Tablet 0    ondansetron (ZOFRAN) 4 MG Tab tablet Take 1 Tablet by mouth every four hours as needed for Nausea/Vomiting (for nausea, vomiting). 30 Tablet 6    prochlorperazine (COMPAZINE) 10 MG Tab Take 1 Tablet by mouth every 6 hours as needed (for nausea, vomiting). 30 Tablet 6    metFORMIN (GLUCOPHAGE) 500 MG Tab Take  750 mg by mouth 2 times a day with meals. 500 mg x 1.5 tab = 750 mg      baclofen (LIORESAL) 10 MG Tab Take 10 mg by mouth at bedtime as needed. Indications: Muscle Spasm      Cyanocobalamin (VITAMIN B-12) 1000 MCG Tab Take 1,000 mcg by mouth every day.      fluticasone-salmeterol (ADVAIR) 250-50 MCG/ACT AEROSOL POWDER, BREATH ACTIVATED Inhale 1 Puff 2 times a day. Indications: Asthma      Omega-3 Fatty Acids (FISH OIL) 1000 MG Cap capsule Take 1,000 mg by mouth every day.      vitamin D3 (CHOLECALCIFEROL) 1000 Unit (25 mcg) Tab Take 1,000 Units by mouth every day.      gabapentin (NEURONTIN) 300 MG Cap Take 600 mg by mouth 2 times a day.      aspirin EC (ECOTRIN) 81 MG Tablet Delayed Response Take 81 mg by mouth every day.       No current facility-administered medications for this encounter.     Facility-Administered Medications Ordered in Other Encounters   Medication Dose Route Frequency Provider Last Rate Last Admin    lidocaine (Xylocaine) 1 % injection 0.5 mL  0.5 mL Intradermal See Admin Instructions Diane Iglesias, A.P.N.        heparin lock flush 100 unit/mL injection 500 Units  500 Units Intracatheter PRN Diane Iglesias, A.P.N.           Review of Systems   Constitutional:  Negative for chills, fever, malaise/fatigue and weight loss.   HENT:  Positive for tinnitus (Chronic). Negative for congestion, ear pain, nosebleeds and sore throat.    Eyes:  Negative for blurred vision.   Respiratory:  Positive for sputum production (improving). Negative for cough, shortness of breath and wheezing.    Cardiovascular:  Negative for chest pain, palpitations, orthopnea and leg swelling.   Gastrointestinal:  Negative for abdominal pain, heartburn, nausea and vomiting.   Genitourinary:  Negative for dysuria, frequency and urgency.   Musculoskeletal:  Negative for neck pain.   Neurological:  Positive for tingling. Negative for dizziness, tremors, sensory change, focal weakness and headaches.   Endo/Heme/Allergies:  Does  "not bruise/bleed easily.   Psychiatric/Behavioral:  Negative for depression, memory loss and suicidal ideas.    All other systems reviewed and are negative.      Problem list, medications, and allergies reviewed by myself today in Epic.     Objective:     Vitals:    01/26/24 1007   BP: 122/64   BP Location: Right arm   Patient Position: Sitting   BP Cuff Size: Adult   Pulse: 69   Temp: 36.7 °C (98.1 °F)   TempSrc: Temporal   SpO2: 95%   Weight: 76.9 kg (169 lb 8.5 oz)   Height: 1.72 m (5' 7.72\")         DESC; KARNOFSKY SCALE WITH ECOG EQUIVALENT: 90, Able to carry on normal activity; minor signs or symptoms of disease (ECOG equivalent 0)    DISTRESS LEVEL: no acute distress    Physical Exam  Constitutional:       General: He is not in acute distress.     Appearance: Normal appearance.      Comments: Port in place   HENT:      Head: Normocephalic and atraumatic.      Nose: Nose normal. No congestion.      Mouth/Throat:      Mouth: Mucous membranes are moist.      Pharynx: Oropharynx is clear.   Eyes:      General: No scleral icterus.     Conjunctiva/sclera: Conjunctivae normal.      Pupils: Pupils are equal, round, and reactive to light.   Cardiovascular:      Rate and Rhythm: Normal rate and regular rhythm.      Pulses: Normal pulses.      Heart sounds: No murmur heard.     No friction rub.   Pulmonary:      Effort: Pulmonary effort is normal. No respiratory distress.      Breath sounds: Normal breath sounds. No stridor. No wheezing or rales.   Chest:      Chest wall: No tenderness.   Abdominal:      General: Abdomen is flat. Bowel sounds are normal. There is no distension.      Palpations: Abdomen is soft. There is no mass.      Tenderness: There is no abdominal tenderness. There is no guarding or rebound.   Musculoskeletal:         General: No swelling, tenderness or deformity. Normal range of motion.      Cervical back: Normal range of motion and neck supple. No rigidity or tenderness.      Right lower leg: No " edema.      Left lower leg: No edema.   Skin:     General: Skin is warm.      Coloration: Skin is not jaundiced or pale.      Findings: No bruising or rash.   Neurological:      General: No focal deficit present.      Mental Status: He is alert and oriented to person, place, and time. Mental status is at baseline.      Motor: No weakness.   Psychiatric:         Mood and Affect: Mood normal.         Behavior: Behavior normal.         Thought Content: Thought content normal.         Judgment: Judgment normal.         Labs:   Most recent labs reviewed.  Please see the lab tab of chart review    Imaging:   Most recent images below have been independently reviewed by me.  Please see the imaging tab of chart review    9/13/23 CT CAP  1.  Left lower lobe pulmonary masses, very slightly more prominent than on 8/29/2023.  2.  Multiple left pulmonary and pleural metastases.  3.  Left hilar emely metastases.  4.  No evidence of metastatic disease in the abdomen or pelvis.    9/12/2023 PET scan  1.  Interval increased size and intensity of LEFT lower lobe lung mass with new intrapulmonary and pleural metastases indicating progression of disease.  2.  Probable pleural metastasis at the RIGHT lung base posteriorly.  3.  Worsening LEFT hilar adenopathy, metastatic.  4.  No evidence of metastatic disease in the abdomen or pelvis.    6/6/2023 PET scan  1.  Multilobular hypermetabolic nodule in the superior segment LEFT lower lobe of lung measuring 2.3 cm consistent with malignancy.  2.  Involvement of the LEFT superior hilar lymph node.  3.  No evidence for metastatic disease in the abdomen or pelvis.    Pathology    A. Lung, left lower lobe, fine needle aspiration slides:          Blood only; no epithelial or malignant cells identified.   B. Lung, left lower lobe, core biopsies:          Positive for small cell carcinoma.   C. Lung, left lower lobe, forceps biopsy with touch prep slides:          Positive for small cell carcinoma.    D. Lung, left lower lobe, fine needle aspiration:          Originally submitted for possible flow cytometric analysis,           which was deemed unnecessary.  Tissue will be submitted to           cytology for preparation of a cell block; an addendum will be           generated if there is an unexpected finding.   E. Lymph node, 10L, fine needle aspiration slides:          Malignant cells present, consistent with metastatic small cell   carcinoma.   F.  Lymph node, 10L, core biopsies:            Hypocellular specimen demonstrating few scattered lymphocytes           and few atypical cells suspicious for small cell carcinoma       Assessment/Plan:      Cancer Staging   SCLC (small cell lung carcinoma) (HCC)  Staging form: Lung, AJCC 8th Edition  - Clinical stage from 9/14/2023: Stage IIIA (cT3, cN1, cM0) - Signed by Leonel Patel M.D. on 9/14/2023       Mr. Ramsey who presents today with a new diagnosis of small cell lung cancer, limited stage.     He is currently enrolled in the HLX10 trial.     He is here prior to C6.     He is doing great and cleared for treatment.       Plan  -Enrolled in the HLX trial  -cleared for cycle 6  -will see patient prior to Cycle 7  -need brain MRI and CT CAP on 3/3/24  -can see patient in [facility] on 3/7      #2. Basal cell carcinoma  -referral to derm    Any questions and concerns raised by the patient were addressed and answered. Patient denies any further questions.  Patient encouraged to call the office with any concerns or issues.      Roseline Fuller M.D.  Hematology/Oncology

## 2024-01-26 NOTE — ADDENDUM NOTE
Encounter addended by: Santo Quintero on: 1/26/2024 11:09 AM   Actions taken: Charge Capture section accepted

## 2024-01-26 NOTE — RESEARCH NOTE
ReConsent note:    Participation in the [id number] clinical trial was discussed with patient today. All aspects of the study purpose and procedures were explained.  Subject was given ample time to review the consent and all questions were answered to their satisfaction. Patient aware that the clinical trial is voluntary and they may withdraw consent at any time without affecting the level of care they receive.  Subject signed consent without coercion and undue influence and was given a copy of the signed consent. No study-related procedures took place prior to consenting and all assessments were conducted per protocol.

## 2024-01-27 LAB
CK MB SERPL-MCNC: 2.6 NG/ML (ref 0–7.7)
MISCELLANEOUS LAB RESULT MISCLAB: NORMAL

## 2024-01-27 NOTE — PROGRESS NOTES
"Pharmacy Chemotherapy Verification:    Patient Name: Bart Ramsey  Dx: small cell lung cancer  Study Protocol: [id number]  Participant # [id number]    HLX10 or placebo 300 mg IV over 30-60 min on day 1  Cisplatin 75 mg/m2 IV over 60 min on day 1  Etoposide 100 mg/m2 IV over 1-2 hours on days 1-3  Every 21 days with concurrent radiation x 4 cycles Completed 12/6/23    followed by  HLX10 or placebo 300 mg IV over 30-60 min on day 1  Every 21 days up to one year  A Randomized, Double-Blind, International Multicenter, Phase III Study to Evaluate the Anti-Tumor Efficacy and Safety of HLX10 (Recombinant Humanized Anti-PD-1 Monoclonal Antibody Injection) or Placebo in Combination with Chemotherapy (Carboplatin/Cisplatin-Etoposide) and Concurrent Radiotherapy in Patients with Limited-Stage Small Cell Lung Cancer (LS-SCLC). [id number]  Allergies: Patient has no known allergies.       Pulse 71   Temp 36.7 °C (98 °F) (Temporal)   Resp 18   Ht 1.72 m (5' 7.72\")   Wt 77 kg (169 lb 12.1 oz)   SpO2 98%   BMI 26.03 kg/m²      Body surface area is 1.92 meters squared.    Labs 1/26/24:  ANC~ 3700 Plt = 146k   Hgb = 9.2     SCr = 1.30 mg/dL CrCl ~ 54.33 mL/min   AST/ALT/AP = 80/47/113 TBili = 0.4     Drug Order   (Drug name, dose, route, IV Fluid & volume, frequency, number of doses) Cycle 6  Previous treatment: C5 1/8/24   Medication = HLX10 or placebo  Base Dose = 300 mg  Fixed dose, no calculation required  Final Dose = 300 mg  Route = IV  Fluid & Volume =  mL  Admin Duration = Over 60 min     Study-supplied medication       First infusion 60 min. If tolerated, subsequent infusions 30 min.      By my signature below, I confirm this process was performed independently with the BSA and all final chemotherapy dosing calculations congruent. I have reviewed the above chemotherapy order and that my calculation of the final dose and BSA (when applicable) corroborate those calculations of the  " pharmacist. Discrepancies of 10% or greater in the written dose have been addressed and documented within the EPIC Progress notes.    Oscar ScottD

## 2024-01-28 NOTE — PROGRESS NOTES
"Pharmacy Chemotherapy Verification:   Patient Name: Bart Ramsey  Dx: small cell lung cancer  Cycle 6   Previous treatment: C5 1/8/24  Study Protocol: [id number]  Participant # [id number]    HLX10 or placebo 300 mg IV over 30-60 min on day 1  Cisplatin 75 mg/m2 IV over 60 min on day 1  Etoposide 100 mg/m2 IV over 1-2 hours on days 1-3  Every 21 days with concurrent radiation x4 cycles    followed by  HLX10 or placebo 300 mg IV over 30-60 min on day 1  Every 21 days until DP/UT  A Randomized, Double-Blind, International Multicenter, Phase III Study to Evaluate the Anti-Tumor Efficacy and Safety of HLX10 (Recombinant Humanized Anti-PD-1 Monoclonal Antibody Injection) or Placebo in Combination with Chemotherapy (Carboplatin/Cisplatin-Etoposide) and Concurrent Radiotherapy in Patients with Limited-Stage Small Cell Lung Cancer (LS-SCLC). [id number]      Allergies:  Patient has no known allergies.       Pulse 71   Temp 36.7 °C (98 °F) (Temporal)   Resp 18   Ht 1.72 m (5' 7.72\")   Wt 77 kg (169 lb 12.1 oz)   SpO2 98%   BMI 26.03 kg/m²  Body surface area is 1.92 meters squared.    Labs 1/26/24:  ANC~ 3700 Plt = 146k   Hgb = 9.2     SCr = 1.3 mg/dL CrCl ~ 54.3 mL/min   AST/ALT/AP = 80/47/113 TBili = 0.4  1/5/24:  TSH = 1.85 Free T4 = 1.23  HLX10 or placebo 300 mg fixed dose = 300 mg IV   Fixed dose, no calculation required = 300 mg IV    Lizzie Carias, PharmD  "

## 2024-01-29 ENCOUNTER — DOCUMENTATION (OUTPATIENT)
Dept: ONCOLOGY | Facility: MEDICAL CENTER | Age: 74
End: 2024-01-29

## 2024-01-29 ENCOUNTER — OUTPATIENT INFUSION SERVICES (OUTPATIENT)
Dept: ONCOLOGY | Facility: MEDICAL CENTER | Age: 74
End: 2024-01-29
Attending: STUDENT IN AN ORGANIZED HEALTH CARE EDUCATION/TRAINING PROGRAM

## 2024-01-29 VITALS
RESPIRATION RATE: 18 BRPM | HEIGHT: 68 IN | BODY MASS INDEX: 25.73 KG/M2 | WEIGHT: 169.75 LBS | TEMPERATURE: 98 F | OXYGEN SATURATION: 98 % | HEART RATE: 71 BPM

## 2024-01-29 DIAGNOSIS — C34.90 SCLC (SMALL CELL LUNG CARCINOMA) (HCC): ICD-10-CM

## 2024-01-29 PROCEDURE — 700111 HCHG RX REV CODE 636 W/ 250 OVERRIDE (IP): Performed by: NURSE PRACTITIONER

## 2024-01-29 PROCEDURE — 96413 CHEMO IV INFUSION 1 HR: CPT

## 2024-01-29 PROCEDURE — A9270 NON-COVERED ITEM OR SERVICE: HCPCS | Performed by: NURSE PRACTITIONER

## 2024-01-29 PROCEDURE — 700101 HCHG RX REV CODE 250: Performed by: NURSE PRACTITIONER

## 2024-01-29 PROCEDURE — A4212 NON CORING NEEDLE OR STYLET: HCPCS

## 2024-01-29 RX ADMIN — Medication 300 MG: at 12:15

## 2024-01-29 RX ADMIN — HEPARIN 500 UNITS: 100 SYRINGE at 12:55

## 2024-01-29 ASSESSMENT — FIBROSIS 4 INDEX: FIB4 SCORE: 5.83

## 2024-01-29 NOTE — PROGRESS NOTES
Nutrition Services: Brief Update  Wt Readings from Last 7 Encounters:   01/29/24 77 kg (169 lb 12.1 oz)   01/26/24 76.9 kg (169 lb 8.5 oz)   01/08/24 73.3 kg (161 lb 9.6 oz)   01/05/24 74 kg (163 lb 2.3 oz)   12/29/23 76.8 kg (169 lb 3.3 oz)   12/23/23 79.3 kg (174 lb 13.2 oz)   12/15/23 77.7 kg (171 lb 4.8 oz)     Weight Change: wt stable    Pertinent Recent Lab work (1/26/24): BUN 30, ; RBC 2.84    RD able to visit pt chairside in [facility]. Pt states is feeling very well. Mentions good energy levels and no significant n/v/d/c. States is no longer on the liquid diet and is tolerating food without issue. Pt denies any nutrition questions or concerns for RD.     RD remains available PRN, please consult as needed.   [phone]

## 2024-01-29 NOTE — PROGRESS NOTES
Chemotherapy Verification - SECONDARY RN       Height = 172 cm  Weight = 77 kg  BSA = 1.92 mg       Medication: HLX10 or Placebo  Dose: 300 mg set dose  Calculated Dose: 300 mg                             (In mg/m2, AUC, mg/kg)     I confirm that this process was performed independently.

## 2024-01-29 NOTE — PROGRESS NOTES
Patient presents for HLX10/Placebo infusion. Reviewed plan of care patient verbalizes understanding. Port accessed using sterile technique flushes well with brisk blood return. Medication infused per MD orders. Port flushed per protocol and de-accessed sterile gauze to site. Patient scheduled for his next appointment and released in no acute distress.

## 2024-01-29 NOTE — PROGRESS NOTES
Chemotherapy Verification - PRIMARY RN      Height = 172 cm  Weight = 77 kg  BSA = 1.92 m2       Medication: HLX10  Dose: 300 mg set dose  Calculated Dose: 300 mg set dose                             (In mg/m2, AUC, mg/kg)       I confirm this process was performed independently with the BSA and all final chemotherapy dosing calculations congruent.  Any discrepancies of 10% or greater have been addressed with the chemotherapy pharmacist. The resolution of the discrepancy has been documented in the EPIC progress notes.

## 2024-02-05 PROBLEM — E78.5 DYSLIPIDEMIA (HIGH LDL; LOW HDL): Status: ACTIVE | Noted: 2024-02-05

## 2024-02-05 PROBLEM — E66.3 OVERWEIGHT WITH BODY MASS INDEX (BMI) OF 26 TO 26.9 IN ADULT: Status: ACTIVE | Noted: 2024-02-05

## 2024-02-05 PROBLEM — D70.9 NEUTROPENIC FEVER (HCC): Status: RESOLVED | Noted: 2023-12-17 | Resolved: 2024-02-05

## 2024-02-05 PROBLEM — R50.81 NEUTROPENIC FEVER (HCC): Status: RESOLVED | Noted: 2023-12-17 | Resolved: 2024-02-05

## 2024-02-05 PROBLEM — D61.818 PANCYTOPENIA (HCC): Status: RESOLVED | Noted: 2023-11-13 | Resolved: 2024-02-05

## 2024-02-05 PROBLEM — R94.4 DECREASED GFR: Status: ACTIVE | Noted: 2024-02-05

## 2024-02-14 RX ORDER — METHYLPREDNISOLONE SODIUM SUCCINATE 125 MG/2ML
125 INJECTION, POWDER, LYOPHILIZED, FOR SOLUTION INTRAMUSCULAR; INTRAVENOUS PRN
Status: CANCELLED | OUTPATIENT
Start: 2024-02-17

## 2024-02-14 RX ORDER — DIPHENHYDRAMINE HYDROCHLORIDE 50 MG/ML
50 INJECTION INTRAMUSCULAR; INTRAVENOUS PRN
Status: CANCELLED | OUTPATIENT
Start: 2024-02-17

## 2024-02-14 RX ORDER — 0.9 % SODIUM CHLORIDE 0.9 %
3 VIAL (ML) INJECTION PRN
Status: CANCELLED | OUTPATIENT
Start: 2024-02-16

## 2024-02-14 RX ORDER — 0.9 % SODIUM CHLORIDE 0.9 %
10 VIAL (ML) INJECTION PRN
Status: CANCELLED | OUTPATIENT
Start: 2024-02-17

## 2024-02-14 RX ORDER — 0.9 % SODIUM CHLORIDE 0.9 %
VIAL (ML) INJECTION PRN
Status: CANCELLED | OUTPATIENT
Start: 2024-02-16

## 2024-02-14 RX ORDER — ONDANSETRON 8 MG/1
8 TABLET, ORALLY DISINTEGRATING ORAL PRN
Status: CANCELLED | OUTPATIENT
Start: 2024-02-17

## 2024-02-14 RX ORDER — 0.9 % SODIUM CHLORIDE 0.9 %
10 VIAL (ML) INJECTION PRN
Status: CANCELLED | OUTPATIENT
Start: 2024-02-16

## 2024-02-14 RX ORDER — EPINEPHRINE 1 MG/ML(1)
0.5 AMPUL (ML) INJECTION PRN
Status: CANCELLED | OUTPATIENT
Start: 2024-02-17

## 2024-02-14 RX ORDER — SODIUM CHLORIDE 9 MG/ML
INJECTION, SOLUTION INTRAVENOUS CONTINUOUS
Status: CANCELLED | OUTPATIENT
Start: 2024-02-17

## 2024-02-14 RX ORDER — PROCHLORPERAZINE MALEATE 10 MG
10 TABLET ORAL EVERY 6 HOURS PRN
Status: CANCELLED | OUTPATIENT
Start: 2024-02-17

## 2024-02-14 RX ORDER — 0.9 % SODIUM CHLORIDE 0.9 %
3 VIAL (ML) INJECTION PRN
Status: CANCELLED | OUTPATIENT
Start: 2024-02-17

## 2024-02-14 RX ORDER — 0.9 % SODIUM CHLORIDE 0.9 %
VIAL (ML) INJECTION PRN
Status: CANCELLED | OUTPATIENT
Start: 2024-02-17

## 2024-02-14 RX ORDER — ONDANSETRON 2 MG/ML
4 INJECTION INTRAMUSCULAR; INTRAVENOUS PRN
Status: CANCELLED | OUTPATIENT
Start: 2024-02-17

## 2024-02-16 ENCOUNTER — HOSPITAL ENCOUNTER (OUTPATIENT)
Dept: HEMATOLOGY ONCOLOGY | Facility: MEDICAL CENTER | Age: 74
End: 2024-02-16
Attending: NURSE PRACTITIONER
Payer: COMMERCIAL

## 2024-02-16 ENCOUNTER — OUTPATIENT INFUSION SERVICES (OUTPATIENT)
Dept: ONCOLOGY | Facility: MEDICAL CENTER | Age: 74
End: 2024-02-16
Attending: PSYCHIATRY & NEUROLOGY

## 2024-02-16 VITALS
OXYGEN SATURATION: 94 % | TEMPERATURE: 97.4 F | HEART RATE: 81 BPM | SYSTOLIC BLOOD PRESSURE: 125 MMHG | RESPIRATION RATE: 18 BRPM | DIASTOLIC BLOOD PRESSURE: 68 MMHG

## 2024-02-16 VITALS
BODY MASS INDEX: 27.15 KG/M2 | OXYGEN SATURATION: 98 % | DIASTOLIC BLOOD PRESSURE: 70 MMHG | TEMPERATURE: 97.8 F | SYSTOLIC BLOOD PRESSURE: 150 MMHG | HEART RATE: 78 BPM | HEIGHT: 67 IN | WEIGHT: 173 LBS

## 2024-02-16 DIAGNOSIS — C34.90 SCLC (SMALL CELL LUNG CARCINOMA) (HCC): ICD-10-CM

## 2024-02-16 DIAGNOSIS — Z00.6 RESEARCH STUDY PATIENT: ICD-10-CM

## 2024-02-16 DIAGNOSIS — E83.42 HYPOMAGNESEMIA: ICD-10-CM

## 2024-02-16 DIAGNOSIS — Z79.899 ENCOUNTER FOR LONG-TERM CURRENT USE OF HIGH RISK MEDICATION: ICD-10-CM

## 2024-02-16 LAB
ALBUMIN SERPL BCP-MCNC: 4.1 G/DL (ref 3.2–4.9)
ALBUMIN/GLOB SERPL: 1.3 G/DL
ALP SERPL-CCNC: 110 U/L (ref 30–99)
ALT SERPL-CCNC: 28 U/L (ref 2–50)
ANION GAP SERPL CALC-SCNC: 12 MMOL/L (ref 7–16)
APPEARANCE UR: CLEAR
APTT PPP: 27.5 SEC (ref 24.7–36)
AST SERPL-CCNC: 62 U/L (ref 12–45)
BASOPHILS # BLD AUTO: 0.7 % (ref 0–1.8)
BASOPHILS # BLD: 0.04 K/UL (ref 0–0.12)
BILIRUB CONJ SERPL-MCNC: <0.2 MG/DL (ref 0.1–0.5)
BILIRUB INDIRECT SERPL-MCNC: NORMAL MG/DL (ref 0–1)
BILIRUB SERPL-MCNC: 0.4 MG/DL (ref 0.1–1.5)
BILIRUB UR QL STRIP.AUTO: NEGATIVE
BUN SERPL-MCNC: 31 MG/DL (ref 8–22)
CALCIUM ALBUM COR SERPL-MCNC: 9.4 MG/DL (ref 8.5–10.5)
CALCIUM SERPL-MCNC: 9.5 MG/DL (ref 8.5–10.5)
CHLORIDE SERPL-SCNC: 100 MMOL/L (ref 96–112)
CHOLEST SERPL-MCNC: 168 MG/DL (ref 100–199)
CK SERPL-CCNC: 84 U/L (ref 0–154)
CO2 SERPL-SCNC: 24 MMOL/L (ref 20–33)
COLOR UR: YELLOW
CREAT SERPL-MCNC: 1.43 MG/DL (ref 0.5–1.4)
EOSINOPHIL # BLD AUTO: 0.22 K/UL (ref 0–0.51)
EOSINOPHIL NFR BLD: 3.8 % (ref 0–6.9)
ERYTHROCYTE [DISTWIDTH] IN BLOOD BY AUTOMATED COUNT: 53.1 FL (ref 35.9–50)
GFR SERPLBLD CREATININE-BSD FMLA CKD-EPI: 51 ML/MIN/1.73 M 2
GLOBULIN SER CALC-MCNC: 3.2 G/DL (ref 1.9–3.5)
GLUCOSE SERPL-MCNC: 118 MG/DL (ref 65–99)
GLUCOSE UR STRIP.AUTO-MCNC: NEGATIVE MG/DL
HCT VFR BLD AUTO: 32.1 % (ref 42–52)
HDLC SERPL-MCNC: 44 MG/DL
HGB BLD-MCNC: 10.6 G/DL (ref 14–18)
IMM GRANULOCYTES # BLD AUTO: 0.02 K/UL (ref 0–0.11)
IMM GRANULOCYTES NFR BLD AUTO: 0.3 % (ref 0–0.9)
INR PPP: 1.02 (ref 0.87–1.13)
KETONES UR STRIP.AUTO-MCNC: NEGATIVE MG/DL
LDH SERPL L TO P-CCNC: 152 U/L (ref 107–266)
LDLC SERPL CALC-MCNC: 101 MG/DL
LEUKOCYTE ESTERASE UR QL STRIP.AUTO: NEGATIVE
LYMPHOCYTES # BLD AUTO: 0.87 K/UL (ref 1–4.8)
LYMPHOCYTES NFR BLD: 15.2 % (ref 22–41)
MAGNESIUM SERPL-MCNC: 1.8 MG/DL (ref 1.5–2.5)
MCH RBC QN AUTO: 32.4 PG (ref 27–33)
MCHC RBC AUTO-ENTMCNC: 33 G/DL (ref 32.3–36.5)
MCV RBC AUTO: 98.2 FL (ref 81.4–97.8)
MICRO URNS: NORMAL
MONOCYTES # BLD AUTO: 0.44 K/UL (ref 0–0.85)
MONOCYTES NFR BLD AUTO: 7.7 % (ref 0–13.4)
NEUTROPHILS # BLD AUTO: 4.13 K/UL (ref 1.82–7.42)
NEUTROPHILS NFR BLD: 72.3 % (ref 44–72)
NITRITE UR QL STRIP.AUTO: NEGATIVE
NRBC # BLD AUTO: 0 K/UL
NRBC BLD-RTO: 0 /100 WBC (ref 0–0.2)
NT-PROBNP SERPL IA-MCNC: 101 PG/ML (ref 0–125)
OUTPT INFUS CBC COMMENT OICOM: ABNORMAL
PH UR STRIP.AUTO: 6 [PH] (ref 5–8)
PHOSPHATE SERPL-MCNC: 3.3 MG/DL (ref 2.5–4.5)
PLATELET # BLD AUTO: 161 K/UL (ref 164–446)
PMV BLD AUTO: 9.1 FL (ref 9–12.9)
POTASSIUM SERPL-SCNC: 4.6 MMOL/L (ref 3.6–5.5)
PROT SERPL-MCNC: 7.3 G/DL (ref 6–8.2)
PROT UR QL STRIP: NEGATIVE MG/DL
PROTHROMBIN TIME: 13.5 SEC (ref 12–14.6)
RBC # BLD AUTO: 3.27 M/UL (ref 4.7–6.1)
RBC UR QL AUTO: NEGATIVE
SODIUM SERPL-SCNC: 136 MMOL/L (ref 135–145)
SP GR UR STRIP.AUTO: 1.01
T3 SERPL-MCNC: 121 NG/DL (ref 60–181)
T4 FREE SERPL-MCNC: 1.48 NG/DL (ref 0.93–1.7)
TRIGL SERPL-MCNC: 115 MG/DL (ref 0–149)
TROPONIN T SERPL-MCNC: 21 NG/L (ref 6–19)
TSH SERPL DL<=0.005 MIU/L-ACNC: 1.63 UIU/ML (ref 0.38–5.33)
UROBILINOGEN UR STRIP.AUTO-MCNC: 0.2 MG/DL
WBC # BLD AUTO: 5.7 K/UL (ref 4.8–10.8)

## 2024-02-16 PROCEDURE — 83615 LACTATE (LD) (LDH) ENZYME: CPT

## 2024-02-16 PROCEDURE — 85610 PROTHROMBIN TIME: CPT

## 2024-02-16 PROCEDURE — 82553 CREATINE MB FRACTION: CPT

## 2024-02-16 PROCEDURE — 36591 DRAW BLOOD OFF VENOUS DEVICE: CPT

## 2024-02-16 PROCEDURE — 85730 THROMBOPLASTIN TIME PARTIAL: CPT

## 2024-02-16 PROCEDURE — 80053 COMPREHEN METABOLIC PANEL: CPT

## 2024-02-16 PROCEDURE — 99214 OFFICE O/P EST MOD 30 MIN: CPT | Performed by: NURSE PRACTITIONER

## 2024-02-16 PROCEDURE — 84439 ASSAY OF FREE THYROXINE: CPT

## 2024-02-16 PROCEDURE — 84484 ASSAY OF TROPONIN QUANT: CPT | Mod: 91

## 2024-02-16 PROCEDURE — 84100 ASSAY OF PHOSPHORUS: CPT

## 2024-02-16 PROCEDURE — 700111 HCHG RX REV CODE 636 W/ 250 OVERRIDE (IP): Performed by: NURSE PRACTITIONER

## 2024-02-16 PROCEDURE — 84480 ASSAY TRIIODOTHYRONINE (T3): CPT

## 2024-02-16 PROCEDURE — 83880 ASSAY OF NATRIURETIC PEPTIDE: CPT

## 2024-02-16 PROCEDURE — 99212 OFFICE O/P EST SF 10 MIN: CPT | Performed by: NURSE PRACTITIONER

## 2024-02-16 PROCEDURE — 84443 ASSAY THYROID STIM HORMONE: CPT

## 2024-02-16 PROCEDURE — 81003 URINALYSIS AUTO W/O SCOPE: CPT

## 2024-02-16 PROCEDURE — 80061 LIPID PANEL: CPT

## 2024-02-16 PROCEDURE — 82550 ASSAY OF CK (CPK): CPT

## 2024-02-16 PROCEDURE — 82248 BILIRUBIN DIRECT: CPT

## 2024-02-16 PROCEDURE — 85025 COMPLETE CBC W/AUTO DIFF WBC: CPT

## 2024-02-16 PROCEDURE — A4212 NON CORING NEEDLE OR STYLET: HCPCS

## 2024-02-16 PROCEDURE — 83735 ASSAY OF MAGNESIUM: CPT

## 2024-02-16 RX ORDER — ATORVASTATIN CALCIUM 20 MG/1
20 TABLET, FILM COATED ORAL DAILY
COMMUNITY
Start: 2024-02-13

## 2024-02-16 RX ORDER — TERAZOSIN 2 MG/1
2 CAPSULE ORAL
COMMUNITY
Start: 2024-02-13

## 2024-02-16 RX ORDER — MAGNESIUM GLYCINATE 100 MG
1 CAPSULE ORAL
COMMUNITY
Start: 2024-02-16

## 2024-02-16 RX ADMIN — HEPARIN 500 UNITS: 100 SYRINGE at 11:34

## 2024-02-16 ASSESSMENT — ENCOUNTER SYMPTOMS
TINGLING: 1
HEADACHES: 0
NAUSEA: 0
WEIGHT LOSS: 0
BLOOD IN STOOL: 0
CONSTIPATION: 0
DIZZINESS: 1
SHORTNESS OF BREATH: 0
INSOMNIA: 0
FEVER: 0
MYALGIAS: 0
VOMITING: 0
PALPITATIONS: 0
COUGH: 1
CHILLS: 0
DIARRHEA: 0
WHEEZING: 0

## 2024-02-16 ASSESSMENT — FIBROSIS 4 INDEX: FIB4 SCORE: 5.91

## 2024-02-16 NOTE — PROGRESS NOTES
"Subjective     Edenilson Ramsey is a 74 y.o. male who presents with Lung Cancer (Prechemo/ Jonny)            HPI  Mr. Ramsey presents for evaluation prior to cycle 7 maintenance [id number] vs placebo for treatment of stage IIIa, [id number], small cell lung cancer.     Patient with remote history of agent orange exposure in [place] (1960 9-1970). He was in his usual state of health until 2/2023 when he had difficulty breathing, \"felt like he was being strangled\". [facility] and he underwent cholecystectomy. Imaging completed at that time showed lung nodules. Biopsy was reportedly attempted on lung nodules in approximately March or April 2023 with results inconclusive. PET scan completed 6/6/23 showed hypermetabolic nodule in superior segment of left lower lobe measuring 2.3 cm consistent with malignancy, enlarged left superior hilar lymph node noted, no evidence of metastatic disease in abdomen or pelvis. Biopsy per bronchoscopy was completed 8/29/23 with pathology confirming malignancy. He initiated cisplatin, etoposide, [id number] vs placebo on 9/18/23; concurrent radiation (29 of 33 fractions) occurred between 10/9-11/21/23 but was not fully completed due to hospitalization for COVID pneumonia; Cycle 4 Cis, etoposide, HLX was completed 12/4/23 and he transitioned to maintenance HLX vs placebo with cycle 5 on 1/6/24.    Patient is engaging in his usual activity, cough is intermittent and self-limiting.  He is being evaluated for swallow evaluation per GI, although symptoms have improved.  He notes dizziness if he gets up too quickly.  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  Patient continues primary care with granddaughter and is otherwise at his baseline.      Review of Systems   Constitutional:  Negative for chills, fever, malaise/fatigue (normal activity) and weight loss (up).   Respiratory:  Positive for cough (intermittent and self limiting). " Negative for shortness of breath and wheezing.    Cardiovascular:  Negative for chest pain, palpitations and leg swelling.   Gastrointestinal:  Negative for blood in stool, constipation (Last BM yesterday), diarrhea, melena, nausea and vomiting.   Genitourinary:  Negative for dysuria.        Started new med for flow   Musculoskeletal:  Positive for joint pain (R knee, consistent with baseline). Negative for myalgias.   Neurological:  Positive for dizziness (if up too quick) and tingling (neuroapthy at BLE r/t agent orange). Negative for headaches.   Psychiatric/Behavioral:  The patient does not have insomnia.        No Known Allergies      Current Outpatient Medications on File Prior to Encounter   Medication Sig Dispense Refill    atorvastatin (LIPITOR) 20 MG Tab Take 20 mg by mouth every day.      terazosin (HYTRIN) 2 MG Cap Take 2 mg by mouth at bedtime.      Magnesium Glycinate 100 MG Cap Take 1 Capsule by mouth every day with lunch. 420 mg daily      metFORMIN (GLUCOPHAGE) 500 MG Tab Take 500 mg by mouth 2 times a day with meals. 500 mg BID      baclofen (LIORESAL) 10 MG Tab Take 10 mg by mouth at bedtime as needed. Indications: Muscle Spasm      Cyanocobalamin (VITAMIN B-12) 1000 MCG Tab Take 1,000 mcg by mouth every day.      fluticasone-salmeterol (ADVAIR) 250-50 MCG/ACT AEROSOL POWDER, BREATH ACTIVATED Inhale 1 Puff 2 times a day. Indications: Asthma      Omega-3 Fatty Acids (FISH OIL) 1000 MG Cap capsule Take 1,000 mg by mouth every day.      vitamin D3 (CHOLECALCIFEROL) 1000 Unit (25 mcg) Tab Take 1,000 Units by mouth every day.      gabapentin (NEURONTIN) 300 MG Cap Take 600 mg by mouth 2 times a day.      aspirin EC (ECOTRIN) 81 MG Tablet Delayed Response Take 81 mg by mouth every day.      guaiFENesin ER (MUCINEX) 600 MG TABLET SR 12 HR Take 1 Tablet by mouth every 12 hours. 28 Tablet 0    ondansetron (ZOFRAN) 4 MG Tab tablet Take 1 Tablet by mouth every four hours as needed for Nausea/Vomiting (for  "nausea, vomiting). (Patient not taking: Reported on 2/5/2024) 30 Tablet 6    prochlorperazine (COMPAZINE) 10 MG Tab Take 1 Tablet by mouth every 6 hours as needed (for nausea, vomiting). (Patient not taking: Reported on 2/5/2024) 30 Tablet 6     No current facility-administered medications on file prior to encounter.              Objective     BP (!) 150/70 (BP Location: Right arm, Patient Position: Sitting, BP Cuff Size: Adult)   Pulse 78   Temp 36.6 °C (97.8 °F) (Temporal)   Ht 1.702 m (5' 7\")   Wt 78.5 kg (173 lb)   SpO2 98%   BMI 27.10 kg/m²      Physical Exam  Vitals reviewed.   Constitutional:       General: He is not in acute distress.     Appearance: He is well-developed. He is not diaphoretic.   HENT:      Head: Normocephalic and atraumatic.   Eyes:      General: No scleral icterus.        Right eye: No discharge.         Left eye: No discharge.   Cardiovascular:      Rate and Rhythm: Normal rate and regular rhythm.      Heart sounds: Normal heart sounds. No murmur heard.     No friction rub. No gallop.   Pulmonary:      Effort: Pulmonary effort is normal. No respiratory distress.      Breath sounds: Examination of the right-upper field reveals decreased breath sounds. Examination of the left-upper field reveals decreased breath sounds. Examination of the right-middle field reveals decreased breath sounds. Examination of the right-lower field reveals decreased breath sounds. Examination of the left-lower field reveals decreased breath sounds. Decreased breath sounds present. No wheezing.   Abdominal:      General: There is no distension.      Palpations: Abdomen is soft.      Tenderness: There is no abdominal tenderness.   Musculoskeletal:         General: Normal range of motion.      Cervical back: Normal range of motion.   Skin:     General: Skin is warm and dry.      Coloration: Skin is not pale.      Findings: No erythema or rash.   Neurological:      Mental Status: He is alert and oriented to " person, place, and time.   Psychiatric:         Behavior: Behavior normal.         Outpatient Infusion Services on 02/16/2024   Component Date Value Ref Range Status    WBC 02/16/2024 5.7  4.8 - 10.8 K/uL Final    RBC 02/16/2024 3.27 (L)  4.70 - 6.10 M/uL Final    Hemoglobin 02/16/2024 10.6 (L)  14.0 - 18.0 g/dL Final    Hematocrit 02/16/2024 32.1 (L)  42.0 - 52.0 % Final    MCV 02/16/2024 98.2 (H)  81.4 - 97.8 fL Final    MCH 02/16/2024 32.4  27.0 - 33.0 pg Final    MCHC 02/16/2024 33.0  32.3 - 36.5 g/dL Final    Please note new reference range effective 05/22/2023.    RDW 02/16/2024 53.1 (H)  35.9 - 50.0 fL Final    Platelet Count 02/16/2024 161 (L)  164 - 446 K/uL Final    MPV 02/16/2024 9.1  9.0 - 12.9 fL Final    Neutrophils-Polys 02/16/2024 72.30 (H)  44.00 - 72.00 % Final    Lymphocytes 02/16/2024 15.20 (L)  22.00 - 41.00 % Final    Monocytes 02/16/2024 7.70  0.00 - 13.40 % Final    Eosinophils 02/16/2024 3.80  0.00 - 6.90 % Final    Basophils 02/16/2024 0.70  0.00 - 1.80 % Final    Immature Granulocytes 02/16/2024 0.30  0.00 - 0.90 % Final    Nucleated RBC 02/16/2024 0.00  0.00 - 0.20 /100 WBC Final    Please note new reference range effective 05/22/2023.    Neutrophils (Absolute) 02/16/2024 4.13  1.82 - 7.42 K/uL Final    Comment: Includes immature neutrophils, if present.  Please note new reference range effective 05/22/2023.      Lymphs (Absolute) 02/16/2024 0.87 (L)  1.00 - 4.80 K/uL Final    Monos (Absolute) 02/16/2024 0.44  0.00 - 0.85 K/uL Final    Eos (Absolute) 02/16/2024 0.22  0.00 - 0.51 K/uL Final    Baso (Absolute) 02/16/2024 0.04  0.00 - 0.12 K/uL Final    Immature Granulocytes (abs) 02/16/2024 0.02  0.00 - 0.11 K/uL Final    NRBC (Absolute) 02/16/2024 0.00  K/uL Final    Outpt Infus CBC Comment 02/16/2024 see below   Final    Comment: Per physician request, the automated differential results reported on this  patient have not been verified by a manual method.      Sodium 02/16/2024 136  135 -  145 mmol/L Final    Potassium 02/16/2024 4.6  3.6 - 5.5 mmol/L Final    Chloride 02/16/2024 100  96 - 112 mmol/L Final    Co2 02/16/2024 24  20 - 33 mmol/L Final    Anion Gap 02/16/2024 12.0  7.0 - 16.0 Final    Glucose 02/16/2024 118 (H)  65 - 99 mg/dL Final    Bun 02/16/2024 31 (H)  8 - 22 mg/dL Final    Creatinine 02/16/2024 1.43 (H)  0.50 - 1.40 mg/dL Final    Calcium 02/16/2024 9.5  8.5 - 10.5 mg/dL Final    Correct Calcium 02/16/2024 9.4  8.5 - 10.5 mg/dL Final    AST(SGOT) 02/16/2024 62 (H)  12 - 45 U/L Final    ALT(SGPT) 02/16/2024 28  2 - 50 U/L Final    Alkaline Phosphatase 02/16/2024 110 (H)  30 - 99 U/L Final    Total Bilirubin 02/16/2024 0.4  0.1 - 1.5 mg/dL Final    Albumin 02/16/2024 4.1  3.2 - 4.9 g/dL Final    Total Protein 02/16/2024 7.3  6.0 - 8.2 g/dL Final    Globulin 02/16/2024 3.2  1.9 - 3.5 g/dL Final    A-G Ratio 02/16/2024 1.3  g/dL Final    Direct Bilirubin 02/16/2024 <0.2  0.1 - 0.5 mg/dL Final    Magnesium 02/16/2024 1.8  1.5 - 2.5 mg/dL Final    Phosphorus 02/16/2024 3.3  2.5 - 4.5 mg/dL Final    TSH 02/16/2024 1.630  0.380 - 5.330 uIU/mL Final    Comment: The 2011 American Thyroid Association (TARUN) guidelines  recommended that the interpretation of thyroid function in  pregnancy be based on trimester specific reference ranges.    1st Trimester  0.100-2.500 mIU/L  2nd Trimester  0.200-3.000 mIU/L  3rd Trimester  0.300-3.500 mIU/L    These established reference ranges have not been validated  at [facility].      Free T-4 02/16/2024 1.48  0.93 - 1.70 ng/dL Final    T3 02/16/2024 121.0  60.0 - 181.0 ng/dL Final    LDH Total 02/16/2024 152  107 - 266 U/L Final    Cholesterol,Tot 02/16/2024 168  100 - 199 mg/dL Final    Triglycerides 02/16/2024 115  0 - 149 mg/dL Final    HDL 02/16/2024 44  >=40 mg/dL Final    LDL 02/16/2024 101 (H)  <100 mg/dL Final    PT 02/16/2024 13.5  12.0 - 14.6 sec Final    INR 02/16/2024 1.02  0.87 - 1.13 Final    Comment: INR - Non-therapeutic  Reference Range: 0.87-1.13  INR - Therapeutic Reference Range: 2.0-4.0      APTT 02/16/2024 27.5  24.7 - 36.0 sec Final    CPK Total 02/16/2024 84  0 - 154 U/L Final    CK-Mb 02/16/2024 2.3  0.0 - 7.7 ng/mL Final    Comment: Performed By: [facility]  [address] [place] [id number]  : Dano Clifford MD, PhD  CLIA Number: [id number]      Troponin T 02/16/2024 21 (H)  6 - 19 ng/L Final    Comment: Biotin intake of greater than 5 mg per day may interfere with  troponin levels, causing false low values.    The Ultra High Sensitivity Troponin T test has a reference range  for positive troponins that follows the recommendation of ACC for  the 99th percentile reference population.      NT-proBNP 02/16/2024 101  0 - 125 pg/mL Final    Indirect Bilirubin 02/16/2024 see below  0.0 - 1.0 mg/dL Final    Comment: Unable to calculate indirect bilirubin due to a total or direct  bilirubin result being outside the measurement range of the analyzer.      Color 02/16/2024 Yellow   Final    Character 02/16/2024 Clear   Final    Specific Gravity 02/16/2024 1.010  <1.035 Final    Ph 02/16/2024 6.0  5.0 - 8.0 Final    Glucose 02/16/2024 Negative  Negative mg/dL Final    Ketones 02/16/2024 Negative  Negative mg/dL Final    Protein 02/16/2024 Negative  Negative mg/dL Final    Bilirubin 02/16/2024 Negative  Negative Final    Urobilinogen, Urine 02/16/2024 0.2  Negative Final    Nitrite 02/16/2024 Negative  Negative Final    Leukocyte Esterase 02/16/2024 Negative  Negative Final    Occult Blood 02/16/2024 Negative  Negative Final    Micro Urine Req 02/16/2024 see below   Final    Comment: Microscopic examination not performed when specimen is clear  and chemically negative for protein, blood, leukocyte esterase  and nitrite.      Miscellaneous Lab Result 02/16/2024 SEE NOTE   Final    Comment: Test name                     Result Flag Units   RefIntvl  -------------------------------------------------------------  Troponin-I                     <0.01       ng/mL 0.00-0.03  INTERPRETIVE INFORMATION: Troponin I    0.03 ng/mL or less ....... Negative, repeat testing in four to six  hours if clinically indicated.    0.04 - 0.29 ng/mL ........ Suspicious for myocardial injury.  Serial measurements may be necessary to confirm or exclude the  diagnosis of acute coronary syndrome. Repeat in four to six hours  if indicated.    0.30 ng/mL or greater .... Consistent with myocardial injury.  Clinical and laboratory correlation recommended.  Performed At: [facility] ([facility])  [facility]  [place]  [address]  Medical Director: DO LLOYD IGLESIAS Number: [id number]      GFR (CKD-EPI) 02/16/2024 51 (A)  >60 mL/min/1.73 m 2 Final    Comment: Estimated Glomerular Filtration Rate is calculated using  race neutral CKD-EPI 2021 equation per NKF-ASN recommendations.              Assessment & Plan   1. Encounter for long-term current use of high risk medication        2. SCLC (small cell lung carcinoma) (HCC)        3. Hypomagnesemia  Magnesium Glycinate 100 MG Cap      4. Research study patient          1.  Study Patient: Cisplatin, etoposide, [id number] vs placebo with radiation, initiated 9/18/23. ECOG = 0    2.  Hypomagnesemia: Replaced per infusion services, provided supplements per rx, continue to monitor.    3.  SCLC: Diagnosed 8/29/23; s/p 4 cycles cis, etoposide, [id number] vs placebo 9/18-12/4/23; s/p 29 fractions RT 10/9-11/21/23; transitioned to maintenance [id number] vs placebo at C5 1/6/24.    Patient continues tolerating treatment fairly well.  CBC, CMP, direct bilirubin, magnesium, phosphorus, LDH, lipid profile, INR, APTT, CPK, CK-MB, troponin, BNP, free T4, TSH, UA have been evaluated and found to be within acceptable limits. He will proceed with cycle 7 of treatment and return in 3 weeks for evaluation prior  to cycle 8, sooner as needed.        The patient verbalized agreement and understanding of current plan. All questions and concerns were addressed at time of visit.    Please note that this dictation was created using voice recognition software. I have made every reasonable attempt to correct obvious errors, but I expect that there are errors of grammar and possibly content that I did not discover before finalizing the note.

## 2024-02-16 NOTE — PROGRESS NOTES
Pt arrives to [facility] for lab draw via port.  No additional labs needed for research dept today.  RUC port accessed with sterile technique, flushed with NS and brisk blood return observed.  All labs drawn via port.  Port flushed with NS and heparin locked.  Ames needle removed intact.  Site covered with gauze.  Confirmed next appt time on 2/19/2024 with pt.  UA collected and sent to the lab.  Pt dc home to self care.

## 2024-02-18 NOTE — PROGRESS NOTES
"Pharmacy Chemotherapy Verification:   Patient Name: Bart Ramsey  Dx: small cell lung cancer  Cycle 7  Previous treatment: C6 1/29/24  Study Protocol: [id number]  Participant # [id number]    HLX10 or placebo 300 mg IV over 30-60 min on day 1  Cisplatin 75 mg/m2 IV over 60 min on day 1  Etoposide 100 mg/m2 IV over 1-2 hours on days 1-3  Every 21 days with concurrent radiation x4 cycles    followed by  HLX10 or placebo 300 mg IV over 30-60 min on day 1  Every 21 days until DP/UT  A Randomized, Double-Blind, International Multicenter, Phase III Study to Evaluate the Anti-Tumor Efficacy and Safety of HLX10 (Recombinant Humanized Anti-PD-1 Monoclonal Antibody Injection) or Placebo in Combination with Chemotherapy (Carboplatin/Cisplatin-Etoposide) and Concurrent Radiotherapy in Patients with Limited-Stage Small Cell Lung Cancer (LS-SCLC). [id number]      Allergies:  Patient has no known allergies.       /63   Pulse 77   Temp 36.3 °C (97.3 °F) (Temporal)   Resp 18   Ht 1.72 m (5' 7.72\")   Wt 79 kg (174 lb 2.6 oz)   SpO2 94%   BMI 26.70 kg/m²  Body surface area is 1.94 meters squared.    Labs 2/16/24:  ANC~ 4130 Plt = 161k   Hgb = 10.6     SCr = 1.43 mg/dL CrCl ~ 51 mL/min   AST/ALT/AP = 62/28/110 TBili = 0.4  TSH = 1.63 Free T4 = 1.48  HLX10 or placebo 300 mg fixed dose = 300 mg IV   Fixed dose, no calculation required = 300 mg IV    Mainor Yap, PharmD  "

## 2024-02-19 ENCOUNTER — OUTPATIENT INFUSION SERVICES (OUTPATIENT)
Dept: ONCOLOGY | Facility: MEDICAL CENTER | Age: 74
End: 2024-02-19
Attending: STUDENT IN AN ORGANIZED HEALTH CARE EDUCATION/TRAINING PROGRAM
Payer: MEDICARE

## 2024-02-19 VITALS
OXYGEN SATURATION: 94 % | SYSTOLIC BLOOD PRESSURE: 125 MMHG | HEIGHT: 68 IN | TEMPERATURE: 97.3 F | BODY MASS INDEX: 26.4 KG/M2 | DIASTOLIC BLOOD PRESSURE: 63 MMHG | WEIGHT: 174.16 LBS | RESPIRATION RATE: 18 BRPM | HEART RATE: 77 BPM

## 2024-02-19 DIAGNOSIS — C34.90 SCLC (SMALL CELL LUNG CARCINOMA) (HCC): ICD-10-CM

## 2024-02-19 LAB
CK MB SERPL-MCNC: 2.3 NG/ML (ref 0–7.7)
MISCELLANEOUS LAB RESULT MISCLAB: NORMAL

## 2024-02-19 PROCEDURE — A4212 NON CORING NEEDLE OR STYLET: HCPCS

## 2024-02-19 PROCEDURE — 700111 HCHG RX REV CODE 636 W/ 250 OVERRIDE (IP): Performed by: NURSE PRACTITIONER

## 2024-02-19 PROCEDURE — A9270 NON-COVERED ITEM OR SERVICE: HCPCS | Performed by: NURSE PRACTITIONER

## 2024-02-19 PROCEDURE — 700101 HCHG RX REV CODE 250: Performed by: NURSE PRACTITIONER

## 2024-02-19 PROCEDURE — 96413 CHEMO IV INFUSION 1 HR: CPT

## 2024-02-19 RX ADMIN — Medication 300 MG: at 10:52

## 2024-02-19 RX ADMIN — HEPARIN 500 UNITS: 100 SYRINGE at 11:46

## 2024-02-19 ASSESSMENT — FIBROSIS 4 INDEX: FIB4 SCORE: 5.39

## 2024-02-19 NOTE — PROGRESS NOTES
Chemotherapy Verification - SECONDARY RN       Height = 172 cm  Weight = 79 kg  BSA = 1.94 m2       Medication: HLX10 or PLACEBO  Dose: 300 mg set dose  Calculated Dose: 300 mg                             (In mg/m2, AUC, mg/kg)       I confirm that this process was performed independently.

## 2024-02-19 NOTE — PROGRESS NOTES
Bill into Infusions Services for Day 1/ Cycle 7 of HLX10/Placebo for SCLC. Pt denied having any new or acute complaints today, reports tolerating past treatments well. POC and recent lab results reviewed.      Port accessed in a sterile manner, had + blood return, flushed briskly. IVF TKO.    Pt given HLX10/placebo as prescribed filter intact, Bill tolerated well, denied having any complaints during or after infusion.    Port had + blood return after, flushed per [facility] policy, de-accessed, needle intact, insertion site covered with sterile gauze and paper tape.  Confirmed next appointment and Bill was discharged home in no acute distress.

## 2024-02-19 NOTE — PROGRESS NOTES
"Pharmacy Chemotherapy Verification:    Patient Name: Bart Ramsey  Dx: small cell lung cancer  Study Protocol: [id number]  Participant # [id number]    HLX10 or placebo 300 mg IV over 30-60 min on day 1  Cisplatin 75 mg/m2 IV over 60 min on day 1  Etoposide 100 mg/m2 IV over 1-2 hours on days 1-3  Every 21 days with concurrent radiation x 4 cycles Completed 12/6/23    followed by  HLX10 or placebo 300 mg IV over 30-60 min on day 1  Every 21 days up to one year  A Randomized, Double-Blind, International Multicenter, Phase III Study to Evaluate the Anti-Tumor Efficacy and Safety of HLX10 (Recombinant Humanized Anti-PD-1 Monoclonal Antibody Injection) or Placebo in Combination with Chemotherapy (Carboplatin/Cisplatin-Etoposide) and Concurrent Radiotherapy in Patients with Limited-Stage Small Cell Lung Cancer (LS-SCLC). [id number]  Allergies: Patient has no known allergies.       /63   Pulse 77   Temp 36.3 °C (97.3 °F) (Temporal)   Resp 18   Ht 1.72 m (5' 7.72\")   Wt 79 kg (174 lb 2.6 oz)   SpO2 94%   BMI 26.70 kg/m²      Body surface area is 1.94 meters squared.    Labs 2/16/24:  ANC~ 4130 Plt = 161k   Hgb = 10.6     SCr = 1.43 mg/dL CrCl ~ 51 mL/min   AST/ALT/AP = 62/28/110 TBili = 0.4  Mag = 1.8  K+ = 4.6  TSH = 1.63   Free T4 = 1.48    T3  = 121   Urine protein = negative     Drug Order   (Drug name, dose, route, IV Fluid & volume, frequency, number of doses) Cycle 7  Previous treatment: C6 on 1/29/24   Medication = HLX10 or placebo  Base Dose = 300 mg  Fixed dose, no calculation required  Final Dose = 300 mg  Route = IV  Fluid & Volume =  mL  Admin Duration = Over 60 min     Study-supplied medication       First infusion 60 min. If tolerated, subsequent infusions 30 min.      By my signature below, I confirm this process was performed independently with the BSA and all final chemotherapy dosing calculations congruent. I have reviewed the above chemotherapy order and that my " calculation of the final dose and BSA (when applicable) corroborate those calculations of the  pharmacist. Discrepancies of 10% or greater in the written dose have been addressed and documented within the EPIC Progress notes.    Benita Pena, OscarD

## 2024-02-19 NOTE — PROGRESS NOTES
Chemotherapy Verification - PRIMARY RN      Height =172 cm     Weight = 79 kg    BSA =  1.94 m^2      Medication: HLX10 or Placebo  Dose: 300 mg (study dose)    Calculated Dose:  300 mg (study dose)                            (In mg/m2, AUC, mg/kg)           I confirm this process was performed independently with the BSA and all final chemotherapy dosing calculations congruent.  Any discrepancies of 10% or greater have been addressed with the chemotherapy pharmacist. The resolution of the discrepancy has been documented in the EPIC progress notes.

## 2024-02-21 ENCOUNTER — TELEPHONE (OUTPATIENT)
Dept: HEMATOLOGY ONCOLOGY | Facility: MEDICAL CENTER | Age: 74
End: 2024-02-21
Payer: MEDICARE

## 2024-02-21 NOTE — TELEPHONE ENCOUNTER
Patient called stating that yesterday he had symptoms of the flu I.e.vomiting and chills. His last infusion of HLX10 was on Monday.He reports feeling better now no more vomiting or chills just feeling tired, he is able to drink fluids, he has not eaten anything yet today but says he will try in alittle bit, he was advised to go to Urgent Care or ER if symptoms get worse or is unable to eat or drink, patient understood, I also told him I would notify Kalani GARCIA for Dr. Fuller to see if she has any further instructions.

## 2024-02-22 ENCOUNTER — PATIENT OUTREACH (OUTPATIENT)
Dept: ONCOLOGY | Facility: MEDICAL CENTER | Age: 74
End: 2024-02-22
Payer: MEDICARE

## 2024-02-22 ENCOUNTER — TELEPHONE (OUTPATIENT)
Dept: HEMATOLOGY ONCOLOGY | Facility: MEDICAL CENTER | Age: 74
End: 2024-02-22
Payer: MEDICARE

## 2024-02-22 NOTE — PROGRESS NOTES
Follow up for cancer navigation and DST of 2/10.  Pt reports he is doing ok.  Denies current barriers to care outside of some symptoms and side effects that he has discussed with staff.  He does not have questions or needs from navigator at this time.  Encouraged to reach out if concerns come up.  Sent follow up letter, cancer support calendar and message via [employer] as well.

## 2024-02-22 NOTE — TELEPHONE ENCOUNTER
Called Pt r/t his call about flu symptoms.  Pt states he is now able to eat and drink some but is still fatigued.  Pt states he thinks one more day and he will be back to feeling good.  Let him know to call with any concerns.

## 2024-02-22 NOTE — LETTER
[facility]    [address]  [place] [address]  Phone: [phone] - Fax: [phone]              Edenilson Ramsey,  [address] [place] [address]     Date: 02/22/24    Dear Edenilson,    I am your Cancer Nurse Navigator, a certified oncology nurse. I am sending this letter as a reminder of our available services. My role is to address any needs you may have with education, guidance and support. I am available to you and your family through your treatment at [facility].       I am available to address your needs during your journey with the following services:     Care Coordination  I can assist you in facilitating communication between your cancer care treatment team to ensure timely treatment and follow-up.  I can also assist with transition of care back to your primary care provider, or other specialist, as needed.  My goal is to bridge gaps for you throughout the course of your active treatment.       Education Services  Understanding the recommended treatment course by your physician is key. I can provide educational resources personalized to your cancer diagnosis to help you understand your diagnosis and treatment. Please let me know if you would like to receive information about your diagnosis and treatment plan.  I am here to help.     Support Services/Resource Information  [facility] we offer a full scope of support services.  I can assist you with referral information to:  Cancer Clinical Trials & Research  Nutrition counseling  Support groups  Complementary Therapies such as Mind-Body Techniques Meditation  Patient Financial Advocates    [facility], an [employer] affiliate office, our volunteers can assist you with accessing our [employer] library, support services information, head coverings and comfort items  Community and national resources, included eligibility based christina assistance and pharmaceutical access  programs, if you are in need of additional information.     [facility] offers services that include:  Behavioral Health  Genetic counseling & testing  Acupuncture  Lymphedema prevention/treatment program  Palliative care services.         I hope you have an excellent patient experience.  Please feel free to share with me your comments regarding the care you have received- we value your feedback.    Sincerely,       Jacqueline Rg R.N.  Cancer Nurse Navigator    Office: [phone]  Main:  [phone]   Email:  [email or web address]

## 2024-03-05 DIAGNOSIS — C34.90 SCLC (SMALL CELL LUNG CARCINOMA) (HCC): ICD-10-CM

## 2024-03-05 RX ORDER — METHYLPREDNISOLONE SODIUM SUCCINATE 125 MG/2ML
125 INJECTION, POWDER, LYOPHILIZED, FOR SOLUTION INTRAMUSCULAR; INTRAVENOUS PRN
Status: CANCELLED | OUTPATIENT
Start: 2024-03-08

## 2024-03-05 RX ORDER — 0.9 % SODIUM CHLORIDE 0.9 %
10 VIAL (ML) INJECTION PRN
Status: CANCELLED | OUTPATIENT
Start: 2024-03-08

## 2024-03-05 RX ORDER — 0.9 % SODIUM CHLORIDE 0.9 %
VIAL (ML) INJECTION PRN
Status: CANCELLED | OUTPATIENT
Start: 2024-03-08

## 2024-03-05 RX ORDER — DIPHENHYDRAMINE HYDROCHLORIDE 50 MG/ML
50 INJECTION INTRAMUSCULAR; INTRAVENOUS PRN
Status: CANCELLED | OUTPATIENT
Start: 2024-03-08

## 2024-03-05 RX ORDER — 0.9 % SODIUM CHLORIDE 0.9 %
VIAL (ML) INJECTION PRN
Status: CANCELLED | OUTPATIENT
Start: 2024-03-07

## 2024-03-05 RX ORDER — 0.9 % SODIUM CHLORIDE 0.9 %
10 VIAL (ML) INJECTION PRN
Status: CANCELLED | OUTPATIENT
Start: 2024-03-07

## 2024-03-05 RX ORDER — EPINEPHRINE 1 MG/ML(1)
0.5 AMPUL (ML) INJECTION PRN
Status: CANCELLED | OUTPATIENT
Start: 2024-03-08

## 2024-03-05 RX ORDER — 0.9 % SODIUM CHLORIDE 0.9 %
3 VIAL (ML) INJECTION PRN
Status: CANCELLED | OUTPATIENT
Start: 2024-03-08

## 2024-03-05 RX ORDER — 0.9 % SODIUM CHLORIDE 0.9 %
3 VIAL (ML) INJECTION PRN
Status: CANCELLED | OUTPATIENT
Start: 2024-03-07

## 2024-03-05 RX ORDER — PROCHLORPERAZINE MALEATE 10 MG
10 TABLET ORAL EVERY 6 HOURS PRN
Status: CANCELLED | OUTPATIENT
Start: 2024-03-08

## 2024-03-05 RX ORDER — ONDANSETRON 8 MG/1
8 TABLET, ORALLY DISINTEGRATING ORAL PRN
Status: CANCELLED | OUTPATIENT
Start: 2024-03-08

## 2024-03-05 RX ORDER — SODIUM CHLORIDE 9 MG/ML
INJECTION, SOLUTION INTRAVENOUS CONTINUOUS
Status: CANCELLED | OUTPATIENT
Start: 2024-03-08

## 2024-03-05 RX ORDER — ONDANSETRON 2 MG/ML
4 INJECTION INTRAMUSCULAR; INTRAVENOUS PRN
Status: CANCELLED | OUTPATIENT
Start: 2024-03-08

## 2024-03-06 ENCOUNTER — HOSPITAL ENCOUNTER (OUTPATIENT)
Dept: RADIOLOGY | Facility: MEDICAL CENTER | Age: 74
End: 2024-03-06
Attending: STUDENT IN AN ORGANIZED HEALTH CARE EDUCATION/TRAINING PROGRAM
Payer: MEDICARE

## 2024-03-06 DIAGNOSIS — C34.90 SCLC (SMALL CELL LUNG CARCINOMA) (HCC): ICD-10-CM

## 2024-03-06 PROCEDURE — 71260 CT THORAX DX C+: CPT

## 2024-03-06 PROCEDURE — 700117 HCHG RX CONTRAST REV CODE 255: Performed by: STUDENT IN AN ORGANIZED HEALTH CARE EDUCATION/TRAINING PROGRAM

## 2024-03-06 RX ADMIN — IOHEXOL 100 ML: 350 INJECTION, SOLUTION INTRAVENOUS at 17:45

## 2024-03-07 ENCOUNTER — OUTPATIENT INFUSION SERVICES (OUTPATIENT)
Dept: ONCOLOGY | Facility: MEDICAL CENTER | Age: 74
End: 2024-03-07
Attending: STUDENT IN AN ORGANIZED HEALTH CARE EDUCATION/TRAINING PROGRAM

## 2024-03-07 ENCOUNTER — RESEARCH ENCOUNTER (OUTPATIENT)
Dept: HEMATOLOGY ONCOLOGY | Facility: MEDICAL CENTER | Age: 74
End: 2024-03-07

## 2024-03-07 ENCOUNTER — HOSPITAL ENCOUNTER (OUTPATIENT)
Dept: HEMATOLOGY ONCOLOGY | Facility: MEDICAL CENTER | Age: 74
End: 2024-03-07
Attending: NURSE PRACTITIONER
Payer: COMMERCIAL

## 2024-03-07 VITALS
BODY MASS INDEX: 26.46 KG/M2 | WEIGHT: 174.6 LBS | HEIGHT: 68 IN | SYSTOLIC BLOOD PRESSURE: 108 MMHG | OXYGEN SATURATION: 96 % | HEART RATE: 73 BPM | TEMPERATURE: 97.6 F | DIASTOLIC BLOOD PRESSURE: 50 MMHG | RESPIRATION RATE: 15 BRPM

## 2024-03-07 VITALS
RESPIRATION RATE: 18 BRPM | DIASTOLIC BLOOD PRESSURE: 71 MMHG | OXYGEN SATURATION: 97 % | SYSTOLIC BLOOD PRESSURE: 129 MMHG | HEART RATE: 80 BPM | TEMPERATURE: 98.2 F

## 2024-03-07 DIAGNOSIS — C34.90 SCLC (SMALL CELL LUNG CARCINOMA) (HCC): ICD-10-CM

## 2024-03-07 DIAGNOSIS — Z79.899 ENCOUNTER FOR LONG-TERM CURRENT USE OF HIGH RISK MEDICATION: ICD-10-CM

## 2024-03-07 LAB
ALBUMIN SERPL BCP-MCNC: 3.9 G/DL (ref 3.2–4.9)
ALBUMIN/GLOB SERPL: 1.2 G/DL
ALP SERPL-CCNC: 121 U/L (ref 30–99)
ALT SERPL-CCNC: 14 U/L (ref 2–50)
ANION GAP SERPL CALC-SCNC: 12 MMOL/L (ref 7–16)
APPEARANCE UR: CLEAR
APTT PPP: 28.4 SEC (ref 24.7–36)
AST SERPL-CCNC: 20 U/L (ref 12–45)
BASOPHILS # BLD AUTO: 0.6 % (ref 0–1.8)
BASOPHILS # BLD: 0.04 K/UL (ref 0–0.12)
BILIRUB CONJ SERPL-MCNC: <0.2 MG/DL (ref 0.1–0.5)
BILIRUB INDIRECT SERPL-MCNC: NORMAL MG/DL (ref 0–1)
BILIRUB SERPL-MCNC: 0.4 MG/DL (ref 0.1–1.5)
BILIRUB UR QL STRIP.AUTO: NEGATIVE
BUN SERPL-MCNC: 23 MG/DL (ref 8–22)
CALCIUM ALBUM COR SERPL-MCNC: 9.5 MG/DL (ref 8.5–10.5)
CALCIUM SERPL-MCNC: 9.4 MG/DL (ref 8.5–10.5)
CHLORIDE SERPL-SCNC: 103 MMOL/L (ref 96–112)
CHOLEST SERPL-MCNC: 125 MG/DL (ref 100–199)
CK SERPL-CCNC: 74 U/L (ref 0–154)
CO2 SERPL-SCNC: 23 MMOL/L (ref 20–33)
COLOR UR: YELLOW
CREAT SERPL-MCNC: 1.32 MG/DL (ref 0.5–1.4)
EOSINOPHIL # BLD AUTO: 0.25 K/UL (ref 0–0.51)
EOSINOPHIL NFR BLD: 3.7 % (ref 0–6.9)
ERYTHROCYTE [DISTWIDTH] IN BLOOD BY AUTOMATED COUNT: 48.5 FL (ref 35.9–50)
GFR SERPLBLD CREATININE-BSD FMLA CKD-EPI: 57 ML/MIN/1.73 M 2
GLOBULIN SER CALC-MCNC: 3.2 G/DL (ref 1.9–3.5)
GLUCOSE SERPL-MCNC: 134 MG/DL (ref 65–99)
GLUCOSE UR STRIP.AUTO-MCNC: NEGATIVE MG/DL
HCT VFR BLD AUTO: 30.6 % (ref 42–52)
HDLC SERPL-MCNC: 42 MG/DL
HGB BLD-MCNC: 10.2 G/DL (ref 14–18)
IMM GRANULOCYTES # BLD AUTO: 0.02 K/UL (ref 0–0.11)
IMM GRANULOCYTES NFR BLD AUTO: 0.3 % (ref 0–0.9)
INR PPP: 1.02 (ref 0.87–1.13)
KETONES UR STRIP.AUTO-MCNC: NEGATIVE MG/DL
LDH SERPL L TO P-CCNC: 138 U/L (ref 107–266)
LDLC SERPL CALC-MCNC: 63 MG/DL
LEUKOCYTE ESTERASE UR QL STRIP.AUTO: NEGATIVE
LYMPHOCYTES # BLD AUTO: 1.01 K/UL (ref 1–4.8)
LYMPHOCYTES NFR BLD: 14.9 % (ref 22–41)
MAGNESIUM SERPL-MCNC: 1.9 MG/DL (ref 1.5–2.5)
MCH RBC QN AUTO: 32.2 PG (ref 27–33)
MCHC RBC AUTO-ENTMCNC: 33.3 G/DL (ref 32.3–36.5)
MCV RBC AUTO: 96.5 FL (ref 81.4–97.8)
MICRO URNS: NORMAL
MONOCYTES # BLD AUTO: 0.57 K/UL (ref 0–0.85)
MONOCYTES NFR BLD AUTO: 8.4 % (ref 0–13.4)
NEUTROPHILS # BLD AUTO: 4.87 K/UL (ref 1.82–7.42)
NEUTROPHILS NFR BLD: 72.1 % (ref 44–72)
NITRITE UR QL STRIP.AUTO: NEGATIVE
NRBC # BLD AUTO: 0 K/UL
NRBC BLD-RTO: 0 /100 WBC (ref 0–0.2)
NT-PROBNP SERPL IA-MCNC: 243 PG/ML (ref 0–125)
OUTPT INFUS CBC COMMENT OICOM: ABNORMAL
PH UR STRIP.AUTO: 5.5 [PH] (ref 5–8)
PHOSPHATE SERPL-MCNC: 3 MG/DL (ref 2.5–4.5)
PLATELET # BLD AUTO: 163 K/UL (ref 164–446)
PMV BLD AUTO: 9 FL (ref 9–12.9)
POTASSIUM SERPL-SCNC: 4.3 MMOL/L (ref 3.6–5.5)
PROT SERPL-MCNC: 7.1 G/DL (ref 6–8.2)
PROT UR QL STRIP: NEGATIVE MG/DL
PROTHROMBIN TIME: 13.5 SEC (ref 12–14.6)
RBC # BLD AUTO: 3.17 M/UL (ref 4.7–6.1)
RBC UR QL AUTO: NEGATIVE
SODIUM SERPL-SCNC: 138 MMOL/L (ref 135–145)
SP GR UR STRIP.AUTO: 1.02
TRIGL SERPL-MCNC: 101 MG/DL (ref 0–149)
TROPONIN T SERPL-MCNC: 21 NG/L (ref 6–19)
UROBILINOGEN UR STRIP.AUTO-MCNC: 0.2 MG/DL
WBC # BLD AUTO: 6.8 K/UL (ref 4.8–10.8)

## 2024-03-07 PROCEDURE — 83615 LACTATE (LD) (LDH) ENZYME: CPT

## 2024-03-07 PROCEDURE — 85730 THROMBOPLASTIN TIME PARTIAL: CPT

## 2024-03-07 PROCEDURE — 80053 COMPREHEN METABOLIC PANEL: CPT

## 2024-03-07 PROCEDURE — 82550 ASSAY OF CK (CPK): CPT

## 2024-03-07 PROCEDURE — 84100 ASSAY OF PHOSPHORUS: CPT

## 2024-03-07 PROCEDURE — 82248 BILIRUBIN DIRECT: CPT

## 2024-03-07 PROCEDURE — 85025 COMPLETE CBC W/AUTO DIFF WBC: CPT

## 2024-03-07 PROCEDURE — 83880 ASSAY OF NATRIURETIC PEPTIDE: CPT

## 2024-03-07 PROCEDURE — 85610 PROTHROMBIN TIME: CPT

## 2024-03-07 PROCEDURE — 82553 CREATINE MB FRACTION: CPT

## 2024-03-07 PROCEDURE — 81003 URINALYSIS AUTO W/O SCOPE: CPT

## 2024-03-07 PROCEDURE — 80061 LIPID PANEL: CPT

## 2024-03-07 PROCEDURE — 99212 OFFICE O/P EST SF 10 MIN: CPT | Performed by: NURSE PRACTITIONER

## 2024-03-07 PROCEDURE — 700111 HCHG RX REV CODE 636 W/ 250 OVERRIDE (IP): Performed by: NURSE PRACTITIONER

## 2024-03-07 PROCEDURE — 84484 ASSAY OF TROPONIN QUANT: CPT

## 2024-03-07 PROCEDURE — A4212 NON CORING NEEDLE OR STYLET: HCPCS

## 2024-03-07 PROCEDURE — 36591 DRAW BLOOD OFF VENOUS DEVICE: CPT

## 2024-03-07 PROCEDURE — 83735 ASSAY OF MAGNESIUM: CPT

## 2024-03-07 PROCEDURE — 99214 OFFICE O/P EST MOD 30 MIN: CPT | Performed by: NURSE PRACTITIONER

## 2024-03-07 RX ADMIN — HEPARIN 500 UNITS: 100 SYRINGE at 13:58

## 2024-03-07 ASSESSMENT — ENCOUNTER SYMPTOMS
NAUSEA: 0
CONSTIPATION: 0
DIARRHEA: 0
COUGH: 0
PALPITATIONS: 0
CHILLS: 0
MYALGIAS: 0
FEVER: 0
VOMITING: 0
DIAPHORESIS: 0
WEIGHT LOSS: 0
SHORTNESS OF BREATH: 0

## 2024-03-07 ASSESSMENT — FIBROSIS 4 INDEX: FIB4 SCORE: 5.39

## 2024-03-07 ASSESSMENT — PAIN SCALES - GENERAL: PAINLEVEL: NO PAIN

## 2024-03-07 NOTE — RESEARCH NOTE
Subject [id number] came into the clinica today all labs were drawn at the infusion center including the Pk's. An ECG was performed without any problems. Patient was seen by Diane COMER for pre- chemo visit. Patient will return on 10 Mar 2024 for Cycle 8. Patient was instructed to call if any problems or questions.

## 2024-03-07 NOTE — PROGRESS NOTES
"Subjective     Edenilson Ramsey is a 74 y.o. male who presents with Lung Cancer (Prechemo/ Jonny/ Study patient- EKG today /)          HPI    Patient seen today for evaluation prior to cycle 8 of chemotherapy employing [id number] vs placebo drug for small cell lung cancer, for continued monitoring of symptoms and side effects of cancer treatments.    Oncology history of presenting illness:  Per Freya Prince, APRN:  \"Patient with remote history of agent orange exposure in [place] ([id number]). He was in his usual state of health until 2/2023 when he had difficulty breathing, \"felt like he was being strangled.\" [facility] and he underwent cholecystectomy. Imaging completed at that time showed lung nodules. Biopsy was reportedly attempted on lung nodules in approximately March or April 2023 with results inconclusive. PET scan completed 6/6/23 showed hypermetabolic nodule in superior segment of left lower lobe measuring 2.3 cm consistent with malignancy, enlarged left superior hilar lymph node noted, no evidence of metastatic disease in abdomen or pelvis. Biopsy per bronchoscopy was completed 8/29/23 with pathology confirming malignancy. He initiated cisplatin, etoposide, [id number] vs placebo on 9/18/23; concurrent radiation (29 of 33 fractions) occurred between 10/9-11/21/23 but was not fully completed due to hospitalization for COVID pneumonia; Cycle 4 Cis, etoposide, HLX was completed 12/4/23 and he transitioned to maintenance HLX vs placebo with cycle 5 on 1/6/24.     Treatment history:  09/18/23: C1 Cis/Etop/HLX10 vs Placebo  10/09/23: C2 Cis/Etop/HLX vs Placebo + RT  10/28/23: C3 Cis/Etop/HLX vs Placebo + RT (treatment deferred 1 week for neutropenia)  11/06/23  C3 Cis/Etop/HLX vs Placebo + RT  12/04/23: C4 Cis/Etop/HLX  01/06/24: C5 HLX vs placebo  01/26/24: C6 HLX vs placebo  02/19/24: C7 HLX vs placebo  03/10/24: C8 HLX vs placebo    Interval history:  Patient is doing " very well.  He denies any clinical symptoms.  He is eating well with good appetite.  His weight is stable.  He denies any coughing, wheezing or shortness of breath.      No Known Allergies    Current Outpatient Medications on File Prior to Encounter   Medication Sig Dispense Refill    atorvastatin (LIPITOR) 20 MG Tab Take 20 mg by mouth every day.      terazosin (HYTRIN) 2 MG Cap Take 2 mg by mouth at bedtime.      Magnesium Glycinate 100 MG Cap Take 1 Capsule by mouth every day with lunch. 420 mg daily      metFORMIN (GLUCOPHAGE) 500 MG Tab Take 500 mg by mouth 2 times a day with meals. 500 mg BID      baclofen (LIORESAL) 10 MG Tab Take 10 mg by mouth at bedtime as needed. Indications: Muscle Spasm      Cyanocobalamin (VITAMIN B-12) 1000 MCG Tab Take 1,000 mcg by mouth every day.      fluticasone-salmeterol (ADVAIR) 250-50 MCG/ACT AEROSOL POWDER, BREATH ACTIVATED Inhale 1 Puff 2 times a day. Indications: Asthma      Omega-3 Fatty Acids (FISH OIL) 1000 MG Cap capsule Take 1,000 mg by mouth every day.      vitamin D3 (CHOLECALCIFEROL) 1000 Unit (25 mcg) Tab Take 1,000 Units by mouth every day.      gabapentin (NEURONTIN) 300 MG Cap Take 600 mg by mouth 2 times a day.      aspirin EC (ECOTRIN) 81 MG Tablet Delayed Response Take 81 mg by mouth every day.       Current Facility-Administered Medications on File Prior to Encounter   Medication Dose Route Frequency Provider Last Rate Last Admin    heparin lock flush 100 unit/mL injection 500 Units  500 Units Intracatheter PRN Diane Iglesias, A.P.N.   500 Units at 03/07/24 1928           Review of Systems   Constitutional:  Negative for chills, diaphoresis, fever, malaise/fatigue and weight loss.   Respiratory:  Negative for cough and shortness of breath.    Cardiovascular:  Negative for chest pain and palpitations.   Gastrointestinal:  Negative for constipation, diarrhea, nausea and vomiting.   Genitourinary:  Negative for dysuria.   Musculoskeletal:  Negative for  "myalgias.              Objective     /50 (BP Location: Right arm, Patient Position: Sitting, BP Cuff Size: Adult)   Pulse 73   Temp 36.4 °C (97.6 °F) (Temporal)   Resp 15   Ht 1.72 m (5' 7.72\")   Wt 79.2 kg (174 lb 9.7 oz)   SpO2 96%   BMI 26.77 kg/m²      Physical Exam  Vitals reviewed.   Constitutional:       General: He is not in acute distress.     Appearance: Normal appearance. He is not diaphoretic.   HENT:      Head: Normocephalic and atraumatic.   Cardiovascular:      Rate and Rhythm: Normal rate and regular rhythm.      Heart sounds: Normal heart sounds. No murmur heard.     No friction rub. No gallop.   Pulmonary:      Effort: Pulmonary effort is normal. No respiratory distress.      Breath sounds: Normal breath sounds. No wheezing.   Abdominal:      General: Bowel sounds are normal. There is no distension.      Palpations: Abdomen is soft.      Tenderness: There is no abdominal tenderness.   Musculoskeletal:         General: No swelling or tenderness. Normal range of motion.   Skin:     General: Skin is warm and dry.   Neurological:      Mental Status: He is alert and oriented to person, place, and time.   Psychiatric:         Mood and Affect: Mood normal.         Behavior: Behavior normal.               CT-CHEST,ABDOMEN,PELVIS WITH    Result Date: 3/7/2024  3/6/2024 5:21 PM HISTORY/REASON FOR EXAM:  SCLC; STUDY PATIENT - PLEASE USE RECIST 1.1 CRITERIA. Small cell lung cancer, restaging TECHNIQUE/EXAM DESCRIPTION: CT scan of the chest, abdomen and pelvis with contrast. Thin-section helical scanning was obtained with intravenous contrast from the lung apices through the pubic symphysis to include the chest, abdomen and pelvis. 100 mL of Omnipaque 350 nonionic contrast was administered intravenously without complication. Low dose optimization technique was utilized for this CT exam including automated exposure control and adjustment of the mA and/or kV according to patient size. COMPARISON: CT " chest abdomen and pelvis 1/21/2024 FINDINGS: Osseous structures: Within normal limits. Chest: LUNGS: There are pulmonary lucencies consistent with emphysema. There is architectural distortion in the left upper lobe and left lower lobe consistent with parenchymal scarring. Left lower lobe mass measures 12 x 11 mm and most recently 10 x 9 mm. Left lower lobe 4 mm nodule is present. Size is unchanged. MEDIASTINUM: There is no adenopathy, mass, or other abnormality within the axilla, the mediastinum, or the nichelle. AORTA: The aorta is normal in appearance. PLEURA: There no pleural effusion or pneumothoraces. Abdomen: LIVER: The liver is normal in appearance. SPLEEN: There are calcifications within the spleen consistent with granuloma. PANCREAS: The pancreas is normal in appearance. GALLBLADDER and biliary system: There has been cholecystectomy. There is no biliary dilation. Venous vasculature: The splenic vein and portal vein enhance normally. ADRENAL GLANDS: The adrenal glands are normal in appearance. KIDNEYS: The kidneys are normal in appearance. AORTA: There is a small infrarenal abdominal aortic aneurysm. He measures 3.1 x 2.9 cm. BOWEL: No bowel or mesenteric abnormality identified. Pelvis: Within normal limits.     1.  Left lower lobe primary treated tumor not significant changed now measuring 12 x 11 mm and most recently 10 x 9 mm-SD 2.  No other target lesion for measurement 3.  Emphysema 4.  Left lower lobe parenchymal scarring 5.  Right internal jugular catheter appears appropriately located 6.  No distant metastases 7.  Small infrarenal abdominal aortic aneurysm measuring 3.1 x 2.9 cm          Latest Reference Range & Units 03/07/24 13:58   WBC 4.8 - 10.8 K/uL 6.8   RBC 4.70 - 6.10 M/uL 3.17 (L)   Hemoglobin 14.0 - 18.0 g/dL 10.2 (L)   Hematocrit 42.0 - 52.0 % 30.6 (L)   MCV 81.4 - 97.8 fL 96.5   MCH 27.0 - 33.0 pg 32.2   MCHC 32.3 - 36.5 g/dL 33.3   RDW 35.9 - 50.0 fL 48.5   Platelet Count 164 - 446 K/uL 163  (L)   MPV 9.0 - 12.9 fL 9.0   Neutrophils-Polys 44.00 - 72.00 % 72.10 (H)   Neutrophils (Absolute) 1.82 - 7.42 K/uL 4.87   Lymphocytes 22.00 - 41.00 % 14.90 (L)   Lymphs (Absolute) 1.00 - 4.80 K/uL 1.01   Monocytes 0.00 - 13.40 % 8.40   Monos (Absolute) 0.00 - 0.85 K/uL 0.57   Eosinophils 0.00 - 6.90 % 3.70   Eos (Absolute) 0.00 - 0.51 K/uL 0.25   Basophils 0.00 - 1.80 % 0.60   Baso (Absolute) 0.00 - 0.12 K/uL 0.04   Immature Granulocytes 0.00 - 0.90 % 0.30   Immature Granulocytes (abs) 0.00 - 0.11 K/uL 0.02   Nucleated RBC 0.00 - 0.20 /100 WBC 0.00   NRBC (Absolute) K/uL 0.00   Outpt Infus CBC Comment  see below            Assessment & Plan       1. SCLC (small cell lung carcinoma) (HCC)        2. Encounter for long-term current use of high risk medication                1. Patient with stage IIIa, [id number], small cell lung cancer currently on [id number] vs placebo currently on cycle 8.  Clinically he is very stable and if labs meet parameters he is okay to proceed with treatment as planned.    ECO    2.  Patient to follow-up in the clinic in 3 weeks, or sooner if needed.      Please note that this dictation was created using voice recognition software. I have made every reasonable attempt to correct obvious errors, but I expect that there are errors of grammar and possibly content that I did not discover before finalizing the note.

## 2024-03-08 ENCOUNTER — APPOINTMENT (OUTPATIENT)
Dept: HEMATOLOGY ONCOLOGY | Facility: MEDICAL CENTER | Age: 74
End: 2024-03-08
Payer: MEDICARE

## 2024-03-08 NOTE — ADDENDUM NOTE
Encounter addended by: Dodie Kim, Med Ass't on: 3/7/2024 5:46 PM   Actions taken: Charge Capture section accepted

## 2024-03-09 LAB — CK MB SERPL-MCNC: 2.1 NG/ML (ref 0–7.7)

## 2024-03-10 ENCOUNTER — OUTPATIENT INFUSION SERVICES (OUTPATIENT)
Dept: ONCOLOGY | Facility: MEDICAL CENTER | Age: 74
End: 2024-03-10
Attending: STUDENT IN AN ORGANIZED HEALTH CARE EDUCATION/TRAINING PROGRAM

## 2024-03-10 VITALS
BODY MASS INDEX: 26.33 KG/M2 | RESPIRATION RATE: 18 BRPM | WEIGHT: 173.72 LBS | HEART RATE: 80 BPM | SYSTOLIC BLOOD PRESSURE: 130 MMHG | DIASTOLIC BLOOD PRESSURE: 63 MMHG | HEIGHT: 68 IN | OXYGEN SATURATION: 95 % | TEMPERATURE: 98.3 F

## 2024-03-10 DIAGNOSIS — C34.90 SCLC (SMALL CELL LUNG CARCINOMA) (HCC): ICD-10-CM

## 2024-03-10 PROCEDURE — 700101 HCHG RX REV CODE 250: Performed by: NURSE PRACTITIONER

## 2024-03-10 PROCEDURE — A4212 NON CORING NEEDLE OR STYLET: HCPCS

## 2024-03-10 PROCEDURE — 96413 CHEMO IV INFUSION 1 HR: CPT

## 2024-03-10 PROCEDURE — 700111 HCHG RX REV CODE 636 W/ 250 OVERRIDE (IP): Performed by: NURSE PRACTITIONER

## 2024-03-10 PROCEDURE — A9270 NON-COVERED ITEM OR SERVICE: HCPCS | Performed by: NURSE PRACTITIONER

## 2024-03-10 RX ADMIN — Medication 300 MG: at 16:13

## 2024-03-10 RX ADMIN — HEPARIN 500 UNITS: 100 SYRINGE at 17:05

## 2024-03-10 ASSESSMENT — FIBROSIS 4 INDEX: FIB4 SCORE: 2.43

## 2024-03-10 NOTE — PROGRESS NOTES
Bill came into [facility] for Day 1/ Cycle 8 of HLX10 or PLACEBO for lung cancer. Pt denied having any new or acute complaints today, reports tolerating past treatments well. Port accessed in a sterile manner, had + blood return, flushed briskly. Labs drawn prior to appointment and within parameters. Pt given HLX10/PLACEBO as prescribed, tolerated well, denied having any complaints during or after infusion. Port had + blood return after, flushed per [facility] policy, de-accessed, needle intact, insertion site covered with sterile gauze and paper tape. Pt has future appointments. Discharged to self care.

## 2024-03-10 NOTE — PROGRESS NOTES
Chemotherapy Verification - SECONDARY RN       Height = 172cm  Weight = 78.8kg  BSA = 1.94       Medication: HLX10 or placebo  Dose: 300mg  Calculated Dose: 300mg standard dose                               (In mg/m2, AUC, mg/kg)     I confirm that this process was performed independently.

## 2024-03-10 NOTE — PROGRESS NOTES
"Pharmacy Chemotherapy Verification:    Patient Name: Bart Ramsey  Dx: small cell lung cancer  Study Protocol: [id number]  Participant # [id number]    HLX10 or placebo 300 mg IV over 30-60 min on day 1  Cisplatin 75 mg/m2 IV over 60 min on day 1  Etoposide 100 mg/m2 IV over 1-2 hours on days 1-3  Every 21 days with concurrent radiation x 4 cycles Completed 12/6/23    followed by  HLX10 or placebo 300 mg IV over 30-60 min on day 1  Every 21 days up to one year  A Randomized, Double-Blind, International Multicenter, Phase III Study to Evaluate the Anti-Tumor Efficacy and Safety of HLX10 (Recombinant Humanized Anti-PD-1 Monoclonal Antibody Injection) or Placebo in Combination with Chemotherapy (Carboplatin/Cisplatin-Etoposide) and Concurrent Radiotherapy in Patients with Limited-Stage Small Cell Lung Cancer (LS-SCLC). [id number]  Allergies: Patient has no known allergies.       /63   Pulse 80   Temp 36.8 °C (98.3 °F) (Temporal)   Resp 18   Ht 1.72 m (5' 7.72\")   Wt 78.8 kg (173 lb 11.6 oz)   SpO2 95%   BMI 26.64 kg/m²      Body surface area is 1.94 meters squared.    All lab results, BP and urine protein 3/7/24 within treatment parameters.     Drug Order   (Drug name, dose, route, IV Fluid & volume, frequency, number of doses) Cycle 8  Previous treatment: C7 on 2/19/24   Medication = HLX10 or placebo  Base Dose = 300 mg  Fixed dose, no calculation required  Final Dose = 300 mg  Route = IV  Fluid & Volume =  mL  Admin Duration = Over 60 min     Study-supplied medication       First infusion 60 min. If tolerated, subsequent infusions 30 min.      By my signature below, I confirm this process was performed independently with the BSA and all final chemotherapy dosing calculations congruent. I have reviewed the above chemotherapy order and that my calculation of the final dose and BSA (when applicable) corroborate those calculations of the  pharmacist. Discrepancies of 10% or " greater in the written dose have been addressed and documented within the EPIC Progress notes.    Ambika Belle, OscarD

## 2024-03-10 NOTE — PROGRESS NOTES
"Pharmacy Chemotherapy Verification:   Patient Name: Bart Ramsey  Dx: small cell lung cancer  Cycle 8  Previous treatment: C7 2/19/24  Study Protocol: [id number]  Participant # [id number]    HLX10 or placebo 300 mg IV over 30-60 min on day 1  Cisplatin 75 mg/m2 IV over 60 min on day 1  Etoposide 100 mg/m2 IV over 1-2 hours on days 1-3  Every 21 days with concurrent radiation x4 cycles    followed by  HLX10 or placebo 300 mg IV over 30-60 min on day 1  Every 21 days until DP/UT  A Randomized, Double-Blind, International Multicenter, Phase III Study to Evaluate the Anti-Tumor Efficacy and Safety of HLX10 (Recombinant Humanized Anti-PD-1 Monoclonal Antibody Injection) or Placebo in Combination with Chemotherapy (Carboplatin/Cisplatin-Etoposide) and Concurrent Radiotherapy in Patients with Limited-Stage Small Cell Lung Cancer (LS-SCLC). [id number]      Allergies:  Patient has no known allergies.       /63   Pulse 80   Temp 36.8 °C (98.3 °F) (Temporal)   Resp 18   Ht 1.72 m (5' 7.72\")   Wt 78.8 kg (173 lb 11.6 oz)   SpO2 95%   BMI 26.64 kg/m²  Body surface area is 1.94 meters squared.    Labs 3/7/24:  ANC~ 4870 Plt = 163k   Hgb = 10.2     SCr = 1.32 mg/dL CrCl ~ 55 mL/min   AST/ALT/AP = 20/14/121 TBili = 0.4   HLX10 or placebo 300 mg fixed dose = 300 mg IV   Fixed dose, no calculation required = 300 mg IV    Mainor Yap, PharmD  "

## 2024-03-10 NOTE — PROGRESS NOTES
Chemotherapy Verification - PRIMARY RN      Height = 1.72m  Weight = 78.8kg  BSA = 1.94m^2       Medication: HLX10 or PLACEBO  Dose: 300mg - set dose  Calculated Dose: 300mg - set dose                             (In mg/m2, AUC, mg/kg)         I confirm this process was performed independently.

## 2024-03-14 ENCOUNTER — OFFICE VISIT (OUTPATIENT)
Dept: DERMATOLOGY | Facility: IMAGING CENTER | Age: 74
End: 2024-03-14
Payer: MEDICARE

## 2024-03-14 DIAGNOSIS — L57.0 ACTINIC KERATOSIS: ICD-10-CM

## 2024-03-14 DIAGNOSIS — E11.42 TYPE 2 DIABETES MELLITUS WITH DIABETIC POLYNEUROPATHY, WITHOUT LONG-TERM CURRENT USE OF INSULIN (HCC): ICD-10-CM

## 2024-03-14 DIAGNOSIS — D18.01 CHERRY ANGIOMA: ICD-10-CM

## 2024-03-14 DIAGNOSIS — L81.4 LENTIGINES: ICD-10-CM

## 2024-03-14 DIAGNOSIS — L82.1 SK (SEBORRHEIC KERATOSIS): ICD-10-CM

## 2024-03-14 DIAGNOSIS — D48.9 NEOPLASM OF UNCERTAIN BEHAVIOR: ICD-10-CM

## 2024-03-14 DIAGNOSIS — D22.9 MULTIPLE NEVI: ICD-10-CM

## 2024-03-14 NOTE — PROGRESS NOTES
DERMATOLOGY NOTE  NEW VISIT       Chief complaint: Establish Care (Spot check)     Spot on back for evaluation        History of skin cancer: No  History of precancers/actinic keratoses: Yes, Details: face  History of biopsies:Yes, Details: Rt shoulder benign  History of blistering/severe sunburns:Yes, Details: as a child  Family history of skin cancer:No  Family history of atypical moles:No      No Known Allergies     MEDICATIONS:  Medications relevant to specialty reviewed.     REVIEW OF SYSTEMS:   Positive for skin (see HPI)  Negative for fevers and chills       EXAM:  There were no vitals taken for this visit.  Constitutional: Well-developed, well-nourished, and in no distress.     A focused skin exam was performed including the affected areas of the back and feet, scalp, face and BUEs. Notable findings on exam today listed below and/or in assessment/plan.     5 mm dark brown/black papule to L parietal scalp  Ill-defined erythematous gritty/scaly papules over the forehead  -sun exposed skin of trunk and b/l upper extremities and face with scattered clinically benign light brown reticulated macules all of which were morphologically similar and none of which were suspicious for skin cancer today on exam  Several tan light brown stuck-on waxy papules scattered on the trunk and extremities  Several scattered 1-3mm bright red macules and thin papules on the trunk and extremities  Multiple light brown medium brown skin-colored macules papules scattered over the trunk >> extremities  Monofilament testing with a 10 gram force: sensation intact: intact bilaterally  Visual Inspection: Feet without maceration, ulcers, fissures.  Pedal pulses: decreased bilaterally        IMPRESSION / PLAN:    1. Type 2 diabetes mellitus with diabetic polyneuropathy, without long-term current use of insulin (HCC)  Continue care per PC  - Diabetic Monofilament Lower Extremity Exam    2. Neoplasm of uncertain behavior  Procedure Note  "  Procedure: Biopsy by shave technique  Location: L parietal scalp  Size: as noted in exam  Preoperative diagnosis:r/o atypia, concern for melanoma  Risks, benefits and alternatives of procedure discussed, verbal consent obtained for photo (see chart) and written informed consent obtained for procedure. Time out completed. Area of biopsy prepped with alcohol. Anesthesia with 1% lidocaine with epinephrine administered with 30 gauge needle. Shave biopsy of the site performed. Hemostasis achieved with pressure and aluminum chloride. Vaseline applied to wound with bandage. Patient tolerated procedure well and there were no complications. The specimen was sent to the pathology lab by the staff. Wound care was discussed.      3. Actinic keratosis  - NMSC/\"pre-cancer\" education/counseling   CRYOTHERAPY:  Risks (including, but not limited to: skin discoloration, redness, blister, blood blister, recurrence, need for further treatment, infection, scar) and benefits of cryotherapy discussed. Patient verbally agreed to proceed with treatment. 1 cryotherapy freeze thaw cycles of 10 seconds were applied to 4 lesions on forehead with cryac. Patient tolerated procedure well. Aftercare instructions given--no specific care needed unless irritated during healing process, can apply Vaseline with small band-aid if needed.      4. Lentigines  - Benign-appearing nature of lesions discussed during exam.   - Advised to continue to monitor for any return to clinic for new or concerning changes.      5. Cherry angioma  - Benign-appearing nature of lesions discussed during exam.   - Advised to continue to monitor for any return to clinic for new or concerning changes.      6. SK (seborrheic keratosis)  - Benign-appearing nature of lesions discussed during exam.   - Advised to continue to monitor for any return to clinic for new or concerning changes.      7. Multiple nevi  - Benign-appearing nature of lesions discussed during exam.   - Advised " to continue to monitor for any return to clinic for new or concerning changes.        Discussed risks associated with LN2 and shave bx, Patient verbalized understanding and agrees with plan regarding the above        Please note that this dictation was created using voice recognition software. I have made every reasonable attempt to correct obvious errors, but I expect that there are errors of grammar and possibly content that I did not discover before finalizing the note.      Return to clinic in: Return for Pending Bx results. and as needed for any new or changing skin lesions.

## 2024-03-25 ENCOUNTER — RESEARCH ENCOUNTER (OUTPATIENT)
Dept: HEMATOLOGY ONCOLOGY | Facility: MEDICAL CENTER | Age: 74
End: 2024-03-25

## 2024-03-25 RX ORDER — 0.9 % SODIUM CHLORIDE 0.9 %
10 VIAL (ML) INJECTION PRN
Status: CANCELLED | OUTPATIENT
Start: 2024-03-29

## 2024-03-25 RX ORDER — METHYLPREDNISOLONE SODIUM SUCCINATE 125 MG/2ML
125 INJECTION, POWDER, LYOPHILIZED, FOR SOLUTION INTRAMUSCULAR; INTRAVENOUS PRN
Status: CANCELLED | OUTPATIENT
Start: 2024-03-29

## 2024-03-25 RX ORDER — 0.9 % SODIUM CHLORIDE 0.9 %
10 VIAL (ML) INJECTION PRN
Status: CANCELLED | OUTPATIENT
Start: 2024-03-28

## 2024-03-25 RX ORDER — PROCHLORPERAZINE MALEATE 10 MG
10 TABLET ORAL EVERY 6 HOURS PRN
Status: CANCELLED | OUTPATIENT
Start: 2024-03-29

## 2024-03-25 RX ORDER — 0.9 % SODIUM CHLORIDE 0.9 %
3 VIAL (ML) INJECTION PRN
Status: CANCELLED | OUTPATIENT
Start: 2024-03-28

## 2024-03-25 RX ORDER — SODIUM CHLORIDE 9 MG/ML
INJECTION, SOLUTION INTRAVENOUS CONTINUOUS
Status: CANCELLED | OUTPATIENT
Start: 2024-03-29

## 2024-03-25 RX ORDER — EPINEPHRINE 1 MG/ML(1)
0.5 AMPUL (ML) INJECTION PRN
Status: CANCELLED | OUTPATIENT
Start: 2024-03-29

## 2024-03-25 RX ORDER — ONDANSETRON 8 MG/1
8 TABLET, ORALLY DISINTEGRATING ORAL PRN
Status: CANCELLED | OUTPATIENT
Start: 2024-03-29

## 2024-03-25 RX ORDER — ONDANSETRON 2 MG/ML
4 INJECTION INTRAMUSCULAR; INTRAVENOUS PRN
Status: CANCELLED | OUTPATIENT
Start: 2024-03-29

## 2024-03-25 RX ORDER — 0.9 % SODIUM CHLORIDE 0.9 %
3 VIAL (ML) INJECTION PRN
Status: CANCELLED | OUTPATIENT
Start: 2024-03-29

## 2024-03-25 RX ORDER — 0.9 % SODIUM CHLORIDE 0.9 %
VIAL (ML) INJECTION PRN
Status: CANCELLED | OUTPATIENT
Start: 2024-03-28

## 2024-03-25 RX ORDER — 0.9 % SODIUM CHLORIDE 0.9 %
VIAL (ML) INJECTION PRN
Status: CANCELLED | OUTPATIENT
Start: 2024-03-29

## 2024-03-25 RX ORDER — DIPHENHYDRAMINE HYDROCHLORIDE 50 MG/ML
50 INJECTION INTRAMUSCULAR; INTRAVENOUS PRN
Status: CANCELLED | OUTPATIENT
Start: 2024-03-29

## 2024-03-28 ENCOUNTER — OUTPATIENT INFUSION SERVICES (OUTPATIENT)
Dept: ONCOLOGY | Facility: MEDICAL CENTER | Age: 74
End: 2024-03-28
Attending: STUDENT IN AN ORGANIZED HEALTH CARE EDUCATION/TRAINING PROGRAM

## 2024-03-28 ENCOUNTER — HOSPITAL ENCOUNTER (OUTPATIENT)
Dept: HEMATOLOGY ONCOLOGY | Facility: MEDICAL CENTER | Age: 74
End: 2024-03-28
Attending: NURSE PRACTITIONER
Payer: COMMERCIAL

## 2024-03-28 VITALS
RESPIRATION RATE: 18 BRPM | OXYGEN SATURATION: 93 % | HEART RATE: 67 BPM | TEMPERATURE: 98 F | DIASTOLIC BLOOD PRESSURE: 62 MMHG | SYSTOLIC BLOOD PRESSURE: 132 MMHG

## 2024-03-28 VITALS
HEART RATE: 77 BPM | TEMPERATURE: 97.7 F | RESPIRATION RATE: 14 BRPM | SYSTOLIC BLOOD PRESSURE: 118 MMHG | HEIGHT: 68 IN | WEIGHT: 173.8 LBS | OXYGEN SATURATION: 99 % | BODY MASS INDEX: 26.34 KG/M2 | DIASTOLIC BLOOD PRESSURE: 70 MMHG

## 2024-03-28 DIAGNOSIS — C34.90 SCLC (SMALL CELL LUNG CARCINOMA) (HCC): ICD-10-CM

## 2024-03-28 DIAGNOSIS — Z00.6 RESEARCH STUDY PATIENT: ICD-10-CM

## 2024-03-28 DIAGNOSIS — R94.4 DECREASED GFR: ICD-10-CM

## 2024-03-28 DIAGNOSIS — R74.8 ELEVATED CPK: ICD-10-CM

## 2024-03-28 DIAGNOSIS — R79.89 INCREASE IN SERUM CREATININE FROM PRIOR MEASUREMENT: ICD-10-CM

## 2024-03-28 LAB
ALBUMIN SERPL BCP-MCNC: 4.1 G/DL (ref 3.2–4.9)
ALBUMIN/GLOB SERPL: 1.3 G/DL
ALP SERPL-CCNC: 108 U/L (ref 30–99)
ALT SERPL-CCNC: 23 U/L (ref 2–50)
ANION GAP SERPL CALC-SCNC: 13 MMOL/L (ref 7–16)
APPEARANCE UR: CLEAR
APTT PPP: 28.8 SEC (ref 24.7–36)
AST SERPL-CCNC: 32 U/L (ref 12–45)
BASOPHILS # BLD AUTO: 0.6 % (ref 0–1.8)
BASOPHILS # BLD: 0.04 K/UL (ref 0–0.12)
BILIRUB CONJ SERPL-MCNC: <0.2 MG/DL (ref 0.1–0.5)
BILIRUB INDIRECT SERPL-MCNC: NORMAL MG/DL (ref 0–1)
BILIRUB SERPL-MCNC: 0.4 MG/DL (ref 0.1–1.5)
BILIRUB UR QL STRIP.AUTO: NEGATIVE
BUN SERPL-MCNC: 30 MG/DL (ref 8–22)
CALCIUM ALBUM COR SERPL-MCNC: 9.1 MG/DL (ref 8.5–10.5)
CALCIUM SERPL-MCNC: 9.2 MG/DL (ref 8.5–10.5)
CHLORIDE SERPL-SCNC: 103 MMOL/L (ref 96–112)
CHOLEST SERPL-MCNC: 127 MG/DL (ref 100–199)
CK SERPL-CCNC: 198 U/L (ref 0–154)
CO2 SERPL-SCNC: 22 MMOL/L (ref 20–33)
COLOR UR: YELLOW
CREAT SERPL-MCNC: 1.51 MG/DL (ref 0.5–1.4)
EOSINOPHIL # BLD AUTO: 0.25 K/UL (ref 0–0.51)
EOSINOPHIL NFR BLD: 4 % (ref 0–6.9)
ERYTHROCYTE [DISTWIDTH] IN BLOOD BY AUTOMATED COUNT: 48.8 FL (ref 35.9–50)
GFR SERPLBLD CREATININE-BSD FMLA CKD-EPI: 48 ML/MIN/1.73 M 2
GLOBULIN SER CALC-MCNC: 3.1 G/DL (ref 1.9–3.5)
GLUCOSE SERPL-MCNC: 141 MG/DL (ref 65–99)
GLUCOSE UR STRIP.AUTO-MCNC: NEGATIVE MG/DL
HCT VFR BLD AUTO: 31.9 % (ref 42–52)
HDLC SERPL-MCNC: 45 MG/DL
HGB BLD-MCNC: 10.4 G/DL (ref 14–18)
IMM GRANULOCYTES # BLD AUTO: 0.03 K/UL (ref 0–0.11)
IMM GRANULOCYTES NFR BLD AUTO: 0.5 % (ref 0–0.9)
INR PPP: 1.01 (ref 0.87–1.13)
KETONES UR STRIP.AUTO-MCNC: NEGATIVE MG/DL
LDH SERPL L TO P-CCNC: 160 U/L (ref 107–266)
LDLC SERPL CALC-MCNC: 68 MG/DL
LEUKOCYTE ESTERASE UR QL STRIP.AUTO: NEGATIVE
LYMPHOCYTES # BLD AUTO: 0.83 K/UL (ref 1–4.8)
LYMPHOCYTES NFR BLD: 13.2 % (ref 22–41)
MAGNESIUM SERPL-MCNC: 1.9 MG/DL (ref 1.5–2.5)
MCH RBC QN AUTO: 31.7 PG (ref 27–33)
MCHC RBC AUTO-ENTMCNC: 32.6 G/DL (ref 32.3–36.5)
MCV RBC AUTO: 97.3 FL (ref 81.4–97.8)
MICRO URNS: NORMAL
MONOCYTES # BLD AUTO: 0.48 K/UL (ref 0–0.85)
MONOCYTES NFR BLD AUTO: 7.6 % (ref 0–13.4)
NEUTROPHILS # BLD AUTO: 4.67 K/UL (ref 1.82–7.42)
NEUTROPHILS NFR BLD: 74.1 % (ref 44–72)
NITRITE UR QL STRIP.AUTO: NEGATIVE
NRBC # BLD AUTO: 0 K/UL
NRBC BLD-RTO: 0 /100 WBC (ref 0–0.2)
NT-PROBNP SERPL IA-MCNC: 140 PG/ML (ref 0–125)
OUTPT INFUS CBC COMMENT OICOM: ABNORMAL
PH UR STRIP.AUTO: 5.5 [PH] (ref 5–8)
PHOSPHATE SERPL-MCNC: 2.9 MG/DL (ref 2.5–4.5)
PLATELET # BLD AUTO: 152 K/UL (ref 164–446)
PMV BLD AUTO: 8.8 FL (ref 9–12.9)
POTASSIUM SERPL-SCNC: 4.3 MMOL/L (ref 3.6–5.5)
PROT SERPL-MCNC: 7.2 G/DL (ref 6–8.2)
PROT UR QL STRIP: NEGATIVE MG/DL
PROTHROMBIN TIME: 13.4 SEC (ref 12–14.6)
RBC # BLD AUTO: 3.28 M/UL (ref 4.7–6.1)
RBC UR QL AUTO: NEGATIVE
SODIUM SERPL-SCNC: 138 MMOL/L (ref 135–145)
SP GR UR STRIP.AUTO: 1.02
T3 SERPL-MCNC: 121 NG/DL (ref 60–181)
T4 FREE SERPL-MCNC: 1.3 NG/DL (ref 0.93–1.7)
TRIGL SERPL-MCNC: 72 MG/DL (ref 0–149)
TROPONIN T SERPL-MCNC: 17 NG/L (ref 6–19)
TSH SERPL DL<=0.005 MIU/L-ACNC: 1.48 UIU/ML (ref 0.38–5.33)
UROBILINOGEN UR STRIP.AUTO-MCNC: 0.2 MG/DL
WBC # BLD AUTO: 6.3 K/UL (ref 4.8–10.8)

## 2024-03-28 PROCEDURE — 83880 ASSAY OF NATRIURETIC PEPTIDE: CPT

## 2024-03-28 PROCEDURE — 83735 ASSAY OF MAGNESIUM: CPT

## 2024-03-28 PROCEDURE — 85730 THROMBOPLASTIN TIME PARTIAL: CPT

## 2024-03-28 PROCEDURE — 81003 URINALYSIS AUTO W/O SCOPE: CPT

## 2024-03-28 PROCEDURE — 82553 CREATINE MB FRACTION: CPT

## 2024-03-28 PROCEDURE — 84480 ASSAY TRIIODOTHYRONINE (T3): CPT

## 2024-03-28 PROCEDURE — 99214 OFFICE O/P EST MOD 30 MIN: CPT | Performed by: NURSE PRACTITIONER

## 2024-03-28 PROCEDURE — 99212 OFFICE O/P EST SF 10 MIN: CPT | Performed by: NURSE PRACTITIONER

## 2024-03-28 PROCEDURE — 83615 LACTATE (LD) (LDH) ENZYME: CPT

## 2024-03-28 PROCEDURE — 85025 COMPLETE CBC W/AUTO DIFF WBC: CPT

## 2024-03-28 PROCEDURE — 84484 ASSAY OF TROPONIN QUANT: CPT

## 2024-03-28 PROCEDURE — 700111 HCHG RX REV CODE 636 W/ 250 OVERRIDE (IP): Performed by: NURSE PRACTITIONER

## 2024-03-28 PROCEDURE — 82248 BILIRUBIN DIRECT: CPT

## 2024-03-28 PROCEDURE — 85610 PROTHROMBIN TIME: CPT

## 2024-03-28 PROCEDURE — 36415 COLL VENOUS BLD VENIPUNCTURE: CPT

## 2024-03-28 PROCEDURE — A4212 NON CORING NEEDLE OR STYLET: HCPCS

## 2024-03-28 PROCEDURE — 80053 COMPREHEN METABOLIC PANEL: CPT

## 2024-03-28 PROCEDURE — 80061 LIPID PANEL: CPT

## 2024-03-28 PROCEDURE — 82550 ASSAY OF CK (CPK): CPT

## 2024-03-28 PROCEDURE — 84443 ASSAY THYROID STIM HORMONE: CPT

## 2024-03-28 PROCEDURE — 84100 ASSAY OF PHOSPHORUS: CPT

## 2024-03-28 PROCEDURE — 84439 ASSAY OF FREE THYROXINE: CPT

## 2024-03-28 RX ADMIN — HEPARIN 500 UNITS: 100 SYRINGE at 09:58

## 2024-03-28 ASSESSMENT — ENCOUNTER SYMPTOMS
DIARRHEA: 0
WHEEZING: 0
CHILLS: 0
HEADACHES: 0
DIZZINESS: 0
SHORTNESS OF BREATH: 0
CONSTIPATION: 0
TINGLING: 1
MYALGIAS: 0
INSOMNIA: 0
BLOOD IN STOOL: 0
PALPITATIONS: 0
COUGH: 1
VOMITING: 0
NAUSEA: 0
WEIGHT LOSS: 0
FEVER: 0

## 2024-03-28 ASSESSMENT — FIBROSIS 4 INDEX: FIB4 SCORE: 2.43

## 2024-03-28 NOTE — PROGRESS NOTES
"Subjective     Edenilson Ramsey is a 74 y.o. male who presents with Lung Cancer (Prechemo/ Jonny)            HPI      C9  Unacc  Study      Review of Systems   Constitutional:  Negative for chills, fever, malaise/fatigue (usual activity, spring yard fever) and weight loss (stable).   Respiratory:  Positive for cough (raspy r/t allergy, resolves with mucinex). Negative for shortness of breath and wheezing.    Cardiovascular:  Positive for leg swelling (trace BLE - compression sox in place). Negative for chest pain and palpitations.   Gastrointestinal:  Negative for blood in stool, constipation (Last BM this am), diarrhea, melena, nausea and vomiting.   Genitourinary:  Negative for dysuria.   Musculoskeletal:  Negative for joint pain and myalgias.   Skin:         S/p derm visit, negative bx at left scalp   Neurological:  Positive for tingling (neuropathy r/t agent orange). Negative for dizziness and headaches.   Psychiatric/Behavioral:  The patient does not have insomnia.               Objective     /70 (BP Location: Right arm, Patient Position: Sitting, BP Cuff Size: Adult)   Pulse 77   Temp 36.5 °C (97.7 °F) (Temporal)   Resp 14   Ht 1.72 m (5' 7.72\")   Wt 78.8 kg (173 lb 12.8 oz)   SpO2 99%   BMI 26.65 kg/m²      Physical Exam          S/p EKG       CT CAP  3/6/2024 5:21 PM  HISTORY/REASON FOR EXAM:  SCLC; STUDY PATIENT - PLEASE USE RECIST 1.1 CRITERIA.  Small cell lung cancer, restaging     TECHNIQUE/EXAM DESCRIPTION:  CT scan of the chest, abdomen and pelvis with contrast.     Thin-section helical scanning was obtained with intravenous contrast from the lung apices through the pubic symphysis to include the chest, abdomen and pelvis. 100 mL of Omnipaque 350 nonionic contrast was administered intravenously without complication.     Low dose optimization technique was utilized for this CT exam including automated exposure control and adjustment of the mA and/or kV according to patient size.   "   COMPARISON: CT chest abdomen and pelvis 1/21/2024     FINDINGS:  Osseous structures: Within normal limits.     Chest:  LUNGS: There are pulmonary lucencies consistent with emphysema. There is architectural distortion in the left upper lobe and left lower lobe consistent with parenchymal scarring.  Left lower lobe mass measures 12 x 11 mm and most recently 10 x 9 mm.  Left lower lobe 4 mm nodule is present. Size is unchanged.    MEDIASTINUM: There is no adenopathy, mass, or other abnormality within the axilla, the mediastinum, or the nichelle.     AORTA: The aorta is normal in appearance.     PLEURA: There no pleural effusion or pneumothoraces.     Abdomen:  LIVER: The liver is normal in appearance.     SPLEEN: There are calcifications within the spleen consistent with granuloma.     PANCREAS: The pancreas is normal in appearance.     GALLBLADDER and biliary system: There has been cholecystectomy. There is no biliary dilation.     Venous vasculature: The splenic vein and portal vein enhance normally.     ADRENAL GLANDS: The adrenal glands are normal in appearance.     KIDNEYS: The kidneys are normal in appearance.     AORTA: There is a small infrarenal abdominal aortic aneurysm. He measures 3.1 x 2.9 cm.     BOWEL: No bowel or mesenteric abnormality identified.     Pelvis: Within normal limits.     IMPRESSION:  1.  Left lower lobe primary treated tumor not significant changed now measuring 12 x 11 mm and most recently 10 x 9 mm-SD  2.  No other target lesion for measurement  3.  Emphysema  4.  Left lower lobe parenchymal scarring  5.  Right internal jugular catheter appears appropriately located  6.  No distant metastases  7.  Small infrarenal abdominal aortic aneurysm measuring 3.1 x 2.9 cm           Assessment & Plan     @Ask The DoctorMAICOL([id number])@  [unfilled]        Rto 3 weeks           "      Objective     /70 (BP Location: Right arm, Patient Position: Sitting, BP Cuff Size: Adult)   Pulse 77   Temp 36.5 °C (97.7 °F) (Temporal)   Resp 14   Ht 1.72 m (5' 7.72\")   Wt 78.8 kg (173 lb 12.8 oz)   SpO2 99%   BMI 26.65 kg/m²      Physical Exam  Vitals reviewed.   Constitutional:       General: He is not in acute distress.     Appearance: He is well-developed. He is not diaphoretic.   HENT:      Head: Normocephalic and atraumatic.   Eyes:      General: No scleral icterus.        Right eye: No discharge.         Left eye: No discharge.   Cardiovascular:      Rate and Rhythm: Normal rate and regular rhythm.      Heart sounds: Normal heart sounds. No murmur heard.     No friction rub. No gallop.   Pulmonary:      Effort: Pulmonary effort is normal. No respiratory distress.      Breath sounds: Normal breath sounds. No wheezing.   Abdominal:      General: There is no distension.      Palpations: Abdomen is soft.      Tenderness: There is no abdominal tenderness.   Musculoskeletal:         General: Normal range of motion.      Cervical back: Normal range of motion.   Skin:     General: Skin is warm and dry.      Coloration: Skin is not pale.      Findings: No erythema or rash.   Neurological:      Mental Status: He is alert and oriented to person, place, and time.   Psychiatric:         Behavior: Behavior normal.         [facility] on 03/28/2024   Component Date Value Ref Range Status    WBC 03/28/2024 6.3  4.8 - 10.8 K/uL Final    RBC 03/28/2024 3.28 (L)  4.70 - 6.10 M/uL Final    Hemoglobin 03/28/2024 10.4 (L)  14.0 - 18.0 g/dL Final    Hematocrit 03/28/2024 31.9 (L)  42.0 - 52.0 % Final    MCV 03/28/2024 97.3  81.4 - 97.8 fL Final    MCH 03/28/2024 31.7  27.0 - 33.0 pg Final    MCHC 03/28/2024 32.6  32.3 - 36.5 g/dL Final    Please note new reference range effective 05/22/2023.    RDW 03/28/2024 48.8  35.9 - 50.0 fL Final    Platelet Count 03/28/2024 152 (L)  390 - 446 K/uL " Final    MPV 03/28/2024 8.8 (L)  9.0 - 12.9 fL Final    Neutrophils-Polys 03/28/2024 74.10 (H)  44.00 - 72.00 % Final    Lymphocytes 03/28/2024 13.20 (L)  22.00 - 41.00 % Final    Monocytes 03/28/2024 7.60  0.00 - 13.40 % Final    Eosinophils 03/28/2024 4.00  0.00 - 6.90 % Final    Basophils 03/28/2024 0.60  0.00 - 1.80 % Final    Immature Granulocytes 03/28/2024 0.50  0.00 - 0.90 % Final    Nucleated RBC 03/28/2024 0.00  0.00 - 0.20 /100 WBC Final    Please note new reference range effective 05/22/2023.    Neutrophils (Absolute) 03/28/2024 4.67  1.82 - 7.42 K/uL Final    Comment: Includes immature neutrophils, if present.  Please note new reference range effective 05/22/2023.      Lymphs (Absolute) 03/28/2024 0.83 (L)  1.00 - 4.80 K/uL Final    Monos (Absolute) 03/28/2024 0.48  0.00 - 0.85 K/uL Final    Eos (Absolute) 03/28/2024 0.25  0.00 - 0.51 K/uL Final    Baso (Absolute) 03/28/2024 0.04  0.00 - 0.12 K/uL Final    Immature Granulocytes (abs) 03/28/2024 0.03  0.00 - 0.11 K/uL Final    NRBC (Absolute) 03/28/2024 0.00  K/uL Final    Outpt Infus CBC Comment 03/28/2024 see below   Final    Comment: Per physician request, the automated differential results reported on this  patient have not been verified by a manual method.      Sodium 03/28/2024 138  135 - 145 mmol/L Final    Potassium 03/28/2024 4.3  3.6 - 5.5 mmol/L Final    Chloride 03/28/2024 103  96 - 112 mmol/L Final    Co2 03/28/2024 22  20 - 33 mmol/L Final    Anion Gap 03/28/2024 13.0  7.0 - 16.0 Final    Glucose 03/28/2024 141 (H)  65 - 99 mg/dL Final    Bun 03/28/2024 30 (H)  8 - 22 mg/dL Final    Creatinine 03/28/2024 1.51 (H)  0.50 - 1.40 mg/dL Final    Calcium 03/28/2024 9.2  8.5 - 10.5 mg/dL Final    Correct Calcium 03/28/2024 9.1  8.5 - 10.5 mg/dL Final    AST(SGOT) 03/28/2024 32  12 - 45 U/L Final    ALT(SGPT) 03/28/2024 23  2 - 50 U/L Final    Alkaline Phosphatase 03/28/2024 108 (H)  30 - 99 U/L Final    Total Bilirubin 03/28/2024 0.4  0.1 - 1.5  mg/dL Final    Albumin 03/28/2024 4.1  3.2 - 4.9 g/dL Final    Total Protein 03/28/2024 7.2  6.0 - 8.2 g/dL Final    Globulin 03/28/2024 3.1  1.9 - 3.5 g/dL Final    A-G Ratio 03/28/2024 1.3  g/dL Final    Direct Bilirubin 03/28/2024 <0.2  0.1 - 0.5 mg/dL Final    Magnesium 03/28/2024 1.9  1.5 - 2.5 mg/dL Final    Phosphorus 03/28/2024 2.9  2.5 - 4.5 mg/dL Final    TSH 03/28/2024 1.480  0.380 - 5.330 uIU/mL Final    Comment: The 2011 American Thyroid Association (TARUN) guidelines  recommended that the interpretation of thyroid function in  pregnancy be based on trimester specific reference ranges.    1st Trimester  0.100-2.500 mIU/L  2nd Trimester  0.200-3.000 mIU/L  3rd Trimester  0.300-3.500 mIU/L    These established reference ranges have not been validated  at [facility].      Free T-4 03/28/2024 1.30  0.93 - 1.70 ng/dL Final    T3 03/28/2024 121.0  60.0 - 181.0 ng/dL Final    LDH Total 03/28/2024 160  107 - 266 U/L Final    Cholesterol,Tot 03/28/2024 127  100 - 199 mg/dL Final    Triglycerides 03/28/2024 72  0 - 149 mg/dL Final    HDL 03/28/2024 45  >=40 mg/dL Final    LDL 03/28/2024 68  <100 mg/dL Final    PT 03/28/2024 13.4  12.0 - 14.6 sec Final    INR 03/28/2024 1.01  0.87 - 1.13 Final    Comment: INR - Non-therapeutic Reference Range: 0.87-1.13  INR - Therapeutic Reference Range: 2.0-4.0      APTT 03/28/2024 28.8  24.7 - 36.0 sec Final    CPK Total 03/28/2024 198 (H)  0 - 154 U/L Final    CK-Mb 03/28/2024 3.9  0.0 - 7.7 ng/mL Final    Comment: Performed By: [facility]  [address] [place] [id number]  : Dano Clifford MD, PhD  CLIA Number: [id number]      Troponin T 03/28/2024 17  6 - 19 ng/L Final    Comment: Biotin intake of greater than 5 mg per day may interfere with  troponin levels, causing false low values.    The Ultra High Sensitivity Troponin T test has a reference range  for positive troponins that follows the recommendation of ACC for  the  99th percentile reference population.      Color 03/28/2024 Yellow   Final    Character 03/28/2024 Clear   Final    Specific Gravity 03/28/2024 1.019  <1.035 Final    Ph 03/28/2024 5.5  5.0 - 8.0 Final    Glucose 03/28/2024 Negative  Negative mg/dL Final    Ketones 03/28/2024 Negative  Negative mg/dL Final    Protein 03/28/2024 Negative  Negative mg/dL Final    Bilirubin 03/28/2024 Negative  Negative Final    Urobilinogen, Urine 03/28/2024 0.2  Negative Final    Nitrite 03/28/2024 Negative  Negative Final    Leukocyte Esterase 03/28/2024 Negative  Negative Final    Occult Blood 03/28/2024 Negative  Negative Final    Micro Urine Req 03/28/2024 see below   Final    Comment: Microscopic examination not performed when specimen is clear  and chemically negative for protein, blood, leukocyte esterase  and nitrite.      NT-proBNP 03/28/2024 140 (H)  0 - 125 pg/mL Final    Indirect Bilirubin 03/28/2024 see below  0.0 - 1.0 mg/dL Final    Comment: Unable to calculate indirect bilirubin due to a total or direct  bilirubin result being outside the measurement range of the analyzer.      GFR (CKD-EPI) 03/28/2024 48 (A)  >60 mL/min/1.73 m 2 Final    Comment: Estimated Glomerular Filtration Rate is calculated using  race neutral CKD-EPI 2021 equation per NKF-ASN recommendations.           CT CAP  3/6/2024 5:21 PM  HISTORY/REASON FOR EXAM:  SCLC; STUDY PATIENT - PLEASE USE RECIST 1.1 CRITERIA.  Small cell lung cancer, restaging     TECHNIQUE/EXAM DESCRIPTION:  CT scan of the chest, abdomen and pelvis with contrast.     Thin-section helical scanning was obtained with intravenous contrast from the lung apices through the pubic symphysis to include the chest, abdomen and pelvis. 100 mL of Omnipaque 350 nonionic contrast was administered intravenously without complication.     Low dose optimization technique was utilized for this CT exam including automated exposure control and adjustment of the mA and/or kV according to patient  size.     COMPARISON: CT chest abdomen and pelvis 1/21/2024     FINDINGS:  Osseous structures: Within normal limits.     Chest:  LUNGS: There are pulmonary lucencies consistent with emphysema. There is architectural distortion in the left upper lobe and left lower lobe consistent with parenchymal scarring.  Left lower lobe mass measures 12 x 11 mm and most recently 10 x 9 mm.  Left lower lobe 4 mm nodule is present. Size is unchanged.    MEDIASTINUM: There is no adenopathy, mass, or other abnormality within the axilla, the mediastinum, or the nichelle.     AORTA: The aorta is normal in appearance.     PLEURA: There no pleural effusion or pneumothoraces.     Abdomen:  LIVER: The liver is normal in appearance.     SPLEEN: There are calcifications within the spleen consistent with granuloma.     PANCREAS: The pancreas is normal in appearance.     GALLBLADDER and biliary system: There has been cholecystectomy. There is no biliary dilation.     Venous vasculature: The splenic vein and portal vein enhance normally.     ADRENAL GLANDS: The adrenal glands are normal in appearance.     KIDNEYS: The kidneys are normal in appearance.     AORTA: There is a small infrarenal abdominal aortic aneurysm. He measures 3.1 x 2.9 cm.     BOWEL: No bowel or mesenteric abnormality identified.     Pelvis: Within normal limits.     IMPRESSION:  1.  Left lower lobe primary treated tumor not significant changed now measuring 12 x 11 mm and most recently 10 x 9 mm-SD  2.  No other target lesion for measurement  3.  Emphysema  4.  Left lower lobe parenchymal scarring  5.  Right internal jugular catheter appears appropriately located  6.  No distant metastases  7.  Small infrarenal abdominal aortic aneurysm measuring 3.1 x 2.9 cm           Assessment & Plan     1. SCLC (small cell lung carcinoma) (HCC)        2. Elevated CPK        3. Increase in serum creatinine from prior measurement        4. Decreased GFR        5. Research study patient             1.  Study Patient: Cisplatin, etoposide, [id number] vs placebo with radiation, initiated 9/18/23. ECOG = 0     2.  Elevated CPK/Renal labs: No c/o muscle pain or recent injury, no reported dark urine; renal labs are a bit off as well: elevated BUN & Cr, decreased GFR. Encourage hydration and continue to monitor.    3.  SCLC: Diagnosed 8/29/23; s/p 4 cycles cis, etoposide, [id number] vs placebo 9/18-12/4/23; s/p 29 fractions RT 10/9-11/21/23; transitioned to maintenance [id number] vs placebo at C5 1/6/24.     Patient continues tolerating treatment fairly well. CBC, CMP, direct bilirubin, magnesium, phosphorus, LDH, lipid profile, INR, APTT, CPK, CK-MB, troponin, BNP, free T4, TSH, UA have been evaluated and found to be within acceptable limits, except where addressed above. BNP elevated but improved. He will proceed with cycle 9 of treatment and return in 3 weeks for evaluation prior to cycle 10, sooner as needed.      The patient verbalized agreement and understanding of current plan. All questions and concerns were addressed at time of visit.    Please note that this dictation was created using voice recognition software. I have made every reasonable attempt to correct obvious errors, but I expect that there are errors of grammar and possibly content that I did not discover before finalizing the note.

## 2024-03-28 NOTE — PROGRESS NOTES
Patient arrived to [facility] for Pretreatment labs. Patient was ambulatory and does not report any pain or distress. Port was accessed and de-accessed, flushed with NS and heparin, blood return was noted, labs were collected, port was covered with band aid. Next appointment was scheduled. Patient left [facility] and no signs of distress.

## 2024-03-29 ENCOUNTER — APPOINTMENT (OUTPATIENT)
Dept: HEMATOLOGY ONCOLOGY | Facility: MEDICAL CENTER | Age: 74
End: 2024-03-29
Payer: MEDICARE

## 2024-03-29 LAB — CK MB SERPL-MCNC: 3.9 NG/ML (ref 0–7.7)

## 2024-03-31 ENCOUNTER — OUTPATIENT INFUSION SERVICES (OUTPATIENT)
Dept: ONCOLOGY | Facility: MEDICAL CENTER | Age: 74
End: 2024-03-31
Attending: STUDENT IN AN ORGANIZED HEALTH CARE EDUCATION/TRAINING PROGRAM

## 2024-03-31 VITALS
TEMPERATURE: 96.8 F | WEIGHT: 176.81 LBS | RESPIRATION RATE: 17 BRPM | HEART RATE: 85 BPM | HEIGHT: 69 IN | BODY MASS INDEX: 26.19 KG/M2 | SYSTOLIC BLOOD PRESSURE: 123 MMHG | OXYGEN SATURATION: 92 % | DIASTOLIC BLOOD PRESSURE: 67 MMHG

## 2024-03-31 DIAGNOSIS — C34.90 SCLC (SMALL CELL LUNG CARCINOMA) (HCC): ICD-10-CM

## 2024-03-31 PROCEDURE — A9270 NON-COVERED ITEM OR SERVICE: HCPCS | Performed by: NURSE PRACTITIONER

## 2024-03-31 PROCEDURE — 700111 HCHG RX REV CODE 636 W/ 250 OVERRIDE (IP): Performed by: NURSE PRACTITIONER

## 2024-03-31 PROCEDURE — 700101 HCHG RX REV CODE 250: Performed by: NURSE PRACTITIONER

## 2024-03-31 PROCEDURE — 96413 CHEMO IV INFUSION 1 HR: CPT

## 2024-03-31 RX ADMIN — HEPARIN 500 UNITS: 100 SYRINGE at 16:38

## 2024-03-31 RX ADMIN — Medication 300 MG: at 15:51

## 2024-03-31 RX ADMIN — LIDOCAINE HYDROCHLORIDE 0.5 ML: 10 INJECTION, SOLUTION EPIDURAL; INFILTRATION; INTRACAUDAL at 15:36

## 2024-03-31 ASSESSMENT — FIBROSIS 4 INDEX: FIB4 SCORE: 3.25

## 2024-03-31 NOTE — PROGRESS NOTES
Patient to [facility] for study drug. PORT accessed with sterile field, flushed with + blood return. HLX10 infused over 30 minutes. No s/s of infusion reaction. SASH performed and port de-accessed. Gauze and paper tape applied as a dressing. Patient to home.

## 2024-03-31 NOTE — PROGRESS NOTES
Chemotherapy Verification - PRIMARY RN      Height = 1.75m  Weight = 80.2kg  BSA = 1.97m2       Medication: HLX10 or PLACEBO  Dose: flat dose  Calculated Dose: 300mg                             (In mg/m2, AUC, mg/kg)           I confirm this process was performed independently with the BSA and all final chemotherapy dosing calculations congruent.  Any discrepancies of 10% or greater have been addressed with the chemotherapy pharmacist. The resolution of the discrepancy has been documented in the EPIC progress notes.

## 2024-03-31 NOTE — PROGRESS NOTES
"Pharmacy Chemotherapy Verification:   Patient Name: Bart Ramsey  Dx: small cell lung cancer  Cycle 9  Previous treatment: C8 3/10/24  Study Protocol: [id number]  Participant # [id number]    HLX10 or placebo 300 mg IV over 30-60 min on day 1  Cisplatin 75 mg/m2 IV over 60 min on day 1  Etoposide 100 mg/m2 IV over 1-2 hours on days 1-3  Every 21 days with concurrent radiation x4 cycles    followed by  HLX10 or placebo 300 mg IV over 30-60 min on day 1  Every 21 days until DP/UT  A Randomized, Double-Blind, International Multicenter, Phase III Study to Evaluate the Anti-Tumor Efficacy and Safety of HLX10 (Recombinant Humanized Anti-PD-1 Monoclonal Antibody Injection) or Placebo in Combination with Chemotherapy (Carboplatin/Cisplatin-Etoposide) and Concurrent Radiotherapy in Patients with Limited-Stage Small Cell Lung Cancer (LS-SCLC). [id number]      Allergies:  Patient has no known allergies.       /67   Pulse 85   Temp 36 °C (96.8 °F) (Temporal)   Resp 17   Ht 1.75 m (5' 8.9\")   Wt 80.2 kg (176 lb 12.9 oz)   SpO2 92%   BMI 26.19 kg/m²  Body surface area is 1.97 meters squared.    Labs 3/28/24:  ANC~ 4670 Plt = 152k   Hgb = 10.4     SCr = 1.51 mg/dL CrCl ~ 49 mL/min   AST/ALT/AP = 32/23/108 TBili = 0.4  TSH = 1.48 Free T4 = 1.30  HLX10 or placebo 300 mg fixed dose = 300 mg IV   Fixed dose, no calculation required = 300 mg IV    Mainor Yap, PharmD  "

## 2024-03-31 NOTE — PROGRESS NOTES
Chemotherapy Verification - SECONDARY RN       Height = 175 cm  Weight = 80.2 kg  BSA = 1.97 m2       Medication: HLX10 / Placebo  Dose: 300 mg set dose  Calculated Dose: 300 mg set dose                             (In mg/m2, AUC, mg/kg)     I confirm that this process was performed independently.

## 2024-03-31 NOTE — PROGRESS NOTES
"Pharmacy Chemotherapy Verification:    Patient Name: Bart Ramsey  Dx: small cell lung cancer  Study Protocol: [id number]  Participant # [id number]    HLX10 or placebo 300 mg IV over 30-60 min on day 1  Cisplatin 75 mg/m2 IV over 60 min on day 1  Etoposide 100 mg/m2 IV over 1-2 hours on days 1-3  Every 21 days with concurrent radiation x 4 cycles Completed 12/6/23    followed by  HLX10 or placebo 300 mg IV over 30-60 min on day 1  Every 21 days up to one year  A Randomized, Double-Blind, International Multicenter, Phase III Study to Evaluate the Anti-Tumor Efficacy and Safety of HLX10 (Recombinant Humanized Anti-PD-1 Monoclonal Antibody Injection) or Placebo in Combination with Chemotherapy (Carboplatin/Cisplatin-Etoposide) and Concurrent Radiotherapy in Patients with Limited-Stage Small Cell Lung Cancer (LS-SCLC). [id number]  Allergies: Patient has no known allergies.       Ht (P) 1.75 m (5' 8.9\")   Wt (P) 80.2 kg (176 lb 12.9 oz)   BMI (P) 26.19 kg/m²      Body surface area is 1.97 meters squared (pended).    All lab results, BP and urine protein 3/28/24 within treatment parameters.     Drug Order   (Drug name, dose, route, IV Fluid & volume, frequency, number of doses) Cycle 9  Previous treatment: C8 on 3/10/24   Medication = HLX10 or placebo  Base Dose = 300 mg  Fixed dose, no calculation required  Final Dose = 300 mg  Route = IV  Fluid & Volume =  mL  Admin Duration = Over 60 min     Study-supplied medication       First infusion 60 min. If tolerated, subsequent infusions 30 min.      By my signature below, I confirm this process was performed independently with the BSA and all final chemotherapy dosing calculations congruent. I have reviewed the above chemotherapy order and that my calculation of the final dose and BSA (when applicable) corroborate those calculations of the  pharmacist. Discrepancies of 10% or greater in the written dose have been addressed and documented " within the EPIC Progress notes.    Ambika Belle, OscarD

## 2024-04-05 ENCOUNTER — HOSPITAL ENCOUNTER (OUTPATIENT)
Dept: RADIOLOGY | Facility: MEDICAL CENTER | Age: 74
End: 2024-04-05
Attending: STUDENT IN AN ORGANIZED HEALTH CARE EDUCATION/TRAINING PROGRAM

## 2024-04-05 DIAGNOSIS — C34.90 SCLC (SMALL CELL LUNG CARCINOMA) (HCC): ICD-10-CM

## 2024-04-05 PROCEDURE — 700117 HCHG RX CONTRAST REV CODE 255: Performed by: STUDENT IN AN ORGANIZED HEALTH CARE EDUCATION/TRAINING PROGRAM

## 2024-04-05 PROCEDURE — A9579 GAD-BASE MR CONTRAST NOS,1ML: HCPCS | Performed by: STUDENT IN AN ORGANIZED HEALTH CARE EDUCATION/TRAINING PROGRAM

## 2024-04-05 PROCEDURE — 70553 MRI BRAIN STEM W/O & W/DYE: CPT

## 2024-04-05 RX ADMIN — GADOTERIDOL 15 ML: 279.3 INJECTION, SOLUTION INTRAVENOUS at 10:35

## 2024-04-17 RX ORDER — 0.9 % SODIUM CHLORIDE 0.9 %
3 VIAL (ML) INJECTION PRN
Status: CANCELLED | OUTPATIENT
Start: 2024-04-20

## 2024-04-17 RX ORDER — EPINEPHRINE 1 MG/ML(1)
0.5 AMPUL (ML) INJECTION PRN
Status: CANCELLED | OUTPATIENT
Start: 2024-04-20

## 2024-04-17 RX ORDER — ONDANSETRON 8 MG/1
8 TABLET, ORALLY DISINTEGRATING ORAL PRN
Status: CANCELLED | OUTPATIENT
Start: 2024-04-20

## 2024-04-17 RX ORDER — DIPHENHYDRAMINE HYDROCHLORIDE 50 MG/ML
50 INJECTION INTRAMUSCULAR; INTRAVENOUS PRN
Status: CANCELLED | OUTPATIENT
Start: 2024-04-20

## 2024-04-17 RX ORDER — SODIUM CHLORIDE 9 MG/ML
INJECTION, SOLUTION INTRAVENOUS CONTINUOUS
Status: CANCELLED | OUTPATIENT
Start: 2024-04-20

## 2024-04-17 RX ORDER — ONDANSETRON 2 MG/ML
4 INJECTION INTRAMUSCULAR; INTRAVENOUS PRN
Status: CANCELLED | OUTPATIENT
Start: 2024-04-20

## 2024-04-17 RX ORDER — PROCHLORPERAZINE MALEATE 10 MG
10 TABLET ORAL EVERY 6 HOURS PRN
Status: CANCELLED | OUTPATIENT
Start: 2024-04-20

## 2024-04-17 RX ORDER — 0.9 % SODIUM CHLORIDE 0.9 %
3 VIAL (ML) INJECTION PRN
Status: CANCELLED | OUTPATIENT
Start: 2024-04-18

## 2024-04-17 RX ORDER — 0.9 % SODIUM CHLORIDE 0.9 %
10 VIAL (ML) INJECTION PRN
Status: CANCELLED | OUTPATIENT
Start: 2024-04-18

## 2024-04-17 RX ORDER — METHYLPREDNISOLONE SODIUM SUCCINATE 125 MG/2ML
125 INJECTION, POWDER, LYOPHILIZED, FOR SOLUTION INTRAMUSCULAR; INTRAVENOUS PRN
Status: CANCELLED | OUTPATIENT
Start: 2024-04-20

## 2024-04-17 RX ORDER — 0.9 % SODIUM CHLORIDE 0.9 %
10 VIAL (ML) INJECTION PRN
Status: CANCELLED | OUTPATIENT
Start: 2024-04-20

## 2024-04-17 RX ORDER — 0.9 % SODIUM CHLORIDE 0.9 %
VIAL (ML) INJECTION PRN
Status: CANCELLED | OUTPATIENT
Start: 2024-04-20

## 2024-04-17 RX ORDER — 0.9 % SODIUM CHLORIDE 0.9 %
VIAL (ML) INJECTION PRN
Status: CANCELLED | OUTPATIENT
Start: 2024-04-18

## 2024-04-18 NOTE — PROGRESS NOTES
"Pharmacy Chemotherapy Verification:   Patient Name: Bart Ramsey  Dx: small cell lung cancer  Cycle 10  Previous treatment: C9 3/31/24  Study Protocol: [id number]  Participant # [id number]    HLX10 or placebo 300 mg IV over 30-60 min on day 1  Cisplatin 75 mg/m2 IV over 60 min on day 1  Etoposide 100 mg/m2 IV over 1-2 hours on days 1-3  Every 21 days with concurrent radiation x4 cycles    followed by  HLX10 or placebo 300 mg IV over 30-60 min on day 1  Every 21 days until DP/UT  A Randomized, Double-Blind, International Multicenter, Phase III Study to Evaluate the Anti-Tumor Efficacy and Safety of HLX10 (Recombinant Humanized Anti-PD-1 Monoclonal Antibody Injection) or Placebo in Combination with Chemotherapy (Carboplatin/Cisplatin-Etoposide) and Concurrent Radiotherapy in Patients with Limited-Stage Small Cell Lung Cancer (LS-SCLC). [id number]    Allergies:  Patient has no known allergies.       /59   Pulse 80   Temp 36.7 °C (98.1 °F) (Temporal)   Resp 18   Ht 1.75 m (5' 8.9\")   Wt 79.6 kg (175 lb 7.8 oz)   SpO2 92%   BMI 25.99 kg/m²  Body surface area is 1.97 meters squared.    Labs 4/19/24:  ANC~ 4720 Plt = 159k   Hgb = 11.2     SCr = 1.5 mg/dL CrCl ~ 49 mL/min   AST/ALT/AP = 21/18/123 TBili = 0.6   HLX10 or placebo 300 mg fixed dose = 300 mg IV   Fixed dose, no calculation required = 300 mg IV    Mainor Yap, PharmD  "

## 2024-04-19 ENCOUNTER — OUTPATIENT INFUSION SERVICES (OUTPATIENT)
Dept: ONCOLOGY | Facility: MEDICAL CENTER | Age: 74
End: 2024-04-19
Attending: STUDENT IN AN ORGANIZED HEALTH CARE EDUCATION/TRAINING PROGRAM

## 2024-04-19 ENCOUNTER — HOSPITAL ENCOUNTER (OUTPATIENT)
Dept: HEMATOLOGY ONCOLOGY | Facility: MEDICAL CENTER | Age: 74
End: 2024-04-19
Attending: STUDENT IN AN ORGANIZED HEALTH CARE EDUCATION/TRAINING PROGRAM
Payer: COMMERCIAL

## 2024-04-19 VITALS
HEIGHT: 69 IN | DIASTOLIC BLOOD PRESSURE: 60 MMHG | TEMPERATURE: 97.1 F | OXYGEN SATURATION: 94 % | WEIGHT: 169.53 LBS | BODY MASS INDEX: 25.11 KG/M2 | HEART RATE: 74 BPM | RESPIRATION RATE: 18 BRPM | SYSTOLIC BLOOD PRESSURE: 118 MMHG

## 2024-04-19 VITALS
DIASTOLIC BLOOD PRESSURE: 54 MMHG | TEMPERATURE: 98.1 F | SYSTOLIC BLOOD PRESSURE: 102 MMHG | HEIGHT: 69 IN | OXYGEN SATURATION: 96 % | HEART RATE: 85 BPM | BODY MASS INDEX: 25.24 KG/M2 | WEIGHT: 170.42 LBS

## 2024-04-19 DIAGNOSIS — Z79.899 ENCOUNTER FOR LONG-TERM CURRENT USE OF HIGH RISK MEDICATION: ICD-10-CM

## 2024-04-19 DIAGNOSIS — C34.90 SCLC (SMALL CELL LUNG CARCINOMA) (HCC): ICD-10-CM

## 2024-04-19 DIAGNOSIS — Z00.6 RESEARCH STUDY PATIENT: ICD-10-CM

## 2024-04-19 LAB
ALBUMIN SERPL BCP-MCNC: 4 G/DL (ref 3.2–4.9)
ALBUMIN/GLOB SERPL: 1.1 G/DL
ALP SERPL-CCNC: 123 U/L (ref 30–99)
ALT SERPL-CCNC: 18 U/L (ref 2–50)
ANION GAP SERPL CALC-SCNC: 13 MMOL/L (ref 7–16)
APPEARANCE UR: CLEAR
APTT PPP: 64 SEC (ref 24.7–36)
AST SERPL-CCNC: 21 U/L (ref 12–45)
BASOPHILS # BLD AUTO: 0.6 % (ref 0–1.8)
BASOPHILS # BLD: 0.04 K/UL (ref 0–0.12)
BILIRUB CONJ SERPL-MCNC: <0.2 MG/DL (ref 0.1–0.5)
BILIRUB INDIRECT SERPL-MCNC: NORMAL MG/DL (ref 0–1)
BILIRUB SERPL-MCNC: 0.6 MG/DL (ref 0.1–1.5)
BILIRUB UR QL STRIP.AUTO: NEGATIVE
BUN SERPL-MCNC: 34 MG/DL (ref 8–22)
CALCIUM ALBUM COR SERPL-MCNC: 9.6 MG/DL (ref 8.5–10.5)
CALCIUM SERPL-MCNC: 9.6 MG/DL (ref 8.5–10.5)
CHLORIDE SERPL-SCNC: 102 MMOL/L (ref 96–112)
CHOLEST SERPL-MCNC: 123 MG/DL (ref 100–199)
CK SERPL-CCNC: 64 U/L (ref 0–154)
CO2 SERPL-SCNC: 23 MMOL/L (ref 20–33)
COLOR UR: YELLOW
CREAT SERPL-MCNC: 1.5 MG/DL (ref 0.5–1.4)
EOSINOPHIL # BLD AUTO: 0.42 K/UL (ref 0–0.51)
EOSINOPHIL NFR BLD: 6.3 % (ref 0–6.9)
ERYTHROCYTE [DISTWIDTH] IN BLOOD BY AUTOMATED COUNT: 45 FL (ref 35.9–50)
GFR SERPLBLD CREATININE-BSD FMLA CKD-EPI: 48 ML/MIN/1.73 M 2
GLOBULIN SER CALC-MCNC: 3.6 G/DL (ref 1.9–3.5)
GLUCOSE SERPL-MCNC: 116 MG/DL (ref 65–99)
GLUCOSE UR STRIP.AUTO-MCNC: NEGATIVE MG/DL
HCT VFR BLD AUTO: 32.3 % (ref 42–52)
HDLC SERPL-MCNC: 36 MG/DL
HGB BLD-MCNC: 11.2 G/DL (ref 14–18)
IMM GRANULOCYTES # BLD AUTO: 0.02 K/UL (ref 0–0.11)
IMM GRANULOCYTES NFR BLD AUTO: 0.3 % (ref 0–0.9)
INR PPP: 1.09 (ref 0.87–1.13)
KETONES UR STRIP.AUTO-MCNC: ABNORMAL MG/DL
LDH SERPL L TO P-CCNC: 151 U/L (ref 107–266)
LDLC SERPL CALC-MCNC: 69 MG/DL
LEUKOCYTE ESTERASE UR QL STRIP.AUTO: NEGATIVE
LYMPHOCYTES # BLD AUTO: 0.82 K/UL (ref 1–4.8)
LYMPHOCYTES NFR BLD: 12.4 % (ref 22–41)
MAGNESIUM SERPL-MCNC: 1.9 MG/DL (ref 1.5–2.5)
MCH RBC QN AUTO: 32.8 PG (ref 27–33)
MCHC RBC AUTO-ENTMCNC: 34.7 G/DL (ref 32.3–36.5)
MCV RBC AUTO: 94.7 FL (ref 81.4–97.8)
MICRO URNS: ABNORMAL
MONOCYTES # BLD AUTO: 0.6 K/UL (ref 0–0.85)
MONOCYTES NFR BLD AUTO: 9.1 % (ref 0–13.4)
NEUTROPHILS # BLD AUTO: 4.72 K/UL (ref 1.82–7.42)
NEUTROPHILS NFR BLD: 71.3 % (ref 44–72)
NITRITE UR QL STRIP.AUTO: NEGATIVE
NRBC # BLD AUTO: 0 K/UL
NRBC BLD-RTO: 0 /100 WBC (ref 0–0.2)
NT-PROBNP SERPL IA-MCNC: 158 PG/ML (ref 0–125)
OUTPT INFUS CBC COMMENT OICOM: ABNORMAL
PH UR STRIP.AUTO: 5 [PH] (ref 5–8)
PHOSPHATE SERPL-MCNC: 3.2 MG/DL (ref 2.5–4.5)
PLATELET # BLD AUTO: 159 K/UL (ref 164–446)
PMV BLD AUTO: 9.4 FL (ref 9–12.9)
POTASSIUM SERPL-SCNC: 4.2 MMOL/L (ref 3.6–5.5)
PROT SERPL-MCNC: 7.6 G/DL (ref 6–8.2)
PROT UR QL STRIP: NEGATIVE MG/DL
PROTHROMBIN TIME: 14.2 SEC (ref 12–14.6)
RBC # BLD AUTO: 3.41 M/UL (ref 4.7–6.1)
RBC UR QL AUTO: NEGATIVE
SODIUM SERPL-SCNC: 138 MMOL/L (ref 135–145)
SP GR UR STRIP.AUTO: 1.03
TRIGL SERPL-MCNC: 91 MG/DL (ref 0–149)
TROPONIN T SERPL-MCNC: 21 NG/L (ref 6–19)
UROBILINOGEN UR STRIP.AUTO-MCNC: 0.2 MG/DL
WBC # BLD AUTO: 6.6 K/UL (ref 4.8–10.8)

## 2024-04-19 PROCEDURE — 81003 URINALYSIS AUTO W/O SCOPE: CPT

## 2024-04-19 PROCEDURE — 85730 THROMBOPLASTIN TIME PARTIAL: CPT

## 2024-04-19 PROCEDURE — 700111 HCHG RX REV CODE 636 W/ 250 OVERRIDE (IP): Performed by: NURSE PRACTITIONER

## 2024-04-19 PROCEDURE — 82248 BILIRUBIN DIRECT: CPT

## 2024-04-19 PROCEDURE — 99212 OFFICE O/P EST SF 10 MIN: CPT | Performed by: NURSE PRACTITIONER

## 2024-04-19 PROCEDURE — 99214 OFFICE O/P EST MOD 30 MIN: CPT | Performed by: NURSE PRACTITIONER

## 2024-04-19 PROCEDURE — A4212 NON CORING NEEDLE OR STYLET: HCPCS

## 2024-04-19 PROCEDURE — 36591 DRAW BLOOD OFF VENOUS DEVICE: CPT

## 2024-04-19 PROCEDURE — 84100 ASSAY OF PHOSPHORUS: CPT

## 2024-04-19 PROCEDURE — 85025 COMPLETE CBC W/AUTO DIFF WBC: CPT

## 2024-04-19 PROCEDURE — 83615 LACTATE (LD) (LDH) ENZYME: CPT

## 2024-04-19 PROCEDURE — 85610 PROTHROMBIN TIME: CPT

## 2024-04-19 PROCEDURE — 84484 ASSAY OF TROPONIN QUANT: CPT

## 2024-04-19 PROCEDURE — 83735 ASSAY OF MAGNESIUM: CPT

## 2024-04-19 PROCEDURE — 83880 ASSAY OF NATRIURETIC PEPTIDE: CPT

## 2024-04-19 PROCEDURE — 82553 CREATINE MB FRACTION: CPT

## 2024-04-19 PROCEDURE — 80053 COMPREHEN METABOLIC PANEL: CPT

## 2024-04-19 PROCEDURE — 82550 ASSAY OF CK (CPK): CPT

## 2024-04-19 PROCEDURE — 80061 LIPID PANEL: CPT

## 2024-04-19 RX ADMIN — HEPARIN 500 UNITS: 100 SYRINGE at 11:27

## 2024-04-19 ASSESSMENT — ENCOUNTER SYMPTOMS
CHILLS: 0
WHEEZING: 0
NAUSEA: 0
DIZZINESS: 1
SHORTNESS OF BREATH: 0
MYALGIAS: 0
FEVER: 0
INSOMNIA: 0
WEIGHT LOSS: 1
SPUTUM PRODUCTION: 1
COUGH: 1
DIARRHEA: 0
HEADACHES: 0
PALPITATIONS: 0
CONSTIPATION: 0
VOMITING: 0
TINGLING: 0
BLOOD IN STOOL: 0

## 2024-04-19 ASSESSMENT — FIBROSIS 4 INDEX
FIB4 SCORE: 3.25
FIB4 SCORE: 3.25

## 2024-04-19 NOTE — PROGRESS NOTES
"Subjective     Edenilson Ramsey is a 74 y.o. male who presents with Lung Cancer (Prechemo )            HPI  Mr. Ramsey presents for evaluation prior to cycle 10 maintenance [id number] vs placebo for treatment of stage IIIa, [id number], small cell lung cancer. He is unaccompanied for today's visit.     Patient with remote history of agent orange exposure in [place] (1960 9-1970). He was in his usual state of health until 2/2023 when he had difficulty breathing, \"felt like he was being strangled\". [facility] and he underwent cholecystectomy. Imaging completed at that time showed lung nodules. Biopsy was reportedly attempted on lung nodules in approximately March or April 2023 with results inconclusive. PET scan completed 6/6/23 showed hypermetabolic nodule in superior segment of left lower lobe measuring 2.3 cm consistent with malignancy, enlarged left superior hilar lymph node noted, no evidence of metastatic disease in abdomen or pelvis. Biopsy per bronchoscopy was completed 8/29/23 with pathology confirming malignancy. He initiated cisplatin, etoposide, [id number] vs placebo on 9/18/23; concurrent radiation (29 of 33 fractions) occurred between 10/9-11/21/23 but was not fully completed due to hospitalization for COVID pneumonia; Cycle 4 Cis, etoposide, HLX was completed 12/4/23 and he transitioned to maintenance HLX vs placebo with cycle 5 on 1/6/24.     Patient reports increased fatigue since last cycle.  He is lost 3 pounds attributed to poor appetite, that he is scheduling snacks and eating regardless.  Mild cough is attributed to COPD.  [facility] is [facility] for \"something with my kidneys\" which I suspect is pending imaging.  He is otherwise tolerating treatment fairly well and at his baseline.        Review of Systems   Constitutional:  Positive for malaise/fatigue (fatigue is worse) and weight loss (3# - not a great appetite). Negative for chills and " fever.   Respiratory:  Positive for cough (COPD related) and sputum production (white/clear). Negative for shortness of breath and wheezing.    Cardiovascular:  Negative for chest pain, palpitations and leg swelling (compression socks).   Gastrointestinal:  Negative for blood in stool, constipation (Last BM last night), diarrhea, melena, nausea and vomiting.   Genitourinary:  Negative for dysuria and hematuria.        [facility] (suspect imaging appt); started flowmax, hasn't noticed a difference   Musculoskeletal:  Negative for joint pain and myalgias.   Neurological:  Positive for dizziness (when up too quick). Negative for tingling and headaches.   Psychiatric/Behavioral:  The patient does not have insomnia.      No Known Allergies      Current Outpatient Medications on File Prior to Encounter   Medication Sig Dispense Refill    atorvastatin (LIPITOR) 20 MG Tab Take 20 mg by mouth every day.      terazosin (HYTRIN) 2 MG Cap Take 2 mg by mouth at bedtime.      Magnesium Glycinate 100 MG Cap Take 1 Capsule by mouth every day with lunch. 420 mg daily      metFORMIN (GLUCOPHAGE) 500 MG Tab Take 500 mg by mouth 2 times a day with meals. 500 mg BID      baclofen (LIORESAL) 10 MG Tab Take 10 mg by mouth at bedtime as needed. Indications: Muscle Spasm      Cyanocobalamin (VITAMIN B-12) 1000 MCG Tab Take 1,000 mcg by mouth every day.      fluticasone-salmeterol (ADVAIR) 250-50 MCG/ACT AEROSOL POWDER, BREATH ACTIVATED Inhale 1 Puff 2 times a day. Indications: Asthma      Omega-3 Fatty Acids (FISH OIL) 1000 MG Cap capsule Take 1,000 mg by mouth every day.      vitamin D3 (CHOLECALCIFEROL) 1000 Unit (25 mcg) Tab Take 1,000 Units by mouth every day.      gabapentin (NEURONTIN) 300 MG Cap Take 600 mg by mouth 2 times a day.      aspirin EC (ECOTRIN) 81 MG Tablet Delayed Response Take 81 mg by mouth every day.       No current facility-administered medications on file prior to encounter.              Objective  "    /54 (BP Location: Right arm, Patient Position: Sitting, BP Cuff Size: Adult)   Pulse 85   Temp 36.7 °C (98.1 °F) (Temporal)   Ht 1.75 m (5' 8.9\")   Wt 77.3 kg (170 lb 6.7 oz)   SpO2 96%   BMI 25.24 kg/m²      Physical Exam  Vitals reviewed.   Constitutional:       General: He is not in acute distress.     Appearance: He is well-developed. He is not diaphoretic.   HENT:      Head: Normocephalic and atraumatic.   Eyes:      General: No scleral icterus.        Right eye: No discharge.         Left eye: No discharge.   Cardiovascular:      Rate and Rhythm: Normal rate and regular rhythm.      Heart sounds: Normal heart sounds. No murmur heard.     No friction rub. No gallop.   Pulmonary:      Effort: Pulmonary effort is normal. No respiratory distress.      Breath sounds: Normal breath sounds. No wheezing.   Abdominal:      General: There is no distension.      Palpations: Abdomen is soft.      Tenderness: There is no abdominal tenderness.   Musculoskeletal:         General: Normal range of motion.      Cervical back: Normal range of motion.   Skin:     General: Skin is warm and dry.      Coloration: Skin is not pale.      Findings: No erythema or rash.   Neurological:      Mental Status: He is alert and oriented to person, place, and time.   Psychiatric:         Behavior: Behavior normal.         Treatment labs to be completed prior to dosing        No visits with results within 1 Day(s) from this visit.   Latest known visit with results is:   [facility] on 03/28/2024   Component Date Value Ref Range Status    WBC 03/28/2024 6.3  4.8 - 10.8 K/uL Final    RBC 03/28/2024 3.28 (L)  4.70 - 6.10 M/uL Final    Hemoglobin 03/28/2024 10.4 (L)  14.0 - 18.0 g/dL Final    Hematocrit 03/28/2024 31.9 (L)  42.0 - 52.0 % Final    MCV 03/28/2024 97.3  81.4 - 97.8 fL Final    MCH 03/28/2024 31.7  27.0 - 33.0 pg Final    MCHC 03/28/2024 32.6  32.3 - 36.5 g/dL Final    Please note new reference range " effective 05/22/2023.    RDW 03/28/2024 48.8  35.9 - 50.0 fL Final    Platelet Count 03/28/2024 152 (L)  164 - 446 K/uL Final    MPV 03/28/2024 8.8 (L)  9.0 - 12.9 fL Final    Neutrophils-Polys 03/28/2024 74.10 (H)  44.00 - 72.00 % Final    Lymphocytes 03/28/2024 13.20 (L)  22.00 - 41.00 % Final    Monocytes 03/28/2024 7.60  0.00 - 13.40 % Final    Eosinophils 03/28/2024 4.00  0.00 - 6.90 % Final    Basophils 03/28/2024 0.60  0.00 - 1.80 % Final    Immature Granulocytes 03/28/2024 0.50  0.00 - 0.90 % Final    Nucleated RBC 03/28/2024 0.00  0.00 - 0.20 /100 WBC Final    Please note new reference range effective 05/22/2023.    Neutrophils (Absolute) 03/28/2024 4.67  1.82 - 7.42 K/uL Final    Comment: Includes immature neutrophils, if present.  Please note new reference range effective 05/22/2023.      Lymphs (Absolute) 03/28/2024 0.83 (L)  1.00 - 4.80 K/uL Final    Monos (Absolute) 03/28/2024 0.48  0.00 - 0.85 K/uL Final    Eos (Absolute) 03/28/2024 0.25  0.00 - 0.51 K/uL Final    Baso (Absolute) 03/28/2024 0.04  0.00 - 0.12 K/uL Final    Immature Granulocytes (abs) 03/28/2024 0.03  0.00 - 0.11 K/uL Final    NRBC (Absolute) 03/28/2024 0.00  K/uL Final    Outpt Infus CBC Comment 03/28/2024 see below   Final    Comment: Per physician request, the automated differential results reported on this  patient have not been verified by a manual method.      Sodium 03/28/2024 138  135 - 145 mmol/L Final    Potassium 03/28/2024 4.3  3.6 - 5.5 mmol/L Final    Chloride 03/28/2024 103  96 - 112 mmol/L Final    Co2 03/28/2024 22  20 - 33 mmol/L Final    Anion Gap 03/28/2024 13.0  7.0 - 16.0 Final    Glucose 03/28/2024 141 (H)  65 - 99 mg/dL Final    Bun 03/28/2024 30 (H)  8 - 22 mg/dL Final    Creatinine 03/28/2024 1.51 (H)  0.50 - 1.40 mg/dL Final    Calcium 03/28/2024 9.2  8.5 - 10.5 mg/dL Final    Correct Calcium 03/28/2024 9.1  8.5 - 10.5 mg/dL Final    AST(SGOT) 03/28/2024 32  12 - 45 U/L Final    ALT(SGPT) 03/28/2024 23  2 - 50  U/L Final    Alkaline Phosphatase 03/28/2024 108 (H)  30 - 99 U/L Final    Total Bilirubin 03/28/2024 0.4  0.1 - 1.5 mg/dL Final    Albumin 03/28/2024 4.1  3.2 - 4.9 g/dL Final    Total Protein 03/28/2024 7.2  6.0 - 8.2 g/dL Final    Globulin 03/28/2024 3.1  1.9 - 3.5 g/dL Final    A-G Ratio 03/28/2024 1.3  g/dL Final    Direct Bilirubin 03/28/2024 <0.2  0.1 - 0.5 mg/dL Final    Magnesium 03/28/2024 1.9  1.5 - 2.5 mg/dL Final    Phosphorus 03/28/2024 2.9  2.5 - 4.5 mg/dL Final    TSH 03/28/2024 1.480  0.380 - 5.330 uIU/mL Final    Comment: The 2011 American Thyroid Association (TARUN) guidelines  recommended that the interpretation of thyroid function in  pregnancy be based on trimester specific reference ranges.    1st Trimester  0.100-2.500 mIU/L  2nd Trimester  0.200-3.000 mIU/L  3rd Trimester  0.300-3.500 mIU/L    These established reference ranges have not been validated  at [facility].      Free T-4 03/28/2024 1.30  0.93 - 1.70 ng/dL Final    T3 03/28/2024 121.0  60.0 - 181.0 ng/dL Final    LDH Total 03/28/2024 160  107 - 266 U/L Final    Cholesterol,Tot 03/28/2024 127  100 - 199 mg/dL Final    Triglycerides 03/28/2024 72  0 - 149 mg/dL Final    HDL 03/28/2024 45  >=40 mg/dL Final    LDL 03/28/2024 68  <100 mg/dL Final    PT 03/28/2024 13.4  12.0 - 14.6 sec Final    INR 03/28/2024 1.01  0.87 - 1.13 Final    Comment: INR - Non-therapeutic Reference Range: 0.87-1.13  INR - Therapeutic Reference Range: 2.0-4.0      APTT 03/28/2024 28.8  24.7 - 36.0 sec Final    CPK Total 03/28/2024 198 (H)  0 - 154 U/L Final    CK-Mb 03/28/2024 3.9  0.0 - 7.7 ng/mL Final    Comment: Performed By: [facility]  [address] [place] [id number]  : Dano Clifford MD, PhD  CLIA Number: [id number]      Troponin T 03/28/2024 17  6 - 19 ng/L Final    Comment: Biotin intake of greater than 5 mg per day may interfere with  troponin levels, causing false low values.    The Ultra High  Sensitivity Troponin T test has a reference range  for positive troponins that follows the recommendation of ACC for  the 99th percentile reference population.      Color 03/28/2024 Yellow   Final    Character 03/28/2024 Clear   Final    Specific Gravity 03/28/2024 1.019  <1.035 Final    Ph 03/28/2024 5.5  5.0 - 8.0 Final    Glucose 03/28/2024 Negative  Negative mg/dL Final    Ketones 03/28/2024 Negative  Negative mg/dL Final    Protein 03/28/2024 Negative  Negative mg/dL Final    Bilirubin 03/28/2024 Negative  Negative Final    Urobilinogen, Urine 03/28/2024 0.2  Negative Final    Nitrite 03/28/2024 Negative  Negative Final    Leukocyte Esterase 03/28/2024 Negative  Negative Final    Occult Blood 03/28/2024 Negative  Negative Final    Micro Urine Req 03/28/2024 see below   Final    Comment: Microscopic examination not performed when specimen is clear  and chemically negative for protein, blood, leukocyte esterase  and nitrite.      NT-proBNP 03/28/2024 140 (H)  0 - 125 pg/mL Final    Indirect Bilirubin 03/28/2024 see below  0.0 - 1.0 mg/dL Final    Comment: Unable to calculate indirect bilirubin due to a total or direct  bilirubin result being outside the measurement range of the analyzer.      GFR (CKD-EPI) 03/28/2024 48 (A)  >60 mL/min/1.73 m 2 Final    Comment: Estimated Glomerular Filtration Rate is calculated using  race neutral CKD-EPI 2021 equation per NKF-ASN recommendations.               MRI Brain  4/5/2024 10:19 AM  HISTORY/REASON FOR EXAM:  Small cell lung carcinoma.     TECHNIQUE/EXAM DESCRIPTION: MR brain with and without contrast.     Multiplanar multisequence MR examination of the brain with and without contrast done on 1.5 MRI scanner.     15 mL ProHance contrast was administered intravenously.     COMPARISON:  None.     FINDINGS:  There is no abnormal contrast enhancement. There is no space-occupying lesion or vasogenic edema.     There is no restricted diffusion are hemorrhage. The gray matter  structures are unremarkable. There are nonspecific T2 hyperintensities in the subcortical and periventricular white matter. The ventricles and the CSF spaces are prominent consistent with mild age-appropriate cerebral volume loss. There is no subdural or epidural fluid collection. There is no dural thickening are dural based mass. The visualized courses of the cranial nerves are unremarkable.     The visualized vascular flow voids are unremarkable. The visualized bones are unremarkable.     There is minimal mucosal thickening in the left maxillary sinus and left mastoid air cells.     IMPRESSION:  1.  There is no evidence of metastasis.  2.  Mild chronic microvascular ischemic disease.         Assessment & Plan   1. SCLC (small cell lung carcinoma) (HCC)        2. Encounter for long-term current use of high risk medication        3. Research study patient          1.  Study Patient: Cisplatin, etoposide, [id number] vs placebo with radiation, initiated 9/18/23. ECOG = 0     2.  SCLC: Diagnosed 8/29/23; s/p 4 cycles cis, etoposide, [id number] vs placebo 9/18-12/4/23; s/p 29 fractions RT 10/9-11/21/23; transitioned to maintenance [id number] vs placebo at C5 1/6/24.     MRI brain completed 4/5/24 showed KALIA. Patient continues tolerating treatment fairly well, some increased fatigue this past cycle. CBC, CMP, direct bilirubin, magnesium, phosphorus, LDH, lipid profile, INR, APTT, CPK, CK-MB, troponin, BNP, free T4, TSH, UA will be completed prior to dosing and if within acceptable limits patient will proceed with cycle 10 of dosing and return in 3 weeks for evaluation prior to cycle 11, sooner as needed.        The patient verbalized agreement and understanding of current plan. All questions and concerns were addressed at time of visit.    Please note that this dictation was created using voice recognition software. I have made every reasonable attempt to correct obvious errors, but I expect that there are errors of grammar  and possibly content that I did not discover before finalizing the note.

## 2024-04-19 NOTE — PROGRESS NOTES
Pt arrives to [facility] for lab draw via port.  No additional labs needed for research dept today.  RUC port accessed with sterile technique, flushed with NS and brisk blood return observed.  All labs drawn via port.  Port flushed with NS and heparin locked.  Ames needle removed intact.  Site covered with gauze.  Confirmed next appt time on 4/22/2024 with pt.  UA collected and sent to the lab.  Pt dc home to self care.

## 2024-04-20 LAB — CK MB SERPL-MCNC: 1.7 NG/ML (ref 0–7.7)

## 2024-04-21 NOTE — PROGRESS NOTES
"Pharmacy Chemotherapy Verification:    Dx: small cell lung cancer  Study Protocol: [id number]  Participant # [id number]    HLX10 or placebo 300 mg IV over 30-60 min on day 1  Cisplatin 75 mg/m2 IV over 60 min on day 1  Etoposide 100 mg/m2 IV over 1-2 hours on days 1-3  Every 21 days with concurrent radiation x 4 cycles Completed 12/6/23    followed by  HLX10 or placebo 300 mg IV over 30-60 min on day 1  Every 21 days up to one year  A Randomized, Double-Blind, International Multicenter, Phase III Study to Evaluate the Anti-Tumor Efficacy and Safety of HLX10 (Recombinant Humanized Anti-PD-1 Monoclonal Antibody Injection) or Placebo in Combination with Chemotherapy (Carboplatin/Cisplatin-Etoposide) and Concurrent Radiotherapy in Patients with Limited-Stage Small Cell Lung Cancer (LS-SCLC). [id number]  Allergies: Patient has no known allergies.       /59   Pulse 80   Temp 36.7 °C (98.1 °F) (Temporal)   Resp 18   Ht 1.75 m (5' 8.9\")   Wt 79.6 kg (175 lb 7.8 oz)   SpO2 92%   BMI 25.99 kg/m²      Body surface area is 1.97 meters squared.    All lab results,and urine protein 4/19/24 and BP 4/22/24 within treatment parameters.     Drug Order   (Drug name, dose, route, IV Fluid & volume, frequency, number of doses) Cycle 10  Previous treatment: C9 on 3/31/24   Medication = HLX10 or placebo  Base Dose = 300 mg  Fixed dose, no calculation required  Final Dose = 300 mg  Route = IV  Fluid & Volume =  mL  Admin Duration = Over 60 min     Study-supplied medication       First infusion 60 min. If tolerated, subsequent infusions 30 min.      By my signature below, I confirm this process was performed independently with the BSA and all final chemotherapy dosing calculations congruent. I have reviewed the above chemotherapy order and that my calculation of the final dose and BSA (when applicable) corroborate those calculations of the  pharmacist. Discrepancies of 10% or greater in the written dose " have been addressed and documented within the EPIC Progress notes.    Ambika Belle, OscarD

## 2024-04-22 ENCOUNTER — OUTPATIENT INFUSION SERVICES (OUTPATIENT)
Dept: ONCOLOGY | Facility: MEDICAL CENTER | Age: 74
End: 2024-04-22
Attending: STUDENT IN AN ORGANIZED HEALTH CARE EDUCATION/TRAINING PROGRAM

## 2024-04-22 VITALS
HEART RATE: 80 BPM | HEIGHT: 69 IN | OXYGEN SATURATION: 92 % | WEIGHT: 175.49 LBS | TEMPERATURE: 98.1 F | RESPIRATION RATE: 18 BRPM | SYSTOLIC BLOOD PRESSURE: 108 MMHG | BODY MASS INDEX: 25.99 KG/M2 | DIASTOLIC BLOOD PRESSURE: 59 MMHG

## 2024-04-22 DIAGNOSIS — C34.90 SCLC (SMALL CELL LUNG CARCINOMA) (HCC): ICD-10-CM

## 2024-04-22 PROCEDURE — 700111 HCHG RX REV CODE 636 W/ 250 OVERRIDE (IP): Performed by: NURSE PRACTITIONER

## 2024-04-22 PROCEDURE — A4212 NON CORING NEEDLE OR STYLET: HCPCS

## 2024-04-22 PROCEDURE — 700101 HCHG RX REV CODE 250: Performed by: NURSE PRACTITIONER

## 2024-04-22 PROCEDURE — A9270 NON-COVERED ITEM OR SERVICE: HCPCS | Performed by: NURSE PRACTITIONER

## 2024-04-22 PROCEDURE — 96413 CHEMO IV INFUSION 1 HR: CPT

## 2024-04-22 RX ADMIN — LIDOCAINE HYDROCHLORIDE 0.5 ML: 10 INJECTION, SOLUTION EPIDURAL; INFILTRATION; INTRACAUDAL at 14:32

## 2024-04-22 RX ADMIN — Medication 300 MG: at 14:53

## 2024-04-22 RX ADMIN — HEPARIN 500 UNITS: 100 SYRINGE at 15:37

## 2024-04-22 ASSESSMENT — FIBROSIS 4 INDEX: FIB4 SCORE: 2.3

## 2024-04-22 NOTE — PROGRESS NOTES
Patient came into infusion independently. Orders and vitals reviewed, assessment done. Port site numbed with lidocaine injection. Port accessed with blood return noted. Labs from 4/20/24 reviewed, patient meets parameters to proceed with treatment today. Cycle 10 of HLX10 or Placebo treatment given as ordered with no adverse events. Port flushed, heparin locked and de-accessed. Patient left in stable condition with next appointment confirmed.

## 2024-04-22 NOTE — PROGRESS NOTES
Chemotherapy Verification - SECONDARY RN       Height = 175 cm  Weight = 79.6 kg  BSA = 1.97 m2       Medication: HLX10 or placebo  Dose: 300 mg set dose  Calculated Dose: 300 mg                             (In mg/m2, AUC, mg/kg)     I confirm that this process was performed independently.

## 2024-04-22 NOTE — PROGRESS NOTES
Chemotherapy Verification - PRIMARY RN      Height = 1.75m  Weight = 79.6kg  BSA = 1.97m2       Medication: HLX10 or PLACEBO (STUDY DRUG)  Dose: 300mg set dose  Calculated Dose: 300mg set dose                                    I confirm this process was performed independently with the BSA and all final chemotherapy dosing calculations congruent.  Any discrepancies of 10% or greater have been addressed with the chemotherapy pharmacist. The resolution of the discrepancy has been documented in the EPIC progress notes.

## 2024-05-07 RX ORDER — 0.9 % SODIUM CHLORIDE 0.9 %
3 VIAL (ML) INJECTION PRN
Status: CANCELLED | OUTPATIENT
Start: 2024-05-11

## 2024-05-07 RX ORDER — ONDANSETRON 8 MG/1
8 TABLET, ORALLY DISINTEGRATING ORAL PRN
Status: CANCELLED | OUTPATIENT
Start: 2024-05-11

## 2024-05-07 RX ORDER — ONDANSETRON 2 MG/ML
4 INJECTION INTRAMUSCULAR; INTRAVENOUS PRN
Status: CANCELLED | OUTPATIENT
Start: 2024-05-11

## 2024-05-07 RX ORDER — 0.9 % SODIUM CHLORIDE 0.9 %
VIAL (ML) INJECTION PRN
Status: CANCELLED | OUTPATIENT
Start: 2024-05-11

## 2024-05-07 RX ORDER — 0.9 % SODIUM CHLORIDE 0.9 %
10 VIAL (ML) INJECTION PRN
Status: CANCELLED | OUTPATIENT
Start: 2024-05-10

## 2024-05-07 RX ORDER — 0.9 % SODIUM CHLORIDE 0.9 %
VIAL (ML) INJECTION PRN
Status: CANCELLED | OUTPATIENT
Start: 2024-05-10

## 2024-05-07 RX ORDER — DIPHENHYDRAMINE HYDROCHLORIDE 50 MG/ML
50 INJECTION INTRAMUSCULAR; INTRAVENOUS PRN
Status: CANCELLED | OUTPATIENT
Start: 2024-05-11

## 2024-05-07 RX ORDER — 0.9 % SODIUM CHLORIDE 0.9 %
3 VIAL (ML) INJECTION PRN
Status: CANCELLED | OUTPATIENT
Start: 2024-05-10

## 2024-05-07 RX ORDER — EPINEPHRINE 1 MG/ML(1)
0.5 AMPUL (ML) INJECTION PRN
Status: CANCELLED | OUTPATIENT
Start: 2024-05-11

## 2024-05-07 RX ORDER — 0.9 % SODIUM CHLORIDE 0.9 %
10 VIAL (ML) INJECTION PRN
Status: CANCELLED | OUTPATIENT
Start: 2024-05-11

## 2024-05-07 RX ORDER — METHYLPREDNISOLONE SODIUM SUCCINATE 125 MG/2ML
125 INJECTION, POWDER, LYOPHILIZED, FOR SOLUTION INTRAMUSCULAR; INTRAVENOUS PRN
Status: CANCELLED | OUTPATIENT
Start: 2024-05-11

## 2024-05-07 RX ORDER — PROCHLORPERAZINE MALEATE 10 MG
10 TABLET ORAL EVERY 6 HOURS PRN
Status: CANCELLED | OUTPATIENT
Start: 2024-05-11

## 2024-05-07 RX ORDER — SODIUM CHLORIDE 9 MG/ML
INJECTION, SOLUTION INTRAVENOUS CONTINUOUS
Status: CANCELLED | OUTPATIENT
Start: 2024-05-11

## 2024-05-08 ENCOUNTER — HOSPITAL ENCOUNTER (OUTPATIENT)
Dept: RADIOLOGY | Facility: MEDICAL CENTER | Age: 74
End: 2024-05-08
Attending: STUDENT IN AN ORGANIZED HEALTH CARE EDUCATION/TRAINING PROGRAM
Payer: MEDICARE

## 2024-05-08 DIAGNOSIS — C34.90 SCLC (SMALL CELL LUNG CARCINOMA) (HCC): ICD-10-CM

## 2024-05-08 RX ADMIN — HEPARIN 100 UNITS/ML: 100 SYRINGE at 14:37

## 2024-05-08 RX ADMIN — IOHEXOL 100 ML: 350 INJECTION, SOLUTION INTRAVENOUS at 14:59

## 2024-05-08 NOTE — PROGRESS NOTES
Port to R chest accessed per protocol with Ames needle. Port flushes easily with brisk blood return. Port deaccessed per protocol flushing with 20ml normal saline and 500 units heparin. Blood return verified prior to deaccess. Patient tolerated well with minor discomfort.

## 2024-05-10 ENCOUNTER — OUTPATIENT INFUSION SERVICES (OUTPATIENT)
Dept: ONCOLOGY | Facility: MEDICAL CENTER | Age: 74
End: 2024-05-10
Attending: STUDENT IN AN ORGANIZED HEALTH CARE EDUCATION/TRAINING PROGRAM
Payer: COMMERCIAL

## 2024-05-10 ENCOUNTER — RESEARCH ENCOUNTER (OUTPATIENT)
Dept: HEMATOLOGY ONCOLOGY | Facility: MEDICAL CENTER | Age: 74
End: 2024-05-10

## 2024-05-10 ENCOUNTER — HOSPITAL ENCOUNTER (OUTPATIENT)
Dept: HEMATOLOGY ONCOLOGY | Facility: MEDICAL CENTER | Age: 74
End: 2024-05-10
Attending: STUDENT IN AN ORGANIZED HEALTH CARE EDUCATION/TRAINING PROGRAM
Payer: COMMERCIAL

## 2024-05-10 VITALS
HEART RATE: 73 BPM | HEIGHT: 69 IN | BODY MASS INDEX: 26.29 KG/M2 | SYSTOLIC BLOOD PRESSURE: 126 MMHG | DIASTOLIC BLOOD PRESSURE: 64 MMHG | WEIGHT: 177.47 LBS | OXYGEN SATURATION: 95 % | TEMPERATURE: 98.1 F

## 2024-05-10 DIAGNOSIS — Z79.899 ENCOUNTER FOR LONG-TERM (CURRENT) USE OF HIGH-RISK MEDICATION: ICD-10-CM

## 2024-05-10 DIAGNOSIS — C34.90 SCLC (SMALL CELL LUNG CARCINOMA) (HCC): ICD-10-CM

## 2024-05-10 PROCEDURE — 99215 OFFICE O/P EST HI 40 MIN: CPT | Performed by: STUDENT IN AN ORGANIZED HEALTH CARE EDUCATION/TRAINING PROGRAM

## 2024-05-10 RX ORDER — 0.9 % SODIUM CHLORIDE 0.9 %
10 VIAL (ML) INJECTION PRN
Status: CANCELLED | OUTPATIENT
Start: 2024-05-20

## 2024-05-10 RX ORDER — DIPHENHYDRAMINE HYDROCHLORIDE 50 MG/ML
50 INJECTION INTRAMUSCULAR; INTRAVENOUS PRN
Status: CANCELLED | OUTPATIENT
Start: 2024-05-20

## 2024-05-10 RX ORDER — SODIUM CHLORIDE 9 MG/ML
INJECTION, SOLUTION INTRAVENOUS CONTINUOUS
Status: CANCELLED | OUTPATIENT
Start: 2024-05-20

## 2024-05-10 RX ORDER — 0.9 % SODIUM CHLORIDE 0.9 %
10 VIAL (ML) INJECTION PRN
Status: CANCELLED | OUTPATIENT
Start: 2024-05-19

## 2024-05-10 RX ORDER — 0.9 % SODIUM CHLORIDE 0.9 %
VIAL (ML) INJECTION PRN
Status: CANCELLED | OUTPATIENT
Start: 2024-05-20

## 2024-05-10 RX ORDER — ONDANSETRON 8 MG/1
8 TABLET, ORALLY DISINTEGRATING ORAL PRN
Status: CANCELLED | OUTPATIENT
Start: 2024-05-20

## 2024-05-10 RX ORDER — 0.9 % SODIUM CHLORIDE 0.9 %
3 VIAL (ML) INJECTION PRN
Status: CANCELLED | OUTPATIENT
Start: 2024-05-19

## 2024-05-10 RX ORDER — 0.9 % SODIUM CHLORIDE 0.9 %
3 VIAL (ML) INJECTION PRN
Status: CANCELLED | OUTPATIENT
Start: 2024-05-20

## 2024-05-10 RX ORDER — 0.9 % SODIUM CHLORIDE 0.9 %
VIAL (ML) INJECTION PRN
Status: CANCELLED | OUTPATIENT
Start: 2024-05-19

## 2024-05-10 RX ORDER — ONDANSETRON 2 MG/ML
4 INJECTION INTRAMUSCULAR; INTRAVENOUS PRN
Status: CANCELLED | OUTPATIENT
Start: 2024-05-20

## 2024-05-10 RX ORDER — METHYLPREDNISOLONE SODIUM SUCCINATE 125 MG/2ML
125 INJECTION, POWDER, LYOPHILIZED, FOR SOLUTION INTRAMUSCULAR; INTRAVENOUS PRN
Status: CANCELLED | OUTPATIENT
Start: 2024-05-20

## 2024-05-10 RX ORDER — EPINEPHRINE 1 MG/ML(1)
0.5 AMPUL (ML) INJECTION PRN
Status: CANCELLED | OUTPATIENT
Start: 2024-05-20

## 2024-05-10 RX ORDER — PROCHLORPERAZINE MALEATE 10 MG
10 TABLET ORAL EVERY 6 HOURS PRN
Status: CANCELLED | OUTPATIENT
Start: 2024-05-20

## 2024-05-10 ASSESSMENT — ENCOUNTER SYMPTOMS
TREMORS: 0
PALPITATIONS: 0
TINGLING: 1
ORTHOPNEA: 0
MEMORY LOSS: 0
SENSORY CHANGE: 0
COUGH: 0
HEADACHES: 0
HEARTBURN: 0
SORE THROAT: 0
VOMITING: 0
NECK PAIN: 0
FEVER: 0
DIZZINESS: 0
FOCAL WEAKNESS: 0
NAUSEA: 0
DEPRESSION: 0
WHEEZING: 0
SPUTUM PRODUCTION: 1
CHILLS: 0
BRUISES/BLEEDS EASILY: 0
BLURRED VISION: 0
WEIGHT LOSS: 0
ABDOMINAL PAIN: 0
SHORTNESS OF BREATH: 0

## 2024-05-10 ASSESSMENT — FIBROSIS 4 INDEX: FIB4 SCORE: 2.3

## 2024-05-10 NOTE — PROGRESS NOTES
Appointment for today is cancelled for Bill per oncologist's office. Per MD, cancer has progressed and he will be taken off trial and put on a different regimen. 801 office to contact the [facility] scheduling team to have this re-arranged.

## 2024-05-10 NOTE — ADDENDUM NOTE
Encounter addended by: Santo Quintero on: 5/10/2024 11:45 AM   Actions taken: Charge Capture section accepted

## 2024-05-10 NOTE — RESEARCH NOTE
Patient came into the clinic today for C11 of the [id number] study he had a CT scan done on the 8th of May which shows disease progression, due to this Dr. Fuller has taken patient off of study to get him started on a new therapy ASAP. He will not be doing the survival follow up, he will be returning to the clinic soon for the  EOT visit.

## 2024-05-10 NOTE — PROGRESS NOTES
"    Consult Note: Hematology/Oncology     Primary Care:  Pcp Pt States None    Chief Complaint   Patient presents with    Lung Cancer     Prechemo          Current Treatment:     9/18/23   C1: Cis/Etop/HLX10 vs Placebo  10/9/23   C2: Cis/Etop/HLX vs Placebo + RT  10/28/23 C3: Cis/Etop/HLX vs Placebo + RT (treatment deferred 1 week for neutropenia)  11/6/23   C3: Cis/Etop/HLX vs Placebo + RT  12/4/23: C4 Cis/Etop/HLX  1/6/24: C5 HLX vs placebo  1/26/24: C6 HLX vs placebo  02/19/24: C7 HLX vs placebo  03/10/24: C8 HLX vs placebo  04/18/24: C9 HLX vs placebo    Prior Treatment: None    Subjective:   History of Presenting Illness:  Bart Ramsey is a 73 y.o. male who presents with a new diagnosis of SCLC    Patient reports that in February he felt that he was being strangled.  He called the ambulance and he was taken to the ER.  He was placed on BP meds.  He still felt terrible after several days.  [facility]bl[facility] which was removed.  Imaging at that time showed some nodules on his lung.      In March or April 2023 he had a biopsy attempt of his lung nodules.  Per patient biopsy result was inconclusive      PET scan was done on 6/6/2023 which showed a hypermetabolic nodule in the superior segment LEFT lower lobe of lung measuring 2.3 cm consistent with malignancy. Involvement of the LEFT superior hilar lymph node.  No evidence for metastatic disease in the abdomen or pelvis.    He had a bronchoscopy Pathology revealed SCLC.     He had agent orange exposure from [place] ([id number]). Son committed suicide 1 year ago today.  He is raising his granddaughter ([place]).     He quit in 1994 (started in 1966, 28 pack year history).  Used to smoke marijuana.  He states he drinks too much (drinks a \"couple good drinks\" per night).  He has a healthy diet.  He eats a lot of vegetables.     Patient was diagnosed with COVID-19 on 11/13. CT scan showed lower lobe infiltrate. " During the stay in the hospital, patient continued receive treatment for community-acquired pneumonia with cefepime and doxycycline; he continued to feel better.  At discharge, given levofloxacin.    Patient was admitted to the hospital on December 17.  He came in with generalized weakness and shortness of breath found to be severely neutropenic and thus was started on vancomycin and Zosyn patient was having difficulty swallowing and mass he was evaluated by GI and found to have erosive esophagitis which was likely due to radiation esophagitis worsened by pill esophagitis reflux.    We deferred C5.    Interval History    Patient reports that he is doing ok aside from some fatigue.  For the past 2 days he is sleeping a lot.    Today, we discussed the results of his scans.    Past Medical History:   Diagnosis Date    Arthritis     knee    Bowel habit changes     diarrhea    Bronchitis     Cancer (HCC) 09/29/2023    small cell lung cancer    COPD (chronic obstructive pulmonary disease) ([facility])     Diabetes ([facility])     diet controlled, no medications.    Emphysema of lung ([facility])     COPD- no medications    High cholesterol 08/2023    Was on a statin, MD monitoring, not currently on meds    Myocardial infarct ([facility])     1994,1996,2000    Pneumonia     Psychiatric problem 08/2023    depression, son passed away recently, no meds    Sleep apnea     cpap        Past Surgical History:   Procedure Laterality Date    PA UPPER GI ENDOSCOPY,DIAGNOSIS N/A 12/22/2023    Procedure: GASTROSCOPY WITH DILATION;  Surgeon: Dano Oviedo M.D.;  Location: [facility];  Service: Gastroenterology    PA BRONCHOSCOPY,DIAGNOSTIC N/A 8/29/2023    Procedure: FIBER OPTIC BRONCHOSCOPY WITH  WASH, BRUSH, BRONCHOALVEOLAR LAVAGE, BIOPSY AND FINE NEEDLE ASPIRATION, ENDOBRONCHIAL ULTRASOUND & NAVIGATION,  ROBOTICS;  Surgeon: Bart Cortez M.D.;  Location: [facility];  Service: Pulmonary Robotic    ENDOBRONCHIAL US ADD-ON N/A  2023    Procedure: ENDOBRONCHIAL ULTRASOUND (EBUS);  Surgeon: Bart Cortez M.D.;  Location: SURGERY [facility];  Service: Pulmonary Robotic    GASTROSCOPY-ENDO  2017    Procedure: GASTROSCOPY-ENDO;  Surgeon: Emerson Eaton M.D.;  Location: ENDOSCOPY [facility] [place];  Service:     COLONOSCOPY - ENDO  2017    Procedure: COLONOSCOPY - ENDO;  Surgeon: Emerson Eaton M.D.;  Location: ENDOSCOPY [facility] [place];  Service:     CHOLECYSTECTOMY      INGUINAL HERNIA REPAIR Right     OTHER      Tonsillectomy    OTHER      Hernia     OTHER CARDIAC SURGERY      3 stents    TONSILLECTOMY         Social History     Tobacco Use    Smoking status: Former     Current packs/day: 0.00     Average packs/day: 0.5 packs/day for 14.0 years (7.0 ttl pk-yrs)     Types: Cigarettes     Start date: 1980     Quit date:      Years since quittin.3    Tobacco comments:     Pt quit smoking cigarettes, 2000.  1/2 pack per day, smoked 20 years    Vaping Use    Vaping Use: Never used   Substance Use Topics    Alcohol use: Not Currently     Alcohol/week: 8.4 oz     Types: 14 Shots of liquor per week     Comment: daily    Drug use: Not Currently     Types: Marijuana     Comment: quit         Family History   Problem Relation Age of Onset    Cancer Mother         lung?       No Known Allergies    Current Outpatient Medications   Medication Sig Dispense Refill    atorvastatin (LIPITOR) 20 MG Tab Take 20 mg by mouth every day.      terazosin (HYTRIN) 2 MG Cap Take 2 mg by mouth at bedtime.      Magnesium Glycinate 100 MG Cap Take 1 Capsule by mouth every day with lunch. 420 mg daily      metFORMIN (GLUCOPHAGE) 500 MG Tab Take 500 mg by mouth 2 times a day with meals. 500 mg BID      baclofen (LIORESAL) 10 MG Tab Take 10 mg by mouth at bedtime as needed. Indications: Muscle Spasm      Cyanocobalamin (VITAMIN B-12) 1000 MCG Tab Take 1,000 mcg by mouth every day.      fluticasone-salmeterol (ADVAIR) 250-50 MCG/ACT  AEROSOL POWDER, BREATH ACTIVATED Inhale 1 Puff 2 times a day. Indications: Asthma      Omega-3 Fatty Acids (FISH OIL) 1000 MG Cap capsule Take 1,000 mg by mouth every day.      vitamin D3 (CHOLECALCIFEROL) 1000 Unit (25 mcg) Tab Take 1,000 Units by mouth every day.      gabapentin (NEURONTIN) 300 MG Cap Take 600 mg by mouth 2 times a day.      aspirin EC (ECOTRIN) 81 MG Tablet Delayed Response Take 81 mg by mouth every day.       No current facility-administered medications for this encounter.     Facility-Administered Medications Ordered in Other Encounters   Medication Dose Route Frequency Provider Last Rate Last Admin    heparin lock flush 100 unit/mL injection 500 Units  500 Units Intracatheter PRN Diane Iglesias, A.P.N.           Review of Systems   Constitutional:  Negative for chills, fever, malaise/fatigue and weight loss.   HENT:  Positive for tinnitus (Chronic). Negative for congestion, ear pain, nosebleeds and sore throat.    Eyes:  Negative for blurred vision.   Respiratory:  Positive for sputum production (improving). Negative for cough, shortness of breath and wheezing.    Cardiovascular:  Negative for chest pain, palpitations, orthopnea and leg swelling.   Gastrointestinal:  Negative for abdominal pain, heartburn, nausea and vomiting.   Genitourinary:  Negative for dysuria, frequency and urgency.   Musculoskeletal:  Negative for neck pain.   Neurological:  Positive for tingling. Negative for dizziness, tremors, sensory change, focal weakness and headaches.   Endo/Heme/Allergies:  Does not bruise/bleed easily.   Psychiatric/Behavioral:  Negative for depression, memory loss and suicidal ideas.    All other systems reviewed and are negative.      Problem list, medications, and allergies reviewed by myself today in Epic.     Objective:     Vitals:    05/10/24 1011   BP: 126/64   BP Location: Right arm   Patient Position: Sitting   BP Cuff Size: Adult   Pulse: 73   Temp: 36.7 °C (98.1 °F)   TempSrc:  "Temporal   SpO2: 95%   Weight: 80.5 kg (177 lb 7.5 oz)   Height: 1.75 m (5' 8.9\")         DESC; KARNOFSKY SCALE WITH ECOG EQUIVALENT: 90, Able to carry on normal activity; minor signs or symptoms of disease (ECOG equivalent 0)    DISTRESS LEVEL: no acute distress    Physical Exam  Constitutional:       General: He is not in acute distress.     Appearance: Normal appearance.      Comments: Port in place   HENT:      Head: Normocephalic and atraumatic.      Nose: Nose normal. No congestion.      Mouth/Throat:      Mouth: Mucous membranes are moist.      Pharynx: Oropharynx is clear.   Eyes:      General: No scleral icterus.     Conjunctiva/sclera: Conjunctivae normal.      Pupils: Pupils are equal, round, and reactive to light.   Cardiovascular:      Rate and Rhythm: Normal rate and regular rhythm.      Pulses: Normal pulses.      Heart sounds: No murmur heard.     No friction rub.   Pulmonary:      Effort: Pulmonary effort is normal. No respiratory distress.      Breath sounds: Normal breath sounds. No stridor. No wheezing or rales.   Chest:      Chest wall: No tenderness.   Abdominal:      General: Abdomen is flat. Bowel sounds are normal. There is no distension.      Palpations: Abdomen is soft. There is no mass.      Tenderness: There is no abdominal tenderness. There is no guarding or rebound.   Musculoskeletal:         General: No swelling, tenderness or deformity. Normal range of motion.      Cervical back: Normal range of motion and neck supple. No rigidity or tenderness.      Right lower leg: No edema.      Left lower leg: No edema.   Skin:     General: Skin is warm.      Coloration: Skin is not jaundiced or pale.      Findings: No bruising or rash.   Neurological:      General: No focal deficit present.      Mental Status: He is alert and oriented to person, place, and time. Mental status is at baseline.      Motor: No weakness.   Psychiatric:         Mood and Affect: Mood normal.         Behavior: Behavior " normal.         Thought Content: Thought content normal.         Judgment: Judgment normal.         Labs:   Most recent labs reviewed.  Please see the lab tab of chart review    Imaging:   Most recent images below have been independently reviewed by me.  Please see the imaging tab of chart review    MR Brain   1.  There is no evidence of metastasis.  2.  Mild chronic microvascular ischemic disease.    05/08/24 CT CAP  1.  Worsening left thoracic pleural metastatic disease.  2.  Increasing left upper lobe and lower lobe pulmonary opacities which could be tumor or some component of infiltrate as well.  3.  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  4.  2.9 cm infrarenal AAA.    9/13/23 CT CAP  1.  Left lower lobe pulmonary masses, very slightly more prominent than on 8/29/2023.  2.  Multiple left pulmonary and pleural metastases.  3.  Left hilar emely metastases.  4.  No evidence of metastatic disease in the abdomen or pelvis.    9/12/2023 PET scan  1.  Interval increased size and intensity of LEFT lower lobe lung mass with new intrapulmonary and pleural metastases indicating progression of disease.  2.  Probable pleural metastasis at the RIGHT lung base posteriorly.  3.  Worsening LEFT hilar adenopathy, metastatic.  4.  No evidence of metastatic disease in the abdomen or pelvis.    6/6/2023 PET scan  1.  Multilobular hypermetabolic nodule in the superior segment LEFT lower lobe of lung measuring 2.3 cm consistent with malignancy.  2.  Involvement of the LEFT superior hilar lymph node.  3.  No evidence for metastatic disease in the abdomen or pelvis.    Pathology    A. Lung, left lower lobe, fine needle aspiration slides:          Blood only; no epithelial or malignant cells identified.   B. Lung, left lower lobe, core biopsies:          Positive for small cell carcinoma.   C. Lung, left lower lobe, forceps biopsy with touch prep slides:          Positive for small cell carcinoma.   D. Lung, left lower lobe, fine  needle aspiration:          Originally submitted for possible flow cytometric analysis,           which was deemed unnecessary.  Tissue will be submitted to           cytology for preparation of a cell block; an addendum will be           generated if there is an unexpected finding.   E. Lymph node, 10L, fine needle aspiration slides:          Malignant cells present, consistent with metastatic small cell   carcinoma.   F.  Lymph node, 10L, core biopsies:            Hypocellular specimen demonstrating few scattered lymphocytes           and few atypical cells suspicious for small cell carcinoma       Assessment/Plan:      Cancer Staging   SCLC (small cell lung carcinoma) (HCC)  Staging form: Lung, AJCC 8th Edition  - Clinical stage from 9/14/2023: Stage IIIA (cT3, cN1, cM0) - Signed by Leonel Patel M.D. on 9/14/2023       Mr. Ramsey who presents today with a new diagnosis of small cell lung cancer, limited stage.     CT CAP shows PD.      Patient will be taken off the HLX trial    I discussed with Radiation Oncology and no treatments currently from their standpoint.     We discussed IO vs chemotherapy. We discussed the s/e of chemotherapy and how hard it was for him to tolerate this in the past.  We discussed the s/e of IO.  We did discuss that he may have gotten therapy in the form of HL X we are unsure given that this trial is a double blinded study.  He did not have any IO related side effects however which points against this.    Careful consideration patient wishes to pursue treatment with pembrolizumab.      Plan  -initiate Pembrolizumab, due to progression.    -need brain MRI and CT CAP on 9/1/24    Any questions and concerns raised by the patient were addressed and answered. Patient denies any further questions.  Patient encouraged to call the office with any concerns or issues.      Roseline Fuller M.D.  Hematology/Oncology    40 minutes was spent on this visit

## 2024-05-13 ENCOUNTER — APPOINTMENT (OUTPATIENT)
Dept: ONCOLOGY | Facility: MEDICAL CENTER | Age: 74
End: 2024-05-13
Attending: STUDENT IN AN ORGANIZED HEALTH CARE EDUCATION/TRAINING PROGRAM
Payer: COMMERCIAL

## 2024-05-13 NOTE — PROGRESS NOTES
"Pharmacy Chemotherapy Calculations    Dx: SCLC  Cycle 1  Previous treatment = [id number] study protocol x10 cycles, last 4/22/24    Protocol: Pembrolizumab  Pembrolizumab 200 mg IV over 30 minutes   21-day cycle until DP/UT or until up to 24 months of therapy has been completed  Or  Pembrolizumab 400 mg IV over 30 minutes   42-day cycle until DP/UT or until up to 24 months of therapy has been completed  NCCN Guidelines for Small Cell Lung Cancer V.1.2024.  Los WOLFE , et al. J Thorac Oncol. 2020;15(4):618-627.  Los WOLFE, et al. J Clin Oncol. 2018;36(15_suppl):8506.  Crescencio PA, et al. J Clin Oncol. 2017;35(34):3823- 3829.  Cherrie M , et al. Eur J Cancer. 2020;131:68-75.    Allergies:  Patient has no known allergies.       /68   Pulse 64   Temp 36.3 °C (97.4 °F) (Temporal)   Resp 18   Ht 1.72 m (5' 7.72\")   Wt 82.2 kg (181 lb 3.5 oz)   SpO2 93%   BMI 27.78 kg/m²  Body surface area is 1.98 meters squared.    Labs 5/14/24 (ok to use for treatment on 5/19/24 per APRN):  ANC~ 4070 Plt = 186k   Hgb = 10.9     SCr = 1.56 mg/dL CrCl ~ 48 mL/min   AST/ALT/AP = 27/20/107 TBili = 0.4  TSH = 1.72    Pembrolizumab 400 mg fixed dose   No calculation required, OK to treat with final dose = 400 mg    Mainor Yap, PharmD  "

## 2024-05-14 ENCOUNTER — RESEARCH ENCOUNTER (OUTPATIENT)
Dept: HEMATOLOGY ONCOLOGY | Facility: MEDICAL CENTER | Age: 74
End: 2024-05-14
Payer: MEDICARE

## 2024-05-14 ENCOUNTER — HOSPITAL ENCOUNTER (OUTPATIENT)
Facility: MEDICAL CENTER | Age: 74
End: 2024-05-14
Attending: STUDENT IN AN ORGANIZED HEALTH CARE EDUCATION/TRAINING PROGRAM
Payer: COMMERCIAL

## 2024-05-14 ENCOUNTER — HOSPITAL ENCOUNTER (OUTPATIENT)
Dept: HEMATOLOGY ONCOLOGY | Facility: MEDICAL CENTER | Age: 74
End: 2024-05-14
Attending: STUDENT IN AN ORGANIZED HEALTH CARE EDUCATION/TRAINING PROGRAM
Payer: COMMERCIAL

## 2024-05-14 DIAGNOSIS — C34.90 SCLC (SMALL CELL LUNG CARCINOMA) (HCC): ICD-10-CM

## 2024-05-14 DIAGNOSIS — Z79.899 ENCOUNTER FOR LONG-TERM CURRENT USE OF HIGH RISK MEDICATION: ICD-10-CM

## 2024-05-14 DIAGNOSIS — Z00.6 RESEARCH STUDY PATIENT: ICD-10-CM

## 2024-05-14 LAB
ALBUMIN SERPL BCP-MCNC: 4.1 G/DL (ref 3.2–4.9)
ALBUMIN/GLOB SERPL: 1.4 G/DL
ALP SERPL-CCNC: 107 U/L (ref 30–99)
ALT SERPL-CCNC: 20 U/L (ref 2–50)
ANION GAP SERPL CALC-SCNC: 12 MMOL/L (ref 7–16)
APPEARANCE UR: CLEAR
APTT PPP: 49.3 SEC (ref 24.7–36)
AST SERPL-CCNC: 27 U/L (ref 12–45)
BASOPHILS # BLD AUTO: 0.8 % (ref 0–1.8)
BASOPHILS # BLD: 0.05 K/UL (ref 0–0.12)
BILIRUB CONJ SERPL-MCNC: <0.2 MG/DL (ref 0.1–0.5)
BILIRUB INDIRECT SERPL-MCNC: NORMAL MG/DL (ref 0–1)
BILIRUB SERPL-MCNC: 0.4 MG/DL (ref 0.1–1.5)
BILIRUB UR QL STRIP.AUTO: NEGATIVE
BUN SERPL-MCNC: 30 MG/DL (ref 8–22)
CALCIUM ALBUM COR SERPL-MCNC: 8.9 MG/DL (ref 8.5–10.5)
CALCIUM SERPL-MCNC: 9 MG/DL (ref 8.5–10.5)
CHLORIDE SERPL-SCNC: 106 MMOL/L (ref 96–112)
CHOLEST SERPL-MCNC: 129 MG/DL (ref 100–199)
CK SERPL-CCNC: 160 U/L (ref 0–154)
CO2 SERPL-SCNC: 23 MMOL/L (ref 20–33)
COLOR UR: YELLOW
CREAT SERPL-MCNC: 1.56 MG/DL (ref 0.5–1.4)
EOSINOPHIL # BLD AUTO: 0.31 K/UL (ref 0–0.51)
EOSINOPHIL NFR BLD: 5 % (ref 0–6.9)
ERYTHROCYTE [DISTWIDTH] IN BLOOD BY AUTOMATED COUNT: 51 FL (ref 35.9–50)
GFR SERPLBLD CREATININE-BSD FMLA CKD-EPI: 46 ML/MIN/1.73 M 2
GLOBULIN SER CALC-MCNC: 2.9 G/DL (ref 1.9–3.5)
GLUCOSE SERPL-MCNC: 95 MG/DL (ref 65–99)
GLUCOSE UR STRIP.AUTO-MCNC: NEGATIVE MG/DL
HCT VFR BLD AUTO: 32.6 % (ref 42–52)
HDLC SERPL-MCNC: 42 MG/DL
HGB BLD-MCNC: 10.9 G/DL (ref 14–18)
IMM GRANULOCYTES # BLD AUTO: 0.01 K/UL (ref 0–0.11)
IMM GRANULOCYTES NFR BLD AUTO: 0.2 % (ref 0–0.9)
INR PPP: 1.06 (ref 0.87–1.13)
KETONES UR STRIP.AUTO-MCNC: NEGATIVE MG/DL
LDH SERPL L TO P-CCNC: 184 U/L (ref 107–266)
LDLC SERPL CALC-MCNC: 56 MG/DL
LEUKOCYTE ESTERASE UR QL STRIP.AUTO: NEGATIVE
LYMPHOCYTES # BLD AUTO: 1.18 K/UL (ref 1–4.8)
LYMPHOCYTES NFR BLD: 19.1 % (ref 22–41)
MAGNESIUM SERPL-MCNC: 1.9 MG/DL (ref 1.5–2.5)
MCH RBC QN AUTO: 32.1 PG (ref 27–33)
MCHC RBC AUTO-ENTMCNC: 33.4 G/DL (ref 32.3–36.5)
MCV RBC AUTO: 95.9 FL (ref 81.4–97.8)
MICRO URNS: NORMAL
MONOCYTES # BLD AUTO: 0.57 K/UL (ref 0–0.85)
MONOCYTES NFR BLD AUTO: 9.2 % (ref 0–13.4)
NEUTROPHILS # BLD AUTO: 4.07 K/UL (ref 1.82–7.42)
NEUTROPHILS NFR BLD: 65.7 % (ref 44–72)
NITRITE UR QL STRIP.AUTO: NEGATIVE
NRBC # BLD AUTO: 0 K/UL
NRBC BLD-RTO: 0 /100 WBC (ref 0–0.2)
NT-PROBNP SERPL IA-MCNC: 230 PG/ML (ref 0–125)
PH UR STRIP.AUTO: 7.5 [PH] (ref 5–8)
PHOSPHATE SERPL-MCNC: 2.9 MG/DL (ref 2.5–4.5)
PLATELET # BLD AUTO: 186 K/UL (ref 164–446)
PMV BLD AUTO: 9.4 FL (ref 9–12.9)
POTASSIUM SERPL-SCNC: 4.7 MMOL/L (ref 3.6–5.5)
PROT SERPL-MCNC: 7 G/DL (ref 6–8.2)
PROT UR QL STRIP: NEGATIVE MG/DL
PROTHROMBIN TIME: 13.9 SEC (ref 12–14.6)
RBC # BLD AUTO: 3.4 M/UL (ref 4.7–6.1)
RBC UR QL AUTO: NEGATIVE
SODIUM SERPL-SCNC: 141 MMOL/L (ref 135–145)
SP GR UR STRIP.AUTO: 1.02
T3FREE SERPL-MCNC: 2.41 PG/ML (ref 2–4.4)
TRIGL SERPL-MCNC: 156 MG/DL (ref 0–149)
TROPONIN T SERPL-MCNC: 16 NG/L (ref 6–19)
TSH SERPL DL<=0.005 MIU/L-ACNC: 1.72 UIU/ML (ref 0.38–5.33)
UROBILINOGEN UR STRIP.AUTO-MCNC: 0.2 MG/DL
WBC # BLD AUTO: 6.2 K/UL (ref 4.8–10.8)

## 2024-05-14 RX ORDER — 0.9 % SODIUM CHLORIDE 0.9 %
20 VIAL (ML) INJECTION PRN
Status: CANCELLED | OUTPATIENT
Start: 2024-05-14

## 2024-05-14 RX ADMIN — HEPARIN 500 UNITS: 100 SYRINGE at 10:40

## 2024-05-14 NOTE — RESEARCH NOTE
Patient came into clinic today to complete the EOT visit for the [id number] study and have the labs drawn. He was withdrawn from the study by Dr. Fuller on 10 MAY 2024 due to disease progression and will start a new therapy this Daniel 19 MAY 2024.

## 2024-05-14 NOTE — PROGRESS NOTES
Patient arrived to Medical Oncology RN visit for port access with labs for research study. Right chest port accessed in sterile manner. Brisk blood return obtained. Labs collected.  Port flushed per [facility] policy and site covered with Band-Aid per pt request.  Pt released ambulatory from clinic in no apparent distress.

## 2024-05-17 LAB — CK MB SERPL-MCNC: 3.1 NG/ML (ref 0–7.7)

## 2024-05-19 ENCOUNTER — OUTPATIENT INFUSION SERVICES (OUTPATIENT)
Dept: ONCOLOGY | Facility: MEDICAL CENTER | Age: 74
End: 2024-05-19
Attending: STUDENT IN AN ORGANIZED HEALTH CARE EDUCATION/TRAINING PROGRAM
Payer: COMMERCIAL

## 2024-05-19 VITALS
SYSTOLIC BLOOD PRESSURE: 126 MMHG | OXYGEN SATURATION: 93 % | DIASTOLIC BLOOD PRESSURE: 68 MMHG | WEIGHT: 181.22 LBS | TEMPERATURE: 97.4 F | HEIGHT: 68 IN | BODY MASS INDEX: 27.47 KG/M2 | RESPIRATION RATE: 18 BRPM | HEART RATE: 64 BPM

## 2024-05-19 DIAGNOSIS — C34.90 SCLC (SMALL CELL LUNG CARCINOMA) (HCC): ICD-10-CM

## 2024-05-19 RX ADMIN — SODIUM CHLORIDE 400 MG: 9 INJECTION, SOLUTION INTRAVENOUS at 14:58

## 2024-05-19 RX ADMIN — HEPARIN 500 UNITS: 100 SYRINGE at 15:35

## 2024-05-19 ASSESSMENT — FIBROSIS 4 INDEX: FIB4 SCORE: 2.4

## 2024-05-19 NOTE — PROGRESS NOTES
Chemotherapy Verification - SECONDARY RN       Height = 1.72 m  Weight = 82.2 kg  BSA = 1.98 m^2       Medication: pembrolizumab  Dose: 400 mg (set dose)  Calculated Dose: 400 mg (set dose)                             (In mg/m2, AUC, mg/kg)     I confirm that this process was performed independently.

## 2024-05-19 NOTE — PROGRESS NOTES
Chemotherapy Verification - PRIMARY RN      Height = 172 cm  Weight = 82.2 kg  BSA = 1.98 m^2       Medication: Keytruda  Dose: SET DOSE   Calculated Dose: 400 mg                             (In mg/m2, AUC, mg/kg)         I confirm this process was performed independently with the BSA and all final chemotherapy dosing calculations congruent.  Any discrepancies of 10% or greater have been addressed with the chemotherapy pharmacist. The resolution of the discrepancy has been documented in the EPIC progress notes.

## 2024-05-19 NOTE — PROGRESS NOTES
"Pharmacy chemotherapy verification:    Dx: NSCLC        Protocol: Pembrolizumab (Keytruda)      Pembrolizumab 400 mg IV over 30 min on Day 1  Repeat every 42 days until DP/UT OR until 24mo of therapy is completed.  NCCN Guidelines for Non-Small Cell Lung Cancer V.1.2024  Dajuan SAMANIEGO, et al. J Clin Oncol. 2019 Oct 1;37(28):3495-9748.  Floresita M, et al. N Engl J Med. 2016 Nov 10;375(29):5360-6310.     Allergies:  Patient has no known allergies.     /68   Pulse 64   Temp 36.3 °C (97.4 °F) (Temporal)   Resp 18   Ht 1.72 m (5' 7.72\")   Wt 82.2 kg (181 lb 3.5 oz)   SpO2 93%   BMI 27.78 kg/m²  Body surface area is 1.98 meters squared.    All lab results 5/14/24(ok per provider to use these labs for treatment 5/19/24)  within treatment parameters.      Drug Order   (Drug name, dose, route, IV Fluid & volume, frequency, number of doses) Cycle: 1      Previous treatment: C10 HLX10 or placebo 4/22/24     Medication = Pembrolizumab (Keytruda)  Base Dose= 400 mg  Calc Dose: fixed dose, no calc required  Final Dose = 400mg   Route = IV  Fluid & Volume = NS 50 mL  Admin Duration = Over 30 min           Fixed dose, no calculation required. Ok to treat with final dose.     By my signature below, I confirm this process was performed independently with the BSA and all final chemotherapy dosing calculations congruent. I have reviewed the above chemotherapy order and that my calculation of the final dose and BSA (when applicable) corroborate those calculations of the  pharmacist. Discrepancies of 10% or greater in the written dose have been addressed and documented within the [facility] Progress notes.    Stuart DangD.          "

## 2024-05-20 NOTE — PROGRESS NOTES
Pt presented to infusion for C1 Keytruda, had been on study chemo until last month. Denied any new s/s of infection or open wounds. Had been educated by MD about new med, re-iterated education and gave pt handouts. Labs drawn 5/14, got OK to use from Freya COMER. Port accessed in sterile fashion, brisk blood return observed. Keytruda infused over 30 mins without issue. Port flushed and de-accessed, gauze drsg placed. Has next appt, left on foot to self care.

## 2024-05-28 ENCOUNTER — HOSPITAL ENCOUNTER (OUTPATIENT)
Dept: HEMATOLOGY ONCOLOGY | Facility: MEDICAL CENTER | Age: 74
End: 2024-05-28
Attending: STUDENT IN AN ORGANIZED HEALTH CARE EDUCATION/TRAINING PROGRAM
Payer: COMMERCIAL

## 2024-05-28 VITALS
WEIGHT: 182.21 LBS | DIASTOLIC BLOOD PRESSURE: 54 MMHG | OXYGEN SATURATION: 96 % | SYSTOLIC BLOOD PRESSURE: 104 MMHG | HEART RATE: 69 BPM | BODY MASS INDEX: 27.62 KG/M2 | HEIGHT: 68 IN | TEMPERATURE: 98.6 F

## 2024-05-28 DIAGNOSIS — C34.90 SCLC (SMALL CELL LUNG CARCINOMA) (HCC): ICD-10-CM

## 2024-05-28 DIAGNOSIS — Z79.899 ENCOUNTER FOR LONG-TERM (CURRENT) USE OF HIGH-RISK MEDICATION: ICD-10-CM

## 2024-05-28 PROCEDURE — 99214 OFFICE O/P EST MOD 30 MIN: CPT | Performed by: STUDENT IN AN ORGANIZED HEALTH CARE EDUCATION/TRAINING PROGRAM

## 2024-05-28 ASSESSMENT — ENCOUNTER SYMPTOMS
NAUSEA: 0
WHEEZING: 0
ORTHOPNEA: 0
DIZZINESS: 0
PALPITATIONS: 0
BLURRED VISION: 0
DEPRESSION: 0
BRUISES/BLEEDS EASILY: 0
FEVER: 0
CHILLS: 0
FOCAL WEAKNESS: 0
HEARTBURN: 0
WEIGHT LOSS: 0
VOMITING: 0
TREMORS: 0
SENSORY CHANGE: 0
NECK PAIN: 0
SORE THROAT: 0
COUGH: 0
SHORTNESS OF BREATH: 0
SPUTUM PRODUCTION: 1
ABDOMINAL PAIN: 0
HEADACHES: 0
TINGLING: 1
MEMORY LOSS: 0

## 2024-05-28 ASSESSMENT — FIBROSIS 4 INDEX: FIB4 SCORE: 2.4

## 2024-05-28 NOTE — ADDENDUM NOTE
Encounter addended by: Santo Quintero on: 5/28/2024 10:31 AM   Actions taken: Charge Capture section accepted

## 2024-05-28 NOTE — ADDENDUM NOTE
Encounter addended by: Roseline Fuller M.D. on: 5/28/2024 10:31 AM   Actions taken: SmartForm saved, Clinical Note Signed

## 2024-05-28 NOTE — PROGRESS NOTES
"    Consult Note: Hematology/Oncology     Primary Care:  Pcp Pt States None    Chief Complaint   Patient presents with    Lung Cancer     Prechemo          Current Treatment:     9/18/23   C1: Cis/Etop/HLX10 vs Placebo  10/9/23   C2: Cis/Etop/HLX vs Placebo + RT  10/28/23 C3: Cis/Etop/HLX vs Placebo + RT (treatment deferred 1 week for neutropenia)  11/6/23   C3: Cis/Etop/HLX vs Placebo + RT  12/4/23: C4 Cis/Etop/HLX  1/6/24: C5 HLX vs placebo  1/26/24: C6 HLX vs placebo  02/19/24: C7 HLX vs placebo  03/10/24: C8 HLX vs placebo  04/18/24: C9 HLX vs placebo    5/19/24: C1 Pembrolizumab    Prior Treatment: None    Subjective:   History of Presenting Illness:  Bart Ramsey is a 74 y.o. male who presents with a new diagnosis of SCLC    Patient reports that in February he felt that he was being strangled.  He called the ambulance and he was taken to the ER.  He was placed on BP meds.  He still felt terrible after several days.  [facility] to [facility]bladder which was removed.  Imaging at that time showed some nodules on his lung.      In March or April 2023 he had a biopsy attempt of his lung nodules.  Per patient biopsy result was inconclusive      PET scan was done on 6/6/2023 which showed a hypermetabolic nodule in the superior segment LEFT lower lobe of lung measuring 2.3 cm consistent with malignancy. Involvement of the LEFT superior hilar lymph node.  No evidence for metastatic disease in the abdomen or pelvis.    He had a bronchoscopy Pathology revealed SCLC.     He had agent orange exposure from [place] ([id number]). Son committed suicide 1 year ago today.  He is raising his granddaughter ([place]).     He quit in 1994 (started in 1966, 28 pack year history).  Used to smoke marijuana.  He states he drinks too much (drinks a \"couple good drinks\" per night).  He has a healthy diet.  He eats a lot of vegetables.     Patient was diagnosed with COVID-19 on 11/13. CT scan " showed lower lobe infiltrate. During the stay in the hospital, patient continued receive treatment for community-acquired pneumonia with cefepime and doxycycline; he continued to feel better.  At discharge, given levofloxacin.    Patient was admitted to the hospital on December 17.  He came in with generalized weakness and shortness of breath found to be severely neutropenic and thus was started on vancomycin and Zosyn patient was having difficulty swallowing and mass he was evaluated by GI and found to have erosive esophagitis which was likely due to radiation esophagitis worsened by pill esophagitis reflux.    We deferred C5.    Interval History    He is doing well.  He denies any diarrhea, rashes.     Past Medical History:   Diagnosis Date    Arthritis     knee    Bowel habit changes     diarrhea    Bronchitis     Cancer (HCC) 09/29/2023    small cell lung cancer    COPD (chronic obstructive pulmonary disease) ([facility])     Diabetes ([facility])     diet controlled, no medications.    Emphysema of lung ([facility])     COPD- no medications    High cholesterol 08/2023    Was on a statin, MD monitoring, not currently on meds    Myocardial infarct ([facility])     1994,1996,2000    Pneumonia     Psychiatric problem 08/2023    depression, son passed away recently, no meds    Sleep apnea     cpap        Past Surgical History:   Procedure Laterality Date    MI UPPER GI ENDOSCOPY,DIAGNOSIS N/A 12/22/2023    Procedure: GASTROSCOPY WITH DILATION;  Surgeon: Dano Oviedo M.D.;  Location: [facility];  Service: Gastroenterology    MI BRONCHOSCOPY,DIAGNOSTIC N/A 8/29/2023    Procedure: FIBER OPTIC BRONCHOSCOPY WITH  WASH, BRUSH, BRONCHOALVEOLAR LAVAGE, BIOPSY AND FINE NEEDLE ASPIRATION, ENDOBRONCHIAL ULTRASOUND & NAVIGATION,  ROBOTICS;  Surgeon: Bart Cortez M.D.;  Location: [facility];  Service: Pulmonary Robotic    ENDOBRONCHIAL US ADD-ON N/A 8/29/2023    Procedure: ENDOBRONCHIAL ULTRASOUND (EBUS);  Surgeon:  Bart Cortez M.D.;  Location: SURGERY [facility];  Service: Pulmonary Robotic    GASTROSCOPY-ENDO  2017    Procedure: GASTROSCOPY-ENDO;  Surgeon: Emerson Eaton M.D.;  Location: ENDOSCOPY [facility];  Service:     COLONOSCOPY - ENDO  2017    Procedure: COLONOSCOPY - ENDO;  Surgeon: Emerson Eaton M.D.;  Location: ENDOSCOPY [facility];  Service:     CHOLECYSTECTOMY      INGUINAL HERNIA REPAIR Right     OTHER      Tonsillectomy    OTHER      Hernia     OTHER CARDIAC SURGERY      3 stents    TONSILLECTOMY         Social History     Tobacco Use    Smoking status: Former     Current packs/day: 0.00     Average packs/day: 0.5 packs/day for 14.0 years (7.0 ttl pk-yrs)     Types: Cigarettes     Start date: 1980     Quit date:      Years since quittin.4    Tobacco comments:     Pt quit smoking cigarettes, .  1/2 pack per day, smoked 20 years    Vaping Use    Vaping status: Never Used   Substance Use Topics    Alcohol use: Not Currently     Alcohol/week: 8.4 oz     Types: 14 Shots of liquor per week     Comment: daily    Drug use: Not Currently     Types: Marijuana     Comment: quit         Family History   Problem Relation Age of Onset    Cancer Mother         lung?       No Known Allergies    Current Outpatient Medications   Medication Sig Dispense Refill    atorvastatin (LIPITOR) 20 MG Tab Take 20 mg by mouth every day.      terazosin (HYTRIN) 2 MG Cap Take 2 mg by mouth at bedtime.      Magnesium Glycinate 100 MG Cap Take 1 Capsule by mouth every day with lunch. 420 mg daily      metFORMIN (GLUCOPHAGE) 500 MG Tab Take 500 mg by mouth 2 times a day with meals. 500 mg BID      baclofen (LIORESAL) 10 MG Tab Take 10 mg by mouth at bedtime as needed. Indications: Muscle Spasm      Cyanocobalamin (VITAMIN B-12) 1000 MCG Tab Take 1,000 mcg by mouth every day.      fluticasone-salmeterol (ADVAIR) 250-50 MCG/ACT AEROSOL POWDER, BREATH ACTIVATED Inhale 1 Puff 2 times a day.  "Indications: Asthma      Omega-3 Fatty Acids (FISH OIL) 1000 MG Cap capsule Take 1,000 mg by mouth every day.      vitamin D3 (CHOLECALCIFEROL) 1000 Unit (25 mcg) Tab Take 1,000 Units by mouth every day.      gabapentin (NEURONTIN) 300 MG Cap Take 600 mg by mouth 2 times a day.      aspirin EC (ECOTRIN) 81 MG Tablet Delayed Response Take 81 mg by mouth every day.       No current facility-administered medications for this encounter.       Review of Systems   Constitutional:  Negative for chills, fever, malaise/fatigue and weight loss.   HENT:  Positive for tinnitus (Chronic). Negative for congestion, ear pain, nosebleeds and sore throat.    Eyes:  Negative for blurred vision.   Respiratory:  Positive for sputum production (improving). Negative for cough, shortness of breath and wheezing.    Cardiovascular:  Negative for chest pain, palpitations, orthopnea and leg swelling.   Gastrointestinal:  Negative for abdominal pain, heartburn, nausea and vomiting.   Genitourinary:  Negative for dysuria, frequency and urgency.   Musculoskeletal:  Negative for neck pain.   Neurological:  Positive for tingling. Negative for dizziness, tremors, sensory change, focal weakness and headaches.   Endo/Heme/Allergies:  Does not bruise/bleed easily.   Psychiatric/Behavioral:  Negative for depression, memory loss and suicidal ideas.    All other systems reviewed and are negative.      Problem list, medications, and allergies reviewed by myself today in Epic.     Objective:     Vitals:    05/28/24 1001   BP: 104/54   BP Location: Left arm   Patient Position: Sitting   BP Cuff Size: Adult   Pulse: 69   Temp: 37 °C (98.6 °F)   TempSrc: Temporal   SpO2: 96%   Weight: 82.6 kg (182 lb 3.4 oz)   Height: 1.72 m (5' 7.72\")         DESC; KARNOFSKY SCALE WITH ECOG EQUIVALENT: 90, Able to carry on normal activity; minor signs or symptoms of disease (ECOG equivalent 0)    DISTRESS LEVEL: no acute distress    Physical Exam  Constitutional:       " General: He is not in acute distress.     Appearance: Normal appearance.      Comments: Port in place   HENT:      Head: Normocephalic and atraumatic.      Nose: Nose normal. No congestion.      Mouth/Throat:      Mouth: Mucous membranes are moist.      Pharynx: Oropharynx is clear.   Eyes:      General: No scleral icterus.     Conjunctiva/sclera: Conjunctivae normal.      Pupils: Pupils are equal, round, and reactive to light.   Cardiovascular:      Rate and Rhythm: Normal rate and regular rhythm.      Pulses: Normal pulses.      Heart sounds: No murmur heard.     No friction rub.   Pulmonary:      Effort: Pulmonary effort is normal. No respiratory distress.      Breath sounds: Normal breath sounds. No stridor. No wheezing or rales.   Chest:      Chest wall: No tenderness.   Abdominal:      General: Abdomen is flat. Bowel sounds are normal. There is no distension.      Palpations: Abdomen is soft. There is no mass.      Tenderness: There is no abdominal tenderness. There is no guarding or rebound.   Musculoskeletal:         General: No swelling, tenderness or deformity. Normal range of motion.      Cervical back: Normal range of motion and neck supple. No rigidity or tenderness.      Right lower leg: No edema.      Left lower leg: No edema.   Skin:     General: Skin is warm.      Coloration: Skin is not jaundiced or pale.      Findings: No bruising or rash.   Neurological:      General: No focal deficit present.      Mental Status: He is alert and oriented to person, place, and time. Mental status is at baseline.      Motor: No weakness.   Psychiatric:         Mood and Affect: Mood normal.         Behavior: Behavior normal.         Thought Content: Thought content normal.         Judgment: Judgment normal.         Labs:   Most recent labs reviewed.  Please see the lab tab of chart review    Imaging:   Most recent images below have been independently reviewed by me.  Please see the imaging tab of chart review    MR  Brain   1.  There is no evidence of metastasis.  2.  Mild chronic microvascular ischemic disease.    05/08/24 CT CAP  1.  Worsening left thoracic pleural metastatic disease.  2.  Increasing left upper lobe and lower lobe pulmonary opacities which could be tumor or some component of infiltrate as well.  3.  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  4.  2.9 cm infrarenal AAA.    9/13/23 CT CAP  1.  Left lower lobe pulmonary masses, very slightly more prominent than on 8/29/2023.  2.  Multiple left pulmonary and pleural metastases.  3.  Left hilar emely metastases.  4.  No evidence of metastatic disease in the abdomen or pelvis.    9/12/2023 PET scan  1.  Interval increased size and intensity of LEFT lower lobe lung mass with new intrapulmonary and pleural metastases indicating progression of disease.  2.  Probable pleural metastasis at the RIGHT lung base posteriorly.  3.  Worsening LEFT hilar adenopathy, metastatic.  4.  No evidence of metastatic disease in the abdomen or pelvis.    6/6/2023 PET scan  1.  Multilobular hypermetabolic nodule in the superior segment LEFT lower lobe of lung measuring 2.3 cm consistent with malignancy.  2.  Involvement of the LEFT superior hilar lymph node.  3.  No evidence for metastatic disease in the abdomen or pelvis.    Pathology    A. Lung, left lower lobe, fine needle aspiration slides:          Blood only; no epithelial or malignant cells identified.   B. Lung, left lower lobe, core biopsies:          Positive for small cell carcinoma.   C. Lung, left lower lobe, forceps biopsy with touch prep slides:          Positive for small cell carcinoma.   D. Lung, left lower lobe, fine needle aspiration:          Originally submitted for possible flow cytometric analysis,           which was deemed unnecessary.  Tissue will be submitted to           cytology for preparation of a cell block; an addendum will be           generated if there is an unexpected finding.   E. Lymph node, 10L,  fine needle aspiration slides:          Malignant cells present, consistent with metastatic small cell   carcinoma.   F.  Lymph node, 10L, core biopsies:            Hypocellular specimen demonstrating few scattered lymphocytes           and few atypical cells suspicious for small cell carcinoma       Assessment/Plan:      Cancer Staging   SCLC (small cell lung carcinoma) (HCC)  Staging form: Lung, AJCC 8th Edition  - Clinical stage from 9/14/2023: Stage IIIA (cT3, cN1, cM0) - Signed by Leonel Patel M.D. on 9/14/2023       Mr. Ramsey who presents today with a new diagnosis of small cell lung cancer, limited stage.     CT CAP shows PD.      Patient will be taken off the HLX trial    I discussed with Radiation Oncology and no treatments currently from their standpoint.     We discussed IO vs chemotherapy. We discussed the s/e of chemotherapy and how hard it was for him to tolerate this in the past.  He is not interested in this. We discussed the s/e of IO.  We did discuss that he may have gotten therapy in the form of HL X we are unsure given that this trial is a double blinded study.  He did not have any IO related side effects however which points against this.    Careful consideration patient wishes to pursue treatment with pembrolizumab.    He is s/p C1, pembrolizumab.       Plan  -continue Pembrolizumab, C2, due to progression.    -need brain MRI and CT CAP on 8/15/24    Any questions and concerns raised by the patient were addressed and answered. Patient denies any further questions.  Patient encouraged to call the office with any concerns or issues.      Roseline Fuller M.D.  Hematology/Oncology    30 minutes was spent on this visit

## 2024-05-31 ENCOUNTER — APPOINTMENT (OUTPATIENT)
Dept: HEMATOLOGY ONCOLOGY | Facility: MEDICAL CENTER | Age: 74
End: 2024-05-31
Payer: MEDICARE

## 2024-06-05 ENCOUNTER — TELEPHONE (OUTPATIENT)
Dept: HEMATOLOGY ONCOLOGY | Facility: MEDICAL CENTER | Age: 74
End: 2024-06-05
Payer: COMMERCIAL

## 2024-06-06 NOTE — TELEPHONE ENCOUNTER
After discussing with Santo and her talking to Dr Fuller, it was confirmed that the Pt does not need to do additional Pio testing. Spoke with Pt and he is grateful for the information as he stated that it cost a lot last time and he had a hard time getting it covered.

## 2024-06-18 DIAGNOSIS — C34.90 SCLC (SMALL CELL LUNG CARCINOMA) (HCC): ICD-10-CM

## 2024-06-25 RX ORDER — 0.9 % SODIUM CHLORIDE 0.9 %
3 VIAL (ML) INJECTION PRN
OUTPATIENT
Start: 2024-07-01

## 2024-06-25 RX ORDER — METHYLPREDNISOLONE SODIUM SUCCINATE 125 MG/2ML
125 INJECTION, POWDER, LYOPHILIZED, FOR SOLUTION INTRAMUSCULAR; INTRAVENOUS PRN
OUTPATIENT
Start: 2024-07-01

## 2024-06-25 RX ORDER — ONDANSETRON 8 MG/1
8 TABLET, ORALLY DISINTEGRATING ORAL PRN
OUTPATIENT
Start: 2024-07-01

## 2024-06-25 RX ORDER — SODIUM CHLORIDE 9 MG/ML
INJECTION, SOLUTION INTRAVENOUS CONTINUOUS
OUTPATIENT
Start: 2024-07-01

## 2024-06-25 RX ORDER — ONDANSETRON 2 MG/ML
4 INJECTION INTRAMUSCULAR; INTRAVENOUS PRN
OUTPATIENT
Start: 2024-07-01

## 2024-06-25 RX ORDER — EPINEPHRINE 1 MG/ML(1)
0.5 AMPUL (ML) INJECTION PRN
OUTPATIENT
Start: 2024-07-01

## 2024-06-25 RX ORDER — PROCHLORPERAZINE MALEATE 10 MG
10 TABLET ORAL EVERY 6 HOURS PRN
OUTPATIENT
Start: 2024-07-01

## 2024-06-25 RX ORDER — 0.9 % SODIUM CHLORIDE 0.9 %
3 VIAL (ML) INJECTION PRN
OUTPATIENT
Start: 2024-06-29

## 2024-06-25 RX ORDER — 0.9 % SODIUM CHLORIDE 0.9 %
10 VIAL (ML) INJECTION PRN
OUTPATIENT
Start: 2024-07-01

## 2024-06-25 RX ORDER — DIPHENHYDRAMINE HYDROCHLORIDE 50 MG/ML
50 INJECTION INTRAMUSCULAR; INTRAVENOUS PRN
OUTPATIENT
Start: 2024-07-01

## 2024-06-25 RX ORDER — 0.9 % SODIUM CHLORIDE 0.9 %
VIAL (ML) INJECTION PRN
OUTPATIENT
Start: 2024-07-01

## 2024-06-25 RX ORDER — 0.9 % SODIUM CHLORIDE 0.9 %
10 VIAL (ML) INJECTION PRN
OUTPATIENT
Start: 2024-06-29

## 2024-06-25 RX ORDER — 0.9 % SODIUM CHLORIDE 0.9 %
VIAL (ML) INJECTION PRN
OUTPATIENT
Start: 2024-06-29

## 2024-06-27 ENCOUNTER — HOSPITAL ENCOUNTER (OUTPATIENT)
Dept: HEMATOLOGY ONCOLOGY | Facility: MEDICAL CENTER | Age: 74
End: 2024-06-27
Attending: NURSE PRACTITIONER
Payer: COMMERCIAL

## 2024-06-27 VITALS
HEIGHT: 68 IN | WEIGHT: 178.9 LBS | DIASTOLIC BLOOD PRESSURE: 44 MMHG | BODY MASS INDEX: 27.11 KG/M2 | TEMPERATURE: 97.2 F | RESPIRATION RATE: 15 BRPM | OXYGEN SATURATION: 96 % | HEART RATE: 61 BPM | SYSTOLIC BLOOD PRESSURE: 92 MMHG

## 2024-06-27 DIAGNOSIS — C34.90 SCLC (SMALL CELL LUNG CARCINOMA) (HCC): ICD-10-CM

## 2024-06-27 DIAGNOSIS — Z79.899 ENCOUNTER FOR LONG-TERM (CURRENT) USE OF HIGH-RISK MEDICATION: ICD-10-CM

## 2024-06-27 PROCEDURE — 99212 OFFICE O/P EST SF 10 MIN: CPT | Performed by: NURSE PRACTITIONER

## 2024-06-27 ASSESSMENT — ENCOUNTER SYMPTOMS
DIARRHEA: 0
DIZZINESS: 1
COUGH: 1
NAUSEA: 0
PALPITATIONS: 0
CONSTIPATION: 0
FEVER: 0
CHILLS: 0
HEADACHES: 0
SHORTNESS OF BREATH: 0
WEIGHT LOSS: 1
VOMITING: 0
MYALGIAS: 0

## 2024-06-27 ASSESSMENT — FIBROSIS 4 INDEX: FIB4 SCORE: 2.4

## 2024-06-27 ASSESSMENT — PAIN SCALES - GENERAL: PAINLEVEL: NO PAIN

## 2024-06-27 NOTE — ADDENDUM NOTE
Encounter addended by: Dodie Kim, Med Ass't on: 6/27/2024 2:06 PM   Actions taken: Charge Capture section accepted

## 2024-06-27 NOTE — PROGRESS NOTES
"Subjective     Edenilson Ramsey is a 74 y.o. male who presents with Lung Cancer (Jonny/ pre chemo )          HPI    Patient seen today for evaluation prior to cycle 2 of cancer therapy employing Pembrolizumab for small cell lung cancer, for continued monitoring of symptoms and side effects of cancer treatments.     Oncology history of presenting illness:  Per Freya Prince, APRN:  \"Patient with remote history of agent orange exposure in [place] ([id number]). He was in his usual state of health until 2/2023 when he had difficulty breathing, \"felt like he was being strangled.\" [facility] and he underwent cholecystectomy. Imaging completed at that time showed lung nodules. Biopsy was reportedly attempted on lung nodules in approximately March or April 2023 with results inconclusive. PET scan completed 6/6/23 showed hypermetabolic nodule in superior segment of left lower lobe measuring 2.3 cm consistent with malignancy, enlarged left superior hilar lymph node noted, no evidence of metastatic disease in abdomen or pelvis. Biopsy per bronchoscopy was completed 8/29/23 with pathology confirming malignancy. He initiated cisplatin, etoposide, [id number] vs placebo on 9/18/23; concurrent radiation (29 of 33 fractions) occurred between 10/9-11/21/23 but was not fully completed due to hospitalization for COVID pneumonia; Cycle 4 Cis, etoposide, HLX was completed 12/4/23 and he transitioned to maintenance HLX vs placebo with cycle 5 on 1/6/24.     Patient found to have progression of disease on CT CAP on 5/8/2024.  He was transition to single agent pembrolizumab with his first dose on 5/19/2024.     Treatment history:  09/18/23: C1 Cis/Etop/HLX10 vs Placebo  10/09/23: C2 Cis/Etop/HLX vs Placebo + RT  10/28/23: C3 Cis/Etop/HLX vs Placebo + RT (treatment deferred 1 week for neutropenia)  11/06/23  C3 Cis/Etop/HLX vs Placebo + RT  12/04/23: C4 Cis/Etop/HLX  01/06/24: C5 HLX vs " placebo  01/26/24: C6 HLX vs placebo  02/19/24: C7 HLX vs placebo  03/10/24: C8 HLX vs placebo  04/18/24: C9 HLX vs placebo     05/19/24: C1 Pembrolizumab   06/30/24: C2 Pembrolizumab     Interval history:  Patient overall is doing well.  He has noticed slightly worsening cough since in the last 2 months or so.  He also has noticed limited dizziness at times.  His blood pressure is low.  He is drinking quite a bit of water he states.  He did state that he is eating well but is lost approximately 4 pounds.  He did state that he has noticed a little bit more fatigue with treatment.  But overall states he is doing fine.      No Known Allergies  Current Outpatient Medications on File Prior to Encounter   Medication Sig Dispense Refill    atorvastatin (LIPITOR) 20 MG Tab Take 20 mg by mouth every day.      terazosin (HYTRIN) 2 MG Cap Take 2 mg by mouth at bedtime.      Magnesium Glycinate 100 MG Cap Take 1 Capsule by mouth every day with lunch. 420 mg daily      metFORMIN (GLUCOPHAGE) 500 MG Tab Take 500 mg by mouth 2 times a day with meals. 500 mg BID      baclofen (LIORESAL) 10 MG Tab Take 10 mg by mouth at bedtime as needed. Indications: Muscle Spasm      Cyanocobalamin (VITAMIN B-12) 1000 MCG Tab Take 1,000 mcg by mouth every day.      fluticasone-salmeterol (ADVAIR) 250-50 MCG/ACT AEROSOL POWDER, BREATH ACTIVATED Inhale 1 Puff 2 times a day. Indications: Asthma      Omega-3 Fatty Acids (FISH OIL) 1000 MG Cap capsule Take 1,000 mg by mouth every day.      vitamin D3 (CHOLECALCIFEROL) 1000 Unit (25 mcg) Tab Take 1,000 Units by mouth every day.      gabapentin (NEURONTIN) 300 MG Cap Take 600 mg by mouth 2 times a day.      aspirin EC (ECOTRIN) 81 MG Tablet Delayed Response Take 81 mg by mouth every day.       No current facility-administered medications on file prior to encounter.       Review of Systems   Constitutional:  Positive for malaise/fatigue and weight loss. Negative for chills and fever.   Respiratory:   "Positive for cough. Negative for shortness of breath.    Cardiovascular:  Negative for chest pain and palpitations.   Gastrointestinal:  Negative for constipation, diarrhea, nausea and vomiting.   Genitourinary:  Negative for dysuria.   Musculoskeletal:  Negative for myalgias.   Skin:  Positive for itching (left forearm itching at times). Negative for rash.   Neurological:  Positive for dizziness. Negative for headaches.              Objective     BP 92/44 (BP Location: Left arm, Patient Position: Sitting, BP Cuff Size: Adult) Comment: Pt C/o some dizziness  Pulse 61   Temp 36.2 °C (97.2 °F) (Temporal)   Resp 15   Ht 1.72 m (5' 7.72\")   Wt 81.1 kg (178 lb 14.5 oz)   SpO2 96%   BMI 27.43 kg/m²      Physical Exam  Vitals reviewed.   Constitutional:       General: He is not in acute distress.     Appearance: Normal appearance. He is not diaphoretic.   HENT:      Head: Normocephalic and atraumatic.   Cardiovascular:      Rate and Rhythm: Normal rate and regular rhythm.      Heart sounds: Normal heart sounds. No murmur heard.     No friction rub. No gallop.   Pulmonary:      Effort: Pulmonary effort is normal. No respiratory distress.      Breath sounds: Normal breath sounds. No wheezing.   Abdominal:      General: Bowel sounds are normal. There is no distension.      Palpations: Abdomen is soft.      Tenderness: There is no abdominal tenderness.   Musculoskeletal:         General: No swelling or tenderness. Normal range of motion.   Skin:     General: Skin is warm and dry.   Neurological:      Mental Status: He is alert and oriented to person, place, and time.   Psychiatric:         Mood and Affect: Mood normal.         Behavior: Behavior normal.                        Assessment & Plan       1. SCLC (small cell lung carcinoma) (HCC)        2. Encounter for long-term (current) use of high-risk medication                1. Patient with stage IIIa, [id number], small cell lung cancer currently on pembrolizumab, cycle " 2.  Clinically patient is doing well overall.  If labs meet parameters, patient is okay to proceed with treatment as planned.    2.  Patient with slightly worsening cough and some lightheadedness at times.  Of asked that he continue to monitor and contact our office if anything worsens.    3.  Patient will follow-up in the clinic in 6 weeks, or sooner if needed.      Please note that this dictation was created using voice recognition software. I have made every reasonable attempt to correct obvious errors, but I expect that there are errors of grammar and possibly content that I did not discover before finalizing the note.

## 2024-06-30 ENCOUNTER — OUTPATIENT INFUSION SERVICES (OUTPATIENT)
Dept: ONCOLOGY | Facility: MEDICAL CENTER | Age: 74
End: 2024-06-30
Attending: STUDENT IN AN ORGANIZED HEALTH CARE EDUCATION/TRAINING PROGRAM
Payer: COMMERCIAL

## 2024-06-30 VITALS
HEART RATE: 82 BPM | SYSTOLIC BLOOD PRESSURE: 135 MMHG | OXYGEN SATURATION: 94 % | DIASTOLIC BLOOD PRESSURE: 64 MMHG | WEIGHT: 182.98 LBS | RESPIRATION RATE: 18 BRPM | HEIGHT: 68 IN | TEMPERATURE: 96.8 F | BODY MASS INDEX: 27.73 KG/M2

## 2024-06-30 DIAGNOSIS — C34.90 SCLC (SMALL CELL LUNG CARCINOMA) (HCC): ICD-10-CM

## 2024-06-30 ASSESSMENT — FIBROSIS 4 INDEX: FIB4 SCORE: 2.4

## 2024-06-30 NOTE — PROGRESS NOTES
Pt presented to Infusion for D1C2 OP NSC Lung Pembrolizumab. POC discussed and pt verbalized understanding, reports having a sore throat, feeling very fatigued, and he is experiencing pain on inspiration. These symptoms have been occurring since last night. Patient has been afebrile and has not had any sick contacts. He reports that he slept most of the day yesterday and throughout the entire night. Lung fields auscultated; all lung fields were clear with no advantageous sounds.  Symptoms were reported to NAHOMI Stapleton. Per provider, hold treatment today. Patient educated on sings and symptoms that warrant an ED visit. [facility]. Patient discharged to self care.

## 2024-07-03 ENCOUNTER — TELEPHONE (OUTPATIENT)
Dept: HEMATOLOGY ONCOLOGY | Facility: MEDICAL CENTER | Age: 74
End: 2024-07-03
Payer: COMMERCIAL

## 2024-07-07 ENCOUNTER — OUTPATIENT INFUSION SERVICES (OUTPATIENT)
Dept: ONCOLOGY | Facility: MEDICAL CENTER | Age: 74
End: 2024-07-07
Attending: STUDENT IN AN ORGANIZED HEALTH CARE EDUCATION/TRAINING PROGRAM
Payer: COMMERCIAL

## 2024-07-07 VITALS
HEIGHT: 68 IN | OXYGEN SATURATION: 94 % | RESPIRATION RATE: 18 BRPM | HEART RATE: 63 BPM | BODY MASS INDEX: 27 KG/M2 | SYSTOLIC BLOOD PRESSURE: 123 MMHG | TEMPERATURE: 97 F | WEIGHT: 178.13 LBS | DIASTOLIC BLOOD PRESSURE: 58 MMHG

## 2024-07-07 DIAGNOSIS — C34.90 SCLC (SMALL CELL LUNG CARCINOMA) (HCC): ICD-10-CM

## 2024-07-07 LAB
ALBUMIN SERPL BCP-MCNC: 4 G/DL (ref 3.2–4.9)
ALBUMIN/GLOB SERPL: 1.2 G/DL
ALP SERPL-CCNC: 94 U/L (ref 30–99)
ALT SERPL-CCNC: 34 U/L (ref 2–50)
ANION GAP SERPL CALC-SCNC: 10 MMOL/L (ref 7–16)
AST SERPL-CCNC: 33 U/L (ref 12–45)
BASOPHILS # BLD AUTO: 0.8 % (ref 0–1.8)
BASOPHILS # BLD: 0.06 K/UL (ref 0–0.12)
BILIRUB SERPL-MCNC: 0.6 MG/DL (ref 0.1–1.5)
BUN SERPL-MCNC: 28 MG/DL (ref 8–22)
CALCIUM ALBUM COR SERPL-MCNC: 9.5 MG/DL (ref 8.5–10.5)
CALCIUM SERPL-MCNC: 9.5 MG/DL (ref 8.5–10.5)
CHLORIDE SERPL-SCNC: 104 MMOL/L (ref 96–112)
CO2 SERPL-SCNC: 24 MMOL/L (ref 20–33)
CREAT SERPL-MCNC: 1.48 MG/DL (ref 0.5–1.4)
EOSINOPHIL # BLD AUTO: 0.33 K/UL (ref 0–0.51)
EOSINOPHIL NFR BLD: 4.2 % (ref 0–6.9)
ERYTHROCYTE [DISTWIDTH] IN BLOOD BY AUTOMATED COUNT: 48.8 FL (ref 35.9–50)
GFR SERPLBLD CREATININE-BSD FMLA CKD-EPI: 49 ML/MIN/1.73 M 2
GLOBULIN SER CALC-MCNC: 3.3 G/DL (ref 1.9–3.5)
GLUCOSE SERPL-MCNC: 120 MG/DL (ref 65–99)
HCT VFR BLD AUTO: 37.2 % (ref 42–52)
HGB BLD-MCNC: 12.6 G/DL (ref 14–18)
IMM GRANULOCYTES # BLD AUTO: 0.02 K/UL (ref 0–0.11)
IMM GRANULOCYTES NFR BLD AUTO: 0.3 % (ref 0–0.9)
LYMPHOCYTES # BLD AUTO: 0.87 K/UL (ref 1–4.8)
LYMPHOCYTES NFR BLD: 11 % (ref 22–41)
MCH RBC QN AUTO: 31.3 PG (ref 27–33)
MCHC RBC AUTO-ENTMCNC: 33.9 G/DL (ref 32.3–36.5)
MCV RBC AUTO: 92.5 FL (ref 81.4–97.8)
MONOCYTES # BLD AUTO: 0.6 K/UL (ref 0–0.85)
MONOCYTES NFR BLD AUTO: 7.6 % (ref 0–13.4)
NEUTROPHILS # BLD AUTO: 6.01 K/UL (ref 1.82–7.42)
NEUTROPHILS NFR BLD: 76.1 % (ref 44–72)
NRBC # BLD AUTO: 0 K/UL
NRBC BLD-RTO: 0 /100 WBC (ref 0–0.2)
OUTPT INFUS CBC COMMENT OICOM: ABNORMAL
PLATELET # BLD AUTO: 174 K/UL (ref 164–446)
PMV BLD AUTO: 8.9 FL (ref 9–12.9)
POTASSIUM SERPL-SCNC: 4.1 MMOL/L (ref 3.6–5.5)
PROT SERPL-MCNC: 7.3 G/DL (ref 6–8.2)
RBC # BLD AUTO: 4.02 M/UL (ref 4.7–6.1)
SODIUM SERPL-SCNC: 138 MMOL/L (ref 135–145)
T4 FREE SERPL-MCNC: 1.23 NG/DL (ref 0.93–1.7)
TSH SERPL DL<=0.005 MIU/L-ACNC: 2.22 UIU/ML (ref 0.38–5.33)
WBC # BLD AUTO: 7.9 K/UL (ref 4.8–10.8)

## 2024-07-07 PROCEDURE — 84439 ASSAY OF FREE THYROXINE: CPT

## 2024-07-07 PROCEDURE — 700105 HCHG RX REV CODE 258: Performed by: NURSE PRACTITIONER

## 2024-07-07 PROCEDURE — 84443 ASSAY THYROID STIM HORMONE: CPT

## 2024-07-07 PROCEDURE — 80053 COMPREHEN METABOLIC PANEL: CPT

## 2024-07-07 PROCEDURE — 700111 HCHG RX REV CODE 636 W/ 250 OVERRIDE (IP): Mod: JZ,JG | Performed by: NURSE PRACTITIONER

## 2024-07-07 PROCEDURE — 85025 COMPLETE CBC W/AUTO DIFF WBC: CPT

## 2024-07-07 PROCEDURE — A4212 NON CORING NEEDLE OR STYLET: HCPCS

## 2024-07-07 PROCEDURE — 96413 CHEMO IV INFUSION 1 HR: CPT

## 2024-07-07 RX ADMIN — HEPARIN 500 UNITS: 100 SYRINGE at 10:32

## 2024-07-07 RX ADMIN — SODIUM CHLORIDE 400 MG: 9 INJECTION, SOLUTION INTRAVENOUS at 09:43

## 2024-07-07 ASSESSMENT — FIBROSIS 4 INDEX: FIB4 SCORE: 2.4

## 2024-07-08 RX ORDER — 0.9 % SODIUM CHLORIDE 0.9 %
20 VIAL (ML) INJECTION PRN
OUTPATIENT
Start: 2024-07-08

## 2024-08-07 RX ORDER — 0.9 % SODIUM CHLORIDE 0.9 %
VIAL (ML) INJECTION PRN
Status: CANCELLED | OUTPATIENT
Start: 2024-08-16

## 2024-08-07 RX ORDER — 0.9 % SODIUM CHLORIDE 0.9 %
3 VIAL (ML) INJECTION PRN
Status: CANCELLED | OUTPATIENT
Start: 2024-08-17

## 2024-08-07 RX ORDER — EPINEPHRINE 1 MG/ML(1)
0.5 AMPUL (ML) INJECTION PRN
Status: CANCELLED | OUTPATIENT
Start: 2024-08-17

## 2024-08-07 RX ORDER — 0.9 % SODIUM CHLORIDE 0.9 %
VIAL (ML) INJECTION PRN
Status: CANCELLED | OUTPATIENT
Start: 2024-08-17

## 2024-08-07 RX ORDER — ONDANSETRON 2 MG/ML
4 INJECTION INTRAMUSCULAR; INTRAVENOUS PRN
Status: CANCELLED | OUTPATIENT
Start: 2024-08-17

## 2024-08-07 RX ORDER — 0.9 % SODIUM CHLORIDE 0.9 %
10 VIAL (ML) INJECTION PRN
Status: CANCELLED | OUTPATIENT
Start: 2024-08-16

## 2024-08-07 RX ORDER — SODIUM CHLORIDE 9 MG/ML
INJECTION, SOLUTION INTRAVENOUS CONTINUOUS
Status: CANCELLED | OUTPATIENT
Start: 2024-08-17

## 2024-08-07 RX ORDER — ONDANSETRON 8 MG/1
8 TABLET, ORALLY DISINTEGRATING ORAL PRN
Status: CANCELLED | OUTPATIENT
Start: 2024-08-17

## 2024-08-07 RX ORDER — DIPHENHYDRAMINE HYDROCHLORIDE 50 MG/ML
50 INJECTION INTRAMUSCULAR; INTRAVENOUS PRN
Status: CANCELLED | OUTPATIENT
Start: 2024-08-17

## 2024-08-07 RX ORDER — METHYLPREDNISOLONE SODIUM SUCCINATE 125 MG/2ML
125 INJECTION, POWDER, LYOPHILIZED, FOR SOLUTION INTRAMUSCULAR; INTRAVENOUS PRN
Status: CANCELLED | OUTPATIENT
Start: 2024-08-17

## 2024-08-07 RX ORDER — 0.9 % SODIUM CHLORIDE 0.9 %
10 VIAL (ML) INJECTION PRN
Status: CANCELLED | OUTPATIENT
Start: 2024-08-17

## 2024-08-07 RX ORDER — 0.9 % SODIUM CHLORIDE 0.9 %
3 VIAL (ML) INJECTION PRN
Status: CANCELLED | OUTPATIENT
Start: 2024-08-16

## 2024-08-07 RX ORDER — PROCHLORPERAZINE MALEATE 10 MG
10 TABLET ORAL EVERY 6 HOURS PRN
Status: CANCELLED | OUTPATIENT
Start: 2024-08-17

## 2024-08-08 ENCOUNTER — HOSPITAL ENCOUNTER (OUTPATIENT)
Dept: HEMATOLOGY ONCOLOGY | Facility: MEDICAL CENTER | Age: 74
End: 2024-08-08
Attending: INTERNAL MEDICINE
Payer: COMMERCIAL

## 2024-08-08 VITALS
DIASTOLIC BLOOD PRESSURE: 52 MMHG | RESPIRATION RATE: 15 BRPM | HEIGHT: 67 IN | SYSTOLIC BLOOD PRESSURE: 100 MMHG | BODY MASS INDEX: 27.62 KG/M2 | TEMPERATURE: 97.6 F | WEIGHT: 176 LBS | OXYGEN SATURATION: 95 % | HEART RATE: 54 BPM

## 2024-08-08 DIAGNOSIS — C34.90 SCLC (SMALL CELL LUNG CARCINOMA) (HCC): ICD-10-CM

## 2024-08-08 PROCEDURE — 99212 OFFICE O/P EST SF 10 MIN: CPT | Performed by: INTERNAL MEDICINE

## 2024-08-08 ASSESSMENT — ENCOUNTER SYMPTOMS
VOMITING: 0
DIZZINESS: 1
FEVER: 0
WEIGHT LOSS: 1
CONSTIPATION: 0
HEADACHES: 0
SHORTNESS OF BREATH: 0
NAUSEA: 0
PALPITATIONS: 0
MYALGIAS: 0
DIARRHEA: 0
COUGH: 1
CHILLS: 0

## 2024-08-08 ASSESSMENT — FIBROSIS 4 INDEX: FIB4 SCORE: 2.41

## 2024-08-08 NOTE — PROGRESS NOTES
"  PROGRESS NOTE : [facility].     8/8/2024    Subjective     Edenilson Ramsey is a 74 y.o. male who presents with Follow-Up (Prechemo )          HPI    Patient seen today for evaluation prior to cycle 3 of cancer therapy employing Pembrolizumab for small cell lung cancer, for continued monitoring of symptoms and side effects of cancer treatments.     Oncology history of presenting illness:    Per Freya Prince, APRN:  \"Patient with remote history of agent orange exposure in [place] ([id number]). He was in his usual state of health until 2/2023 when he had difficulty breathing, \"felt like he was being strangled.\" [facility] and he underwent cholecystectomy. Imaging completed at that time showed lung nodules. Biopsy was reportedly attempted on lung nodules in approximately March or April 2023 with results inconclusive. PET scan completed 6/6/23 showed hypermetabolic nodule in superior segment of left lower lobe measuring 2.3 cm consistent with malignancy, enlarged left superior hilar lymph node noted, no evidence of metastatic disease in abdomen or pelvis. Biopsy per bronchoscopy was completed 8/29/23 with pathology confirming malignancy. He initiated cisplatin, etoposide, [id number] vs placebo on 9/18/23; concurrent radiation (29 of 33 fractions) occurred between 10/9-11/21/23 but was not fully completed due to hospitalization for COVID pneumonia; Cycle 4 Cis, etoposide, HLX was completed 12/4/23 and he transitioned to maintenance HLX vs placebo with cycle 5 on 1/6/24.     Patient found to have progression of disease on CT CAP on 5/8/2024.  He was transition to single agent pembrolizumab with his first dose on 5/19/2024.     Treatment history:  09/18/23: C1 Cis/Etop/HLX10 vs Placebo  10/09/23: C2 Cis/Etop/HLX vs Placebo + RT  10/28/23: C3 Cis/Etop/HLX vs Placebo + RT (treatment deferred 1 week for neutropenia)  11/06/23  C3 Cis/Etop/HLX vs Placebo + " RT  12/04/23: C4 Cis/Etop/HLX  01/06/24: C5 HLX vs placebo  01/26/24: C6 HLX vs placebo  02/19/24: C7 HLX vs placebo  03/10/24: C8 HLX vs placebo  04/18/24: C9 HLX vs placebo     05/19/24: C1 Pembrolizumab   07/07/24: C2 Pembrolizumab     Interval history:  Patient overall is doing well.  He has noticed slightly worsening cough since in the last 2 months or so.  He also has noticed limited dizziness at times.  His blood pressure is low.  He is drinking quite a bit of water he states.  He did state that he is eating well but is lost approximately 4 pounds.  He did state that he has significantly more fatigue with treatment.        No Known Allergies  Current Outpatient Medications on File Prior to Encounter   Medication Sig Dispense Refill    atorvastatin (LIPITOR) 20 MG Tab Take 20 mg by mouth every day.      terazosin (HYTRIN) 2 MG Cap Take 2 mg by mouth at bedtime.      Magnesium Glycinate 100 MG Cap Take 1 Capsule by mouth every day with lunch. 420 mg daily      metFORMIN (GLUCOPHAGE) 500 MG Tab Take 500 mg by mouth 2 times a day with meals. 500 mg BID      baclofen (LIORESAL) 10 MG Tab Take 10 mg by mouth at bedtime as needed. Indications: Muscle Spasm      Cyanocobalamin (VITAMIN B-12) 1000 MCG Tab Take 1,000 mcg by mouth every day.      fluticasone-salmeterol (ADVAIR) 250-50 MCG/ACT AEROSOL POWDER, BREATH ACTIVATED Inhale 1 Puff 2 times a day. Indications: Asthma      Omega-3 Fatty Acids (FISH OIL) 1000 MG Cap capsule Take 1,000 mg by mouth every day.      vitamin D3 (CHOLECALCIFEROL) 1000 Unit (25 mcg) Tab Take 1,000 Units by mouth every day.      gabapentin (NEURONTIN) 300 MG Cap Take 600 mg by mouth 2 times a day.      aspirin EC (ECOTRIN) 81 MG Tablet Delayed Response Take 81 mg by mouth every day.       No current facility-administered medications on file prior to encounter.       Review of Systems   Constitutional:  Positive for malaise/fatigue and weight loss. Negative for chills and fever.  "  Respiratory:  Positive for cough. Negative for shortness of breath.    Cardiovascular:  Negative for chest pain and palpitations.   Gastrointestinal:  Negative for constipation, diarrhea, nausea and vomiting.   Genitourinary:  Negative for dysuria.   Musculoskeletal:  Negative for myalgias.   Skin:  Positive for itching (left forearm itching at times). Negative for rash.   Neurological:  Positive for dizziness. Negative for headaches.              Objective     /52 (BP Location: Left arm, Patient Position: Sitting, BP Cuff Size: Adult)   Pulse (!) 54   Temp 36.4 °C (97.6 °F) (Temporal)   Resp 15   Ht 1.702 m (5' 7\")   Wt 79.8 kg (176 lb)   SpO2 95%   BMI 27.57 kg/m²      Physical Exam  Vitals reviewed.   Constitutional:       General: He is not in acute distress.     Appearance: Normal appearance. He is not diaphoretic.   HENT:      Head: Normocephalic and atraumatic.   Cardiovascular:      Rate and Rhythm: Normal rate and regular rhythm.      Heart sounds: Normal heart sounds. No murmur heard.     No friction rub. No gallop.   Pulmonary:      Effort: Pulmonary effort is normal. No respiratory distress.      Breath sounds: Normal breath sounds. No wheezing.   Abdominal:      General: Bowel sounds are normal. There is no distension.      Palpations: Abdomen is soft.      Tenderness: There is no abdominal tenderness.   Musculoskeletal:         General: No swelling or tenderness. Normal range of motion.   Skin:     General: Skin is warm and dry.   Neurological:      Mental Status: He is alert and oriented to person, place, and time.   Psychiatric:         Mood and Affect: Mood normal.         Behavior: Behavior normal.                        Assessment & Plan       1. SCLC (small cell lung carcinoma) ([facility]) [C34.90]  CT-CHEST,ABDOMEN,PELVIS WITH              1. Patient with stage IIIa, [id number], small cell lung cancer currently on immunotherapy pembrolizumab, he had a cycle 2 on 7/7/2024.  He complains of " significant fatigue but clinically he is doing well overall.  I discussed with him regarding further management and care.  Patient had a immunotherapy of HLX10 from September 2023 to April 2024 followed by the progression.  The chance of him responding to Keytruda is very low.  His MRI and CT scan is scheduled on August 28, 2024.  I told him that his chance of responding to current immunotherapy is a very low since he failed immunotherapy already.  He clearly understands his prognosis and he does not want to have chemotherapy for sure.  I gave him option of CT scan follow-up as soon as possible.  He wants to have a CT scan as soon as possible to see where we are.  If there is progression, he may choose a comfort care/hospice care in the future    2.  Patient will follow-up in the clinic after CT scan of chest abdomen and pelvis to discuss further care.      Please note that this dictation was created using voice recognition software. I have made every reasonable attempt to correct obvious errors, but I expect that there are errors of grammar and possibly content that I did not discover before finalizing the note.

## 2024-08-12 ENCOUNTER — HOSPITAL ENCOUNTER (OUTPATIENT)
Dept: RADIOLOGY | Facility: MEDICAL CENTER | Age: 74
End: 2024-08-12
Attending: INTERNAL MEDICINE
Payer: COMMERCIAL

## 2024-08-12 DIAGNOSIS — C34.90 SCLC (SMALL CELL LUNG CARCINOMA) (HCC): ICD-10-CM

## 2024-08-12 PROCEDURE — 71260 CT THORAX DX C+: CPT

## 2024-08-12 PROCEDURE — 700117 HCHG RX CONTRAST REV CODE 255: Performed by: INTERNAL MEDICINE

## 2024-08-12 RX ADMIN — IOHEXOL 90 ML: 350 INJECTION, SOLUTION INTRAVENOUS at 18:34

## 2024-08-15 ENCOUNTER — HOSPITAL ENCOUNTER (OUTPATIENT)
Dept: HEMATOLOGY ONCOLOGY | Facility: MEDICAL CENTER | Age: 74
End: 2024-08-15
Attending: INTERNAL MEDICINE
Payer: MEDICARE

## 2024-08-15 DIAGNOSIS — C34.90 SCLC (SMALL CELL LUNG CARCINOMA) (HCC): ICD-10-CM

## 2024-08-15 ASSESSMENT — ENCOUNTER SYMPTOMS
PALPITATIONS: 0
DIZZINESS: 1
NAUSEA: 0
CHILLS: 0
FEVER: 0
COUGH: 1
CONSTIPATION: 0
MYALGIAS: 0
VOMITING: 0
SHORTNESS OF BREATH: 0
HEADACHES: 0
DIARRHEA: 0
WEIGHT LOSS: 1

## 2024-08-15 NOTE — PROGRESS NOTES
"This evaluation was conducted via Teams using secure and encrypted videoconferencing technology. The patient was in their home in the [facility].    The patient's identity was confirmed and verbal consent was obtained for this virtual visit.       PROGRESS NOTE : [facility].     8/15/2024    Subjective     Edenilson Ramsey is a 74 y.o. male who presents with Lung Cancer (Follow up )          HPI    Patient seen today for evaluation prior to cycle 3 of cancer therapy employing Pembrolizumab for small cell lung cancer, for continued monitoring of symptoms and side effects of cancer treatments.     Oncology history of presenting illness:    Per Freya Prince, APRN:  \"Patient with remote history of agent orange exposure in [place] ([id number]). He was in his usual state of health until 2/2023 when he had difficulty breathing, \"felt like he was being strangled.\" [facility] and he underwent cholecystectomy. Imaging completed at that time showed lung nodules. Biopsy was reportedly attempted on lung nodules in approximately March or April 2023 with results inconclusive. PET scan completed 6/6/23 showed hypermetabolic nodule in superior segment of left lower lobe measuring 2.3 cm consistent with malignancy, enlarged left superior hilar lymph node noted, no evidence of metastatic disease in abdomen or pelvis. Biopsy per bronchoscopy was completed 8/29/23 with pathology confirming malignancy. He initiated cisplatin, etoposide, [id number] vs placebo on 9/18/23; concurrent radiation (29 of 33 fractions) occurred between 10/9-11/21/23 but was not fully completed due to hospitalization for COVID pneumonia; Cycle 4 Cis, etoposide, HLX was completed 12/4/23 and he transitioned to maintenance HLX vs placebo with cycle 5 on 1/6/24.     Patient found to have progression of disease on CT CAP on 5/8/2024.  He was transition to single agent pembrolizumab with his first dose on " 5/19/2024 and C 2 on 6/29/2024.     Treatment history:  09/18/23: C1 Cis/Etop/HLX10 vs Placebo  10/09/23: C2 Cis/Etop/HLX vs Placebo + RT  10/28/23: C3 Cis/Etop/HLX vs Placebo + RT (treatment deferred 1 week for neutropenia)  11/06/23  C3 Cis/Etop/HLX vs Placebo + RT  12/04/23: C4 Cis/Etop/HLX  01/06/24: C5 HLX vs placebo  01/26/24: C6 HLX vs placebo  02/19/24: C7 HLX vs placebo  03/10/24: C8 HLX vs placebo  04/18/24: C9 HLX vs placebo     05/19/24: C1 Pembrolizumab   07/07/24: C2 Pembrolizumab     Interval history:  Patient overall is doing well except significant fatigue.  He has noticed slightly worsening cough since in the last 2 months or so.  He also has noticed limited dizziness at times.  His blood pressure is low.  He is drinking quite a bit of water he states.  He did state that he is eating well but is lost approximately 4 pounds.  He did state that he has significantly more fatigue with treatment.        No Known Allergies  Current Outpatient Medications on File Prior to Encounter   Medication Sig Dispense Refill    atorvastatin (LIPITOR) 20 MG Tab Take 20 mg by mouth every day.      terazosin (HYTRIN) 2 MG Cap Take 2 mg by mouth at bedtime.      Magnesium Glycinate 100 MG Cap Take 1 Capsule by mouth every day with lunch. 420 mg daily      metFORMIN (GLUCOPHAGE) 500 MG Tab Take 500 mg by mouth 2 times a day with meals. 500 mg BID      baclofen (LIORESAL) 10 MG Tab Take 10 mg by mouth at bedtime as needed. Indications: Muscle Spasm      Cyanocobalamin (VITAMIN B-12) 1000 MCG Tab Take 1,000 mcg by mouth every day.      fluticasone-salmeterol (ADVAIR) 250-50 MCG/ACT AEROSOL POWDER, BREATH ACTIVATED Inhale 1 Puff 2 times a day. Indications: Asthma      Omega-3 Fatty Acids (FISH OIL) 1000 MG Cap capsule Take 1,000 mg by mouth every day.      vitamin D3 (CHOLECALCIFEROL) 1000 Unit (25 mcg) Tab Take 1,000 Units by mouth every day.      gabapentin (NEURONTIN) 300 MG Cap Take 600 mg by mouth 2 times a day.       aspirin EC (ECOTRIN) 81 MG Tablet Delayed Response Take 81 mg by mouth every day.       No current facility-administered medications on file prior to encounter.       Review of Systems   Constitutional:  Positive for malaise/fatigue and weight loss. Negative for chills and fever.   Respiratory:  Positive for cough. Negative for shortness of breath.    Cardiovascular:  Negative for chest pain and palpitations.   Gastrointestinal:  Negative for constipation, diarrhea, nausea and vomiting.   Genitourinary:  Negative for dysuria.   Musculoskeletal:  Negative for myalgias.   Skin:  Positive for itching (left forearm itching at times). Negative for rash.   Neurological:  Positive for dizziness. Negative for headaches.              Objective     There were no vitals taken for this visit.     NO PE               Assessment & Plan       1. SCLC (small cell lung carcinoma) (H[facility])  Referral to Hospice              1. Patient with metastatic small cell lung cancer currently on immunotherapy of pembrolizumab, he had a cycle 2 on 6/29/2024.  He complains of significant fatigue but clinically he is doing well overall.  Per patient's request, I did follow CT scan of chest abdomen and pelvis which showed definite evidence of progression especially in the left pleural metastasis. He clearly understands his prognosis and he does not want to have chemotherapy for sure.  His every his life expectancy will be around 4 months. I discussed with him regarding further management and care.  Since he does not want to have a chemotherapy, I gave him option of comfort care and hospice care and he agreed for hospice care.  I explained to him in detail regarding benefit of HOSPICE thoroughly.  I will refer this patient to [facility] for continuous care    Please note that this dictation was created using voice recognition software. I have made every reasonable attempt to correct obvious errors, but I expect that there are errors of grammar and  possibly content that I did not discover before finalizing the note.

## 2024-08-16 ENCOUNTER — HOSPICE ADMISSION (OUTPATIENT)
Dept: HOSPICE | Facility: HOSPICE | Age: 74
End: 2024-08-16
Payer: MEDICARE

## 2024-08-18 ENCOUNTER — APPOINTMENT (OUTPATIENT)
Dept: ONCOLOGY | Facility: MEDICAL CENTER | Age: 74
End: 2024-08-18
Attending: STUDENT IN AN ORGANIZED HEALTH CARE EDUCATION/TRAINING PROGRAM
Payer: MEDICARE

## 2024-08-19 ENCOUNTER — HOME CARE VISIT (OUTPATIENT)
Dept: HOSPICE | Facility: HOSPICE | Age: 74
End: 2024-08-19
Payer: MEDICARE

## 2024-08-20 ENCOUNTER — HOME CARE VISIT (OUTPATIENT)
Dept: HOSPICE | Facility: HOSPICE | Age: 74
End: 2024-08-20
Payer: MEDICARE

## 2024-08-22 ENCOUNTER — TELEPHONE (OUTPATIENT)
Dept: HEMATOLOGY ONCOLOGY | Facility: MEDICAL CENTER | Age: 74
End: 2024-08-22
Payer: MEDICARE

## 2024-08-22 NOTE — TELEPHONE ENCOUNTER
Called and spoke to patient to see if he had met with hospice care . Patient stated that he had met with them a few days ago and everything is going well. I informed patient that there was an appointment that needed cancelling and I would be removing that from his my chart. Patient was in agreement and I encouraged patient that if he needed anything he can always reach out to the office. Patient verbalized understanding

## 2024-08-29 ENCOUNTER — HOME CARE VISIT (OUTPATIENT)
Dept: HOSPICE | Facility: HOSPICE | Age: 74
End: 2024-08-29
Payer: MEDICARE

## 2024-08-29 ENCOUNTER — PHARMACY VISIT (OUTPATIENT)
Dept: PHARMACY | Facility: MEDICAL CENTER | Age: 74
End: 2024-08-29
Payer: COMMERCIAL

## 2024-08-29 VITALS — RESPIRATION RATE: 14 BRPM

## 2024-08-29 DIAGNOSIS — C34.90 SMALL CELL LUNG CANCER (HCC): ICD-10-CM

## 2024-08-29 PROCEDURE — S9126 HOSPICE CARE, IN THE HOME, P: HCPCS

## 2024-08-29 PROCEDURE — G0299 HHS/HOSPICE OF RN EA 15 MIN: HCPCS

## 2024-08-29 PROCEDURE — RXMED WILLOW AMBULATORY MEDICATION CHARGE: Performed by: REHABILITATION PRACTITIONER

## 2024-08-29 PROCEDURE — 665036 HSPC NOTICE OF ELECTION NOE

## 2024-08-29 RX ORDER — TERAZOSIN 2 MG/1
2 CAPSULE ORAL
Qty: 30 CAPSULE | Refills: 11 | Status: SHIPPED | OUTPATIENT
Start: 2024-08-29 | End: 2025-08-29

## 2024-08-29 RX ORDER — BISACODYL 10 MG
10 SUPPOSITORY, RECTAL RECTAL PRN
Qty: 5 SUPPOSITORY | Refills: 10 | Status: SHIPPED | OUTPATIENT
Start: 2024-08-29 | End: 2025-08-29

## 2024-08-29 RX ORDER — ACETAMINOPHEN 500 MG
1000 TABLET ORAL EVERY 6 HOURS PRN
Qty: 30 TABLET | Refills: 10 | Status: SHIPPED | OUTPATIENT
Start: 2024-08-29 | End: 2025-08-29

## 2024-08-29 RX ORDER — GABAPENTIN 300 MG/1
600 CAPSULE ORAL 2 TIMES DAILY
Qty: 120 CAPSULE | Refills: 11 | Status: SHIPPED | OUTPATIENT
Start: 2024-08-29 | End: 2025-08-29

## 2024-08-29 RX ORDER — FLUTICASONE PROPIONATE AND SALMETEROL 250; 50 UG/1; UG/1
1 POWDER RESPIRATORY (INHALATION) 2 TIMES DAILY
Qty: 60 EACH | Refills: 11 | Status: SHIPPED | OUTPATIENT
Start: 2024-08-29 | End: 2025-08-29

## 2024-08-29 RX ORDER — LORAZEPAM 2 MG/ML
1-2 CONCENTRATE ORAL
Qty: 360 ML | Refills: 0 | Status: SHIPPED | OUTPATIENT
Start: 2024-08-29 | End: 2025-08-29

## 2024-08-29 RX ORDER — ONDANSETRON 4 MG/1
4 TABLET, ORALLY DISINTEGRATING ORAL EVERY 6 HOURS PRN
Qty: 15 TABLET | Refills: 10 | Status: SHIPPED | OUTPATIENT
Start: 2024-08-29 | End: 2025-08-29

## 2024-08-29 RX ORDER — FLUTICASONE PROPIONATE 50 MCG
1 SPRAY, SUSPENSION (ML) NASAL DAILY
Qty: 16 G | Refills: 11 | Status: SHIPPED | OUTPATIENT
Start: 2024-08-29 | End: 2025-08-29

## 2024-08-29 RX ORDER — SENNA AND DOCUSATE SODIUM 50; 8.6 MG/1; MG/1
2 TABLET, FILM COATED ORAL 2 TIMES DAILY PRN
Qty: 28 TABLET | Refills: 10 | Status: SHIPPED | OUTPATIENT
Start: 2024-08-29 | End: 2025-08-29

## 2024-08-29 RX ORDER — MORPHINE SULFATE 100 MG/5ML
5-10 SOLUTION ORAL
Qty: 240 ML | Refills: 0 | Status: SHIPPED | OUTPATIENT
Start: 2024-08-29 | End: 2025-08-29

## 2024-08-29 RX ORDER — ACETAMINOPHEN 650 MG/1
650 SUPPOSITORY RECTAL EVERY 6 HOURS PRN
Qty: 5 SUPPOSITORY | Refills: 10 | Status: SHIPPED | OUTPATIENT
Start: 2024-08-29 | End: 2025-08-29

## 2024-08-29 RX ORDER — ASPIRIN 81 MG/1
81 TABLET ORAL DAILY
Qty: 30 TABLET | Refills: 11 | Status: SHIPPED | OUTPATIENT
Start: 2024-08-29 | End: 2025-08-29

## 2024-08-29 RX ORDER — OLOPATADINE HYDROCHLORIDE 1 MG/ML
1 SOLUTION/ DROPS OPHTHALMIC 2 TIMES DAILY PRN
Qty: 5 ML | Refills: 11 | Status: SHIPPED | OUTPATIENT
Start: 2024-08-29 | End: 2025-08-29

## 2024-08-29 RX ORDER — BACLOFEN 10 MG/1
10 TABLET ORAL NIGHTLY PRN
Qty: 30 TABLET | Refills: 11 | Status: SHIPPED | OUTPATIENT
Start: 2024-08-29 | End: 2025-08-29

## 2024-08-29 SDOH — ECONOMIC STABILITY: GENERAL

## 2024-08-29 ASSESSMENT — ENCOUNTER SYMPTOMS
PERSON REPORTING PAIN: PATIENT
MUSCLE WEAKNESS: 1
DRY SKIN: 1
FATIGUES EASILY: 1
SLEEP QUALITY: POOR
LAST BOWEL MOVEMENT: 67080
DENIES PAIN: 1
STOOL FREQUENCY: LESS THAN DAILY
BOWEL PATTERN NORMAL: 1
FATIGUE: 1

## 2024-08-29 ASSESSMENT — COPD QUESTIONNAIRES: COPD: 1

## 2024-08-29 ASSESSMENT — PATIENT HEALTH QUESTIONNAIRE - PHQ9: CLINICAL INTERPRETATION OF PHQ2 SCORE: 0

## 2024-08-29 ASSESSMENT — SOCIAL DETERMINANTS OF HEALTH (SDOH): ACTIVE STRESSOR - HEALTH CHANGES: 1

## 2024-08-29 ASSESSMENT — ACTIVITIES OF DAILY LIVING (ADL)
MONEY MANAGEMENT (EXPENSES/BILLS): NEEDS ASSISTANCE
AMBULATION ASSISTANCE: STAND BY ASSIST
CURRENT_FUNCTION: ONE PERSON

## 2024-08-30 ENCOUNTER — HOME CARE VISIT (OUTPATIENT)
Dept: HOSPICE | Facility: HOSPICE | Age: 74
End: 2024-08-30
Payer: MEDICARE

## 2024-08-30 ENCOUNTER — PHARMACY VISIT (OUTPATIENT)
Dept: PHARMACY | Facility: MEDICAL CENTER | Age: 74
End: 2024-08-30
Payer: COMMERCIAL

## 2024-08-30 VITALS — HEART RATE: 60 BPM | RESPIRATION RATE: 16 BRPM | DIASTOLIC BLOOD PRESSURE: 60 MMHG | SYSTOLIC BLOOD PRESSURE: 120 MMHG

## 2024-08-30 PROCEDURE — RXMED WILLOW AMBULATORY MEDICATION CHARGE: Performed by: STUDENT IN AN ORGANIZED HEALTH CARE EDUCATION/TRAINING PROGRAM

## 2024-08-30 PROCEDURE — G0155 HHCP-SVS OF CSW,EA 15 MIN: HCPCS

## 2024-08-30 PROCEDURE — G0299 HHS/HOSPICE OF RN EA 15 MIN: HCPCS

## 2024-08-30 PROCEDURE — S9126 HOSPICE CARE, IN THE HOME, P: HCPCS

## 2024-08-30 ASSESSMENT — ENCOUNTER SYMPTOMS
LAST BOWEL MOVEMENT: 67080
PERSON REPORTING PAIN: PATIENT
FATIGUE: 1
DRY SKIN: 1
COUGH: 1
STOOL FREQUENCY: LESS THAN DAILY
BOWEL PATTERN NORMAL: 1
COUGH CHARACTERISTICS: PRODUCTIVE
SLEEP QUALITY: ADEQUATE
SLEEP QUALITY: ADEQUATE
DENIES PAIN: 1
FATIGUES EASILY: 1
FORGETFULNESS: 1
MUSCLE WEAKNESS: 1

## 2024-08-30 ASSESSMENT — SOCIAL DETERMINANTS OF HEALTH (SDOH)
ACTIVE STRESSOR - HEALTH CHANGES: 1
NEEDS HELP WITH INCOME CONCERNS: N
IS CONCERNED ABOUT INCOME: N
ACTIVE STRESSOR - HEALTH CHANGES: 1
HAS ADEQUATE INCOME: Y

## 2024-08-30 ASSESSMENT — ACTIVITIES OF DAILY LIVING (ADL)
LAUNDRY_REQUIRES_ASSISTANCE: 1
SHOPPING_REQUIRES_ASSISTANCE: 1

## 2024-08-30 ASSESSMENT — PATIENT HEALTH QUESTIONNAIRE - PHQ9: CLINICAL INTERPRETATION OF PHQ2 SCORE: 0

## 2024-08-31 PROCEDURE — S9126 HOSPICE CARE, IN THE HOME, P: HCPCS

## 2024-09-01 PROCEDURE — S9126 HOSPICE CARE, IN THE HOME, P: HCPCS

## 2024-09-02 ENCOUNTER — HOME CARE VISIT (OUTPATIENT)
Dept: HOSPICE | Facility: HOSPICE | Age: 74
End: 2024-09-02
Payer: MEDICARE

## 2024-09-02 PROCEDURE — S9126 HOSPICE CARE, IN THE HOME, P: HCPCS

## 2024-09-03 ENCOUNTER — APPOINTMENT (OUTPATIENT)
Dept: HEMATOLOGY ONCOLOGY | Facility: MEDICAL CENTER | Age: 74
End: 2024-09-03
Payer: COMMERCIAL

## 2024-09-03 PROCEDURE — S9126 HOSPICE CARE, IN THE HOME, P: HCPCS

## 2024-09-04 ENCOUNTER — HOME CARE VISIT (OUTPATIENT)
Dept: HOSPICE | Facility: HOSPICE | Age: 74
End: 2024-09-04
Payer: MEDICARE

## 2024-09-04 VITALS — RESPIRATION RATE: 16 BRPM | DIASTOLIC BLOOD PRESSURE: 62 MMHG | SYSTOLIC BLOOD PRESSURE: 104 MMHG

## 2024-09-04 PROCEDURE — S9126 HOSPICE CARE, IN THE HOME, P: HCPCS

## 2024-09-04 PROCEDURE — G0299 HHS/HOSPICE OF RN EA 15 MIN: HCPCS

## 2024-09-04 ASSESSMENT — ENCOUNTER SYMPTOMS
MUSCLE WEAKNESS: 1
BOWEL PATTERN NORMAL: 1
DYSPNEA ACTIVITY LEVEL: AFTER AMBULATING LESS THAN 10 FT
LAST BOWEL MOVEMENT: 67087
STOOL FREQUENCY: LESS THAN DAILY
FATIGUE: 1
FATIGUES EASILY: 1
SLEEP QUALITY: ADEQUATE
DYSPNEA ON EXERTION: 1
SHORTNESS OF BREATH: 1
DENIES PAIN: 1

## 2024-09-04 ASSESSMENT — SOCIAL DETERMINANTS OF HEALTH (SDOH): ACTIVE STRESSOR - NO STRESS FACTORS: 1

## 2024-09-05 PROCEDURE — S9126 HOSPICE CARE, IN THE HOME, P: HCPCS

## 2024-09-06 PROCEDURE — S9126 HOSPICE CARE, IN THE HOME, P: HCPCS

## 2024-09-07 ENCOUNTER — HOME CARE VISIT (OUTPATIENT)
Dept: HOSPICE | Facility: HOSPICE | Age: 74
End: 2024-09-07
Payer: MEDICARE

## 2024-09-07 PROCEDURE — S9126 HOSPICE CARE, IN THE HOME, P: HCPCS

## 2024-09-08 PROCEDURE — S9126 HOSPICE CARE, IN THE HOME, P: HCPCS

## 2024-09-08 NOTE — CASE COMMUNICATION
Called [facility] re: [facility]/evacuation. Related they are prepared to self evacuate and will take medications with them. No hospice needs at this time.

## 2024-09-09 ENCOUNTER — HOME CARE VISIT (OUTPATIENT)
Dept: HOSPICE | Facility: HOSPICE | Age: 74
End: 2024-09-09
Payer: MEDICARE

## 2024-09-09 PROCEDURE — S9126 HOSPICE CARE, IN THE HOME, P: HCPCS

## 2024-09-10 ENCOUNTER — HOME CARE VISIT (OUTPATIENT)
Dept: HOSPICE | Facility: HOSPICE | Age: 74
End: 2024-09-10
Payer: MEDICARE

## 2024-09-10 PROCEDURE — S9126 HOSPICE CARE, IN THE HOME, P: HCPCS

## 2024-09-11 ENCOUNTER — HOME CARE VISIT (OUTPATIENT)
Dept: HOSPICE | Facility: HOSPICE | Age: 74
End: 2024-09-11
Payer: MEDICARE

## 2024-09-11 PROCEDURE — S9126 HOSPICE CARE, IN THE HOME, P: HCPCS

## 2024-09-12 PROCEDURE — S9126 HOSPICE CARE, IN THE HOME, P: HCPCS

## 2024-09-13 PROCEDURE — S9126 HOSPICE CARE, IN THE HOME, P: HCPCS

## 2024-09-14 PROCEDURE — S9126 HOSPICE CARE, IN THE HOME, P: HCPCS

## 2024-09-15 PROCEDURE — S9126 HOSPICE CARE, IN THE HOME, P: HCPCS

## 2024-09-16 ENCOUNTER — HOME CARE VISIT (OUTPATIENT)
Dept: HOSPICE | Facility: HOSPICE | Age: 74
End: 2024-09-16
Payer: MEDICARE

## 2024-09-16 PROCEDURE — S9126 HOSPICE CARE, IN THE HOME, P: HCPCS

## 2024-09-17 ENCOUNTER — HOME CARE VISIT (OUTPATIENT)
Dept: HOSPICE | Facility: HOSPICE | Age: 74
End: 2024-09-17
Payer: MEDICARE

## 2024-09-17 PROCEDURE — G0299 HHS/HOSPICE OF RN EA 15 MIN: HCPCS

## 2024-09-17 PROCEDURE — S9126 HOSPICE CARE, IN THE HOME, P: HCPCS

## 2024-09-17 ASSESSMENT — ENCOUNTER SYMPTOMS
FATIGUE: 1
BOWEL PATTERN NORMAL: 1
MUSCLE WEAKNESS: 1
PERSON REPORTING PAIN: PATIENT
LAST BOWEL MOVEMENT: 67099
DYSPNEA ON EXERTION: 1
SHORTNESS OF BREATH: 1
DENIES PAIN: 1
FATIGUES EASILY: 1
COUGH CHARACTERISTICS: MOIST
DYSPNEA ACTIVITY LEVEL: AFTER AMBULATING 10 - 20 FT
STOOL FREQUENCY: DAILY
SLEEP QUALITY: FAIR
COUGH: 1

## 2024-09-18 ENCOUNTER — HOME CARE VISIT (OUTPATIENT)
Dept: HOSPICE | Facility: HOSPICE | Age: 74
End: 2024-09-18
Payer: MEDICARE

## 2024-09-18 DIAGNOSIS — C34.90 SCLC (SMALL CELL LUNG CARCINOMA) (HCC): Primary | ICD-10-CM

## 2024-09-18 DIAGNOSIS — Z51.5 HOSPICE CARE: ICD-10-CM

## 2024-09-18 PROCEDURE — S9126 HOSPICE CARE, IN THE HOME, P: HCPCS

## 2024-09-18 RX ORDER — METHYLPHENIDATE HYDROCHLORIDE 5 MG/1
5 TABLET ORAL 2 TIMES DAILY
Qty: 180 TABLET | Refills: 0 | Status: SHIPPED | OUTPATIENT
Start: 2024-09-18 | End: 2024-09-24 | Stop reason: SDUPTHER

## 2024-09-19 PROCEDURE — S9126 HOSPICE CARE, IN THE HOME, P: HCPCS

## 2024-09-20 PROCEDURE — S9126 HOSPICE CARE, IN THE HOME, P: HCPCS

## 2024-09-21 PROCEDURE — S9126 HOSPICE CARE, IN THE HOME, P: HCPCS

## 2024-09-22 ENCOUNTER — APPOINTMENT (OUTPATIENT)
Dept: ONCOLOGY | Facility: MEDICAL CENTER | Age: 74
End: 2024-09-22
Attending: STUDENT IN AN ORGANIZED HEALTH CARE EDUCATION/TRAINING PROGRAM
Payer: COMMERCIAL

## 2024-09-22 PROCEDURE — S9126 HOSPICE CARE, IN THE HOME, P: HCPCS

## 2024-09-23 PROCEDURE — S9126 HOSPICE CARE, IN THE HOME, P: HCPCS

## 2024-09-24 ENCOUNTER — HOME CARE VISIT (OUTPATIENT)
Dept: HOSPICE | Facility: HOSPICE | Age: 74
End: 2024-09-24
Payer: MEDICARE

## 2024-09-24 ENCOUNTER — PHARMACY VISIT (OUTPATIENT)
Dept: PHARMACY | Facility: MEDICAL CENTER | Age: 74
End: 2024-09-24
Payer: COMMERCIAL

## 2024-09-24 DIAGNOSIS — Z51.5 HOSPICE CARE: ICD-10-CM

## 2024-09-24 DIAGNOSIS — C34.90 SCLC (SMALL CELL LUNG CARCINOMA) (HCC): ICD-10-CM

## 2024-09-24 PROCEDURE — S9126 HOSPICE CARE, IN THE HOME, P: HCPCS

## 2024-09-24 PROCEDURE — RXMED WILLOW AMBULATORY MEDICATION CHARGE: Performed by: REHABILITATION PRACTITIONER

## 2024-09-24 PROCEDURE — G0299 HHS/HOSPICE OF RN EA 15 MIN: HCPCS

## 2024-09-24 PROCEDURE — RXMED WILLOW AMBULATORY MEDICATION CHARGE: Performed by: STUDENT IN AN ORGANIZED HEALTH CARE EDUCATION/TRAINING PROGRAM

## 2024-09-24 RX ORDER — METHYLPHENIDATE HYDROCHLORIDE 5 MG/1
5 TABLET ORAL 2 TIMES DAILY
Qty: 111 TABLET | Refills: 0 | Status: SHIPPED | OUTPATIENT
Start: 2024-09-24 | End: 2025-09-24

## 2024-09-24 ASSESSMENT — ENCOUNTER SYMPTOMS
COUGH CHARACTERISTICS: MOIST
BOWEL PATTERN NORMAL: 1
MUSCLE WEAKNESS: 1
PERSON REPORTING PAIN: PATIENT
FORGETFULNESS: 1
DYSPNEA ON EXERTION: 1
FATIGUES EASILY: 1
COUGH: 1
SHORTNESS OF BREATH: 1
STOOL FREQUENCY: DAILY
DENIES PAIN: 1
LAST BOWEL MOVEMENT: 67106
DYSPNEA ACTIVITY LEVEL: AFTER AMBULATING 10 - 20 FT

## 2024-09-25 ENCOUNTER — HOME CARE VISIT (OUTPATIENT)
Dept: HOSPICE | Facility: HOSPICE | Age: 74
End: 2024-09-25
Payer: MEDICARE

## 2024-09-25 ENCOUNTER — PHARMACY VISIT (OUTPATIENT)
Dept: PHARMACY | Facility: MEDICAL CENTER | Age: 74
End: 2024-09-25

## 2024-09-25 PROCEDURE — RXOTC WILLOW AMBULATORY OTC CHARGE

## 2024-09-25 PROCEDURE — G0155 HHCP-SVS OF CSW,EA 15 MIN: HCPCS

## 2024-09-25 PROCEDURE — S9126 HOSPICE CARE, IN THE HOME, P: HCPCS

## 2024-09-25 ASSESSMENT — SOCIAL DETERMINANTS OF HEALTH (SDOH)
ACTIVE STRESSOR - HEALTH CHANGES: 1
ACTIVE STRESSOR - EXHAUSTION: 1
ACTIVE STRESSOR - EXPRESSED EMOTIONAL NEED: 1

## 2024-09-25 ASSESSMENT — ENCOUNTER SYMPTOMS: SLEEP QUALITY: ADEQUATE

## 2024-09-26 ENCOUNTER — HOME CARE VISIT (OUTPATIENT)
Dept: HOSPICE | Facility: HOSPICE | Age: 74
End: 2024-09-26
Payer: MEDICARE

## 2024-09-26 PROCEDURE — S9126 HOSPICE CARE, IN THE HOME, P: HCPCS

## 2024-09-26 PROCEDURE — G0155 HHCP-SVS OF CSW,EA 15 MIN: HCPCS

## 2024-09-26 ASSESSMENT — ENCOUNTER SYMPTOMS: SLEEP QUALITY: ADEQUATE

## 2024-09-26 ASSESSMENT — SOCIAL DETERMINANTS OF HEALTH (SDOH): ACTIVE STRESSOR - NO STRESS FACTORS: 1

## 2024-09-27 PROCEDURE — S9126 HOSPICE CARE, IN THE HOME, P: HCPCS

## 2024-09-28 PROCEDURE — S9126 HOSPICE CARE, IN THE HOME, P: HCPCS

## 2024-09-29 PROCEDURE — S9126 HOSPICE CARE, IN THE HOME, P: HCPCS

## 2024-09-30 PROCEDURE — S9126 HOSPICE CARE, IN THE HOME, P: HCPCS

## 2024-10-01 ENCOUNTER — PHARMACY VISIT (OUTPATIENT)
Dept: PHARMACY | Facility: MEDICAL CENTER | Age: 74
End: 2024-10-01
Payer: COMMERCIAL

## 2024-10-01 ENCOUNTER — HOME CARE VISIT (OUTPATIENT)
Dept: HOSPICE | Facility: HOSPICE | Age: 74
End: 2024-10-01
Payer: MEDICARE

## 2024-10-01 PROCEDURE — RXMED WILLOW AMBULATORY MEDICATION CHARGE: Performed by: REHABILITATION PRACTITIONER

## 2024-10-01 PROCEDURE — G0299 HHS/HOSPICE OF RN EA 15 MIN: HCPCS

## 2024-10-01 RX ORDER — NYSTATIN 100000 U/G
1 CREAM TOPICAL 2 TIMES DAILY
Qty: 30 G | Refills: 11 | Status: SHIPPED | OUTPATIENT
Start: 2024-10-01 | End: 2024-10-09

## 2024-10-01 SDOH — ECONOMIC STABILITY: GENERAL

## 2024-10-01 ASSESSMENT — ENCOUNTER SYMPTOMS
DYSPNEA ON EXERTION: 1
SLEEP QUALITY: ADEQUATE
DENIES PAIN: 1
COUGH: 1
BOWEL PATTERN NORMAL: 1
COUGH CHARACTERISTICS: MOIST
FATIGUES EASILY: 1
STOOL FREQUENCY: DAILY
LAST BOWEL MOVEMENT: 67113
DYSPNEA ACTIVITY LEVEL: AFTER AMBULATING 10 - 20 FT
SHORTNESS OF BREATH: 1
MUSCLE WEAKNESS: 1

## 2024-10-01 ASSESSMENT — ACTIVITIES OF DAILY LIVING (ADL): MONEY MANAGEMENT (EXPENSES/BILLS): NEEDS ASSISTANCE

## 2024-10-02 ENCOUNTER — HOME CARE VISIT (OUTPATIENT)
Dept: HOSPICE | Facility: HOSPICE | Age: 74
End: 2024-10-02
Payer: MEDICARE

## 2024-10-08 ENCOUNTER — HOME CARE VISIT (OUTPATIENT)
Dept: HOSPICE | Facility: HOSPICE | Age: 74
End: 2024-10-08
Payer: MEDICARE

## 2024-10-08 VITALS — BODY MASS INDEX: 27.88 KG/M2 | HEART RATE: 68 BPM | WEIGHT: 178 LBS

## 2024-10-08 DIAGNOSIS — Z51.5 HOSPICE CARE: ICD-10-CM

## 2024-10-08 DIAGNOSIS — C34.90 SCLC (SMALL CELL LUNG CARCINOMA) (HCC): ICD-10-CM

## 2024-10-08 PROCEDURE — G0299 HHS/HOSPICE OF RN EA 15 MIN: HCPCS

## 2024-10-08 RX ORDER — METHYLPHENIDATE HYDROCHLORIDE 5 MG/1
TABLET ORAL
Qty: 200 TABLET | Refills: 0 | Status: SHIPPED | OUTPATIENT
Start: 2024-10-08 | End: 2025-10-08

## 2024-10-08 ASSESSMENT — ENCOUNTER SYMPTOMS
SHORTNESS OF BREATH: 1
DYSPNEA ACTIVITY LEVEL: AFTER AMBULATING MORE THAN 20 FT
FATIGUES EASILY: 1
DENIES PAIN: 1
LIMITED RANGE OF MOTION: 1
LAST BOWEL MOVEMENT: 67120
MUSCLE WEAKNESS: 1
COUGH CHARACTERISTICS: DRY
COUGH: 1

## 2024-10-08 ASSESSMENT — FIBROSIS 4 INDEX: FIB4 SCORE: 2.41

## 2024-10-09 ENCOUNTER — HOME CARE VISIT (OUTPATIENT)
Dept: HOSPICE | Facility: HOSPICE | Age: 74
End: 2024-10-09
Payer: MEDICARE

## 2024-10-17 ENCOUNTER — PHARMACY VISIT (OUTPATIENT)
Dept: PHARMACY | Facility: MEDICAL CENTER | Age: 74
End: 2024-10-17
Payer: COMMERCIAL

## 2024-10-17 PROCEDURE — RXMED WILLOW AMBULATORY MEDICATION CHARGE: Performed by: REHABILITATION PRACTITIONER

## 2024-10-18 ENCOUNTER — HOME CARE VISIT (OUTPATIENT)
Dept: HOSPICE | Facility: HOSPICE | Age: 74
End: 2024-10-18
Payer: MEDICARE

## 2024-10-18 PROCEDURE — G0299 HHS/HOSPICE OF RN EA 15 MIN: HCPCS

## 2024-10-18 ASSESSMENT — ENCOUNTER SYMPTOMS
LAST BOWEL MOVEMENT: 67131
HIGHEST PAIN SEVERITY IN PAST 24 HOURS: 3/10
FATIGUES EASILY: 1
DENIES PAIN: 1
MUSCLE WEAKNESS: 1
LOWEST PAIN SEVERITY IN PAST 24 HOURS: 0/10
BOWEL PATTERN NORMAL: 1
STOOL FREQUENCY: DAILY
PAIN SEVERITY GOAL: 0/10
SLEEP QUALITY: ADEQUATE

## 2024-10-18 ASSESSMENT — SOCIAL DETERMINANTS OF HEALTH (SDOH): ACTIVE STRESSOR - NO STRESS FACTORS: 1

## 2024-10-22 ENCOUNTER — HOME CARE VISIT (OUTPATIENT)
Dept: HOSPICE | Facility: HOSPICE | Age: 74
End: 2024-10-22
Payer: MEDICARE

## 2024-10-22 PROCEDURE — G0299 HHS/HOSPICE OF RN EA 15 MIN: HCPCS

## 2024-10-22 ASSESSMENT — ENCOUNTER SYMPTOMS
FATIGUES EASILY: 1
DIZZINESS: 1
LAST BOWEL MOVEMENT: 67134
MUSCLE WEAKNESS: 1
DENIES PAIN: 1

## 2024-10-24 ENCOUNTER — PHARMACY VISIT (OUTPATIENT)
Dept: PHARMACY | Facility: MEDICAL CENTER | Age: 74
End: 2024-10-24
Payer: COMMERCIAL

## 2024-10-24 PROCEDURE — RXMED WILLOW AMBULATORY MEDICATION CHARGE: Performed by: REHABILITATION PRACTITIONER

## 2024-10-29 ENCOUNTER — HOME CARE VISIT (OUTPATIENT)
Dept: HOSPICE | Facility: HOSPICE | Age: 74
End: 2024-10-29
Payer: MEDICARE

## 2024-10-29 ENCOUNTER — PHARMACY VISIT (OUTPATIENT)
Dept: PHARMACY | Facility: MEDICAL CENTER | Age: 74
End: 2024-10-29
Payer: COMMERCIAL

## 2024-10-29 PROCEDURE — G0299 HHS/HOSPICE OF RN EA 15 MIN: HCPCS

## 2024-10-29 PROCEDURE — RXMED WILLOW AMBULATORY MEDICATION CHARGE: Performed by: REHABILITATION PRACTITIONER

## 2024-10-29 ASSESSMENT — ENCOUNTER SYMPTOMS
SHORTNESS OF BREATH: 1
SLEEP QUALITY: ADEQUATE
FATIGUE: 1
MUSCLE WEAKNESS: 1
DENIES PAIN: 1
DIZZINESS: 1
FATIGUES EASILY: 1
DYSPNEA ACTIVITY LEVEL: AFTER AMBULATING MORE THAN 20 FT
LAST BOWEL MOVEMENT: 67141

## 2024-11-05 ENCOUNTER — HOME CARE VISIT (OUTPATIENT)
Dept: HOSPICE | Facility: HOSPICE | Age: 74
End: 2024-11-05
Payer: MEDICARE

## 2024-11-05 VITALS — BODY MASS INDEX: 28.19 KG/M2 | WEIGHT: 180 LBS

## 2024-11-05 PROCEDURE — G0299 HHS/HOSPICE OF RN EA 15 MIN: HCPCS

## 2024-11-05 PROCEDURE — RXMED WILLOW AMBULATORY MEDICATION CHARGE: Performed by: REHABILITATION PRACTITIONER

## 2024-11-05 SDOH — ECONOMIC STABILITY: GENERAL

## 2024-11-05 ASSESSMENT — ENCOUNTER SYMPTOMS
STOOL FREQUENCY: DAILY
SUBJECTIVE PAIN PROGRESSION: RESOLVED
BOWEL PATTERN NORMAL: 1
PAIN LOCATION - RELIEVING FACTORS: TYLENOL
LOWEST PAIN SEVERITY IN PAST 24 HOURS: 0/10
PAIN SEVERITY GOAL: 0/10
COUGH: 1
SLEEP QUALITY: ADEQUATE
LAST BOWEL MOVEMENT: 67149
MUSCLE WEAKNESS: 1
PAIN: 1
COUGH CHARACTERISTICS: DRY
HIGHEST PAIN SEVERITY IN PAST 24 HOURS: 4/10
FATIGUES EASILY: 1
PAIN LOCATION - PAIN SEVERITY: 0/10

## 2024-11-05 ASSESSMENT — FIBROSIS 4 INDEX: FIB4 SCORE: 2.41

## 2024-11-05 ASSESSMENT — ACTIVITIES OF DAILY LIVING (ADL): MONEY MANAGEMENT (EXPENSES/BILLS): NEEDS ASSISTANCE

## 2024-11-06 ENCOUNTER — PHARMACY VISIT (OUTPATIENT)
Dept: PHARMACY | Facility: MEDICAL CENTER | Age: 74
End: 2024-11-06
Payer: COMMERCIAL

## 2024-11-12 ENCOUNTER — HOME CARE VISIT (OUTPATIENT)
Dept: HOSPICE | Facility: HOSPICE | Age: 74
End: 2024-11-12
Payer: MEDICARE

## 2024-11-19 ENCOUNTER — HOME CARE VISIT (OUTPATIENT)
Dept: HOSPICE | Facility: HOSPICE | Age: 74
End: 2024-11-19
Payer: MEDICARE

## 2024-11-19 VITALS — RESPIRATION RATE: 16 BRPM | HEART RATE: 68 BPM

## 2024-11-19 PROCEDURE — G0299 HHS/HOSPICE OF RN EA 15 MIN: HCPCS

## 2024-11-19 ASSESSMENT — ENCOUNTER SYMPTOMS
PAIN LOCATION: LEFT SHOULDER BLADE
COUGH CHARACTERISTICS: DRY
SUBJECTIVE PAIN PROGRESSION: WAXING AND WANING
PAIN SEVERITY GOAL: 3/10
LAST BOWEL MOVEMENT: 67162
COUGH: 1
PAIN LOCATION - PAIN FREQUENCY: INFREQUENT
LIMITED RANGE OF MOTION: 1
SLEEP QUALITY: ADEQUATE
DRY SKIN: 1
MUSCLE WEAKNESS: 1
STOOL FREQUENCY: DAILY
PAIN: 1
DYSPNEA ACTIVITY LEVEL: AFTER AMBULATING MORE THAN 20 FT
PAIN LOCATION - RELIEVING FACTORS: MORPHINE
COUGH CHARACTERISTICS: NON-PRODUCTIVE
FATIGUE: 1
FATIGUES EASILY: 1
PAIN LOCATION - PAIN SEVERITY: 5/10
PAIN LOCATION - PAIN DURATION: HOURS
FORGETFULNESS: 1
SHORTNESS OF BREATH: 1
LOWEST PAIN SEVERITY IN PAST 24 HOURS: 0/10
HIGHEST PAIN SEVERITY IN PAST 24 HOURS: 5/10
PAIN LOCATION - PAIN QUALITY: SHARP
BOWEL PATTERN NORMAL: 1

## 2024-11-19 ASSESSMENT — SOCIAL DETERMINANTS OF HEALTH (SDOH): ACTIVE STRESSOR - NO STRESS FACTORS: 1

## 2024-11-20 ENCOUNTER — HOME CARE VISIT (OUTPATIENT)
Dept: HOSPICE | Facility: HOSPICE | Age: 74
End: 2024-11-20
Payer: MEDICARE

## 2024-11-21 PROCEDURE — RXMED WILLOW AMBULATORY MEDICATION CHARGE: Performed by: REHABILITATION PRACTITIONER

## 2024-11-22 ENCOUNTER — PHARMACY VISIT (OUTPATIENT)
Dept: PHARMACY | Facility: MEDICAL CENTER | Age: 74
End: 2024-11-22
Payer: COMMERCIAL

## 2024-11-26 ENCOUNTER — PHARMACY VISIT (OUTPATIENT)
Dept: PHARMACY | Facility: MEDICAL CENTER | Age: 74
End: 2024-11-26
Payer: COMMERCIAL

## 2024-11-26 ENCOUNTER — HOME CARE VISIT (OUTPATIENT)
Dept: HOSPICE | Facility: HOSPICE | Age: 74
End: 2024-11-26
Payer: MEDICARE

## 2024-11-26 PROCEDURE — RXMED WILLOW AMBULATORY MEDICATION CHARGE: Performed by: REHABILITATION PRACTITIONER

## 2024-11-26 PROCEDURE — G0299 HHS/HOSPICE OF RN EA 15 MIN: HCPCS

## 2024-11-26 ASSESSMENT — FIBROSIS 4 INDEX: FIB4 SCORE: 2.41

## 2024-11-27 VITALS — SYSTOLIC BLOOD PRESSURE: 110 MMHG | BODY MASS INDEX: 28.77 KG/M2 | WEIGHT: 183.7 LBS | DIASTOLIC BLOOD PRESSURE: 60 MMHG

## 2024-11-27 PROCEDURE — RXMED WILLOW AMBULATORY MEDICATION CHARGE: Performed by: REHABILITATION PRACTITIONER

## 2024-11-27 ASSESSMENT — ENCOUNTER SYMPTOMS
PAIN LOCATION - PAIN FREQUENCY: INTERMITTENT
BOWEL PATTERN NORMAL: 1
LIMITED RANGE OF MOTION: 1
DYSPNEA ACTIVITY LEVEL: AFTER AMBULATING MORE THAN 20 FT
DIZZINESS: 1
COUGH CHARACTERISTICS: DRY
LAST BOWEL MOVEMENT: 67168
SHORTNESS OF BREATH: 1
MUSCLE WEAKNESS: 1
PAIN LOCATION: LEFT SHOULDER BLADE
FATIGUE: 1
PAIN SEVERITY GOAL: 0/10
HIGHEST PAIN SEVERITY IN PAST 24 HOURS: 5/10
PAIN LOCATION - PAIN SEVERITY: 5/10
LOWEST PAIN SEVERITY IN PAST 24 HOURS: 0/10
PAIN LOCATION - EXACERBATING FACTORS: ACTIVITY
COUGH: 1
PAIN: 1
SUBJECTIVE PAIN PROGRESSION: GRADUALLY IMPROVING
FATIGUES EASILY: 1

## 2024-12-02 ENCOUNTER — PHARMACY VISIT (OUTPATIENT)
Dept: PHARMACY | Facility: MEDICAL CENTER | Age: 74
End: 2024-12-02
Payer: COMMERCIAL

## 2024-12-03 ENCOUNTER — HOME CARE VISIT (OUTPATIENT)
Dept: HOSPICE | Facility: HOSPICE | Age: 74
End: 2024-12-03
Payer: MEDICARE

## 2024-12-03 PROCEDURE — G0299 HHS/HOSPICE OF RN EA 15 MIN: HCPCS

## 2024-12-03 SDOH — ECONOMIC STABILITY: GENERAL

## 2024-12-03 ASSESSMENT — ENCOUNTER SYMPTOMS
LAST BOWEL MOVEMENT: 67177
MUSCLE WEAKNESS: 1
DYSPNEA ACTIVITY LEVEL: AFTER AMBULATING MORE THAN 20 FT
BOWEL PATTERN NORMAL: 1
LIMITED RANGE OF MOTION: 1
COUGH: 1
DENIES PAIN: 1
FATIGUE: 1
DYSPNEA ON EXERTION: 1
FATIGUES EASILY: 1
COUGH CHARACTERISTICS: DRY
SHORTNESS OF BREATH: 1

## 2024-12-03 ASSESSMENT — ACTIVITIES OF DAILY LIVING (ADL): MONEY MANAGEMENT (EXPENSES/BILLS): NEEDS ASSISTANCE

## 2024-12-10 ENCOUNTER — HOME CARE VISIT (OUTPATIENT)
Dept: HOSPICE | Facility: HOSPICE | Age: 74
End: 2024-12-10
Payer: MEDICARE

## 2024-12-17 ENCOUNTER — HOME CARE VISIT (OUTPATIENT)
Dept: HOSPICE | Facility: HOSPICE | Age: 74
End: 2024-12-17
Payer: MEDICARE

## 2024-12-18 ENCOUNTER — HOME CARE VISIT (OUTPATIENT)
Dept: HOSPICE | Facility: HOSPICE | Age: 74
End: 2024-12-18
Payer: MEDICARE

## 2024-12-18 PROCEDURE — G0299 HHS/HOSPICE OF RN EA 15 MIN: HCPCS

## 2024-12-19 PROCEDURE — RXMED WILLOW AMBULATORY MEDICATION CHARGE: Performed by: REHABILITATION PRACTITIONER

## 2024-12-19 ASSESSMENT — ENCOUNTER SYMPTOMS
COUGH CHARACTERISTICS: PRODUCTIVE
SHORTNESS OF BREATH: 1
MUSCLE WEAKNESS: 1
BOWEL PATTERN NORMAL: 1
DENIES PAIN: 1
LIMITED RANGE OF MOTION: 1
LAST BOWEL MOVEMENT: 67191
COUGH: 1
DYSPNEA ON EXERTION: 1
DYSPNEA ACTIVITY LEVEL: AFTER AMBULATING MORE THAN 20 FT
FATIGUES EASILY: 1
DIZZINESS: 1

## 2024-12-20 ENCOUNTER — PHARMACY VISIT (OUTPATIENT)
Dept: PHARMACY | Facility: MEDICAL CENTER | Age: 74
End: 2024-12-20
Payer: COMMERCIAL

## 2024-12-26 ENCOUNTER — HOME CARE VISIT (OUTPATIENT)
Dept: HOSPICE | Facility: HOSPICE | Age: 74
End: 2024-12-26
Payer: MEDICARE

## 2024-12-31 ENCOUNTER — HOME CARE VISIT (OUTPATIENT)
Dept: HOSPICE | Facility: HOSPICE | Age: 74
End: 2024-12-31
Payer: MEDICARE

## 2024-12-31 ENCOUNTER — PHARMACY VISIT (OUTPATIENT)
Dept: PHARMACY | Facility: MEDICAL CENTER | Age: 74
End: 2024-12-31
Payer: COMMERCIAL

## 2024-12-31 PROCEDURE — G0299 HHS/HOSPICE OF RN EA 15 MIN: HCPCS

## 2024-12-31 PROCEDURE — RXMED WILLOW AMBULATORY MEDICATION CHARGE: Performed by: REHABILITATION PRACTITIONER

## 2025-01-01 ENCOUNTER — PHARMACY VISIT (OUTPATIENT)
Dept: PHARMACY | Facility: MEDICAL CENTER | Age: 75
End: 2025-01-01
Payer: COMMERCIAL

## 2025-01-01 ENCOUNTER — HOME CARE VISIT (OUTPATIENT)
Dept: HOSPICE | Facility: HOSPICE | Age: 75
End: 2025-01-01
Payer: MEDICARE

## 2025-01-01 VITALS — HEART RATE: 88 BPM | RESPIRATION RATE: 8 BRPM

## 2025-01-01 PROCEDURE — RXMED WILLOW AMBULATORY MEDICATION CHARGE: Performed by: REHABILITATION PRACTITIONER

## 2025-01-01 PROCEDURE — G0299 HHS/HOSPICE OF RN EA 15 MIN: HCPCS

## 2025-01-01 PROCEDURE — RXMED WILLOW AMBULATORY MEDICATION CHARGE: Performed by: STUDENT IN AN ORGANIZED HEALTH CARE EDUCATION/TRAINING PROGRAM

## 2025-01-01 PROCEDURE — G0156 HHCP-SVS OF AIDE,EA 15 MIN: HCPCS

## 2025-01-01 RX ORDER — SCOPOLAMINE 1 MG/3D
1 PATCH, EXTENDED RELEASE TRANSDERMAL
Qty: 5 PATCH | Refills: 1 | OUTPATIENT
Start: 2025-01-01

## 2025-01-01 RX ADMIN — LORAZEPAM 1 MG: 2 CONCENTRATE ORAL at 15:45

## 2025-01-01 RX ADMIN — OXYCODONE HYDROCHLORIDE 10 MG: 100 SOLUTION ORAL at 15:45

## 2025-01-01 ASSESSMENT — PAIN SCALES - PAIN ASSESSMENT IN ADVANCED DEMENTIA (PAINAD)
NEGVOCALIZATION: 0 - NONE.
FACIALEXPRESSION: 0 - SMILING OR INEXPRESSIVE.
BODYLANGUAGE: 0 - RELAXED.
TOTALSCORE: 0
CONSOLABILITY: 0 - NO NEED TO CONSOLE.
NEGVOCALIZATION: 1 - OCCASIONAL MOAN OR GROAN. LOW-LEVEL SPEECH WITH A NEGATIVE OR DISAPPROVING QUALITY.
TOTALSCORE: 3
BODYLANGUAGE: 1 - TENSE. DISTRESSED PACING. FIDGETING.
CONSOLABILITY: 0 - NO NEED TO CONSOLE.
FACIALEXPRESSION: 1 - SAD. FRIGHTENED. FROWN.

## 2025-01-02 ASSESSMENT — ENCOUNTER SYMPTOMS
MUSCLE WEAKNESS: 1
LAST BOWEL MOVEMENT: 67204
DIZZINESS: 1
FORGETFULNESS: 1
BOWEL PATTERN NORMAL: 1
SHORTNESS OF BREATH: 1
DYSPNEA ON EXERTION: 1
FATIGUE: 1
COUGH: 1
DYSPNEA ACTIVITY LEVEL: AFTER AMBULATING MORE THAN 20 FT
COUGH CHARACTERISTICS: PRODUCTIVE
LIMITED RANGE OF MOTION: 1
DENIES PAIN: 1
FATIGUES EASILY: 1

## 2025-01-07 ENCOUNTER — PHARMACY VISIT (OUTPATIENT)
Dept: PHARMACY | Facility: MEDICAL CENTER | Age: 75
End: 2025-01-07
Payer: COMMERCIAL

## 2025-01-07 ENCOUNTER — HOME CARE VISIT (OUTPATIENT)
Dept: HOSPICE | Facility: HOSPICE | Age: 75
End: 2025-01-07
Payer: MEDICARE

## 2025-01-07 VITALS — WEIGHT: 183.5 LBS | BODY MASS INDEX: 28.74 KG/M2

## 2025-01-07 PROCEDURE — G0299 HHS/HOSPICE OF RN EA 15 MIN: HCPCS

## 2025-01-07 PROCEDURE — RXMED WILLOW AMBULATORY MEDICATION CHARGE: Performed by: REHABILITATION PRACTITIONER

## 2025-01-07 SDOH — ECONOMIC STABILITY: GENERAL

## 2025-01-07 ASSESSMENT — ENCOUNTER SYMPTOMS
COUGH CHARACTERISTICS: PRODUCTIVE
FATIGUES EASILY: 1
LIMITED RANGE OF MOTION: 1
FATIGUE: 1
LAST BOWEL MOVEMENT: 67210
MUSCLE WEAKNESS: 1
DENIES PAIN: 1
COUGH: 1

## 2025-01-07 ASSESSMENT — FIBROSIS 4 INDEX: FIB4 SCORE: 2.41

## 2025-01-07 ASSESSMENT — ACTIVITIES OF DAILY LIVING (ADL): MONEY MANAGEMENT (EXPENSES/BILLS): NEEDS ASSISTANCE

## 2025-01-14 ENCOUNTER — HOME CARE VISIT (OUTPATIENT)
Dept: HOSPICE | Facility: HOSPICE | Age: 75
End: 2025-01-14
Payer: MEDICARE

## 2025-01-14 PROCEDURE — G0299 HHS/HOSPICE OF RN EA 15 MIN: HCPCS

## 2025-01-14 ASSESSMENT — ENCOUNTER SYMPTOMS
FATIGUES EASILY: 1
PAIN: 1
LOWEST PAIN SEVERITY IN PAST 24 HOURS: 0/10
PAIN LOCATION - PAIN QUALITY: ACHE
PAIN LOCATION - PAIN SEVERITY: 3/10
MUSCLE WEAKNESS: 1
LAST BOWEL MOVEMENT: 67217
FATIGUE: 1
PAIN SEVERITY GOAL: 0/10
PAIN LOCATION - PAIN FREQUENCY: INTERMITTENT
PAIN LOCATION: LEFT MID BACK
PAIN LOCATION - RELIEVING FACTORS: ROXANOL
HIGHEST PAIN SEVERITY IN PAST 24 HOURS: 4/10
LIMITED RANGE OF MOTION: 1
SUBJECTIVE PAIN PROGRESSION: UNCHANGED

## 2025-01-21 ENCOUNTER — HOME CARE VISIT (OUTPATIENT)
Dept: HOSPICE | Facility: HOSPICE | Age: 75
End: 2025-01-21
Payer: MEDICARE

## 2025-01-21 ENCOUNTER — PHARMACY VISIT (OUTPATIENT)
Dept: PHARMACY | Facility: MEDICAL CENTER | Age: 75
End: 2025-01-21
Payer: COMMERCIAL

## 2025-01-21 PROCEDURE — RXMED WILLOW AMBULATORY MEDICATION CHARGE: Performed by: REHABILITATION PRACTITIONER

## 2025-01-21 PROCEDURE — G0299 HHS/HOSPICE OF RN EA 15 MIN: HCPCS

## 2025-01-21 ASSESSMENT — ENCOUNTER SYMPTOMS
SUBJECTIVE PAIN PROGRESSION: UNCHANGED
PAIN LOCATION - PAIN SEVERITY: 0/10
LOWEST PAIN SEVERITY IN PAST 24 HOURS: 0/10
FATIGUES EASILY: 1
PAIN LOCATION - PAIN FREQUENCY: INTERMITTENT
LIMITED RANGE OF MOTION: 1
SLEEP QUALITY: ADEQUATE
HIGHEST PAIN SEVERITY IN PAST 24 HOURS: 5/10
COUGH: 1
PAIN LOCATION - PAIN QUALITY: ACHE
MUSCLE WEAKNESS: 1
PAIN LOCATION - RELIEVING FACTORS: ROXANOL
DYSPNEA ACTIVITY LEVEL: AT REST
PAIN SEVERITY GOAL: 0/10
LAST BOWEL MOVEMENT: 67224
DYSPNEA ON EXERTION: 1
FATIGUE: 1
COUGH CHARACTERISTICS: PRODUCTIVE
SHORTNESS OF BREATH: 1
PAIN: 1

## 2025-01-28 ENCOUNTER — PHARMACY VISIT (OUTPATIENT)
Dept: PHARMACY | Facility: MEDICAL CENTER | Age: 75
End: 2025-01-28
Payer: COMMERCIAL

## 2025-01-28 ENCOUNTER — HOME CARE VISIT (OUTPATIENT)
Dept: HOSPICE | Facility: HOSPICE | Age: 75
End: 2025-01-28
Payer: MEDICARE

## 2025-01-28 VITALS — WEIGHT: 185 LBS | BODY MASS INDEX: 28.98 KG/M2

## 2025-01-28 PROCEDURE — G0299 HHS/HOSPICE OF RN EA 15 MIN: HCPCS

## 2025-01-28 PROCEDURE — RXMED WILLOW AMBULATORY MEDICATION CHARGE: Performed by: REHABILITATION PRACTITIONER

## 2025-01-28 PROCEDURE — RXMED WILLOW AMBULATORY MEDICATION CHARGE: Performed by: STUDENT IN AN ORGANIZED HEALTH CARE EDUCATION/TRAINING PROGRAM

## 2025-01-28 ASSESSMENT — ENCOUNTER SYMPTOMS
MUSCLE WEAKNESS: 1
FATIGUE: 1
PAIN LOCATION - PAIN SEVERITY: 4/10
PAIN LOCATION - RELIEVING FACTORS: MORPHINE
PAIN LOCATION - PAIN FREQUENCY: INTERMITTENT
PAIN: 1
SHORTNESS OF BREATH: 1
HIGHEST PAIN SEVERITY IN PAST 24 HOURS: 5/10
LAST BOWEL MOVEMENT: 67233
DYSPNEA ACTIVITY LEVEL: AFTER AMBULATING MORE THAN 20 FT
COUGH CHARACTERISTICS: PRODUCTIVE
COUGH: 1
LOWEST PAIN SEVERITY IN PAST 24 HOURS: 2/10
FATIGUES EASILY: 1
PAIN SEVERITY GOAL: 2/10
LIMITED RANGE OF MOTION: 1
PAIN LOCATION - PAIN QUALITY: DULL
SUBJECTIVE PAIN PROGRESSION: UNCHANGED

## 2025-01-28 ASSESSMENT — FIBROSIS 4 INDEX: FIB4 SCORE: 2.41

## 2025-01-30 NOTE — ED NOTES
Patient medicated per MAR for headache   Presented with syncope on 1/4.  Significant orthostasis noted in acute setting.  Continue midodrine PRN and midodrine 5mg daily in AM.  Apply abdominal binder/TEDs as needed.  Encourage PO hydration.  Continue to monitor orthostatics closely.    Given 500cc 0.9% NSS IV fluid bolus on 1/28.

## 2025-02-04 ENCOUNTER — HOME CARE VISIT (OUTPATIENT)
Dept: HOSPICE | Facility: HOSPICE | Age: 75
End: 2025-02-04
Payer: MEDICARE

## 2025-02-04 VITALS — BODY MASS INDEX: 28.08 KG/M2 | WEIGHT: 179.3 LBS

## 2025-02-04 PROCEDURE — G0299 HHS/HOSPICE OF RN EA 15 MIN: HCPCS

## 2025-02-04 SDOH — ECONOMIC STABILITY: GENERAL

## 2025-02-04 ASSESSMENT — ENCOUNTER SYMPTOMS
SUBJECTIVE PAIN PROGRESSION: UNCHANGED
PAIN SEVERITY GOAL: 2/10
COUGH: 1
FATIGUES EASILY: 1
DIARRHEA: 1
FATIGUE: 1
COUGH CHARACTERISTICS: PRODUCTIVE
LIMITED RANGE OF MOTION: 1
PAIN: 1
PAIN LOCATION - PAIN FREQUENCY: INTERMITTENT
MUSCLE WEAKNESS: 1
LAST BOWEL MOVEMENT: 67239
HIGHEST PAIN SEVERITY IN PAST 24 HOURS: 6/10
LOWEST PAIN SEVERITY IN PAST 24 HOURS: 0/10
PAIN LOCATION - PAIN QUALITY: ACHE
PAIN LOCATION - PAIN SEVERITY: 2/10

## 2025-02-04 ASSESSMENT — FIBROSIS 4 INDEX: FIB4 SCORE: 2.41

## 2025-02-04 ASSESSMENT — ACTIVITIES OF DAILY LIVING (ADL): MONEY MANAGEMENT (EXPENSES/BILLS): NEEDS ASSISTANCE

## 2025-02-08 ENCOUNTER — HOME CARE VISIT (OUTPATIENT)
Dept: HOSPICE | Facility: HOSPICE | Age: 75
End: 2025-02-08
Payer: MEDICARE

## 2025-02-08 RX ORDER — SENNA AND DOCUSATE SODIUM 50; 8.6 MG/1; MG/1
2 TABLET, FILM COATED ORAL 2 TIMES DAILY PRN
Qty: 28 TABLET | Refills: 10 | Status: SHIPPED | OUTPATIENT
Start: 2025-02-08 | End: 2026-02-08

## 2025-02-11 ENCOUNTER — HOME CARE VISIT (OUTPATIENT)
Dept: HOSPICE | Facility: HOSPICE | Age: 75
End: 2025-02-11
Payer: MEDICARE

## 2025-02-11 ENCOUNTER — PHARMACY VISIT (OUTPATIENT)
Dept: PHARMACY | Facility: MEDICAL CENTER | Age: 75
End: 2025-02-11
Payer: COMMERCIAL

## 2025-02-11 PROCEDURE — G0299 HHS/HOSPICE OF RN EA 15 MIN: HCPCS

## 2025-02-11 PROCEDURE — RXMED WILLOW AMBULATORY MEDICATION CHARGE: Performed by: REHABILITATION PRACTITIONER

## 2025-02-11 ASSESSMENT — ENCOUNTER SYMPTOMS
PAIN LOCATION - PAIN QUALITY: ACHE
SUBJECTIVE PAIN PROGRESSION: UNCHANGED
PAIN SEVERITY GOAL: 0/10
HIGHEST PAIN SEVERITY IN PAST 24 HOURS: 2/10
PAIN LOCATION - PAIN FREQUENCY: INTERMITTENT
LOWEST PAIN SEVERITY IN PAST 24 HOURS: 0/10
BOWEL PATTERN NORMAL: 1
LAST BOWEL MOVEMENT: 67246
COUGH: 1
PAIN LOCATION - PAIN SEVERITY: 1/10
PAIN: 1
SLEEP QUALITY: FAIR
FATIGUES EASILY: 1
SHORTNESS OF BREATH: 1
COUGH CHARACTERISTICS: PRODUCTIVE
LIMITED RANGE OF MOTION: 1
FLATUS: 1
DYSPNEA ACTIVITY LEVEL: AFTER AMBULATING MORE THAN 20 FT
MUSCLE WEAKNESS: 1

## 2025-02-18 ENCOUNTER — HOME CARE VISIT (OUTPATIENT)
Dept: HOSPICE | Facility: HOSPICE | Age: 75
End: 2025-02-18
Payer: MEDICARE

## 2025-02-18 ENCOUNTER — PHARMACY VISIT (OUTPATIENT)
Dept: PHARMACY | Facility: MEDICAL CENTER | Age: 75
End: 2025-02-18
Payer: COMMERCIAL

## 2025-02-18 DIAGNOSIS — C34.90 SMALL CELL CARCINOMA OF LUNG, UNSPECIFIED LATERALITY, UNSPECIFIED PART OF LUNG (HCC): Primary | ICD-10-CM

## 2025-02-18 DIAGNOSIS — Z51.5 HOSPICE CARE: ICD-10-CM

## 2025-02-18 PROCEDURE — RXMED WILLOW AMBULATORY MEDICATION CHARGE: Performed by: STUDENT IN AN ORGANIZED HEALTH CARE EDUCATION/TRAINING PROGRAM

## 2025-02-18 PROCEDURE — G0299 HHS/HOSPICE OF RN EA 15 MIN: HCPCS

## 2025-02-18 RX ORDER — AZITHROMYCIN 250 MG/1
TABLET, FILM COATED ORAL
Qty: 6 TABLET | Refills: 0 | Status: SHIPPED | OUTPATIENT
Start: 2025-02-18 | End: 2025-02-23

## 2025-02-18 RX ORDER — GUAIFENESIN AND DEXTROMETHORPHAN HYDROBROMIDE 1200; 60 MG/1; MG/1
1 TABLET, EXTENDED RELEASE ORAL EVERY 12 HOURS
Qty: 28 TABLET | Refills: 0 | Status: SHIPPED | OUTPATIENT
Start: 2025-02-18 | End: 2025-02-26

## 2025-02-18 ASSESSMENT — ENCOUNTER SYMPTOMS
COUGH: 1
DENIES PAIN: 1
FATIGUES EASILY: 1
BOWEL PATTERN NORMAL: 1
COUGH CHARACTERISTICS: MOIST
STOOL FREQUENCY: LESS THAN DAILY

## 2025-02-19 ENCOUNTER — HOME CARE VISIT (OUTPATIENT)
Dept: HOSPICE | Facility: HOSPICE | Age: 75
End: 2025-02-19
Payer: MEDICARE

## 2025-02-19 ENCOUNTER — PHARMACY VISIT (OUTPATIENT)
Dept: PHARMACY | Facility: MEDICAL CENTER | Age: 75
End: 2025-02-19
Payer: COMMERCIAL

## 2025-02-19 VITALS — RESPIRATION RATE: 18 BRPM

## 2025-02-19 PROCEDURE — RXMED WILLOW AMBULATORY MEDICATION CHARGE: Performed by: STUDENT IN AN ORGANIZED HEALTH CARE EDUCATION/TRAINING PROGRAM

## 2025-02-19 PROCEDURE — G0299 HHS/HOSPICE OF RN EA 15 MIN: HCPCS

## 2025-02-19 ASSESSMENT — ENCOUNTER SYMPTOMS
PAIN LOCATION - PAIN DURATION: SHORT
LOWEST PAIN SEVERITY IN PAST 24 HOURS: 0/10
PAIN LOCATION - RELIEVING FACTORS: REST
HIGHEST PAIN SEVERITY IN PAST 24 HOURS: 2/10
PAIN LOCATION - EXACERBATING FACTORS: MOVEMENT
PAIN LOCATION - PAIN FREQUENCY: INFREQUENT
PAIN SEVERITY GOAL: 2/10
STOOL FREQUENCY: LESS THAN DAILY
PAIN LOCATION - PAIN SEVERITY: 2/10
COUGH CHARACTERISTICS: MOIST
BOWEL PATTERN NORMAL: 1
PAIN LOCATION - PAIN QUALITY: ACHE
FATIGUES EASILY: 1
PAIN: 1
SUBJECTIVE PAIN PROGRESSION: UNCHANGED
COUGH CHARACTERISTICS: PRODUCTIVE
COUGH: 1

## 2025-02-21 ENCOUNTER — HOME CARE VISIT (OUTPATIENT)
Dept: HOSPICE | Facility: HOSPICE | Age: 75
End: 2025-02-21
Payer: MEDICARE

## 2025-02-22 ENCOUNTER — PHARMACY VISIT (OUTPATIENT)
Dept: PHARMACY | Facility: MEDICAL CENTER | Age: 75
End: 2025-02-22
Payer: COMMERCIAL

## 2025-02-22 ENCOUNTER — HOME CARE VISIT (OUTPATIENT)
Dept: HOSPICE | Facility: HOSPICE | Age: 75
End: 2025-02-22
Payer: MEDICARE

## 2025-02-22 PROCEDURE — RXMED WILLOW AMBULATORY MEDICATION CHARGE: Performed by: REHABILITATION PRACTITIONER

## 2025-02-25 ENCOUNTER — HOME CARE VISIT (OUTPATIENT)
Dept: HOSPICE | Facility: HOSPICE | Age: 75
End: 2025-02-25
Payer: MEDICARE

## 2025-02-25 PROCEDURE — RXMED WILLOW AMBULATORY MEDICATION CHARGE: Performed by: REHABILITATION PRACTITIONER

## 2025-02-25 PROCEDURE — G0299 HHS/HOSPICE OF RN EA 15 MIN: HCPCS

## 2025-02-25 RX ORDER — IPRATROPIUM BROMIDE AND ALBUTEROL SULFATE 2.5; .5 MG/3ML; MG/3ML
3 SOLUTION RESPIRATORY (INHALATION) 4 TIMES DAILY
Qty: 60 EACH | Refills: 22 | Status: SHIPPED | OUTPATIENT
Start: 2025-02-25 | End: 2026-02-25

## 2025-02-25 SDOH — ECONOMIC STABILITY: HOUSING INSECURITY: EVIDENCE OF SMOKING MATERIAL: 0

## 2025-02-25 ASSESSMENT — ENCOUNTER SYMPTOMS
SUBJECTIVE PAIN PROGRESSION: GRADUALLY IMPROVING
COUGH CHARACTERISTICS: PRODUCTIVE
COUGH: 1
PAIN: 1
HIGHEST PAIN SEVERITY IN PAST 24 HOURS: 3/10
PAIN LOCATION - PAIN DURATION: CONSTANT
COUGH CHARACTERISTICS: WEAK
PAIN LOCATION - PAIN QUALITY: ACHE
PAIN LOCATION - RELIEVING FACTORS: REST
PAIN LOCATION - PAIN SEVERITY: 2/10
CONSTIPATION: 1
FATIGUES EASILY: 1
LOWEST PAIN SEVERITY IN PAST 24 HOURS: 1/10
STOOL FREQUENCY: LESS THAN DAILY
PAIN SEVERITY GOAL: 2/10
PAIN LOCATION: LEFT CHEST AREA
PAIN LOCATION - PAIN FREQUENCY: FREQUENT

## 2025-02-26 ENCOUNTER — PHARMACY VISIT (OUTPATIENT)
Dept: PHARMACY | Facility: MEDICAL CENTER | Age: 75
End: 2025-02-26
Payer: COMMERCIAL

## 2025-03-04 ENCOUNTER — PHARMACY VISIT (OUTPATIENT)
Dept: PHARMACY | Facility: MEDICAL CENTER | Age: 75
End: 2025-03-04
Payer: COMMERCIAL

## 2025-03-04 ENCOUNTER — HOME CARE VISIT (OUTPATIENT)
Dept: HOSPICE | Facility: HOSPICE | Age: 75
End: 2025-03-04
Payer: MEDICARE

## 2025-03-04 PROCEDURE — G0299 HHS/HOSPICE OF RN EA 15 MIN: HCPCS

## 2025-03-04 PROCEDURE — RXMED WILLOW AMBULATORY MEDICATION CHARGE: Performed by: REHABILITATION PRACTITIONER

## 2025-03-04 ASSESSMENT — FIBROSIS 4 INDEX: FIB4 SCORE: 2.44

## 2025-03-05 VITALS — BODY MASS INDEX: 28.07 KG/M2 | WEIGHT: 179.2 LBS

## 2025-03-05 SDOH — ECONOMIC STABILITY: HOUSING INSECURITY: EVIDENCE OF SMOKING MATERIAL: 0

## 2025-03-05 SDOH — ECONOMIC STABILITY: GENERAL

## 2025-03-05 ASSESSMENT — ENCOUNTER SYMPTOMS
PAIN LOCATION: LEFT ARMPIT
DIZZINESS: 1
PAIN SEVERITY GOAL: 0/10
LAST BOWEL MOVEMENT: 67267
FATIGUE: 1
FATIGUES EASILY: 1
PAIN LOCATION - PAIN SEVERITY: 4/10
HIGHEST PAIN SEVERITY IN PAST 24 HOURS: 8/10
PAIN LOCATION - EXACERBATING FACTORS: ACTIVITY
LIMITED RANGE OF MOTION: 1
PAIN LOCATION - RELIEVING FACTORS: ROXANOL
SLEEP QUALITY: ADEQUATE
SHORTNESS OF BREATH: 1
SUBJECTIVE PAIN PROGRESSION: UNCHANGED
COUGH CHARACTERISTICS: DRY
LOWEST PAIN SEVERITY IN PAST 24 HOURS: 2/10
PAIN: 1
COUGH: 1
DYSPNEA ACTIVITY LEVEL: AFTER AMBULATING MORE THAN 20 FT
PAIN LOCATION - PAIN QUALITY: SORE
MUSCLE WEAKNESS: 1

## 2025-03-05 ASSESSMENT — ACTIVITIES OF DAILY LIVING (ADL): MONEY MANAGEMENT (EXPENSES/BILLS): NEEDS ASSISTANCE

## 2025-03-11 ENCOUNTER — PHARMACY VISIT (OUTPATIENT)
Dept: PHARMACY | Facility: MEDICAL CENTER | Age: 75
End: 2025-03-11
Payer: COMMERCIAL

## 2025-03-11 ENCOUNTER — HOME CARE VISIT (OUTPATIENT)
Dept: HOSPICE | Facility: HOSPICE | Age: 75
End: 2025-03-11
Payer: MEDICARE

## 2025-03-11 PROCEDURE — G0299 HHS/HOSPICE OF RN EA 15 MIN: HCPCS

## 2025-03-11 PROCEDURE — RXMED WILLOW AMBULATORY MEDICATION CHARGE: Performed by: REHABILITATION PRACTITIONER

## 2025-03-11 RX ORDER — DOXYCYCLINE 100 MG/1
100 CAPSULE ORAL 2 TIMES DAILY
Qty: 14 CAPSULE | Refills: 0 | Status: SHIPPED | OUTPATIENT
Start: 2025-03-11 | End: 2025-03-18

## 2025-03-11 ASSESSMENT — FIBROSIS 4 INDEX: FIB4 SCORE: 2.44

## 2025-03-11 NOTE — PROGRESS NOTES
Patient discussed with Jazz Wright RN. Terminal diagnosis lung cancer. [facility]. Patient has new onset profound fatigue, green sputum, chest tightness/pain (X 1 week). He is taking roxanol twice daily. Patient would like treatment. New order for augmentin and doxycycline X 7 days per epocrates guidelines for adults with comorbidities/risk factors.

## 2025-03-12 VITALS — WEIGHT: 177 LBS | BODY MASS INDEX: 27.72 KG/M2

## 2025-03-12 SDOH — ECONOMIC STABILITY: HOUSING INSECURITY: EVIDENCE OF SMOKING MATERIAL: 0

## 2025-03-12 ASSESSMENT — ENCOUNTER SYMPTOMS
COUGH: 1
LAST BOWEL MOVEMENT: 67275
PAIN LOCATION - PAIN SEVERITY: 4/10
LOWEST PAIN SEVERITY IN PAST 24 HOURS: 4/10
COUGH CHARACTERISTICS: PRODUCTIVE
SUBJECTIVE PAIN PROGRESSION: UNCHANGED
PAIN: 1
FATIGUES EASILY: 1
PAIN LOCATION: LEFT ARMPIT
DYSPNEA ON EXERTION: 1
FATIGUE: 1
MUSCLE WEAKNESS: 1
PAIN LOCATION - PAIN QUALITY: SORE
PAIN SEVERITY GOAL: 4/10
DIZZINESS: 1
SHORTNESS OF BREATH: 1
HIGHEST PAIN SEVERITY IN PAST 24 HOURS: 8/10
DYSPNEA ACTIVITY LEVEL: AFTER AMBULATING 10 - 20 FT
CHEST TIGHTNESS: 1
LIMITED RANGE OF MOTION: 1

## 2025-03-18 ENCOUNTER — PHARMACY VISIT (OUTPATIENT)
Dept: PHARMACY | Facility: MEDICAL CENTER | Age: 75
End: 2025-03-18
Payer: COMMERCIAL

## 2025-03-18 ENCOUNTER — HOME CARE VISIT (OUTPATIENT)
Dept: HOSPICE | Facility: HOSPICE | Age: 75
End: 2025-03-18
Payer: MEDICARE

## 2025-03-18 VITALS
BODY MASS INDEX: 27.72 KG/M2 | DIASTOLIC BLOOD PRESSURE: 60 MMHG | RESPIRATION RATE: 16 BRPM | HEART RATE: 80 BPM | SYSTOLIC BLOOD PRESSURE: 110 MMHG | WEIGHT: 177 LBS

## 2025-03-18 DIAGNOSIS — C34.90 SMALL CELL LUNG CANCER (HCC): ICD-10-CM

## 2025-03-18 PROCEDURE — G0299 HHS/HOSPICE OF RN EA 15 MIN: HCPCS

## 2025-03-18 PROCEDURE — RXMED WILLOW AMBULATORY MEDICATION CHARGE: Performed by: REHABILITATION PRACTITIONER

## 2025-03-18 PROCEDURE — RXMED WILLOW AMBULATORY MEDICATION CHARGE: Performed by: STUDENT IN AN ORGANIZED HEALTH CARE EDUCATION/TRAINING PROGRAM

## 2025-03-18 RX ORDER — LORAZEPAM 2 MG/ML
1-2 CONCENTRATE ORAL
Qty: 360 ML | Refills: 0 | Status: SHIPPED | OUTPATIENT
Start: 2025-03-18 | End: 2026-03-18

## 2025-03-18 RX ORDER — MORPHINE SULFATE 100 MG/5ML
5-10 SOLUTION ORAL
Qty: 240 ML | Refills: 0 | Status: SHIPPED | OUTPATIENT
Start: 2025-03-18 | End: 2025-03-28

## 2025-03-18 SDOH — ECONOMIC STABILITY: HOUSING INSECURITY: EVIDENCE OF SMOKING MATERIAL: 0

## 2025-03-18 ASSESSMENT — ENCOUNTER SYMPTOMS
DIZZINESS: 1
PAIN LOCATION - PAIN QUALITY: JAB
PAIN SEVERITY GOAL: 2/10
LIMITED RANGE OF MOTION: 1
PAIN LOCATION - PAIN SEVERITY: 0/10
PAIN LOCATION - EXACERBATING FACTORS: FATIGUE
SUBJECTIVE PAIN PROGRESSION: UNCHANGED
PAIN LOCATION: LEFT BACK/FLANK
LAST BOWEL MOVEMENT: 67281
PAIN LOCATION - PAIN SEVERITY: 2/10
COUGH: 1
SHORTNESS OF BREATH: 1
LOWEST PAIN SEVERITY IN PAST 24 HOURS: 2/10
FATIGUE: 1
MUSCLE WEAKNESS: 1
PAIN: 1
STOOL FREQUENCY: LESS THAN DAILY
DYSPNEA ACTIVITY LEVEL: AT REST
DYSPNEA ON EXERTION: 1
PAIN LOCATION - PAIN FREQUENCY: INTERMITTENT
COUGH CHARACTERISTICS: DRY
PAIN LOCATION: LEFT CHEST
PAIN LOCATION - PAIN QUALITY: SORE, ACHE
PAIN LOCATION - PAIN FREQUENCY: INTERMITTENT
FATIGUES EASILY: 1
PAIN LOCATION - RELIEVING FACTORS: ROXANOL
BOWEL PATTERN NORMAL: 1
HIGHEST PAIN SEVERITY IN PAST 24 HOURS: 7/10

## 2025-03-18 ASSESSMENT — FIBROSIS 4 INDEX: FIB4 SCORE: 2.44

## 2025-03-24 ENCOUNTER — HOME CARE VISIT (OUTPATIENT)
Dept: HOSPICE | Facility: HOSPICE | Age: 75
End: 2025-03-24
Payer: MEDICARE

## 2025-03-25 ENCOUNTER — HOME CARE VISIT (OUTPATIENT)
Dept: HOSPICE | Facility: HOSPICE | Age: 75
End: 2025-03-25
Payer: MEDICARE

## 2025-03-25 PROCEDURE — G0299 HHS/HOSPICE OF RN EA 15 MIN: HCPCS

## 2025-03-25 SDOH — ECONOMIC STABILITY: HOUSING INSECURITY: EVIDENCE OF SMOKING MATERIAL: 0

## 2025-03-25 ASSESSMENT — ENCOUNTER SYMPTOMS
HIGHEST PAIN SEVERITY IN PAST 24 HOURS: 5/10
PAIN LOCATION: LEFT SIDE OF BACK
SUBJECTIVE PAIN PROGRESSION: UNCHANGED
DYSPNEA ON EXERTION: 1
PAIN LOCATION - PAIN QUALITY: SORE
DIZZINESS: 1
PAIN: 1
PAIN SEVERITY GOAL: 3/10
LAST BOWEL MOVEMENT: 67288
PAIN LOCATION: LEFT SIDE OF CHEST
PAIN LOCATION - PAIN SEVERITY: 0/10
PAIN LOCATION - EXACERBATING FACTORS: ACTIVITY
LIMITED RANGE OF MOTION: 1
LOWEST PAIN SEVERITY IN PAST 24 HOURS: 5/10
PAIN LOCATION - PAIN QUALITY: SORE
PAIN LOCATION - PAIN SEVERITY: 5/10
CHEST TIGHTNESS: 1
MUSCLE WEAKNESS: 1
FATIGUE: 1
COUGH CHARACTERISTICS: PRODUCTIVE
BOWEL PATTERN NORMAL: 1
FATIGUES EASILY: 1
SHORTNESS OF BREATH: 1
COUGH: 1
DYSPNEA ACTIVITY LEVEL: AFTER AMBULATING MORE THAN 20 FT
PAIN LOCATION - RELIEVING FACTORS: ROXANOL

## 2025-03-26 ENCOUNTER — HOME CARE VISIT (OUTPATIENT)
Dept: HOSPICE | Facility: HOSPICE | Age: 75
End: 2025-03-26
Payer: MEDICARE

## 2025-03-27 ENCOUNTER — HOME CARE VISIT (OUTPATIENT)
Dept: HOSPICE | Facility: HOSPICE | Age: 75
End: 2025-03-27
Payer: MEDICARE

## 2025-03-28 ENCOUNTER — HOME CARE VISIT (OUTPATIENT)
Dept: HOSPICE | Facility: HOSPICE | Age: 75
End: 2025-03-28
Payer: MEDICARE

## 2025-03-28 ENCOUNTER — PHARMACY VISIT (OUTPATIENT)
Dept: PHARMACY | Facility: MEDICAL CENTER | Age: 75
End: 2025-03-28
Payer: COMMERCIAL

## 2025-03-28 DIAGNOSIS — Z51.5 HOSPICE CARE: ICD-10-CM

## 2025-03-28 DIAGNOSIS — C34.90 SMALL CELL CARCINOMA OF LUNG, UNSPECIFIED LATERALITY, UNSPECIFIED PART OF LUNG (HCC): ICD-10-CM

## 2025-03-28 PROCEDURE — G0299 HHS/HOSPICE OF RN EA 15 MIN: HCPCS

## 2025-03-28 PROCEDURE — RXMED WILLOW AMBULATORY MEDICATION CHARGE: Performed by: REHABILITATION PRACTITIONER

## 2025-03-28 RX ORDER — OXYCODONE HYDROCHLORIDE 100 MG/5ML
10-20 SOLUTION ORAL
Qty: 240 ML | Refills: 0 | Status: SHIPPED | OUTPATIENT
Start: 2025-03-28 | End: 2026-03-28

## 2025-03-28 NOTE — PROGRESS NOTES
Patient discussed with Jazz Wright RN. Patient having increasing hallucinations. Will DC MSIR and trial oxycodone.

## 2025-03-29 ENCOUNTER — HOME CARE VISIT (OUTPATIENT)
Dept: HOSPICE | Facility: HOSPICE | Age: 75
End: 2025-03-29
Payer: MEDICARE

## 2025-03-29 VITALS — HEART RATE: 100 BPM | RESPIRATION RATE: 14 BRPM

## 2025-03-29 ASSESSMENT — ENCOUNTER SYMPTOMS
SHORTNESS OF BREATH: 1
PAIN LOCATION - EXACERBATING FACTORS: LAYING ON LEFT SIDE
COUGH CHARACTERISTICS: PRODUCTIVE
DYSPNEA ACTIVITY LEVEL: AFTER AMBULATING LESS THAN 10 FT
PAIN: 1
COUGH: 1
LOWEST PAIN SEVERITY IN PAST 24 HOURS: 2/10
HIGHEST PAIN SEVERITY IN PAST 24 HOURS: 10/10
SUBJECTIVE PAIN PROGRESSION: GRADUALLY IMPROVING
PAIN SEVERITY GOAL: 3/10
PAIN LOCATION - PAIN SEVERITY: 2/10
PAIN LOCATION - RELIEVING FACTORS: ROXANOL
PAIN LOCATION - PAIN FREQUENCY: CONSTANT

## 2025-03-30 ENCOUNTER — HOME CARE VISIT (OUTPATIENT)
Dept: HOSPICE | Facility: HOSPICE | Age: 75
End: 2025-03-30
Payer: MEDICARE

## 2025-03-31 ENCOUNTER — HOME CARE VISIT (OUTPATIENT)
Dept: HOSPICE | Facility: HOSPICE | Age: 75
End: 2025-03-31
Payer: MEDICARE

## 2025-03-31 PROCEDURE — G0156 HHCP-SVS OF AIDE,EA 15 MIN: HCPCS

## 2025-04-06 VITALS — HEART RATE: 92 BPM | RESPIRATION RATE: 9 BRPM

## 2025-04-06 VITALS — RESPIRATION RATE: 8 BRPM | HEART RATE: 88 BPM

## 2025-04-06 ASSESSMENT — PAIN SCALES - PAIN ASSESSMENT IN ADVANCED DEMENTIA (PAINAD)
FACIALEXPRESSION: 0 - SMILING OR INEXPRESSIVE.
NEGVOCALIZATION: 0 - NONE.
TOTALSCORE: 0
CONSOLABILITY: 0 - NO NEED TO CONSOLE.
BODYLANGUAGE: 0 - RELAXED.
NEGVOCALIZATION: 0 - NONE.
BODYLANGUAGE: 0 - RELAXED.
CONSOLABILITY: 0 - NO NEED TO CONSOLE.
TOTALSCORE: 0
FACIALEXPRESSION: 0 - SMILING OR INEXPRESSIVE.

## (undated) DEVICE — MASK AIRWAY SIZE 4 UNIQUE SILICON (10EA/BX)

## (undated) DEVICE — SYRINGE DISP. 60 CC LL - (30/BX, 12BX/CA)**WHEN THESE ARE GONE ORDER #500206**

## (undated) DEVICE — KIT CUSTOM PROCEDURE SINGLE FOR ENDO  (15/CA)

## (undated) DEVICE — SET LEADWIRE 5 LEAD BEDSIDE DISPOSABLE ECG (1SET OF 5/EA)

## (undated) DEVICE — SYRINGE SAFETY 5 ML 18 GA X 1-1/2 BLUNT LL (100/BX 4BX/CA)

## (undated) DEVICE — SENSOR OXIMETER ADULT SPO2 RD SET (20EA/BX)

## (undated) DEVICE — TUBE SUCTION YANKAUER  1/4 X 6FT (20EA/CA)"

## (undated) DEVICE — GOWN SURGEONS LARGE - (32/CA)

## (undated) DEVICE — WATER IRRIGATION STERILE 1000ML (12EA/CA)

## (undated) DEVICE — MASK AIRWAY SIZE 3 UNIQUE SILICON (10/BX)

## (undated) DEVICE — SYRINGE SAFETY 10 ML 18 GA X 1 1/2 BLUNT LL (100/BX 4BX/CA)

## (undated) DEVICE — ELECTRODE 850 FOAM ADHESIVE - HYDROGEL RADIOTRNSPRNT (50/PK)

## (undated) DEVICE — CATHETER GUIDING ION (1/EA)

## (undated) DEVICE — BITE BLOCK ADULT 60FR (100EA/CA)

## (undated) DEVICE — CANNULA W/ SUPPLY TUBING O2 - (50/CA)

## (undated) DEVICE — CANISTER SUCTION RIGID RED 1500CC (40EA/CA)

## (undated) DEVICE — MASK AIRWAY SIZE 2 LMA WITH LUBE & SYRINGE (10/BX)

## (undated) DEVICE — GOWN SURGEONS X-LARGE - DISP. (30/CA)

## (undated) DEVICE — MASK O2 VNYL ADLT RBRTH HI - (50/CS)

## (undated) DEVICE — TUBING CLEARLINK DUO-VENT - C-FLO (48EA/CA)

## (undated) DEVICE — MASK WITH FACE SHIELD (25/BX 4BX/CA)

## (undated) DEVICE — ELECTRODE DUAL RETURN W/ CORD - (50/PK)

## (undated) DEVICE — FORCEP RADIAL JAW 4 STANDARD CAPACITY W/NEEDLE 240CM (40EA/BX)

## (undated) DEVICE — FILM CASSETTE ENDO

## (undated) DEVICE — SET EXTENSION WITH 2 PORTS (48EA/CA) ***PART #2C8610 IS A SUBSTITUTE*****

## (undated) DEVICE — CON SEDATION EA ADDL 15 MIN

## (undated) DEVICE — LACTATED RINGERS INJ 1000 ML - (14EA/CA 60CA/PF)

## (undated) DEVICE — PROBE ENDOSCOPIC PERIPHERAL VISION ION 1.5 IF1000 (1/EA)

## (undated) DEVICE — SOD. CHL. INJ. 0.9% 1000 ML - (14EA/CA 60CA/PF)

## (undated) DEVICE — TUBE E-T HI-LO CUFF 8.5MM (10EA/PK)

## (undated) DEVICE — ROBOTIC SURGERY SERVICES

## (undated) DEVICE — SPONGE GAUZE NON-STERILE 4X4 - (2000/CA 10PK/CA)

## (undated) DEVICE — COVER LIGHT HANDLE FLEXIBLE - SOFT (2EA/PK 80PK/CA)

## (undated) DEVICE — CON SEDATION/>5 YR 1ST 15 MIN

## (undated) DEVICE — BAG IV VISION ION IF1000 (10EA/BX)

## (undated) DEVICE — MASK  AIRWAY SIZE 5 UNIQUE SILICON (10EA/BX)

## (undated) DEVICE — SYRINGE 6 CC 20 GA X 1 1/2 - NDL SAFETY  (50/BX)

## (undated) DEVICE — TUBE CONNECTING SUCTION - CLEAR PLASTIC STERILE 72 IN (50EA/CA)

## (undated) DEVICE — SUCTION INSTRUMENT YANKAUER BULBOUS TIP W/O VENT (50EA/CA)

## (undated) DEVICE — SYRINGE 3 CC 22 GA X 1-1/2 - NDL SAFETY (50/BX 8BX/CA)

## (undated) DEVICE — CATHETER IV SAFETY 20 GA X 1-1/4 (50/BX)

## (undated) DEVICE — SOD. CHL 10CC SYRINGE PREFILL - W/10 CC (30/BX)

## (undated) DEVICE — Device

## (undated) DEVICE — SYRINGE SAFETY 3 ML 18 GA X 1 1/2 BLUNT LL (100/BX 8BX/CA)

## (undated) DEVICE — CUFF BP ADULT LARGE DISPOSABLE (20EA/CA)

## (undated) DEVICE — KIT  I.V. START (100EA/CA)

## (undated) DEVICE — CANISTER SUCTION 3000ML MECHANICAL FILTER AUTO SHUTOFF MEDI-VAC NONSTERILE LF DISP  (40EA/CA)

## (undated) DEVICE — CONTAINER, SPECIMEN, STERILE

## (undated) DEVICE — BLOCK BITE ENDOSCOPIC 2809 - (100/BX) INTERMEDIATE

## (undated) DEVICE — BASIN EMESIS DISP. - (250/CA)

## (undated) DEVICE — DEVICE BIOPSY ACQUIRE NEEDLE EBUS 22GA (1EA)